# Patient Record
Sex: FEMALE | Race: WHITE | NOT HISPANIC OR LATINO | Employment: OTHER | URBAN - METROPOLITAN AREA
[De-identification: names, ages, dates, MRNs, and addresses within clinical notes are randomized per-mention and may not be internally consistent; named-entity substitution may affect disease eponyms.]

---

## 2017-01-02 ENCOUNTER — OFFICE VISIT - HEALTHEAST (OUTPATIENT)
Dept: GERIATRICS | Facility: CLINIC | Age: 62
End: 2017-01-02

## 2017-01-02 DIAGNOSIS — I10 HYPERTENSION: ICD-10-CM

## 2017-01-02 DIAGNOSIS — G89.29 ENCOUNTER FOR CHRONIC PAIN MANAGEMENT: ICD-10-CM

## 2017-01-02 DIAGNOSIS — K81.0 ACUTE CHOLECYSTITIS: ICD-10-CM

## 2017-01-02 DIAGNOSIS — E80.6 HYPERBILIRUBINEMIA: ICD-10-CM

## 2017-01-02 DIAGNOSIS — Z90.49 S/P CHOLECYSTECTOMY: ICD-10-CM

## 2017-01-02 DIAGNOSIS — D72.829 LEUKOCYTOSIS: ICD-10-CM

## 2017-01-02 DIAGNOSIS — N39.0 URINARY TRACT INFECTION: ICD-10-CM

## 2017-01-04 ENCOUNTER — COMMUNICATION - HEALTHEAST (OUTPATIENT)
Dept: SURGERY | Facility: CLINIC | Age: 62
End: 2017-01-04

## 2017-01-05 ENCOUNTER — OFFICE VISIT - HEALTHEAST (OUTPATIENT)
Dept: SURGERY | Facility: CLINIC | Age: 62
End: 2017-01-05

## 2017-01-05 ENCOUNTER — OFFICE VISIT - HEALTHEAST (OUTPATIENT)
Dept: GERIATRICS | Facility: CLINIC | Age: 62
End: 2017-01-05

## 2017-01-05 DIAGNOSIS — Z90.49 S/P CHOLECYSTECTOMY: ICD-10-CM

## 2017-01-05 DIAGNOSIS — Z48.89 POSTOPERATIVE VISIT: ICD-10-CM

## 2017-01-05 DIAGNOSIS — Z90.49 S/P CHOLE: ICD-10-CM

## 2017-01-05 DIAGNOSIS — K81.0 ACUTE CHOLECYSTITIS: ICD-10-CM

## 2017-01-05 DIAGNOSIS — R79.0 LOW MAGNESIUM LEVELS: ICD-10-CM

## 2017-01-05 DIAGNOSIS — D72.829 LEUKOCYTOSIS, UNSPECIFIED TYPE: ICD-10-CM

## 2017-01-05 DIAGNOSIS — E83.51 HYPOCALCEMIA: ICD-10-CM

## 2017-01-09 ENCOUNTER — OFFICE VISIT - HEALTHEAST (OUTPATIENT)
Dept: SURGERY | Facility: CLINIC | Age: 62
End: 2017-01-09

## 2017-01-09 ENCOUNTER — OFFICE VISIT - HEALTHEAST (OUTPATIENT)
Dept: GERIATRICS | Facility: CLINIC | Age: 62
End: 2017-01-09

## 2017-01-09 DIAGNOSIS — E83.51 HYPOCALCEMIA: ICD-10-CM

## 2017-01-09 DIAGNOSIS — D72.829 LEUKOCYTOSIS, UNSPECIFIED TYPE: ICD-10-CM

## 2017-01-09 DIAGNOSIS — Z48.89 POSTOPERATIVE VISIT: ICD-10-CM

## 2017-01-09 DIAGNOSIS — Z90.49 S/P CHOLECYSTECTOMY: ICD-10-CM

## 2017-01-09 DIAGNOSIS — R79.0 LOW MAGNESIUM LEVELS: ICD-10-CM

## 2017-01-09 DIAGNOSIS — K81.0 ACUTE CHOLECYSTITIS: ICD-10-CM

## 2017-01-10 ENCOUNTER — OFFICE VISIT - HEALTHEAST (OUTPATIENT)
Dept: GERIATRICS | Facility: CLINIC | Age: 62
End: 2017-01-10

## 2017-01-10 DIAGNOSIS — N76.6 VULVAR ULCER: ICD-10-CM

## 2017-01-12 ENCOUNTER — AMBULATORY - HEALTHEAST (OUTPATIENT)
Dept: GERIATRICS | Facility: CLINIC | Age: 62
End: 2017-01-12

## 2017-06-13 ENCOUNTER — RECORDS - HEALTHEAST (OUTPATIENT)
Dept: LAB | Facility: CLINIC | Age: 62
End: 2017-06-13

## 2017-06-13 LAB
CHOLEST SERPL-MCNC: 187 MG/DL
FASTING STATUS PATIENT QL REPORTED: ABNORMAL
HDLC SERPL-MCNC: 34 MG/DL
LDLC SERPL CALC-MCNC: 124 MG/DL
TRIGL SERPL-MCNC: 143 MG/DL

## 2018-02-05 ENCOUNTER — RECORDS - HEALTHEAST (OUTPATIENT)
Dept: LAB | Facility: CLINIC | Age: 63
End: 2018-02-05

## 2018-02-05 LAB
ALBUMIN SERPL-MCNC: 3.3 G/DL (ref 3.5–5)
ALP SERPL-CCNC: 101 U/L (ref 45–120)
ALT SERPL W P-5'-P-CCNC: 12 U/L (ref 0–45)
ANION GAP SERPL CALCULATED.3IONS-SCNC: 11 MMOL/L (ref 5–18)
AST SERPL W P-5'-P-CCNC: 19 U/L (ref 0–40)
BILIRUB SERPL-MCNC: 0.4 MG/DL (ref 0–1)
BUN SERPL-MCNC: 12 MG/DL (ref 8–22)
CALCIUM SERPL-MCNC: 8.9 MG/DL (ref 8.5–10.5)
CHLORIDE BLD-SCNC: 105 MMOL/L (ref 98–107)
CHOLEST SERPL-MCNC: 174 MG/DL
CO2 SERPL-SCNC: 25 MMOL/L (ref 22–31)
CREAT SERPL-MCNC: 0.78 MG/DL (ref 0.6–1.1)
FASTING STATUS PATIENT QL REPORTED: ABNORMAL
GFR SERPL CREATININE-BSD FRML MDRD: >60 ML/MIN/1.73M2
GLUCOSE BLD-MCNC: 96 MG/DL (ref 70–125)
HDLC SERPL-MCNC: 42 MG/DL
LDLC SERPL CALC-MCNC: 110 MG/DL
POTASSIUM BLD-SCNC: 4.7 MMOL/L (ref 3.5–5)
PROT SERPL-MCNC: 6.4 G/DL (ref 6–8)
SODIUM SERPL-SCNC: 141 MMOL/L (ref 136–145)
TRIGL SERPL-MCNC: 111 MG/DL

## 2018-02-06 ENCOUNTER — HOME CARE/HOSPICE - HEALTHEAST (OUTPATIENT)
Dept: HOME HEALTH SERVICES | Facility: HOME HEALTH | Age: 63
End: 2018-02-06

## 2018-02-07 ENCOUNTER — HOME CARE/HOSPICE - HEALTHEAST (OUTPATIENT)
Dept: HOME HEALTH SERVICES | Facility: HOME HEALTH | Age: 63
End: 2018-02-07

## 2018-02-09 ENCOUNTER — HOME CARE/HOSPICE - HEALTHEAST (OUTPATIENT)
Dept: HOME HEALTH SERVICES | Facility: HOME HEALTH | Age: 63
End: 2018-02-09

## 2018-02-12 ENCOUNTER — HOME CARE/HOSPICE - HEALTHEAST (OUTPATIENT)
Dept: HOME HEALTH SERVICES | Facility: HOME HEALTH | Age: 63
End: 2018-02-12

## 2018-02-14 ENCOUNTER — HOME CARE/HOSPICE - HEALTHEAST (OUTPATIENT)
Dept: HOME HEALTH SERVICES | Facility: HOME HEALTH | Age: 63
End: 2018-02-14

## 2018-02-16 ENCOUNTER — HOME CARE/HOSPICE - HEALTHEAST (OUTPATIENT)
Dept: HOME HEALTH SERVICES | Facility: HOME HEALTH | Age: 63
End: 2018-02-16

## 2018-02-19 ENCOUNTER — HOME CARE/HOSPICE - HEALTHEAST (OUTPATIENT)
Dept: HOME HEALTH SERVICES | Facility: HOME HEALTH | Age: 63
End: 2018-02-19

## 2018-02-21 ENCOUNTER — HOME CARE/HOSPICE - HEALTHEAST (OUTPATIENT)
Dept: HOME HEALTH SERVICES | Facility: HOME HEALTH | Age: 63
End: 2018-02-21

## 2018-02-23 ENCOUNTER — HOME CARE/HOSPICE - HEALTHEAST (OUTPATIENT)
Dept: HOME HEALTH SERVICES | Facility: HOME HEALTH | Age: 63
End: 2018-02-23

## 2018-02-27 ENCOUNTER — HOME CARE/HOSPICE - HEALTHEAST (OUTPATIENT)
Dept: HOME HEALTH SERVICES | Facility: HOME HEALTH | Age: 63
End: 2018-02-27

## 2018-05-07 ENCOUNTER — RECORDS - HEALTHEAST (OUTPATIENT)
Dept: LAB | Facility: CLINIC | Age: 63
End: 2018-05-07

## 2018-05-09 LAB
THC CARBOXYLIC ACID-BY GC/MS: 22 NG/ML
TOXICOLOGIST REVIEW: NORMAL

## 2018-08-28 ENCOUNTER — RECORDS - HEALTHEAST (OUTPATIENT)
Dept: LAB | Facility: CLINIC | Age: 63
End: 2018-08-28

## 2018-08-30 LAB
7-NH-CLONAZEPAM-BY GC/MS: NEGATIVE NG/ML
7-NH-FLUNITRAZEPAM-BY GC/MS: NEGATIVE NG/ML
ALPHA OH-ALPRAZOLAM-BY GC/MS: NEGATIVE NG/ML
ALPHA OH-TRIAZOLAM-BY GC/MS: NEGATIVE NG/ML
BACTERIA SPEC CULT: ABNORMAL
INTERPRETATION: NORMAL
LORAZEPAM-BY GC/MS: NEGATIVE NG/ML
NORDIAZEPAM-BY GC/MS: NEGATIVE NG/ML
OH-ETHYL-FLURAZEPAM-BY GC/MS: NEGATIVE NG/ML
OXAZEPAM-BY GC/MS: NEGATIVE NG/ML
TEMAZEPAM-BY GC/MS: NEGATIVE NG/ML

## 2018-08-31 LAB
THC CARBOXYLIC ACID-BY GC/MS: 3 NG/ML
TOXICOLOGIST REVIEW: NORMAL

## 2019-01-22 ENCOUNTER — RECORDS - HEALTHEAST (OUTPATIENT)
Dept: LAB | Facility: CLINIC | Age: 64
End: 2019-01-22

## 2019-01-22 LAB
ALBUMIN SERPL-MCNC: 3.5 G/DL (ref 3.5–5)
ALP SERPL-CCNC: 117 U/L (ref 45–120)
ALT SERPL W P-5'-P-CCNC: 10 U/L (ref 0–45)
ANION GAP SERPL CALCULATED.3IONS-SCNC: 11 MMOL/L (ref 5–18)
AST SERPL W P-5'-P-CCNC: 15 U/L (ref 0–40)
BILIRUB SERPL-MCNC: 0.4 MG/DL (ref 0–1)
BUN SERPL-MCNC: 13 MG/DL (ref 8–22)
CALCIUM SERPL-MCNC: 8.9 MG/DL (ref 8.5–10.5)
CHLORIDE BLD-SCNC: 106 MMOL/L (ref 98–107)
CHOLEST SERPL-MCNC: 181 MG/DL
CO2 SERPL-SCNC: 23 MMOL/L (ref 22–31)
CREAT SERPL-MCNC: 0.9 MG/DL (ref 0.6–1.1)
FASTING STATUS PATIENT QL REPORTED: ABNORMAL
GFR SERPL CREATININE-BSD FRML MDRD: >60 ML/MIN/1.73M2
GLUCOSE BLD-MCNC: 98 MG/DL (ref 70–125)
HDLC SERPL-MCNC: 41 MG/DL
LDLC SERPL CALC-MCNC: 113 MG/DL
POTASSIUM BLD-SCNC: 4.5 MMOL/L (ref 3.5–5)
PROT SERPL-MCNC: 6.3 G/DL (ref 6–8)
SODIUM SERPL-SCNC: 140 MMOL/L (ref 136–145)
TRIGL SERPL-MCNC: 136 MG/DL
TSH SERPL DL<=0.005 MIU/L-ACNC: 4.98 UIU/ML (ref 0.3–5)

## 2019-01-23 LAB — BACTERIA SPEC CULT: NO GROWTH

## 2019-05-06 ENCOUNTER — RECORDS - HEALTHEAST (OUTPATIENT)
Dept: LAB | Facility: CLINIC | Age: 64
End: 2019-05-06

## 2019-05-06 LAB — TSH SERPL DL<=0.005 MIU/L-ACNC: 8.41 UIU/ML (ref 0.3–5)

## 2019-08-21 ENCOUNTER — RECORDS - HEALTHEAST (OUTPATIENT)
Dept: LAB | Facility: CLINIC | Age: 64
End: 2019-08-21

## 2019-08-21 ENCOUNTER — TRANSFERRED RECORDS (OUTPATIENT)
Dept: HEALTH INFORMATION MANAGEMENT | Facility: CLINIC | Age: 64
End: 2019-08-21

## 2019-08-21 LAB — TSH SERPL DL<=0.005 MIU/L-ACNC: 0.83 UIU/ML (ref 0.3–5)

## 2019-10-27 ENCOUNTER — HOSPITAL ENCOUNTER (EMERGENCY)
Facility: CLINIC | Age: 64
Discharge: HOME | End: 2019-10-27
Attending: EMERGENCY MEDICINE | Admitting: EMERGENCY MEDICINE
Payer: MEDICARE

## 2019-10-27 ENCOUNTER — APPOINTMENT (OUTPATIENT)
Dept: ULTRASOUND IMAGING | Facility: CLINIC | Age: 64
End: 2019-10-27
Attending: EMERGENCY MEDICINE
Payer: MEDICARE

## 2019-10-27 VITALS
BODY MASS INDEX: 29.62 KG/M2 | RESPIRATION RATE: 18 BRPM | DIASTOLIC BLOOD PRESSURE: 82 MMHG | HEIGHT: 69 IN | TEMPERATURE: 98.2 F | WEIGHT: 200 LBS | OXYGEN SATURATION: 99 % | SYSTOLIC BLOOD PRESSURE: 198 MMHG

## 2019-10-27 DIAGNOSIS — L03.115 CELLULITIS OF RIGHT LOWER EXTREMITY: ICD-10-CM

## 2019-10-27 DIAGNOSIS — M79.89 RIGHT LEG SWELLING: ICD-10-CM

## 2019-10-27 LAB
ALBUMIN SERPL-MCNC: 2.7 G/DL (ref 3.4–5)
ALP SERPL-CCNC: 120 U/L (ref 40–150)
ALT SERPL W P-5'-P-CCNC: 12 U/L (ref 0–50)
ANION GAP SERPL CALCULATED.3IONS-SCNC: 7 MMOL/L (ref 3–14)
AST SERPL W P-5'-P-CCNC: 17 U/L (ref 0–45)
BASOPHILS # BLD AUTO: 0 10E9/L (ref 0–0.2)
BASOPHILS NFR BLD AUTO: 0.4 %
BILIRUB SERPL-MCNC: 0.4 MG/DL (ref 0.2–1.3)
BUN SERPL-MCNC: 10 MG/DL (ref 7–30)
CALCIUM SERPL-MCNC: 8.4 MG/DL (ref 8.5–10.1)
CHLORIDE SERPL-SCNC: 109 MMOL/L (ref 94–109)
CO2 SERPL-SCNC: 25 MMOL/L (ref 20–32)
CREAT SERPL-MCNC: 0.97 MG/DL (ref 0.52–1.04)
DIFFERENTIAL METHOD BLD: ABNORMAL
EOSINOPHIL # BLD AUTO: 0.3 10E9/L (ref 0–0.7)
EOSINOPHIL NFR BLD AUTO: 3.5 %
ERYTHROCYTE [DISTWIDTH] IN BLOOD BY AUTOMATED COUNT: 14.1 % (ref 10–15)
GFR SERPL CREATININE-BSD FRML MDRD: 61 ML/MIN/{1.73_M2}
GLUCOSE SERPL-MCNC: 86 MG/DL (ref 70–99)
HCT VFR BLD AUTO: 35.5 % (ref 35–47)
HGB BLD-MCNC: 11.1 G/DL (ref 11.7–15.7)
IMM GRANULOCYTES # BLD: 0 10E9/L (ref 0–0.4)
IMM GRANULOCYTES NFR BLD: 0.3 %
LACTATE BLD-SCNC: 0.7 MMOL/L (ref 0.7–2)
LYMPHOCYTES # BLD AUTO: 1.2 10E9/L (ref 0.8–5.3)
LYMPHOCYTES NFR BLD AUTO: 17.3 %
MCH RBC QN AUTO: 29.3 PG (ref 26.5–33)
MCHC RBC AUTO-ENTMCNC: 31.3 G/DL (ref 31.5–36.5)
MCV RBC AUTO: 94 FL (ref 78–100)
MONOCYTES # BLD AUTO: 0.5 10E9/L (ref 0–1.3)
MONOCYTES NFR BLD AUTO: 7 %
NEUTROPHILS # BLD AUTO: 5.1 10E9/L (ref 1.6–8.3)
NEUTROPHILS NFR BLD AUTO: 71.5 %
NRBC # BLD AUTO: 0 10*3/UL
NRBC BLD AUTO-RTO: 0 /100
PLATELET # BLD AUTO: 239 10E9/L (ref 150–450)
POTASSIUM SERPL-SCNC: 4.8 MMOL/L (ref 3.4–5.3)
PROT SERPL-MCNC: 6.2 G/DL (ref 6.8–8.8)
RBC # BLD AUTO: 3.79 10E12/L (ref 3.8–5.2)
SODIUM SERPL-SCNC: 141 MMOL/L (ref 133–144)
WBC # BLD AUTO: 7.1 10E9/L (ref 4–11)

## 2019-10-27 PROCEDURE — 93971 EXTREMITY STUDY: CPT | Mod: RT

## 2019-10-27 PROCEDURE — 87040 BLOOD CULTURE FOR BACTERIA: CPT | Performed by: EMERGENCY MEDICINE

## 2019-10-27 PROCEDURE — 80053 COMPREHEN METABOLIC PANEL: CPT | Performed by: EMERGENCY MEDICINE

## 2019-10-27 PROCEDURE — 96374 THER/PROPH/DIAG INJ IV PUSH: CPT | Performed by: EMERGENCY MEDICINE

## 2019-10-27 PROCEDURE — 99285 EMERGENCY DEPT VISIT HI MDM: CPT | Mod: 25 | Performed by: EMERGENCY MEDICINE

## 2019-10-27 PROCEDURE — 96361 HYDRATE IV INFUSION ADD-ON: CPT | Performed by: EMERGENCY MEDICINE

## 2019-10-27 PROCEDURE — 25000128 H RX IP 250 OP 636: Performed by: EMERGENCY MEDICINE

## 2019-10-27 PROCEDURE — 99284 EMERGENCY DEPT VISIT MOD MDM: CPT | Mod: Z6 | Performed by: EMERGENCY MEDICINE

## 2019-10-27 PROCEDURE — 83605 ASSAY OF LACTIC ACID: CPT | Performed by: EMERGENCY MEDICINE

## 2019-10-27 PROCEDURE — 85025 COMPLETE CBC W/AUTO DIFF WBC: CPT | Performed by: EMERGENCY MEDICINE

## 2019-10-27 RX ORDER — CEPHALEXIN 500 MG/1
500 CAPSULE ORAL 4 TIMES DAILY
Qty: 40 CAPSULE | Refills: 0 | Status: SHIPPED | OUTPATIENT
Start: 2019-10-27 | End: 2019-11-06

## 2019-10-27 RX ORDER — CEFAZOLIN SODIUM 2 G/100ML
2 INJECTION, SOLUTION INTRAVENOUS EVERY 8 HOURS
Status: DISCONTINUED | OUTPATIENT
Start: 2019-10-27 | End: 2019-10-27 | Stop reason: HOSPADM

## 2019-10-27 RX ORDER — SODIUM CHLORIDE 9 MG/ML
1000 INJECTION, SOLUTION INTRAVENOUS CONTINUOUS
Status: DISCONTINUED | OUTPATIENT
Start: 2019-10-27 | End: 2019-10-27 | Stop reason: HOSPADM

## 2019-10-27 RX ADMIN — SODIUM CHLORIDE 1000 ML: 9 INJECTION, SOLUTION INTRAVENOUS at 13:55

## 2019-10-27 RX ADMIN — CEFAZOLIN SODIUM 2 G: 2 INJECTION, SOLUTION INTRAVENOUS at 13:56

## 2019-10-27 ASSESSMENT — MIFFLIN-ST. JEOR: SCORE: 1516.57

## 2019-10-27 ASSESSMENT — ENCOUNTER SYMPTOMS: WOUND: 1

## 2019-10-27 NOTE — ED PROVIDER NOTES
History     Chief Complaint   Patient presents with     Leg Swelling     HPI  Carolina Mari is a 64 year old female who presents to the emergency department today for evaluation of leg swelling. Patient developed bilateral leg swelling a few weeks ago. She has been using her compression socks but it has continued to get worse. Her right leg is worse that her left and is weeping with erythema. Patient notes that she has rashes on her butt and abdomen. She notes that her abdomen rash is from her gastric bypass surgery which was years ago but is continuing to give her problems. She has no history of blood clots.     Past Medical History   Past Medical History:   Diagnosis Date     Diabetes mellitus (H)      History of stomach ulcers      HTN (hypertension)      Knee osteoarthritis     right     Osteoarthritis of right hip      Osteoporosis      Osteoporosis      Thyroid disease        Problem List  Patient Active Problem List   Diagnosis     Osteoarthritis of right hip     Open wound of abdomen     Morbid obesity (H)     Diabetes mellitus, type 2 (H)     Tobacco use disorder       Past Surgical History  Past Surgical History:   Procedure Laterality Date     FOOT SURGERY       GASTRIC BYPASS       HC SACROPLASTY       tka      right       Social History  Social History     Socioeconomic History     Marital status:      Spouse name: Not on file     Number of children: Not on file     Years of education: Not on file     Highest education level: Not on file   Occupational History     Not on file   Social Needs     Financial resource strain: Not on file     Food insecurity:     Worry: Not on file     Inability: Not on file     Transportation needs:     Medical: Not on file     Non-medical: Not on file   Tobacco Use     Smoking status: Never Smoker     Smokeless tobacco: Never Used   Substance and Sexual Activity     Alcohol use: No     Drug use: No     Sexual activity: Not on file   Lifestyle     Physical  activity:     Days per week: Not on file     Minutes per session: Not on file     Stress: Not on file   Relationships     Social connections:     Talks on phone: Not on file     Gets together: Not on file     Attends [employer] service: Not on file     Active member of club or organization: Not on file     Attends meetings of clubs or organizations: Not on file     Relationship status: Not on file     Intimate partner violence:     Fear of current or ex partner: Not on file     Emotionally abused: Not on file     Physically abused: Not on file     Forced sexual activity: Not on file   Other Topics Concern     Not on file   Social History Narrative     Not on file       Allergies:  No Known Allergies    Problem List:    Patient Active Problem List    Diagnosis Date Noted     Open wound of abdomen 07/01/2015     Priority: Medium     Morbid obesity (H) 07/01/2015     Priority: Medium     Diabetes mellitus, type 2 (H) 07/01/2015     Priority: Medium     Tobacco use disorder 07/01/2015     Priority: Medium     Osteoarthritis of right hip 04/22/2015     Priority: Medium        Past Medical History:    Past Medical History:   Diagnosis Date     Diabetes mellitus (H)      History of stomach ulcers      HTN (hypertension)      Knee osteoarthritis      Osteoarthritis of right hip      Osteoporosis      Osteoporosis      Thyroid disease        Past Surgical History:    Past Surgical History:   Procedure Laterality Date     FOOT SURGERY       GASTRIC BYPASS       HC SACROPLASTY       tka      right       Family History:    Family History   Problem Relation Age of Onset     Coronary Artery Disease Father      Coronary Artery Disease Brother      Cancer Mother         bone     Cancer Brother         leukemia       Social History:  Marital Status:   [4]  Social History     Tobacco Use     Smoking status: Never Smoker     Smokeless tobacco: Never Used   Substance Use Topics     Alcohol use: No     Drug use: No     "    Medications:    cephALEXin (KEFLEX) 500 MG capsule  ATENOLOL PO  colchicine 0.6 MG tablet  cyanocobalamin (VITAMIN B12) 1000 MCG/ML injection  CYCLOBENZAPRINE HCL PO  diclofenac (VOLTAREN) 1 % GEL  ferrous sulfate (IRON) 325 (65 FE) MG tablet  FLUOXETINE HCL PO  GABAPENTIN PO  LEVOTHYROXINE SODIUM PO  LOSARTAN POTASSIUM PO  MELATONIN PO  multivitamin, therapeutic with minerals (MULTI-VITAMIN) TABS  NAPROXEN SODIUM PO  Nystatin (NYAMYC) 753909 UNIT/GM POWD  OxyCODONE HCl (OXYCONTIN PO)  OXYCODONE HCL ER PO  predniSONE (DELTASONE) 10 MG tablet  RANITIDINE HCL PO  TRAZODONE HCL PO          Review of Systems   Cardiovascular: Positive for leg swelling.   Skin: Positive for rash and wound.   All other systems reviewed and are negative.      Physical Exam   BP: (!) 198/82  Heart Rate: 55  Temp: 98.2  F (36.8  C)  Resp: 18  Height: 175.3 cm (5' 9\")  Weight: 90.7 kg (200 lb)  SpO2: 99 %      Physical Exam General alert cooperative female in mild to moderate distress.  HEENT shows no scleral icterus.  She will speak with sentences.  Lungs are clear.  Cardiac auscultation was normal.  Her abdomen reveals open sores in the mid abdomen healing by secondary intention.  Did not look secondarily infected.  The abdomen otherwise is nontender.  On her right buttock or proximal thigh she has a superficial lesion but again does not look secondarily infected.  Her right leg is swollen, erythematous, some mild superficial ulcers and sanguinous oozing from those areas as noted.  Intact pulses.  No joint effusions.              ED Course   1:24 PM Patient assessed.        Procedures               Critical Care time:  none               Results for orders placed or performed during the hospital encounter of 10/27/19 (from the past 24 hour(s))   CBC with platelets differential   Result Value Ref Range    WBC 7.1 4.0 - 11.0 10e9/L    RBC Count 3.79 (L) 3.8 - 5.2 10e12/L    Hemoglobin 11.1 (L) 11.7 - 15.7 g/dL    Hematocrit 35.5 35.0 - " 47.0 %    MCV 94 78 - 100 fl    MCH 29.3 26.5 - 33.0 pg    MCHC 31.3 (L) 31.5 - 36.5 g/dL    RDW 14.1 10.0 - 15.0 %    Platelet Count 239 150 - 450 10e9/L    Diff Method Automated Method     % Neutrophils 71.5 %    % Lymphocytes 17.3 %    % Monocytes 7.0 %    % Eosinophils 3.5 %    % Basophils 0.4 %    % Immature Granulocytes 0.3 %    Nucleated RBCs 0 0 /100    Absolute Neutrophil 5.1 1.6 - 8.3 10e9/L    Absolute Lymphocytes 1.2 0.8 - 5.3 10e9/L    Absolute Monocytes 0.5 0.0 - 1.3 10e9/L    Absolute Eosinophils 0.3 0.0 - 0.7 10e9/L    Absolute Basophils 0.0 0.0 - 0.2 10e9/L    Abs Immature Granulocytes 0.0 0 - 0.4 10e9/L    Absolute Nucleated RBC 0.0    Comprehensive metabolic panel   Result Value Ref Range    Sodium 141 133 - 144 mmol/L    Potassium 4.8 3.4 - 5.3 mmol/L    Chloride 109 94 - 109 mmol/L    Carbon Dioxide 25 20 - 32 mmol/L    Anion Gap 7 3 - 14 mmol/L    Glucose 86 70 - 99 mg/dL    Urea Nitrogen 10 7 - 30 mg/dL    Creatinine 0.97 0.52 - 1.04 mg/dL    GFR Estimate 61 >60 mL/min/[1.73_m2]    GFR Estimate If Black 70 >60 mL/min/[1.73_m2]    Calcium 8.4 (L) 8.5 - 10.1 mg/dL    Bilirubin Total 0.4 0.2 - 1.3 mg/dL    Albumin 2.7 (L) 3.4 - 5.0 g/dL    Protein Total 6.2 (L) 6.8 - 8.8 g/dL    Alkaline Phosphatase 120 40 - 150 U/L    ALT 12 0 - 50 U/L    AST 17 0 - 45 U/L   Lactic acid whole blood   Result Value Ref Range    Lactic Acid 0.7 0.7 - 2.0 mmol/L   US Lower Extremity Venous Duplex Right    Narrative    ULTRASOUND VENOUS LOWER EXTREMITY UNILATERAL RIGHT  10/27/2019 2:36 PM      HISTORY: Unilateral leg swelling with concerns for deep venous  thrombosis.    COMPARISON: None.    TECHNIQUE: Ultrasound gray scale, Color Doppler flow, and spectral  Doppler waveform analysis performed.    FINDINGS: The right common femoral, superficial femoral, popliteal and  posterior tibial veins are patent and fully compressible and  demonstrate normal venous Doppler flow. The visualized greater  saphenous vein is  negative for thrombus.      Impression    IMPRESSION: No deep venous thrombosis demonstrated.    REINA REED MD       Medications   0.9% sodium chloride BOLUS (1,000 mLs Intravenous New Bag 10/27/19 1355)     Followed by   sodium chloride 0.9% infusion (has no administration in time range)   ceFAZolin (ANCEF) intermittent infusion 2 g in 100 mL dextrose PRE-MIX (2 g Intravenous Given 10/27/19 1356)     IV is established.  Blood work is obtained and noted above.  Patient given Ancef.  Ultrasound was ordered to assess for DVT.  Assessments & Plan (with Medical Decision Making)   Carolina Mari is a 64 year old female who presents to the emergency department today for evaluation of leg swelling. Patient developed bilateral leg swelling a few weeks ago. She has been using her compression socks but it has continued to get worse. Her right leg is worse that her left and is weeping with erythema. Patient notes that she has rashes on her butt and abdomen. She notes that her abdomen rash is from her gastric bypass surgery which was years ago but is continuing to give her problems. She has no history of blood clots.  On presentation patient was afebrile.  Her right leg was swollen and erythematous and had some draining lesions that were superficial and had sanguinous discharge.  Ultrasound showed no DVT.  Blood work including a lactic acid was normal.  She was given Ancef IV for suspected cellulitis.  Recommend she take Keflex for 10 days.  Will cover the lesions and they can change dressings as needed.  Recommend her compressive stockings.  Recheck with her primary doctor in 3 to 4 days.  Return if new concerning symptoms.  I have reviewed the nursing notes.    I have reviewed the findings, diagnosis, plan and need for follow up with the patient.       New Prescriptions    CEPHALEXIN (KEFLEX) 500 MG CAPSULE    Take 1 capsule (500 mg) by mouth 4 times daily for 10 days       Final diagnoses:   Right leg swelling    Cellulitis of right lower extremity     This document serves as a record of the services and decisions personally performed and made by Grayson Epps MD. It was created on HIS/HER behalf by Hilda Chong, a trained medical scribe. The creation of this document is based on the provider's statements to the medical scribe.  Hilda Chong 1:29 PM 10/27/2019    Provider:  The information in this document, created by the medical scribe for me, accurately reflects the services I personally performed and the decisions made by me. I have reviewed and approved this document for accuracy prior to leaving the patient care area.  Grayson Epps MD 1:29 PM 10/27/2019    10/27/2019   [facility] EMERGENCY DEPARTMENT     Grayson Epps MD  10/27/19 1514

## 2019-10-27 NOTE — DISCHARGE INSTRUCTIONS
Keflex as directed for 10 days.  Keep the lesions clean and covered.  Compression stockings.  Follow-up with your doctor in 3 to 4 days for reassessment to make sure things are improving.  The infection/redness may get worse before the antibiotic's have time to take effect.

## 2019-10-27 NOTE — ED AVS SNAPSHOT
[facility] Emergency Department  [address] [address]  Phone:  [phone]  Fax:  [phone]                                    Carolina Mari   MRN: [id number]    Department:  [facility] Emergency Department   Date of Visit:  10/27/2019           After Visit Summary Signature Page    I have received my discharge instructions, and my questions have been answered. I have discussed any challenges I see with this plan with the nurse or doctor.    ..........................................................................................................................................  Patient/Patient Representative Signature      ..........................................................................................................................................  Patient Representative Print Name and Relationship to Patient    ..................................................               ................................................  Date                                   Time    ..........................................................................................................................................  Reviewed by Signature/Title    ...................................................              ..............................................  Date                                               Time          22EPIC Rev 08/18

## 2019-11-02 LAB
BACTERIA SPEC CULT: NO GROWTH
BACTERIA SPEC CULT: NO GROWTH
Lab: NORMAL
Lab: NORMAL
SPECIMEN SOURCE: NORMAL
SPECIMEN SOURCE: NORMAL

## 2019-11-06 ENCOUNTER — OFFICE VISIT (OUTPATIENT)
Dept: FAMILY MEDICINE | Facility: CLINIC | Age: 64
End: 2019-11-06
Payer: MEDICARE

## 2019-11-06 VITALS
WEIGHT: 247 LBS | HEART RATE: 73 BPM | SYSTOLIC BLOOD PRESSURE: 144 MMHG | TEMPERATURE: 98.1 F | RESPIRATION RATE: 16 BRPM | HEIGHT: 69 IN | DIASTOLIC BLOOD PRESSURE: 60 MMHG | BODY MASS INDEX: 36.58 KG/M2 | OXYGEN SATURATION: 97 %

## 2019-11-06 DIAGNOSIS — L03.90 CELLULITIS, UNSPECIFIED CELLULITIS SITE: Primary | ICD-10-CM

## 2019-11-06 DIAGNOSIS — M06.4 UNDIFFERENTIATED INFLAMMATORY ARTHRITIS (H): ICD-10-CM

## 2019-11-06 DIAGNOSIS — Z23 NEED FOR PROPHYLACTIC VACCINATION AND INOCULATION AGAINST INFLUENZA: ICD-10-CM

## 2019-11-06 PROCEDURE — G0008 ADMIN INFLUENZA VIRUS VAC: HCPCS | Performed by: FAMILY MEDICINE

## 2019-11-06 PROCEDURE — 90662 IIV NO PRSV INCREASED AG IM: CPT | Performed by: FAMILY MEDICINE

## 2019-11-06 PROCEDURE — 99204 OFFICE O/P NEW MOD 45 MIN: CPT | Mod: 25 | Performed by: FAMILY MEDICINE

## 2019-11-06 PROCEDURE — 87070 CULTURE OTHR SPECIMN AEROBIC: CPT | Performed by: FAMILY MEDICINE

## 2019-11-06 RX ORDER — OXYCODONE HYDROCHLORIDE 15 MG/1
15 TABLET, FILM COATED, EXTENDED RELEASE ORAL
Refills: 0 | Status: CANCELLED | OUTPATIENT
Start: 2019-11-06

## 2019-11-06 RX ORDER — OXYCODONE HYDROCHLORIDE 30 MG/1
30 TABLET, FILM COATED, EXTENDED RELEASE ORAL EVERY 8 HOURS
Refills: 0 | Status: CANCELLED | OUTPATIENT
Start: 2019-11-06

## 2019-11-06 RX ORDER — SULFAMETHOXAZOLE/TRIMETHOPRIM 800-160 MG
1 TABLET ORAL 2 TIMES DAILY
Qty: 20 TABLET | Refills: 0 | Status: SHIPPED | OUTPATIENT
Start: 2019-11-06 | End: 2019-11-16

## 2019-11-06 ASSESSMENT — ANXIETY QUESTIONNAIRES
3. WORRYING TOO MUCH ABOUT DIFFERENT THINGS: NOT AT ALL
6. BECOMING EASILY ANNOYED OR IRRITABLE: NOT AT ALL
7. FEELING AFRAID AS IF SOMETHING AWFUL MIGHT HAPPEN: NOT AT ALL
2. NOT BEING ABLE TO STOP OR CONTROL WORRYING: NOT AT ALL
5. BEING SO RESTLESS THAT IT IS HARD TO SIT STILL: NOT AT ALL
1. FEELING NERVOUS, ANXIOUS, OR ON EDGE: NOT AT ALL
GAD7 TOTAL SCORE: 0

## 2019-11-06 ASSESSMENT — PAIN SCALES - GENERAL: PAINLEVEL: EXTREME PAIN (8)

## 2019-11-06 ASSESSMENT — PATIENT HEALTH QUESTIONNAIRE - PHQ9
5. POOR APPETITE OR OVEREATING: NOT AT ALL
SUM OF ALL RESPONSES TO PHQ QUESTIONS 1-9: 0

## 2019-11-06 ASSESSMENT — MIFFLIN-ST. JEOR: SCORE: 1729.76

## 2019-11-06 NOTE — PROGRESS NOTES
Subjective     Carolina Mari is a 64 year old female who presents to clinic today for the following health issues:    HPI   Problem:   Chief Complaint   Patient presents with     [facility]     med recheck     Cellulitis     right foot, states she had her last dose of Keflex this morning     Flu Shot     Patient Request     would like order for farro wraps     ADDITIONAL HPI: 64 year old female here for above issue.      ROS: 10 point review of systems negative except as per HPI.    PAST MEDICAL HISTORY:  Past Medical History:   Diagnosis Date     Diabetes mellitus (H)      History of stomach ulcers      HTN (hypertension)      Knee osteoarthritis     right     Osteoarthritis of right hip      Osteoporosis      Osteoporosis      Thyroid disease         ACTIVE MEDICAL PROBLEMS:  Patient Active Problem List   Diagnosis     Osteoarthritis of right hip     Open wound of abdomen     Morbid obesity (H)     Diabetes mellitus, type 2 (H)     Tobacco use disorder        FAMILY HISTORY:  Family History   Problem Relation Age of Onset     Coronary Artery Disease Father      Coronary Artery Disease Brother      Cancer Mother         bone     Cancer Brother         leukemia       SOCIAL HISTORY:  Social History     Socioeconomic History     Marital status:      Spouse name: Not on file     Number of children: Not on file     Years of education: Not on file     Highest education level: Not on file   Occupational History     Not on file   Social Needs     Financial resource strain: Not on file     Food insecurity:     Worry: Not on file     Inability: Not on file     Transportation needs:     Medical: Not on file     Non-medical: Not on file   Tobacco Use     Smoking status: Never Smoker     Smokeless tobacco: Never Used   Substance and Sexual Activity     Alcohol use: No     Drug use: No     Sexual activity: Not on file   Lifestyle     Physical activity:     Days per week: Not on file     Minutes per session:  Not on file     Stress: Not on file   Relationships     Social connections:     Talks on phone: Not on file     Gets together: Not on file     Attends Shinto service: Not on file     Active member of club or organization: Not on file     Attends meetings of clubs or organizations: Not on file     Relationship status: Not on file     Intimate partner violence:     Fear of current or ex partner: Not on file     Emotionally abused: Not on file     Physically abused: Not on file     Forced sexual activity: Not on file   Other Topics Concern     Not on file   Social History Narrative     Not on file       MEDICATIONS:  Current Outpatient Medications   Medication Sig Dispense Refill     ATENOLOL PO Take 100 mg by mouth 2 times daily       cephALEXin (KEFLEX) 500 MG capsule Take 1 capsule (500 mg) by mouth 4 times daily for 10 days 40 capsule 0     cyanocobalamin (VITAMIN B12) 1000 MCG/ML injection Inject 1 mL into the muscle every 30 days       diclofenac (VOLTAREN) 1 % GEL Place onto the skin 4 times daily       FLUOXETINE HCL PO Take 60 mg by mouth daily       GABAPENTIN PO Take 300 mg by mouth 3 times daily       LEVOTHYROXINE SODIUM PO Take 200 mcg by mouth daily       LOSARTAN POTASSIUM PO Take 100 mg by mouth daily       multivitamin, therapeutic with minerals (MULTI-VITAMIN) TABS Take 1 tablet by mouth daily       NAPROXEN SODIUM PO Take 275 mg by mouth 2 times daily (with meals)       Nystatin (NYAMYC) 859744 UNIT/GM POWD        OxyCODONE HCl (OXYCONTIN PO) Take 30 mg by mouth every 8 hours       OXYCODONE HCL ER PO Take 15 mg by mouth every 4 hours as needed        RANITIDINE HCL PO Take 150 mg by mouth 2 times daily       CYCLOBENZAPRINE HCL PO Take 10 mg by mouth 3 times daily       ferrous sulfate (IRON) 325 (65 FE) MG tablet Take 325 mg by mouth 2 times daily       MELATONIN PO Take 3 mg by mouth nightly as needed       TRAZODONE HCL PO Take 50 mg by mouth At Bedtime         ALLERGIES:   No Known  "Allergies    Problem list, Medication list, Allergies, and Medical/Social/Surgical histories reviewed in [facility] and updated as appropriate.    OBJECTIVE:                                                    VITALS: BP (!) 144/60 (BP Location: Right arm)   Pulse 73   Temp 98.1  F (36.7  C) (Tympanic)   Resp 16   Ht 1.753 m (5' 9\")   Wt 112 kg (247 lb)   SpO2 97%   BMI 36.48 kg/m   Body mass index is 36.48 kg/m .  GENERAL: Pleasant, well appearing female.  CV: RRR, no murmurs, rubs or gallops.  LUNGS: CTAB, normal effort.   EXTREMITIES: right lower extremity diffuse 2+ pitting edema and erythema  with weeping of serous fluid. Culture obtained.    ASSESSMENT/PLAN:                                                    1. Cellulitis, unspecified cellulitis site  Culture obtained. Switch to septra for broader antibiotic coverage. Farrow wraps ordered as she cannot put on traditional compression hose. In the interim we did wrap today with kerlix and ace. She can change this daily until she gets her Farrow wraps.  - order for DME; Equipment being ordered: Farrow compression wrap.  Dispense: 1 each; Refill: 1  - sulfamethoxazole-trimethoprim (BACTRIM DS/SEPTRA DS) 800-160 MG tablet; Take 1 tablet by mouth 2 times daily for 10 days  Dispense: 20 tablet; Refill: 0  - Wound Culture Aerobic Bacterial    2. Undifferentiated inflammatory arthritis (H)  She is currently on Oxycontin 30mg TID and oxycodone 15mg 5/day. This is equal to 270 MME per day. Discussed with her that I would not be willing to take on prescribing of narcotics at those doses. Discussed high risk of unintentional overdose at these doses. Offered pain clinic referral. She declined and will continue to see previous provider in [place].     3. Need for prophylactic vaccination and inoculation against influenza  - INFLUENZA (HIGH DOSE) 3 VALENT VACCINE [99366]  - ADMIN INFLUENZA (For MEDICARE Patients ONLY) []      "

## 2019-11-06 NOTE — PATIENT INSTRUCTIONS
Our Clinic hours are:  Mondays    7:20 am - 7 pm  Tues -  Fri  7:20 am - 5 pm    Clinic Phone: [phone]    The clinic lab opens at 7:30 am Mon - Fri and appointments are required.    [facility]. [phone]  Monday  8 am - 7pm  Tues - Fri 8 am - 5:30 pm

## 2019-11-07 ASSESSMENT — ANXIETY QUESTIONNAIRES: GAD7 TOTAL SCORE: 0

## 2019-11-08 LAB
BACTERIA SPEC CULT: NO GROWTH
Lab: NORMAL
SPECIMEN SOURCE: NORMAL

## 2019-11-18 ENCOUNTER — HOSPITAL LABORATORY (OUTPATIENT)
Facility: OTHER | Age: 64
End: 2019-11-18

## 2019-11-19 LAB
ANION GAP SERPL CALCULATED.3IONS-SCNC: 2 MMOL/L (ref 3–14)
BUN SERPL-MCNC: 21 MG/DL (ref 7–30)
CALCIUM SERPL-MCNC: 8.4 MG/DL (ref 8.5–10.1)
CHLORIDE SERPL-SCNC: 110 MMOL/L (ref 94–109)
CO2 SERPL-SCNC: 25 MMOL/L (ref 20–32)
CREAT SERPL-MCNC: 1.1 MG/DL (ref 0.52–1.04)
GFR SERPL CREATININE-BSD FRML MDRD: 52 ML/MIN/{1.73_M2}
GLUCOSE SERPL-MCNC: 87 MG/DL (ref 70–99)
POTASSIUM SERPL-SCNC: 5.2 MMOL/L (ref 3.4–5.3)
SODIUM SERPL-SCNC: 137 MMOL/L (ref 133–144)

## 2019-12-10 ENCOUNTER — PATIENT OUTREACH (OUTPATIENT)
Dept: GERIATRIC MEDICINE | Facility: CLINIC | Age: 64
End: 2019-12-10

## 2019-12-10 PROBLEM — Z76.89 HEALTH CARE HOME: Status: ACTIVE | Noted: 2019-12-10

## 2019-12-10 NOTE — PROGRESS NOTES
[facility] Care Coordination Contact    Member became effective with  [facility] on 12/01/2019 with [facility] MSC+.  Previous Health Plan: [facility] MSC+  Previous Care System: [facility]  Previous care coordinators name and number: Jessica Palma Type: CADI  Last MMIS Entry: Date 11/06/19 and Type 06 REASSMT      UTF received: Yes: Received and saved to member file    Irma Maikel  Care Management Specialist  [facility]  [phone]

## 2019-12-10 NOTE — LETTER
December 10, 2019      CAROLINA LISA REYES  [address]   [place] [address]        Dear Carolina:    Welcome to [facility] s [place] [employer] Plus (MSC+) health program. My name is GILBERT Negron, [facility]. I am your MSC+ care coordinator.     I will call you soon to introduce myself and discuss the services you are receiving. I will not replace your CADI waiver , but we will work together to coordinate your Medicaid and waiver benefits.Our goal is to provide services that will keep you as healthy and independent as possible.     MSC+ includes the benefits you may receive from Medical Assistance. Soon, you will receive a new MSC+ member identification (ID) card from [facility]. Please use this card whenever you get health services. If you have Medicare, you will need to show your Medicare card when you get health services.       If you have questions, please call me at [phone]. If you reach my voice mail, leave a message and your phone number. If you are hearing impaired, please call the [place] Relay at 812 or [phone] (kjkxzo-[id number] relay service).    Sincerely,         GILBERT Negron [facility]    E-mail: [email or web address]  Phone: [phone]      Zack Pacheco          MS MSC+ [id number] DHS Approved ([id number])                                 [id number] (11/18)                   _

## 2019-12-19 ENCOUNTER — PATIENT OUTREACH (OUTPATIENT)
Dept: GERIATRIC MEDICINE | Facility: CLINIC | Age: 64
End: 2019-12-19

## 2019-12-19 NOTE — PROGRESS NOTES
[facility] Care Coordination Contact    [facility] Care System Change (Transfer)    12/31/19  Member is new enrollee to [facility] effective 12/1/19 with [facility]+ health plan. Member transferred from [facility].    No home visit required because this care coordinator (CC) has received all required documentation from the previous CC.    Writer t/c to member, introduced self as member's new CC. Confirmed with member that the welcome letter with writer's name and contact information has been received.  Reviewed [facility]/Health Risk Assessment (HRA) and POC with member. No changes noted.  Transitional HRA completed. Care Plan Summary updated and reflects current services.  Required referral authorization information communicated to CMS: Not applicable    MMIS entry completed by: Care Coordinator    Writer reviewed the following with member:    ER visits: Yes, Zack Mobley with cellulitis  Hospitalizations: No  TCU stays: No  Significant health status changes: None  Falls/Injuries: No  ADL/IADL changes: No  Changes in services: No    Follow-Up Plan: Member informed of future contact, plan to f/u with member with at next regularly scheduled contact.  Contact information shared with member and family, encouraged member to call with any questions or concerns.    Carolina did say that she actually sees a physician with [facility], Le Romo, and saw a [facility] once but still seeing Dr. Romo.     CC will process clinic change and care system change back to [facility] effective 1/1/20.    GILBERT Negron, [facility]  [phone]    12/19/19 Left message with Carolina and her daughter Elizabeth on this date requesting call back to discuss her change to [facility] for care coordination.    GILBERT Negron, [facility]  [phone]

## 2020-01-07 NOTE — PROGRESS NOTES
Faxed PCC Change to [facility] for change eff 2/1/20.  Member does not see [facility] PCP but Dr Le Romo at [facility].    UTF will be sent when change confirmed.    Irma Ko  Care Management Specialist  [facility]  [phone]

## 2020-02-14 PROBLEM — Z76.89 HEALTH CARE HOME: Status: RESOLVED | Noted: 2019-12-10 | Resolved: 2020-02-14

## 2020-02-21 ENCOUNTER — RECORDS - HEALTHEAST (OUTPATIENT)
Dept: LAB | Facility: CLINIC | Age: 65
End: 2020-02-21

## 2020-02-21 LAB
ANION GAP SERPL CALCULATED.3IONS-SCNC: 8 MMOL/L (ref 5–18)
BUN SERPL-MCNC: 12 MG/DL (ref 8–22)
CALCIUM SERPL-MCNC: 8.8 MG/DL (ref 8.5–10.5)
CHLORIDE BLD-SCNC: 104 MMOL/L (ref 98–107)
CHOLEST SERPL-MCNC: 135 MG/DL
CO2 SERPL-SCNC: 27 MMOL/L (ref 22–31)
CREAT SERPL-MCNC: 0.99 MG/DL (ref 0.6–1.1)
FASTING STATUS PATIENT QL REPORTED: ABNORMAL
GFR SERPL CREATININE-BSD FRML MDRD: 56 ML/MIN/1.73M2
GLUCOSE BLD-MCNC: 85 MG/DL (ref 70–125)
HDLC SERPL-MCNC: 42 MG/DL
LDLC SERPL CALC-MCNC: 76 MG/DL
POTASSIUM BLD-SCNC: 4.6 MMOL/L (ref 3.5–5)
SODIUM SERPL-SCNC: 139 MMOL/L (ref 136–145)
TRIGL SERPL-MCNC: 84 MG/DL
TSH SERPL DL<=0.005 MIU/L-ACNC: 3.87 UIU/ML (ref 0.3–5)

## 2020-11-04 ENCOUNTER — RECORDS - HEALTHEAST (OUTPATIENT)
Dept: LAB | Facility: CLINIC | Age: 65
End: 2020-11-04

## 2020-11-04 LAB
ANION GAP SERPL CALCULATED.3IONS-SCNC: 9 MMOL/L (ref 5–18)
BUN SERPL-MCNC: 14 MG/DL (ref 8–22)
CALCIUM SERPL-MCNC: 8.2 MG/DL (ref 8.5–10.5)
CHLORIDE BLD-SCNC: 104 MMOL/L (ref 98–107)
CHOLEST SERPL-MCNC: 115 MG/DL
CO2 SERPL-SCNC: 23 MMOL/L (ref 22–31)
CREAT SERPL-MCNC: 1.3 MG/DL (ref 0.6–1.1)
FASTING STATUS PATIENT QL REPORTED: ABNORMAL
GFR SERPL CREATININE-BSD FRML MDRD: 41 ML/MIN/1.73M2
GLUCOSE BLD-MCNC: 89 MG/DL (ref 70–125)
HDLC SERPL-MCNC: 37 MG/DL
LDLC SERPL CALC-MCNC: 58 MG/DL
POTASSIUM BLD-SCNC: 4.6 MMOL/L (ref 3.5–5)
SODIUM SERPL-SCNC: 136 MMOL/L (ref 136–145)
TRIGL SERPL-MCNC: 99 MG/DL

## 2021-01-05 ENCOUNTER — HOSPITAL ENCOUNTER (EMERGENCY)
Facility: CLINIC | Age: 66
Discharge: HOME | End: 2021-01-05
Attending: FAMILY MEDICINE | Admitting: FAMILY MEDICINE
Payer: MEDICARE

## 2021-01-05 ENCOUNTER — HOSPITAL ENCOUNTER (OUTPATIENT)
Facility: CLINIC | Age: 66
Setting detail: OBSERVATION
Discharge: NURSING, REHAB OR LONG-TERM CARE | End: 2021-01-06
Attending: EMERGENCY MEDICINE | Admitting: INTERNAL MEDICINE
Payer: MEDICARE

## 2021-01-05 VITALS
RESPIRATION RATE: 22 BRPM | DIASTOLIC BLOOD PRESSURE: 129 MMHG | HEIGHT: 69 IN | TEMPERATURE: 98.3 F | BODY MASS INDEX: 36.48 KG/M2 | OXYGEN SATURATION: 98 % | HEART RATE: 75 BPM | SYSTOLIC BLOOD PRESSURE: 166 MMHG

## 2021-01-05 DIAGNOSIS — Z11.52 ENCOUNTER FOR SCREENING LABORATORY TESTING FOR SEVERE ACUTE RESPIRATORY SYNDROME CORONAVIRUS 2 (SARS-COV-2): ICD-10-CM

## 2021-01-05 DIAGNOSIS — M06.4 UNDIFFERENTIATED INFLAMMATORY ARTHRITIS (H): ICD-10-CM

## 2021-01-05 DIAGNOSIS — L89.313 PRESSURE INJURY OF RIGHT BUTTOCK, STAGE 3 (H): ICD-10-CM

## 2021-01-05 DIAGNOSIS — L89.322 PRESSURE INJURY OF LEFT BUTTOCK, STAGE 2 (H): ICD-10-CM

## 2021-01-05 DIAGNOSIS — Z79.631 ENCOUNTER FOR METHOTREXATE MONITORING: ICD-10-CM

## 2021-01-05 DIAGNOSIS — L03.317 CELLULITIS OF BUTTOCK: ICD-10-CM

## 2021-01-05 DIAGNOSIS — Z51.81 ENCOUNTER FOR METHOTREXATE MONITORING: ICD-10-CM

## 2021-01-05 LAB
ANION GAP SERPL CALCULATED.3IONS-SCNC: 2 MMOL/L (ref 3–14)
BASOPHILS # BLD AUTO: 0.1 10E9/L (ref 0–0.2)
BASOPHILS NFR BLD AUTO: 0.5 %
BUN SERPL-MCNC: 19 MG/DL (ref 7–30)
CALCIUM SERPL-MCNC: 8.1 MG/DL (ref 8.5–10.1)
CHLORIDE SERPL-SCNC: 113 MMOL/L (ref 94–109)
CO2 SERPL-SCNC: 26 MMOL/L (ref 20–32)
CREAT SERPL-MCNC: 1.04 MG/DL (ref 0.52–1.04)
CRP SERPL-MCNC: 33.7 MG/L (ref 0–8)
DIFFERENTIAL METHOD BLD: ABNORMAL
EOSINOPHIL # BLD AUTO: 0.2 10E9/L (ref 0–0.7)
EOSINOPHIL NFR BLD AUTO: 1.7 %
ERYTHROCYTE [DISTWIDTH] IN BLOOD BY AUTOMATED COUNT: 16.9 % (ref 10–15)
GFR SERPL CREATININE-BSD FRML MDRD: 56 ML/MIN/{1.73_M2}
GLUCOSE SERPL-MCNC: 98 MG/DL (ref 70–99)
HCT VFR BLD AUTO: 30.3 % (ref 35–47)
HGB BLD-MCNC: 9.4 G/DL (ref 11.7–15.7)
IMM GRANULOCYTES # BLD: 0.1 10E9/L (ref 0–0.4)
IMM GRANULOCYTES NFR BLD: 0.7 %
LABORATORY COMMENT REPORT: NORMAL
LYMPHOCYTES # BLD AUTO: 1 10E9/L (ref 0.8–5.3)
LYMPHOCYTES NFR BLD AUTO: 9.1 %
MCH RBC QN AUTO: 27.9 PG (ref 26.5–33)
MCHC RBC AUTO-ENTMCNC: 31 G/DL (ref 31.5–36.5)
MCV RBC AUTO: 90 FL (ref 78–100)
MONOCYTES # BLD AUTO: 0.5 10E9/L (ref 0–1.3)
MONOCYTES NFR BLD AUTO: 5.1 %
NEUTROPHILS # BLD AUTO: 8.9 10E9/L (ref 1.6–8.3)
NEUTROPHILS NFR BLD AUTO: 82.9 %
NRBC # BLD AUTO: 0 10*3/UL
NRBC BLD AUTO-RTO: 0 /100
PLATELET # BLD AUTO: 290 10E9/L (ref 150–450)
POTASSIUM SERPL-SCNC: 4.2 MMOL/L (ref 3.4–5.3)
RBC # BLD AUTO: 3.37 10E12/L (ref 3.8–5.2)
SARS-COV-2 RNA SPEC QL NAA+PROBE: NEGATIVE
SODIUM SERPL-SCNC: 141 MMOL/L (ref 133–144)
SPECIMEN SOURCE: NORMAL
WBC # BLD AUTO: 10.7 10E9/L (ref 4–11)

## 2021-01-05 PROCEDURE — 93005 ELECTROCARDIOGRAM TRACING: CPT | Performed by: FAMILY MEDICINE

## 2021-01-05 PROCEDURE — 99285 EMERGENCY DEPT VISIT HI MDM: CPT | Performed by: EMERGENCY MEDICINE

## 2021-01-05 PROCEDURE — 250N000013 HC RX MED GY IP 250 OP 250 PS 637: Mod: GY | Performed by: EMERGENCY MEDICINE

## 2021-01-05 PROCEDURE — 99284 EMERGENCY DEPT VISIT MOD MDM: CPT | Performed by: FAMILY MEDICINE

## 2021-01-05 PROCEDURE — C9803 HOPD COVID-19 SPEC COLLECT: HCPCS | Performed by: EMERGENCY MEDICINE

## 2021-01-05 PROCEDURE — 80048 BASIC METABOLIC PNL TOTAL CA: CPT | Performed by: FAMILY MEDICINE

## 2021-01-05 PROCEDURE — 86140 C-REACTIVE PROTEIN: CPT | Performed by: FAMILY MEDICINE

## 2021-01-05 PROCEDURE — 99219 PR INITIAL OBSERVATION CARE,LEVEL II: CPT | Performed by: INTERNAL MEDICINE

## 2021-01-05 PROCEDURE — 250N000013 HC RX MED GY IP 250 OP 250 PS 637: Mod: GY | Performed by: INTERNAL MEDICINE

## 2021-01-05 PROCEDURE — 99284 EMERGENCY DEPT VISIT MOD MDM: CPT | Mod: 25 | Performed by: FAMILY MEDICINE

## 2021-01-05 PROCEDURE — 85025 COMPLETE CBC W/AUTO DIFF WBC: CPT | Performed by: FAMILY MEDICINE

## 2021-01-05 PROCEDURE — 99285 EMERGENCY DEPT VISIT HI MDM: CPT | Mod: 25 | Performed by: EMERGENCY MEDICINE

## 2021-01-05 PROCEDURE — 93010 ELECTROCARDIOGRAM REPORT: CPT | Performed by: FAMILY MEDICINE

## 2021-01-05 PROCEDURE — G0378 HOSPITAL OBSERVATION PER HR: HCPCS

## 2021-01-05 PROCEDURE — 87635 SARS-COV-2 COVID-19 AMP PRB: CPT | Performed by: EMERGENCY MEDICINE

## 2021-01-05 PROCEDURE — 250N000013 HC RX MED GY IP 250 OP 250 PS 637: Performed by: FAMILY MEDICINE

## 2021-01-05 RX ORDER — GABAPENTIN 600 MG/1
600 TABLET ORAL 3 TIMES DAILY
Status: DISCONTINUED | OUTPATIENT
Start: 2021-01-05 | End: 2021-01-06 | Stop reason: HOSPADM

## 2021-01-05 RX ORDER — METOPROLOL TARTRATE 100 MG
50 TABLET ORAL 2 TIMES DAILY
COMMUNITY

## 2021-01-05 RX ORDER — HYDROMORPHONE HYDROCHLORIDE 1 MG/ML
0.2 INJECTION, SOLUTION INTRAMUSCULAR; INTRAVENOUS; SUBCUTANEOUS
Status: CANCELLED | OUTPATIENT
Start: 2021-01-05

## 2021-01-05 RX ORDER — AMLODIPINE BESYLATE 10 MG/1
10 TABLET ORAL DAILY
COMMUNITY
Start: 2021-05-24 | End: 2021-08-03 | Stop reason: DRUGHIGH

## 2021-01-05 RX ORDER — CYCLOBENZAPRINE HCL 10 MG
10 TABLET ORAL 3 TIMES DAILY
Status: DISCONTINUED | OUTPATIENT
Start: 2021-01-05 | End: 2021-01-06 | Stop reason: HOSPADM

## 2021-01-05 RX ORDER — OXYCODONE HYDROCHLORIDE 5 MG/1
5-10 TABLET ORAL
Status: CANCELLED | OUTPATIENT
Start: 2021-01-05

## 2021-01-05 RX ORDER — CLINDAMYCIN HCL 300 MG
300 CAPSULE ORAL 4 TIMES DAILY
Qty: 40 CAPSULE | Refills: 0 | Status: SHIPPED | OUTPATIENT
Start: 2021-01-05 | End: 2021-01-15

## 2021-01-05 RX ORDER — CLINDAMYCIN HCL 150 MG
300 CAPSULE ORAL ONCE
Status: COMPLETED | OUTPATIENT
Start: 2021-01-05 | End: 2021-01-05

## 2021-01-05 RX ORDER — OXYCODONE AND ACETAMINOPHEN 5; 325 MG/1; MG/1
2 TABLET ORAL ONCE
Status: COMPLETED | OUTPATIENT
Start: 2021-01-05 | End: 2021-01-05

## 2021-01-05 RX ORDER — FAMOTIDINE 20 MG/1
20 TABLET, FILM COATED ORAL DAILY
COMMUNITY
Start: 2021-11-18

## 2021-01-05 RX ORDER — OXYCODONE HCL 10 MG/1
30 TABLET, FILM COATED, EXTENDED RELEASE ORAL EVERY 8 HOURS
Status: DISCONTINUED | OUTPATIENT
Start: 2021-01-05 | End: 2021-01-06 | Stop reason: HOSPADM

## 2021-01-05 RX ORDER — CLINDAMYCIN HCL 150 MG
300 CAPSULE ORAL EVERY 6 HOURS SCHEDULED
Status: DISCONTINUED | OUTPATIENT
Start: 2021-01-05 | End: 2021-01-06

## 2021-01-05 RX ORDER — OXYCODONE HYDROCHLORIDE 5 MG/1
15 TABLET ORAL EVERY 4 HOURS PRN
Status: DISCONTINUED | OUTPATIENT
Start: 2021-01-05 | End: 2021-01-06 | Stop reason: HOSPADM

## 2021-01-05 RX ADMIN — OXYCODONE HYDROCHLORIDE AND ACETAMINOPHEN 2 TABLET: 5; 325 TABLET ORAL at 19:43

## 2021-01-05 RX ADMIN — CLINDAMYCIN HYDROCHLORIDE 300 MG: 150 CAPSULE ORAL at 19:41

## 2021-01-05 RX ADMIN — GABAPENTIN 600 MG: 600 TABLET, FILM COATED ORAL at 22:43

## 2021-01-05 RX ADMIN — CYCLOBENZAPRINE HYDROCHLORIDE 10 MG: 10 TABLET, FILM COATED ORAL at 22:43

## 2021-01-05 RX ADMIN — FLUOXETINE 60 MG: 20 CAPSULE ORAL at 22:43

## 2021-01-05 RX ADMIN — CLINDAMYCIN HYDROCHLORIDE 300 MG: 150 CAPSULE ORAL at 14:47

## 2021-01-05 RX ADMIN — OXYCODONE HYDROCHLORIDE 30 MG: 10 TABLET, FILM COATED, EXTENDED RELEASE ORAL at 22:43

## 2021-01-05 ASSESSMENT — ENCOUNTER SYMPTOMS
SHORTNESS OF BREATH: 0
WOUND: 1
FEVER: 0
MYALGIAS: 0
HEADACHES: 0
NAUSEA: 0
VOMITING: 0
SORE THROAT: 0
FREQUENCY: 0
COUGH: 0
ABDOMINAL PAIN: 0
DYSURIA: 0
CHILLS: 0
COLOR CHANGE: 1

## 2021-01-05 ASSESSMENT — MIFFLIN-ST. JEOR: SCORE: 1772.38

## 2021-01-05 NOTE — ED PROVIDER NOTES
History     Chief Complaint   Patient presents with     Wound Check     Back Pain     HPI  Carolina Mari is a 65 year old female, past medical history is significant for type 2 diabetes, morbid obesity, tobacco use disorder, osteoarthritis, presents to the emergency department at the request of home health nurse for a check on chronic pressure ulcers on her buttocks times a month and apparently 3 wounds on her abdomen that have been present for years.  History is obtained from the patient who states that she has some pressure ulcers on her buttocks which have been dressed about every other day by home health care nurse as well as ones on her anterior abdomen which been present for years.  Over the past couple of days to a week she has had increased pain especially on the right buttock down the right posterior medial thigh area by description.  She denies any fever chills or sweats.  No nausea or vomiting.  She is in an adult diaper and is frequently incontinent of urine and feces.      Allergies:  No Known Allergies    Problem List:    Patient Active Problem List    Diagnosis Date Noted     Cellulitis of buttock 01/05/2021     Priority: Medium     Pressure injury of right buttock, stage 3 (H) 01/05/2021     Priority: Medium     Open wound of abdomen 07/01/2015     Priority: Medium     Morbid obesity (H) 07/01/2015     Priority: Medium     Diabetes mellitus, type 2 (H) 07/01/2015     Priority: Medium     Tobacco use disorder 07/01/2015     Priority: Medium     Osteoarthritis of right hip 04/22/2015     Priority: Medium        Past Medical History:    Past Medical History:   Diagnosis Date     Diabetes mellitus (H)      History of stomach ulcers      HTN (hypertension)      Knee osteoarthritis      Osteoarthritis of right hip      Osteoporosis      Osteoporosis      Thyroid disease        Past Surgical History:    Past Surgical History:   Procedure Laterality Date     FOOT SURGERY       GASTRIC BYPASS        "HC SACROPLASTY       tka      right       Family History:    Family History   Problem Relation Age of Onset     Coronary Artery Disease Father      Coronary Artery Disease Brother      Cancer Mother         bone     Cancer Brother         leukemia       Social History:  Marital Status:   [4]  Social History     Tobacco Use     Smoking status: Never Smoker     Smokeless tobacco: Never Used   Substance Use Topics     Alcohol use: No     Drug use: No        Medications:         clindamycin (CLEOCIN) 300 MG capsule       amLODIPine (NORVASC) 5 MG tablet       cyanocobalamin (VITAMIN B12) 1000 MCG/ML injection       CYCLOBENZAPRINE HCL PO       famotidine (PEPCID) 20 MG tablet       FLUOXETINE HCL PO       GABAPENTIN PO       LEVOTHYROXINE SODIUM PO       LOSARTAN POTASSIUM PO       metoprolol tartrate (LOPRESSOR) 100 MG tablet       Nystatin (NYAMYC) 530145 UNIT/GM POWD       order for DME       oxyCODONE (OXYCONTIN) 30 MG 12 hr tablet       oxyCODONE IR (ROXICODONE) 15 MG tablet          Review of Systems   All other systems reviewed and are negative.      Physical Exam   BP: (!) 148/72  Pulse: 66  Temp: 98.4  F (36.9  C)  Resp: 16  Height: 175.3 cm (5' 9\")  SpO2: 98 %      Physical Exam  Vitals signs and nursing note reviewed.   Constitutional:       General: She is not in acute distress.     Appearance: Normal appearance. She is obese. She is not ill-appearing.   Cardiovascular:      Rate and Rhythm: Normal rate and regular rhythm.      Pulses: Normal pulses.      Heart sounds: Normal heart sounds.   Pulmonary:      Effort: Pulmonary effort is normal.      Breath sounds: Normal breath sounds.   Abdominal:      Palpations: Abdomen is soft.       Musculoskeletal:        Legs:    Neurological:      Mental Status: She is alert.         ED Course        Procedures       EKG Interpretation:      Interpreted by Jeffery Benites MD  Time reviewed: Time obtained 1300 time interpreted same 68 bpm normal sinus " rhythm, T wave inversions V4 through V6.  No previous EKGs for comparison.  EKG is obtained at the time the patient has no chest complaints, no chest pain, no shortness of breath.  She presents for evaluation of buttock ulcers.  She has no other complaints.               Critical Care time:  none     Labs Ordered and Resulted from Time of ED Arrival Up to the Time of Departure from the ED   CBC WITH PLATELETS DIFFERENTIAL - Abnormal; Notable for the following components:       Result Value    RBC Count 3.37 (*)     Hemoglobin 9.4 (*)     Hematocrit 30.3 (*)     MCHC 31.0 (*)     RDW 16.9 (*)     Absolute Neutrophil 8.9 (*)     All other components within normal limits   CRP INFLAMMATION - Abnormal; Notable for the following components:    CRP Inflammation 33.7 (*)     All other components within normal limits   BASIC METABOLIC PANEL - Abnormal; Notable for the following components:    Chloride 113 (*)     Anion Gap 2 (*)     GFR Estimate 56 (*)     Calcium 8.1 (*)     All other components within normal limits   WOUND CARE                 No results found for this or any previous visit (from the past 24 hour(s)).    Medications   clindamycin (CLEOCIN) capsule 300 mg (300 mg Oral Given 1/5/21 1447)   3:21 PM  I reviewed my impression in the room with the patient as well as resulted lab diagnostics.  I requested wound care assessment in the emergency department but they have not not able to accommodate the request.  I placed an outpatient referral for wound care for their suggestions for alternate dressings for the patient's pressure ulcers.  Given the presence of cellulitis to the right sided lesion I placed the patient on clindamycin after considering the potential etiology of the cellulitis with her morbid obesity and type 2 diabetes, and consideration of possible MRSA.  Encourage the patient to continue the current dressings that she receives from home health every other day by her account.  Disposition is to  home.    Assessments & Plan (with Medical Decision Making)   65-year-old female past medical history reviewed as above who presents to the emergency department with concerns of chronic pressure ulcers on her buttocks/sacral area as discussed in the HPI.  Documented with respect to findings on exam; the patient did not appear systemically ill no hypotension, no tachycardia, no tachypnea, no fever.  Chronic pressure injuries to both buttock areas down to the posterior medial thigh especially the right side with evidence of cellulitis in this area.  Lab diagnostics showed nonelevation of white cell count, mild elevation of CRP.  The patient is not nauseated nor vomiting and is able to take oral antibiotics and eat and drink.  I tried to get wound care team to look at her in the emergency department but they were not able to accommodate the request.  I placed an outpatient referral wound care order.  Place the patient on oral clindamycin.  She will have follow-up through wound care for their suggestions for alternate dressings.  Return to the emergency department if worse or changes.  Disposition to home.    Disclaimer: This note consists of symbols derived from keyboarding, dictation and/or voice recognition software. As a result, there may be errors in the script that have gone undetected. Please consider this when interpreting information found in this chart.      I have reviewed the nursing notes.    I have reviewed the findings, diagnosis, plan and need for follow up with the patient.          Discharge Medication List as of 1/5/2021  5:30 PM      START taking these medications    Details   clindamycin (CLEOCIN) 300 MG capsule Take 1 capsule (300 mg) by mouth 4 times daily for 10 days, Disp-40 capsule, R-0, E-Prescribe             Final diagnoses:   Cellulitis of buttock   Pressure injury of left buttock, stage 2 (H)   Pressure injury of right buttock, stage 3 (H)       1/5/2021   [facility]  EMERGENCY DEPT     Jeffery Benites MD  01/07/21 1148       Jeffery Benites MD  01/11/21 2919

## 2021-01-05 NOTE — LETTER
Transition Communication Hand-off for Care Transitions to Next Level of Care Provider    Name: Carolina Mari  : 1955  MRN #: [id number]  Primary Care Provider: Le Romo     Primary Clinic: [facility] [address]  [facility] [address]     Reason for Hospitalization:  Cellulitis of buttock [L03.317]  Pressure injury of right buttock, stage 3 (H) [L89.313]  Admit Date/Time: 2021  5:51 PM  Discharge Date: 21  Payor Source: Payor: MEDICARE / Plan: MEDICARE / Product Type: Medicare /     Reason for Communication Hand-off Referral:  Discharge to a TCU    Discharge Plan:  [facility] Phone (Main Phone:[phone] Admissions Phone:[phone] Fax: [phone])       Concern for non-adherence with plan of care:   Y/N No  Discharge Needs Assessment:  Needs      Most Recent Value   Equipment Currently Used at Home  wheelchair, manual   # of Referrals Placed by [facility]  External Care Coordination, Post Acute Facilities, Transportation        Follow-up plan:  No future appointments.      Le Ash, HEBERT Care Coordinator    AVS/Discharge Summary is the source of truth; this is a helpful guide for improved communication of patient story

## 2021-01-05 NOTE — DISCHARGE INSTRUCTIONS
Outpatient wound care assessment.  Continue with current dressings.  Clindamycin 300 mg p.o. 4 times daily x10 days.  Wound check in clinic in 7 to 10 days.  Return to the emergency department if worse or changes.

## 2021-01-05 NOTE — ED AVS SNAPSHOT
[facility] Emergency Dept  [address] [address]  Phone: [phone]  Fax: [phone]                                    Carolina Mari   MRN: [id number]    Department: [facility] Emergency Dept   Date of Visit: 1/5/2021           After Visit Summary Signature Page    I have received my discharge instructions, and my questions have been answered. I have discussed any challenges I see with this plan with the nurse or doctor.    ..........................................................................................................................................  Patient/Patient Representative Signature      ..........................................................................................................................................  Patient Representative Print Name and Relationship to Patient    ..................................................               ................................................  Date                                   Time    ..........................................................................................................................................  Reviewed by Signature/Title    ...................................................              ..............................................  Date                                               Time          22EPIC Rev 08/18

## 2021-01-05 NOTE — ED TRIAGE NOTES
Pt was brought in via EMS after her home health nurse suggested she come in for a wound check as she has chronic pressure ulcers to the buttock X 1 month and 3 wounds to the abd she had had for years. She denies any fever, SOB, and has not other complaints outside of the pain.

## 2021-01-06 ENCOUNTER — APPOINTMENT (OUTPATIENT)
Dept: PHYSICAL THERAPY | Facility: CLINIC | Age: 66
End: 2021-01-06
Payer: MEDICARE

## 2021-01-06 ENCOUNTER — APPOINTMENT (OUTPATIENT)
Dept: OCCUPATIONAL THERAPY | Facility: CLINIC | Age: 66
End: 2021-01-06
Payer: MEDICARE

## 2021-01-06 VITALS
DIASTOLIC BLOOD PRESSURE: 54 MMHG | SYSTOLIC BLOOD PRESSURE: 118 MMHG | HEIGHT: 69 IN | BODY MASS INDEX: 38.14 KG/M2 | RESPIRATION RATE: 18 BRPM | WEIGHT: 257.5 LBS | OXYGEN SATURATION: 94 % | TEMPERATURE: 97.7 F | HEART RATE: 54 BPM

## 2021-01-06 LAB
ANION GAP SERPL CALCULATED.3IONS-SCNC: 2 MMOL/L (ref 3–14)
BUN SERPL-MCNC: 16 MG/DL (ref 7–30)
CALCIUM SERPL-MCNC: 7.9 MG/DL (ref 8.5–10.1)
CHLORIDE SERPL-SCNC: 111 MMOL/L (ref 94–109)
CO2 SERPL-SCNC: 27 MMOL/L (ref 20–32)
CREAT SERPL-MCNC: 0.95 MG/DL (ref 0.52–1.04)
ERYTHROCYTE [DISTWIDTH] IN BLOOD BY AUTOMATED COUNT: 16.9 % (ref 10–15)
GFR SERPL CREATININE-BSD FRML MDRD: 62 ML/MIN/{1.73_M2}
GLUCOSE BLDC GLUCOMTR-MCNC: 93 MG/DL (ref 70–99)
GLUCOSE SERPL-MCNC: 103 MG/DL (ref 70–99)
HBA1C MFR BLD: 5.4 % (ref 0–5.6)
HCT VFR BLD AUTO: 27.5 % (ref 35–47)
HGB BLD-MCNC: 8.5 G/DL (ref 11.7–15.7)
MCH RBC QN AUTO: 27.7 PG (ref 26.5–33)
MCHC RBC AUTO-ENTMCNC: 30.9 G/DL (ref 31.5–36.5)
MCV RBC AUTO: 90 FL (ref 78–100)
PLATELET # BLD AUTO: 238 10E9/L (ref 150–450)
POTASSIUM SERPL-SCNC: 3.7 MMOL/L (ref 3.4–5.3)
RBC # BLD AUTO: 3.07 10E12/L (ref 3.8–5.2)
SODIUM SERPL-SCNC: 140 MMOL/L (ref 133–144)
WBC # BLD AUTO: 9.6 10E9/L (ref 4–11)

## 2021-01-06 PROCEDURE — 80048 BASIC METABOLIC PNL TOTAL CA: CPT | Performed by: INTERNAL MEDICINE

## 2021-01-06 PROCEDURE — 250N000013 HC RX MED GY IP 250 OP 250 PS 637: Performed by: INTERNAL MEDICINE

## 2021-01-06 PROCEDURE — 97161 PT EVAL LOW COMPLEX 20 MIN: CPT | Mod: GP | Performed by: PHYSICAL THERAPIST

## 2021-01-06 PROCEDURE — 97602 WOUND(S) CARE NON-SELECTIVE: CPT

## 2021-01-06 PROCEDURE — G0378 HOSPITAL OBSERVATION PER HR: HCPCS

## 2021-01-06 PROCEDURE — 99217 PR OBSERVATION CARE DISCHARGE: CPT | Performed by: INTERNAL MEDICINE

## 2021-01-06 PROCEDURE — 99207 PR CDG-CODE CATEGORY CHANGED: CPT | Performed by: INTERNAL MEDICINE

## 2021-01-06 PROCEDURE — 97165 OT EVAL LOW COMPLEX 30 MIN: CPT | Mod: GO

## 2021-01-06 PROCEDURE — G0463 HOSPITAL OUTPT CLINIC VISIT: HCPCS | Mod: 25

## 2021-01-06 PROCEDURE — 85027 COMPLETE CBC AUTOMATED: CPT | Performed by: INTERNAL MEDICINE

## 2021-01-06 PROCEDURE — 36415 COLL VENOUS BLD VENIPUNCTURE: CPT | Performed by: INTERNAL MEDICINE

## 2021-01-06 PROCEDURE — 83036 HEMOGLOBIN GLYCOSYLATED A1C: CPT | Performed by: INTERNAL MEDICINE

## 2021-01-06 PROCEDURE — 999N001017 HC STATISTIC GLUCOSE BY METER IP

## 2021-01-06 PROCEDURE — A6240 HYDROCOLLD DRG FILLER PASTE: HCPCS

## 2021-01-06 RX ORDER — PROCHLORPERAZINE 25 MG
12.5 SUPPOSITORY, RECTAL RECTAL EVERY 12 HOURS PRN
Status: DISCONTINUED | OUTPATIENT
Start: 2021-01-06 | End: 2021-01-06 | Stop reason: HOSPADM

## 2021-01-06 RX ORDER — METOPROLOL TARTRATE 100 MG
100 TABLET ORAL 2 TIMES DAILY
Status: DISCONTINUED | OUTPATIENT
Start: 2021-01-06 | End: 2021-01-06 | Stop reason: HOSPADM

## 2021-01-06 RX ORDER — OXYCODONE HYDROCHLORIDE 30 MG/1
30 TABLET, FILM COATED, EXTENDED RELEASE ORAL EVERY 8 HOURS
Qty: 10 TABLET | Refills: 0 | Status: SHIPPED | OUTPATIENT
Start: 2021-01-06 | End: 2021-01-10

## 2021-01-06 RX ORDER — NALOXONE HYDROCHLORIDE 0.4 MG/ML
0.4 INJECTION, SOLUTION INTRAMUSCULAR; INTRAVENOUS; SUBCUTANEOUS
Status: DISCONTINUED | OUTPATIENT
Start: 2021-01-06 | End: 2021-01-06

## 2021-01-06 RX ORDER — LEVOTHYROXINE SODIUM 100 UG/1
200 TABLET ORAL DAILY
Status: DISCONTINUED | OUTPATIENT
Start: 2021-01-06 | End: 2021-01-06 | Stop reason: HOSPADM

## 2021-01-06 RX ORDER — AMOXICILLIN 250 MG
1 CAPSULE ORAL 2 TIMES DAILY PRN
Status: DISCONTINUED | OUTPATIENT
Start: 2021-01-06 | End: 2021-01-06 | Stop reason: HOSPADM

## 2021-01-06 RX ORDER — OXYCODONE HYDROCHLORIDE 15 MG/1
15 TABLET ORAL EVERY 4 HOURS PRN
Refills: 0 | COMMUNITY
Start: 2021-01-06 | End: 2021-01-06

## 2021-01-06 RX ORDER — FAMOTIDINE 20 MG/1
20 TABLET, FILM COATED ORAL 2 TIMES DAILY
Status: DISCONTINUED | OUTPATIENT
Start: 2021-01-06 | End: 2021-01-06 | Stop reason: HOSPADM

## 2021-01-06 RX ORDER — ACETAMINOPHEN 325 MG/1
650 TABLET ORAL EVERY 4 HOURS PRN
Status: DISCONTINUED | OUTPATIENT
Start: 2021-01-06 | End: 2021-01-06 | Stop reason: HOSPADM

## 2021-01-06 RX ORDER — LOSARTAN POTASSIUM 50 MG/1
100 TABLET ORAL DAILY
Status: DISCONTINUED | OUTPATIENT
Start: 2021-01-06 | End: 2021-01-06 | Stop reason: HOSPADM

## 2021-01-06 RX ORDER — NALOXONE HYDROCHLORIDE 0.4 MG/ML
0.4 INJECTION, SOLUTION INTRAMUSCULAR; INTRAVENOUS; SUBCUTANEOUS
Status: DISCONTINUED | OUTPATIENT
Start: 2021-01-06 | End: 2021-01-06 | Stop reason: HOSPADM

## 2021-01-06 RX ORDER — NALOXONE HYDROCHLORIDE 0.4 MG/ML
0.2 INJECTION, SOLUTION INTRAMUSCULAR; INTRAVENOUS; SUBCUTANEOUS
Status: DISCONTINUED | OUTPATIENT
Start: 2021-01-06 | End: 2021-01-06 | Stop reason: HOSPADM

## 2021-01-06 RX ORDER — NALOXONE HYDROCHLORIDE 0.4 MG/ML
0.2 INJECTION, SOLUTION INTRAMUSCULAR; INTRAVENOUS; SUBCUTANEOUS
Status: DISCONTINUED | OUTPATIENT
Start: 2021-01-06 | End: 2021-01-06

## 2021-01-06 RX ORDER — BISACODYL 10 MG
10 SUPPOSITORY, RECTAL RECTAL DAILY PRN
Status: DISCONTINUED | OUTPATIENT
Start: 2021-01-06 | End: 2021-01-06 | Stop reason: HOSPADM

## 2021-01-06 RX ORDER — POLYETHYLENE GLYCOL 3350 17 G/17G
17 POWDER, FOR SOLUTION ORAL DAILY PRN
Status: DISCONTINUED | OUTPATIENT
Start: 2021-01-06 | End: 2021-01-06 | Stop reason: HOSPADM

## 2021-01-06 RX ORDER — CLINDAMYCIN HCL 150 MG
300 CAPSULE ORAL EVERY 6 HOURS SCHEDULED
Status: DISCONTINUED | OUTPATIENT
Start: 2021-01-06 | End: 2021-01-06 | Stop reason: HOSPADM

## 2021-01-06 RX ORDER — AMLODIPINE BESYLATE 5 MG/1
5 TABLET ORAL DAILY
Status: DISCONTINUED | OUTPATIENT
Start: 2021-01-06 | End: 2021-01-06 | Stop reason: HOSPADM

## 2021-01-06 RX ORDER — ONDANSETRON 4 MG/1
4 TABLET, ORALLY DISINTEGRATING ORAL EVERY 6 HOURS PRN
Status: DISCONTINUED | OUTPATIENT
Start: 2021-01-06 | End: 2021-01-06

## 2021-01-06 RX ORDER — AMOXICILLIN 250 MG
2 CAPSULE ORAL 2 TIMES DAILY
Status: DISCONTINUED | OUTPATIENT
Start: 2021-01-06 | End: 2021-01-06 | Stop reason: HOSPADM

## 2021-01-06 RX ORDER — ACETAMINOPHEN 650 MG/1
650 SUPPOSITORY RECTAL EVERY 4 HOURS PRN
Status: DISCONTINUED | OUTPATIENT
Start: 2021-01-06 | End: 2021-01-06 | Stop reason: HOSPADM

## 2021-01-06 RX ORDER — PROCHLORPERAZINE MALEATE 5 MG
5 TABLET ORAL EVERY 6 HOURS PRN
Status: DISCONTINUED | OUTPATIENT
Start: 2021-01-06 | End: 2021-01-06 | Stop reason: HOSPADM

## 2021-01-06 RX ORDER — OXYCODONE HYDROCHLORIDE 15 MG/1
15 TABLET ORAL EVERY 4 HOURS PRN
Qty: 15 TABLET | Refills: 0 | Status: SHIPPED | OUTPATIENT
Start: 2021-01-06 | End: 2021-01-10

## 2021-01-06 RX ORDER — ONDANSETRON 2 MG/ML
4 INJECTION INTRAMUSCULAR; INTRAVENOUS EVERY 6 HOURS PRN
Status: DISCONTINUED | OUTPATIENT
Start: 2021-01-06 | End: 2021-01-06

## 2021-01-06 RX ORDER — ONDANSETRON 4 MG/1
4 TABLET, ORALLY DISINTEGRATING ORAL EVERY 6 HOURS PRN
Status: DISCONTINUED | OUTPATIENT
Start: 2021-01-06 | End: 2021-01-06 | Stop reason: HOSPADM

## 2021-01-06 RX ORDER — AMOXICILLIN 250 MG
2 CAPSULE ORAL 2 TIMES DAILY PRN
Status: DISCONTINUED | OUTPATIENT
Start: 2021-01-06 | End: 2021-01-06 | Stop reason: HOSPADM

## 2021-01-06 RX ORDER — ONDANSETRON 2 MG/ML
4 INJECTION INTRAMUSCULAR; INTRAVENOUS EVERY 6 HOURS PRN
Status: DISCONTINUED | OUTPATIENT
Start: 2021-01-06 | End: 2021-01-06 | Stop reason: HOSPADM

## 2021-01-06 RX ORDER — POLYETHYLENE GLYCOL 3350 17 G/17G
17 POWDER, FOR SOLUTION ORAL DAILY
Status: DISCONTINUED | OUTPATIENT
Start: 2021-01-06 | End: 2021-01-06 | Stop reason: HOSPADM

## 2021-01-06 RX ORDER — AMOXICILLIN 250 MG
1 CAPSULE ORAL 2 TIMES DAILY
Status: DISCONTINUED | OUTPATIENT
Start: 2021-01-06 | End: 2021-01-06 | Stop reason: HOSPADM

## 2021-01-06 RX ADMIN — CLINDAMYCIN HYDROCHLORIDE 300 MG: 150 CAPSULE ORAL at 12:46

## 2021-01-06 RX ADMIN — OXYCODONE HYDROCHLORIDE 30 MG: 10 TABLET, FILM COATED, EXTENDED RELEASE ORAL at 05:46

## 2021-01-06 RX ADMIN — CYCLOBENZAPRINE HYDROCHLORIDE 10 MG: 10 TABLET, FILM COATED ORAL at 12:46

## 2021-01-06 RX ADMIN — GABAPENTIN 600 MG: 600 TABLET, FILM COATED ORAL at 08:49

## 2021-01-06 RX ADMIN — METOPROLOL TARTRATE 100 MG: 100 TABLET, FILM COATED ORAL at 01:10

## 2021-01-06 RX ADMIN — CYCLOBENZAPRINE HYDROCHLORIDE 10 MG: 10 TABLET, FILM COATED ORAL at 08:49

## 2021-01-06 RX ADMIN — OXYCODONE HYDROCHLORIDE 30 MG: 10 TABLET, FILM COATED, EXTENDED RELEASE ORAL at 14:38

## 2021-01-06 RX ADMIN — LOSARTAN POTASSIUM 100 MG: 50 TABLET, FILM COATED ORAL at 01:11

## 2021-01-06 RX ADMIN — FAMOTIDINE 20 MG: 20 TABLET, FILM COATED ORAL at 08:49

## 2021-01-06 RX ADMIN — LOSARTAN POTASSIUM 100 MG: 50 TABLET, FILM COATED ORAL at 08:49

## 2021-01-06 RX ADMIN — MICONAZOLE NITRATE: 20 POWDER TOPICAL at 12:45

## 2021-01-06 RX ADMIN — OXYCODONE HYDROCHLORIDE 15 MG: 5 TABLET ORAL at 01:11

## 2021-01-06 RX ADMIN — METOPROLOL TARTRATE 100 MG: 100 TABLET, FILM COATED ORAL at 08:49

## 2021-01-06 RX ADMIN — OXYCODONE HYDROCHLORIDE 15 MG: 5 TABLET ORAL at 08:48

## 2021-01-06 RX ADMIN — CLINDAMYCIN HYDROCHLORIDE 300 MG: 150 CAPSULE ORAL at 05:47

## 2021-01-06 RX ADMIN — GABAPENTIN 600 MG: 600 TABLET, FILM COATED ORAL at 12:46

## 2021-01-06 RX ADMIN — OXYCODONE HYDROCHLORIDE 15 MG: 5 TABLET ORAL at 12:50

## 2021-01-06 RX ADMIN — AMLODIPINE BESYLATE 5 MG: 5 TABLET ORAL at 08:49

## 2021-01-06 RX ADMIN — LEVOTHYROXINE SODIUM 200 MCG: 100 TABLET ORAL at 05:47

## 2021-01-06 NOTE — ED NOTES
Pt requesting further pain medication, despite receiving 2 percocet two hours prior. Pt reports she is very uncomfortable. Hospital admission orders reviewed and pain management medication orders released to give in ED. Awaiting pharmacy verification.

## 2021-01-06 NOTE — PLAN OF CARE
"[facility] ADMISSION NOTE    Patient admitted to room 2302 at approximately 0015 via cart from emergency room. Patient was accompanied by transport tech.     Verbal SBAR report received from sharon prior to patient arrival.     Patient trasferred to bed via air dejuan. Patient alert and oriented X 3. Pain is controlled without any medications.  . Admission vital signs: Blood pressure 119/56, pulse 66, temperature 98.3  F (36.8  C), temperature source Oral, resp. rate 18, height 1.753 m (5' 9\"), weight 116.8 kg (257 lb 8 oz), SpO2 98 %, not currently breastfeeding. Patient was oriented to plan of care, call light, bed controls, tv, telephone, bathroom, and visiting hours.     Risk Assessment    The following safety risks were identified during admission: fall. Yellow risk band applied: YES.     Skin Initial Assessment    This writer admitted this patient and completed a full skin assessment and Jesus score in the Adult PCS flowsheet. Appropriate interventions initiated as needed.     Secondary skin check completed by jeanette.    Jesus Risk Assessment  Sensory Perception: 3-->slightly limited  Moisture: 2-->very moist  Activity: 2-->chairfast  Mobility: 2-->very limited  Nutrition: 4-->excellent  Friction and Shear: 3-->no apparent problem  Jesus Score: 16  Bed Support Surface: Pulsate mattress  Reassessed using Bed Algorithm: Yes  Jesus Intervention(s) Implemented: draw sheets, encouraged fluids, heels suspended, HOB elevated 30 degrees or less, incontinence care, moisture barrier ointment applied, low air low mattress, patient /family education on pressure injury prevention, repositioned/turned q2hr(pure wick in place )    Education    Patient has a Graham to Observation order: No  Observation education completed and documented: No      Odalis Claudio RN      "

## 2021-01-06 NOTE — PROGRESS NOTES
"[facility] Nurse Inpatient Adult Pressure Injury and Wound Assessment    Reason for consultation: Evaluate and treat multiple wounds - left buttocks, right posterior/medial proximal thigh, abdomen, bilateral abdominal skin folds, bilateral lower legs  Assessment  Left buttocks, right posterior/medial proximal thigh wounds due to pressure. Contributing factors of the pressure injury: pressure, shear, microclimate, immobility and moisture  Status: initial assessment    Pressure Injuries are Stage 3   Present on admission: Yes    Abdominal wound is a non-healing surgical wound per the patient from \"hernia surgeries\" with last one being done in \"2002.\"    Lateral abdominal fold wounds are fissuring r/t fungal-like infection.    Wounds on lower legs are related to lymphedema/venous insufficiency.   Treatment Plan  Wound Cares:  Abdomen  1.) Cleanse with wound cleanser and gauze. Pat dry.  2.) Cover with 6x6 Mepilex border dressing (or similar silicone foam dressing).  Change 3 times a week.    Bilateral abdominal folds, lateral  1.) Cleanse with wound cleanser and gauze. Pat dry. Make sure the area is thoroughly dry prior to applying topical product.  2.) Apply thin layer of Triad Hydrophilic Wound Dressing.  3.) Dust with small amount of Miconazole 2% Powder. Pat to help stick to Triad.  Perform these cares twice daily. Once open areas are healed, switch to applying Miconazole 2% Powder in thin layer twice daily after cleansing/drying.    Left buttocks  1.) Cleanse with wound cleanser and gauze. Pat dry.  2.) Apply 3x3 Mepilex Border Dressing (or similar silicone dressing). May need to use 3M No Sting Barrier Film (or similar product) under tape border and again over tape border to help adhere.  3.) Change 3 times a week as long as dressing stays in place. If not staying in place, follow same instructions for Right Posterior/Medial Thigh wounds    Right Posterior/Medial Thigh Wounds  1.) Cleanse with wound cleanser and gauze. " Pat dry.  2.) Apply dime thick layer of Triad Hydrophilic Wound Dressing over open areas, thinner layer ok on areas around wounds.  3.) Can cover with ABD pad if it stays in place.  4.) Perform these cares twice daily.    Bilateral Lower Legs  1.) Cleanse with wound cleanser or mild soap/water. If using soap/water, rinse after. Pat dry.  2.) Apply Aquaphor generously to intact skin.  3.) Use vaseline gauze or similar product and apply Aquaphor generously. (Used three 3x8 strips per leg).  4.) Use 2 ABD pads taped together for each leg. Lay strips of vaseline gauze with Aquaphor on ABD pads.  5.) Lift patient's leg, and slide under her leg so the vaseline strips/Aquaphor are on areas of crusting.  6.) Secure with rolled gauze and tape.  7.) Perform these cares 1-2 times a day depending on drainage. Most likely will need cares twice daily for now.    Orders detailed in discharge instructions for TCU  Recommended provider order: None, at this time; While patient would be ideally assessed by lymphedema therapy, she states she can't tolerate any compression on her legs because of pain. Also, with monophasic pulse on R would recommend vascular consult if wounds are not healing.  [facility] Nurse follow-up plan:signing off as patient is discharging today  Nursing to notify the Provider(s) and re-consult the WO Nurse if wound(s) deteriorates or new skin concern.    Patient History  According to provider note(s):  Carolina Mari is a 65 year old female who is brought in by family members due to generalized weakness, failure to thrive, and worsening pressure ulcers.  Patient states that her pressure ulcers developed approximately month ago after sitting too long in her wheelchair.  Patient states he has become increasingly weak and is having difficulty with transfers.  She wears an adult diaper and is frequently incontinent of urine and stool.  Her adult son assists with some cares and provides meals.  She believes she has  "had increased drainage from her wounds, but denies fevers or chills.  She also has drainage from her legs.     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, osteoarthritis of bilateral hips, left knee    Objective Data  Containment of urine/stool: [employer]    Active Diet Order  Orders Placed This Encounter      Combination Diet 2 gm NA Diet      Advance Diet as Tolerated      Output:   I/O last 3 completed shifts:  In: 540 [P.O.:540]  Out: 500 [Urine:500]    Risk Assessment:   Sensory Perception: 4-->no impairment  Moisture: 3-->occasionally moist  Activity: 2-->chairfast  Mobility: 3-->slightly limited  Nutrition: 4-->excellent  Friction and Shear: 1-->problem  Jesus Score: 17                          Labs:   Recent Labs   Lab 01/06/21  0439 01/05/21  1315   HGB 8.5* 9.4*   WBC 9.6 10.7   A1C 5.4  --    CRP  --  33.7*       Physical Exam  Areas of skin assessed: focused abdomen, buttocks, proximal thighs, breast folds, abdominal folds, bilateral lower legs, feet     Left buttocks and Right posterior/medial proximal thigh    1 full thickness wound noted on left buttocks and multiple full thickness wounds noted on right posterior/medial proximal thigh    Left buttocks wound measuring 1.6 cm x 1.9 cm x 0.1 cm with clean pink (approx 90%), white (approx 10%) wound bed. No tunneling, undermining noted.  Wound Edges: attached  Periwound: intact  Drainage: moderate serosanguineous drainage noted on previous dressing applied this morning  Odor: none  Pain: score \"100\", \"all of the above\" (burning, stinging, throbbing)    Right proximal thigh (posterior/lateral)  - multiple wounds in area measuring 9 cm x 9 cm with wounds ranging in size from " "0.6 cm x 0.2 cm x 0.1 cm to 6 cm x 2.5 cm x 0.1 cm. Tissue for these wounds is mix of yellow (slough vs coagulum vs subcutaneous), pink, red. No tunneling or undermining noted.  Wound Edges: attached  Periwound: intact, purple/red coloring, blanchable  Drainage: moderate serosanguineous  Odor: none  Pain: score \"100\", \"all of the above\" (burning, stinging, throbbing)    Abdominal wound    3 full thickness wounds. Full area measuring 7.5 cm x 2.5 cm (wound slightly on diagonal and longest length on diagonal measuring 8.6 cm). From proximal to distal, wound beds measuring 1.8 cm x 0.6 cm x flush, 1.4 cm x 1.1 cm x flush, and 2.7 cm x 1.1 cm x flush (on slight diagonal). All wound beds clean, red (see above).   Wound Edges: attached  Periwound: intact, scar tissue  Drainage: small serosanguineous noted on previous bandage applied this morning  Odor: none  Pain: no    Bilateral abdominal folds  Partial thickness fissuring noted on both right and left abdominal folds (lateral)  Right lateral abdominal fold fissure measuring 0.5 cm x 3 cm x flush and left lateral abdominal fold fissure measuring 0.4 cm x 8.5 cm x flush. Wound beds 100% clean pink/red.   Wound Edges: attached  Periwound: erythema  Drainage: Small amount of serosanguineous drainage noted.  Odor: none  Pain: score \"100\", \"all of the above\" (burning, stinging, throbbing)    Right and Left Lower leg    Noted excoriation on right leg measuring 15 cm x 6 cm. See below photo of left leg with excoriation measuring 7.5 cm x 6 cm.    Right Leg:      Stage/tissue depth: Full thickness  11 cm L x 14 cm W x flush D  Tunneling: no  Undermining: no  Wound bed type/amount: yellow, scaly patches; non fluctuant  Periwound: woody texture, pink  Drainage: heavy serous  Odor: no  Pain: score \"100\", \"all of the above\" (burning, stinging, throbbing)    Left Leg:      Stage/tissue depth: partial thickness  8 cm L x 15 cm W x flush D  Tunneling: no  Undermining: no  Wound bed " "type/amount: pink denudements and scaly areas within above measurements  Periwound: woody texture, pink  Drainage: heavy serous  Odor: no  Pain: score \"100\", \"all of the above\" (burning, stinging, throbbing)    Pain intervention prior to dressing change: oral oxycontin given by bedside nurse    Dorsalis Pedal Pulse: palpable: yes; doppler: R monophasic and L biphasic  Posterior Tibial Pulse: palpable: yes; doppler: biphasic  Hair growth: not noted  Capillary Refill: <3 seconds  Feet/toes color: consistent with surrounding coloring; patient with one toe amputation bilaterally  Firm non-pitting woody edema bilaterally    Interventions  Visual inspection and assessment completed   Wound Care Rationale Protect periwound skin, Improve absorptive capacity, Promote moist wound healing without tissue dehydration , Provide selective debridement (autolysis) of nonviable tissue , Provide protection  and Pain reduction.  Wound Care: done per plan of care  Supplies: gathered  Current off-loading measures: Pillows under calves  Current support surface: Standard  Low air loss mattress with pulsation   Education provided: plan of care, wound progress and Hygiene; cause of wounds on left buttocks/right posterior/medial proximal thigh being sustained pressure in wheelchair  Discussed plan of care with Patient and Nurse    Face to face time: approximately 100 minutes.  Procedure: debridement dressing applied to right leg wounds    Kandis Jimenes RN  [phone]    "

## 2021-01-06 NOTE — DISCHARGE INSTRUCTIONS
Wound Care:    Abdomen  1.) Cleanse with wound cleanser and gauze. Pat dry.  2.) Cover with 6x6 Mepilex border dressing (or similar silicone foam dressing).  Change 3 times a week.    Bilateral abdominal folds, lateral  1.) Cleanse with wound cleanser and gauze. Pat dry. Make sure the area is thoroughly dry prior to applying topical product.  2.) Apply thin layer of Triad Hydrophilic Wound Dressing.  3.) Dust with small amount of Miconazole 2% Powder. Pat to help stick to Triad.  Perform these cares twice daily. Once open areas are healed, switch to applying Miconazole 2% Powder in thin layer twice daily after cleansing/drying.    Left buttocks  1.) Cleanse with wound cleanser and gauze. Pat dry.  2.) Apply 3x3 Mepilex Border Dressing (or similar silicone dressing). May need to use 3M No Sting Barrier Film (or similar product) under tape border and again over tape border to help adhere.  3.) Change 3 times a week as long as dressing stays in place. If not staying in place, follow same instructions for Right Posterior/Medial Thigh wounds    Right Posterior/Medial Thigh Wounds  1.) Cleanse with wound cleanser and gauze. Pat dry.  2.) Apply dime thick layer of Triad Hydrophilic Wound Dressing over open areas, thinner layer ok on areas around wounds.  3.) Can cover with ABD pad if it stays in place.  4.) Perform these cares twice daily.    Bilateral Lower Legs  1.) Cleanse with wound cleanser or mild soap/water. If using soap/water, rinse after. Pat dry.  2.) Apply Aquaphor generously to intact skin.  3.) Use vaseline gauze or similar product and apply Aquaphor generously. (Used three 3x8 strips per leg).  4.) Use 2 ABD pads taped together for each leg. Lay strips of vaseline gauze with Aquaphor on ABD pads.  5.) Lift patient's leg, and slide under her leg so the vaseline strips/Aquaphor are on areas of crusting.  6.) Secure with rolled gauze and tape.  7.) Perform these cares 1-2 times a day depending on drainage. Most  likely will need cares twice daily for now.    Kandis Jimenes RN, CWOCN  [phone]

## 2021-01-06 NOTE — PLAN OF CARE
Pt alert/oriented.  lives alone in asst living in [place]. Pt w/c bound,  can stand & pivot to toilet. Has home care come in 3x per week. Pt  has been seeing WOC for around 1 month for new buttocks wounds.   Skin issues:  -red areas to nose from eyeglass pads  -scattered scabs & scratches  -abdominal wound: 3 open areas, red, moist, serous drainage. Pt  has had for years following hernia surgery. Covered with mepilex foam dressing.  -abdominal fold: both sides red, cracked.  -BLE red, warm, swollen 2-3+ edema (hx lymphedema), scattered scratches, yellow area to right lateral low leg. Bilat legs weeping small amt serous fluid. Pt wanted ABD pads wrapped around lower legs & kerlix applied per home regimen. Pt has 1 toe amputated from each foot. Bilateral heels blanchable red.  -left buttock 1.5cm x1.5cm open area, pink, moist scant drng.   -right buttock large blanchable purple area to low buttock/thigh, excoriated area, open areas, red, moist with serous drainage. Left these areas open & repositioned pt on her side. Pure-wick in place to keep moisture off of wounds. WOC consult in place.   Pt given oxycodone 15mg for pain 7/10, had snack. Calls appropriately.

## 2021-01-06 NOTE — CONSULTS
Care Management Initial Consult    General Information  Assessment completed with: Patient, Children, Carolina and Elizabeth/Tim       Primary Care Provider verified and updated as needed: Yes   Readmission within the last 30 days: no previous admission in last 30 days         Advance Care Planning: Advance Care Planning Reviewed: no concerns identified          Communication Assessment  Patient's communication style: spoken language (English or Bilingual)    Hearing Difficulty or Deaf: no   Wear Glasses or Blind: yes    Cognitive  Cognitive/Neuro/Behavioral: WDL                      Living Environment:   People in home: alone     Current living Arrangements: independent living facility      Able to return to prior arrangements: no       Family/Social Support:  Care provided by: self, homecare agency  Provides care for: no one     Children, Other (specify)(homecare)          Description of Support System: Supportive, Involved    Support Assessment: Adequate family and caregiver support    Current Resources:   Skilled Home Care Services: Skilled Nursing, Home Health Aid  Community Resources:    Equipment currently used at home: wheelchair, manual       Financial Concerns: No concerns identified           Lifestyle & Psychosocial Needs:        Socioeconomic History     Marital status:      Spouse name: Not on file     Number of children: Not on file     Years of education: Not on file     Highest education level: Not on file     Tobacco Use     Smoking status: Never Smoker     Smokeless tobacco: Never Used   Substance and Sexual Activity     Alcohol use: No     Drug use: No       Mental Health Status:  Mental Health Status: No Current Concerns       Chemical Dependency Status:  Chemical Dependency Status: Current Concern         Additional Information:    Spoke to the patient and her son Tim and daughter in law Elizabeth with the patient's permission regarding discharge planning.    The patient lives in the  [facility]. Her son Tim is supportive and involved.  [facility].  Per April RN, [facility] the patient has been sitting in her wheelchair all day with no activity and drinking alcohol frequently.  The patient denies she has a drinking problem.  Her drink of choice is whiskey water.  She declined CD resources.  She will not allow home care in her home at times.  The patient is wheelchair dependent, she is able to pivot transfer.  She has pressure ulcers on her buttocks that are worsening.  Discussed TCU.  The patient has been accepted at [facility] Phone (Main Phone:[phone] Admissions Phone:[phone] Fax: [phone]).  [facility]  Discussed transportation options with the patient.  Discussed vendor, mode of transportation and anticipated private pay cost.  She  agrees to private pay cost associated with [facility] services.    Referral placed for External Clinic Care Coordination.    Plan: [facility] Phone (Main Phone:[phone] Admissions Phone:[phone] Fax: [phone]) today.      Le Ash RN

## 2021-01-06 NOTE — PROGRESS NOTES
01/06/21 0802   Quick Adds   Type of Visit Initial PT Evaluation   Living Environment   People in home alone   Current Living Arrangements independent living facility   Home Accessibility wheelchair accessible   Transportation Anticipated family or friend will provide   Living Environment Comments patient lives in indepenent living facility, son comes to check on her periodocally. Her sister in law runs errands for her    Self-Care   Usual Activity Tolerance fair   Current Activity Tolerance poor   Regular Exercise No   Equipment Currently Used at Home wheelchair, manual   Disability/Function   Hearing Difficulty or Deaf no   Wear Glasses or Blind yes   Vision Management glasses   Concentrating, Remembering or Making Decisions Difficulty no   Difficulty Communicating no   Difficulty Eating/Swallowing no   Walking or Climbing Stairs Difficulty yes   Walking or Climbing Stairs ambulation difficulty, requires equipment   Mobility Management uses W/c    Dressing/Bathing Difficulty yes   Dressing/Bathing bathing difficulty, assistance 1 person   Toileting issues no;yes   Toileting Management commode at home   Toileting Assistance toileting difficulty, requires equipment   Doing Errands Independently Difficulty (such as shopping) no   Errands Management family assists with shopping   Fall history within last six months no   Change in Functional Status Since Onset of Current Illness/Injury yes   General Information   Onset of Illness/Injury or Date of Surgery 01/05/21   Referring Physician Bob Gil   Patient/Family Therapy Goals Statement (PT) to go to a TCU and then return home   Pertinent History of Current Problem (include personal factors and/or comorbidities that impact the POC) Carolina Mari is a 65 year old female admitted on 1/5/2021. She was brought into the emergency department with worsening pressure ulcers and inability to care for self at home.   Existing Precautions/Restrictions fall    Integumentary/Edema   Integumentary/Edema Comments wraps to B LE from shin down, Periwick present, pressure sore with bandage over sacral area   Range of Motion (ROM)   ROM Comment AROM B LE WFL    Strength   Strength Comments LE strength grossly 3+/5, able to complete activity but fatigues super quickly    Bed Mobility   Bed Mobility supine-sit   Supine-Sit [place] (Bed Mobility) supervision   Transfers   Transfers bed-chair transfer;sit-stand transfer   Impairments Contributing to Impaired Transfers impaired balance;decreased ROM   Bed-Chair Transfer   Bed-Chair [place] (Transfers) contact guard   Sit-Stand Transfer   Sit-Stand [place] (Transfers) contact guard   Gait/Stairs (Locomotion)   Comment (Gait/Stairs) patient uses WC at baseline and only pivot transfers    Balance   Balance Comments sitting balance good, but standing balance needs close CGA and unsteady without UE support    Clinical Impression   Criteria for Skilled Therapeutic Intervention evaluation only   PT Diagnosis (PT) impaired functional mobility    Influenced by the following impairments weakness, decreased activity tolerance   Functional limitations due to impairments transfers, bed mobility, safety, fall risk   Clinical Presentation Stable/Uncomplicated   Clinical Presentation Rationale clinical decision making and chart review   Clinical Decision Making (Complexity) low complexity   Therapy Frequency (PT) Evaluation only   Predicted Duration of Therapy Intervention (days/wks) 1 day   Risk & Benefits of therapy have been explained evaluation/treatment results reviewed;care plan/treatment goals reviewed;risks/benefits reviewed;current/potential barriers reviewed;participants voiced agreement with care plan;participants included;patient   PT Discharge Planning    PT Discharge Recommendation (DC Rec) Transitional Care Facility   PT Rationale for DC Rec patient is weak and struggles with single stand pivot transfer, unsteady and  needing Close CGA of 2 for transfer   PT Brief overview of current status  needing SBA for supine > sit, painful, bed> chair transfer close CGA of 2 and unsteady    Total Evaluation Time   Total Evaluation Time (Minutes) 10       Mónica Fleming  PT, DPT       1/6/2021   [address] [address]  [email or web address]  Voicemail: [phone]

## 2021-01-06 NOTE — PROGRESS NOTES
01/06/21 0852   Quick Adds   Type of Visit Initial Occupational Therapy Evaluation       Present no   Living Environment   People in home alone   Current Living Arrangements independent living facility   Home Accessibility wheelchair accessible   Transportation Anticipated family or friend will provide   Living Environment Comments patient lives in [facility] living facility, son comes to check on her periodocally. Her sister in law runs errands for her    Self-Care   Usual Activity Tolerance fair   Current Activity Tolerance poor   Regular Exercise No   Equipment Currently Used at Home wheelchair, manual;shower chair   Disability/Function   Hearing Difficulty or Deaf no   Wear Glasses or Blind yes   Vision Management wears glasses   Concentrating, Remembering or Making Decisions Difficulty no   Difficulty Communicating no   Difficulty Eating/Swallowing no   Walking or Climbing Stairs Difficulty yes   Walking or Climbing Stairs ambulation difficulty, requires equipment;transferring difficulty, requires equipment   Mobility Management uses w/c for mobility. also pivot transfers using w/c as UE support   Dressing/Bathing Difficulty yes   Dressing/Bathing bathing difficulty, requires equipment;bathing difficulty, assistance 1 person   Dressing/Bathing Management independent with dressing    Toileting issues yes   Toileting Management at times uses commode at home    Toileting Assistance toileting difficulty, requires equipment   Doing Errands Independently Difficulty (such as shopping) no   Errands Management family assists with shopping    Fall history within last six months no   Change in Functional Status Since Onset of Current Illness/Injury yes   General Information   Onset of Illness/Injury or Date of Surgery 01/05/21   Referring Physician Bob Gil MD   Patient/Family Therapy Goal Statement (OT) to go to TCU to increase strength prior to return home    Additional Occupational  Profile Info/Pertinent History of Current Problem Carolina Mari is a 65 year old female admitted on 1/5/2021. She was brought into the emergency department with worsening pressure ulcers and inability to care for self at home.   Existing Precautions/Restrictions no known precautions/restrictions   Cognitive Status Examination   Orientation Status orientation to person, place and time   Affect/Mental Status (Cognitive) WNL   Pain Assessment   Patient Currently in Pain Yes, see Vital Sign flowsheet  (wound on buttocks)   Strength Comprehensive (MMT)   Comment, General Manual Muscle Testing (MMT) Assessment Pt states she feels much weaker than baseline. Does not feel safe doing pivot transfers, dressing, toileting independently    Bed Mobility   Comment (Bed Mobility) SBA with significant effort. HOB elevated and heavy use of bed rail- pt does have hospital bed at home.    Lower Body Dressing Assessment/Training   [place] Level (Lower Body Dressing) moderate assist (50% patient effort)   Toileting   [place] Level (Toileting) contact guard assist;assist of 2  (2 d/t pt being unsteady. )   Assistive Devices (Toileting) commode chair   Clinical Impression   Criteria for Skilled Therapeutic Interventions Met (OT) no  (appropriate to defer treatment to TCU)   OT Diagnosis decreased independence with ADLs   OT Problem List-Impairments impacting ADL activity tolerance impaired;strength   Assessment of Occupational Performance 3-5 Performance Deficits   Identified Performance Deficits dressing, toileting, functional transfers   Clinical Decision Making Complexity (OT) low complexity   Risks and Benefits of Treatment have been explained. Yes   Patient, Family & other staff in agreement with plan of care Yes   OT Discharge Planning    OT Discharge Recommendation (DC Rec) Transitional Care Facility   OT Rationale for DC Rec decreased independence with ADLs and functional transfers    OT Brief overview of  current status  Pt is below baseline for ADLs. requires CGAx2 to safely transfer from bed <> w/c<> commode   Total Evaluation Time (Minutes)   Total Evaluation Time (Minutes) 10

## 2021-01-06 NOTE — PROGRESS NOTES
PAS-RR    D: Per DHS regulation, SW completed and submitted PAS-RR to [place] [employer] Direct Connect via the Senior LinkAge Line.  PAS-RR confirmation # is :   PAS [id number]    P: Further questions may be directed to Senior LinkAge Line at #[phone], option #4 for PAS-RR staff.    GILBERT Rey  [facility] [phone]/ [facility] [phone]  Care Management

## 2021-01-06 NOTE — PLAN OF CARE
[facility] DISCHARGE NOTE    Patient discharged to [facility] transitional care unit at 4:30 PM via stretcher. Accompanied by EMS. Discharge instructions reviewed with patient, opportunity offered to ask questions. Pt seen by WO prior to leaving.  Wounds dressed and recommendations received from WO for discharge. Report given to Heidi at [facility]. All belongings sent with patient.    Kandis Sanchez RN

## 2021-01-06 NOTE — ED NOTES
Patient incontinent of urine.  Changed brief with A2 and repositioned patient.  Dressings on wounds on buttocks reinforced with tegaderm.

## 2021-01-06 NOTE — ED PROVIDER NOTES
History     Chief Complaint   Patient presents with     pt placement     HPI  Carolina Mari is a 65 year old female with history significant for type 2 diabetes, morbid obesity, osteoarthritis with worsening pressure ulcers on buttocks.  She was evaluated in the emergency department earlier today and referral for wound care was made and plan for treatment of surrounding cellulitis with oral clindamycin.  Family initially in agreement to  patient and take her back to her senior living facility.  Patient had waited in the lobby for greater than 2 hours and then the family contacted and informed that they were not comfortable with her coming back and refusing to pick her up.  They have her apartment keys and she has no way to gain access to her place.  I spoke with her son Augustus [phone].  He had called and spoke with charge nurse earlier.  He informs me that his mom's home health nurse has been saying for weeks that her wounds are worsening and that she is not getting a level of care that she needs.  Reportedly she will frequently refused to answer the door or her phone and will not get home health care.  Around Vik time she called family to come to her place to see her wounds.  Son was quite surprised at the degree of the wounds.  He wanted her to be seen but she wanted to put it off till after the holidays.  Today her home health nurse, reportedly, would not leave until she agreed to come to the emergency department for evaluation.    Patient reports to me that she uses a wheelchair at baseline but is able to pivot to commode and to bed independently.  She does admit that it has been getting more difficult recently.  She has bilateral lymphedema which can be quite uncomfortable for her.  She is upset that she feels her family left her stranded here.  She denies any fevers, chills, nausea, vomiting, diarrhea.  No abdominal pain, chest pain, or cough.    The patient's PMHx, Surgical Hx,  Allergies, and Medications were all reviewed with the patient.    Allergies:  No Known Allergies    Problem List:    Patient Active Problem List    Diagnosis Date Noted     Cellulitis of buttock 01/05/2021     Priority: Medium     Pressure injury of right buttock, stage 3 (H) 01/05/2021     Priority: Medium     Open wound of abdomen 07/01/2015     Priority: Medium     Morbid obesity (H) 07/01/2015     Priority: Medium     Diabetes mellitus, type 2 (H) 07/01/2015     Priority: Medium     Tobacco use disorder 07/01/2015     Priority: Medium     Osteoarthritis of right hip 04/22/2015     Priority: Medium        Past Medical History:    Past Medical History:   Diagnosis Date     Diabetes mellitus (H)      History of stomach ulcers      HTN (hypertension)      Knee osteoarthritis      Osteoarthritis of right hip      Osteoporosis      Osteoporosis      Thyroid disease        Past Surgical History:    Past Surgical History:   Procedure Laterality Date     FOOT SURGERY       GASTRIC BYPASS       HC SACROPLASTY       tka      right       Family History:    Family History   Problem Relation Age of Onset     Coronary Artery Disease Father      Coronary Artery Disease Brother      Cancer Mother         bone     Cancer Brother         leukemia       Social History:  Marital Status:   [4]  Social History     Tobacco Use     Smoking status: Never Smoker     Smokeless tobacco: Never Used   Substance Use Topics     Alcohol use: No     Drug use: No        Medications:         amLODIPine (NORVASC) 5 MG tablet       CYCLOBENZAPRINE HCL PO       FLUOXETINE HCL PO       GABAPENTIN PO       LEVOTHYROXINE SODIUM PO       LOSARTAN POTASSIUM PO       metoprolol tartrate (LOPRESSOR) 100 MG tablet       Nystatin (NYAMYC) 928341 UNIT/GM POWD       OxyCODONE HCl (OXYCONTIN PO)       OXYCODONE HCL ER PO       clindamycin (CLEOCIN) 300 MG capsule       cyanocobalamin (VITAMIN B12) 1000 MCG/ML injection       order for DME       TRAZODONE  HCL PO          Review of Systems   Constitutional: Negative for chills and fever.   HENT: Negative for sore throat.    Eyes: Positive for visual disturbance.   Respiratory: Negative for cough and shortness of breath.    Cardiovascular: Positive for leg swelling. Negative for chest pain.   Gastrointestinal: Negative for abdominal pain, nausea and vomiting.   Genitourinary: Negative for dysuria, frequency and urgency.   Musculoskeletal: Negative for myalgias.   Skin: Positive for color change and wound.   Neurological: Negative for headaches.       Physical Exam   BP: (!) 150/68  Pulse: 68  Temp: 98.5  F (36.9  C)  Resp: 16  Weight: 117.9 kg (260 lb)  SpO2: 99 %    Physical Exam  GEN: Awake, alert, and cooperative.  Appears distressed but nontoxic.  HENT: MMM. External ears and nose normal bilaterally.  Atraumatic normocephalic  EYES: EOM intact. Conjunctiva clear. No discharge.   NECK: Symmetric.  Freely mobile.  CV : Regular rate and rhythm.  Extremities warm and well perfused.  PULM: Normal effort.  Speaking in full sentences  ABD: Soft, obese, non-tender, non-distended. No rebound or guarding.  3 circular wounds on abdomen which does not appear cellulitic.   NEURO: Normal speech. Following commands. CN II-XII grossly intact. Answering questions and interacting appropriately.   EXT: No gross deformity.  Bilateral erythema and skin changes consistent with chronic stasis and lymphedema. No open wound of signs of acute cellulitis  INT: Warm and dry.  Stage II pressure ulcer left ischial..  Stage II/III pressure ulcer and right posterior medial thigh with surrounding erythema calor and tenderness.       ED Course        Procedures           Critical Care time:  none               Results for orders placed or performed during the hospital encounter of 01/05/21 (from the past 24 hour(s))   CBC with platelets, differential   Result Value Ref Range    WBC 10.7 4.0 - 11.0 10e9/L    RBC Count 3.37 (L) 3.8 - 5.2 10e12/L     Hemoglobin 9.4 (L) 11.7 - 15.7 g/dL    Hematocrit 30.3 (L) 35.0 - 47.0 %    MCV 90 78 - 100 fl    MCH 27.9 26.5 - 33.0 pg    MCHC 31.0 (L) 31.5 - 36.5 g/dL    RDW 16.9 (H) 10.0 - 15.0 %    Platelet Count 290 150 - 450 10e9/L    Diff Method Automated Method     % Neutrophils 82.9 %    % Lymphocytes 9.1 %    % Monocytes 5.1 %    % Eosinophils 1.7 %    % Basophils 0.5 %    % Immature Granulocytes 0.7 %    Nucleated RBCs 0 0 /100    Absolute Neutrophil 8.9 (H) 1.6 - 8.3 10e9/L    Absolute Lymphocytes 1.0 0.8 - 5.3 10e9/L    Absolute Monocytes 0.5 0.0 - 1.3 10e9/L    Absolute Eosinophils 0.2 0.0 - 0.7 10e9/L    Absolute Basophils 0.1 0.0 - 0.2 10e9/L    Abs Immature Granulocytes 0.1 0 - 0.4 10e9/L    Absolute Nucleated RBC 0.0    CRP Inflammation   Result Value Ref Range    CRP Inflammation 33.7 (H) 0.0 - 8.0 mg/L   Basic metabolic panel   Result Value Ref Range    Sodium 141 133 - 144 mmol/L    Potassium 4.2 3.4 - 5.3 mmol/L    Chloride 113 (H) 94 - 109 mmol/L    Carbon Dioxide 26 20 - 32 mmol/L    Anion Gap 2 (L) 3 - 14 mmol/L    Glucose 98 70 - 99 mg/dL    Urea Nitrogen 19 7 - 30 mg/dL    Creatinine 1.04 0.52 - 1.04 mg/dL    GFR Estimate 56 (L) >60 mL/min/[1.73_m2]    GFR Estimate If Black 64 >60 mL/min/[1.73_m2]    Calcium 8.1 (L) 8.5 - 10.1 mg/dL       Medications   clindamycin (CLEOCIN) capsule 300 mg (300 mg Oral Given 1/5/21 1941)   oxyCODONE-acetaminophen (PERCOCET) 5-325 MG per tablet 2 tablet (2 tablets Oral Given 1/5/21 1943)       Assessments & Plan (with Medical Decision Making)   65 year old female with past medical history history of obesity, pressure ulcers, type 2 diabetes, who is unable to care for self at home.  No safe disposition and will require hospital admission for consideration of placement.  Would also benefit from PT/OT.  Oral clindamycin for concern for cellulitis as previously planned.  Wound care consultation for outpatient, should be consulted for inpatient since she is being admitted.  I  have reviewed the case with Dr. Abdalla from the hospital service accepted the admission.  Transition orders placed.  Patient will remain in the emergency department until 11 PM due to bed availability.    I have reviewed the nursing notes.         New Prescriptions    No medications on file       Final diagnoses:   Cellulitis of buttock   Pressure injury of right buttock, stage 3 (H)     Lucas Dejesus MD    1/5/2021   [facility] EMERGENCY DEPT    Disclaimer: This note consists of words and symbols derived from keyboarding and dictation using voice recognition software.  As a result, there may be errors that have gone undetected.  Please consider this when interpreting information found in this note.             Lucas Dejesus MD  01/05/21 1949       Lucas Dejesus MD  01/05/21 2123

## 2021-01-06 NOTE — ED TRIAGE NOTES
Patient reports that she is feeling sad because her family left her here. Patient reports that she was discharged around 1500 from the Emergency Department today and her family was supposed to pick her up and they left her here. Patient is here for hospital placement, to get placement in a higher level of care.

## 2021-01-06 NOTE — DISCHARGE SUMMARY
[facility] Hospitalist Discharge Summary    Carolina Mari MRN# [id number]   Age: 65 year old YOB: 1955     Date of Admission:  1/5/2021  Date of Discharge::  1/6/2021  Admitting Physician:  Bob Gil MD  Discharge Physician:  Eloy Low MD  Primary Physician: Le Romo  Transferring Facility: N/A     Home clinic: unknown          Admission Diagnoses:   Cellulitis of buttock [L03.317]  Pressure injury of right buttock, stage 3 (H) [L89.313]          Discharge Diagnosis:     Principle diagnosis: Physical deconditioning  Failure to thrive  Secondary diagnoses:  Patient Active Problem List   Diagnosis     Osteoarthritis of right hip     Open wound of abdomen     Morbid obesity (H)     Diabetes mellitus, type 2 (H)     Tobacco use disorder     Cellulitis of buttock     Pressure injury of right buttock, stage 3 (H)          Brief History of Presenting Illness:   As per admit hx  Carolina Mari is a 65 year old female who is brought in by family members due to generalized weakness, failure to thrive, and worsening pressure ulcers.  Patient states that her pressure ulcers developed approximately month ago after sitting too long in her wheelchair.  Patient states he has become increasingly weak and is having difficulty with transfers.  She wears an adult diaper and is frequently incontinent of urine and stool.  Her adult son assists with some cares and provides meals.  She believes she has had increased drainage from her wounds, but denies fevers or chills.  She also has drainage from her legs.  She lives in an apartment and has a wound care nurse who assists with wound care every other day.  Patient has no sick contacts and states that she has not been out of her house since March.  The patient presented emergency room after her wound care nurse told her that her wounds did not appear to be healing.  Patient was seen in the emergency room earlier today and discharged on a  course of clindamycin for cellulitis, however her son Augustus refused to pick her up from the ER lobby.  He stated that he was concerned that his mother's wounds were not healing and he was not comfortable having her return home.  Son reports that the patient will frequently refuse to answer the door or her phone and therefore not available due to get home care.         Hospital Course:     Physical deconditioning  Failure to thrive  Patient lives in an apartment.  She receives visits from the wound care nurse, as well as help from her son.  Her son no longer feels the patient is able to safely live in her apartment.  Seen by Physical and Occupational Therapy consults and Social work  -to  TCU today     Pressure ulcer stage II on buttocks bilaterally with cellulitis  Nonhealing wound on abdomen  Bilateral lower extremity lymphedema  -Consult wound care nurse  -Clindamycin for cellulitis x 10 days     Hypertension  Blood pressure appears suboptimally controlled in the emergency room ranging between 140-160/60-80.  -Continue prior to admission amlodipine, losartan, and metoprolol     Diabetes mellitus type 2  Diabetes is mentioned in the patient's past medical history, but she does not appear to be currently on any medications for this. a1c 5.4     GERD  Continue prior to admission famotidine.     Hypothyroidism  Continue prior to admission levothyroxine.     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     Depression  Anxiety  Continue prior to admission fluoxetine.     B12 deficiency  Hold vitamin B12 injection, this can be resumed after discharge.     COVID-19 PUI  PCR for COVID-19 is negative.  The patient is low risk and does not require further isolation.             Procedures:    No procedures performed during this admission         Allergies:    No Known Allergies          Medications Prior to Admission:     Medications Prior to Admission   Medication Sig Dispense Refill Last Dose     amLODIPine (NORVASC) 5 MG tablet Take 5 mg by mouth daily   1/5/2021 at am maybe     CYCLOBENZAPRINE HCL PO Take 10 mg by mouth 3 times daily   1/4/2021 at Unknown time     famotidine (PEPCID) 20 MG tablet Take 20 mg by mouth 2 times daily   1/4/2021 at pm     FLUOXETINE HCL PO Take 60 mg by mouth every evening    1/3/2021 at pm     GABAPENTIN PO Take 600 mg by mouth 3 times daily    1/4/2021 at pm     LEVOTHYROXINE SODIUM PO Take 200 mcg by mouth daily   1/5/2021 at am maybe     LOSARTAN POTASSIUM PO Take 100 mg by mouth daily   1/4/2021 at am     metoprolol tartrate (LOPRESSOR) 100 MG tablet Take 100 mg by mouth 2 times daily   1/5/2021 at am     Nystatin (NYAMYC) 306178 UNIT/GM POWD Apply topically as needed    Past Week at Unknown time     OxyCODONE HCl (OXYCONTIN PO) Take 30 mg by mouth every 8 hours   Past Week at Unknown time     OXYCODONE HCL ER PO Take 15 mg by mouth every 4 hours as needed    Past Week at Unknown time     clindamycin (CLEOCIN) 300 MG capsule Take 1 capsule (300 mg) by mouth 4 times daily for 10 days 40 capsule 0 new not yet     cyanocobalamin (VITAMIN B12) 1000 MCG/ML injection Inject 1 mL into the muscle every 30 days   due now     order for DME Equipment being ordered: Farrow compression wrap. 1 each 1              Discharge Medications:     Current Discharge Medication List      CONTINUE these medications which have NOT CHANGED    Details   amLODIPine (NORVASC) 5 MG tablet Take 5 mg by mouth daily      CYCLOBENZAPRINE HCL PO Take 10 mg by mouth 3 times daily      famotidine (PEPCID) 20 MG tablet Take 20 mg by mouth 2 times daily      FLUOXETINE HCL PO Take 60 mg by mouth every evening       GABAPENTIN PO Take 600 mg by mouth 3 times daily       LEVOTHYROXINE SODIUM PO Take 200  "mcg by mouth daily      LOSARTAN POTASSIUM PO Take 100 mg by mouth daily      metoprolol tartrate (LOPRESSOR) 100 MG tablet Take 100 mg by mouth 2 times daily      Nystatin (NYAMYC) 306078 UNIT/GM POWD Apply topically as needed       !! OxyCODONE HCl (OXYCONTIN PO) Take 30 mg by mouth every 8 hours    Associated Diagnoses: Undifferentiated inflammatory arthritis (H); Encounter for methotrexate monitoring      !! OXYCODONE HCL ER PO Take 15 mg by mouth every 4 hours as needed     Associated Diagnoses: Inflammatory arthritis; Encounter for methotrexate monitoring      clindamycin (CLEOCIN) 300 MG capsule Take 1 capsule (300 mg) by mouth 4 times daily for 10 days  Qty: 40 capsule, Refills: 0      cyanocobalamin (VITAMIN B12) 1000 MCG/ML injection Inject 1 mL into the muscle every 30 days      order for DME Equipment being ordered: Farrow compression wrap.  Qty: 1 each, Refills: 1    Associated Diagnoses: Cellulitis, unspecified cellulitis site       !! - Potential duplicate medications found. Please discuss with provider.                Consultations:   No consultations were requested during this admission            Discharge Exam:   Blood pressure 118/54, pulse 54, temperature 97.7  F (36.5  C), temperature source Oral, resp. rate 18, height 1.753 m (5' 9\"), weight 116.8 kg (257 lb 8 oz), SpO2 94 %, not currently breastfeeding.  GENERAL APPEARANCE: healthy, alert and no distress  EYES: conjunctiva clear, eyes grossly normal  HENT: external ears and nose normal   NECK: supple, no masses or adenopathy  RESP: lungs clear to auscultation - no rales, rhonchi or wheezes  CV: regular rate and rhythm, normal S1 S2, no S3 or S4 and no murmur, click or rub   ABDOMEN: soft, nontender, no HSM or masses and bowel sounds normal  MS: no clubbing, cyanosis; 2+ indurated edema  SKIN: abdominal skin-anterior open wound areas-mild serous drainage  NEURO: Normal strength and tone, sensory exam grossly normal, mentation intact and speech " normal    Unresulted Labs Ordered in the Past 30 Days of this Admission     No orders found for last 31 day(s).          No results found for this or any previous visit (from the past 24 hour(s)).         Pending Tests at Discharge:   None         Discharge Instructions and Follow-Up:     Discharge diet: Regular   Discharge activity: Activity as tolerated   Discharge follow-up: F/u AZUCENA FERRERA on rounds           Discharge Disposition:     Discharged to short-term care facility      Attestation:  I have reviewed today's vital signs, notes, medications, labs and imaging.    Time Spent on this Encounter   I, Eloy Low MD, personally saw the patient today and spent greater than 30 minutes discharging this patient.    Eloy Low MD

## 2021-01-06 NOTE — H&P
[facility]     History and Physical - Hospitalist Service       Date of Admission:  1/5/2021    Assessment & Plan   Carolina Mari is a 65 year old female admitted on 1/5/2021. She was brought into the emergency department with worsening pressure ulcers and inability to care for self at home.    Physical deconditioning  Failure to thrive  Patient lives in an apartment.  She receives visits from the wound care nurse, as well as help from her son.  Her son no longer feels the patient is able to safely live in her apartment.  -Physical and Occupational Therapy consults  -Social work consult for possible TCU placement    Pressure ulcer stage II on buttocks bilaterally with cellulitis  Nonhealing wound on abdomen  Bilateral lower extremity lymphedema  -Consult wound care nurse  -Clindamycin for cellulitis.    Hypertension  Blood pressure appears suboptimally controlled in the emergency room ranging between 140-160/60-80.  -Continue prior to admission amlodipine, losartan, and metoprolol    Diabetes mellitus type 2  Diabetes is mentioned in the patient's past medical history, but she does not appear to be currently on any medications for this.  -Check hemoglobin A1c in the morning.    GERD  Continue prior to admission famotidine.    Hypothyroidism  Continue prior to admission levothyroxine.    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    Depression  Anxiety  Continue prior to admission fluoxetine.    B12 deficiency  Hold vitamin B12 injection, this can be resumed after discharge.    COVID-19 PUI  PCR for COVID-19 is negative.  The patient is low risk and does not require further isolation.     Diet: Moderate Consistent CHO Diet    DVT Prophylaxis:  Low Risk/Ambulatory with no VTE prophylaxis indicated  Rodriguez Catheter: not present  Code Status:   DNR/DNI    Disposition Plan   Expected discharge: Tomorrow, recommended to transitional care unit once safe disposition plan/ TCU bed available.  Entered: Bob Gil MD 01/05/2021, 9:18 PM       Bob Gil MD  [facility]     ______________________________________________________________________    Chief Complaint   Chief Complaint   Patient presents with     pt placement        History is obtained from the patient    History of Present Illness   Carolina Mari is a 65 year old female who is brought in by family members due to generalized weakness, failure to thrive, and worsening pressure ulcers.  Patient states that her pressure ulcers developed approximately month ago after sitting too long in her wheelchair.  Patient states he has become increasingly weak and is having difficulty with transfers.  She wears an adult diaper and is frequently incontinent of urine and stool.  Her adult son assists with some cares and provides meals.  She believes she has had increased drainage from her wounds, but denies fevers or chills.  She also has drainage from her legs.  She lives in an apartment and has a wound care nurse who assists with wound care every other day.  Patient has no sick contacts and states that she has not been out of her house since March.  The patient presented emergency room after her wound care nurse told her that her wounds did not appear to be healing.  Patient was seen in the emergency room earlier today and discharged on a course of clindamycin for cellulitis, however her son Augustus refused to pick her up from the ER lobby.  He stated that he was concerned that his mother's wounds were not healing and he was not comfortable having her return home.  Son reports that the patient will frequently refuse to answer the door or her phone and therefore not  available due to get home care.    The patient denies headache, nasal congestion, runny nose, sore throat, wheezing, cough, or shortness of breath.  Patient denies chest pain, chest pressure, palpitations, lightheadedness, or dizziness.  Patient denies syncope, falls, or trauma.  Patient denies nausea, vomiting, diarrhea, melena, hematochezia, constipation, or abdominal pain.  Patient denies dysuria or hematuria.  Patient muscle aches or new joint pain.  Patient denies headache, neck pain, or back pain.  Patient denies diplopia, dysarthria, dysphagia, incoordination, focal numbness, or unilateral weakness.    Review of Systems    The 10 point Review of Systems is negative other than noted in the HPI or here.     Past Medical History    I have reviewed this patient's medical history and updated it with pertinent information if needed.   Past Medical History:   Diagnosis Date     Diabetes mellitus (H)      History of stomach ulcers      HTN (hypertension)      Knee osteoarthritis     right     Osteoarthritis of right hip      Osteoporosis      Osteoporosis      Thyroid disease        Patient Active Problem List    Diagnosis Date Noted     Cellulitis of buttock 01/05/2021     Priority: Medium     Pressure injury of right buttock, stage 3 (H) 01/05/2021     Priority: Medium     Open wound of abdomen 07/01/2015     Priority: Medium     Morbid obesity (H) 07/01/2015     Priority: Medium     Diabetes mellitus, type 2 (H) 07/01/2015     Priority: Medium     Tobacco use disorder 07/01/2015     Priority: Medium     Osteoarthritis of right hip 04/22/2015     Priority: Medium        Past Surgical History   I have reviewed this patient's surgical history and updated it with pertinent information if needed.  Past Surgical History:   Procedure Laterality Date     FOOT SURGERY       GASTRIC BYPASS       HC SACROPLASTY       tka      right       Social History   I have reviewed this patient's social history and updated it with  pertinent information if needed.  Social History     Tobacco Use     Smoking status: Never Smoker     Smokeless tobacco: Never Used   Substance Use Topics     Alcohol use: No     Drug use: No       Family History   I have reviewed this patient's family history and updated it with pertinent information if needed.   Family History   Problem Relation Age of Onset     Coronary Artery Disease Father      Coronary Artery Disease Brother      Cancer Mother         bone     Cancer Brother         leukemia       Prior to Admission Medications   Prior to Admission Medications   Prescriptions Last Dose Informant Patient Reported? Taking?   CYCLOBENZAPRINE HCL PO 1/4/2021 at Unknown time Self Yes Yes   Sig: Take 10 mg by mouth 3 times daily   FLUOXETINE HCL PO 1/3/2021 at pm Pharmacy Yes Yes   Sig: Take 60 mg by mouth every evening    GABAPENTIN PO 1/4/2021 at pm Pharmacy Yes Yes   Sig: Take 600 mg by mouth 3 times daily    LEVOTHYROXINE SODIUM PO 1/5/2021 at am maybe Pharmacy Yes Yes   Sig: Take 200 mcg by mouth daily   LOSARTAN POTASSIUM PO 1/4/2021 at am Pharmacy Yes Yes   Sig: Take 100 mg by mouth daily   Nystatin (NYAMYC) 020175 UNIT/GM POWD Past Week at Unknown time Pharmacy Yes Yes   Sig: Apply topically as needed    OXYCODONE HCL ER PO Past Week at Unknown time Pharmacy Yes Yes   Sig: Take 15 mg by mouth every 4 hours as needed    OxyCODONE HCl (OXYCONTIN PO) Past Week at Unknown time Pharmacy Yes Yes   Sig: Take 30 mg by mouth every 8 hours   amLODIPine (NORVASC) 5 MG tablet 1/5/2021 at am maybe Pharmacy Yes Yes   Sig: Take 5 mg by mouth daily   clindamycin (CLEOCIN) 300 MG capsule new not yet Self No No   Sig: Take 1 capsule (300 mg) by mouth 4 times daily for 10 days   cyanocobalamin (VITAMIN B12) 1000 MCG/ML injection due now Self Yes No   Sig: Inject 1 mL into the muscle every 30 days   famotidine (PEPCID) 20 MG tablet 1/4/2021 at pm Pharmacy Yes Yes   Sig: Take 20 mg by mouth 2 times daily   metoprolol tartrate  (LOPRESSOR) 100 MG tablet 1/5/2021 at am Pharmacy Yes Yes   Sig: Take 100 mg by mouth 2 times daily   order for DME  Pharmacy No No   Sig: Equipment being ordered: Farrow compression wrap.      Facility-Administered Medications: None     Allergies   No Known Allergies    Physical Exam   Vital Signs: Temp: 98.5  F (36.9  C) Temp src: Oral BP: (!) 149/71 Pulse: 73   Resp: 16 SpO2: 96 % O2 Device: None (Room air)    Weight: 260 lbs 0 oz    Gen: Disheveled obese debilitated female, resting in bed, no acute distress  Head: atraumatic normocephalic; sclera non-injected, anicterric; oral mucosa moist, no lesions, no exudates  Neck: supple without spinal abnormality  Chest: clear to auscultation bilaterally, no wheezes, no rhonchi, no rales.  Cardiovascular: regular rate and rhythm, no gallops or rubs, no murmurs, no edema  Abdomen: bowel sounds normal, no hepatosplenomegaly, no masses, non-tender, non-distended, no guarding, no rebound tenderness  Musculoskeletal: Generalized muscle atrophy, normal muscle tone  Skin: Bilateral lower extremity chronic venous stasis with woody edema, stage II pressure ulcers over ischial tuberosities bilaterally with surrounding erythema, serpiginous superficial ulceration over mid abdomen along the midline  Lymph: no lymphadenopathy.  Neuro: cranial nerves II-XII intact, strength in all four extremities symmetrically decreased, reflexes normal, coordination normal.    Data   Data reviewed today: I reviewed all medications, new labs and imaging results over the last 24 hours. I personally reviewed the EKG tracing showing NSR with flipped T waves in lateral leads.    Recent Labs   Lab 01/05/21  1315   WBC 10.7   HGB 9.4*   MCV 90         POTASSIUM 4.2   CHLORIDE 113*   CO2 26   BUN 19   CR 1.04   ANIONGAP 2*   JAYA 8.1*   GLC 98         No results found for this or any previous visit (from the past 24 hour(s)).

## 2021-01-06 NOTE — PLAN OF CARE
Pt repositioning more on left side as buttocks wounds are worse on right side. Small amt serous drainage on chux pad. Removed pad, placed foam dressings for comfort until WOC nurse can see pt. Also place interdry wicking cloth under abdominal fold. Pt given scheduled oxycontin at this time.

## 2021-01-07 NOTE — PROGRESS NOTES
Care Transitions staff discussed current COVID 19 pandemic and Medicare waiver of the traditional 3 day stay requirement. Referred patient to medicare.gov, insurance provider, and admissions department at accepting facility for further questions or concerns on coverage for SNF stay.    1. This patient has been evacuated from a nursing home in the emergency area? no  2. This patient is being discharged from a hospital (in the emergency area) in order to provide care to more seriously ill patients? yes  3. This patient needs SNF care as a result of the emergency, regardless of whether he/she was in a hospital/nursing home prior to this stay? no      Rekha ELLISON RN  Inpatient Care Coordinator  [facility] [phone]  [facility] [phone]

## 2021-01-08 ENCOUNTER — VIRTUAL VISIT (OUTPATIENT)
Dept: GERIATRICS | Facility: CLINIC | Age: 66
End: 2021-01-08
Payer: MEDICARE

## 2021-01-08 VITALS
OXYGEN SATURATION: 94 % | DIASTOLIC BLOOD PRESSURE: 69 MMHG | HEART RATE: 63 BPM | BODY MASS INDEX: 38.1 KG/M2 | WEIGHT: 258 LBS | RESPIRATION RATE: 18 BRPM | SYSTOLIC BLOOD PRESSURE: 127 MMHG | TEMPERATURE: 97.7 F

## 2021-01-08 DIAGNOSIS — F17.200 TOBACCO USE DISORDER: ICD-10-CM

## 2021-01-08 DIAGNOSIS — E66.01 MORBID OBESITY (H): ICD-10-CM

## 2021-01-08 DIAGNOSIS — I10 ESSENTIAL HYPERTENSION: ICD-10-CM

## 2021-01-08 DIAGNOSIS — M25.531 PAIN AND SWELLING OF RIGHT WRIST: ICD-10-CM

## 2021-01-08 DIAGNOSIS — E11.628 TYPE 2 DIABETES MELLITUS WITH OTHER SKIN COMPLICATION, WITHOUT LONG-TERM CURRENT USE OF INSULIN (H): ICD-10-CM

## 2021-01-08 DIAGNOSIS — L03.317 CELLULITIS OF BUTTOCK: ICD-10-CM

## 2021-01-08 DIAGNOSIS — F11.90 CHRONIC, CONTINUOUS USE OF OPIOIDS: ICD-10-CM

## 2021-01-08 DIAGNOSIS — M25.431 PAIN AND SWELLING OF RIGHT WRIST: ICD-10-CM

## 2021-01-08 DIAGNOSIS — L89.313 PRESSURE INJURY OF RIGHT BUTTOCK, STAGE 3 (H): Primary | ICD-10-CM

## 2021-01-08 PROCEDURE — 99305 1ST NF CARE MODERATE MDM 35: CPT | Mod: AI | Performed by: NURSE PRACTITIONER

## 2021-01-08 NOTE — LETTER
"    1/8/2021        RE: Carolina Mari  [address]  [place] [address]        [facility] Video Visit  Carolina Mari is a 65 year old female who is being evaluated via a billable video visit due to the restrictions of the COVID19 pandemic.The patient has been notified of following: \"This video visit will be conducted via a call between you and your provider. We have found that certain health care needs can be provided without the need for an in-person physical exam.  This service lets us provide the care you need with a video conversation.  If a prescription is necessary we can send it directly to your pharmacy.  If lab work is needed we can place an order for that and you can then stop by our lab to have the test done at a later time. If during the course of the call the provider feels a video visit is not appropriate, you will not be charged for this service.\"     Proivder has received verbal consent for a Video Visit from the patient? Yes    Patient/facility would like the video invitation sent by: Send to e-mail at: [email or web address]    This visit took place virtually, with the patient located at [facility].  ________________________________________________  Video Start Time: 1534  TCU Admission  PRIMARY CARE PROVIDER AND CLINIC:  Le Romo MD, [place] [address] / [facility]*  Chief Complaint   Patient presents with     Video Visit     Hospital F/U   [facility] MRN: [id number]. Carolina Mari  is a 65 year old  (1955), admitted to the above facility from  [facility]. Hospital stay 1/5/21 through 1/6/21..  Admitted to this facility for  rehab, medical management and nursing care. HPI information obtained from: facility chart records, facility staff, patient report, [facility] chart review and Care Everywhere Epic chart review.     Brief Summary of Hospital " Course: Carolina presented to [facility] on 1/5/21 w/ FTT and physical deconditioning. She was found to have cellulitis of right buttock wound, which has been present x 1 month. WOCN was consulted, along w/ OT/PT as her son declined to have her discharge home, as she frequently refuses the home care services given to her. She discharged to TCU for ongoing care.    Updates since admission to transitional care unit: Carolina presented to TCU on 1/6 s/p the above-short hospitalization. We note she is on a significant opioid regimen at baseline. She states that she doesn't have pain if she doesn't move, but then has significant pain with movement in her backside. She is also complaining of pain in her right wrist, which is newly swollen. She is known to have an undifferentiated seronegative arthritis, ganglion cyst, and bouts with gout. She denies trauma to this area. She denies headaches, dizziness, SOB, CP, palpitations, fever, cough. She states her bowels are moving well and she has a good appetite. Chart review shows some mild HTN at time, but otherwise VS are stable.     CODE STATUS/ADVANCE DIRECTIVES DISCUSSION:   DNR / DNIPatient's living condition: lives alone ALLERGIES: Patient has no known allergies.PAST MEDICAL HISTORY:  has a past medical history of Diabetes mellitus (H), History of stomach ulcers, HTN (hypertension), Knee osteoarthritis, Osteoarthritis of right hip, Osteoporosis, Osteoporosis, and Thyroid disease.PAST SURGICAL HISTORY:   has a past surgical history that includes tka; gastric bypass; Foot surgery; and SACROPLASTY.FAMILY HISTORY: family history includes Cancer in her brother and mother; Coronary Artery Disease in her brother and father.SOCIAL HISTORY:   reports that she has never smoked. She has never used smokeless tobacco. She reports that she does not drink alcohol or use drugs.  Post Discharge Medication Reconciliation Status: discharge medications reconciled and changed, per note/orders  Current  Outpatient Medications   Medication Sig Dispense Refill     amLODIPine (NORVASC) 5 MG tablet Take 5 mg by mouth daily       clindamycin (CLEOCIN) 300 MG capsule Take 1 capsule (300 mg) by mouth 4 times daily for 10 days 40 capsule 0     cyanocobalamin (VITAMIN B12) 1000 MCG/ML injection Inject 1 mL into the muscle every 30 days       CYCLOBENZAPRINE HCL PO Take 10 mg by mouth 3 times daily       famotidine (PEPCID) 20 MG tablet Take 20 mg by mouth 2 times daily       FLUOXETINE HCL PO Take 60 mg by mouth every evening        GABAPENTIN PO Take 600 mg by mouth 3 times daily        LEVOTHYROXINE SODIUM PO Take 200 mcg by mouth daily       LOSARTAN POTASSIUM PO Take 100 mg by mouth daily       metoprolol tartrate (LOPRESSOR) 100 MG tablet Take 100 mg by mouth 2 times daily       Nystatin (NYAMYC) 516329 UNIT/GM POWD Apply topically as needed        order for DME Equipment being ordered: Farrow compression wrap. 1 each 1     oxyCODONE (OXYCONTIN) 30 MG 12 hr tablet Take 1 tablet (30 mg) by mouth every 8 hours 10 tablet 0     oxyCODONE IR (ROXICODONE) 15 MG tablet Take 1 tablet (15 mg) by mouth every 4 hours as needed 15 tablet 0     ROS: 10 point ROS of systems including Constitutional, Eyes, Respiratory, Cardiovascular, Gastroenterology, Genitourinary, Integumentary, Musculoskeletal, Psychiatric were all negative except for pertinent positives noted in my HPI.    Vitals:  /69   Pulse 63   Temp 97.7  F (36.5  C)   Resp 18   Wt 117 kg (258 lb)   SpO2 94%   BMI 38.10 kg/m    Exam:  GENERAL APPEARANCE: Sleepy, in no distress, cooperative.   EYES/ENT: EOM normal on camera, hearing acuity [place].  RESP: Respiratory effort appears unlabored over video screen, no respiratory distress apparent on video, On RA.   CV: Edema appears 1-2+ BLE via video. Color appears pale.  ABDOMEN: Appears non-distended, rounded.  SKIN: Skin appears baseline w/ video inspection and wound is covered at this time. Right wrist is as pictured  below w/ bilateral comparison:    NEURO: CN II-XII at patient's baseline, MCCULLOUGH at baseline on video. Sensation baseline PPS.  PSYCH: Insight, judgement, and memory are baseline, affect and mood are flat/engaged.    Lab/Diagnostic data:  Most Recent 3 CBC's:  Recent Labs   Lab Test 01/06/21  0439 01/05/21  1315 10/27/19  1350   WBC 9.6 10.7 7.1   HGB 8.5* 9.4* 11.1*   MCV 90 90 94    290 239   Most Recent 3 BMP's:  Recent Labs   Lab Test 01/06/21  0439 01/05/21  1315 11/18/19  1250    141 Canceled, Test credited  137   POTASSIUM 3.7 4.2 Canceled, Test credited  5.2   CHLORIDE 111* 113* Canceled, Test credited  110*   CO2 27 26 Canceled, Test credited  25   BUN 16 19 Canceled, Test credited  21   CR 0.95 1.04 Canceled, Test credited  1.10*   ANIONGAP 2* 2* Canceled, Test credited  2*   JAYA 7.9* 8.1* Canceled, Test credited  8.4*   * 98 Canceled, Test credited  87   Most Recent Hemoglobin A1c:  Recent Labs   Lab Test 01/06/21  0439   A1C 5.4       ASSESSMENT/PLAN:  Pressure injury of right buttock, stage 3 (H)  Cellulitis of buttock  Type 2 diabetes mellitus with other skin complication, without long-term current use of insulin (H)  Tobacco use disorder  Morbid obesity (H)  Essential hypertension  Chronic, continuous use of opioids   Pain and swelling of the right wrist  Acute-on-chronic. Ongoing.    Although Carolina c/o pain, she is drowsy and appearing comfortable during our visit. TCU stay may be an opportunity to reduce her opioid needs. We will approach her with this as we trend her progression.    We note a significant difference in appearance between hands/wrists. We consulted/coordinated care w/ ortho EMMIE Fischer around work-up, and XRs were indeed recommended here. We ordered them as below and acknowledge that these will likely occur after hours. We will coordinate care with the on-call team.     Given Carolina's significant polypharmacy, we will trend her renal function. Given her hx of  gout, we will obtain a uric acid level on the next lab day, though this is of low suspicion.     We also note her use of Clindamycin, and we will trend CBC as well.     We also coordinated care with nursing to help facilitate virtual visit, wrist examination, and will be tracking Carolina's wound ongoing.   Follow-up w/in 1 week or as needed.    Orders:  1. XR right hand A/P/Lateral/Oblique ASAP. Dx; pain/swelling  2. XR right wrist AP/lateral ASAP. Dx: pain/swelling.  3. CBC, CMP, uric acid level x1 on 1/11. Dx: Cellulitis/pain.   4. Diagnoses clarified and attached to medications.     Total time spent with patient visit at the skilled nursing facility was 35 min including patient visit and review of past records. Greater than 50% of total time spent with counseling and coordinating care with PA and nursing as noted above.     Video-Visit Details  Type of service:  Video Visit  Video Start Time: 1534  Video End Time (time video stopped): 1545  Originating Location (pt. Location): TCU  Distant Location (provider location):  [facility] GERIATRIC TRANSITIONAL CARE   Mode of Communication:  Video Conference.    Electronically signed by:  RACHEL Alvarez CNP DNP              Sincerely,        RACHEL Martin CNP

## 2021-01-08 NOTE — PROGRESS NOTES
"[facility] Geriatrics Video Visit  Carolina Mari is a 65 year old female who is being evaluated via a billable video visit due to the restrictions of the COVID19 pandemic.The patient has been notified of following: \"This video visit will be conducted via a call between you and your provider. We have found that certain health care needs can be provided without the need for an in-person physical exam.  This service lets us provide the care you need with a video conversation.  If a prescription is necessary we can send it directly to your pharmacy.  If lab work is needed we can place an order for that and you can then stop by our lab to have the test done at a later time. If during the course of the call the provider feels a video visit is not appropriate, you will not be charged for this service.\"     Proivder has received verbal consent for a Video Visit from the patient? Yes    Patient/facility would like the video invitation sent by: Send to e-mail at: [email or web address]    This visit took place virtually, with the patient located at [facility].  ________________________________________________  Video Start Time: 1534  TCU Admission  PRIMARY CARE PROVIDER AND CLINIC:  Le Romo MD, [place] [address] / [facility]*  Chief Complaint   Patient presents with     Video Visit     [facility] F/U   [place] MRN: [id number]. Carolina Mari  is a 65 year old  (1955), admitted to the above facility from  [facility]. Hospital stay 1/5/21 through 1/6/21..  Admitted to this facility for  rehab, medical management and nursing care. HPI information obtained from: facility chart records, facility staff, patient report, [facility] chart review and Care Everywhere Epic chart review.     Brief Summary of Hospital Course: Carolina presented to [facility] on 1/5/21 w/ FTT and physical deconditioning. She was found to have " cellulitis of right buttock wound, which has been present x 1 month. WOCN was consulted, along w/ OT/PT as her son declined to have her discharge home, as she frequently refuses the home care services given to her. She discharged to TCU for ongoing care.    Updates since admission to transitional care unit: Carolina presented to TCU on 1/6 s/p the above-short hospitalization. We note she is on a significant opioid regimen at baseline. She states that she doesn't have pain if she doesn't move, but then has significant pain with movement in her backside. She is also complaining of pain in her right wrist, which is newly swollen. She is known to have an undifferentiated seronegative arthritis, ganglion cyst, and bouts with gout. She denies trauma to this area. She denies headaches, dizziness, SOB, CP, palpitations, fever, cough. She states her bowels are moving well and she has a good appetite. Chart review shows some mild HTN at time, but otherwise VS are stable.     CODE STATUS/ADVANCE DIRECTIVES DISCUSSION:   DNR / DNIPatient's living condition: lives alone ALLERGIES: Patient has no known allergies.PAST MEDICAL HISTORY:  has a past medical history of Diabetes mellitus (H), History of stomach ulcers, HTN (hypertension), Knee osteoarthritis, Osteoarthritis of right hip, Osteoporosis, Osteoporosis, and Thyroid disease.PAST SURGICAL HISTORY:   has a past surgical history that includes tka; gastric bypass; Foot surgery; and SACROPLASTY.FAMILY HISTORY: family history includes Cancer in her brother and mother; Coronary Artery Disease in her brother and father.SOCIAL HISTORY:   reports that she has never smoked. She has never used smokeless tobacco. She reports that she does not drink alcohol or use drugs.  Post Discharge Medication Reconciliation Status: discharge medications reconciled and changed, per note/orders  Current Outpatient Medications   Medication Sig Dispense Refill     amLODIPine (NORVASC) 5 MG tablet Take 5 mg  by mouth daily       clindamycin (CLEOCIN) 300 MG capsule Take 1 capsule (300 mg) by mouth 4 times daily for 10 days 40 capsule 0     cyanocobalamin (VITAMIN B12) 1000 MCG/ML injection Inject 1 mL into the muscle every 30 days       CYCLOBENZAPRINE HCL PO Take 10 mg by mouth 3 times daily       famotidine (PEPCID) 20 MG tablet Take 20 mg by mouth 2 times daily       FLUOXETINE HCL PO Take 60 mg by mouth every evening        GABAPENTIN PO Take 600 mg by mouth 3 times daily        LEVOTHYROXINE SODIUM PO Take 200 mcg by mouth daily       LOSARTAN POTASSIUM PO Take 100 mg by mouth daily       metoprolol tartrate (LOPRESSOR) 100 MG tablet Take 100 mg by mouth 2 times daily       Nystatin (NYAMYC) 448253 UNIT/GM POWD Apply topically as needed        order for DME Equipment being ordered: Farrow compression wrap. 1 each 1     oxyCODONE (OXYCONTIN) 30 MG 12 hr tablet Take 1 tablet (30 mg) by mouth every 8 hours 10 tablet 0     oxyCODONE IR (ROXICODONE) 15 MG tablet Take 1 tablet (15 mg) by mouth every 4 hours as needed 15 tablet 0     ROS: 10 point ROS of systems including Constitutional, Eyes, Respiratory, Cardiovascular, Gastroenterology, Genitourinary, Integumentary, Musculoskeletal, Psychiatric were all negative except for pertinent positives noted in my HPI.    Vitals:  /69   Pulse 63   Temp 97.7  F (36.5  C)   Resp 18   Wt 117 kg (258 lb)   SpO2 94%   BMI 38.10 kg/m    Exam:  GENERAL APPEARANCE: Sleepy, in no distress, cooperative.   EYES/ENT: EOM normal on camera, hearing acuity [place].  RESP: Respiratory effort appears unlabored over video screen, no respiratory distress apparent on video, On RA.   CV: Edema appears 1-2+ BLE via video. Color appears pale.  ABDOMEN: Appears non-distended, rounded.  SKIN: Skin appears baseline w/ video inspection and wound is covered at this time. Right wrist is as pictured below w/ bilateral comparison:    NEURO: CN II-XII at patient's baseline, MCCULLOUGH at baseline on video.  Sensation baseline PPS.  PSYCH: Insight, judgement, and memory are baseline, affect and mood are flat/engaged.    Lab/Diagnostic data:  Most Recent 3 CBC's:  Recent Labs   Lab Test 01/06/21  0439 01/05/21  1315 10/27/19  1350   WBC 9.6 10.7 7.1   HGB 8.5* 9.4* 11.1*   MCV 90 90 94    290 239   Most Recent 3 BMP's:  Recent Labs   Lab Test 01/06/21  0439 01/05/21  1315 11/18/19  1250    141 Canceled, Test credited  137   POTASSIUM 3.7 4.2 Canceled, Test credited  5.2   CHLORIDE 111* 113* Canceled, Test credited  110*   CO2 27 26 Canceled, Test credited  25   BUN 16 19 Canceled, Test credited  21   CR 0.95 1.04 Canceled, Test credited  1.10*   ANIONGAP 2* 2* Canceled, Test credited  2*   JAYA 7.9* 8.1* Canceled, Test credited  8.4*   * 98 Canceled, Test credited  87   Most Recent Hemoglobin A1c:  Recent Labs   Lab Test 01/06/21  0439   A1C 5.4       ASSESSMENT/PLAN:  Pressure injury of right buttock, stage 3 (H)  Cellulitis of buttock  Type 2 diabetes mellitus with other skin complication, without long-term current use of insulin (H)  Tobacco use disorder  Morbid obesity (H)  Essential hypertension  Chronic, continuous use of opioids   Pain and swelling of the right wrist  Acute-on-chronic. Ongoing.    Although Carolina c/o pain, she is drowsy and appearing comfortable during our visit. TCU stay may be an opportunity to reduce her opioid needs. We will approach her with this as we trend her progression.    We note a significant difference in appearance between hands/wrists. We consulted/coordinated care w/ ortho EMMIE Fischer around work-up, and XRs were indeed recommended here. We ordered them as below and acknowledge that these will likely occur after hours. We will coordinate care with the on-call team.     Given Carolina's significant polypharmacy, we will trend her renal function. Given her hx of gout, we will obtain a uric acid level on the next lab day, though this is of low suspicion.     We also  note her use of Clindamycin, and we will trend CBC as well.     We also coordinated care with nursing to help facilitate virtual visit, wrist examination, and will be tracking Carolina's wound ongoing.   Follow-up w/in 1 week or as needed.    Orders:  1. XR right hand A/P/Lateral/Oblique ASAP. Dx; pain/swelling  2. XR right wrist AP/lateral ASAP. Dx: pain/swelling.  3. CBC, CMP, uric acid level x1 on 1/11. Dx: Cellulitis/pain.   4. Diagnoses clarified and attached to medications.     Total time spent with patient visit at the [facility] nursing [facility] was 35 min including patient visit and review of past records. Greater than 50% of total time spent with counseling and coordinating care with PA and nursing as noted above.     Video-Visit Details  Type of service:  Video Visit  Video Start Time: 1534  Video End Time (time video stopped): 1545  Originating Location (pt. Location): U  Distant Location (provider location):  [facility] GERIATRIC TRANSITIONAL CARE   Mode of Communication:  Video Conference.    Electronically signed by:  Dr. Adilene Aguayo, APRN CNP DNP

## 2021-01-09 ENCOUNTER — RECORDS - HEALTHEAST (OUTPATIENT)
Dept: LAB | Facility: CLINIC | Age: 66
End: 2021-01-09

## 2021-01-09 ENCOUNTER — TRANSFERRED RECORDS (OUTPATIENT)
Dept: HEALTH INFORMATION MANAGEMENT | Facility: CLINIC | Age: 66
End: 2021-01-09

## 2021-01-10 ENCOUNTER — TELEPHONE (OUTPATIENT)
Dept: GERIATRICS | Facility: CLINIC | Age: 66
End: 2021-01-10

## 2021-01-10 DIAGNOSIS — L89.313 PRESSURE INJURY OF RIGHT BUTTOCK, STAGE 3 (H): ICD-10-CM

## 2021-01-10 DIAGNOSIS — M06.4 UNDIFFERENTIATED INFLAMMATORY ARTHRITIS (H): ICD-10-CM

## 2021-01-10 DIAGNOSIS — Z79.631 ENCOUNTER FOR METHOTREXATE MONITORING: ICD-10-CM

## 2021-01-10 DIAGNOSIS — Z51.81 ENCOUNTER FOR METHOTREXATE MONITORING: ICD-10-CM

## 2021-01-10 RX ORDER — OXYCODONE HYDROCHLORIDE 15 MG/1
15 TABLET ORAL EVERY 4 HOURS PRN
Qty: 15 TABLET | Refills: 0 | Status: SHIPPED | OUTPATIENT
Start: 2021-01-10 | End: 2021-01-13

## 2021-01-10 RX ORDER — OXYCODONE HYDROCHLORIDE 30 MG/1
30 TABLET, FILM COATED, EXTENDED RELEASE ORAL EVERY 8 HOURS
Qty: 10 TABLET | Refills: 0 | Status: SHIPPED | OUTPATIENT
Start: 2021-01-10 | End: 2021-01-13

## 2021-01-10 NOTE — TELEPHONE ENCOUNTER
Nursing called to report the 2 xray results of right wrist and hand.  Both showed inflammatory arthritis.  No fractures.    Does have PRN oxycodone and oxycontin which staff only have 2 left of the oxycontin and 6 of oxycodone.  Asking for a refill.   Can send 3 days worth to [facility]    [facility] Controlled Substance Medication Fill:  Carolina Mari  1955  Effective Jan 1, 2021, The [place] Board of Pharmacy has a new legislative requirement that requires checking the [place]  database before initially prescribing an opiate and every three months for patients receiving an opiate for treatment of chronic pain.   Request for controlled substance medication:    for oxycodone, Oxycontin medication and sent to [facility] for 15, 10 amount   checked, and last dispensed fill  on 1/6/2021 for 15, 10 amount prior to discharge from hospital.      Electronically signed by Fatemeh Knight RN, CNP

## 2021-01-11 ENCOUNTER — TRANSFERRED RECORDS (OUTPATIENT)
Dept: HEALTH INFORMATION MANAGEMENT | Facility: CLINIC | Age: 66
End: 2021-01-11

## 2021-01-11 LAB
ALBUMIN SERPL-MCNC: 1.8 G/DL (ref 3.5–5)
ALBUMIN SERPL-MCNC: 1.8 G/DL (ref 3.5–5)
ALP SERPL-CCNC: 94 U/L (ref 45–120)
ALP SERPL-CCNC: 94 U/L (ref 45–120)
ALT SERPL W P-5'-P-CCNC: 14 U/L (ref 0–45)
ALT SERPL-CCNC: 14 U/L (ref 0–45)
ANION GAP SERPL CALCULATED.3IONS-SCNC: 5 MMOL/L (ref 5–18)
ANION GAP SERPL CALCULATED.3IONS-SCNC: 5 MMOL/L (ref 5–18)
AST SERPL W P-5'-P-CCNC: 20 U/L (ref 0–40)
AST SERPL-CCNC: 20 U/L (ref 0–40)
BASOPHILS # BLD AUTO: 0 THOU/UL (ref 0–0.2)
BASOPHILS NFR BLD AUTO: 1 % (ref 0–2)
BILIRUB SERPL-MCNC: 0.3 MG/DL (ref 0–1)
BILIRUB SERPL-MCNC: 0.3 MG/DL (ref 0–1)
BUN SERPL-MCNC: 16 MG/DL (ref 8–22)
BUN SERPL-MCNC: 16 MG/DL (ref 8–22)
CALCIUM SERPL-MCNC: 8 MG/DL (ref 8.5–10.5)
CALCIUM SERPL-MCNC: 8 MG/DL (ref 8.5–10.5)
CHLORIDE BLD-SCNC: 103 MMOL/L (ref 98–107)
CHLORIDE SERPLBLD-SCNC: 103 MMOL/L (ref 98–107)
CO2 SERPL-SCNC: 28 MMOL/L (ref 22–31)
CO2 SERPL-SCNC: 28 MMOL/L (ref 22–31)
CREAT SERPL-MCNC: 1.14 MG/DL (ref 0.6–1.1)
CREAT SERPL-MCNC: 1.14 MG/DL (ref 0.6–1.1)
DIFFERENTIAL: ABNORMAL
EOSINOPHIL # BLD AUTO: 0.4 THOU/UL (ref 0–0.4)
EOSINOPHIL NFR BLD AUTO: 4 % (ref 0–6)
ERYTHROCYTE [DISTWIDTH] IN BLOOD BY AUTOMATED COUNT: 16.6 % (ref 11–14.5)
ERYTHROCYTE [DISTWIDTH] IN BLOOD BY AUTOMATED COUNT: 16.6 % (ref 11–14.5)
GFR SERPL CREATININE-BSD FRML MDRD: 48 ML/MIN/1.73M2
GFR SERPL CREATININE-BSD FRML MDRD: 48 ML/MIN/1.73M2
GLUCOSE BLD-MCNC: 75 MG/DL (ref 70–125)
GLUCOSE SERPL-MCNC: 75 MG/DL (ref 70–125)
HCT VFR BLD AUTO: 28.5 % (ref 35–47)
HCT VFR BLD AUTO: 28.5 % (ref 35–47)
HEMOGLOBIN: 8.6 G/DL (ref 12–16)
HGB BLD-MCNC: 8.6 G/DL (ref 12–16)
IMM GRANULOCYTES # BLD: 0 THOU/UL
IMM GRANULOCYTES NFR BLD: 0 %
LYMPHOCYTES # BLD AUTO: 1.1 THOU/UL (ref 0.8–4.4)
LYMPHOCYTES NFR BLD AUTO: 14 % (ref 20–40)
MCH RBC QN AUTO: 27.6 PG (ref 27–34)
MCH RBC QN AUTO: 27.6 PG (ref 27–34)
MCHC RBC AUTO-ENTMCNC: 30.2 G/DL (ref 32–36)
MCHC RBC AUTO-ENTMCNC: 30.2 G/DL (ref 32–36)
MCV RBC AUTO: 91 FL (ref 80–100)
MCV RBC AUTO: 91 FL (ref 80–100)
MONOCYTES # BLD AUTO: 0.6 THOU/UL (ref 0–0.9)
MONOCYTES NFR BLD AUTO: 7 % (ref 2–10)
NEUTROPHILS # BLD AUTO: 5.8 THOU/UL (ref 2–7.7)
NEUTROPHILS NFR BLD AUTO: 73 % (ref 50–70)
PLATELET # BLD AUTO: 271 THOU/UL (ref 140–440)
PLATELET # BLD AUTO: 271 THOU/UL (ref 140–440)
PMV BLD AUTO: 10.9 FL (ref 8.5–12.5)
POTASSIUM BLD-SCNC: 4.9 MMOL/L (ref 3.5–5)
POTASSIUM SERPL-SCNC: 4.9 MMOL/L (ref 3.5–5)
PROT SERPL-MCNC: 5.3 G/DL (ref 6–8)
PROT SERPL-MCNC: 5.3 G/DL (ref 6–8)
RBC # BLD AUTO: 3.12 MILL/UL (ref 3.8–5.4)
RBC # BLD AUTO: 3.12 MILL/UL (ref 3.8–5.4)
SODIUM SERPL-SCNC: 136 MMOL/L (ref 136–145)
SODIUM SERPL-SCNC: 136 MMOL/L (ref 136–145)
URATE SERPL-MCNC: 5.6 MG/DL (ref 2–7.5)
WBC # BLD AUTO: 7.9 THOU/UL (ref 4–11)
WBC: 7.9 THOU/UL (ref 4–11)

## 2021-01-13 DIAGNOSIS — Z51.81 ENCOUNTER FOR METHOTREXATE MONITORING: ICD-10-CM

## 2021-01-13 DIAGNOSIS — M06.4 UNDIFFERENTIATED INFLAMMATORY ARTHRITIS (H): ICD-10-CM

## 2021-01-13 DIAGNOSIS — L89.313 PRESSURE INJURY OF RIGHT BUTTOCK, STAGE 3 (H): ICD-10-CM

## 2021-01-13 DIAGNOSIS — Z79.631 ENCOUNTER FOR METHOTREXATE MONITORING: ICD-10-CM

## 2021-01-13 RX ORDER — OXYCODONE HYDROCHLORIDE 30 MG/1
30 TABLET, FILM COATED, EXTENDED RELEASE ORAL EVERY 8 HOURS
Qty: 10 TABLET | Refills: 0 | Status: SHIPPED | OUTPATIENT
Start: 2021-01-13 | End: 2021-01-18

## 2021-01-13 RX ORDER — OXYCODONE HYDROCHLORIDE 15 MG/1
15 TABLET ORAL EVERY 4 HOURS PRN
Qty: 15 TABLET | Refills: 0 | Status: SHIPPED | OUTPATIENT
Start: 2021-01-13 | End: 2021-01-18

## 2021-01-15 ENCOUNTER — VIRTUAL VISIT (OUTPATIENT)
Dept: GERIATRICS | Facility: CLINIC | Age: 66
End: 2021-01-15
Payer: MEDICARE

## 2021-01-15 VITALS
WEIGHT: 257.5 LBS | BODY MASS INDEX: 38.03 KG/M2 | HEART RATE: 83 BPM | OXYGEN SATURATION: 96 % | DIASTOLIC BLOOD PRESSURE: 65 MMHG | RESPIRATION RATE: 16 BRPM | TEMPERATURE: 97.3 F | SYSTOLIC BLOOD PRESSURE: 107 MMHG

## 2021-01-15 DIAGNOSIS — M06.4 UNDIFFERENTIATED INFLAMMATORY ARTHRITIS (H): ICD-10-CM

## 2021-01-15 DIAGNOSIS — E11.628 TYPE 2 DIABETES MELLITUS WITH OTHER SKIN COMPLICATION, WITHOUT LONG-TERM CURRENT USE OF INSULIN (H): ICD-10-CM

## 2021-01-15 DIAGNOSIS — L03.317 CELLULITIS OF BUTTOCK: ICD-10-CM

## 2021-01-15 DIAGNOSIS — F11.90 CHRONIC, CONTINUOUS USE OF OPIOIDS: ICD-10-CM

## 2021-01-15 DIAGNOSIS — E66.01 MORBID OBESITY (H): ICD-10-CM

## 2021-01-15 DIAGNOSIS — N18.31 STAGE 3A CHRONIC KIDNEY DISEASE (H): ICD-10-CM

## 2021-01-15 DIAGNOSIS — I10 ESSENTIAL HYPERTENSION: ICD-10-CM

## 2021-01-15 DIAGNOSIS — L89.313 PRESSURE INJURY OF RIGHT BUTTOCK, STAGE 3 (H): Primary | ICD-10-CM

## 2021-01-15 DIAGNOSIS — F17.200 TOBACCO USE DISORDER: ICD-10-CM

## 2021-01-15 PROCEDURE — 99309 SBSQ NF CARE MODERATE MDM 30: CPT | Mod: 95 | Performed by: NURSE PRACTITIONER

## 2021-01-15 NOTE — LETTER
"    1/15/2021        RE: Carolina Mari  [address]  [place] [address]        [facility] Geriatrics Video Visit  Carolina Mari is a 65 year old female who is being evaluated via a billable video visit due to the restrictions of the COVID19 pandemic.The patient has been notified of following: \"This video visit will be conducted via a call between you and your provider. We have found that certain health care needs can be provided without the need for an in-person physical exam.  This service lets us provide the care you need with a video conversation.  If a prescription is necessary we can send it directly to your pharmacy.  If lab work is needed we can place an order for that and you can then stop by our lab to have the test done at a later time. If during the course of the call the provider feels a video visit is not appropriate, you will not be charged for this service.\"     Proivder has received verbal consent for a Video Visit from the patient? Yes    Patient/facility would like the video invitation sent by: Send to e-mail at: [email or web address]     This visit took place virtually, with the patient located at [facility]   ________________________________________________  Video Start Time: 1244  Carolina Mari complains of    Chief Complaint   Patient presents with     Nursing Home Acute   HPI/Subjective:  Carolina seen on follow-up after her admission to TCU last week. She has wrist complaints and we obtained XRs, which showed known inflammatory arthritis.     Today, Carolina states her wrist is much better and she was astonished that it was just arthritis that had caused her flare. She states the swelling is way down and she still has mild soreness. Her main pain complaint is her wounds and chronic back problems. We note her high opioid dosing and drowsiness. Carolina states she is doing well with therapy and she feels like she can " complete her exercises with a tolerable amount of pain. She states her appetite is good, she denies bowel trouble, nausea, SOB, CP, dizziness, headache. Her VS have been stable, and her follow-up blood work s/p TCU admission was grossly unremarkable.     History: I have reviewed and updated the patient's Past Medical History, Social History, Family History and Medication List. ALLERGIES: Aspirin  MEDICATIONS:  Current Outpatient Medications   Medication Sig Dispense Refill     amLODIPine (NORVASC) 5 MG tablet Take 5 mg by mouth daily       clindamycin (CLEOCIN) 300 MG capsule Take 1 capsule (300 mg) by mouth 4 times daily for 10 days 40 capsule 0     cyanocobalamin (VITAMIN B12) 1000 MCG/ML injection Inject 1 mL into the muscle every 30 days       CYCLOBENZAPRINE HCL PO Take 10 mg by mouth 3 times daily       famotidine (PEPCID) 20 MG tablet Take 20 mg by mouth 2 times daily       FLUOXETINE HCL PO Take 60 mg by mouth every evening        GABAPENTIN PO Take 600 mg by mouth 3 times daily        LEVOTHYROXINE SODIUM PO Take 200 mcg by mouth daily       LOSARTAN POTASSIUM PO Take 100 mg by mouth daily       metoprolol tartrate (LOPRESSOR) 100 MG tablet Take 100 mg by mouth 2 times daily       Nystatin (NYAMYC) 708386 UNIT/GM POWD Apply topically as needed        order for DME Equipment being ordered: Farrow compression wrap. 1 each 1     oxyCODONE (OXYCONTIN) 30 MG 12 hr tablet Take 1 tablet (30 mg) by mouth every 8 hours 10 tablet 0     oxyCODONE IR (ROXICODONE) 15 MG tablet Take 1 tablet (15 mg) by mouth every 4 hours as needed for breakthrough pain 15 tablet 0     REVIEW OF SYSTEMS: 4 point ROS including Respiratory, CV, GI and , other than that noted in the HPI, is negative.  Objective:  /65   Pulse 83   Temp 97.3  F (36.3  C)   Resp 16   Wt 116.8 kg (257 lb 8 oz)   SpO2 96%   BMI 38.03 kg/m    GENERAL APPEARANCE: Alert, in no distress, cooperative.   EYES/ENT: EOM normal on camera, hearing acuity  [place].  RESP: Respiratory effort appears unlabored over video screen, no respiratory distress apparent on video, On RA.   CV: Edema appears 1+ BLE via video. Color appears pale w/ discoloration in BLE.  ABDOMEN: Appears non-distended , rounded.  SKIN: Skin appears baseline w/ video inspection. Wounds as pictured here:      NEURO: CN II-XII at patient's baseline, MCCULLOUGH at baseline on video. Sensation baseline PPS.  PSYCH: Insight, judgement, and memory are baseline impaired, affect and mood are drowsy/happy.    Laboratory/Diagnostics: Reviewed in Epic by provider today.     Assessment/Plan:  Pressure injury of right buttock, stage 3 (H)  Undifferentiated inflammatory arthritis (H)  Cellulitis of buttock  Type 2 diabetes mellitus with other skin complication, without long-term current use of insulin (H)  Stage 3a chronic kidney disease  Tobacco use disorder  Morbid obesity (H)  Essential hypertension  Chronic, continuous use of opioids  Acute-on-chronic. Ongoing.    Carolina is making good progress in therapy with her pain well controlled. Given she is drowsy, and at risk for falls, we will slightly decrease Flexeril, with the goal for this medication to be changed to PRN.     We note her high opioid dosing. Should she tolerate a flexeril reduction, we may be able to decrease her IR Oxycodone.     She remains on abx for her cellulitis for a few more days.     Her renal function and BGs have been overall controlled at baseline.     We will closely monitor wounds with nursing ongoing, these are far from resolved.   Follow-up w/in 1 week or as needed.    Orders:  1. Decrease Flexeril to 5mg PO TID. Dx: muscle spasms.     Video-Visit Details  Type of service:  Video Visit  Video Start Time: 1244  Video End Time (time video stopped): 1248  Originating Location (pt. Location): TCU  Distant Location (provider location):  [facility] TRANSITIONAL CARE   Mode of Communication:  Video Conference.    Electronically  signed by:  Dr. Adilene Aguayo, APRN CNP DNP            Sincerely,        Adilene Aguayo, RACHEL CNP

## 2021-01-15 NOTE — PROGRESS NOTES
"[facility] Geriatrics Video Visit  Carolina Mari is a 65 year old female who is being evaluated via a billable video visit due to the restrictions of the COVID19 pandemic.The patient has been notified of following: \"This video visit will be conducted via a call between you and your provider. We have found that certain health care needs can be provided without the need for an in-person physical exam.  This service lets us provide the care you need with a video conversation.  If a prescription is necessary we can send it directly to your pharmacy.  If lab work is needed we can place an order for that and you can then stop by our lab to have the test done at a later time. If during the course of the call the provider feels a video visit is not appropriate, you will not be charged for this service.\"     Proivder has received verbal consent for a Video Visit from the patient? Yes    Patient/facility would like the video invitation sent by: Send to e-mail at: [email or web address]     This visit took place virtually, with the patient located at [facility]   ________________________________________________  Video Start Time: 1244  Carolina Mari complains of    Chief Complaint   Patient presents with     Nursing Home Acute   HPI/Subjective:  Carolina seen on follow-up after her admission to TCU last week. She has wrist complaints and we obtained XRs, which showed known inflammatory arthritis.     Today, Carolina states her wrist is much better and she was astonished that it was just arthritis that had caused her flare. She states the swelling is way down and she still has mild soreness. Her main pain complaint is her wounds and chronic back problems. We note her high opioid dosing and drowsiness. Carolina states she is doing well with therapy and she feels like she can complete her exercises with a tolerable amount of pain. She states her appetite is good, she denies bowel " trouble, nausea, SOB, CP, dizziness, headache. Her VS have been stable, and her follow-up blood work s/p TCU admission was grossly unremarkable.     History: I have reviewed and updated the patient's Past Medical History, Social History, Family History and Medication List. ALLERGIES: Aspirin  MEDICATIONS:  Current Outpatient Medications   Medication Sig Dispense Refill     amLODIPine (NORVASC) 5 MG tablet Take 5 mg by mouth daily       clindamycin (CLEOCIN) 300 MG capsule Take 1 capsule (300 mg) by mouth 4 times daily for 10 days 40 capsule 0     cyanocobalamin (VITAMIN B12) 1000 MCG/ML injection Inject 1 mL into the muscle every 30 days       CYCLOBENZAPRINE HCL PO Take 10 mg by mouth 3 times daily       famotidine (PEPCID) 20 MG tablet Take 20 mg by mouth 2 times daily       FLUOXETINE HCL PO Take 60 mg by mouth every evening        GABAPENTIN PO Take 600 mg by mouth 3 times daily        LEVOTHYROXINE SODIUM PO Take 200 mcg by mouth daily       LOSARTAN POTASSIUM PO Take 100 mg by mouth daily       metoprolol tartrate (LOPRESSOR) 100 MG tablet Take 100 mg by mouth 2 times daily       Nystatin (NYAMYC) 135227 UNIT/GM POWD Apply topically as needed        order for DME Equipment being ordered: Farrow compression wrap. 1 each 1     oxyCODONE (OXYCONTIN) 30 MG 12 hr tablet Take 1 tablet (30 mg) by mouth every 8 hours 10 tablet 0     oxyCODONE IR (ROXICODONE) 15 MG tablet Take 1 tablet (15 mg) by mouth every 4 hours as needed for breakthrough pain 15 tablet 0     REVIEW OF SYSTEMS: 4 point ROS including Respiratory, CV, GI and , other than that noted in the HPI, is negative.  Objective:  /65   Pulse 83   Temp 97.3  F (36.3  C)   Resp 16   Wt 116.8 kg (257 lb 8 oz)   SpO2 96%   BMI 38.03 kg/m    GENERAL APPEARANCE: Alert, in no distress, cooperative.   EYES/ENT: EOM normal on camera, hearing acuity [place].  RESP: Respiratory effort appears unlabored over video screen, no respiratory distress apparent on  video, On RA.   CV: Edema appears 1+ BLE via video. Color appears pale w/ discoloration in BLE.  ABDOMEN: Appears non-distended , rounded.  SKIN: Skin appears baseline w/ video inspection. Wounds as pictured here:      NEURO: CN II-XII at patient's baseline, MCCULLOUGH at baseline on video. Sensation baseline PPS.  PSYCH: Insight, judgement, and memory are baseline impaired, affect and mood are drowsy/happy.    Laboratory/Diagnostics: Reviewed in Epic by provider today.     Assessment/Plan:  Pressure injury of right buttock, stage 3 (H)  Undifferentiated inflammatory arthritis (H)  Cellulitis of buttock  Type 2 diabetes mellitus with other skin complication, without long-term current use of insulin (H)  Stage 3a chronic kidney disease  Tobacco use disorder  Morbid obesity (H)  Essential hypertension  Chronic, continuous use of opioids  Acute-on-chronic. Ongoing.    Carolina is making good progress in therapy with her pain well controlled. Given she is drowsy, and at risk for falls, we will slightly decrease Flexeril, with the goal for this medication to be changed to PRN.     We note her high opioid dosing. Should she tolerate a flexeril reduction, we may be able to decrease her IR Oxycodone.     She remains on abx for her cellulitis for a few more days.     Her renal function and BGs have been overall controlled at baseline.     We will closely monitor wounds with nursing ongoing, these are far from resolved.   Follow-up w/in 1 week or as needed.    Orders:  1. Decrease Flexeril to 5mg PO TID. Dx: muscle spasms.     Video-Visit Details  Type of service:  Video Visit  Video Start Time: 1244  Video End Time (time video stopped): 1248  Originating Location (pt. Location): TCU  Distant Location (provider location):  [facility] GERIATRIC TRANSITIONAL CARE   Mode of Communication:  Video Conference.    Electronically signed by:  Dr. Adilene Aguayo, APRN CNP DNP

## 2021-01-18 ENCOUNTER — VIRTUAL VISIT (OUTPATIENT)
Dept: GERIATRICS | Facility: CLINIC | Age: 66
End: 2021-01-18
Payer: MEDICARE

## 2021-01-18 VITALS
SYSTOLIC BLOOD PRESSURE: 107 MMHG | HEIGHT: 69 IN | HEART RATE: 85 BPM | TEMPERATURE: 97.4 F | DIASTOLIC BLOOD PRESSURE: 65 MMHG | BODY MASS INDEX: 38.14 KG/M2 | RESPIRATION RATE: 16 BRPM | OXYGEN SATURATION: 96 % | WEIGHT: 257.5 LBS

## 2021-01-18 DIAGNOSIS — N18.31 STAGE 3A CHRONIC KIDNEY DISEASE (H): ICD-10-CM

## 2021-01-18 DIAGNOSIS — L89.313 PRESSURE INJURY OF RIGHT BUTTOCK, STAGE 3 (H): ICD-10-CM

## 2021-01-18 DIAGNOSIS — M06.4 UNDIFFERENTIATED INFLAMMATORY ARTHRITIS (H): ICD-10-CM

## 2021-01-18 DIAGNOSIS — L03.317 CELLULITIS OF BUTTOCK: ICD-10-CM

## 2021-01-18 DIAGNOSIS — Z79.631 ENCOUNTER FOR METHOTREXATE MONITORING: ICD-10-CM

## 2021-01-18 DIAGNOSIS — Z51.81 ENCOUNTER FOR METHOTREXATE MONITORING: ICD-10-CM

## 2021-01-18 DIAGNOSIS — L89.313 PRESSURE INJURY OF RIGHT BUTTOCK, STAGE 3 (H): Primary | ICD-10-CM

## 2021-01-18 DIAGNOSIS — E66.01 CLASS 2 SEVERE OBESITY DUE TO EXCESS CALORIES WITH SERIOUS COMORBIDITY AND BODY MASS INDEX (BMI) OF 38.0 TO 38.9 IN ADULT (H): ICD-10-CM

## 2021-01-18 DIAGNOSIS — F11.90 CHRONIC, CONTINUOUS USE OF OPIOIDS: ICD-10-CM

## 2021-01-18 DIAGNOSIS — E66.812 CLASS 2 SEVERE OBESITY DUE TO EXCESS CALORIES WITH SERIOUS COMORBIDITY AND BODY MASS INDEX (BMI) OF 38.0 TO 38.9 IN ADULT (H): ICD-10-CM

## 2021-01-18 PROCEDURE — 99305 1ST NF CARE MODERATE MDM 35: CPT | Mod: 95 | Performed by: FAMILY MEDICINE

## 2021-01-18 RX ORDER — OXYCODONE HYDROCHLORIDE 30 MG/1
30 TABLET, FILM COATED, EXTENDED RELEASE ORAL EVERY 8 HOURS
Qty: 30 TABLET | Refills: 0 | Status: SHIPPED | OUTPATIENT
Start: 2021-01-18 | End: 2021-01-26

## 2021-01-18 RX ORDER — OXYCODONE HYDROCHLORIDE 15 MG/1
15 TABLET ORAL EVERY 4 HOURS PRN
Qty: 30 TABLET | Refills: 0 | Status: SHIPPED | OUTPATIENT
Start: 2021-01-18 | End: 2021-01-20

## 2021-01-18 ASSESSMENT — MIFFLIN-ST. JEOR: SCORE: 1772.39

## 2021-01-18 NOTE — LETTER
"    1/18/2021        RE: Carolina Mari  [address] [address]  [place] [facility] [place] [facility] [address]         [facility]  Carolina Mari is being evaluated via a billable video.   The patient has been notified of following:  \"This video visit will be conducted via a call between you and your provider. We have found that certain health care needs can be provided without the need for an in-person physical exam.  This service lets us provide the care you need with a video conversation. If during the course of the call the provider feels a video visit is not appropriate, you will not be charged for this service.\"   The provider has received verbal consent for a Video Visit from the patient and or first contact? Yes  Patient/facility staff would like the video invitation sent by: N/A   Video Start Time: 13:19  Which Facility the Patient is at during the time of visit: [facility]   Received verbal consent to use Care Everywhere in order to access labs, documents, histories, and all other needed information to provide care at current facility.    PRIMARY CARE PROVIDER AND CLINIC:  Le Romo MD, [facility] [address] [place] [address] / [facility]*  Chief Complaint   Patient presents with     Hospital F/U     Video Visit     [facility] Medical Record Number:  [id number]  Carolina Mari  is a 65 year old  (1955), admitted to the above facility from  [facility]. Hospital stay 1/5/21 through 1/6/21..  Admitted to this facility for  rehab, medical management and nursing care.  HPI:    HPI information obtained from: facility staff, patient report and [facility] chart review.   Brief Summary of Hospital Course:   - pt with PMH notable for T2D, hospitalized with generalized weakness, right gluteal wound cellulitis      Updates on Status Since Skilled nursing Admission:   -1/15:Flexeril reduced       Today:  - Pt seen in the presence of RN who graciously " "assisted with the virtual visit  - buttock wound (on left and right): RN  Reports has been stable since admission. Pt reports still has some pain on the leg, and the wound over the buttock is painful, endorses changing position more often.   - wrist arthritis: coming down ..   - rehab:reports going well, can tolerate, can lift legs a little bit. \" I cannot walk for the last three years, because my hips are disintegrated, told nothing will help it, it is an injury that disintegrate. I went to [facility] sport doctor.\"  Has been on oxycodone for 2-3 years.  Has a contract with PCP.       CODE STATUS/ADVANCE DIRECTIVES DISCUSSION:   DNR / DNI  Patient's living condition: lives alone  ALLERGIES: Aspirin  PAST MEDICAL HISTORY:  has a past medical history of Diabetes mellitus (H), History of stomach ulcers, HTN (hypertension), Knee osteoarthritis, Osteoarthritis of right hip, Osteoporosis, Osteoporosis, and Thyroid disease.  PAST SURGICAL HISTORY:   has a past surgical history that includes tka; gastric bypass; Foot surgery; and SACROPLASTY.  FAMILY HISTORY: family history includes Cancer in her brother and mother; Coronary Artery Disease in her brother and father.  SOCIAL HISTORY:   reports that she has never smoked. She has never used smokeless tobacco. She reports that she does not drink alcohol or use drugs.  Current Outpatient Medications   Medication Sig Dispense Refill     amLODIPine (NORVASC) 5 MG tablet Take 5 mg by mouth daily       cyanocobalamin (VITAMIN B12) 1000 MCG/ML injection Inject 1 mL into the muscle every 30 days       CYCLOBENZAPRINE HCL PO Take 5 mg by mouth 3 times daily       famotidine (PEPCID) 20 MG tablet Take 20 mg by mouth 2 times daily       FLUOXETINE HCL PO Take 60 mg by mouth every evening        GABAPENTIN PO Take 600 mg by mouth 3 times daily        LEVOTHYROXINE SODIUM PO Take 200 mcg by mouth daily       LOSARTAN POTASSIUM PO Take 100 mg by mouth daily       metoprolol tartrate (LOPRESSOR) " "100 MG tablet Take 100 mg by mouth 2 times daily       Nystatin (NYAMYC) 621346 UNIT/GM POWD Apply topically as needed        oxyCODONE (OXYCONTIN) 30 MG 12 hr tablet Take 1 tablet (30 mg) by mouth every 8 hours 30 tablet 0     oxyCODONE IR (ROXICODONE) 15 MG tablet Take 1 tablet (15 mg) by mouth every 4 hours as needed for breakthrough pain 30 tablet 0      discharge medications reconciled and changed, per note/orders    ROS: 10 point ROS of systems including Constitutional, Eyes, Respiratory, Cardiovascular, Gastroenterology, Genitourinary, Integumentary, Musculoskeletal, Psychiatric were all negative except for pertinent positives noted in my HPI.  Vitals:/65   Pulse 85   Temp 97.4  F (36.3  C)   Resp 16   Ht 1.753 m (5' 9\")   Wt 116.8 kg (257 lb 8 oz)   SpO2 96%   BMI 38.03 kg/m     Limited Visit Exam done given COVID-19 precautions:  GENERAL APPEARANCE:  in no distress  RESP:  unlabored breathing  M/S:   no joint deformity noted  SKIN:  no rash noted  NEURO:   no purposeful movement in upper and lower extremities  PSYCH:  affect and mood normal    Lab/Diagnostic data: Reviewed in the chart and EHR.        ASSESSMENT/PLAN:  -----------------------------  Pressure injury of right buttock, stage 3 (H)  Cellulitis of buttock  Chronic Pain and Chronic, continuous use of opioids   - completed abx.   -  Analgesia optimal .       Undifferentiated inflammatory arthritis (H): improving      Class 2 Obesity  2/2 excessive calories  associated with comorbidities  Body mass index is 38.03 kg/m . (H)  -  to reduce carbs.       Stage 3a  chronic kidney disease GFR 48: - Avoid nephrotoxic  medications. Renally dose medications. Monitor electrolytes, and dehydration status    Hypothyroidism:  TSH 3.87 (Feb 2020). No FT4. On LT4 200 mcg. No concern.     Order: See above, otherwise, continue the rest of the current POC.       Electronically signed by:  Ariana Bose MD     Video-Visit Details  Type of " service:  Video Visit  Video End Time (time video stopped): 13:29  Distant Location (provider location):  [facility]                 Sincerely,        Ariana Bose MD

## 2021-01-18 NOTE — PROGRESS NOTES
" [facility]  Carolina Mari is being evaluated via a billable video.   The patient has been notified of following:  \"This video visit will be conducted via a call between you and your provider. We have found that certain health care needs can be provided without the need for an in-person physical exam.  This service lets us provide the care you need with a video conversation. If during the course of the call the provider feels a video visit is not appropriate, you will not be charged for this service.\"   The provider has received verbal consent for a Video Visit from the patient and or first contact? Yes  Patient/facility staff would like the video invitation sent by: N/A   Video Start Time: 13:19  Which Facility the Patient is at during the time of visit: [facility]   Received verbal consent to use Care Everywhere in order to access labs, documents, histories, and all other needed information to provide care at current facility.    PRIMARY CARE PROVIDER AND CLINIC:  Le Romo MD, [place] [address] [place] [address] / [facility]*  Chief Complaint   Patient presents with     Hospital F/U     Video Visit     [facility] Medical Record Number:  [id number]  Carolina Mari  is a 65 year old  (1955), admitted to the above facility from  [facility]. Hospital stay 1/5/21 through 1/6/21..  Admitted to this facility for  rehab, medical management and nursing care.  HPI:    HPI information obtained from: facility staff, patient report and [facility] chart review.   Brief Summary of Hospital Course:   - pt with PMH notable for T2D, hospitalized with generalized weakness, right gluteal wound cellulitis      Updates on Status Since Skilled nursing Admission:   -1/15:Flexeril reduced       Today:  - Pt seen in the presence of RN who graciously assisted with the virtual visit  - buttock wound (on left and right): RN  Reports has been stable since " "admission. Pt reports still has some pain on the leg, and the wound over the buttock is painful, endorses changing position more often.   - wrist arthritis: coming down ..   - rehab:reports going well, can tolerate, can lift legs a little bit. \" I cannot walk for the last three years, because my hips are disintegrated, told nothing will help it, it is an injury that disintegrate. I went to [facility] sport doctor.\"  Has been on oxycodone for 2-3 years.  Has a contract with PCP.       CODE STATUS/ADVANCE DIRECTIVES DISCUSSION:   DNR / DNI  Patient's living condition: lives alone  ALLERGIES: Aspirin  PAST MEDICAL HISTORY:  has a past medical history of Diabetes mellitus (H), History of stomach ulcers, HTN (hypertension), Knee osteoarthritis, Osteoarthritis of right hip, Osteoporosis, Osteoporosis, and Thyroid disease.  PAST SURGICAL HISTORY:   has a past surgical history that includes tka; gastric bypass; Foot surgery; and SACROPLASTY.  FAMILY HISTORY: family history includes Cancer in her brother and mother; Coronary Artery Disease in her brother and father.  SOCIAL HISTORY:   reports that she has never smoked. She has never used smokeless tobacco. She reports that she does not drink alcohol or use drugs.  Current Outpatient Medications   Medication Sig Dispense Refill     amLODIPine (NORVASC) 5 MG tablet Take 5 mg by mouth daily       cyanocobalamin (VITAMIN B12) 1000 MCG/ML injection Inject 1 mL into the muscle every 30 days       CYCLOBENZAPRINE HCL PO Take 5 mg by mouth 3 times daily       famotidine (PEPCID) 20 MG tablet Take 20 mg by mouth 2 times daily       FLUOXETINE HCL PO Take 60 mg by mouth every evening        GABAPENTIN PO Take 600 mg by mouth 3 times daily        LEVOTHYROXINE SODIUM PO Take 200 mcg by mouth daily       LOSARTAN POTASSIUM PO Take 100 mg by mouth daily       metoprolol tartrate (LOPRESSOR) 100 MG tablet Take 100 mg by mouth 2 times daily       Nystatin (NYAMYC) 687671 UNIT/GM POWD Apply " "topically as needed        oxyCODONE (OXYCONTIN) 30 MG 12 hr tablet Take 1 tablet (30 mg) by mouth every 8 hours 30 tablet 0     oxyCODONE IR (ROXICODONE) 15 MG tablet Take 1 tablet (15 mg) by mouth every 4 hours as needed for breakthrough pain 30 tablet 0      discharge medications reconciled and changed, per note/orders    ROS: 10 point ROS of systems including Constitutional, Eyes, Respiratory, Cardiovascular, Gastroenterology, Genitourinary, Integumentary, Musculoskeletal, Psychiatric were all negative except for pertinent positives noted in my HPI.  Vitals:/65   Pulse 85   Temp 97.4  F (36.3  C)   Resp 16   Ht 1.753 m (5' 9\")   Wt 116.8 kg (257 lb 8 oz)   SpO2 96%   BMI 38.03 kg/m     Limited Visit Exam done given COVID-19 precautions:  GENERAL APPEARANCE:  in no distress  RESP:  unlabored breathing  M/S:   no joint deformity noted  SKIN:  no rash noted  NEURO:   no purposeful movement in upper and lower extremities  PSYCH:  affect and mood normal    Lab/Diagnostic data: Reviewed in the chart and EHR.        ASSESSMENT/PLAN:  -----------------------------  Pressure injury of right buttock, stage 3 (H)  Cellulitis of buttock  Chronic Pain and Chronic, continuous use of opioids   - completed abx.   -  Analgesia optimal .       Undifferentiated inflammatory arthritis (H): improving      Class 2 Obesity  2/2 excessive calories  associated with comorbidities  Body mass index is 38.03 kg/m . (H)  -  to reduce carbs.       Stage 3a  chronic kidney disease GFR 48: - Avoid nephrotoxic  medications. Renally dose medications. Monitor electrolytes, and dehydration status    Hypothyroidism:  TSH 3.87 (Feb 2020). No FT4. On LT4 200 mcg. No concern.     Order: See above, otherwise, continue the rest of the current POC.       Electronically signed by:  Ariana Bose MD     Video-Visit Details  Type of service:  Video Visit  Video End Time (time video stopped): 13:29  Distant Location (provider location):  " [facility]

## 2021-01-18 NOTE — TELEPHONE ENCOUNTER
[facility]'s Telephone Encounter  Chief Complaint   Patient presents with     Refill Request   Carolina Mari is a 65 year old (1955). Nursing team called/messaged today to report: she is completely out of her OxyContin and running low on her Oxycodone. She is chronically on this medication and is rehabbing in a TCU at this time.     ASSESSMENT/PLAN  [facility] Controlled Substance Medication Fill:  Carolina Mari  1955  Effective Jan 1, 2021, The [place] Board of Pharmacy has a new legislative requirement that requires checking the [place]  database before initially prescribing an opiate and every three months for patients receiving an opiate for treatment of chronic pain.   Request for controlled substance medication:    for OxyContin & Oxycodone medication   checked, and noted last fill of medication recently, however due to patient now being at a SNF or AL, last fill can not accessed by facility staff or patient and medication needs to be filled with facility contracted pharmacy.     Orders:  1. Refilled.    Electronically signed by:  RACHEL Alvarez CNP DNP

## 2021-01-20 ENCOUNTER — VIRTUAL VISIT (OUTPATIENT)
Dept: GERIATRICS | Facility: CLINIC | Age: 66
End: 2021-01-20
Payer: MEDICARE

## 2021-01-20 VITALS
TEMPERATURE: 98 F | OXYGEN SATURATION: 98 % | HEART RATE: 80 BPM | BODY MASS INDEX: 38.87 KG/M2 | WEIGHT: 263.2 LBS | SYSTOLIC BLOOD PRESSURE: 130 MMHG | DIASTOLIC BLOOD PRESSURE: 83 MMHG | RESPIRATION RATE: 16 BRPM

## 2021-01-20 DIAGNOSIS — F11.90 CHRONIC, CONTINUOUS USE OF OPIOIDS: ICD-10-CM

## 2021-01-20 DIAGNOSIS — L89.313 PRESSURE INJURY OF RIGHT BUTTOCK, STAGE 3 (H): Primary | ICD-10-CM

## 2021-01-20 DIAGNOSIS — E11.628 TYPE 2 DIABETES MELLITUS WITH OTHER SKIN COMPLICATION, WITHOUT LONG-TERM CURRENT USE OF INSULIN (H): ICD-10-CM

## 2021-01-20 DIAGNOSIS — L03.317 CELLULITIS OF BUTTOCK: ICD-10-CM

## 2021-01-20 DIAGNOSIS — N18.31 STAGE 3A CHRONIC KIDNEY DISEASE (H): ICD-10-CM

## 2021-01-20 PROCEDURE — 99309 SBSQ NF CARE MODERATE MDM 30: CPT | Mod: 95 | Performed by: NURSE PRACTITIONER

## 2021-01-20 RX ORDER — OXYCODONE HYDROCHLORIDE 10 MG/1
10 TABLET ORAL EVERY 4 HOURS PRN
Qty: 20 TABLET | Refills: 0 | Status: SHIPPED | OUTPATIENT
Start: 2021-01-20 | End: 2021-01-26

## 2021-01-20 NOTE — LETTER
"    1/20/2021        RE: Carolina Mari  [address] [address]  [place] [address]        [facility] Video Visit  Carolina Mari is a 65 year old female who is being evaluated via a billable video visit due to the restrictions of the COVID19 pandemic.The patient has been notified of following: \"This video visit will be conducted via a call between you and your provider. We have found that certain health care needs can be provided without the need for an in-person physical exam.  This service lets us provide the care you need with a video conversation.  If a prescription is necessary we can send it directly to your pharmacy.  If lab work is needed we can place an order for that and you can then stop by our lab to have the test done at a later time. If during the course of the call the provider feels a video visit is not appropriate, you will not be charged for this service.\"     Proivder has received verbal consent for a Video Visit from the patient? Yes    Patient/facility would like the video invitation sent by: Send to e-mail at: [email or web address]     This visit took place virtually, with the patient located at [facility]   ________________________________________________  Video Start Time: 1135  Carolina Mari complains of    Chief Complaint   Patient presents with     Video Visit     Nursing Home Acute   HPI/Subjective:  Carolina seen on follow-up after a flexeril reduction last week. Nursing states she is quite drowsy still. Today, Carolina states sometimes she has pain, but it's pretty controlled. She feels like she is able to do therapy. She denies constipation/diarrhea. She denies nausea/heartburn. She states that she has a good appetite. Chart review shows VS are stable.       History: I have reviewed and updated the patient's Past Medical History, Social History, Family History and Medication List. ALLERGIES: Aspirin and " Colcrys  MEDICATIONS:  Current Outpatient Medications   Medication Sig Dispense Refill     amLODIPine (NORVASC) 5 MG tablet Take 5 mg by mouth daily       cyanocobalamin (VITAMIN B12) 1000 MCG/ML injection Inject 1 mL into the muscle every 30 days       CYCLOBENZAPRINE HCL PO Take 5 mg by mouth 3 times daily       famotidine (PEPCID) 20 MG tablet Take 20 mg by mouth 2 times daily       FLUOXETINE HCL PO Take 60 mg by mouth every evening        GABAPENTIN PO Take 600 mg by mouth 3 times daily        LEVOTHYROXINE SODIUM PO Take 200 mcg by mouth daily       LOSARTAN POTASSIUM PO Take 100 mg by mouth daily       metoprolol tartrate (LOPRESSOR) 100 MG tablet Take 100 mg by mouth 2 times daily       Nystatin (NYAMYC) 504701 UNIT/GM POWD Apply topically as needed        oxyCODONE (OXYCONTIN) 30 MG 12 hr tablet Take 1 tablet (30 mg) by mouth every 8 hours 30 tablet 0     oxyCODONE IR (ROXICODONE) 15 MG tablet Take 1 tablet (15 mg) by mouth every 4 hours as needed for breakthrough pain 30 tablet 0     REVIEW OF SYSTEMS: 4 point ROS including Respiratory, CV, GI and , other than that noted in the HPI, is negative.  Objective:  /83   Pulse 80   Temp 98  F (36.7  C)   Resp 16   Wt 119.4 kg (263 lb 3.2 oz)   SpO2 98%   BMI 38.87 kg/m    GENERAL APPEARANCE: Alert, in no distress, cooperative.   EYES/ENT: EOM normal on camera, hearing acuity [place].  RESP: Respiratory effort appears unlabored over video screen, no respiratory distress apparent on video, On RA.   CV: Edema appears 1+ BLE via video. Color appears pale w/ discoloration in BLE.  ABDOMEN: Appears non-distended , rounded.  SKIN: Skin appears baseline w/ video inspection. Wounds as pictured here:      NEURO: CN II-XII at patient's baseline, MCCULLOUGH at baseline on video. Sensation baseline PPS.  PSYCH: Insight, judgement, and memory are baseline impaired, affect and mood are drowsy/happy.    Laboratory/Diagnostics: Reviewed in Epic by provider today.      Assessment/Plan:  Pressure injury of right buttock, stage 3 (H)  Cellulitis of buttock  Type 2 diabetes mellitus with other skin complication, without long-term current use of insulin (H)  Stage 3a chronic kidney disease  Chronic, continuous use of opioids  Acute-on-chronic. Ongoing.    Carolina has tolerated Flexeril reductions well without any notice, worsening, etc. We will change this to PRN and lower dosage as this is very likely to add to fall risk and drowsiness.    Carolina's skin problems are improving as noted above, which is also likely decreasing pain. We note her CKD and DM are well controlled. We will attempt GDR on her PRN Oxycodone as noted below.   Follow-up w/in 1 week or as needed.    Orders:  1. Decrease Flexeril to 2.5mg PO TID PRN. Dx: muscle spasms.   2. Decrease Oxycodone IR to 10mg PO Q4h PRN. Dx: severe pain.     [facility] Controlled Substance Medication Fill:  Carolina Munoz Kamaljit  1955  Effective Jan 1, 2021, The [place] Board of Pharmacy has a new legislative requirement that requires checking the [place]  database before initially prescribing an opiate and every three months for patients receiving an opiate for treatment of chronic pain.   Request for controlled substance medication:    for Oxycodone IR medication   checked, and noted last fill of medication recently, however due to patient now being at a SNF or AL, last fill can not accessed by facility staff or patient and medication needs to be filled with facility contracted pharmacy.     Video-Visit Details  Type of service:  Video Visit  Video Start Time: 1135  Video End Time (time video stopped): 1141  Originating Location (pt. Location): [facility]  Distant Location (provider location):  [facility]   Mode of Communication:  Video Conference.    Electronically signed by:  RACHEL Alvarez CNP DNP          Sincerely,        RACHEL Martin CNP

## 2021-01-20 NOTE — PROGRESS NOTES
"[facility] Geriatrics Video Visit  Carolina Mari is a 65 year old female who is being evaluated via a billable video visit due to the restrictions of the COVID19 pandemic.The patient has been notified of following: \"This video visit will be conducted via a call between you and your provider. We have found that certain health care needs can be provided without the need for an in-person physical exam.  This service lets us provide the care you need with a video conversation.  If a prescription is necessary we can send it directly to your pharmacy.  If lab work is needed we can place an order for that and you can then stop by our lab to have the test done at a later time. If during the course of the call the provider feels a video visit is not appropriate, you will not be charged for this service.\"     Kristinder has received verbal consent for a Video Visit from the patient? Yes    Patient/facility would like the video invitation sent by: Send to e-mail at: [email or web address]     This visit took place virtually, with the patient located at [facility]   ________________________________________________  Video Start Time: 1135  Carolina Mari complains of    Chief Complaint   Patient presents with     Video Visit     Nursing Home Acute   HPI/Subjective:  Carolina seen on follow-up after a flexeril reduction last week. Nursing states she is quite drowsy still. Today, Carolina states sometimes she has pain, but it's pretty controlled. She feels like she is able to do therapy. She denies constipation/diarrhea. She denies nausea/heartburn. She states that she has a good appetite. Chart review shows VS are stable.       History: I have reviewed and updated the patient's Past Medical History, Social History, Family History and Medication List. ALLERGIES: Aspirin and Colcrys  MEDICATIONS:  Current Outpatient Medications   Medication Sig Dispense Refill     amLODIPine (NORVASC) 5 " MG tablet Take 5 mg by mouth daily       cyanocobalamin (VITAMIN B12) 1000 MCG/ML injection Inject 1 mL into the muscle every 30 days       CYCLOBENZAPRINE HCL PO Take 5 mg by mouth 3 times daily       famotidine (PEPCID) 20 MG tablet Take 20 mg by mouth 2 times daily       FLUOXETINE HCL PO Take 60 mg by mouth every evening        GABAPENTIN PO Take 600 mg by mouth 3 times daily        LEVOTHYROXINE SODIUM PO Take 200 mcg by mouth daily       LOSARTAN POTASSIUM PO Take 100 mg by mouth daily       metoprolol tartrate (LOPRESSOR) 100 MG tablet Take 100 mg by mouth 2 times daily       Nystatin (NYAMYC) 143002 UNIT/GM POWD Apply topically as needed        oxyCODONE (OXYCONTIN) 30 MG 12 hr tablet Take 1 tablet (30 mg) by mouth every 8 hours 30 tablet 0     oxyCODONE IR (ROXICODONE) 15 MG tablet Take 1 tablet (15 mg) by mouth every 4 hours as needed for breakthrough pain 30 tablet 0     REVIEW OF SYSTEMS: 4 point ROS including Respiratory, CV, GI and , other than that noted in the HPI, is negative.  Objective:  /83   Pulse 80   Temp 98  F (36.7  C)   Resp 16   Wt 119.4 kg (263 lb 3.2 oz)   SpO2 98%   BMI 38.87 kg/m    GENERAL APPEARANCE: Alert, in no distress, cooperative.   EYES/ENT: EOM normal on camera, hearing acuity [place].  RESP: Respiratory effort appears unlabored over video screen, no respiratory distress apparent on video, On RA.   CV: Edema appears 1+ BLE via video. Color appears pale w/ discoloration in BLE.  ABDOMEN: Appears non-distended , rounded.  SKIN: Skin appears baseline w/ video inspection. Wounds as pictured here:      NEURO: CN II-XII at patient's baseline, MCCULLOUGH at baseline on video. Sensation baseline PPS.  PSYCH: Insight, judgement, and memory are baseline impaired, affect and mood are drowsy/happy.    Laboratory/Diagnostics: Reviewed in Epic by provider today.     Assessment/Plan:  Pressure injury of right buttock, stage 3 (H)  Cellulitis of buttock  Type 2 diabetes mellitus with other  skin complication, without long-term current use of insulin (H)  Stage 3a chronic kidney disease  Chronic, continuous use of opioids  Acute-on-chronic. Ongoing.    Carolina has tolerated Flexeril reductions well without any notice, worsening, etc. We will change this to PRN and lower dosage as this is very likely to add to fall risk and drowsiness.    Carolina's skin problems are improving as noted above, which is also likely decreasing pain. We note her CKD and DM are well controlled. We will attempt GDR on her PRN Oxycodone as noted below.   Follow-up w/in 1 week or as needed.    Orders:  1. Decrease Flexeril to 2.5mg PO TID PRN. Dx: muscle spasms.   2. Decrease Oxycodone IR to 10mg PO Q4h PRN. Dx: severe pain.     [facility] Controlled Substance Medication Fill:  Carolina Mari  1955  Effective Jan 1, 2021, The [place] Board of Pharmacy has a new legislative requirement that requires checking the [place]  database before initially prescribing an opiate and every three months for patients receiving an opiate for treatment of chronic pain.   Request for controlled substance medication:    for Oxycodone IR medication   checked, and noted last fill of medication recently, however due to patient now being at a SNF or AL, last fill can not accessed by facility staff or patient and medication needs to be filled with facility contracted pharmacy.     Video-Visit Details  Type of service:  Video Visit  Video Start Time: 1135  Video End Time (time video stopped): 1141  Originating Location (pt. Location): [facility]  Distant Location (provider location):  [facility]   Mode of Communication:  Video Conference.    Electronically signed by:  Dr. Adilene Aguayo, APRN CNP DNP

## 2021-01-26 ENCOUNTER — VIRTUAL VISIT (OUTPATIENT)
Dept: GERIATRICS | Facility: CLINIC | Age: 66
End: 2021-01-26
Payer: MEDICARE

## 2021-01-26 VITALS
OXYGEN SATURATION: 96 % | DIASTOLIC BLOOD PRESSURE: 76 MMHG | RESPIRATION RATE: 16 BRPM | SYSTOLIC BLOOD PRESSURE: 146 MMHG | TEMPERATURE: 96.3 F | HEART RATE: 69 BPM | BODY MASS INDEX: 38.47 KG/M2 | WEIGHT: 260.5 LBS

## 2021-01-26 DIAGNOSIS — M06.4 UNDIFFERENTIATED INFLAMMATORY ARTHRITIS (H): ICD-10-CM

## 2021-01-26 DIAGNOSIS — E11.628 TYPE 2 DIABETES MELLITUS WITH OTHER SKIN COMPLICATION, WITHOUT LONG-TERM CURRENT USE OF INSULIN (H): ICD-10-CM

## 2021-01-26 DIAGNOSIS — L89.313 PRESSURE INJURY OF RIGHT BUTTOCK, STAGE 3 (H): Primary | ICD-10-CM

## 2021-01-26 DIAGNOSIS — L03.317 CELLULITIS OF BUTTOCK: ICD-10-CM

## 2021-01-26 DIAGNOSIS — F33.1 MODERATE EPISODE OF RECURRENT MAJOR DEPRESSIVE DISORDER (H): ICD-10-CM

## 2021-01-26 DIAGNOSIS — F11.90 CHRONIC, CONTINUOUS USE OF OPIOIDS: ICD-10-CM

## 2021-01-26 DIAGNOSIS — N18.31 STAGE 3A CHRONIC KIDNEY DISEASE (H): ICD-10-CM

## 2021-01-26 PROCEDURE — 99310 SBSQ NF CARE HIGH MDM 45: CPT | Mod: 95 | Performed by: NURSE PRACTITIONER

## 2021-01-26 RX ORDER — ACETAMINOPHEN 500 MG
1000 TABLET ORAL 3 TIMES DAILY
COMMUNITY

## 2021-01-26 RX ORDER — OXYCODONE HYDROCHLORIDE 30 MG/1
30 TABLET, FILM COATED, EXTENDED RELEASE ORAL EVERY 12 HOURS
Qty: 30 TABLET | Refills: 0 | Status: SHIPPED | OUTPATIENT
Start: 2021-01-26 | End: 2021-02-09

## 2021-01-26 RX ORDER — OXYCODONE HYDROCHLORIDE 10 MG/1
5 TABLET ORAL EVERY 4 HOURS PRN
Qty: 20 TABLET | Refills: 0 | Status: SHIPPED | OUTPATIENT
Start: 2021-01-26 | End: 2021-02-03

## 2021-01-26 NOTE — PROGRESS NOTES
"[facility] Geriatrics Video Visit  Carolina Mari is a 65 year old female who is being evaluated via a billable video visit due to the restrictions of the COVID19 pandemic.The patient has been notified of following: \"This video visit will be conducted via a call between you and your provider. We have found that certain health care needs can be provided without the need for an in-person physical exam.  This service lets us provide the care you need with a video conversation.  If a prescription is necessary we can send it directly to your pharmacy.  If lab work is needed we can place an order for that and you can then stop by our lab to have the test done at a later time. If during the course of the call the provider feels a video visit is not appropriate, you will not be charged for this service.\"     Kristinder has received verbal consent for a Video Visit from the patient? Yes    Patient/facility would like the video invitation sent by: Send to e-mail at: [email or web address]     This visit took place virtually, with the patient located at [facility]   ________________________________________________  Video Start Time: 1307  Carolina Mari complains of    Chief Complaint   Patient presents with     Video Visit     Nursing Home Acute   HPI/Subjective:  Carolina seen on follow-up after a flexeril and Oxycodone reduction last week. Today is her birthday!  Carolina ***      History: I have reviewed and updated the patient's Past Medical History, Social History, Family History and Medication List. ALLERGIES: Aspirin and Colcrys  MEDICATIONS:  Current Outpatient Medications   Medication Sig Dispense Refill     amLODIPine (NORVASC) 5 MG tablet Take 5 mg by mouth daily       cyanocobalamin (VITAMIN B12) 1000 MCG/ML injection Inject 1 mL into the muscle every 30 days       CYCLOBENZAPRINE HCL PO Take 2.5 mg by mouth 3 times daily as needed       famotidine (PEPCID) 20 MG tablet " Take 20 mg by mouth 2 times daily       FLUOXETINE HCL PO Take 60 mg by mouth every evening        GABAPENTIN PO Take 600 mg by mouth 3 times daily        LEVOTHYROXINE SODIUM PO Take 200 mcg by mouth daily       LOSARTAN POTASSIUM PO Take 100 mg by mouth daily       metoprolol tartrate (LOPRESSOR) 100 MG tablet Take 100 mg by mouth 2 times daily       Nystatin (NYAMYC) 027791 UNIT/GM POWD Apply topically as needed        oxyCODONE (OXYCONTIN) 30 MG 12 hr tablet Take 1 tablet (30 mg) by mouth every 8 hours 30 tablet 0     oxyCODONE IR (ROXICODONE) 10 MG tablet Take 1 tablet (10 mg) by mouth every 4 hours as needed for breakthrough pain 20 tablet 0     REVIEW OF SYSTEMS: 4 point ROS including Respiratory, CV, GI and , other than that noted in the HPI, is negative.  Objective:  BP (!) 146/76   Pulse 69   Temp 96.3  F (35.7  C)   Resp 16   Wt 118.2 kg (260 lb 8 oz)   SpO2 96%   BMI 38.47 kg/m    GENERAL APPEARANCE: Alert, in no distress, cooperative.   EYES/ENT: EOM normal on camera, hearing acuity [place].  RESP: Respiratory effort appears unlabored over video screen, no respiratory distress apparent on video, On RA.   CV: Edema appears 1+ BLE via video. Color appears pale w/ discoloration in BLE.  ABDOMEN: Appears non-distended , rounded.  SKIN: Skin appears baseline w/ video inspection. Wounds as pictured here:  ***  NEURO: CN II-XII at patient's baseline, MCCULLOUGH at baseline on video. Sensation baseline PPS.  PSYCH: Insight, judgement, and memory are baseline impaired, affect and mood are drowsy/happy.    Laboratory/Diagnostics: Reviewed in Epic by provider today.     Assessment/Plan:  Pressure injury of right buttock, stage 3 (H)  Cellulitis of buttock  Type 2 diabetes mellitus with other skin complication, without long-term current use of insulin (H)  Stage 3a chronic kidney disease  Chronic, continuous use of opioids  Acute-on-chronic. Ongoing.    Carolina has tolerated Flexeril reductions, we will maintain this as  a PRN and monitor use moving forward.    Oxycodone ***    Carolina's skin problems are improving as noted above, which is also likely decreasing pain. We note her CKD and DM are well controlled. We will continue GDR on her PRN Oxycodone as noted below.   Follow-up w/in 1 week or as needed.    Orders:   1. Decrease Oxycodone IR to 5mg PO Q4h PRN. Dx: severe pain.   2. Decrease OxyContin to 30mg PO BID. Dx: severe pain.   3. Hgb, BMP x1 on 1/26. Dx: cellulitis    [facility]GS Controlled Substance Medication Fill:  Carolina Mari  1955  Effective Jan 1, 2021, The [place] Board of Pharmacy has a new legislative requirement that requires checking the [place]  database before initially prescribing an opiate and every three months for patients receiving an opiate for treatment of chronic pain.   Request for controlled substance medication:    for Oxycodone IR medication   checked, and noted last fill of medication recently, however due to patient now being at a SNF or AL, last fill can not accessed by facility staff or patient and medication needs to be filled with facility contracted pharmacy.     Video-Visit Details  Type of service:  Video Visit  Video Start Time: 1307  Video End Time (time video stopped): 1316  Originating Location (pt. Location): [facility]  Distant Location (provider location):  [facility]ATR[facility]   Mode of Communication:  Video Conference.    Electronically signed by:  Dr. Adilene Aguayo, APRN CNP DNP

## 2021-01-26 NOTE — LETTER
"    1/26/2021        RE: Carolina Mari  [address]  [place] [address]        [facility] Geriatrics Video Visit  Carolina Mari is a 65 year old female who is being evaluated via a billable video visit due to the restrictions of the COVID19 pandemic.The patient has been notified of following: \"This video visit will be conducted via a call between you and your provider. We have found that certain health care needs can be provided without the need for an in-person physical exam.  This service lets us provide the care you need with a video conversation.  If a prescription is necessary we can send it directly to your pharmacy.  If lab work is needed we can place an order for that and you can then stop by our lab to have the test done at a later time. If during the course of the call the provider feels a video visit is not appropriate, you will not be charged for this service.\"     Proivder has received verbal consent for a Video Visit from the patient? Yes    Patient/facility would like the video invitation sent by: Send to e-mail at: [email or web address]     This visit took place virtually, with the patient located at [facility]   ________________________________________________  Video Start Time: 1307  Carolina Mari complains of    Chief Complaint   Patient presents with     Video Visit     Nursing Home Acute   HPI/Subjective:  Carolina seen on follow-up after a flexeril and Oxycodone reduction last week. Today is her birthday!  Carolina is much more alert and engaged in care, she is fully dressed today and has makeup on. She's ordered pizza for a \"pizza party\" on the nursing unit for her birthday today.    She denies pain, but does endorse that sometimes her legs or thumbs are sore/give-her-trouble. She states she has a great appetite, no nausea/heartburn. She denies constipation/diarrhea. She denies SOB, cough, fever, headache, dizziness, CP, " palpitations. She feels like she is able to get through therapy and participate in activities without any complaint.     History: I have reviewed and updated the patient's Past Medical History, Social History, Family History and Medication List. ALLERGIES: Aspirin and Colcrys  MEDICATIONS:  Current Outpatient Medications   Medication Sig Dispense Refill     amLODIPine (NORVASC) 5 MG tablet Take 5 mg by mouth daily       cyanocobalamin (VITAMIN B12) 1000 MCG/ML injection Inject 1 mL into the muscle every 30 days       CYCLOBENZAPRINE HCL PO Take 2.5 mg by mouth 3 times daily as needed       famotidine (PEPCID) 20 MG tablet Take 20 mg by mouth 2 times daily       FLUOXETINE HCL PO Take 60 mg by mouth every evening        GABAPENTIN PO Take 600 mg by mouth 3 times daily        LEVOTHYROXINE SODIUM PO Take 200 mcg by mouth daily       LOSARTAN POTASSIUM PO Take 100 mg by mouth daily       metoprolol tartrate (LOPRESSOR) 100 MG tablet Take 100 mg by mouth 2 times daily       Nystatin (NYAMYC) 299713 UNIT/GM POWD Apply topically as needed        oxyCODONE (OXYCONTIN) 30 MG 12 hr tablet Take 1 tablet (30 mg) by mouth every 8 hours 30 tablet 0     oxyCODONE IR (ROXICODONE) 10 MG tablet Take 1 tablet (10 mg) by mouth every 4 hours as needed for breakthrough pain 20 tablet 0     REVIEW OF SYSTEMS: 4 point ROS including Respiratory, CV, GI and , other than that noted in the HPI, is negative.  Objective:  BP (!) 146/76   Pulse 69   Temp 96.3  F (35.7  C)   Resp 16   Wt 118.2 kg (260 lb 8 oz)   SpO2 96%   BMI 38.47 kg/m    GENERAL APPEARANCE: Alert, in no distress, cooperative.   EYES/ENT: EOM normal on camera, hearing acuity [place].  RESP: Respiratory effort appears unlabored over video screen, no respiratory distress apparent on video, On RA.   CV: Edema appears 1+ BLE via video. Color appears pale w/ discoloration in BLE.  ABDOMEN: Appears non-distended , rounded.  SKIN: Skin appears baseline w/ video inspection. Wounds  as pictured here:      NEURO: CN II-XII at patient's baseline, MCCULLOUGH at baseline on video. Sensation baseline PPS.  PSYCH: Insight, judgement, and memory are baseline impaired, affect and mood are drowsy/happy.    Laboratory/Diagnostics: Reviewed in Epic by provider today.     Assessment/Plan:  Pressure injury of right buttock, stage 3 (H)  Cellulitis of buttock  Type 2 diabetes mellitus with other skin complication, without long-term current use of insulin (H)  Stage 3a chronic kidney disease  Chronic, continuous use of opioids  Inflammatory arthritis (H)  Major depression (H)  Acute-on-chronic. Tenuous.    Carolina has tolerated her Flexeril reduction, which is now PRN. We will keep this PRN, as GDR of her other medications may bring up a future need for this.     We coordinated care with PharmD around safe reduction of her long-acting OxyContin, and we will attempt a GDR as noted below.    We will continue her GDR of Oxycodone as noted below, as this has been well tolerated.     Giving we are decreasing one of her controller opioids, we will schedule ES Tylenol to adjunct during this time, and alter PRN for safety.    We coordinated care with nursing, who later called provider explaining some anxiety Carolina was having around these reductions. Carolina was not having pain or withdrawal sxs, just expressing some concern. Provider counseled nursing to provide comfort, education, and will address anxiety/depression further with an in-house psych follow-up    Follow-up w/in 1 week or as needed.    Orders:   1. Decrease Oxycodone IR to 5mg PO Q4h PRN. Dx: severe pain.   2. Decrease OxyContin to 30mg PO BID. Dx: severe pain.   3. Hgb, BMP x1 on 1/29. Dx: cellulitis.  4. Tylenol 1000mg PO TID. Dx: Inflammatory Arthritis.  5. Change PRN Tylenol to 650mg PO every day PRN. Dx: pain/fever.  6. In-house psych consult x1. Dx: MDD, opioid misuse.     [facility]GS Controlled Substance Medication Fill:  Carolina Munoz  Kamaljit  1955  Effective Jan 1, 2021, The [place] Board of Pharmacy has a new legislative requirement that requires checking the [place]  database before initially prescribing an opiate and every three months for patients receiving an opiate for treatment of chronic pain.   Request for controlled substance medication:    for Oxycodone IR & OxyContin medications WERE REDUCED this encounter.   checked, and noted last fill of medication recently, however due to patient now being at a SNF or AL, last fill can not accessed by facility staff or patient and medication needs to be filled with facility contracted pharmacy.     Total time spent with patient visit was 40 min including patient visit and review of past records. Greater than 50% of total time spent with counseling and coordinating care with PharmD and with nursing as noted above.     Video-Visit Details  Type of service:  Video Visit  Video Start Time: 1307  Video End Time (time video stopped): 1316  Originating Location (pt. Location): [facility]  Distant Location (provider location):  [facility]   Mode of Communication:  Video Conference.    Electronically signed by:  RACHEL Alvarez CNP DNP        Sincerely,        RACHEL Martin CNP

## 2021-01-27 ENCOUNTER — RECORDS - HEALTHEAST (OUTPATIENT)
Dept: LAB | Facility: CLINIC | Age: 66
End: 2021-01-27

## 2021-01-27 LAB
SARS-COV-2 PCR COMMENT: NORMAL
SARS-COV-2 RNA SPEC QL NAA+PROBE: NEGATIVE
SARS-COV-2 VIRUS SPECIMEN SOURCE: NORMAL

## 2021-01-27 NOTE — PROGRESS NOTES
"[facility] Geriatrics Video Visit  Carolina Mari is a 65 year old female who is being evaluated via a billable video visit due to the restrictions of the COVID19 pandemic.The patient has been notified of following: \"This video visit will be conducted via a call between you and your provider. We have found that certain health care needs can be provided without the need for an in-person physical exam.  This service lets us provide the care you need with a video conversation.  If a prescription is necessary we can send it directly to your pharmacy.  If lab work is needed we can place an order for that and you can then stop by our lab to have the test done at a later time. If during the course of the call the provider feels a video visit is not appropriate, you will not be charged for this service.\"     Kristinder has received verbal consent for a Video Visit from the patient? Yes    Patient/facility would like the video invitation sent by: Send to e-mail at: [email or web address]     This visit took place virtually, with the patient located at [facility]   ________________________________________________  Video Start Time: 1307  Carolina Mari complains of    Chief Complaint   Patient presents with     Video Visit     Nursing Home Acute   HPI/Subjective:  Carolina seen on follow-up after a flexeril and Oxycodone reduction last week. Today is her birthday!  Carolina is much more alert and engaged in care, she is fully dressed today and has makeup on. She's ordered pizza for a \"pizza party\" on the nursing unit for her birthday today.    She denies pain, but does endorse that sometimes her legs or thumbs are sore/give-her-trouble. She states she has a great appetite, no nausea/heartburn. She denies constipation/diarrhea. She denies SOB, cough, fever, headache, dizziness, CP, palpitations. She feels like she is able to get through therapy and participate in activities without " any complaint.     History: I have reviewed and updated the patient's Past Medical History, Social History, Family History and Medication List. ALLERGIES: Aspirin and Colcrys  MEDICATIONS:  Current Outpatient Medications   Medication Sig Dispense Refill     amLODIPine (NORVASC) 5 MG tablet Take 5 mg by mouth daily       cyanocobalamin (VITAMIN B12) 1000 MCG/ML injection Inject 1 mL into the muscle every 30 days       CYCLOBENZAPRINE HCL PO Take 2.5 mg by mouth 3 times daily as needed       famotidine (PEPCID) 20 MG tablet Take 20 mg by mouth 2 times daily       FLUOXETINE HCL PO Take 60 mg by mouth every evening        GABAPENTIN PO Take 600 mg by mouth 3 times daily        LEVOTHYROXINE SODIUM PO Take 200 mcg by mouth daily       LOSARTAN POTASSIUM PO Take 100 mg by mouth daily       metoprolol tartrate (LOPRESSOR) 100 MG tablet Take 100 mg by mouth 2 times daily       Nystatin (NYAMYC) 551217 UNIT/GM POWD Apply topically as needed        oxyCODONE (OXYCONTIN) 30 MG 12 hr tablet Take 1 tablet (30 mg) by mouth every 8 hours 30 tablet 0     oxyCODONE IR (ROXICODONE) 10 MG tablet Take 1 tablet (10 mg) by mouth every 4 hours as needed for breakthrough pain 20 tablet 0     REVIEW OF SYSTEMS: 4 point ROS including Respiratory, CV, GI and , other than that noted in the HPI, is negative.  Objective:  BP (!) 146/76   Pulse 69   Temp 96.3  F (35.7  C)   Resp 16   Wt 118.2 kg (260 lb 8 oz)   SpO2 96%   BMI 38.47 kg/m    GENERAL APPEARANCE: Alert, in no distress, cooperative.   EYES/ENT: EOM normal on camera, hearing acuity [place].  RESP: Respiratory effort appears unlabored over video screen, no respiratory distress apparent on video, On RA.   CV: Edema appears 1+ BLE via video. Color appears pale w/ discoloration in BLE.  ABDOMEN: Appears non-distended , rounded.  SKIN: Skin appears baseline w/ video inspection. Wounds as pictured here:      NEURO: CN II-XII at patient's baseline, MCCULLOUGH at baseline on video. Sensation  baseline PPS.  PSYCH: Insight, judgement, and memory are baseline impaired, affect and mood are drowsy/happy.    Laboratory/Diagnostics: Reviewed in Epic by provider today.     Assessment/Plan:  Pressure injury of right buttock, stage 3 (H)  Cellulitis of buttock  Type 2 diabetes mellitus with other skin complication, without long-term current use of insulin (H)  Stage 3a chronic kidney disease  Chronic, continuous use of opioids  Inflammatory arthritis (H)  Major depression (H)  Acute-on-chronic. Tenuous.    Carolina has tolerated her Flexeril reduction, which is now PRN. We will keep this PRN, as GDR of her other medications may bring up a future need for this.     We coordinated care with PharmD around safe reduction of her long-acting OxyContin, and we will attempt a GDR as noted below.    We will continue her GDR of Oxycodone as noted below, as this has been well tolerated.     Giving we are decreasing one of her controller opioids, we will schedule ES Tylenol to adjunct during this time, and alter PRN for safety.    We coordinated care with nursing, who later called provider explaining some anxiety Carolina was having around these reductions. Carolina was not having pain or withdrawal sxs, just expressing some concern. Provider counseled nursing to provide comfort, education, and will address anxiety/depression further with an in-house psych follow-up    Follow-up w/in 1 week or as needed.    Orders:   1. Decrease Oxycodone IR to 5mg PO Q4h PRN. Dx: severe pain.   2. Decrease OxyContin to 30mg PO BID. Dx: severe pain.   3. Hgb, BMP x1 on 1/29. Dx: cellulitis.  4. Tylenol 1000mg PO TID. Dx: Inflammatory Arthritis.  5. Change PRN Tylenol to 650mg PO every day PRN. Dx: pain/fever.  6. In-house psych consult x1. Dx: MDD, opioid misuse.     [facility] Controlled Substance Medication Fill:  Carolina Munoz Kamaljit  1955  Effective Jan 1, 2021, The [place] Board of Pharmacy has a new legislative requirement that requires  checking the [place]  database before initially prescribing an opiate and every three months for patients receiving an opiate for treatment of chronic pain.   Request for controlled substance medication:    for Oxycodone IR & OxyContin medications WERE REDUCED this encounter.   checked, and noted last fill of medication recently, however due to patient now being at a SNF or AL, last fill can not accessed by facility staff or patient and medication needs to be filled with facility contracted pharmacy.     Total time spent with patient visit was 40 min including patient visit and review of past records. Greater than 50% of total time spent with counseling and coordinating care with PharmD and with nursing as noted above.     Video-Visit Details  Type of service:  Video Visit  Video Start Time: 1307  Video End Time (time video stopped): 1316  Originating Location (pt. Location): [facility]  Distant Location (provider location):  [facility] GERIATRIC TRANSITIONAL CARE   Mode of Communication:  Video Conference.    Electronically signed by:  Dr. Adilene Aguayo, APRN CNP DNP

## 2021-01-29 ENCOUNTER — TRANSFERRED RECORDS (OUTPATIENT)
Dept: HEALTH INFORMATION MANAGEMENT | Facility: CLINIC | Age: 66
End: 2021-01-29

## 2021-01-29 LAB
ANION GAP SERPL CALCULATED.3IONS-SCNC: 7 MMOL/L (ref 5–18)
ANION GAP SERPL CALCULATED.3IONS-SCNC: 7 MMOL/L (ref 5–18)
BUN SERPL-MCNC: 12 MG/DL (ref 8–22)
BUN SERPL-MCNC: 12 MG/DL (ref 8–22)
CALCIUM SERPL-MCNC: 8.6 MG/DL (ref 8.5–10.5)
CALCIUM SERPL-MCNC: 8.6 MG/DL (ref 8.5–10.5)
CHLORIDE BLD-SCNC: 108 MMOL/L (ref 98–107)
CHLORIDE SERPLBLD-SCNC: 108 MMOL/L (ref 98–107)
CO2 SERPL-SCNC: 24 MMOL/L (ref 22–31)
CO2 SERPL-SCNC: 24 MMOL/L (ref 22–31)
CREAT SERPL-MCNC: 0.87 MG/DL (ref 0.6–1.1)
CREAT SERPL-MCNC: 0.87 MG/DL (ref 0.6–1.1)
GFR SERPL CREATININE-BSD FRML MDRD: >60 ML/MIN/1.73M2
GFR SERPL CREATININE-BSD FRML MDRD: >60 ML/MIN/1.73M2
GLUCOSE BLD-MCNC: 108 MG/DL (ref 70–125)
GLUCOSE SERPL-MCNC: 108 MG/DL (ref 70–125)
HEMOGLOBIN: 10.4 G/DL (ref 12–16)
HGB BLD-MCNC: 10.4 G/DL (ref 12–16)
POTASSIUM BLD-SCNC: 4.9 MMOL/L (ref 3.5–5)
POTASSIUM SERPL-SCNC: 4.9 MMOL/L (ref 3.5–5)
SODIUM SERPL-SCNC: 139 MMOL/L (ref 136–145)
SODIUM SERPL-SCNC: 139 MMOL/L (ref 136–145)

## 2021-02-03 DIAGNOSIS — L89.313 PRESSURE INJURY OF RIGHT BUTTOCK, STAGE 3 (H): ICD-10-CM

## 2021-02-03 RX ORDER — OXYCODONE HYDROCHLORIDE 5 MG/1
5 TABLET ORAL EVERY 4 HOURS PRN
Qty: 20 TABLET | Refills: 0 | Status: SHIPPED | OUTPATIENT
Start: 2021-02-03 | End: 2021-02-09

## 2021-02-04 ENCOUNTER — VIRTUAL VISIT (OUTPATIENT)
Dept: GERIATRICS | Facility: CLINIC | Age: 66
End: 2021-02-04
Payer: MEDICARE

## 2021-02-04 VITALS
RESPIRATION RATE: 20 BRPM | HEART RATE: 88 BPM | WEIGHT: 253.2 LBS | TEMPERATURE: 97.9 F | BODY MASS INDEX: 37.39 KG/M2 | SYSTOLIC BLOOD PRESSURE: 138 MMHG | DIASTOLIC BLOOD PRESSURE: 77 MMHG | OXYGEN SATURATION: 97 %

## 2021-02-04 DIAGNOSIS — F33.1 MODERATE EPISODE OF RECURRENT MAJOR DEPRESSIVE DISORDER (H): ICD-10-CM

## 2021-02-04 DIAGNOSIS — L03.317 CELLULITIS OF BUTTOCK: ICD-10-CM

## 2021-02-04 DIAGNOSIS — F11.90 CHRONIC, CONTINUOUS USE OF OPIOIDS: ICD-10-CM

## 2021-02-04 DIAGNOSIS — M06.4 UNDIFFERENTIATED INFLAMMATORY ARTHRITIS (H): ICD-10-CM

## 2021-02-04 DIAGNOSIS — N18.31 STAGE 3A CHRONIC KIDNEY DISEASE (H): ICD-10-CM

## 2021-02-04 DIAGNOSIS — L89.313 PRESSURE INJURY OF RIGHT BUTTOCK, STAGE 3 (H): Primary | ICD-10-CM

## 2021-02-04 PROCEDURE — 99309 SBSQ NF CARE MODERATE MDM 30: CPT | Mod: 95 | Performed by: NURSE PRACTITIONER

## 2021-02-04 NOTE — PROGRESS NOTES
"[facility] Geriatrics Video Visit  Carolina Mari is a 65 year old female who is being evaluated via a billable video visit due to the restrictions of the COVID19 pandemic.The patient has been notified of following: \"This video visit will be conducted via a call between you and your provider. We have found that certain health care needs can be provided without the need for an in-person physical exam.  This service lets us provide the care you need with a video conversation.  If a prescription is necessary we can send it directly to your pharmacy.  If lab work is needed we can place an order for that and you can then stop by our lab to have the test done at a later time. If during the course of the call the provider feels a video visit is not appropriate, you will not be charged for this service.\"     Kristinder has received verbal consent for a Video Visit from the patient? Yes    Patient/facility would like the video invitation sent by: Send to e-mail at: [email or web address]     This visit took place virtually, with the patient located at [facility]   ________________________________________________  Video Start Time: 1242  Carolina Mari complains of    Chief Complaint   Patient presents with     Nursing Home Acute   HPI/Subjective:  Carolina seen on follow-up after starting Tylenol and reducing OxyContin last week. Today, Carolina denies pain, she has a great appetite, no nausea/heartburn. She denies constipation/diarrhea. She denies SOB, cough, fever, headache, dizziness, CP, palpitations. She feels like she is able to get through therapy and participate in activities without any complaint, but she is nervous to trial another reduction in pain medicine, she's not sure how she feels about it and doesn't want to alter it this week. She states she has been using pain medications to sleep.    History: I have reviewed and updated the patient's Past Medical History, Social " History, Family History and Medication List. ALLERGIES: Aspirin and Colcrys  MEDICATIONS:  Current Outpatient Medications   Medication Sig Dispense Refill     acetaminophen (TYLENOL) 500 MG tablet Take 1,000 mg by mouth 3 times daily AND 650mg PO every day  PRN       amLODIPine (NORVASC) 5 MG tablet Take 5 mg by mouth daily       cyanocobalamin (VITAMIN B12) 1000 MCG/ML injection Inject 1 mL into the muscle every 30 days       CYCLOBENZAPRINE HCL PO Take 2.5 mg by mouth 3 times daily as needed       famotidine (PEPCID) 20 MG tablet Take 20 mg by mouth 2 times daily       FLUOXETINE HCL PO Take 60 mg by mouth every evening        GABAPENTIN PO Take 600 mg by mouth 3 times daily        LEVOTHYROXINE SODIUM PO Take 200 mcg by mouth daily       LOSARTAN POTASSIUM PO Take 100 mg by mouth daily       metoprolol tartrate (LOPRESSOR) 100 MG tablet Take 100 mg by mouth 2 times daily       Nystatin (NYAMYC) 144698 UNIT/GM POWD Apply topically as needed        oxyCODONE (OXYCONTIN) 30 MG 12 hr tablet Take 1 tablet (30 mg) by mouth every 12 hours 30 tablet 0     oxyCODONE IR (ROXICODONE) 5 MG tablet Take 1 tablet (5 mg) by mouth every 4 hours as needed for breakthrough pain 20 tablet 0     REVIEW OF SYSTEMS: 4 point ROS including Respiratory, CV, GI and , other than that noted in the HPI, is negative.  Objective:  There were no vitals taken for this visit.  GENERAL APPEARANCE: Alert, in no distress, cooperative.   EYES/ENT: EOM normal on camera, hearing acuity [place].  RESP: Respiratory effort appears unlabored over video screen, no respiratory distress apparent on video, On RA.   CV: Edema appears 1+ BLE via video. Color appears pale w/ discoloration in BLE.  ABDOMEN: Appears non-distended , rounded.  SKIN: Skin appears baseline w/ video inspection. Wounds as pictured here:      NEURO: CN II-XII at patient's baseline, MCCULLOUGH at baseline on video. Sensation baseline PPS.  PSYCH: Insight, judgement, and memory are baseline impaired,  affect and mood are drowsy/happy.    Laboratory/Diagnostics: Reviewed in Epic by provider today.     Assessment/Plan:  Pressure injury of right buttock, stage 3 (H)  Cellulitis of buttock  Stage 3a chronic kidney disease  Chronic, continuous use of opioids  Inflammatory arthritis (H)  Major depression (H)  Acute-on-chronic. Tenuous.    We note that a change in Norvasc timing may help with BP control and w/ BLE edema (which is much improved anyway).    We partnered w/ Rebecca to agree on pain management plan this week. Her arthritis appears well-controlled, but we note her actually use of pain medications may be related to poor sleep. We note a historical use of Trazodone and Melatonin (She states melatonin did not work for her).      She is open to further medication reductions and denies nausea/heartburn or muscle spasms. We note no PRN Flexeril use in >1 week. We will reduce Pepcid as noted below and discontinue Flexeril.    Rebecca is making positive progress forward, and is hopeful to return home in 1-2 wks.   Follow-up w/in 1 week or as needed.    Orders:    1. Change Norvasc to HS. Dx: HTN.  2. Decrease Pepcid to 20mg PO QAM. Dx: GERD.  3. Discontinue PRN Flexeril.  4. Trazodone 12.5mg PO at bedtime PRN x 14 days. Dx: insomnia.    Video-Visit Details  Type of service:  Video Visit  Video Start Time: 1242  Video End Time (time video stopped): 1300  Originating Location (pt. Location): [facility]  Distant Location (provider location):  [facility] GERIATRIC TRANSITIONAL CARE   Mode of Communication:  Video Conference.    Electronically signed by:  Dr. Adilene Aguayo, APRN CNP DNP

## 2021-02-04 NOTE — LETTER
"    2/4/2021        RE: Carolina Mari  [address]  [place] [address]        [facility] Geriatrics Video Visit  Carolina Mari is a 65 year old female who is being evaluated via a billable video visit due to the restrictions of the COVID19 pandemic.The patient has been notified of following: \"This video visit will be conducted via a call between you and your provider. We have found that certain health care needs can be provided without the need for an in-person physical exam.  This service lets us provide the care you need with a video conversation.  If a prescription is necessary we can send it directly to your pharmacy.  If lab work is needed we can place an order for that and you can then stop by our lab to have the test done at a later time. If during the course of the call the provider feels a video visit is not appropriate, you will not be charged for this service.\"     Proivder has received verbal consent for a Video Visit from the patient? Yes    Patient/facility would like the video invitation sent by: Send to e-mail at: [email or web address]     This visit took place virtually, with the patient located at [facility]   ________________________________________________  Video Start Time: 1242  Carolina Mari complains of    Chief Complaint   Patient presents with     Nursing Home Acute   HPI/Subjective:  Carolina seen on follow-up after starting Tylenol and reducing OxyContin last week. Today, Carolina denies pain, she has a great appetite, no nausea/heartburn. She denies constipation/diarrhea. She denies SOB, cough, fever, headache, dizziness, CP, palpitations. She feels like she is able to get through therapy and participate in activities without any complaint, but she is nervous to trial another reduction in pain medicine, she's not sure how she feels about it and doesn't want to alter it this week. She states she has been using pain " medications to sleep.    History: I have reviewed and updated the patient's Past Medical History, Social History, Family History and Medication List. ALLERGIES: Aspirin and Colcrys  MEDICATIONS:  Current Outpatient Medications   Medication Sig Dispense Refill     acetaminophen (TYLENOL) 500 MG tablet Take 1,000 mg by mouth 3 times daily AND 650mg PO every day  PRN       amLODIPine (NORVASC) 5 MG tablet Take 5 mg by mouth daily       cyanocobalamin (VITAMIN B12) 1000 MCG/ML injection Inject 1 mL into the muscle every 30 days       CYCLOBENZAPRINE HCL PO Take 2.5 mg by mouth 3 times daily as needed       famotidine (PEPCID) 20 MG tablet Take 20 mg by mouth 2 times daily       FLUOXETINE HCL PO Take 60 mg by mouth every evening        GABAPENTIN PO Take 600 mg by mouth 3 times daily        LEVOTHYROXINE SODIUM PO Take 200 mcg by mouth daily       LOSARTAN POTASSIUM PO Take 100 mg by mouth daily       metoprolol tartrate (LOPRESSOR) 100 MG tablet Take 100 mg by mouth 2 times daily       Nystatin (NYAMYC) 184394 UNIT/GM POWD Apply topically as needed        oxyCODONE (OXYCONTIN) 30 MG 12 hr tablet Take 1 tablet (30 mg) by mouth every 12 hours 30 tablet 0     oxyCODONE IR (ROXICODONE) 5 MG tablet Take 1 tablet (5 mg) by mouth every 4 hours as needed for breakthrough pain 20 tablet 0     REVIEW OF SYSTEMS: 4 point ROS including Respiratory, CV, GI and , other than that noted in the HPI, is negative.  Objective:  There were no vitals taken for this visit.  GENERAL APPEARANCE: Alert, in no distress, cooperative.   EYES/ENT: EOM normal on camera, hearing acuity [place].  RESP: Respiratory effort appears unlabored over video screen, no respiratory distress apparent on video, On RA.   CV: Edema appears 1+ BLE via video. Color appears pale w/ discoloration in BLE.  ABDOMEN: Appears non-distended , rounded.  SKIN: Skin appears baseline w/ video inspection. Wounds as pictured here:      NEURO: CN II-XII at patient's baseline, MCCULLOUGH at  baseline on video. Sensation baseline PPS.  PSYCH: Insight, judgement, and memory are baseline impaired, affect and mood are drowsy/happy.    Laboratory/Diagnostics: Reviewed in Epic by provider today.     Assessment/Plan:  Pressure injury of right buttock, stage 3 (H)  Cellulitis of buttock  Stage 3a chronic kidney disease  Chronic, continuous use of opioids  Inflammatory arthritis (H)  Major depression (H)  Acute-on-chronic. Tenuous.    We note that a change in Norvasc timing may help with BP control and w/ BLE edema (which is much improved anyway).    We partnered w/ Rebecca to agree on pain management plan this week. Her arthritis appears well-controlled, but we note her actually use of pain medications may be related to poor sleep. We note a historical use of Trazodone and Melatonin (She states melatonin did not work for her).      She is open to further medication reductions and denies nausea/heartburn or muscle spasms. We note no PRN Flexeril use in >1 week. We will reduce Pepcid as noted below and discontinue Flexeril.    Rebecca is making positive progress forward, and is hopeful to return home in 1-2 wks.   Follow-up w/in 1 week or as needed.    Orders:    1. Change Norvasc to HS. Dx: HTN.  2. Decrease Pepcid to 20mg PO QAM. Dx: GERD.  3. Discontinue PRN Flexeril.  4. Trazodone 12.5mg PO at bedtime PRN x 14 days. Dx: insomnia.    Video-Visit Details  Type of service:  Video Visit  Video Start Time: 1242  Video End Time (time video stopped): 1300  Originating Location (pt. Location): [facility]  Distant Location (provider location):  [facility] GERIATRIC TRANSITIONAL CARE   Mode of Communication:  Video Conference.    Electronically signed by:  RACHEL Alvarez CNP DNP        Sincerely,        RACHEL Martin CNP

## 2021-02-08 ENCOUNTER — TELEPHONE (OUTPATIENT)
Dept: GERIATRICS | Facility: CLINIC | Age: 66
End: 2021-02-08

## 2021-02-08 NOTE — TELEPHONE ENCOUNTER
Nursing report patient having Nausea/vomiting and diarrhea, has vomited 3-4 times since evening.   Vitals are stable  Order: zofran 4mg q 6 hours prn, update NP in AM

## 2021-02-09 ENCOUNTER — VIRTUAL VISIT (OUTPATIENT)
Dept: GERIATRICS | Facility: CLINIC | Age: 66
End: 2021-02-09
Payer: MEDICARE

## 2021-02-09 VITALS
TEMPERATURE: 97.3 F | OXYGEN SATURATION: 95 % | BODY MASS INDEX: 37.39 KG/M2 | RESPIRATION RATE: 16 BRPM | DIASTOLIC BLOOD PRESSURE: 87 MMHG | HEART RATE: 53 BPM | WEIGHT: 253.2 LBS | SYSTOLIC BLOOD PRESSURE: 164 MMHG

## 2021-02-09 DIAGNOSIS — L89.313 PRESSURE INJURY OF RIGHT BUTTOCK, STAGE 3 (H): Primary | ICD-10-CM

## 2021-02-09 DIAGNOSIS — M06.4 UNDIFFERENTIATED INFLAMMATORY ARTHRITIS (H): ICD-10-CM

## 2021-02-09 DIAGNOSIS — E11.628 TYPE 2 DIABETES MELLITUS WITH OTHER SKIN COMPLICATION, WITHOUT LONG-TERM CURRENT USE OF INSULIN (H): ICD-10-CM

## 2021-02-09 DIAGNOSIS — L03.317 CELLULITIS OF BUTTOCK: ICD-10-CM

## 2021-02-09 DIAGNOSIS — F33.1 MODERATE EPISODE OF RECURRENT MAJOR DEPRESSIVE DISORDER (H): ICD-10-CM

## 2021-02-09 DIAGNOSIS — F11.90 CHRONIC, CONTINUOUS USE OF OPIOIDS: ICD-10-CM

## 2021-02-09 DIAGNOSIS — N18.31 STAGE 3A CHRONIC KIDNEY DISEASE (H): ICD-10-CM

## 2021-02-09 PROBLEM — E03.9 HYPOTHYROIDISM: Status: ACTIVE | Noted: 2021-02-09

## 2021-02-09 PROCEDURE — 99310 SBSQ NF CARE HIGH MDM 45: CPT | Mod: 95 | Performed by: NURSE PRACTITIONER

## 2021-02-09 RX ORDER — OXYCODONE HCL 20 MG/1
20 TABLET, FILM COATED, EXTENDED RELEASE ORAL EVERY 12 HOURS
Qty: 30 TABLET | Refills: 0 | Status: ON HOLD | OUTPATIENT
Start: 2021-02-09 | End: 2021-05-22

## 2021-02-09 RX ORDER — OXYCODONE HYDROCHLORIDE 5 MG/1
5 TABLET ORAL EVERY 4 HOURS PRN
Qty: 20 TABLET | Refills: 0 | Status: SHIPPED | OUTPATIENT
Start: 2021-02-09 | End: 2021-02-14

## 2021-02-09 NOTE — PROGRESS NOTES
"[facility] Geriatrics Video Visit  Carolina Mari is a 65 year old female who is being evaluated via a billable video visit due to the restrictions of the COVID19 pandemic.The patient has been notified of following: \"This video visit will be conducted via a call between you and your provider. We have found that certain health care needs can be provided without the need for an in-person physical exam.  This service lets us provide the care you need with a video conversation.  If a prescription is necessary we can send it directly to your pharmacy.  If lab work is needed we can place an order for that and you can then stop by our lab to have the test done at a later time. If during the course of the call the provider feels a video visit is not appropriate, you will not be charged for this service.\"     Proivder has received verbal consent for a Video Visit from the patient? Yes    Patient/facility would like the video invitation sent by: Send to e-mail at: [email or web address]     This visit took place virtually, with the patient located at [facility]   ________________________________________________  Video Start Time: 1500  Carolina Mari complains of    Chief Complaint   Patient presents with     Video Visit     Nursing Home Acute   HPI/Subjective:  Carolina seen on follow-up after we started PRN Trazodone to help with sleep last week. We also reduced Pepcid and completed her GDR of Flexeril, which is now discontinued.     Today, Carolina states a 24 hour hx of nausea and belching. She denies diarrhea/constipation/ She states her pain is pretty good and she is doing well in therapy. She denies headache, SOB, cough, dizziness, headache. We note her appropriate weight loss, trouble sleeping, and no use of PRN Trazodone. We also note no recent TSH w/ high Synthroid dosing at 200mcg/day.    History: I have reviewed and updated the patient's Past Medical History, Social " History, Family History and Medication List. ALLERGIES: Aspirin and Colcrys  MEDICATIONS:  Current Outpatient Medications   Medication Sig Dispense Refill     acetaminophen (TYLENOL) 500 MG tablet Take 1,000 mg by mouth 3 times daily AND 650mg PO every day  PRN       amLODIPine (NORVASC) 5 MG tablet Take 5 mg by mouth At Bedtime       cyanocobalamin (VITAMIN B12) 1000 MCG/ML injection Inject 1 mL into the muscle every 30 days       famotidine (PEPCID) 20 MG tablet Take 20 mg by mouth every morning       FLUOXETINE HCL PO Take 60 mg by mouth every evening        GABAPENTIN PO Take 600 mg by mouth 3 times daily        LEVOTHYROXINE SODIUM PO Take 200 mcg by mouth daily       LOSARTAN POTASSIUM PO Take 100 mg by mouth daily       metoprolol tartrate (LOPRESSOR) 100 MG tablet Take 100 mg by mouth 2 times daily       Nystatin (NYAMYC) 033062 UNIT/GM POWD Apply topically as needed        oxyCODONE (OXYCONTIN) 30 MG 12 hr tablet Take 1 tablet (30 mg) by mouth every 12 hours 30 tablet 0     oxyCODONE IR (ROXICODONE) 5 MG tablet Take 1 tablet (5 mg) by mouth every 4 hours as needed for breakthrough pain 20 tablet 0     REVIEW OF SYSTEMS: 4 point ROS including Respiratory, CV, GI and , other than that noted in the HPI, is negative.  Objective:  BP (!) 164/87   Pulse 53   Temp 97.3  F (36.3  C)   Resp 16   Wt 114.9 kg (253 lb 3.2 oz)   SpO2 95%   BMI 37.39 kg/m    GENERAL APPEARANCE: Alert, in no distress, cooperative.   EYES/ENT: EOM normal on camera, hearing acuity [place].  RESP: Respiratory effort appears unlabored over video screen, no respiratory distress apparent on video, On RA.   CV: Edema appears 1+ BLE via video. Color appears pale w/ discoloration in BLE.  ABDOMEN: Appears non-distended , rounded.  SKIN: Skin appears baseline w/ video inspection. Wounds as pictured here:      NEURO: CN II-XII at patient's baseline, MCCULLOUGH at baseline on video. Sensation baseline PPS.  PSYCH: Insight, judgement, and memory are  baseline impaired, affect and mood are drowsy/happy.    Laboratory/Diagnostics: Reviewed in Epic by provider today.     Assessment/Plan:  (L89.313) Pressure injury of right buttock, stage 3 (H)  (primary encounter diagnosis)  (L03.317) Cellulitis of buttock  (N18.31) Stage 3a chronic kidney disease  (F33.1) Moderate episode of recurrent major depressive disorder (H)  (F11.90) Chronic, continuous use of opioids  (M06.4) Undifferentiated inflammatory arthritis (H)  (E11.628) Type 2 diabetes mellitus with other skin complication, without long-term current use of insulin (H)  Acute-on-chronic. Tenuous.    We suspect that Carolina would benefit from having her Pepcid returned to prior dosing given her GI sxs.    We also note w/ weight loss, sleep issues, and high Synthroid dosing, thyroid may be over-treated. We will obtain TSH and follow.    Given pain is well controlled and she continues to progress, we will reduce OxyContin further. We also encouraged use of PRN Trazodone if she trouble sleeping (as none have been used).     We coordinated care with nursing around pain regimen, and methods of controlling anxiety around medication control. We coordinated care with , who anticipates discharge w/in 1-2 weeks.   Follow-up w/in 1 week or as needed.    Orders:    1. Increase Pepcid to 20mg PO BID. Dx: GERD.  2. Decrease OxyContin to 20mg PO BID. Dx: Severe pain.  3. Recheck TSH x1 on 2/12. Dx: hypothyroidism.      Total time spent with patient visit was 35 min including patient visit and review of past records. Greater than 50% of total time spent with counseling and coordinating care with nursing, and  as noted above.     Video-Visit Details  Type of service:  Video Visit  Video Start Time: 1500  Video End Time (time video stopped): 1510  Originating Location (pt. Location): TCU  Distant Location (provider location):  [facility] TRANSITIONAL CARE   Mode of Communication:  Video  Conference.    Electronically signed by:  Dr. Adilene Aguayo, APRN CNP DNP

## 2021-02-09 NOTE — LETTER
"    2/9/2021        RE: Carolina Mari  [address]  [place] [address]        [facility] Geriatrics Video Visit  Carolina Mari is a 65 year old female who is being evaluated via a billable video visit due to the restrictions of the COVID19 pandemic.The patient has been notified of following: \"This video visit will be conducted via a call between you and your provider. We have found that certain health care needs can be provided without the need for an in-person physical exam.  This service lets us provide the care you need with a video conversation.  If a prescription is necessary we can send it directly to your pharmacy.  If lab work is needed we can place an order for that and you can then stop by our lab to have the test done at a later time. If during the course of the call the provider feels a video visit is not appropriate, you will not be charged for this service.\"     Proivder has received verbal consent for a Video Visit from the patient? Yes    Patient/facility would like the video invitation sent by: Send to e-mail at: [email or web address]     This visit took place virtually, with the patient located at [facility]   ________________________________________________  Video Start Time: 1500  Carolina Mari complains of    Chief Complaint   Patient presents with     Video Visit     Nursing Home Acute   HPI/Subjective:  Carolina seen on follow-up after we started PRN Trazodone to help with sleep last week. We also reduced Pepcid and completed her GDR of Flexeril, which is now discontinued.     Today, Carolina states a 24 hour hx of nausea and belching. She denies diarrhea/constipation/ She states her pain is pretty good and she is doing well in therapy. She denies headache, SOB, cough, dizziness, headache. We note her appropriate weight loss, trouble sleeping, and no use of PRN Trazodone. We also note no recent TSH w/ high Synthroid " dosing at 200mcg/day.    History: I have reviewed and updated the patient's Past Medical History, Social History, Family History and Medication List. ALLERGIES: Aspirin and Colcrys  MEDICATIONS:  Current Outpatient Medications   Medication Sig Dispense Refill     acetaminophen (TYLENOL) 500 MG tablet Take 1,000 mg by mouth 3 times daily AND 650mg PO every day  PRN       amLODIPine (NORVASC) 5 MG tablet Take 5 mg by mouth At Bedtime       cyanocobalamin (VITAMIN B12) 1000 MCG/ML injection Inject 1 mL into the muscle every 30 days       famotidine (PEPCID) 20 MG tablet Take 20 mg by mouth every morning       FLUOXETINE HCL PO Take 60 mg by mouth every evening        GABAPENTIN PO Take 600 mg by mouth 3 times daily        LEVOTHYROXINE SODIUM PO Take 200 mcg by mouth daily       LOSARTAN POTASSIUM PO Take 100 mg by mouth daily       metoprolol tartrate (LOPRESSOR) 100 MG tablet Take 100 mg by mouth 2 times daily       Nystatin (NYAMYC) 052385 UNIT/GM POWD Apply topically as needed        oxyCODONE (OXYCONTIN) 30 MG 12 hr tablet Take 1 tablet (30 mg) by mouth every 12 hours 30 tablet 0     oxyCODONE IR (ROXICODONE) 5 MG tablet Take 1 tablet (5 mg) by mouth every 4 hours as needed for breakthrough pain 20 tablet 0     REVIEW OF SYSTEMS: 4 point ROS including Respiratory, CV, GI and , other than that noted in the HPI, is negative.  Objective:  BP (!) 164/87   Pulse 53   Temp 97.3  F (36.3  C)   Resp 16   Wt 114.9 kg (253 lb 3.2 oz)   SpO2 95%   BMI 37.39 kg/m    GENERAL APPEARANCE: Alert, in no distress, cooperative.   EYES/ENT: EOM normal on camera, hearing acuity [place].  RESP: Respiratory effort appears unlabored over video screen, no respiratory distress apparent on video, On RA.   CV: Edema appears 1+ BLE via video. Color appears pale w/ discoloration in BLE.  ABDOMEN: Appears non-distended , rounded.  SKIN: Skin appears baseline w/ video inspection. Wounds as pictured here:      NEURO: CN II-XII at patient's  baseline, MCCULLOUGH at baseline on video. Sensation baseline PPS.  PSYCH: Insight, judgement, and memory are baseline impaired, affect and mood are drowsy/happy.    Laboratory/Diagnostics: Reviewed in Epic by provider today.     Assessment/Plan:  (L89.313) Pressure injury of right buttock, stage 3 (H)  (primary encounter diagnosis)  (L03.317) Cellulitis of buttock  (N18.31) Stage 3a chronic kidney disease  (F33.1) Moderate episode of recurrent major depressive disorder (H)  (F11.90) Chronic, continuous use of opioids  (M06.4) Undifferentiated inflammatory arthritis (H)  (E11.628) Type 2 diabetes mellitus with other skin complication, without long-term current use of insulin (H)  Acute-on-chronic. Tenuous.    We suspect that Carolina would benefit from having her Pepcid returned to prior dosing given her GI sxs.    We also note w/ weight loss, sleep issues, and high Synthroid dosing, thyroid may be over-treated. We will obtain TSH and follow.    Given pain is well controlled and she continues to progress, we will reduce OxyContin further. We also encouraged use of PRN Trazodone if she trouble sleeping (as none have been used).     We coordinated care with nursing around pain regimen, and methods of controlling anxiety around medication control. We coordinated care with , who anticipates discharge w/in 1-2 weeks.   Follow-up w/in 1 week or as needed.    Orders:    1. Increase Pepcid to 20mg PO BID. Dx: GERD.  2. Decrease OxyContin to 20mg PO BID. Dx: Severe pain.  3. Recheck TSH x1 on 2/12. Dx: hypothyroidism.      Total time spent with patient visit was 35 min including patient visit and review of past records. Greater than 50% of total time spent with counseling and coordinating care with nursing, and  as noted above.     Video-Visit Details  Type of service:  Video Visit  Video Start Time: 1500  Video End Time (time video stopped): 1510  Originating Location (pt. Location): [facility]  Distant  Location (provider location):  [facility] TRANSITIONAL CARE   Mode of Communication:  Video Conference.    Electronically signed by:  RACHEL Alvarez CNP DNP        Sincerely,        RACHEL Martin CNP

## 2021-02-10 ENCOUNTER — RECORDS - HEALTHEAST (OUTPATIENT)
Dept: LAB | Facility: CLINIC | Age: 66
End: 2021-02-10

## 2021-02-12 ENCOUNTER — TRANSFERRED RECORDS (OUTPATIENT)
Dept: HEALTH INFORMATION MANAGEMENT | Facility: CLINIC | Age: 66
End: 2021-02-12

## 2021-02-12 LAB
TSH SERPL DL<=0.005 MIU/L-ACNC: 5.24 UIU/ML (ref 0.3–5)
TSH SERPL-ACNC: 5.24 UIU/ML (ref 0.3–5)

## 2021-02-14 ENCOUNTER — TELEPHONE (OUTPATIENT)
Dept: GERIATRICS | Facility: CLINIC | Age: 66
End: 2021-02-14

## 2021-02-14 DIAGNOSIS — L89.313 PRESSURE INJURY OF RIGHT BUTTOCK, STAGE 3 (H): ICD-10-CM

## 2021-02-14 RX ORDER — OXYCODONE HYDROCHLORIDE 5 MG/1
5 TABLET ORAL EVERY 4 HOURS PRN
Qty: 20 TABLET | Refills: 0 | Status: SHIPPED | OUTPATIENT
Start: 2021-02-14 | End: 2021-02-19

## 2021-02-16 ENCOUNTER — DISCHARGE SUMMARY NURSING HOME (OUTPATIENT)
Dept: GERIATRICS | Facility: CLINIC | Age: 66
End: 2021-02-16
Payer: MEDICARE

## 2021-02-16 VITALS
BODY MASS INDEX: 37.73 KG/M2 | HEART RATE: 75 BPM | WEIGHT: 255.5 LBS | RESPIRATION RATE: 18 BRPM | DIASTOLIC BLOOD PRESSURE: 70 MMHG | SYSTOLIC BLOOD PRESSURE: 143 MMHG | TEMPERATURE: 97.3 F

## 2021-02-16 DIAGNOSIS — N18.31 STAGE 3A CHRONIC KIDNEY DISEASE (H): ICD-10-CM

## 2021-02-16 DIAGNOSIS — F11.90 CHRONIC, CONTINUOUS USE OF OPIOIDS: ICD-10-CM

## 2021-02-16 DIAGNOSIS — F33.1 MODERATE EPISODE OF RECURRENT MAJOR DEPRESSIVE DISORDER (H): ICD-10-CM

## 2021-02-16 DIAGNOSIS — R62.7 ADULT FAILURE TO THRIVE: ICD-10-CM

## 2021-02-16 DIAGNOSIS — I10 ESSENTIAL HYPERTENSION: ICD-10-CM

## 2021-02-16 DIAGNOSIS — E66.01 MORBID OBESITY (H): ICD-10-CM

## 2021-02-16 DIAGNOSIS — F17.200 TOBACCO USE DISORDER: ICD-10-CM

## 2021-02-16 DIAGNOSIS — L89.313 PRESSURE INJURY OF RIGHT BUTTOCK, STAGE 3 (H): ICD-10-CM

## 2021-02-16 DIAGNOSIS — L03.317 CELLULITIS OF BUTTOCK: Primary | ICD-10-CM

## 2021-02-16 DIAGNOSIS — S31.109D OPEN WOUND OF ABDOMEN, SUBSEQUENT ENCOUNTER: ICD-10-CM

## 2021-02-16 DIAGNOSIS — F41.1 GAD (GENERALIZED ANXIETY DISORDER): ICD-10-CM

## 2021-02-16 DIAGNOSIS — M06.4 UNDIFFERENTIATED INFLAMMATORY ARTHRITIS (H): ICD-10-CM

## 2021-02-16 PROCEDURE — 99316 NF DSCHRG MGMT 30 MIN+: CPT | Performed by: NURSE PRACTITIONER

## 2021-02-16 NOTE — PROGRESS NOTES
"[place] [facility] DISCHARGE SUMMARY  PATIENT'S NAME: Carolina Mari : 1955 MRN: [id number] Place of Service where encounter took place:  [facility] (U) [4000] PRIMARY CARE PROVIDER AND CLINIC RESPONSIBLE AFTER TRANSFER: Le Romo MD, [facility] [address] / [facility]. Non-FMG Provider     Transferring providers: RACHEL Terrazas CNP [facility] & Ariana Bose MD.  Recent Hospitalization/ED: [facility] stay 21 to 21.  Date of TCU Admission: 21  Date of TCU (anticipated) Discharge: 21  Discharged to: previous independent home.  Cognitive Scores: BIMS: 10/15, SLUMS: 15/30 and CPT: 4.6/5.6  Physical Function: Using wc for most mobility as per baseline.  DME: None.  CODE STATUS/ADVANCE DIRECTIVES DISCUSSION: DNR/DNI.  ALLERGIES: Aspirin and Colcrys    DISCHARGE DIAGNOSIS/NURSING FACILITY COURSE:   Cellulitis of buttock  Pressure injury of right buttock, stage 3 (H)  Open wound of abdomen, subsequent encounter  Morbid obesity (H)  Adult failure to thrive  Chronic, continuous use of opioids  Undifferentiated inflammatory arthritis (H)  Moderate episode of recurrent major depressive disorder (H)  MARKO (generalized anxiety disorder)  Stage 3a chronic kidney disease  Essential hypertension  Tobacco use disorder  Carolina presented to [facility] on 21 w/ FTT and physical deconditioning. She was found to have cellulitis of right buttock wound, which has been present x 1 month. WOCN was consulted, along w/ OT/PT as her son declined to have her discharge home, as she frequently refuses the home care services given to her. She discharged to [facility] for ongoing care.     Carolina presented to [facility] on  s/p the above-short hospitalization. She completed a course of Clindamycin and her wounds have healed nicely, almost to completion (see prior notes for comparison). We noted her significant misuse of opioid medications for \"sleep issues\" per her statement. She " tolerated significant reductions as noted below. We are concerned for the possibility of misuse again, and that this ultimately contributed to her failure-to-thrive prior to arrival.     During her stay, her right wrist had an arthritic flare, which we confirmed by xray (as it was significant in appearance). This was self-limiting and resolved with her already prescribed regimen. She lost a healthy amount of weight during her stay, and we were able to do several other medication reductions. We noted that her pain is grouped with anxiety and depression. We consulted in-house psych for extra support and educated patient constantly about the appropriate use of opioid prescriptions. We would recommend the following reductions on follow-up:    Switch from Prozac to Zoloft (or alternative) given the t1/2 and potential anticholinergic effects.    Thyroid trending given her weight loss and several med changes.    Ongoing  and case management outpatient. If Carolina refuses home care (as is what happened prior), she will be at-risk for re-hospitalization.      Today, Carolina is feeling great! She states any transient nausea or diarrhea she was having is gone today, and thinks that some food may have disagreed with her. She denies pain, SOB, cough, fever. She is excited to discharge and use home care, and she acknowledges the progress that she has made.     Past Medical History:  has a past medical history of Diabetes mellitus (H), History of stomach ulcers, HTN (hypertension), Knee osteoarthritis, Osteoarthritis of right hip, Osteoporosis, Osteoporosis, and Thyroid disease.  Discharge Medications:  Current Outpatient Medications   Medication Sig Dispense Refill     acetaminophen (TYLENOL) 500 MG tablet Take 1,000 mg by mouth 3 times daily AND 650mg PO every day  PRN       amLODIPine (NORVASC) 5 MG tablet Take 5 mg by mouth At Bedtime       cyanocobalamin (VITAMIN B12) 1000 MCG/ML injection Inject 1 mL into the  muscle every 30 days       famotidine (PEPCID) 20 MG tablet Take 20 mg by mouth 2 times daily       FLUOXETINE HCL PO Take 60 mg by mouth every evening        GABAPENTIN PO Take 600 mg by mouth 3 times daily        LEVOTHYROXINE SODIUM PO Take 200 mcg by mouth daily       LOSARTAN POTASSIUM PO Take 100 mg by mouth daily       metoprolol tartrate (LOPRESSOR) 100 MG tablet Take 100 mg by mouth 2 times daily       Nystatin (NYAMYC) 971417 UNIT/GM POWD Apply topically as needed        oxyCODONE (OXYCONTIN) 20 MG 12 hr tablet Take 1 tablet (20 mg) by mouth every 12 hours 30 tablet 0     oxyCODONE (ROXICODONE) 5 MG tablet Take 1 tablet (5 mg) by mouth every 4 hours as needed for breakthrough pain 20 tablet 0     Medication Changes/Rationale:     Oxycodone reduction from 15mg Q4h PRH down to 5mg Q4h PRN.    OxyContin reduction from 30mg TID, to 20mg BID.     Flexeril reduction and discontinuation.    Tylenol start-up 1000mg TID.     Controlled medications sent with patient: Medication: Oxycodone/OxyContin , 20 of each tabs given to patient at the time of discharge to take home     ROS: 4 point ROS including Respiratory, CV, GI and , other than that noted in the HPI, is negative.    Physical Exam: Vitals: BP (!) 143/70   Pulse 75   Temp 97.3  F (36.3  C)   Resp 18   Wt 115.9 kg (255 lb 8 oz)   BMI 37.73 kg/m    GENERAL APPEARANCE: Alert, in no distress, cooperative.   ENT: Mouth/posterior oropharynx intact w/ moist mucous membranes, hearing acuity [place].  EYES: EOM, conjunctivae, lids, pupils and irises normal, PERRL2.   RESP: Respiratory effort good, no respiratory distress, Lung sounds clear. On RA.   CV: Auscultation of heart reveals S1, S2, rate and rhythm regular, no murmur, no rub or gallop, Edema 1+ BLE. Peripheral pulses are 2+.  ABDOMEN: Normal bowel sounds, soft, non-tender abdomen, and no masses palpated.  SKIN: Inspection/Palpation of skin and subcutaneous tissue baseline w/ fragility. Her wounds/skin  problems are healed almost completely, with no new pictures taken this visit.  NEURO: CN II-XII at patient's baseline, sensation baseline PPS.  PSYCH: Insight, judgement, and memory are improved baseline, affect and mood are happy/engaged.    Facility Labs: Labs done in SNF are in [place] EPIC. Please refer to them using EPIC/Care Everywhere.    DISCHARGE PLAN:    Follow up labs: No labs orders/due    Medical Follow Up:  Follow up with primary care provider in 1-2 weeks.    MTM referral needed and placed by this provider: No    Current [place] scheduled appointments: None.    Discharge Services: Home Care:  Occupational Therapy, Physical Therapy, Registered Nurse, Home Health Aide and     Orders:  1. Directions: Do not discharge with more than 20 tabs of each OxyContin & Oxycodone.     TOTAL DISCHARGE TIME:   Greater than 30 minutes    Electronically signed by:  Dr. Adilene Aguayo, RACHEL CNP DNP  ______________      Documentation of Face-to-Face and Certification for Home Health Services   Patient: Carolina Mari YOB: 1955  MRN: [id number]  Today's Date: 2/16/2021  I certify that patient: Carolina Mari is under my care and that I had a face-to-face encounter that meets the provider  face-to-face encounter requirements with this patient on: 2/16/2021. This encounter with the patient was in whole, or in part, for the following medical condition, which is the primary reason for home health care: Wound care & Inflammatory Arthritis.    I certify that, based on my findings, the following services are medically necessary home health services: Nursing, Occupational Therapy, Physical Therapy and Social Work. My clinical findings support the need for the above services because: Nurse is needed: To provide assessment and oversight required in the home to assure adherence to the medical plan due to: opioid dependence.., Occupational Therapy Services are needed to  assess and treat cognitive ability and address ADL safety due to impairment in mobility. and Physical Therapy Services are needed to assess and treat the following functional impairments: mobility.    Further, I certify that my clinical findings support that this patient is homebound (i.e. absences from home require considerable and taxing effort and are for medical reasons or [facility] services or infrequently or of short duration when for other reasons) because: Requires assistance of another person or specialized equipment to access medical services because patient: Range of motion limitations prevents ability to exit home safely...    Based on the above findings. I certify that this patient is confined to the home and needs intermittent skilled nursing care, physical therapy and/or speech therapy.  The patient is under my care, and I have initiated the establishment of the plan of care.  This patient will be followed by a provider who will periodically review the plan of care.    Provider to give follow up care: Le Romo    Responsible Medicare certified [employer] Provider: RACHEL Alvarez CNP DNP  Provider Signature: See electronic signature associated with these discharge orders.  Date: 2/16/2021

## 2021-02-16 NOTE — LETTER
2021        RE: Carolina Mari  [address] [facility] [address]  [facility] [address]        [place] TCU DISCHARGE SUMMARY  PATIENT'S NAME: Carolina Mari : 1955 MRN: [id number] Place of Service where encounter took place:  [facility] ([facility]) [4000] PRIMARY CARE PROVIDER AND CLINIC RESPONSIBLE AFTER TRANSFER: Le Romo MD, [facility] [address] [address] / [facility]. Non-FMG Provider     Transferring providers: RACHEL Terrazas CNP DNP & Ariana Bose MD.  Recent Hospitalization/ED: FVL stay 21 to 21.  Date of TCU Admission: 21  Date of TCU (anticipated) Discharge: 21  Discharged to: previous independent home.  Cognitive Scores: BIMS: 10/15, SLUMS: 15/30 and CPT: 4.6/5.6  Physical Function: Using wc for most mobility as per baseline.  DME: None.  CODE STATUS/ADVANCE DIRECTIVES DISCUSSION: DNR/DNI.  ALLERGIES: Aspirin and Colcrys    DISCHARGE DIAGNOSIS/NURSING FACILITY COURSE:   Cellulitis of buttock  Pressure injury of right buttock, stage 3 (H)  Open wound of abdomen, subsequent encounter  Morbid obesity (H)  Adult failure to thrive  Chronic, continuous use of opioids  Undifferentiated inflammatory arthritis (H)  Moderate episode of recurrent major depressive disorder (H)  MARKO (generalized anxiety disorder)  Stage 3a chronic kidney disease  Essential hypertension  Tobacco use disorder  Carolina presented to [facility] on 21 w/ FTT and physical deconditioning. She was found to have cellulitis of right buttock wound, which has been present x 1 month. WOCN was consulted, along w/ OT/PT as her son declined to have her discharge home, as she frequently refuses the home care services given to her. She discharged to U for ongoing care.     Carolina presented to U on  s/p the above-short hospitalization. She completed a course of Clindamycin and her wounds have healed nicely, almost to completion (see prior notes for comparison).  "We noted her significant misuse of opioid medications for \"sleep issues\" per her statement. She tolerated significant reductions as noted below. We are concerned for the possibility of misuse again, and that this ultimately contributed to her failure-to-thrive prior to arrival.     During her stay, her right wrist had an arthritic flare, which we confirmed by xray (as it was significant in appearance). This was self-limiting and resolved with her already prescribed regimen. She lost a healthy amount of weight during her stay, and we were able to do several other medication reductions. We noted that her pain is grouped with anxiety and depression. We consulted in-house psych for extra support and educated patient constantly about the appropriate use of opioid prescriptions. We would recommend the following reductions on follow-up:    Switch from Prozac to Zoloft (or alternative) given the t1/2 and potential anticholinergic effects.    Thyroid trending given her weight loss and several med changes.    Ongoing  and case management outpatient. If Carolina refuses home care (as is what happened prior), she will be at-risk for re-hospitalization.      Today, Carolina is feeling great! She states any transient nausea or diarrhea she was having is gone today, and thinks that some food may have disagreed with her. She denies pain, SOB, cough, fever. She is excited to discharge and use home care, and she acknowledges the progress that she has made.     Past Medical History:  has a past medical history of Diabetes mellitus (H), History of stomach ulcers, HTN (hypertension), Knee osteoarthritis, Osteoarthritis of right hip, Osteoporosis, Osteoporosis, and Thyroid disease.  Discharge Medications:  Current Outpatient Medications   Medication Sig Dispense Refill     acetaminophen (TYLENOL) 500 MG tablet Take 1,000 mg by mouth 3 times daily AND 650mg PO every day  PRN       amLODIPine (NORVASC) 5 MG tablet Take 5 mg by " mouth At Bedtime       cyanocobalamin (VITAMIN B12) 1000 MCG/ML injection Inject 1 mL into the muscle every 30 days       famotidine (PEPCID) 20 MG tablet Take 20 mg by mouth 2 times daily       FLUOXETINE HCL PO Take 60 mg by mouth every evening        GABAPENTIN PO Take 600 mg by mouth 3 times daily        LEVOTHYROXINE SODIUM PO Take 200 mcg by mouth daily       LOSARTAN POTASSIUM PO Take 100 mg by mouth daily       metoprolol tartrate (LOPRESSOR) 100 MG tablet Take 100 mg by mouth 2 times daily       Nystatin (NYAMYC) 610371 UNIT/GM POWD Apply topically as needed        oxyCODONE (OXYCONTIN) 20 MG 12 hr tablet Take 1 tablet (20 mg) by mouth every 12 hours 30 tablet 0     oxyCODONE (ROXICODONE) 5 MG tablet Take 1 tablet (5 mg) by mouth every 4 hours as needed for breakthrough pain 20 tablet 0     Medication Changes/Rationale:     Oxycodone reduction from 15mg Q4h PRH down to 5mg Q4h PRN.    OxyContin reduction from 30mg TID, to 20mg BID.     Flexeril reduction and discontinuation.    Tylenol start-up 1000mg TID.     Controlled medications sent with patient: Medication: Oxycodone/OxyContin , 20 of each tabs given to patient at the time of discharge to take home     ROS: 4 point ROS including Respiratory, CV, GI and , other than that noted in the HPI, is negative.    Physical Exam: Vitals: BP (!) 143/70   Pulse 75   Temp 97.3  F (36.3  C)   Resp 18   Wt 115.9 kg (255 lb 8 oz)   BMI 37.73 kg/m    GENERAL APPEARANCE: Alert, in no distress, cooperative.   ENT: Mouth/posterior oropharynx intact w/ moist mucous membranes, hearing acuity WDL.  EYES: EOM, conjunctivae, lids, pupils and irises normal, PERRL2.   RESP: Respiratory effort good, no respiratory distress, Lung sounds clear. On RA.   CV: Auscultation of heart reveals S1, S2, rate and rhythm regular, no murmur, no rub or gallop, Edema 1+ BLE. Peripheral pulses are 2+.  ABDOMEN: Normal bowel sounds, soft, non-tender abdomen, and no masses palpated.  SKIN:  Inspection/Palpation of skin and subcutaneous tissue baseline w/ fragility. Her wounds/skin problems are healed almost completely, with no new pictures taken this visit.  NEURO: CN II-XII at patient's baseline, sensation baseline PPS.  PSYCH: Insight, judgement, and memory are improved baseline, affect and mood are happy/engaged.    Facility Labs: Labs done in SNF are in [place] EPIC. Please refer to them using [facility]/Care Everywhere.    DISCHARGE PLAN:    Follow up labs: No labs orders/due    Medical Follow Up:  Follow up with primary care provider in 1-2 weeks.    MTM referral needed and placed by this provider: No    Current [place] scheduled appointments: None.    Discharge Services: Home Care:  Occupational Therapy, Physical Therapy, Registered Nurse, Home Health Aide and     Orders:  1. Directions: Do not discharge with more than 20 tabs of each OxyContin & Oxycodone.     TOTAL DISCHARGE TIME:   Greater than 30 minutes    Electronically signed by:  Dr. Adilene Aguayo, RACHEL CNP DNP  ______________      Documentation of Face-to-Face and Certification for Home Health Services   Patient: Carolina Mari YOB: 1955  MRN: [id number]  Today's Date: 2/16/2021  I certify that patient: Carolina Mari is under my care and that I had a face-to-face encounter that meets the provider  face-to-face encounter requirements with this patient on: 2/16/2021. This encounter with the patient was in whole, or in part, for the following medical condition, which is the primary reason for home health care: Wound care & Inflammatory Arthritis.    I certify that, based on my findings, the following services are medically necessary home health services: Nursing, Occupational Therapy, Physical Therapy and Social Work. My clinical findings support the need for the above services because: Nurse is needed: To provide assessment and oversight required in the home to assure adherence to the  medical plan due to: opioid dependence.., Occupational Therapy Services are needed to assess and treat cognitive ability and address ADL safety due to impairment in mobility. and Physical Therapy Services are needed to assess and treat the following functional impairments: mobility.    Further, I certify that my clinical findings support that this patient is homebound (i.e. absences from home require considerable and taxing effort and are for medical reasons or Hoahaoism services or infrequently or of short duration when for other reasons) because: Requires assistance of another person or specialized equipment to access medical services because patient: Range of motion limitations prevents ability to exit home safely...    Based on the above findings. I certify that this patient is confined to the home and needs intermittent skilled nursing care, physical therapy and/or speech therapy.  The patient is under my care, and I have initiated the establishment of the plan of care.  This patient will be followed by a provider who will periodically review the plan of care.    Provider to give follow up care: Le Romo    Responsible Medicare certified PECOS Provider: RACHEL Alvarez CNP [facility]  Provider Signature: See electronic signature associated with these discharge orders.  Date: 2/16/2021                   Sincerely,        RACHEL Martin CNP

## 2021-02-16 NOTE — LETTER
2021        RE: Carolina Mari  [address] [facility] [address]  [facility] [address]        [place] TCU DISCHARGE SUMMARY  PATIENT'S NAME: Carolina Mari : 1955 MRN: [id number] Place of Service where encounter took place:  [facility] ([facility]) [4000] PRIMARY CARE PROVIDER AND CLINIC RESPONSIBLE AFTER TRANSFER: Le Romo MD, [facility] [address] [address] / [facility]. Non-FMG Provider     Transferring providers: RACHEL Terrazas CNP DNP & Ariana Bose MD.  Recent Hospitalization/ED: FVL stay 21 to 21.  Date of TCU Admission: 21  Date of TCU (anticipated) Discharge: 21  Discharged to: previous independent home.  Cognitive Scores: BIMS: 10/15, SLUMS: 15/30 and CPT: 4.6/5.6  Physical Function: Using wc for most mobility as per baseline.  DME: None.  CODE STATUS/ADVANCE DIRECTIVES DISCUSSION: DNR/DNI.  ALLERGIES: Aspirin and Colcrys    DISCHARGE DIAGNOSIS/NURSING FACILITY COURSE:   Cellulitis of buttock  Pressure injury of right buttock, stage 3 (H)  Open wound of abdomen, subsequent encounter  Morbid obesity (H)  Adult failure to thrive  Chronic, continuous use of opioids  Undifferentiated inflammatory arthritis (H)  Moderate episode of recurrent major depressive disorder (H)  MARKO (generalized anxiety disorder)  Stage 3a chronic kidney disease  Essential hypertension  Tobacco use disorder  Carolina presented to [facility] on 21 w/ FTT and physical deconditioning. She was found to have cellulitis of right buttock wound, which has been present x 1 month. WOCN was consulted, along w/ OT/PT as her son declined to have her discharge home, as she frequently refuses the home care services given to her. She discharged to U for ongoing care.     Carolina presented to U on  s/p the above-short hospitalization. She completed a course of Clindamycin and her wounds have healed nicely, almost to completion (see prior notes for comparison).  "We noted her significant misuse of opioid medications for \"sleep issues\" per her statement. She tolerated significant reductions as noted below. We are concerned for the possibility of misuse again, and that this ultimately contributed to her failure-to-thrive prior to arrival.     During her stay, her right wrist had an arthritic flare, which we confirmed by xray (as it was significant in appearance). This was self-limiting and resolved with her already prescribed regimen. She lost a healthy amount of weight during her stay, and we were able to do several other medication reductions. We noted that her pain is grouped with anxiety and depression. We consulted in-house psych for extra support and educated patient constantly about the appropriate use of opioid prescriptions. We would recommend the following reductions on follow-up:    Switch from Prozac to Zoloft (or alternative) given the t1/2 and potential anticholinergic effects.    Thyroid trending given her weight loss and several med changes.    Ongoing  and case management outpatient. If Carolina refuses home care (as is what happened prior), she will be at-risk for re-hospitalization.      Today, Carolina is feeling great! She states any transient nausea or diarrhea she was having is gone today, and thinks that some food may have disagreed with her. She denies pain, SOB, cough, fever. She is excited to discharge and use home care, and she acknowledges the progress that she has made.     Past Medical History:  has a past medical history of Diabetes mellitus (H), History of stomach ulcers, HTN (hypertension), Knee osteoarthritis, Osteoarthritis of right hip, Osteoporosis, Osteoporosis, and Thyroid disease.  Discharge Medications:  Current Outpatient Medications   Medication Sig Dispense Refill     acetaminophen (TYLENOL) 500 MG tablet Take 1,000 mg by mouth 3 times daily AND 650mg PO every day  PRN       amLODIPine (NORVASC) 5 MG tablet Take 5 mg by " mouth At Bedtime       cyanocobalamin (VITAMIN B12) 1000 MCG/ML injection Inject 1 mL into the muscle every 30 days       famotidine (PEPCID) 20 MG tablet Take 20 mg by mouth 2 times daily       FLUOXETINE HCL PO Take 60 mg by mouth every evening        GABAPENTIN PO Take 600 mg by mouth 3 times daily        LEVOTHYROXINE SODIUM PO Take 200 mcg by mouth daily       LOSARTAN POTASSIUM PO Take 100 mg by mouth daily       metoprolol tartrate (LOPRESSOR) 100 MG tablet Take 100 mg by mouth 2 times daily       Nystatin (NYAMYC) 378661 UNIT/GM POWD Apply topically as needed        oxyCODONE (OXYCONTIN) 20 MG 12 hr tablet Take 1 tablet (20 mg) by mouth every 12 hours 30 tablet 0     oxyCODONE (ROXICODONE) 5 MG tablet Take 1 tablet (5 mg) by mouth every 4 hours as needed for breakthrough pain 20 tablet 0     Medication Changes/Rationale:     Oxycodone reduction from 15mg Q4h PRH down to 5mg Q4h PRN.    OxyContin reduction from 30mg TID, to 20mg BID.     Flexeril reduction and discontinuation.    Tylenol start-up 1000mg TID.     Controlled medications sent with patient: Medication: Oxycodone/OxyContin , 20 of each tabs given to patient at the time of discharge to take home     ROS: 4 point ROS including Respiratory, CV, GI and , other than that noted in the HPI, is negative.    Physical Exam: Vitals: BP (!) 143/70   Pulse 75   Temp 97.3  F (36.3  C)   Resp 18   Wt 115.9 kg (255 lb 8 oz)   BMI 37.73 kg/m    GENERAL APPEARANCE: Alert, in no distress, cooperative.   ENT: Mouth/posterior oropharynx intact w/ moist mucous membranes, hearing acuity WDL.  EYES: EOM, conjunctivae, lids, pupils and irises normal, PERRL2.   RESP: Respiratory effort good, no respiratory distress, Lung sounds clear. On RA.   CV: Auscultation of heart reveals S1, S2, rate and rhythm regular, no murmur, no rub or gallop, Edema 1+ BLE. Peripheral pulses are 2+.  ABDOMEN: Normal bowel sounds, soft, non-tender abdomen, and no masses palpated.  SKIN:  Inspection/Palpation of skin and subcutaneous tissue baseline w/ fragility. Her wounds/skin problems are healed almost completely, with no new pictures taken this visit.  NEURO: CN II-XII at patient's baseline, sensation baseline PPS.  PSYCH: Insight, judgement, and memory are improved baseline, affect and mood are happy/engaged.    Facility Labs: Labs done in SNF are in [place] EPIC. Please refer to them using [facility]/Care Everywhere.    DISCHARGE PLAN:    Follow up labs: No labs orders/due    Medical Follow Up:  Follow up with primary care provider in 1-2 weeks.    MTM referral needed and placed by this provider: No    Current [place] scheduled appointments: None.    Discharge Services: Home Care:  Occupational Therapy, Physical Therapy, Registered Nurse, Home Health Aide and     Orders:  1. Directions: Do not discharge with more than 20 tabs of each OxyContin & Oxycodone.     TOTAL DISCHARGE TIME:   Greater than 30 minutes    Electronically signed by:  Dr. Adilene Aguayo, RACHEL CNP DNP  ______________      Documentation of Face-to-Face and Certification for Home Health Services   Patient: Carolina Mari YOB: 1955  MRN: [id number]  Today's Date: 2/16/2021  I certify that patient: Carolina Mari is under my care and that I had a face-to-face encounter that meets the provider  face-to-face encounter requirements with this patient on: 2/16/2021. This encounter with the patient was in whole, or in part, for the following medical condition, which is the primary reason for home health care: Wound care & Inflammatory Arthritis.    I certify that, based on my findings, the following services are medically necessary home health services: Nursing, Occupational Therapy, Physical Therapy and Social Work. My clinical findings support the need for the above services because: Nurse is needed: To provide assessment and oversight required in the home to assure adherence to the  medical plan due to: opioid dependence.., Occupational Therapy Services are needed to assess and treat cognitive ability and address ADL safety due to impairment in mobility. and Physical Therapy Services are needed to assess and treat the following functional impairments: mobility.    Further, I certify that my clinical findings support that this patient is homebound (i.e. absences from home require considerable and taxing effort and are for medical reasons or Anabaptist services or infrequently or of short duration when for other reasons) because: Requires assistance of another person or specialized equipment to access medical services because patient: Range of motion limitations prevents ability to exit home safely...    Based on the above findings. I certify that this patient is confined to the home and needs intermittent skilled nursing care, physical therapy and/or speech therapy.  The patient is under my care, and I have initiated the establishment of the plan of care.  This patient will be followed by a provider who will periodically review the plan of care.    Provider to give follow up care: Le Romo    Responsible Medicare certified PECOS Provider: RACHEL Alvarez CNP [facility]  Provider Signature: See electronic signature associated with these discharge orders.  Date: 2/16/2021                   Sincerely,        RACHEL Martin CNP

## 2021-02-19 RX ORDER — OXYCODONE HYDROCHLORIDE 5 MG/1
5 TABLET ORAL EVERY 4 HOURS PRN
Qty: 20 TABLET | Refills: 0 | Status: ON HOLD | OUTPATIENT
Start: 2021-02-19 | End: 2021-05-22

## 2021-03-24 ENCOUNTER — RECORDS - HEALTHEAST (OUTPATIENT)
Dept: LAB | Facility: CLINIC | Age: 66
End: 2021-03-24

## 2021-03-24 LAB
ANION GAP SERPL CALCULATED.3IONS-SCNC: 8 MMOL/L (ref 5–18)
BUN SERPL-MCNC: 11 MG/DL (ref 8–22)
CALCIUM SERPL-MCNC: 8.5 MG/DL (ref 8.5–10.5)
CHLORIDE BLD-SCNC: 108 MMOL/L (ref 98–107)
CO2 SERPL-SCNC: 23 MMOL/L (ref 22–31)
CREAT SERPL-MCNC: 0.94 MG/DL (ref 0.6–1.1)
GFR SERPL CREATININE-BSD FRML MDRD: 59 ML/MIN/1.73M2
GLUCOSE BLD-MCNC: 87 MG/DL (ref 70–125)
POTASSIUM BLD-SCNC: 5.3 MMOL/L (ref 3.5–5)
SODIUM SERPL-SCNC: 139 MMOL/L (ref 136–145)
TSH SERPL DL<=0.005 MIU/L-ACNC: 2.3 UIU/ML (ref 0.3–5)

## 2021-05-20 ENCOUNTER — APPOINTMENT (OUTPATIENT)
Dept: GENERAL RADIOLOGY | Facility: CLINIC | Age: 66
DRG: 602 | End: 2021-05-20
Attending: EMERGENCY MEDICINE
Payer: MEDICARE

## 2021-05-20 ENCOUNTER — HOSPITAL ENCOUNTER (INPATIENT)
Facility: CLINIC | Age: 66
LOS: 1 days | Discharge: NURSING, REHAB OR LONG-TERM CARE | DRG: 602 | End: 2021-05-22
Attending: EMERGENCY MEDICINE | Admitting: HOSPITALIST
Payer: MEDICARE

## 2021-05-20 DIAGNOSIS — L03.317 CELLULITIS OF BUTTOCK: ICD-10-CM

## 2021-05-20 DIAGNOSIS — E53.8 VITAMIN B12 DEFICIENCY (NON ANEMIC): Primary | ICD-10-CM

## 2021-05-20 DIAGNOSIS — M06.4 UNDIFFERENTIATED INFLAMMATORY ARTHRITIS (H): ICD-10-CM

## 2021-05-20 DIAGNOSIS — Z11.52 ENCOUNTER FOR SCREENING LABORATORY TESTING FOR SEVERE ACUTE RESPIRATORY SYNDROME CORONAVIRUS 2 (SARS-COV-2): ICD-10-CM

## 2021-05-20 DIAGNOSIS — L89.313 PRESSURE INJURY OF RIGHT BUTTOCK, STAGE 3 (H): ICD-10-CM

## 2021-05-20 DIAGNOSIS — N17.9 ACUTE KIDNEY INJURY (H): ICD-10-CM

## 2021-05-20 LAB
ALBUMIN SERPL-MCNC: 2.8 G/DL (ref 3.4–5)
ALP SERPL-CCNC: 120 U/L (ref 40–150)
ALT SERPL W P-5'-P-CCNC: 16 U/L (ref 0–50)
ANION GAP SERPL CALCULATED.3IONS-SCNC: 7 MMOL/L (ref 3–14)
AST SERPL W P-5'-P-CCNC: 24 U/L (ref 0–45)
BASOPHILS # BLD AUTO: 0.1 10E9/L (ref 0–0.2)
BASOPHILS NFR BLD AUTO: 0.4 %
BILIRUB SERPL-MCNC: 0.3 MG/DL (ref 0.2–1.3)
BUN SERPL-MCNC: 20 MG/DL (ref 7–30)
CALCIUM SERPL-MCNC: 7.7 MG/DL (ref 8.5–10.1)
CHLORIDE SERPL-SCNC: 104 MMOL/L (ref 94–109)
CO2 SERPL-SCNC: 23 MMOL/L (ref 20–32)
CREAT SERPL-MCNC: 1.31 MG/DL (ref 0.52–1.04)
DIFFERENTIAL METHOD BLD: ABNORMAL
EOSINOPHIL # BLD AUTO: 0.4 10E9/L (ref 0–0.7)
EOSINOPHIL NFR BLD AUTO: 3.3 %
ERYTHROCYTE [DISTWIDTH] IN BLOOD BY AUTOMATED COUNT: 14.6 % (ref 10–15)
GFR SERPL CREATININE-BSD FRML MDRD: 42 ML/MIN/{1.73_M2}
GLUCOSE SERPL-MCNC: 100 MG/DL (ref 70–99)
HCT VFR BLD AUTO: 32.7 % (ref 35–47)
HGB BLD-MCNC: 9.9 G/DL (ref 11.7–15.7)
IMM GRANULOCYTES # BLD: 0.1 10E9/L (ref 0–0.4)
IMM GRANULOCYTES NFR BLD: 0.4 %
LABORATORY COMMENT REPORT: NORMAL
LACTATE BLD-SCNC: 0.8 MMOL/L (ref 0.7–2)
LIPASE SERPL-CCNC: 117 U/L (ref 73–393)
LYMPHOCYTES # BLD AUTO: 1.5 10E9/L (ref 0.8–5.3)
LYMPHOCYTES NFR BLD AUTO: 11.8 %
MCH RBC QN AUTO: 27 PG (ref 26.5–33)
MCHC RBC AUTO-ENTMCNC: 30.3 G/DL (ref 31.5–36.5)
MCV RBC AUTO: 89 FL (ref 78–100)
MONOCYTES # BLD AUTO: 0.6 10E9/L (ref 0–1.3)
MONOCYTES NFR BLD AUTO: 5.1 %
NEUTROPHILS # BLD AUTO: 9.8 10E9/L (ref 1.6–8.3)
NEUTROPHILS NFR BLD AUTO: 79 %
NRBC # BLD AUTO: 0 10*3/UL
NRBC BLD AUTO-RTO: 0 /100
PLATELET # BLD AUTO: 307 10E9/L (ref 150–450)
POTASSIUM SERPL-SCNC: 3.9 MMOL/L (ref 3.4–5.3)
PROT SERPL-MCNC: 6.9 G/DL (ref 6.8–8.8)
RBC # BLD AUTO: 3.66 10E12/L (ref 3.8–5.2)
SARS-COV-2 RNA RESP QL NAA+PROBE: NEGATIVE
SODIUM SERPL-SCNC: 134 MMOL/L (ref 133–144)
SPECIMEN SOURCE: NORMAL
TROPONIN I SERPL-MCNC: <0.015 UG/L (ref 0–0.04)
TSH SERPL DL<=0.005 MIU/L-ACNC: 2.1 MU/L (ref 0.4–4)
WBC # BLD AUTO: 12.4 10E9/L (ref 4–11)

## 2021-05-20 PROCEDURE — 83605 ASSAY OF LACTIC ACID: CPT | Performed by: EMERGENCY MEDICINE

## 2021-05-20 PROCEDURE — 258N000003 HC RX IP 258 OP 636: Performed by: EMERGENCY MEDICINE

## 2021-05-20 PROCEDURE — 71045 X-RAY EXAM CHEST 1 VIEW: CPT

## 2021-05-20 PROCEDURE — 81003 URINALYSIS AUTO W/O SCOPE: CPT | Performed by: EMERGENCY MEDICINE

## 2021-05-20 PROCEDURE — 36415 COLL VENOUS BLD VENIPUNCTURE: CPT | Performed by: EMERGENCY MEDICINE

## 2021-05-20 PROCEDURE — C9803 HOPD COVID-19 SPEC COLLECT: HCPCS | Performed by: EMERGENCY MEDICINE

## 2021-05-20 PROCEDURE — 99285 EMERGENCY DEPT VISIT HI MDM: CPT | Mod: 25 | Performed by: EMERGENCY MEDICINE

## 2021-05-20 PROCEDURE — 84443 ASSAY THYROID STIM HORMONE: CPT | Performed by: EMERGENCY MEDICINE

## 2021-05-20 PROCEDURE — 85025 COMPLETE CBC W/AUTO DIFF WBC: CPT | Performed by: EMERGENCY MEDICINE

## 2021-05-20 PROCEDURE — 96361 HYDRATE IV INFUSION ADD-ON: CPT | Performed by: EMERGENCY MEDICINE

## 2021-05-20 PROCEDURE — 250N000011 HC RX IP 250 OP 636: Performed by: EMERGENCY MEDICINE

## 2021-05-20 PROCEDURE — 96365 THER/PROPH/DIAG IV INF INIT: CPT | Performed by: EMERGENCY MEDICINE

## 2021-05-20 PROCEDURE — 81015 MICROSCOPIC EXAM OF URINE: CPT | Performed by: EMERGENCY MEDICINE

## 2021-05-20 PROCEDURE — 99285 EMERGENCY DEPT VISIT HI MDM: CPT | Performed by: EMERGENCY MEDICINE

## 2021-05-20 PROCEDURE — 80053 COMPREHEN METABOLIC PANEL: CPT | Performed by: EMERGENCY MEDICINE

## 2021-05-20 PROCEDURE — 84484 ASSAY OF TROPONIN QUANT: CPT | Performed by: EMERGENCY MEDICINE

## 2021-05-20 PROCEDURE — 87635 SARS-COV-2 COVID-19 AMP PRB: CPT | Performed by: EMERGENCY MEDICINE

## 2021-05-20 PROCEDURE — 83690 ASSAY OF LIPASE: CPT | Performed by: EMERGENCY MEDICINE

## 2021-05-20 PROCEDURE — 96366 THER/PROPH/DIAG IV INF ADDON: CPT | Performed by: EMERGENCY MEDICINE

## 2021-05-20 PROCEDURE — 87040 BLOOD CULTURE FOR BACTERIA: CPT | Performed by: EMERGENCY MEDICINE

## 2021-05-20 RX ORDER — CEFAZOLIN SODIUM 2 G/100ML
2 INJECTION, SOLUTION INTRAVENOUS EVERY 8 HOURS
Status: DISCONTINUED | OUTPATIENT
Start: 2021-05-20 | End: 2021-05-21

## 2021-05-20 RX ADMIN — SODIUM CHLORIDE, POTASSIUM CHLORIDE, SODIUM LACTATE AND CALCIUM CHLORIDE 1000 ML: 600; 310; 30; 20 INJECTION, SOLUTION INTRAVENOUS at 21:13

## 2021-05-20 RX ADMIN — CEFAZOLIN SODIUM 2 G: 2 INJECTION, SOLUTION INTRAVENOUS at 23:33

## 2021-05-20 ASSESSMENT — MIFFLIN-ST. JEOR: SCORE: 1733.37

## 2021-05-20 NOTE — LETTER
Transition Communication Hand-off for Care Transitions to Next Level of Care Provider    Name: Carolina Mari  : 1955  MRN #: [id number]  Primary Care Provider: Le Romo     Primary Clinic: [facility] [address], [facility] [address]     Reason for Hospitalization:  Cellulitis of buttock [L03.317]  Acute kidney injury (H) [N17.9]  Encounter for screening laboratory testing for severe acute respiratory syndrome coronavirus 2 (SARS-CoV-2) [Z20.822]  Admit Date/Time: 2021  8:10 PM  Discharge Date: 21  Payor Source: Payor: MEDICARE / Plan: MEDICARE / Product Type: Medicare /     Readmission Assessment Measure (SEVERO) Risk Score/category: HIGH  Reason for Communication Hand-off Referral: Fragility    Discharge Plan:  [facility] Phone (Main Phone:[phone] Admissions Phone:[phone] Fax: [phone]) for TCU cares, WOC cares    Concern for non-adherence with plan of care:   Y/N NO  Discharge Needs Assessment:  Needs      Most Recent Value   Equipment Currently Used at Home  grab bar, toilet, grab bar, tub/shower, shower chair, wheelchair, manual   # of Referrals Placed by [facility]  External Care Coordination, Post Acute Facilities, Transportation   PAS Number  -- [[id number]]        Key Recommendations:   Pt to benefit from TCU cares for WOC cares & PT/OT assistance    GILBERT Thomas    AVS/Discharge Summary is the source of truth; this is a helpful guide for improved communication of patient story

## 2021-05-21 ENCOUNTER — APPOINTMENT (OUTPATIENT)
Dept: OCCUPATIONAL THERAPY | Facility: CLINIC | Age: 66
DRG: 602 | End: 2021-05-21
Payer: MEDICARE

## 2021-05-21 PROBLEM — N17.9 ACUTE KIDNEY INJURY (H): Status: ACTIVE | Noted: 2021-05-21

## 2021-05-21 PROBLEM — Z11.52 ENCOUNTER FOR SCREENING LABORATORY TESTING FOR SEVERE ACUTE RESPIRATORY SYNDROME CORONAVIRUS 2 (SARS-COV-2): Status: ACTIVE | Noted: 2021-05-21

## 2021-05-21 LAB
ALBUMIN UR-MCNC: NEGATIVE MG/DL
APPEARANCE UR: CLEAR
BACTERIA #/AREA URNS HPF: ABNORMAL /HPF
BILIRUB UR QL STRIP: NEGATIVE
COLOR UR AUTO: ABNORMAL
CREAT SERPL-MCNC: 1.04 MG/DL (ref 0.52–1.04)
GFR SERPL CREATININE-BSD FRML MDRD: 55 ML/MIN/{1.73_M2}
GLUCOSE BLDC GLUCOMTR-MCNC: 101 MG/DL (ref 70–99)
GLUCOSE BLDC GLUCOMTR-MCNC: 105 MG/DL (ref 70–99)
GLUCOSE BLDC GLUCOMTR-MCNC: 93 MG/DL (ref 70–99)
GLUCOSE BLDC GLUCOMTR-MCNC: 95 MG/DL (ref 70–99)
GLUCOSE BLDC GLUCOMTR-MCNC: 99 MG/DL (ref 70–99)
GLUCOSE UR STRIP-MCNC: NEGATIVE MG/DL
HBA1C MFR BLD: 5.5 % (ref 0–5.6)
HGB UR QL STRIP: ABNORMAL
KETONES UR STRIP-MCNC: NEGATIVE MG/DL
LEUKOCYTE ESTERASE UR QL STRIP: NEGATIVE
MAGNESIUM SERPL-MCNC: 1.6 MG/DL (ref 1.6–2.3)
MRSA DNA SPEC QL NAA+PROBE: NEGATIVE
NITRATE UR QL: NEGATIVE
PH UR STRIP: 6 PH (ref 5–7)
POTASSIUM SERPL-SCNC: 3.6 MMOL/L (ref 3.4–5.3)
RBC #/AREA URNS AUTO: <1 /HPF (ref 0–2)
SOURCE: ABNORMAL
SP GR UR STRIP: 1 (ref 1–1.03)
SPECIMEN SOURCE: NORMAL
SQUAMOUS #/AREA URNS AUTO: 1 /HPF (ref 0–1)
UROBILINOGEN UR STRIP-MCNC: 0 MG/DL (ref 0–2)
WBC #/AREA URNS AUTO: 1 /HPF (ref 0–5)

## 2021-05-21 PROCEDURE — 999N001017 HC STATISTIC GLUCOSE BY METER IP

## 2021-05-21 PROCEDURE — G0463 HOSPITAL OUTPT CLINIC VISIT: HCPCS

## 2021-05-21 PROCEDURE — 250N000011 HC RX IP 250 OP 636: Performed by: INTERNAL MEDICINE

## 2021-05-21 PROCEDURE — 99207 PR NOT IN PERSON INPATIENT CONSULT STATISTICAL MARKER: CPT | Performed by: HOSPITALIST

## 2021-05-21 PROCEDURE — 99223 1ST HOSP IP/OBS HIGH 75: CPT | Mod: AI | Performed by: HOSPITALIST

## 2021-05-21 PROCEDURE — G0378 HOSPITAL OBSERVATION PER HR: HCPCS

## 2021-05-21 PROCEDURE — 83735 ASSAY OF MAGNESIUM: CPT | Performed by: HOSPITALIST

## 2021-05-21 PROCEDURE — 258N000003 HC RX IP 258 OP 636: Performed by: HOSPITALIST

## 2021-05-21 PROCEDURE — 250N000013 HC RX MED GY IP 250 OP 250 PS 637: Performed by: PHYSICIAN ASSISTANT

## 2021-05-21 PROCEDURE — 250N000013 HC RX MED GY IP 250 OP 250 PS 637: Performed by: INTERNAL MEDICINE

## 2021-05-21 PROCEDURE — 82565 ASSAY OF CREATININE: CPT | Performed by: HOSPITALIST

## 2021-05-21 PROCEDURE — 96372 THER/PROPH/DIAG INJ SC/IM: CPT | Performed by: HOSPITALIST

## 2021-05-21 PROCEDURE — 83036 HEMOGLOBIN GLYCOSYLATED A1C: CPT | Performed by: HOSPITALIST

## 2021-05-21 PROCEDURE — G0463 HOSPITAL OUTPT CLINIC VISIT: HCPCS | Mod: 25

## 2021-05-21 PROCEDURE — 36415 COLL VENOUS BLD VENIPUNCTURE: CPT | Performed by: HOSPITALIST

## 2021-05-21 PROCEDURE — 258N000003 HC RX IP 258 OP 636: Performed by: INTERNAL MEDICINE

## 2021-05-21 PROCEDURE — 250N000011 HC RX IP 250 OP 636: Performed by: HOSPITALIST

## 2021-05-21 PROCEDURE — 97602 WOUND(S) CARE NON-SELECTIVE: CPT

## 2021-05-21 PROCEDURE — 87641 MR-STAPH DNA AMP PROBE: CPT | Performed by: INTERNAL MEDICINE

## 2021-05-21 PROCEDURE — 97535 SELF CARE MNGMENT TRAINING: CPT | Mod: GO

## 2021-05-21 PROCEDURE — 250N000013 HC RX MED GY IP 250 OP 250 PS 637: Performed by: EMERGENCY MEDICINE

## 2021-05-21 PROCEDURE — 97165 OT EVAL LOW COMPLEX 30 MIN: CPT | Mod: GO

## 2021-05-21 PROCEDURE — 96375 TX/PRO/DX INJ NEW DRUG ADDON: CPT

## 2021-05-21 PROCEDURE — 84132 ASSAY OF SERUM POTASSIUM: CPT | Performed by: HOSPITALIST

## 2021-05-21 PROCEDURE — 96376 TX/PRO/DX INJ SAME DRUG ADON: CPT

## 2021-05-21 PROCEDURE — 87640 STAPH A DNA AMP PROBE: CPT | Performed by: INTERNAL MEDICINE

## 2021-05-21 PROCEDURE — 120N000001 HC R&B MED SURG/OB

## 2021-05-21 RX ORDER — ACETAMINOPHEN 325 MG/1
650 TABLET ORAL EVERY 4 HOURS PRN
Status: DISCONTINUED | OUTPATIENT
Start: 2021-05-21 | End: 2021-05-21

## 2021-05-21 RX ORDER — AMOXICILLIN 250 MG
2 CAPSULE ORAL 2 TIMES DAILY
Status: DISCONTINUED | OUTPATIENT
Start: 2021-05-21 | End: 2021-05-22 | Stop reason: HOSPADM

## 2021-05-21 RX ORDER — ONDANSETRON 2 MG/ML
4 INJECTION INTRAMUSCULAR; INTRAVENOUS EVERY 6 HOURS PRN
Status: DISCONTINUED | OUTPATIENT
Start: 2021-05-21 | End: 2021-05-22 | Stop reason: HOSPADM

## 2021-05-21 RX ORDER — ACETAMINOPHEN 500 MG
1000 TABLET ORAL 3 TIMES DAILY
Status: DISCONTINUED | OUTPATIENT
Start: 2021-05-21 | End: 2021-05-22 | Stop reason: HOSPADM

## 2021-05-21 RX ORDER — OXYCODONE HYDROCHLORIDE 5 MG/1
5 TABLET ORAL EVERY 4 HOURS PRN
Status: DISCONTINUED | OUTPATIENT
Start: 2021-05-21 | End: 2021-05-22 | Stop reason: HOSPADM

## 2021-05-21 RX ORDER — SODIUM CHLORIDE 9 MG/ML
INJECTION, SOLUTION INTRAVENOUS CONTINUOUS
Status: DISCONTINUED | OUTPATIENT
Start: 2021-05-21 | End: 2021-05-22 | Stop reason: HOSPADM

## 2021-05-21 RX ORDER — AMOXICILLIN 250 MG
1 CAPSULE ORAL 2 TIMES DAILY
Status: DISCONTINUED | OUTPATIENT
Start: 2021-05-21 | End: 2021-05-22 | Stop reason: HOSPADM

## 2021-05-21 RX ORDER — NYSTATIN 100000 U/G
CREAM TOPICAL 2 TIMES DAILY
Status: DISCONTINUED | OUTPATIENT
Start: 2021-05-21 | End: 2021-05-21

## 2021-05-21 RX ORDER — OXYCODONE HCL 10 MG/1
20 TABLET, FILM COATED, EXTENDED RELEASE ORAL EVERY 12 HOURS
Status: DISCONTINUED | OUTPATIENT
Start: 2021-05-21 | End: 2021-05-22 | Stop reason: HOSPADM

## 2021-05-21 RX ORDER — NALOXONE HYDROCHLORIDE 0.4 MG/ML
0.2 INJECTION, SOLUTION INTRAMUSCULAR; INTRAVENOUS; SUBCUTANEOUS
Status: DISCONTINUED | OUTPATIENT
Start: 2021-05-21 | End: 2021-05-22 | Stop reason: HOSPADM

## 2021-05-21 RX ORDER — HYDROCODONE BITARTRATE AND ACETAMINOPHEN 5; 325 MG/1; MG/1
1-2 TABLET ORAL EVERY 4 HOURS PRN
Status: DISCONTINUED | OUTPATIENT
Start: 2021-05-21 | End: 2021-05-22 | Stop reason: HOSPADM

## 2021-05-21 RX ORDER — DEXTROSE MONOHYDRATE 25 G/50ML
25-50 INJECTION, SOLUTION INTRAVENOUS
Status: DISCONTINUED | OUTPATIENT
Start: 2021-05-21 | End: 2021-05-22 | Stop reason: HOSPADM

## 2021-05-21 RX ORDER — ONDANSETRON 4 MG/1
4 TABLET, ORALLY DISINTEGRATING ORAL EVERY 6 HOURS PRN
Status: DISCONTINUED | OUTPATIENT
Start: 2021-05-21 | End: 2021-05-22 | Stop reason: HOSPADM

## 2021-05-21 RX ORDER — ACETAMINOPHEN 325 MG/1
650 TABLET ORAL EVERY 4 HOURS PRN
Status: DISCONTINUED | OUTPATIENT
Start: 2021-05-21 | End: 2021-05-22 | Stop reason: HOSPADM

## 2021-05-21 RX ORDER — LIDOCAINE 40 MG/G
CREAM TOPICAL
Status: DISCONTINUED | OUTPATIENT
Start: 2021-05-21 | End: 2021-05-22 | Stop reason: HOSPADM

## 2021-05-21 RX ORDER — LOSARTAN POTASSIUM 50 MG/1
100 TABLET ORAL DAILY
Status: DISCONTINUED | OUTPATIENT
Start: 2021-05-21 | End: 2021-05-22 | Stop reason: HOSPADM

## 2021-05-21 RX ORDER — NICOTINE POLACRILEX 4 MG
15-30 LOZENGE BUCCAL
Status: DISCONTINUED | OUTPATIENT
Start: 2021-05-21 | End: 2021-05-22 | Stop reason: HOSPADM

## 2021-05-21 RX ORDER — METOPROLOL TARTRATE 100 MG
100 TABLET ORAL 2 TIMES DAILY
Status: DISCONTINUED | OUTPATIENT
Start: 2021-05-21 | End: 2021-05-22 | Stop reason: HOSPADM

## 2021-05-21 RX ORDER — AMLODIPINE BESYLATE 5 MG/1
5 TABLET ORAL AT BEDTIME
Status: DISCONTINUED | OUTPATIENT
Start: 2021-05-21 | End: 2021-05-22 | Stop reason: HOSPADM

## 2021-05-21 RX ORDER — GABAPENTIN 300 MG/1
600 CAPSULE ORAL 3 TIMES DAILY
Status: DISCONTINUED | OUTPATIENT
Start: 2021-05-21 | End: 2021-05-22 | Stop reason: HOSPADM

## 2021-05-21 RX ORDER — NALOXONE HYDROCHLORIDE 0.4 MG/ML
0.4 INJECTION, SOLUTION INTRAMUSCULAR; INTRAVENOUS; SUBCUTANEOUS
Status: DISCONTINUED | OUTPATIENT
Start: 2021-05-21 | End: 2021-05-22 | Stop reason: HOSPADM

## 2021-05-21 RX ORDER — LEVOTHYROXINE SODIUM 100 UG/1
200 TABLET ORAL
Status: DISCONTINUED | OUTPATIENT
Start: 2021-05-21 | End: 2021-05-22 | Stop reason: HOSPADM

## 2021-05-21 RX ORDER — FAMOTIDINE 20 MG/1
20 TABLET, FILM COATED ORAL 2 TIMES DAILY
Status: DISCONTINUED | OUTPATIENT
Start: 2021-05-21 | End: 2021-05-22 | Stop reason: HOSPADM

## 2021-05-21 RX ORDER — FLUCONAZOLE 2 MG/ML
400 INJECTION, SOLUTION INTRAVENOUS ONCE
Status: COMPLETED | OUTPATIENT
Start: 2021-05-21 | End: 2021-05-21

## 2021-05-21 RX ADMIN — OXYCODONE HYDROCHLORIDE 5 MG: 5 TABLET ORAL at 19:48

## 2021-05-21 RX ADMIN — SODIUM CHLORIDE: 9 INJECTION, SOLUTION INTRAVENOUS at 03:29

## 2021-05-21 RX ADMIN — VANCOMYCIN HYDROCHLORIDE 2500 MG: 10 INJECTION, POWDER, LYOPHILIZED, FOR SOLUTION INTRAVENOUS at 04:04

## 2021-05-21 RX ADMIN — TAZOBACTAM SODIUM AND PIPERACILLIN SODIUM 3.38 G: 375; 3 INJECTION, SOLUTION INTRAVENOUS at 14:33

## 2021-05-21 RX ADMIN — FAMOTIDINE 20 MG: 20 TABLET ORAL at 19:48

## 2021-05-21 RX ADMIN — LEVOTHYROXINE SODIUM 200 MCG: 100 TABLET ORAL at 12:24

## 2021-05-21 RX ADMIN — SODIUM CHLORIDE: 9 INJECTION, SOLUTION INTRAVENOUS at 16:36

## 2021-05-21 RX ADMIN — ACETAMINOPHEN 1000 MG: 500 TABLET, FILM COATED ORAL at 14:13

## 2021-05-21 RX ADMIN — LOSARTAN POTASSIUM 100 MG: 50 TABLET, FILM COATED ORAL at 12:24

## 2021-05-21 RX ADMIN — ENOXAPARIN SODIUM 40 MG: 40 INJECTION SUBCUTANEOUS at 04:03

## 2021-05-21 RX ADMIN — GABAPENTIN 600 MG: 300 CAPSULE ORAL at 14:13

## 2021-05-21 RX ADMIN — OXYCODONE HYDROCHLORIDE 20 MG: 10 TABLET, FILM COATED, EXTENDED RELEASE ORAL at 22:42

## 2021-05-21 RX ADMIN — AMLODIPINE BESYLATE 5 MG: 5 TABLET ORAL at 22:42

## 2021-05-21 RX ADMIN — MICONAZOLE NITRATE: 20 POWDER TOPICAL at 06:01

## 2021-05-21 RX ADMIN — ACETAMINOPHEN 1000 MG: 500 TABLET, FILM COATED ORAL at 19:48

## 2021-05-21 RX ADMIN — OXYCODONE HYDROCHLORIDE 20 MG: 10 TABLET, FILM COATED, EXTENDED RELEASE ORAL at 11:22

## 2021-05-21 RX ADMIN — TAZOBACTAM SODIUM AND PIPERACILLIN SODIUM 3.38 G: 375; 3 INJECTION, SOLUTION INTRAVENOUS at 21:59

## 2021-05-21 RX ADMIN — ACETAMINOPHEN 650 MG: 325 TABLET, FILM COATED ORAL at 09:58

## 2021-05-21 RX ADMIN — OXYCODONE HYDROCHLORIDE 5 MG: 5 TABLET ORAL at 05:01

## 2021-05-21 RX ADMIN — TAZOBACTAM SODIUM AND PIPERACILLIN SODIUM 3.38 G: 375; 3 INJECTION, SOLUTION INTRAVENOUS at 08:57

## 2021-05-21 RX ADMIN — TAZOBACTAM SODIUM AND PIPERACILLIN SODIUM 3.38 G: 375; 3 INJECTION, SOLUTION INTRAVENOUS at 03:29

## 2021-05-21 RX ADMIN — OXYCODONE HYDROCHLORIDE 5 MG: 5 TABLET ORAL at 09:58

## 2021-05-21 RX ADMIN — METOPROLOL TARTRATE 100 MG: 100 TABLET, FILM COATED ORAL at 12:24

## 2021-05-21 RX ADMIN — OXYCODONE HYDROCHLORIDE 5 MG: 5 TABLET ORAL at 14:13

## 2021-05-21 RX ADMIN — GABAPENTIN 600 MG: 300 CAPSULE ORAL at 19:48

## 2021-05-21 RX ADMIN — METOPROLOL TARTRATE 100 MG: 100 TABLET, FILM COATED ORAL at 19:48

## 2021-05-21 RX ADMIN — ACETAMINOPHEN 650 MG: 325 TABLET, FILM COATED ORAL at 05:01

## 2021-05-21 RX ADMIN — FLUCONAZOLE IN SODIUM CHLORIDE 400 MG: 2 INJECTION, SOLUTION INTRAVENOUS at 06:01

## 2021-05-21 RX ADMIN — FAMOTIDINE 20 MG: 20 TABLET ORAL at 12:24

## 2021-05-21 RX ADMIN — FLUOXETINE 60 MG: 20 CAPSULE ORAL at 17:54

## 2021-05-21 SDOH — ECONOMIC STABILITY: FOOD INSECURITY: WITHIN THE PAST 12 MONTHS, YOU WORRIED THAT YOUR FOOD WOULD RUN OUT BEFORE YOU GOT MONEY TO BUY MORE.: NOT ASKED

## 2021-05-21 SDOH — ECONOMIC STABILITY: TRANSPORTATION INSECURITY
IN THE PAST 12 MONTHS, HAS THE LACK OF TRANSPORTATION KEPT YOU FROM MEDICAL APPOINTMENTS OR FROM GETTING MEDICATIONS?: NOT ASKED

## 2021-05-21 SDOH — ECONOMIC STABILITY: FOOD INSECURITY: WITHIN THE PAST 12 MONTHS, THE FOOD YOU BOUGHT JUST DIDN'T LAST AND YOU DIDN'T HAVE MONEY TO GET MORE.: NOT ASKED

## 2021-05-21 SDOH — ECONOMIC STABILITY: TRANSPORTATION INSECURITY
IN THE PAST 12 MONTHS, HAS LACK OF TRANSPORTATION KEPT YOU FROM MEETINGS, WORK, OR FROM GETTING THINGS NEEDED FOR DAILY LIVING?: NOT ASKED

## 2021-05-21 SDOH — ECONOMIC STABILITY: INCOME INSECURITY: HOW HARD IS IT FOR YOU TO PAY FOR THE VERY BASICS LIKE FOOD, HOUSING, MEDICAL CARE, AND HEATING?: NOT ASKED

## 2021-05-21 ASSESSMENT — MIFFLIN-ST. JEOR: SCORE: 1814.38

## 2021-05-21 ASSESSMENT — ACTIVITIES OF DAILY LIVING (ADL)
DEPENDENT_IADLS:: CLEANING;COOKING;LAUNDRY;SHOPPING;MEAL PREPARATION;MEDICATION MANAGEMENT;TRANSPORTATION
ADLS_ACUITY_SCORE: 21
ADLS_ACUITY_SCORE: 21

## 2021-05-21 NOTE — CONSULTS
Care Management Initial Consult    General Information  Assessment completed with: Patient, Children, Tim & Carolina  Type of CM/SW Visit: Initial Assessment    Primary Care Provider verified and updated as needed: Yes   Readmission within the last 30 days:           Advance Care Planning:     Not present on chart     Communication Assessment  Patient's communication style: spoken language (English or Bilingual)    Hearing Difficulty or Deaf: no   Wear Glasses or Blind: yes    Cognitive  Cognitive/Neuro/Behavioral: WDL                      Living Environment:   People in home: alone     Current living Arrangements: apartment; Independent Living apartment on the [facility]  Able to return to prior arrangements: no; pt is not physically strong enough to return to her living environment at this time.     Family/Social Support:  Care provided by: self, homecare agency  Provides care for: no one, unable/limited ability to care for self  Marital Status:   Children, PCA          Description of Support System: Supportive, Involved    Support Assessment: Adequate family and caregiver support, Adequate social supports    Current Resources:   Patient receiving home care services: Yes  Skilled Home Care Services: Skilled Nursing, Physicial Therapy, Occupational Therapy  Community Resources: County Worker, Home Care, Meals on Wheels, PCA, Transportation Services  Equipment currently used at home: grab bar, toilet, grab bar, tub/shower, shower chair, wheelchair, manual  Supplies currently used at home: Incontinence Supplies, Diabetic Supplies, Wound Care Supplies, Gloves, Wipes    Employment/Financial:  Employment Status: disabled        Financial Concerns: No concerns identified   Referral to Financial Counselor: No     Lifestyle & Psychosocial Needs:  Socioeconomic History     Marital status:      Spouse name: Not on file     Number of children: 1     Years of education: Not on file     Highest  education level: Not on file     Tobacco Use     Smoking status: Never Smoker     Smokeless tobacco: Never Used   Substance and Sexual Activity     Alcohol use: No     Drug use: No     Functional Status:  Prior to admission patient needed assistance:   Dependent ADLs:: Bathing, Eating, Wheelchair-independent  Dependent IADLs:: Cleaning, Cooking, Laundry, Shopping, Meal Preparation, Medication Management, Transportation     Mental Health Status:  Mental Health Status: Current Concern  Mental Health Management: Medication    Chemical Dependency Status:  Chemical Dependency Status: Per EMR, documented pt is/was daily drinker Jan 2021        Values/Beliefs:  Spiritual, Cultural Beliefs, Confucianist Practices, Values that affect care: no             Additional Information:  Care Management received referral to assist pt with discharge planning.  SW reviewed pt's EMR and then placed call to pt's son, Tim, to introduce self/role, perform assessment, and discuss resources.  Tim shared that the pt lives in her own apartment at [facility] & gets home care, meals on wheels & PCA services, but had to speak with his wife, Elizabeth, to get the name of the agencies.    SW received phone call from [employer] Phone: [phone] Fax: [phone], informing this writer that [facility] provides RN, PT/OT services for the pt.    SW then met with pt and discussed home; pt shared she has PCA services of about 9-10 hours/week, but does not know the name of the agency providing the services.  Pt also said she has a CADI , Mami, at [facility] & gave verbal permission for this writer to speak with Mami to get the names of all agencies involved with her cares at home.  SW left message for Mami & updated pt's Care Team information in EMR.    During conversation with pt, she requested to go to [facility] Phone (Main Phone:[phone] Admissions Phone:[phone] Fax:  [phone]) for TCU cares as she felt like they really helped her during her previous stay.  Per pt, referrals can be sent to:  1.  [facility] Phone (Main Phone:[phone] Admissions Phone:[phone] Fax: [phone])- Spoke with Santa in admissions & she should have a bed for the pt; awaiting final decision.  2.  [facility] (Main: [phone] Admissions: [phone] Fax: [phone])- SW requested bed information from Riky Cornejo Patient Transition Liaison, [phone].  Awaiting return notification.    CTS discussed transportation options with pt and son, Tim.  Discussed vendor, mode of transportation & anticipated private pay costs.  Pt agree to private pay costs associated with [facility] (Ph: [phone]) services; however, pt also has MA, so should cover costs of transportation.  Pt uncertain if she can sit in a wheelchair due to lack of trunk strength at this time; will speak with floor RN prior to scheduling transport.    Care Transitions staff discussed current COVID 19 pandemic and Medicare waiver of the traditional 3 day stay requirement. Referred patient to medicare.gov, insurance provider, and admissions department at accepting facility for further questions or concerns on coverage for SNF stay.    1.  This patient has been evacuated from a nursing home in the emergency area? NO  2.  This patient is being discharged from a hospital (in the emergency area) in order to provide care to more seriously ill patients? YES  3.  This patient needs SNF care as a result of the emergency, regardless of whether he/she was in a hospital/nursing home prior to this stay? NO    COVID-19 testing requested by facility prior to accepting patient for admission YES  Discharging provider advised YES      GILBERT Thomas

## 2021-05-21 NOTE — PHARMACY-VANCOMYCIN DOSING SERVICE
Pharmacy Vancomycin Initial Note  Date of Service May 21, 2021  Patient's  1955  66 year old, female    Indication: Skin and Soft Tissue Infection    Current estimated CrCl = Estimated Creatinine Clearance: 58.1 mL/min (A) (based on SCr of 1.31 mg/dL (H)).    Creatinine for last 3 days  2021:  8:22 PM Creatinine 1.31 mg/dL    Recent Vancomycin Level(s) for last 3 days  No results found for requested labs within last 72 hours.      Vancomycin IV Administrations (past 72 hours)      No vancomycin orders with administrations in past 72 hours.                Nephrotoxins and other renal medications (From now, onward)    Start     Dose/Rate Route Frequency Ordered Stop    21 0300  vancomycin (VANCOCIN) 1,500 mg in sodium chloride 0.9 % 250 mL intermittent infusion      1,500 mg  over 90 Minutes Intravenous EVERY 24 HOURS 21 0255      21 0300  piperacillin-tazobactam (ZOSYN) infusion 3.375 g      3.375 g  over 30 Minutes Intravenous EVERY 6 HOURS 21 0237      21 0300  vancomycin (VANCOCIN) 2,500 mg in sodium chloride 0.9 % 500 mL intermittent infusion      2,500 mg  over 120 Minutes Intravenous ONCE 21 0255            Contrast Orders - past 72 hours (72h ago, onward)    None          Loading dose: 2500 mg (per InsightRx recommendation)  Regimen: 1500 mg every 24 hours for 5 doses.  Start time: 02:50 on 2021  Exposure target: AUC24 (range)400-600 mg/L.hr   AUC24,ss: 432 mg/L.hr  PAUC*: 59 %  Ctrough,ss: 13.2 mg/L  Pconc*: 13 %  Tox.: 8 %        Plan:  1. Start vancomycin  1500 mg IV q24h.   2. Vancomycin monitoring method: trough  3. Vancomycin therapeutic monitoring goal: 10-15 mg/L  4. Pharmacy will check vancomycin levels as appropriate in 1-3 Days.    5. Serum creatinine levels will be ordered daily for the first week of therapy and at least twice weekly for subsequent weeks.      Kory Kinsey, [facility]

## 2021-05-21 NOTE — PLAN OF CARE
Patient transferred from ICU to med-surg unit. BP elevated, home BP meds ordered and given. Other vital signs stable. Complains of pain in left hip and generalized pain, home oxycontin and PRN oxycodone given. Denies nausea, tolerating small amount of diet. Wound dressings completed per WOC RN prior to transfer. Patient refusing to reposition in bed, education given on why repositioning is important. Purewick in place.

## 2021-05-21 NOTE — PROGRESS NOTES
05/21/21 1200   Quick Adds   Type of Visit Initial Occupational Therapy Evaluation       Present no   Living Environment   People in home alone   Current Living Arrangements apartment   Home Accessibility wheelchair accessible   Living Environment Comments PCA 3x/week for a couple hours and RN 1x/week to set meds up and manage wound cares    Self-Care   Equipment Currently Used at Home grab bar, toilet;grab bar, tub/shower;shower chair;wheelchair, manual   Disability/Function   Hearing Difficulty or Deaf no   Wear Glasses or Blind yes   Vision Management wears glasses    Difficulty Communicating no   Walking or Climbing Stairs Difficulty yes   Walking or Climbing Stairs ambulation difficulty, dependent   Mobility Management w/c bound. normally can pivot transfer to w/c and toilet independently    Dressing/Bathing Difficulty yes   Dressing/Bathing bathing difficulty, requires equipment;bathing difficulty, assistance 1 person;dressing difficulty, requires equipment   Dressing/Bathing Management reacher and sock aides for dressing   Toileting issues yes   Toileting Assistance toileting difficulty, requires equipment   Fall history within last six months no   General Information   Onset of Illness/Injury or Date of Surgery 05/20/21   Referring Physician Grady Lopez MD   Patient/Family Therapy Goal Statement (OT) [facility]    Additional Occupational Profile Info/Pertinent History of Current Problem Carolina Mari is a 66 year old female admitted on 5/20/2021.  She was brought into the emergency department with worsening pressure ulcers and inability to care for self at home.   Existing Precautions/Restrictions no known precautions/restrictions   Cognitive Status Examination   Orientation Status orientation to person, place and time   Affect/Mental Status (Cognitive) WNL   Cognitive Status Comments SW requesting SLUMS: 27/30, indicating normal cognition    Pain Assessment   Patient Currently in Pain Yes, see Vital Sign flowsheet  (chronic knee, back and hip pain )   Bed Mobility   Comment (Bed Mobility) Min A for supine to sit. Does not come to full sit prior to needing to sit down.    Transfers   Transfer Comments refusing to attempt to stand at EOB    Upper Body Dressing Assessment/Training   [place] Level (Upper Body Dressing) minimum assist (75% patient effort)   Lower Body Dressing Assessment/Training   Comment (Lower Body Dressing) declines attempt    Toileting   Comment (Toileting) Pt reports needing to have BM. Declines attempt of commode transfer despite Max encouragement. Declines having ceiling lift to commode requesting to use bed pan at end of session.    Clinical Impression   Criteria for Skilled Therapeutic Interventions Met (OT) yes;skilled treatment is necessary   OT Diagnosis decreased independence with ADLs and functional mobility    OT Problem List-Impairments impacting ADL activity tolerance impaired;pain   Assessment of Occupational Performance 3-5 Performance Deficits   Identified Performance Deficits dressing, toileting, showering    Planned Therapy Interventions (OT) ADL retraining;strengthening;progressive activity/exercise   Clinical Decision Making Complexity (OT) low complexity   Therapy Frequency (OT) 6x/week   Predicted Duration of Therapy 2-3 days   Anticipated Equipment Needs Upon Discharge (OT)   (has all recommended AE )   Risk & Benefits of therapy have been explained evaluation/treatment results reviewed;care plan/treatment goals reviewed;risks/benefits reviewed;current/potential barriers reviewed;participants voiced agreement with care plan;participants included;patient   OT Discharge Planning    OT Discharge Recommendation (DC Rec) Transitional Care Facility   OT Rationale for DC Rec pt is below baseline for ADLs. Needs encouragement to participate in therapy. At this time would recommend TCU with transition into  appropriate living environment.    Total Evaluation Time (Minutes)   Total Evaluation Time (Minutes) 10

## 2021-05-21 NOTE — ED NOTES
Pt has several open sores, in ryan area, red area, in skin folds, BM in vaginal area. Pt has a home care nurse 3 times per wk, pt appears she is unable to safely care for herself, due to skin break down, pt lives by herself

## 2021-05-21 NOTE — PLAN OF CARE
"PT: Attempted to see patient this PM. Patient yelling at therapist upon entrance to room to \"get out\" and stating how she is done with therapy. Briefly tried to discuss plan of care with patient but was not participating appropriately. Will re-attempt 5/22 as appropriate for physical therapy evaluation.    Charles Vital, PT, DPT    "

## 2021-05-21 NOTE — ED PROVIDER NOTES
History     Chief Complaint   Patient presents with     Generalized Weakness     increasing weakness over the past 2 weeks     HPI  Carolina Mari is a 66 year old female with a history of physical deconditioning who was admitted in January of this year for cellulitis of her buttock wound who presents for increasing weakness over the past 2 weeks, unable to transfer this evening from her wheelchair to bed and get to the bathroom in time to have a bowel movement.  She says that she has been feeling increasingly weak.  She denies fever or chills.  Denies headache.  She says she has constant pain of her buttock from wounds there.  No nausea or vomiting.  No diarrhea.  She denies dysuria but notes that she has had urinary frequency.  No abdominal pain.  No chest pain, cough, or shortness of breath.    Allergies:  Allergies   Allergen Reactions     Aspirin Other (See Comments) and Unknown     Colcrys Unknown       Problem List:    Patient Active Problem List    Diagnosis Date Noted     Hypothyroidism 02/09/2021     Priority: Medium     Depression 01/26/2021     Priority: Medium     Chronic kidney disease, stage 3 01/14/2021     Priority: Medium     Cellulitis of buttock 01/05/2021     Priority: Medium     Pressure injury of right buttock, stage 3 (H) 01/05/2021     Priority: Medium     Adult failure to thrive 08/04/2015     Priority: Medium     Open wound of abdomen 07/01/2015     Priority: Medium     Morbid obesity (H) 07/01/2015     Priority: Medium     Diabetes mellitus, type 2 (H) 07/01/2015     Priority: Medium     Tobacco use disorder 07/01/2015     Priority: Medium     Osteoarthritis of right hip 04/22/2015     Priority: Medium        Past Medical History:    Past Medical History:   Diagnosis Date     Diabetes mellitus (H)      History of stomach ulcers      HTN (hypertension)      Knee osteoarthritis      Osteoarthritis of right hip      Osteoporosis      Osteoporosis      Thyroid disease        Past  "Surgical History:    Past Surgical History:   Procedure Laterality Date     FOOT SURGERY       GASTRIC BYPASS       HC SACROPLASTY       tka      right       Family History:    Family History   Problem Relation Age of Onset     Coronary Artery Disease Father      Coronary Artery Disease Brother      Cancer Mother         bone     Cancer Brother         leukemia       Social History:  Marital Status:   [4]  Social History     Tobacco Use     Smoking status: Never Smoker     Smokeless tobacco: Never Used   Substance Use Topics     Alcohol use: No     Drug use: No        Medications:    No current outpatient medications on file.        Review of Systems  Pertinent positives and negatives listed in the HPI, all other systems reviewed and are negative.    Physical Exam   BP: (!) 148/66  Pulse: 73  Temp: 100.2  F (37.9  C)  Resp: 20  Height: 175.3 cm (5' 9\")  Weight: 113.4 kg (250 lb)  SpO2: 97 %      Physical Exam  Vitals signs and nursing note reviewed.   Constitutional:       General: She is in acute distress.      Appearance: She is well-developed. She is not diaphoretic.   HENT:      Head: Normocephalic and atraumatic.      Right Ear: External ear normal.      Left Ear: External ear normal.      Nose: Nose normal.   Eyes:      General: No scleral icterus.     Conjunctiva/sclera: Conjunctivae normal.   Neck:      Musculoskeletal: Normal range of motion.   Cardiovascular:      Rate and Rhythm: Normal rate and regular rhythm.   Pulmonary:      Effort: Pulmonary effort is normal. No respiratory distress.      Breath sounds: No stridor.   Abdominal:      General: There is no distension.      Palpations: Abdomen is soft.      Tenderness: There is no abdominal tenderness. There is no guarding or rebound.   Skin:     General: Skin is warm.      Comments: Chronic wound to the right lower extremity on the posterior aspect with tenderness, no drainage, surrounding erythema and edema of the lower extremity.  Chronic " wound to the medial aspect of the left lower extremity with a small amount of serosanguineous drainage and surrounding erythema, excess warmth, and edema.  Chronic wound to the right buttock with tenderness, excess warmth, surrounding erythema spreading along the buttock.  Fungal rash to the intertriginous areas of the groin   Neurological:      Mental Status: She is alert and oriented to person, place, and time.   Psychiatric:         Behavior: Behavior normal.         ED Course        Procedures               Critical Care time:  none               Results for orders placed or performed during the hospital encounter of 05/20/21 (from the past 24 hour(s))   Comprehensive metabolic panel   Result Value Ref Range    Sodium 134 133 - 144 mmol/L    Potassium 3.9 3.4 - 5.3 mmol/L    Chloride 104 94 - 109 mmol/L    Carbon Dioxide 23 20 - 32 mmol/L    Anion Gap 7 3 - 14 mmol/L    Glucose 100 (H) 70 - 99 mg/dL    Urea Nitrogen 20 7 - 30 mg/dL    Creatinine 1.31 (H) 0.52 - 1.04 mg/dL    GFR Estimate 42 (L) >60 mL/min/[1.73_m2]    GFR Estimate If Black 49 (L) >60 mL/min/[1.73_m2]    Calcium 7.7 (L) 8.5 - 10.1 mg/dL    Bilirubin Total 0.3 0.2 - 1.3 mg/dL    Albumin 2.8 (L) 3.4 - 5.0 g/dL    Protein Total 6.9 6.8 - 8.8 g/dL    Alkaline Phosphatase 120 40 - 150 U/L    ALT 16 0 - 50 U/L    AST 24 0 - 45 U/L   Lactic acid whole blood   Result Value Ref Range    Lactic Acid 0.8 0.7 - 2.0 mmol/L   Lipase   Result Value Ref Range    Lipase 117 73 - 393 U/L   Troponin I   Result Value Ref Range    Troponin I ES <0.015 0.000 - 0.045 ug/L   TSH with free T4 reflex   Result Value Ref Range    TSH 2.10 0.40 - 4.00 mU/L   CBC with platelets differential   Result Value Ref Range    WBC 12.4 (H) 4.0 - 11.0 10e9/L    RBC Count 3.66 (L) 3.8 - 5.2 10e12/L    Hemoglobin 9.9 (L) 11.7 - 15.7 g/dL    Hematocrit 32.7 (L) 35.0 - 47.0 %    MCV 89 78 - 100 fl    MCH 27.0 26.5 - 33.0 pg    MCHC 30.3 (L) 31.5 - 36.5 g/dL    RDW 14.6 10.0 - 15.0 %     Platelet Count 307 150 - 450 10e9/L    Diff Method Automated Method     % Neutrophils 79.0 %    % Lymphocytes 11.8 %    % Monocytes 5.1 %    % Eosinophils 3.3 %    % Basophils 0.4 %    % Immature Granulocytes 0.4 %    Nucleated RBCs 0 0 /100    Absolute Neutrophil 9.8 (H) 1.6 - 8.3 10e9/L    Absolute Lymphocytes 1.5 0.8 - 5.3 10e9/L    Absolute Monocytes 0.6 0.0 - 1.3 10e9/L    Absolute Eosinophils 0.4 0.0 - 0.7 10e9/L    Absolute Basophils 0.1 0.0 - 0.2 10e9/L    Abs Immature Granulocytes 0.1 0 - 0.4 10e9/L    Absolute Nucleated RBC 0.0    XR Chest Port 1 View    Narrative    CHEST ONE VIEW PORTABLE   5/20/2021 9:27 PM     HISTORY:  Fever. Weakness.    COMPARISON: None.      Impression    IMPRESSION: Mild elevation of the right hemidiaphragm. Chest otherwise  negative. Lungs clear. Pulmonary vascularity is within normal limits.  Multiple surgical clips are projected over the left upper quadrant of  the abdomen.    PATRICK OCAMPO MD   Symptomatic SARS-CoV-2 COVID-19 Virus (Coronavirus) by PCR    Specimen: Nasopharyngeal   Result Value Ref Range    SARS-CoV-2 Virus Specimen Source Nasopharyngeal     SARS-CoV-2 PCR Result NEGATIVE     SARS-CoV-2 PCR Comment (Note)    UA reflex to Microscopic   Result Value Ref Range    Color Urine Straw     Appearance Urine Clear     Glucose Urine Negative NEG^Negative mg/dL    Bilirubin Urine Negative NEG^Negative    Ketones Urine Negative NEG^Negative mg/dL    Specific Gravity Urine 1.003 1.003 - 1.035    Blood Urine Moderate (A) NEG^Negative    pH Urine 6.0 5.0 - 7.0 pH    Protein Albumin Urine Negative NEG^Negative mg/dL    Urobilinogen mg/dL 0.0 0.0 - 2.0 mg/dL    Nitrite Urine Negative NEG^Negative    Leukocyte Esterase Urine Negative NEG^Negative    Source Midstream Urine     RBC Urine <1 0 - 2 /HPF    WBC Urine 1 0 - 5 /HPF    Bacteria Urine Few (A) NEG^Negative /HPF    Squamous Epithelial /HPF Urine 1 0 - 1 /HPF       Medications   ceFAZolin (ANCEF) intermittent infusion 2  g in 100 mL dextrose PRE-MIX (0 g Intravenous Stopped 5/21/21 0108)   lactated ringers BOLUS 1,000 mL (1,000 mLs Intravenous New Bag 5/20/21 2113)       Assessments & Plan (with Medical Decision Making)   66-year-old female with a history of deconditioning and chronic wounds who presents for weakness and found to have a fever of 100.2  F.  Heart rate is 73, blood pressure is 148/66, SPO2 is 97% on room air.  Core blood cell count is elevated 12.4.  Hemoglobin is 9.9 which is relatively stable for this patient.  Her lactic acid is normal.  Lipase is normal, no signs of pancreatitis.  Troponin is normal.  TSH is normal, no signs of thyroid disease.  Electrolytes are within normal limits.  She does have elevation of her creatinine to 1.3 possibly related to acute kidney injury from dehydration.  She is given IV fluids.  Chest x-ray obtained, images reviewed independently as well as radiology read reviewed, no signs of infiltrates to suggest pneumonia.  Blood cultures are drawn.  She is started on IV cefazolin for cellulitis.  I think she is appropriate to admit to the hospital here for further care.  I have discussed the case with the on-call hospitalist Munira OLEA who agrees to meet the patient to her service.    I have reviewed the nursing notes.    I have reviewed the findings, diagnosis, plan and need for follow up with the patient.       Current Discharge Medication List          Final diagnoses:   Cellulitis of buttock   Acute kidney injury (H)       5/20/2021   [facility] EMERGENCY DEPT     Gianfranco Swanson MD  05/21/21 6609

## 2021-05-21 NOTE — ED NOTES
"Patient has  [place] to Observation  order. Patient has been given the Observation brochure -  What does Observation mean to me.\"  Patient has been given the opportunity to ask questions about observation status and their plan of care.      Judit Jaffe RN    "

## 2021-05-21 NOTE — ED TRIAGE NOTES
"Pt comes from assisted living with c/o increasing weakness over the last two weeks. States she needed help getting her pants up today which she normally doesn't. States she \"shit herself\" which is also not normal. Denies fever/N/V/D. Denies dizziness, CP.   "

## 2021-05-21 NOTE — PROGRESS NOTES
[facility]    Hospitalist Progress Note    Date of Service (when I saw the patient): 05/21/2021    Assessment & Plan   Carolina Mari is a 66 year old female admitted on 5/20/2021.  She was brought into the emergency department with worsening pressure ulcers and inability to care for self at home.     Pressure ulcer stage III on buttocks bilaterally with cellulitis and possible infection  Nonhealing wound on abdomen  Bilateral lower extremity lymphedema and open wounds  - Consult wound care nurse  - Vancomycin and Zosyn given chronicity of the wound  - Blood cultures to follow, wound care  - Nasal swab for MRSA  - May need surgical consult if not improvement with wound care only for debridement.      Physical deconditioning  Failure to thrive  Patient lives in an apartment.  She receives visits from the wound care nurse, as well as help from her son.    Her son no longer feels the patient is able to safely live in her apartment.  - Physical and Occupational Therapy consults  - Social work consult for possible placement     Acute Kidney injury:   Likely pre-renal and in the setting of cellulitis  renal function in January 2021 creatinine was 0.87 with GFR more than 60, today the patient's creatinine is 1.31 GFR 42  We will continue IV fluids and follow.  - Cr 1.31 -> 1.04  - Continue IVF     Candida or yeast infection of the skin  Intertrigo  Skin lesion over patient's perineal area, breast and abdominal folds  Related to extensive, I will coordinate with the patient fluconazole 4 mg IV x1  Nystatin cream twice daily and follow.    Hypertension, borderline control  -Continue prior to admission amlodipine, losartan, and metoprolol, hold for signs of sepsis and hypotension and bradycardia     Diabetes mellitus type 2  Diabetes is mentioned in the patient's past medical history, but she does not appear to be currently on any medications for this.  - HbA1c 5.5%  - ISS  - No indication  for medication at discharge     GERD  Continue Famotidine     Hypothyroidism  Continue prior to admission levothyroxine.     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     Depression  Anxiety  Continue prior to admission fluoxetine.     DVT Prophylaxis: Enoxaparin (Lovenox) SQ  Code Status: No CPR- Do NOT Intubate    Disposition: Expected discharge in 1-2 days pending placement.    Bob Gil    Interval History   Patient is uncomfortable with turning, crying out.  Very weak, has difficulty lifting legs and unable to turn in bed without assistance.    -Data reviewed today: I reviewed all new labs and imaging results over the last 24 hours. I personally reviewed no images or EKG's today.    Physical Exam   Temp: 99.6  F (37.6  C) Temp src: Oral BP: (!) 195/80 Pulse: 65   Resp: 16 SpO2: 98 % O2 Device: None (Room air)    Vitals:    05/20/21 2017 05/21/21 0150   Weight: 113.4 kg (250 lb) 121.5 kg (267 lb 13.7 oz)     Vital Signs with Ranges  Temp:  [98.8  F (37.1  C)-100.2  F (37.9  C)] 99.6  F (37.6  C)  Pulse:  [63-73] 65  Resp:  [14-20] 16  BP: (132-195)/(60-80) 195/80  SpO2:  [94 %-98 %] 98 %  I/O last 3 completed shifts:  In: 251.67 [I.V.:251.67]  Out: 1900 [Urine:1900]    Gen: Obese debilitated female, lying in bed, appears uncomfortable  HEENT: Atraumatic, normocephalic; sclera non-injected, anicterric; oral mucosa moist, no lesion, no exudate  Lungs: Clear to ausculation, no wheezes, no rhonchi, no rales  Heart: Regular rate, regular rhythm, no gallops, no rubs, no murmurs  GI: Bowel sound normal, no hepatosplenomegaly, no masses, non-tender, non-distended, no guarding, no rebound tenderness  Lymph: No lymphadenopathy, no edema  Skin: shallow stage 2 ulcers behind  calves, 8 x 10 cm stage 3 ulcer on lower right buttocks    Medications     sodium chloride 100 mL/hr at 05/21/21 1228       acetaminophen  1,000 mg Oral TID     amLODIPine  5 mg Oral At Bedtime     enoxaparin ANTICOAGULANT  40 mg Subcutaneous Q24H     famotidine  20 mg Oral BID     FLUoxetine  60 mg Oral QPM     gabapentin  600 mg Oral TID     insulin aspart  1-7 Units Subcutaneous TID AC     insulin aspart  1-5 Units Subcutaneous At Bedtime     levothyroxine  200 mcg Oral QAM AC     losartan  100 mg Oral Daily     metoprolol tartrate  100 mg Oral BID     miconazole   Topical BID     oxyCODONE  20 mg Oral Q12H     piperacillin-tazobactam  3.375 g Intravenous Q6H     senna-docusate  1 tablet Oral BID    Or     senna-docusate  2 tablet Oral BID     sodium chloride (PF)  3 mL Intracatheter Q8H     [START ON 5/22/2021] vancomycin (VANCOCIN) IV  1,500 mg Intravenous Q24H       Data   Recent Labs   Lab 05/21/21  0435 05/20/21 2022   WBC  --  12.4*   HGB  --  9.9*   MCV  --  89   PLT  --  307   NA  --  134   POTASSIUM 3.6 3.9   CHLORIDE  --  104   CO2  --  23   BUN  --  20   CR 1.04 1.31*   ANIONGAP  --  7   JAYA  --  7.7*   GLC  --  100*   ALBUMIN  --  2.8*   PROTTOTAL  --  6.9   BILITOTAL  --  0.3   ALKPHOS  --  120   ALT  --  16   AST  --  24   LIPASE  --  117   TROPI  --  <0.015       Recent Results (from the past 24 hour(s))   XR Chest Port 1 View    Narrative    CHEST ONE VIEW PORTABLE   5/20/2021 9:27 PM     HISTORY:  Fever. Weakness.    COMPARISON: None.      Impression    IMPRESSION: Mild elevation of the right hemidiaphragm. Chest otherwise  negative. Lungs clear. Pulmonary vascularity is within normal limits.  Multiple surgical clips are projected over the left upper quadrant of  the abdomen.    PATRICK OCAMPO MD

## 2021-05-21 NOTE — UTILIZATION REVIEW
Admission Status; Secondary Review Determination       Under the authority of the Utilization Management Committee, the utilization review process indicated a secondary review on the above patient. The review outcome is based on review of the medical records, discussions with staff, and applying clinical experience noted on the date of the review.     (x) Inpatient Status Appropriate - This patient's medical care is consistent with medical management for inpatient care and reasonable inpatient medical practice.     RATIONALE FOR DETERMINATION   66-year-old woman with multiple comorbidities, presenting with pressure ulcers severe edema, concern for cellulitis and infection on broad-spectrum antibiotic coverage, discussed with the attending may need surgical debridement concern with elevated white count for ongoing infection, acute kidney injury on admission.  Anticipated for multiple days in the hospital for ongoing evaluation and management.  The expected length of stay at the time of admission was more than 2 nights because of the severity of illness, intensity of service provided, and risk for adverse outcome. Inpatient admission is appropriate.   Discussed with Dr. Gil    This document was produced using voice recognition software       The information on this document is developed by the utilization review team in order for the business office to ensure compliance. This only denotes the appropriateness of proper admission status and does not reflect the quality of care rendered.   The definitions of Inpatient Status and Observation Status used in making the determination above are those provided in the CMS Coverage Manual, Chapter 1 and Chapter 6, section 70.4.   Sincerely,   PRANAV ABERNATHY MD   System Medical Director   Utilization Management   [facility].

## 2021-05-21 NOTE — PLAN OF CARE
"[facility] [place]    Patient admitted to room 1005 at approximately 0230 via cart from emergency room. Patient was accompanied by transport tech.     Verbal SBAR report received from Kaci prior to patient arrival.     Patient trasferred to bed via air dejuan. Patient alert and oriented X 3. Pt has chronic pain, requesting pain medication. MD updated.  Admission vital signs: Blood pressure (!) 143/60, pulse 71, temperature 99.1  F (37.3  C), temperature source Oral, resp. rate 15, height 1.753 m (5' 9\"), weight 121.5 kg (267 lb 13.7 oz), SpO2 96 %, not currently breastfeeding. Patient was oriented to plan of care, call light, bed controls, tv, telephone, bathroom, and visiting hours.     Risk Assessment    The following safety risks were identified during admission: fall and skin. Yellow risk band applied: YES.     Skin Initial Assessment    This writer admitted this patient and completed a full skin assessment and Jesus score in the Adult PCS flowsheet. Appropriate interventions initiated as needed.     Secondary skin check completed by Payton.    Jesus Risk Assessment  Sensory Perception: 3-->slightly limited  Moisture: 2-->very moist  Activity: 2-->chairfast  Mobility: 3-->slightly limited  Nutrition: 3-->adequate  Friction and Shear: 1-->problem  Jesus Score: 14  Sensory/Activity/Mobility Interventions: Turn: left/right positioning, Pillows between bony prominence  Moisture Interventions: Incontinence pad, Urinary collection device  Friction/Shear Interventions: HOB 30 degrees or less, Assistive lifting device (portable/ceiling lift, etc.)  Mattress: Standard Hospital Mattress (Foam)  Bed Frame: Standard width and length    Education    Patient has a [place] to Observation order: Yes  Observation education completed and documented: Yes      Nancy Wood RN      "

## 2021-05-21 NOTE — PROGRESS NOTES
"[facility] Nurse Inpatient Adult Pressure Injury and Wound Assessment    Reason for consultation: Evaluate and treat multiple wounds - left buttocks, right posterior proximal thigh, abdomen, skin folds, bilateral lower legs  Assessment  Left buttocks, right posterior proximal thigh wounds due to pressure. Contributing factors of the pressure injury: pressure, shear, microclimate, immobility and moisture/incontinence  Status: initial assessment    Pressure Injuries are Stage 3   Present on admission: Yes    Abdominal wound is a non-healing surgical wound per the patient from \"hernia surgeries\" with last one being done in \"2002.\"    Right lateral abdominal fold wound is fissuring r/t fungal-like infection (intertrigo)    Wounds on lower legs are related to lymphedema/venous insufficiency.   Treatment Plan  Wound Cares:  Abdomen  1.) Cleanse with wound cleanser and gauze. Pat dry.  2.) Cover with 6x6 Mepilex border dressing (or similar silicone foam dressing).  Change every other day.    Skin folds/areas of intertrigo  1.) Cleanse with Coloplast Barbara Cleanse and Protect. Only remove product that is easily removed. Do not scrub. Pat dry.   2.) Apply thin layer of Triad Hydrophilic Wound Dressing to any open areas.  3.) Dust with small amount of Miconazole 2% Powder. Pat to help stick to Triad.  Perform these cares twice daily. Once open areas are healed, switch to applying Miconazole 2% Powder in thin layer twice daily after cleansing/drying.    Left buttocks  1.) Cleanse with wound cleanser and gauze. Pat dry.  2.) Protect surrounding skin and area that will be covered with bandage using 3M Cavilon No Sting Barrier Film.  3.) Apply thin layer of Medihoney to wound bed.  4.) Apply 3x3 Mepilex Border Dressing. Use 3M Cavilon No Sting Barrier Film again over tape border to help adhere.  5.) Change every other day as long as dressing stays in place. If not staying in place, follow same instructions for Right Posterior Thigh " wound    Right Posterior Thigh Wound  1.) Cleanse with Coloplast Barbara Cleanse and Protect. Only remove product that is easily removed. Do not scrub. Pat dry.   2.) Apply dime thick layer of Triad Hydrophilic Wound Dressing over open area, thinner layer ok on areas around wound.  3.) Cover with ABD pad.  4.) Perform these cares twice daily.    HOB less than 30 degrees as able, elevate knee gatch with HOB elevation to reduce shear.  Chair cushion when up.  Reposition hourly when in chair and every two hours in bed as able.  Avoid supine position using pillows tucked under alternating hips for at least 15-30 degree turn to lift sacrum/coccyx off bed.  Avoid dragging buttocks with position changes.      Bilateral Lower Legs  1.) Cleanse with wound cleanser or mild soap/water. If using soap/water, rinse after. Pat dry.  2.) Apply Aquaphor generously to intact skin    If draining, perform the following cares also (leave open to air if not weeping)  3.) Use an ABD pad(s) based on size of wound/drainage. Lay strips of vaseline gauze with Aquaphor on top of ABD pads.  4.) Apply dressing so that vaseline strips/Aquaphor are on areas of crusting.  5.) Secure with rolled gauze and tape.  6.) Perform these cares 1-2 times a day depending on drainage.     Orders detailed in discharge instructions for presumed TCU at discharge  Recommended provider order: None, at this time; While patient would be ideally assessed by lymphedema therapy, she states she can't tolerate any compression on her legs because of pain. Also, with monophasic pulse on R would recommend vascular consult if wounds are not healing.  [facility] Nurse follow-up plan:signing off  Nursing to notify the Provider(s) and re-consult the [facility] Nurse if wound(s) deteriorates or new skin concern.    Patient History  According to provider note(s):  Carolina Mari is a 66 year old female admitted on 5/20/2021.  She was brought into the emergency department with worsening  pressure ulcers and inability to care for self at home.     Pressure ulcer stage III on buttocks bilaterally with cellulitis and possible infection  Nonhealing wound on abdomen  Bilateral lower extremity lymphedema and open wounds  - Consult wound care nurse  - Vancomycin and Zosyn given chronicity of the wound  - Blood cultures to follow, wound care  - Nasal swab for MRSA  - May need surgical consult if not improvement with wound care only for debridement.     Physical deconditioning  Failure to thrive  Patient lives in an apartment.  She receives visits from the wound care nurse, as well as help from her son.    Her son no longer feels the patient is able to safely live in her apartment.  - Physical and Occupational Therapy consults  - Social work consult for possible placement    Patient known to this writer from previous hospitalization in January 2021 for same issues. History from that episode - Carolina Mari is a 65 year old female who is brought in by family members due to generalized weakness, failure to thrive, and worsening pressure ulcers.  Patient states that her pressure ulcers developed approximately month ago after sitting too long in her wheelchair.  Patient states he has become increasingly weak and is having difficulty with transfers.  She wears an adult diaper and is frequently incontinent of urine and stool.  Her adult son assists with some cares and provides meals.  She believes she has had increased drainage from her wounds, but denies fevers or chills.  She also has drainage from her legs.     Diagnoses include: pressure injuries of bilateral buttocks, nonhealing abdominal wound, BLE lymphedema, physical deconditioning, failure to thrive, HTN, DM 2 (A1C 5.5% 5/21/21), chronic pain syndrome, history of cervical spine fracture with residual right arm weakness, polyarticular symmetrical seronegative inflammatory arthritis, osteoarthritis of bilateral hips, left knee    Objective  Data  Containment of urine/stool: [facility]    Active Diet Order  Orders Placed This Encounter      Combination Diet [id number] Calories: Low Consistent CHO (3-5 CHO units/meal)      Output:   I/O last 3 completed shifts:  In: 251.67 [I.V.:251.67]  Out: 1900 [Urine:1900]    Risk Assessment:   Sensory Perception: 3-->slightly limited  Moisture: 2-->very moist  Activity: 2-->chairfast  Mobility: 3-->slightly limited  Nutrition: 3-->adequate  Friction and Shear: 1-->problem  Jesus Score: 14                          Labs:   Recent Labs   Lab 05/21/21  0435 05/20/21 2022   ALBUMIN  --  2.8*   HGB  --  9.9*   WBC  --  12.4*   A1C 5.5  --        Physical Exam  Areas of skin assessed: focused abdomen, buttocks, proximal thighs, abdominal folds, bilateral lower legs     Left buttocks and Right posterior/medial proximal thigh      Above photo is of left buttocks      Above photo is of right posterior thigh    1 full thickness wound noted on left buttocks and 1 large full thickness wound noted on right posterior thigh    Left buttocks wound measuring 1 cm x 1.5 cm x 0.1 cm with clean pink wound bed. No tunneling or undermining noted.  Wound Edges: attached  Periwound: intact  Drainage: not noted  Odor: none  Pain: not assessed as cares not performed; nurse had just cleansed wounds    Right proximal thigh (posterior) - wound measures 4.5 cm x 10 cm and is a mix of yellow non viable adherent slough (20% of area) and pink tissue with fibrin coating (80% of area) (see above photo)  No tunneling or undermining noted.  Wound Edges: attached  Periwound: intact, purple/red coloring  Drainage: moderate serosanguineous note on prior ABD pad  Odor: none  Pain: not assessed as cares not performed; nurse had just cleansed wounds    Abdominal wound      Stage/tissue depth: full thickness  Full area measuring 11.5 cm x 2.3 cm x flush (measured on slight diagonal)   Wound beds: clean, red  Wound Edges: attached  Periwound: intact, scar  tissue, dried drainage  Drainage: small serosanguineous noted on previous bandage applied this morning  Odor: none  Pain: no    Bilateral abdominal folds  Partial thickness fissuring noted right abdominal fold (lateral)  Wound beds 100% clean pink/red   Wound Edges: attached  Periwound: erythema  Drainage: Small amount of serous drainage noted.  Odor: none    Bilateral lower legs          Right Leg:  Stage/tissue depth: full thickness  4.2 cm L x 3 cm W x flush D  Tunneling: no  Undermining: no  Wound bed type/amount: dry pink (approximately 80%) and yellow (20%)  Periwound: woody texture, pink  Drainage: none  Odor: no    Left Leg:  Stage/tissue depth: partial thickness  13 cm L x 10 cm W x flush D  Tunneling: no  Undermining: no  Wound bed type/amount: some intact epithelium surrounding open areas with pink tissue and yellow fibrin; see above photo  Periwound: woody texture, pink  Drainage: moderate serous, creamy  Odor: no    Wound cares not provided so pain not assessed for her buttocks, leg wounds    From prior hospitalization 1/2021:  Dorsalis Pedal Pulse: palpable: yes; doppler: R monophasic and L biphasic  Posterior Tibial Pulse: palpable: yes; doppler: biphasic  Hair growth: not noted  Capillary Refill: <3 seconds  Feet/toes color: consistent with surrounding coloring; patient with one toe amputation bilaterally  Firm non-pitting woody edema bilaterally    Interventions  Visual inspection and assessment completed   Wound Care Rationale Protect periwound skin, Improve absorptive capacity, Promote moist wound healing without tissue dehydration , Provide selective debridement (autolysis) of nonviable tissue , Provide protection  and Pain reduction.  Wound Care: performed on abdominal wound only as patient transferring to [facility] surg; other cares will need to be performed by nursing staff per orders  Supplies: gathered, floor stock, discussed with RN and discussed with patient  Current off-loading measures:  Pillows under calves  Current support surface: Standard  Atmos Air mattress; recommend that patient is transferred to a low air loss mattress  Education provided: importance of repositioning and plan of care   Discussed plan of care with Patient and Nurse    Face to face time: approximately 20 minutes.    Kandis Jimenes RN, CWOCN  [phone]

## 2021-05-21 NOTE — DISCHARGE INSTRUCTIONS
Wound Cares:  Abdomen  1.) Cleanse with wound cleanser and gauze. Pat dry.  2.) Cover with 6x6 Mepilex border dressing (or similar silicone foam dressing).  Change every other day.     Skin folds/areas of intertrigo  1.) Cleanse with Coloplast Barbara Cleanse and Protect. Only remove product that is easily removed. Do not scrub. Pat dry.   2.) Apply thin layer of Triad Hydrophilic Wound Dressing to any open areas.  3.) Dust with small amount of Miconazole 2% Powder. Pat to help stick to Triad.  Perform these cares twice daily. Once open areas are healed, switch to applying Miconazole 2% Powder in thin layer twice daily after cleansing/drying.     Left buttocks  1.) Cleanse with wound cleanser and gauze. Pat dry.  2.) Protect surrounding skin and area that will be covered with bandage using 3M Cavilon No Sting Barrier Film.  3.) Apply thin layer of Medihoney to wound bed.  4.) Apply 3x3 Mepilex Border Dressing. Use 3M Cavilon No Sting Barrier Film again over tape border to help adhere.  5.) Change every other day as long as dressing stays in place. If not staying in place, follow same instructions for Right Posterior Thigh wound     Right Posterior Thigh Wound  1.) Cleanse with Coloplast Barbara Cleanse and Protect. Only remove product that is easily removed. Do not scrub. Pat dry.   2.) Apply dime thick layer of Triad Hydrophilic Wound Dressing over open area, thinner layer ok on areas around wound.  3.) Cover with ABD pad.  4.) Perform these cares twice daily.     HOB less than 30 degrees as able, elevate knee gatch with HOB elevation to reduce shear.  Chair cushion when up.  Reposition hourly when in chair and every two hours in bed as able.  Avoid supine position using pillows tucked under alternating hips for at least 15-30 degree turn to lift sacrum/coccyx off bed.  Avoid dragging buttocks with position changes.       Bilateral Lower Legs  1.) Cleanse with wound cleanser or mild soap/water. If using soap/water, rinse  after. Pat dry.  2.) Apply Aquaphor generously to intact skin     If draining, perform the following cares also (leave open to air if not weeping)  3.) Use an ABD pad(s) based on size of wound/drainage. Lay strips of vaseline gauze with Aquaphor on top of ABD pads.  4.) Apply dressing so that vaseline strips/Aquaphor are on areas of crusting.  5.) Secure with rolled gauze and tape.  6.) Perform these cares 1-2 times a day depending on drainage.     Kandis Jimenes RN, CWOCN  [phone]

## 2021-05-22 VITALS
SYSTOLIC BLOOD PRESSURE: 159 MMHG | WEIGHT: 267.86 LBS | BODY MASS INDEX: 39.67 KG/M2 | TEMPERATURE: 97.7 F | HEART RATE: 55 BPM | OXYGEN SATURATION: 97 % | DIASTOLIC BLOOD PRESSURE: 70 MMHG | HEIGHT: 69 IN | RESPIRATION RATE: 18 BRPM

## 2021-05-22 LAB
ALBUMIN SERPL-MCNC: 2.3 G/DL (ref 3.4–5)
ALP SERPL-CCNC: 82 U/L (ref 40–150)
ALT SERPL W P-5'-P-CCNC: 10 U/L (ref 0–50)
ANION GAP SERPL CALCULATED.3IONS-SCNC: 5 MMOL/L (ref 3–14)
AST SERPL W P-5'-P-CCNC: 12 U/L (ref 0–45)
BILIRUB SERPL-MCNC: 0.4 MG/DL (ref 0.2–1.3)
BUN SERPL-MCNC: 10 MG/DL (ref 7–30)
CALCIUM SERPL-MCNC: 7.8 MG/DL (ref 8.5–10.1)
CHLORIDE SERPL-SCNC: 113 MMOL/L (ref 94–109)
CO2 SERPL-SCNC: 25 MMOL/L (ref 20–32)
CREAT SERPL-MCNC: 0.92 MG/DL (ref 0.52–1.04)
ERYTHROCYTE [DISTWIDTH] IN BLOOD BY AUTOMATED COUNT: 14.6 % (ref 10–15)
GFR SERPL CREATININE-BSD FRML MDRD: 64 ML/MIN/{1.73_M2}
GLUCOSE BLDC GLUCOMTR-MCNC: 92 MG/DL (ref 70–99)
GLUCOSE BLDC GLUCOMTR-MCNC: 95 MG/DL (ref 70–99)
GLUCOSE SERPL-MCNC: 90 MG/DL (ref 70–99)
HCT VFR BLD AUTO: 30.6 % (ref 35–47)
HGB BLD-MCNC: 9.2 G/DL (ref 11.7–15.7)
MCH RBC QN AUTO: 26.9 PG (ref 26.5–33)
MCHC RBC AUTO-ENTMCNC: 30.1 G/DL (ref 31.5–36.5)
MCV RBC AUTO: 90 FL (ref 78–100)
PLATELET # BLD AUTO: 245 10E9/L (ref 150–450)
POTASSIUM SERPL-SCNC: 3.9 MMOL/L (ref 3.4–5.3)
PROT SERPL-MCNC: 5.9 G/DL (ref 6.8–8.8)
RBC # BLD AUTO: 3.42 10E12/L (ref 3.8–5.2)
SODIUM SERPL-SCNC: 143 MMOL/L (ref 133–144)
WBC # BLD AUTO: 7.4 10E9/L (ref 4–11)

## 2021-05-22 PROCEDURE — 250N000013 HC RX MED GY IP 250 OP 250 PS 637: Performed by: PHYSICIAN ASSISTANT

## 2021-05-22 PROCEDURE — 250N000011 HC RX IP 250 OP 636: Performed by: INTERNAL MEDICINE

## 2021-05-22 PROCEDURE — 999N001017 HC STATISTIC GLUCOSE BY METER IP

## 2021-05-22 PROCEDURE — 250N000011 HC RX IP 250 OP 636: Performed by: HOSPITALIST

## 2021-05-22 PROCEDURE — 250N000013 HC RX MED GY IP 250 OP 250 PS 637: Performed by: INTERNAL MEDICINE

## 2021-05-22 PROCEDURE — 99239 HOSP IP/OBS DSCHRG MGMT >30: CPT | Performed by: INTERNAL MEDICINE

## 2021-05-22 PROCEDURE — 85027 COMPLETE CBC AUTOMATED: CPT | Performed by: HOSPITALIST

## 2021-05-22 PROCEDURE — 258N000003 HC RX IP 258 OP 636: Performed by: INTERNAL MEDICINE

## 2021-05-22 PROCEDURE — 258N000003 HC RX IP 258 OP 636: Performed by: HOSPITALIST

## 2021-05-22 PROCEDURE — 80053 COMPREHEN METABOLIC PANEL: CPT | Performed by: HOSPITALIST

## 2021-05-22 PROCEDURE — 36415 COLL VENOUS BLD VENIPUNCTURE: CPT | Performed by: HOSPITALIST

## 2021-05-22 RX ORDER — OXYCODONE HCL 20 MG/1
20 TABLET, FILM COATED, EXTENDED RELEASE ORAL EVERY 12 HOURS
Qty: 30 TABLET | Refills: 0 | Status: SHIPPED | OUTPATIENT
Start: 2021-05-22 | End: 2021-06-01

## 2021-05-22 RX ORDER — OXYCODONE HYDROCHLORIDE 5 MG/1
5 TABLET ORAL EVERY 4 HOURS PRN
Qty: 20 TABLET | Refills: 0 | Status: SHIPPED | OUTPATIENT
Start: 2021-05-22 | End: 2021-05-26

## 2021-05-22 RX ORDER — CYANOCOBALAMIN 1000 UG/ML
1 INJECTION, SOLUTION INTRAMUSCULAR; SUBCUTANEOUS
DISCHARGE
Start: 2021-06-18 | End: 2023-01-01

## 2021-05-22 RX ADMIN — OXYCODONE HYDROCHLORIDE 5 MG: 5 TABLET ORAL at 03:51

## 2021-05-22 RX ADMIN — OXYCODONE HYDROCHLORIDE 20 MG: 10 TABLET, FILM COATED, EXTENDED RELEASE ORAL at 11:36

## 2021-05-22 RX ADMIN — ACETAMINOPHEN 1000 MG: 500 TABLET, FILM COATED ORAL at 08:41

## 2021-05-22 RX ADMIN — TAZOBACTAM SODIUM AND PIPERACILLIN SODIUM 3.38 G: 375; 3 INJECTION, SOLUTION INTRAVENOUS at 02:55

## 2021-05-22 RX ADMIN — ACETAMINOPHEN 1000 MG: 500 TABLET, FILM COATED ORAL at 13:25

## 2021-05-22 RX ADMIN — LEVOTHYROXINE SODIUM 200 MCG: 100 TABLET ORAL at 06:07

## 2021-05-22 RX ADMIN — LOSARTAN POTASSIUM 100 MG: 50 TABLET, FILM COATED ORAL at 08:41

## 2021-05-22 RX ADMIN — GABAPENTIN 600 MG: 300 CAPSULE ORAL at 08:42

## 2021-05-22 RX ADMIN — VANCOMYCIN HYDROCHLORIDE 1500 MG: 10 INJECTION, POWDER, LYOPHILIZED, FOR SOLUTION INTRAVENOUS at 03:36

## 2021-05-22 RX ADMIN — METOPROLOL TARTRATE 100 MG: 100 TABLET, FILM COATED ORAL at 08:42

## 2021-05-22 RX ADMIN — MICONAZOLE NITRATE: 20 POWDER TOPICAL at 09:21

## 2021-05-22 RX ADMIN — ENOXAPARIN SODIUM 40 MG: 40 INJECTION SUBCUTANEOUS at 03:40

## 2021-05-22 RX ADMIN — OXYCODONE HYDROCHLORIDE 5 MG: 5 TABLET ORAL at 13:25

## 2021-05-22 RX ADMIN — GABAPENTIN 600 MG: 300 CAPSULE ORAL at 13:25

## 2021-05-22 RX ADMIN — FAMOTIDINE 20 MG: 20 TABLET ORAL at 08:42

## 2021-05-22 RX ADMIN — SODIUM CHLORIDE: 9 INJECTION, SOLUTION INTRAVENOUS at 03:36

## 2021-05-22 RX ADMIN — OXYCODONE HYDROCHLORIDE 5 MG: 5 TABLET ORAL at 08:42

## 2021-05-22 RX ADMIN — TAZOBACTAM SODIUM AND PIPERACILLIN SODIUM 3.38 G: 375; 3 INJECTION, SOLUTION INTRAVENOUS at 09:18

## 2021-05-22 ASSESSMENT — ACTIVITIES OF DAILY LIVING (ADL)
ADLS_ACUITY_SCORE: 21

## 2021-05-22 NOTE — PLAN OF CARE
[facility] DISCHARGE NOTE    Patient discharged to transitional care unit at 2:30 PM via cart. Accompanied by staff. Discharge instructions reviewed with patient, opportunity offered to ask questions. Prescriptions sent with patient to fill . All belongings sent with patient.    Vita Garcia RN

## 2021-05-22 NOTE — DISCHARGE SUMMARY
[facility]  Hospitalist Discharge Summary       Date of Admission:  5/20/2021  Date of Discharge:  5/22/2021  2:07 PM  Discharging Provider: Bob Gil MD      Discharge Diagnoses     Pressure ulcer stage III on buttocks bilaterally  Possible infected wounds  Nonhealing wound on abdomen  Bilateral lower extremity lymphedema and open wounds  Physical deconditioning  Failure to thrive  Acute Kidney injury:   Candida or yeast infection of the skin  Intertrigo  Hypertension, borderline control  Diabetes mellitus type 2  GERD  Hypothyroidism  Chronic pain syndrome  History of cervical spine fracture with residual right arm weakness  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  The following labs/tests are   recommended: BMP and CBC.           Unresulted Labs Ordered in the Past 30 Days of this Admission     Date and Time Order Name Status Description    5/20/2021 2309 Blood culture Preliminary     5/20/2021 2150 Blood culture Preliminary       These results will be followed up by Bob Gil or PCP    Discharge Disposition   Discharged to short-term care facility  Condition at discharge: Stable    Hospital Course   Carolina Mari is a 66 year old female admitted on 5/20/2021.  She was brought into the emergency department with worsening pressure ulcers and inability to care for self at home.     Pressure ulcer stage III on buttocks bilaterally  Possible infected wounds  Nonhealing wound on abdomen  Bilateral lower extremity lymphedema and open wounds  - Consult wound care nurse  - Vancomycin and Zosyn given chronicity of the wound  - Blood cultures NGTD  - Nasal swab for MRSA negative so Vanco discontinued  - Discharged on course of  Augmentin     Physical deconditioning  Failure to thrive  Patient lives in an apartment.  She receives visits from the wound care nurse, as well as help from her son.    Her son no longer feels the patient is able to safely live in her apartment.  - Occupational Therapy recommending TCU  - Social work consult for TCU placement     Acute Kidney injury:   Likely pre-renal and in the setting of cellulitis  renal function in January 2021 creatinine was 0.87 with GFR more than 60, today the patient's creatinine is 1.31 GFR 42  Treated with IV fluids.  - Cr 1.31 -> 1.04 -> 0.92  - Recheck BMP in follow up     Candida or yeast infection of the skin  Intertrigo  Skin lesion over patient's perineal area, breast and abdominal folds  Related to extensive, I will coordinate with the patient fluconazole 4 mg IV x1  Nystatin cream twice daily and follow.    Hypertension, borderline control  -Continue prior to admission amlodipine, losartan, and metoprolol, hold for signs of sepsis and hypotension and bradycardia     Diabetes mellitus type 2  Diabetes is mentioned in the patient's past medical history, but she does not appear to be currently on any medications for this.  - HbA1c 5.5%  - No indication for medication at discharge     GERD  Continue Famotidine     Hypothyroidism  Continue prior to admission levothyroxine.     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     Depression  Anxiety  Continue prior to admission fluoxetine.    Consultations This Hospital Stay   PHYSICAL THERAPY ADULT IP CONSULT  OCCUPATIONAL THERAPY ADULT IP CONSULT  PHARMACY TO DOSE VANCO  SOCIAL WORK IP CONSULT  WOUND OSTOMY CONTINENCE NURSE  IP CONSULT  CARE MANAGEMENT / SOCIAL WORK IP CONSULT    Code Status   No CPR- Do  NOT Intubate    Time Spent on this Encounter   I, Bob Gil MD, personally saw the patient today and spent greater than 30 minutes discharging this patient.       Bob Gil MD  [facility] [place]  ______________________________________________________________________    Physical Exam   Vital Signs: Temp: 97.7  F (36.5  C) Temp src: Oral BP: (!) 159/70 Pulse: 55   Resp: 18 SpO2: 97 % O2 Device: None (Room air)    Weight: 267 lbs 13.74 oz       Gen: Obese debilitated female, lying in bed, appears uncomfortable  HEENT: Atraumatic, normocephalic; sclera non-injected, anicterric; oral mucosa moist, no lesion, no exudate  Lungs: Clear to ausculation, no wheezes, no rhonchi, no rales  Heart: Regular rate, regular rhythm, no gallops, no rubs, no murmurs  GI: Bowel sound normal, no hepatosplenomegaly, no masses, non-tender, non-distended, no guarding, no rebound tenderness  Lymph: No lymphadenopathy, no edema  Skin: shallow stage 2 ulcers behind calves, 4.5 x 10 cm stage 3 ulcer on lower right buttocks    Primary Care Physician   Le Romo    Discharge Orders      General info for SNF    Length of Stay Estimate: Short Term Care: Estimated # of Days <30  Condition at Discharge: Improving  Level of care:skilled   Rehabilitation Potential: Good  Admission H&P remains valid and up-to-date: Yes  Recent Chemotherapy: N/A  Use Nursing Home Standing Orders: Yes     Mantoux instructions    Give two-step Mantoux (PPD) Per Facility Policy Yes     Reason for your hospital stay    This is a 66 year old female admitted with nonhealing wounds.     Follow Up and recommended labs and tests    Follow up with correction physician.  The following labs/tests are recommended: BMP and CBC.     Activity - Up with nursing assistance     Glucose monitor nursing POCT    Before meals and at bedtime     Advance Diet as Tolerated    Follow this diet upon discharge: Orders Placed This Encounter       Combination Diet 8688-8780 Calories: Low Consistent CHO (3-5 CHO units/meal)       Significant Results and Procedures   Most Recent 3 CBC's:  Recent Labs   Lab Test 05/22/21  0525 05/20/21 2022 01/29/21 01/11/21   WBC 7.4 12.4*  --  7.9   HGB 9.2* 9.9* 10.4* 8.6*   MCV 90 89  --  91    307  --  271     Most Recent 3 BMP's:  Recent Labs   Lab Test 05/22/21  0525 05/21/21  0435 05/20/21 2022 01/29/21     --  134 139   POTASSIUM 3.9 3.6 3.9 4.9   CHLORIDE 113*  --  104 108*   CO2 25  --  23 24   BUN 10  --  20 12   CR 0.92 1.04 1.31* 0.87   ANIONGAP 5  --  7 7   JAYA 7.8*  --  7.7* 8.6   GLC 90  --  100* 108     Most Recent 6 Bacteria Isolates From Any Culture (See EPIC Reports for Culture Details):  Recent Labs   Lab Test 05/20/21  2324 05/20/21  2245 11/06/19  1632 10/27/19  1353 10/27/19  1350   CULT No growth after 1 day No growth after 1 day No growth No growth No growth   ,   Results for orders placed or performed during the hospital encounter of 05/20/21   XR Chest Port 1 View    Narrative    CHEST ONE VIEW PORTABLE   5/20/2021 9:27 PM     HISTORY:  Fever. Weakness.    COMPARISON: None.      Impression    IMPRESSION: Mild elevation of the right hemidiaphragm. Chest otherwise  negative. Lungs clear. Pulmonary vascularity is within normal limits.  Multiple surgical clips are projected over the left upper quadrant of  the abdomen.    PATRICK OCAMPO MD       Discharge Medications   Current Discharge Medication List      START taking these medications    Details   amoxicillin-clavulanate (AUGMENTIN) 875-125 MG tablet Take 1 tablet by mouth 2 times daily for 7 days  Qty: 14 tablet    Associated Diagnoses: Cellulitis of buttock         CONTINUE these medications which have CHANGED    Details   cyanocobalamin (CYANOCOBALAMIN) 1000 MCG/ML injection Inject 1 mL (1,000 mcg) into the muscle every 30 days  Qty:      Associated Diagnoses: Vitamin B12 deficiency (non anemic)      oxyCODONE (OXYCONTIN) 20 MG 12  hr tablet Take 1 tablet (20 mg) by mouth every 12 hours  Qty: 30 tablet, Refills: 0    Associated Diagnoses: Undifferentiated inflammatory arthritis (H)      oxyCODONE (ROXICODONE) 5 MG tablet Take 1 tablet (5 mg) by mouth every 4 hours as needed for breakthrough pain  Qty: 20 tablet, Refills: 0    Associated Diagnoses: Pressure injury of right buttock, stage 3 (H)         CONTINUE these medications which have NOT CHANGED    Details   acetaminophen (TYLENOL) 500 MG tablet Take 1,000 mg by mouth 3 times daily AND 650mg PO every day  PRN      amLODIPine (NORVASC) 5 MG tablet Take 5 mg by mouth At Bedtime      famotidine (PEPCID) 20 MG tablet Take 20 mg by mouth 2 times daily      FLUOXETINE HCL PO Take 60 mg by mouth every evening       GABAPENTIN PO Take 600 mg by mouth 3 times daily       LEVOTHYROXINE SODIUM PO Take 200 mcg by mouth daily      LOSARTAN POTASSIUM PO Take 100 mg by mouth daily      metoprolol tartrate (LOPRESSOR) 100 MG tablet Take 100 mg by mouth 2 times daily      Nystatin (NYAMYC) 615823 UNIT/GM POWD Apply topically as needed            Allergies   Allergies   Allergen Reactions     Aspirin Other (See Comments) and Unknown     Colcrys Unknown

## 2021-05-22 NOTE — PROGRESS NOTES
Care Management Discharge Note    Discharge Date: 05/22/21     Discharge Disposition: Transitional Care; pt has been accepted for cares at [facility] Phone (Main Phone:[phone] Admissions Phone:[phone] Fax: [phone])    Discharge Services:  NA    Discharge DME:  NA    Discharge Transportation: agency    Private pay costs discussed: Not applicable; pt has Medicare & Medicaid    PAS Confirmation Code:  [id number]  Patient/family educated on Medicare website which has current facility and service quality ratings: yes    Education Provided on the Discharge Plan:  Yes  Persons Notified of Discharge Plans: Pt, son, Tim, [place] admissions & floor RN  Patient/Family in Agreement with the Plan: yes    Handoff Referral Completed: Yes    Additional Information:  Per IDT rounds today, MD team states that pt is medically stable for discharge today.       Transportation discussed and [facility] stretcher transport at 2PM.    Pt/family aware of costs associated with [facility] transportation services, but pt has MA product which covers transportation services.    PLAN OF CARE:  Pt will discharge to [facility]  via [facility] stretcher at 2 PM.    GILBERT Thomas

## 2021-05-22 NOTE — PLAN OF CARE
Occupational Therapy Discharge Summary    Reason for therapy discharge:    Discharged to transitional care facility.    Progress towards therapy goal(s). See goals on Care Plan in [facility] electronic health record for goal details.  Goals partially met.  Barriers to achieving goals:   discharge from facility.    Therapy recommendation(s):    Continued therapy is recommended.  Rationale/Recommendations:  maximize safety and ind with ADLs/transfers.

## 2021-05-22 NOTE — PLAN OF CARE
Patient alert and oriented x3, pleasant with staff. Patient declined dressing change to buttock during NOC. Reports pain 8/10 to left hip and received PRN pain medication. Denies nausea, SOB. Rash to bilateral lower abdominal folds. Moisture wicking cloth in place. Several wounds to BLE. Patient able to sleep throughout NOC. Remained in bed for duration of shift.   Temp: 98.1  F (36.7  C) Temp src: Oral BP: 135/53 Pulse: 53   Resp: 16 SpO2: 99 % O2 Device: None (Room air)

## 2021-05-22 NOTE — PLAN OF CARE
"Vitally stable;    /53 (BP Location: Left arm)   Pulse 53   Temp 98.1  F (36.7  C) (Oral)   Resp 16   Ht 1.753 m (5' 9\")   Wt 121.5 kg (267 lb 13.7 oz)   SpO2 99%   BMI 39.56 kg/m      Pt reported 8/10 chronic pain. PRN Oxycodone given with relief. Pt also has scheduled OxyContin which aids in pain relief. Pt refused wound cares. Transferred pulsate mattress during shift.     Plan: Pain control, discharge to TCU (TBD)    Hilda Maynard RN on 5/21/2021 at 11:00 PM    "

## 2021-05-23 PROBLEM — F33.9 RECURRENT MAJOR DEPRESSION (H): Status: ACTIVE | Noted: 2021-01-06

## 2021-05-23 PROBLEM — F11.10 NONDEPENDENT OPIOID ABUSE (H): Status: ACTIVE | Noted: 2021-05-23

## 2021-05-23 PROBLEM — M19.90 UNSPECIFIED OSTEOARTHRITIS, UNSPECIFIED SITE: Status: ACTIVE | Noted: 2021-01-06

## 2021-05-23 PROBLEM — R53.81 DEBILITY: Status: ACTIVE | Noted: 2021-05-23

## 2021-05-23 PROBLEM — N18.31 STAGE 3A CHRONIC KIDNEY DISEASE (H): Status: ACTIVE | Noted: 2021-01-14

## 2021-05-23 PROBLEM — E11.49: Status: ACTIVE | Noted: 2021-05-23

## 2021-05-23 PROBLEM — R60.9 EDEMA: Status: ACTIVE | Noted: 2021-05-23

## 2021-05-23 PROBLEM — E78.5 HYPERLIPIDEMIA: Status: ACTIVE | Noted: 2021-05-23

## 2021-05-23 PROBLEM — Z90.49 HISTORY OF CHOLECYSTECTOMY: Status: ACTIVE | Noted: 2021-05-23

## 2021-05-23 NOTE — PROGRESS NOTES
[facility]  PCP & CLINIC: Le Romo MD, [facility] [address] / [facility]; Phone: [phone]; Fax: [phone]  Chief Complaint   Patient presents with     Hospital F/U     [facility] 5/20/2021 - 5/22/2021    [place] MRN: [id number]. Place of Service where encounter took place: [facility] (U) [4000] Carolina Mari  is a 66 year old  (1955), admitted to the above facility from  [facility]. Hospital stay 5/20/21 through 5/22/21. Admitted to this facility for  rehab, medical management, and nursing care. HPI information obtained from: facility chart records, facility staff, patient report, [facility] chart review, and Care Everywhere [facility] chart review.     Brief Summary of Hospital Course: Carolina presented to [facility] on 5/20 w/ worsening wounds and overall inability to care for herself. She was found to have possibly infected wounds, an NEELIMA, and was significantly deconditioned. She was given IVF, IVabx and other supportive measures.     Updates since admission to transitional care unit: Carolina presented to [facility] on 5/22 s/p the above hospitalization. She is known to  staff, as she recovered here prior and was able to discharge with services, walking/talking, and with wounds healed. Today, Carolina is happy to be back and feels safe at [place]. She states her pain is fair and at baseline, mostly in her back. She feels embarrassed that her wounds reopened, but she is hopeful to work with nursing on this. She denies headache, dizziness, SOB, CP, cough, fever, wheezing, nausea/heartburn, constipation/diarrhea. Chart review shows some HTN since her arrival:  BPs:      CODE STATUS/ADVANCE DIRECTIVES DISCUSSION: DNR/DNI. Patient's living condition: lives alone. ALLERGIES: Aspirin, Colchicine, and ColcrysPAST MEDICAL HISTORY:  has a past medical history of Diabetes mellitus (H), History of stomach ulcers, HTN  (hypertension), Knee osteoarthritis, Osteoarthritis of right hip, Osteoporosis, Osteoporosis, and Thyroid disease.. PAST SURGICAL HISTORY:   has a past surgical history that includes tka; gastric bypass; Foot surgery; and SACROPLASTY.. FAMILY HISTORY: family history includes Cancer in her brother and mother; Coronary Artery Disease in her brother and father.. SOCIAL HISTORY:   reports that she has never smoked. She has never used smokeless tobacco. She reports that she does not drink alcohol or use drugs.  Post Discharge Medication Reconciliation Status: discharge medications reconciled and changed, per note/orders  Current Outpatient Medications   Medication Sig Dispense Refill     Amino Acids-Protein Hydrolys (PRO-STAT PO) Take 30 mLs by mouth daily       FLUoxetine 20 MG tablet Take 40 mg by mouth daily       acetaminophen (TYLENOL) 500 MG tablet Take 1,000 mg by mouth 3 times daily AND 650mg PO every day  PRN       amLODIPine (NORVASC) 10 MG tablet Take 10 mg by mouth At Bedtime        amoxicillin-clavulanate (AUGMENTIN) 875-125 MG tablet Take 1 tablet by mouth 2 times daily for 7 days 14 tablet      [START ON 6/18/2021] cyanocobalamin (CYANOCOBALAMIN) 1000 MCG/ML injection Inject 1 mL (1,000 mcg) into the muscle every 30 days       famotidine (PEPCID) 20 MG tablet Take 20 mg by mouth every morning        FLUOXETINE HCL PO Take 60 mg by mouth every evening        GABAPENTIN PO Take 600 mg by mouth 3 times daily        LEVOTHYROXINE SODIUM PO Take 200 mcg by mouth daily       LOSARTAN POTASSIUM PO Take 100 mg by mouth daily       metoprolol tartrate (LOPRESSOR) 100 MG tablet Take 100 mg by mouth 2 times daily       Nystatin (NYAMYC) 135929 UNIT/GM POWD Apply topically 2 times daily as needed        oxyCODONE (OXYCONTIN) 20 MG 12 hr tablet Take 1 tablet (20 mg) by mouth every 12 hours 30 tablet 0     oxyCODONE (ROXICODONE) 5 MG tablet Take 1 tablet (5 mg) by mouth every 4 hours as needed for breakthrough pain 20  "tablet 0     ROS: 10 point ROS of systems including Constitutional, Eyes, Respiratory, Cardiovascular, Gastroenterology, Genitourinary, Integumentary, Musculoskeletal, Psychiatric were all negative except for pertinent positives noted in my HPI.    Vitals: BP (!) 173/82   Pulse 57   Temp 98.3  F (36.8  C)   Resp 18   Ht 1.753 m (5' 9\")   Wt 115.9 kg (255 lb 8 oz)   SpO2 94%   BMI 37.73 kg/m    Exam:  GENERAL APPEARANCE: Alert, in no distress, cooperative.   ENT: Mouth/posterior oropharynx intact w/ moist mucous membranes, hearing acuity [place].  EYES: EOM, conjunctivae, lids, pupils and irises normal, PERRL2.   RESP: Respiratory effort unlabored, no respiratory distress, Lung sounds clear. On RA.   CV: Auscultation of heart reveals S1, S2, rate and rhythm regular, no murmur, no rub or gallop, Edema 1+ BLE. Peripheral pulses are 2+.  ABDOMEN: Normal bowel sounds, soft, non-tender abdomen, and no masses palpated.  SKIN: Inspection/Palpation of skin and subcutaneous tissue baseline w/ fragility. Wounds as pictured here:    NEURO: CN II-XII at patient's baseline, sensation baseline PPS.  PSYCH: Insight, judgement, and memory are baseline, affect and mood are happy/engaged.    Lab/Diagnostic data: Recent labs in [facility] reviewed by me today.     ASSESSMENT/PLAN:  Adult failure to thrive  Undifferentiated inflammatory arthritis (H)  Pressure injury of right buttock, stage 3 (H)  Open wound of abdomen, subsequent encounter  Morbid obesity (H)  Moderate episode of recurrent major depressive disorder (H)  Chronic, continuous use of opioids  Nondependent opioid abuse (H)  Class 2 severe obesity due to excess calories with serious comorbidity and body mass index (BMI) of 38.0 to 38.9 in adult (H)  Tobacco use disorder  Type 2 diabetes mellitus with other skin complication, without long-term current use of insulin (H)  MARKO (generalized anxiety disorder)  Stage 3a chronic kidney disease  Essential " hypertension  Acute-on-chronic. Ongoing and recurring.    Upon discharge months ago, provider noted that Prozac may be contributing to patient's lack of initiative, and anticholinergic issues.  Will start reductions, with plan to switch to another SSRI.    To help with wound care healing, will start a protein supplement.    Noting hypertension, will slightly increase Norvasc at bedtime as noted below.    Diabetes appears well controlled at this time.    Noting ongoing opioid abuse/misuse, but also noting her acute pain at this time secondary to infection and open wounds.  Will consider reductions at future visits.    Noting antibiotic dosing, active infection, will trend blood work as noted below.    We will start GDR on Pepcid as noted below.    Provider coordinated care with nursing, and we were able to decrease Accu-Cheks, and clarify nystatin dosing as noted below.    Provider coordinated care with rehabilitation services, who state that Carolina has slid backwards from where she had discharged before (walking and performing her own ADLs).  Provider also coordinated care with , and they state Carolina that would like to move to assisted living upon discharge from TCU.  Follow-up w/in 1 week or as needed.    Orders written by provider at facility and transcribed by : Griselda De Leon  1. Decrease Prozac to 40 mg PO every day - dx: MDD  2. Decrease Pepcid to 20 mg PO Q AM - dx: GERD  3. CBC, CMP x 1 on 5/27 - dx: cellulitis  4. Clarification: Nystatin BID PRN - dx: yeast dermatitis  5. Decrease accu-checks to BID - dx: DM2  6. Increase Norvasc to 10 mg PO at bedtime - dx: HTN  7. Prostat 30 mL PO every day - dx: wounds.    [facility] Controlled Substance Medication Fill:  Carolina DK Munoz Kamaljit  1955  Effective Jan 1, 2021, The [place] Board of Pharmacy has a new legislative requirement that requires checking the [place]  database before initially prescribing an opiate and every three  months for patients receiving an opiate for treatment of chronic pain.   Request for controlled substance medication:     not checked due to meeting exception criteria: 9. the prescriber is unable to access the data due to operational or other technological failure of the program so long as the prescriber reports the failure to the board.    Total time spent with patient visit was 40 min including patient visit and review of past records. Greater than 50% of total time spent with counseling and coordinating care with nursing, rehabilitation services, and  as noted above.     Electronically signed by:  RACHEL Alvarez CNP DNP

## 2021-05-24 ENCOUNTER — NURSING HOME VISIT (OUTPATIENT)
Dept: GERIATRICS | Facility: CLINIC | Age: 66
End: 2021-05-24
Payer: MEDICARE

## 2021-05-24 ENCOUNTER — RECORDS - HEALTHEAST (OUTPATIENT)
Dept: ADMINISTRATIVE | Facility: CLINIC | Age: 66
End: 2021-05-24

## 2021-05-24 VITALS
OXYGEN SATURATION: 94 % | HEART RATE: 57 BPM | BODY MASS INDEX: 37.84 KG/M2 | RESPIRATION RATE: 18 BRPM | TEMPERATURE: 98.3 F | HEIGHT: 69 IN | DIASTOLIC BLOOD PRESSURE: 82 MMHG | SYSTOLIC BLOOD PRESSURE: 173 MMHG | WEIGHT: 255.5 LBS

## 2021-05-24 DIAGNOSIS — F41.1 GAD (GENERALIZED ANXIETY DISORDER): ICD-10-CM

## 2021-05-24 DIAGNOSIS — I10 ESSENTIAL HYPERTENSION: ICD-10-CM

## 2021-05-24 DIAGNOSIS — M06.4 UNDIFFERENTIATED INFLAMMATORY ARTHRITIS (H): ICD-10-CM

## 2021-05-24 DIAGNOSIS — E66.01 MORBID OBESITY (H): ICD-10-CM

## 2021-05-24 DIAGNOSIS — F17.200 TOBACCO USE DISORDER: ICD-10-CM

## 2021-05-24 DIAGNOSIS — N18.31 STAGE 3A CHRONIC KIDNEY DISEASE (H): ICD-10-CM

## 2021-05-24 DIAGNOSIS — L89.313 PRESSURE INJURY OF RIGHT BUTTOCK, STAGE 3 (H): ICD-10-CM

## 2021-05-24 DIAGNOSIS — E66.812 CLASS 2 SEVERE OBESITY DUE TO EXCESS CALORIES WITH SERIOUS COMORBIDITY AND BODY MASS INDEX (BMI) OF 38.0 TO 38.9 IN ADULT (H): ICD-10-CM

## 2021-05-24 DIAGNOSIS — F33.1 MODERATE EPISODE OF RECURRENT MAJOR DEPRESSIVE DISORDER (H): ICD-10-CM

## 2021-05-24 DIAGNOSIS — S31.109D OPEN WOUND OF ABDOMEN, SUBSEQUENT ENCOUNTER: ICD-10-CM

## 2021-05-24 DIAGNOSIS — E11.628 TYPE 2 DIABETES MELLITUS WITH OTHER SKIN COMPLICATION, WITHOUT LONG-TERM CURRENT USE OF INSULIN (H): ICD-10-CM

## 2021-05-24 DIAGNOSIS — E66.01 CLASS 2 SEVERE OBESITY DUE TO EXCESS CALORIES WITH SERIOUS COMORBIDITY AND BODY MASS INDEX (BMI) OF 38.0 TO 38.9 IN ADULT (H): ICD-10-CM

## 2021-05-24 DIAGNOSIS — F11.90 CHRONIC, CONTINUOUS USE OF OPIOIDS: ICD-10-CM

## 2021-05-24 DIAGNOSIS — R62.7 ADULT FAILURE TO THRIVE: Primary | ICD-10-CM

## 2021-05-24 DIAGNOSIS — F11.10: ICD-10-CM

## 2021-05-24 PROCEDURE — 99310 SBSQ NF CARE HIGH MDM 45: CPT | Performed by: NURSE PRACTITIONER

## 2021-05-24 RX ORDER — FLUOXETINE 20 MG/1
60 TABLET, FILM COATED ORAL DAILY
COMMUNITY
Start: 2021-05-24 | End: 2021-08-03 | Stop reason: DRUGHIGH

## 2021-05-24 ASSESSMENT — MIFFLIN-ST. JEOR: SCORE: 1758.32

## 2021-05-24 NOTE — LETTER
5/24/2021        RE: Carolina Mari  [address]  [address]  [facility] [address]        [facility] Admission  PCP & CLINIC: Le Romo MD, [facility] [address] [place] / [facility]; Phone: [phone]; Fax: [phone]  Chief Complaint   Patient presents with     Hospital F/U     [facility] 5/20/2021 - 5/22/2021    [place] MRN: [id number]. Place of Service where encounter took place: [facility] (U) [4000] Carolina Mari  is a 66 year old  (1955), admitted to the above facility from  [facility]. Hospital stay 5/20/21 through 5/22/21. Admitted to this facility for  rehab, medical management, and nursing care. HPI information obtained from: facility chart records, facility staff, patient report, [facility] chart review, and Care Everywhere [facility] chart review.     Brief Summary of Hospital Course: Carolina presented to [facility] on 5/20 w/ worsening wounds and overall inability to care for herself. She was found to have possibly infected wounds, an NEELIMA, and was significantly deconditioned. She was given IVF, IVabx and other supportive measures.     Updates since admission to transitional care unit: Carolina presented to U on 5/22 s/p the above hospitalization. She is known to  staff, as she recovered here prior and was able to discharge with services, walking/talking, and with wounds healed. Today, Carolina is happy to be back and feels safe at [place]. She states her pain is fair and at baseline, mostly in her back. She feels embarrassed that her wounds reopened, but she is hopeful to work with nursing on this. She denies headache, dizziness, SOB, CP, cough, fever, wheezing, nausea/heartburn, constipation/diarrhea. Chart review shows some HTN since her arrival:  BPs:      CODE STATUS/ADVANCE DIRECTIVES DISCUSSION: DNR/DNI. Patient's living condition: lives alone. ALLERGIES: Aspirin, Colchicine, and  ColcrysPAST MEDICAL HISTORY:  has a past medical history of Diabetes mellitus (H), History of stomach ulcers, HTN (hypertension), Knee osteoarthritis, Osteoarthritis of right hip, Osteoporosis, Osteoporosis, and Thyroid disease.. PAST SURGICAL HISTORY:   has a past surgical history that includes tka; gastric bypass; Foot surgery; and SACROPLASTY.. FAMILY HISTORY: family history includes Cancer in her brother and mother; Coronary Artery Disease in her brother and father.. SOCIAL HISTORY:   reports that she has never smoked. She has never used smokeless tobacco. She reports that she does not drink alcohol or use drugs.  Post Discharge Medication Reconciliation Status: discharge medications reconciled and changed, per note/orders  Current Outpatient Medications   Medication Sig Dispense Refill     Amino Acids-Protein Hydrolys (PRO-STAT PO) Take 30 mLs by mouth daily       FLUoxetine 20 MG tablet Take 40 mg by mouth daily       acetaminophen (TYLENOL) 500 MG tablet Take 1,000 mg by mouth 3 times daily AND 650mg PO every day  PRN       amLODIPine (NORVASC) 10 MG tablet Take 10 mg by mouth At Bedtime        amoxicillin-clavulanate (AUGMENTIN) 875-125 MG tablet Take 1 tablet by mouth 2 times daily for 7 days 14 tablet      [START ON 6/18/2021] cyanocobalamin (CYANOCOBALAMIN) 1000 MCG/ML injection Inject 1 mL (1,000 mcg) into the muscle every 30 days       famotidine (PEPCID) 20 MG tablet Take 20 mg by mouth every morning        FLUOXETINE HCL PO Take 60 mg by mouth every evening        GABAPENTIN PO Take 600 mg by mouth 3 times daily        LEVOTHYROXINE SODIUM PO Take 200 mcg by mouth daily       LOSARTAN POTASSIUM PO Take 100 mg by mouth daily       metoprolol tartrate (LOPRESSOR) 100 MG tablet Take 100 mg by mouth 2 times daily       Nystatin (NYAMYC) 175717 UNIT/GM POWD Apply topically 2 times daily as needed        oxyCODONE (OXYCONTIN) 20 MG 12 hr tablet Take 1 tablet (20 mg) by mouth every 12 hours 30 tablet 0      "oxyCODONE (ROXICODONE) 5 MG tablet Take 1 tablet (5 mg) by mouth every 4 hours as needed for breakthrough pain 20 tablet 0     ROS: 10 point ROS of systems including Constitutional, Eyes, Respiratory, Cardiovascular, Gastroenterology, Genitourinary, Integumentary, Musculoskeletal, Psychiatric were all negative except for pertinent positives noted in my HPI.    Vitals: BP (!) 173/82   Pulse 57   Temp 98.3  F (36.8  C)   Resp 18   Ht 1.753 m (5' 9\")   Wt 115.9 kg (255 lb 8 oz)   SpO2 94%   BMI 37.73 kg/m    Exam:  GENERAL APPEARANCE: Alert, in no distress, cooperative.   ENT: Mouth/posterior oropharynx intact w/ moist mucous membranes, hearing acuity [place].  EYES: EOM, conjunctivae, lids, pupils and irises normal, PERRL2.   RESP: Respiratory effort unlabored, no respiratory distress, Lung sounds clear. On RA.   CV: Auscultation of heart reveals S1, S2, rate and rhythm regular, no murmur, no rub or gallop, Edema 1+ BLE. Peripheral pulses are 2+.  ABDOMEN: Normal bowel sounds, soft, non-tender abdomen, and no masses palpated.  SKIN: Inspection/Palpation of skin and subcutaneous tissue baseline w/ fragility. Wounds as pictured here:    NEURO: CN II-XII at patient's baseline, sensation baseline PPS.  PSYCH: Insight, judgement, and memory are baseline, affect and mood are happy/engaged.    Lab/Diagnostic data: Recent labs in [facility] reviewed by me today.     ASSESSMENT/PLAN:  Adult failure to thrive  Undifferentiated inflammatory arthritis (H)  Pressure injury of right buttock, stage 3 (H)  Open wound of abdomen, subsequent encounter  Morbid obesity (H)  Moderate episode of recurrent major depressive disorder (H)  Chronic, continuous use of opioids  Nondependent opioid abuse (H)  Class 2 severe obesity due to excess calories with serious comorbidity and body mass index (BMI) of 38.0 to 38.9 in adult (H)  Tobacco use disorder  Type 2 diabetes mellitus with other skin complication, without long-term current use of insulin " (H)  MARKO (generalized anxiety disorder)  Stage 3a chronic kidney disease  Essential hypertension  Acute-on-chronic. Ongoing and recurring.    Upon discharge months ago, provider noted that Prozac may be contributing to patient's lack of initiative, and anticholinergic issues.  Will start reductions, with plan to switch to another SSRI.    To help with wound care healing, will start a protein supplement.    Noting hypertension, will slightly increase Norvasc at bedtime as noted below.    Diabetes appears well controlled at this time.    Noting ongoing opioid abuse/misuse, but also noting her acute pain at this time secondary to infection and open wounds.  Will consider reductions at future visits.    Noting antibiotic dosing, active infection, will trend blood work as noted below.    We will start GDR on Pepcid as noted below.    Provider coordinated care with nursing, and we were able to decrease Accu-Cheks, and clarify nystatin dosing as noted below.    Provider coordinated care with rehabilitation services, who state that Carolina has slid backwards from where she had discharged before (walking and performing her own ADLs).  Provider also coordinated care with , and they state Carolina that would like to move to assisted living upon discharge from TCU.  Follow-up w/in 1 week or as needed.    Orders written by provider at facility and transcribed by : Griselda De Leon  1. Decrease Prozac to 40 mg PO every day - dx: MDD  2. Decrease Pepcid to 20 mg PO Q AM - dx: GERD  3. CBC, CMP x 1 on 5/27 - dx: cellulitis  4. Clarification: Nystatin BID PRN - dx: yeast dermatitis  5. Decrease accu-checks to BID - dx: DM2  6. Increase Norvasc to 10 mg PO at bedtime - dx: HTN  7. Prostat 30 mL PO every day - dx: wounds.    [facility] Controlled Substance Medication Fill:  Carolina Munoz Kamaljit  1955  Effective Jan 1, 2021, The [place] Board of Pharmacy has a new legislative requirement that requires  checking the [place]  database before initially prescribing an opiate and every three months for patients receiving an opiate for treatment of chronic pain.   Request for controlled substance medication:     not checked due to meeting exception criteria: 9. the prescriber is unable to access the data due to operational or other technological failure of the program so long as the prescriber reports the failure to the board.    Total time spent with patient visit was 40 min including patient visit and review of past records. Greater than 50% of total time spent with counseling and coordinating care with nursing, rehabilitation services, and  as noted above.     Electronically signed by:  RACHEL Alvarez CNP DNP                        Sincerely,        RACHEL Martin CNP

## 2021-05-25 ENCOUNTER — RECORDS - HEALTHEAST (OUTPATIENT)
Dept: ADMINISTRATIVE | Facility: CLINIC | Age: 66
End: 2021-05-25

## 2021-05-26 ENCOUNTER — RECORDS - HEALTHEAST (OUTPATIENT)
Dept: ADMINISTRATIVE | Facility: CLINIC | Age: 66
End: 2021-05-26

## 2021-05-26 ENCOUNTER — RECORDS - HEALTHEAST (OUTPATIENT)
Dept: LAB | Facility: CLINIC | Age: 66
End: 2021-05-26

## 2021-05-26 RX ORDER — OXYCODONE HYDROCHLORIDE 5 MG/1
5 TABLET ORAL EVERY 4 HOURS PRN
Qty: 30 TABLET | Refills: 0 | Status: SHIPPED | OUTPATIENT
Start: 2021-05-26 | End: 2021-06-01

## 2021-05-27 ENCOUNTER — RECORDS - HEALTHEAST (OUTPATIENT)
Dept: ADMINISTRATIVE | Facility: CLINIC | Age: 66
End: 2021-05-27

## 2021-05-27 ENCOUNTER — TELEPHONE (OUTPATIENT)
Dept: GERIATRICS | Facility: CLINIC | Age: 66
End: 2021-05-27

## 2021-05-27 DIAGNOSIS — L89.313 PRESSURE INJURY OF RIGHT BUTTOCK, STAGE 3 (H): ICD-10-CM

## 2021-05-27 LAB
ALBUMIN SERPL-MCNC: 2.6 G/DL (ref 3.5–5)
ALP SERPL-CCNC: 98 U/L (ref 45–120)
ALT SERPL W P-5'-P-CCNC: 14 U/L (ref 0–45)
ANION GAP SERPL CALCULATED.3IONS-SCNC: 8 MMOL/L (ref 5–18)
AST SERPL W P-5'-P-CCNC: 21 U/L (ref 0–40)
BACTERIA SPEC CULT: NO GROWTH
BACTERIA SPEC CULT: NO GROWTH
BASOPHILS # BLD AUTO: 0 THOU/UL (ref 0–0.2)
BASOPHILS NFR BLD AUTO: 1 % (ref 0–2)
BILIRUB SERPL-MCNC: 0.2 MG/DL (ref 0–1)
BUN SERPL-MCNC: 11 MG/DL (ref 8–22)
CALCIUM SERPL-MCNC: 8.7 MG/DL (ref 8.5–10.5)
CHLORIDE BLD-SCNC: 108 MMOL/L (ref 98–107)
CO2 SERPL-SCNC: 26 MMOL/L (ref 22–31)
CREAT SERPL-MCNC: 0.77 MG/DL (ref 0.6–1.1)
EOSINOPHIL # BLD AUTO: 0.3 THOU/UL (ref 0–0.4)
EOSINOPHIL NFR BLD AUTO: 4 % (ref 0–6)
ERYTHROCYTE [DISTWIDTH] IN BLOOD BY AUTOMATED COUNT: 14.6 % (ref 11–14.5)
GFR SERPL CREATININE-BSD FRML MDRD: >60 ML/MIN/1.73M2
GLUCOSE BLD-MCNC: 108 MG/DL (ref 70–125)
HCT VFR BLD AUTO: 33.5 % (ref 35–47)
HGB BLD-MCNC: 10.3 G/DL (ref 12–16)
IMM GRANULOCYTES # BLD: 0.1 THOU/UL
IMM GRANULOCYTES NFR BLD: 1 %
LYMPHOCYTES # BLD AUTO: 1.4 THOU/UL (ref 0.8–4.4)
LYMPHOCYTES NFR BLD AUTO: 17 % (ref 20–40)
Lab: NORMAL
Lab: NORMAL
MCH RBC QN AUTO: 27 PG (ref 27–34)
MCHC RBC AUTO-ENTMCNC: 30.7 G/DL (ref 32–36)
MCV RBC AUTO: 88 FL (ref 80–100)
MONOCYTES # BLD AUTO: 0.4 THOU/UL (ref 0–0.9)
MONOCYTES NFR BLD AUTO: 5 % (ref 2–10)
NEUTROPHILS # BLD AUTO: 6.1 THOU/UL (ref 2–7.7)
NEUTROPHILS NFR BLD AUTO: 73 % (ref 50–70)
PLATELET # BLD AUTO: 365 THOU/UL (ref 140–440)
PMV BLD AUTO: 10 FL (ref 8.5–12.5)
POTASSIUM BLD-SCNC: 4.4 MMOL/L (ref 3.5–5)
PROT SERPL-MCNC: 6.5 G/DL (ref 6–8)
RBC # BLD AUTO: 3.82 MILL/UL (ref 3.8–5.4)
SODIUM SERPL-SCNC: 142 MMOL/L (ref 136–145)
SPECIMEN SOURCE: NORMAL
SPECIMEN SOURCE: NORMAL
WBC: 8.4 THOU/UL (ref 4–11)

## 2021-05-27 RX ORDER — OXYCODONE HYDROCHLORIDE 5 MG/1
5 TABLET ORAL ONCE
Qty: 6 TABLET | Refills: 0 | Status: SHIPPED | OUTPATIENT
Start: 2021-05-27 | End: 2021-05-27

## 2021-05-27 NOTE — TELEPHONE ENCOUNTER
Nurse called requesting an order to take oxycodone from [facility] as pharmacy has not delivered her supply.     Order sent for oxycodone 1 tab take from [facility] now.     Dave RAMOS CNP

## 2021-05-28 ENCOUNTER — RECORDS - HEALTHEAST (OUTPATIENT)
Dept: ADMINISTRATIVE | Facility: CLINIC | Age: 66
End: 2021-05-28

## 2021-05-29 ENCOUNTER — RECORDS - HEALTHEAST (OUTPATIENT)
Dept: ADMINISTRATIVE | Facility: CLINIC | Age: 66
End: 2021-05-29

## 2021-05-30 ENCOUNTER — RECORDS - HEALTHEAST (OUTPATIENT)
Dept: ADMINISTRATIVE | Facility: CLINIC | Age: 66
End: 2021-05-30

## 2021-05-31 ENCOUNTER — RECORDS - HEALTHEAST (OUTPATIENT)
Dept: ADMINISTRATIVE | Facility: CLINIC | Age: 66
End: 2021-05-31

## 2021-06-01 DIAGNOSIS — L89.313 PRESSURE INJURY OF RIGHT BUTTOCK, STAGE 3 (H): ICD-10-CM

## 2021-06-01 DIAGNOSIS — M06.4 UNDIFFERENTIATED INFLAMMATORY ARTHRITIS (H): ICD-10-CM

## 2021-06-01 RX ORDER — OXYCODONE HYDROCHLORIDE 5 MG/1
5 TABLET ORAL EVERY 4 HOURS PRN
Qty: 30 TABLET | Refills: 0 | Status: SHIPPED | OUTPATIENT
Start: 2021-06-01 | End: 2021-06-06

## 2021-06-01 RX ORDER — OXYCODONE HCL 20 MG/1
20 TABLET, FILM COATED, EXTENDED RELEASE ORAL EVERY 12 HOURS
Qty: 30 TABLET | Refills: 0 | Status: SHIPPED | OUTPATIENT
Start: 2021-06-01 | End: 2021-06-13

## 2021-06-01 NOTE — TELEPHONE ENCOUNTER
FGS Controlled Substance Medication Fill:  Carolina DK Alexander Mari  1955  Effective Jan 1, 2021, The [place] Board of Pharmacy has a new legislative requirement that requires checking the [place]  database before initially prescribing an opiate and every three months for patients receiving an opiate for treatment of chronic pain.   Request for controlled substance medication:     not checked due to meeting exception criteria: 9. the prescriber is unable to access the data due to operational or other technological failure of the program so long as the prescriber reports the failure to the board.

## 2021-06-02 ENCOUNTER — NURSING HOME VISIT (OUTPATIENT)
Dept: GERIATRICS | Facility: CLINIC | Age: 66
End: 2021-06-02
Payer: MEDICARE

## 2021-06-02 VITALS
SYSTOLIC BLOOD PRESSURE: 128 MMHG | OXYGEN SATURATION: 98 % | RESPIRATION RATE: 18 BRPM | BODY MASS INDEX: 38.51 KG/M2 | HEART RATE: 62 BPM | TEMPERATURE: 98.5 F | WEIGHT: 260 LBS | HEIGHT: 69 IN | DIASTOLIC BLOOD PRESSURE: 76 MMHG

## 2021-06-02 DIAGNOSIS — R62.7 ADULT FAILURE TO THRIVE: ICD-10-CM

## 2021-06-02 DIAGNOSIS — F33.1 MODERATE EPISODE OF RECURRENT MAJOR DEPRESSIVE DISORDER (H): ICD-10-CM

## 2021-06-02 DIAGNOSIS — E66.01 MORBID OBESITY (H): ICD-10-CM

## 2021-06-02 DIAGNOSIS — M06.4 UNDIFFERENTIATED INFLAMMATORY ARTHRITIS (H): ICD-10-CM

## 2021-06-02 DIAGNOSIS — F11.90 CHRONIC, CONTINUOUS USE OF OPIOIDS: ICD-10-CM

## 2021-06-02 DIAGNOSIS — N18.31 STAGE 3A CHRONIC KIDNEY DISEASE (H): ICD-10-CM

## 2021-06-02 DIAGNOSIS — L89.313 PRESSURE INJURY OF RIGHT BUTTOCK, STAGE 3 (H): Primary | ICD-10-CM

## 2021-06-02 DIAGNOSIS — S31.109D OPEN WOUND OF ABDOMEN, SUBSEQUENT ENCOUNTER: ICD-10-CM

## 2021-06-02 PROCEDURE — 99309 SBSQ NF CARE MODERATE MDM 30: CPT | Performed by: NURSE PRACTITIONER

## 2021-06-02 ASSESSMENT — MIFFLIN-ST. JEOR: SCORE: 1778.73

## 2021-06-02 NOTE — PROGRESS NOTES
[facility]  Episodic/Acute/Follow-Up  [place] MRN: [id number]. Place of Service where encounter took place:  [facility] (U) [4000]   Chief Complaint   Patient presents with     RECHECK   HPI: Carolina Mari  is a 66 year old (1955), who is being seen today for an episodic care visit.  HPI information obtained from: facility chart records, facility staff, patient report, [facility] chart review and Care Everywhere [facility] chart review. Today's concern is:    Carolina seen today on routine follow-up in [facility], after she was admitted last week for wound care and ongoing therapy.  She is completing her antibiotic courses, and her pain is well controlled.  She cannot fathom ever reducing her oxycodone, she actually thinks that she should just be able to take more.  She does not seem to understand her addiction, and when provider brought up possibility of changing her as needed dosing to every 6 hours, Carolina was not ready to consider this.  She states that she has had a hard week.  She has been weepy, angry, and very anxious.  She states that she believes that the Prozac change is causing these issues, and she is very uncomfortable.  She would like provider to increase Prozac back to former dosing.  She otherwise denies shortness of breath, fever, headache, chest pain, nausea.  She states that she has had a couple of loose stools, and that this is new.    Past Medical and Surgical History reviewed in Epic today.  MEDICATIONS:  Current Outpatient Medications   Medication Sig Dispense Refill     acetaminophen (TYLENOL) 500 MG tablet Take 1,000 mg by mouth 3 times daily AND 650mg PO every day  PRN       Amino Acids-Protein Hydrolys (PRO-STAT PO) Take 30 mLs by mouth daily       amLODIPine (NORVASC) 10 MG tablet Take 10 mg by mouth At Bedtime        [START ON 6/18/2021] cyanocobalamin (CYANOCOBALAMIN) 1000 MCG/ML injection Inject 1 mL (1,000 mcg) into the muscle every 30  "days       famotidine (PEPCID) 20 MG tablet Take 20 mg by mouth every morning        FLUoxetine 20 MG tablet Take 40 mg by mouth daily       GABAPENTIN PO Take 600 mg by mouth 3 times daily        LEVOTHYROXINE SODIUM PO Take 200 mcg by mouth daily       LOSARTAN POTASSIUM PO Take 100 mg by mouth daily       metoprolol tartrate (LOPRESSOR) 100 MG tablet Take 100 mg by mouth 2 times daily       Nystatin (NYAMYC) 030156 UNIT/GM POWD Apply topically 2 times daily as needed        oxyCODONE (OXYCONTIN) 20 MG 12 hr tablet Take 1 tablet (20 mg) by mouth every 12 hours 30 tablet 0     oxyCODONE (ROXICODONE) 5 MG tablet Take 1 tablet (5 mg) by mouth every 4 hours as needed for breakthrough pain 30 tablet 0     REVIEW OF SYSTEMS: 4 point ROS including Respiratory, CV, GI and , other than that noted in the HPI, is negative.    Objective: /76   Pulse 62   Temp 98.5  F (36.9  C)   Resp 18   Ht 1.753 m (5' 9\")   Wt 117.9 kg (260 lb)   SpO2 98%   BMI 38.40 kg/m    Exam:  GENERAL APPEARANCE: Alert, in no distress, cooperative.   ENT: Mouth/posterior oropharynx intact w/ moist mucous membranes, hearing acuity [place].  EYES: EOM, conjunctivae, lids, pupils and irises normal, PERRL2.   RESP: Respiratory effort unlabored, no respiratory distress, Lung sounds clear. On RA.   CV: Auscultation of heart reveals S1, S2, rate and rhythm regular, no murmur, no rub or gallop, Edema 2+ BLE. Peripheral pulses are 2+.  ABDOMEN: Normal bowel sounds, soft, non-tender abdomen, and no masses palpated.  SKIN: Inspection/Palpation of skin and subcutaneous tissue baseline w/ fragility.         NEURO: CN II-XII at patient's baseline, sensation baseline PPS.  PSYCH: Insight, judgement, and memory are baseline, affect and mood are happy/engaged/anxious.    Labs: Labs done in facility are in EPIC. Please refer to them using [facility]/Care Everywhere.    ASSESSMENT/PLAN:  Pressure injury of right buttock, stage 3 (H)  Undifferentiated inflammatory " arthritis (H)  Adult failure to thrive  Stage 3a chronic kidney disease  Morbid obesity (H)  Open wound of abdomen, subsequent encounter  Moderate episode of recurrent major depressive disorder (H)  Chronic, continuous use of opioids  Acute-on-chronic. Ongoing.    Noting that some loose stools could be secondary to SSRI changes.  Will monitor this closely, as Carolina is on antibiotics for cellulitis.    Will increase Prozac as Carolina has requested back to 60 mg.  She was very anxious and not ready/willing to attempt to change at this time.    Carolina was not open or ready to changes in her oxycodone/OxyContin dosing.  Of note she should not need long-term use of short acting opioids for the rest of her life.  We have had conversations in the past about pain expectations, and Carolina is usually realistic.  Suspect that her anxiety around Prozac is fueling her anxiety around other medication changes at this time.    Carolina should continue support with in-house psych, as she continues to rehab and likely move to a new assisted living.  Discharge planning in date is undetermined at this time, but suspecting several more weeks of rehab as Carolina is on a waiting list at an [facility] in [place].  Follow-up w/in 1 week or as needed.    Orders:  1. Increase Prozac to 60mg PO every day. Dx: MDD.     [facility]GS Controlled Substance Medication Fill:  Carolina Munoz Kamaljit  1955  Effective Jan 1, 2021, The [place] Board of Pharmacy has a new legislative requirement that requires checking the [place]  database before initially prescribing an opiate and every three months for patients receiving an opiate for treatment of chronic pain.   Request for controlled substance medication:     not checked due to meeting exception criteria: 9. the prescriber is unable to access the data due to operational or other technological failure of the program so long as the prescriber reports the failure to the board.    Electronically signed  by:  Dr. Adilene Aguayo, APRN CNP DNP

## 2021-06-02 NOTE — LETTER
6/2/2021        RE: Carolina Mari  [address]  [address]  [place] [address]        [facility]  Episodic/Acute/Follow-Up  [place] MRN: [id number]. Place of Service where encounter took place:  [facility] [4000]   Chief Complaint   Patient presents with     RECHECK   HPI: Carolina Mari  is a 66 year old (1955), who is being seen today for an episodic care visit.  HPI information obtained from: facility chart records, facility staff, patient report, [facility] chart review and Care Everywhere [facility] chart review. Today's concern is:    Carolina seen today on routine follow-up in [facility], after she was admitted last week for wound care and ongoing therapy.  She is completing her antibiotic courses, and her pain is well controlled.  She cannot fathom ever reducing her oxycodone, she actually thinks that she should just be able to take more.  She does not seem to understand her addiction, and when provider brought up possibility of changing her as needed dosing to every 6 hours, Carolina was not ready to consider this.  She states that she has had a hard week.  She has been weepy, angry, and very anxious.  She states that she believes that the Prozac change is causing these issues, and she is very uncomfortable.  She would like provider to increase Prozac back to former dosing.  She otherwise denies shortness of breath, fever, headache, chest pain, nausea.  She states that she has had a couple of loose stools, and that this is new.    Past Medical and Surgical History reviewed in Epic today.  MEDICATIONS:  Current Outpatient Medications   Medication Sig Dispense Refill     acetaminophen (TYLENOL) 500 MG tablet Take 1,000 mg by mouth 3 times daily AND 650mg PO every day  PRN       Amino Acids-Protein Hydrolys (PRO-STAT PO) Take 30 mLs by mouth daily       amLODIPine (NORVASC) 10 MG tablet Take 10 mg by mouth At Bedtime        [START ON 6/18/2021]  "cyanocobalamin (CYANOCOBALAMIN) 1000 MCG/ML injection Inject 1 mL (1,000 mcg) into the muscle every 30 days       famotidine (PEPCID) 20 MG tablet Take 20 mg by mouth every morning        FLUoxetine 20 MG tablet Take 40 mg by mouth daily       GABAPENTIN PO Take 600 mg by mouth 3 times daily        LEVOTHYROXINE SODIUM PO Take 200 mcg by mouth daily       LOSARTAN POTASSIUM PO Take 100 mg by mouth daily       metoprolol tartrate (LOPRESSOR) 100 MG tablet Take 100 mg by mouth 2 times daily       Nystatin (NYAMYC) 274667 UNIT/GM POWD Apply topically 2 times daily as needed        oxyCODONE (OXYCONTIN) 20 MG 12 hr tablet Take 1 tablet (20 mg) by mouth every 12 hours 30 tablet 0     oxyCODONE (ROXICODONE) 5 MG tablet Take 1 tablet (5 mg) by mouth every 4 hours as needed for breakthrough pain 30 tablet 0     REVIEW OF SYSTEMS: 4 point ROS including Respiratory, CV, GI and , other than that noted in the HPI, is negative.    Objective: /76   Pulse 62   Temp 98.5  F (36.9  C)   Resp 18   Ht 1.753 m (5' 9\")   Wt 117.9 kg (260 lb)   SpO2 98%   BMI 38.40 kg/m    Exam:  GENERAL APPEARANCE: Alert, in no distress, cooperative.   ENT: Mouth/posterior oropharynx intact w/ moist mucous membranes, hearing acuity [place].  EYES: EOM, conjunctivae, lids, pupils and irises normal, PERRL2.   RESP: Respiratory effort unlabored, no respiratory distress, Lung sounds clear. On RA.   CV: Auscultation of heart reveals S1, S2, rate and rhythm regular, no murmur, no rub or gallop, Edema 2+ BLE. Peripheral pulses are 2+.  ABDOMEN: Normal bowel sounds, soft, non-tender abdomen, and no masses palpated.  SKIN: Inspection/Palpation of skin and subcutaneous tissue baseline w/ fragility.         NEURO: CN II-XII at patient's baseline, sensation baseline PPS.  PSYCH: Insight, judgement, and memory are baseline, affect and mood are happy/engaged/anxious.    Labs: Labs done in facility are in EPIC. Please refer to them using EPIC/Care " Everywhere.    ASSESSMENT/PLAN:  Pressure injury of right buttock, stage 3 (H)  Undifferentiated inflammatory arthritis (H)  Adult failure to thrive  Stage 3a chronic kidney disease  Morbid obesity (H)  Open wound of abdomen, subsequent encounter  Moderate episode of recurrent major depressive disorder (H)  Chronic, continuous use of opioids  Acute-on-chronic. Ongoing.    Noting that some loose stools could be secondary to SSRI changes.  Will monitor this closely, as Carolina is on antibiotics for cellulitis.    Will increase Prozac as Carolina has requested back to 60 mg.  She was very anxious and not ready/willing to attempt to change at this time.    Carolina was not open or ready to changes in her oxycodone/OxyContin dosing.  Of note she should not need long-term use of short acting opioids for the rest of her life.  We have had conversations in the past about pain expectations, and Carolina is usually realistic.  Suspect that her anxiety around Prozac is fueling her anxiety around other medication changes at this time.    Carolina should continue support with in-house psych, as she continues to rehab and likely move to a new assisted living.  Discharge planning in date is undetermined at this time, but suspecting several more weeks of rehab as Carolina is on a waiting list at an [facility] in [place].  Follow-up w/in 1 week or as needed.    Orders:  1. Increase Prozac to 60mg PO every day. Dx: MDD.     [facility] Controlled Substance Medication Fill:  Carolina Mari  1955  Effective Jan 1, 2021, The [place] Board of Pharmacy has a new legislative requirement that requires checking the [place]  database before initially prescribing an opiate and every three months for patients receiving an opiate for treatment of chronic pain.   Request for controlled substance medication:     not checked due to meeting exception criteria: 9. the prescriber is unable to access the data due to operational or other technological  failure of the program so long as the prescriber reports the failure to the board.    Electronically signed by:  Dr. Adilene Aguayo, RACHEL CNP DNP        Sincerely,        RACHEL Martin CNP

## 2021-06-06 VITALS
DIASTOLIC BLOOD PRESSURE: 85 MMHG | WEIGHT: 260 LBS | HEART RATE: 59 BPM | HEIGHT: 69 IN | TEMPERATURE: 98.3 F | SYSTOLIC BLOOD PRESSURE: 167 MMHG | RESPIRATION RATE: 18 BRPM | BODY MASS INDEX: 38.51 KG/M2 | OXYGEN SATURATION: 93 %

## 2021-06-06 DIAGNOSIS — L89.313 PRESSURE INJURY OF RIGHT BUTTOCK, STAGE 3 (H): ICD-10-CM

## 2021-06-06 RX ORDER — OXYCODONE HYDROCHLORIDE 5 MG/1
TABLET ORAL
Qty: 30 TABLET | Refills: 0 | Status: SHIPPED | OUTPATIENT
Start: 2021-06-06 | End: 2021-06-13

## 2021-06-06 ASSESSMENT — MIFFLIN-ST. JEOR: SCORE: 1778.73

## 2021-06-07 ENCOUNTER — NURSING HOME VISIT (OUTPATIENT)
Dept: GERIATRICS | Facility: CLINIC | Age: 66
End: 2021-06-07
Payer: MEDICARE

## 2021-06-07 DIAGNOSIS — S31.109D OPEN WOUND OF ABDOMEN, SUBSEQUENT ENCOUNTER: ICD-10-CM

## 2021-06-07 DIAGNOSIS — E11.9 DIET-CONTROLLED DIABETES MELLITUS (H): ICD-10-CM

## 2021-06-07 DIAGNOSIS — L89.313 PRESSURE INJURY OF RIGHT BUTTOCK, STAGE 3 (H): Primary | ICD-10-CM

## 2021-06-07 DIAGNOSIS — E66.01 MORBID OBESITY (H): ICD-10-CM

## 2021-06-07 DIAGNOSIS — F33.1 MODERATE EPISODE OF RECURRENT MAJOR DEPRESSIVE DISORDER (H): ICD-10-CM

## 2021-06-07 DIAGNOSIS — N18.31 STAGE 3A CHRONIC KIDNEY DISEASE (H): ICD-10-CM

## 2021-06-07 DIAGNOSIS — F11.90 CHRONIC, CONTINUOUS USE OF OPIOIDS: ICD-10-CM

## 2021-06-07 DIAGNOSIS — R62.7 ADULT FAILURE TO THRIVE: ICD-10-CM

## 2021-06-07 PROCEDURE — 99305 1ST NF CARE MODERATE MDM 35: CPT | Performed by: FAMILY MEDICINE

## 2021-06-07 PROCEDURE — 99207 PR CDG-CODE CATEGORY CHANGED: CPT | Performed by: FAMILY MEDICINE

## 2021-06-07 NOTE — PROGRESS NOTES
"[facility]  PRIMARY CARE PROVIDER AND CLINIC:  Le Romo MD, [facility] [address] / [facility]*  Chief Complaint   Patient presents with     Hospital F/U     [facility] Medical Record Number:  [id number]  Place of Service where encounter took place:  [facility] (U) [4000]    Carolina Mari  is a 66 year old  (1955), admitted to the above facility from  [facility]. Hospital stay 5/20/21 through 5/22/21..  Admitted to this facility for  rehab, medical management and nursing care.    HPI:    HPI information obtained from: facility staff, patient report and extensive chart review [facility] chart review.   Brief Summary of Hospital Course:   Patient with PMH pertinent for, pressure ulcer stage III on buttocks bilaterally, nonhealing wound on abdomen, type 2 diabetes, clinical syndrome, leukopenia with worsening pressure ulcer and inability to care for self at home.  Found To have infected wound was started on vancomycin and Zosyn, discharged on Augmentin.  Also had NEELIMA which was resolved.    Updates on Status Since Skilled nursing Admission:   5/24: Decrease Prozac to 40 mg, , Decrease Pepcid to 20 mg, Increase Norvasc to 10 mg, Prostat 30 mL PO every day      Today:  - wound (abd/buttock): pt reports she has to keep bandage over it spongy like and does not hurt as much. \" was told it is getting better.     - rehab: pt reports it is going fine. Reports can do the upper and lower body strenght, reports has not walked in 5 years, due to deteriorating hip and it is just going.     - Depression: reports feeling ok, on Prozac once a day. Reports has been on it for 4-5 years. Reports doing ok.     - diarrhea: reports mushy BM, after she eats, every time, especially in the morning and after noon, with coffee. Had nausea and vomiting yesterday, put her on pills for nausea and that is helping. Denies abd pain. \" they took " "x-ray for my belly and found bunch of gas but no obstruction\". Feeling better today.       =================================  CODE STATUS/ADVANCE DIRECTIVES DISCUSSION:   DNR / DNI  Patient's living condition: lives alone  ALLERGIES: Aspirin, Colchicine, and Colcrys  PAST MEDICAL HISTORY:  has a past medical history of Diabetes mellitus (H), History of stomach ulcers, HTN (hypertension), Knee osteoarthritis, Osteoarthritis of right hip, Osteoporosis, Osteoporosis, and Thyroid disease.  PAST SURGICAL HISTORY:   has a past surgical history that includes tka; gastric bypass; Foot surgery; and SACROPLASTY.  FAMILY HISTORY: family history includes Cancer in her brother and mother; Coronary Artery Disease in her brother and father.  SOCIAL HISTORY:   reports that she has never smoked. She has never used smokeless tobacco. She reports that she does not drink alcohol or use drugs.    Post Discharge Medication Reconciliation Status: discharge medications reconciled and changed, per note/orders  Current Outpatient Medications   Medication Sig Dispense Refill     acetaminophen (TYLENOL) 500 MG tablet Take 1,000 mg by mouth 3 times daily AND 650mg PO every day  PRN       Amino Acids-Protein Hydrolys (PRO-STAT PO) Take 30 mLs by mouth daily       amLODIPine (NORVASC) 10 MG tablet Take 10 mg by mouth At Bedtime        [START ON 6/18/2021] cyanocobalamin (CYANOCOBALAMIN) 1000 MCG/ML injection Inject 1 mL (1,000 mcg) into the muscle every 30 days       famotidine (PEPCID) 20 MG tablet Take 20 mg by mouth every morning        FLUoxetine 20 MG tablet Take 60 mg by mouth daily       GABAPENTIN PO Take 600 mg by mouth 3 times daily        LEVOTHYROXINE SODIUM PO Take 200 mcg by mouth daily       LOSARTAN POTASSIUM PO Take 100 mg by mouth daily       metoprolol tartrate (LOPRESSOR) 100 MG tablet Take 100 mg by mouth 2 times daily       Nystatin (NYAMYC) 228385 UNIT/GM POWD Apply topically 2 times daily as needed        oxyCODONE " "(OXYCONTIN) 20 MG 12 hr tablet Take 1 tablet (20 mg) by mouth every 12 hours 30 tablet 0     oxyCODONE (ROXICODONE) 5 MG tablet TAKE 1 TAB BY MOUTH EVERY 4 HOURS AS NEEDED FOR BREAKTHROUGH PAIN 30 tablet 0       ROS: 10 point ROS of systems including Constitutional, Eyes, Respiratory, Cardiovascular, Gastroenterology, Genitourinary, Integumentary, Musculoskeletal, Psychiatric were all negative except for pertinent positives noted in my HPI.    Vitals:  BP (!) 167/85   Pulse 59   Temp 98.3  F (36.8  C)   Resp 18   Ht 1.753 m (5' 9\")   Wt 117.9 kg (260 lb)   SpO2 93%   BMI 38.40 kg/m    Exam:  GENERAL APPEARANCE:  in no distress, cooperative, morbidly obese.   ENT:  Mouth and posterior oropharynx normal, moist mucous membranes, oral mucosa moist, no lesion noted.   EYES:  EOMI, Pupil rounded and equal.  RESP:  lungs clear to auscultation   CV:  S1S2 audible, regular HR, no murmur appreciated.   ABDOMEN:  soft, NT/ND, BS audible. no mass appreciated on palpation.   M/S:   no joint deformity noted on observation. Legs wrapped.   SKIN:  No rash.   NEURO:   No NFD appreciated on observation. Hand  5/5 b/l  PSYCH:  normal insight, judgement and memory, affect and mood normal      Lab/Diagnostic data: Reviewed in the chart and EHR.        ASSESSMENT/PLAN:  --------------------------  Pressure injury of right buttock, stage 3 (H)  Open wound of abdomen, subsequent encounter  Chronic, continuous use of opioids  Nondependent opioid abuse (H)  - completed abx  - wound care, improving.   - WC bound   - analgesia optimal.   - used to have home care, and wants to go back. Counseled that disposition has to be safe.   - Discussed the nature of her chronic pain, and the goal is be comfortable, and some pain is expected. Counseled on opioid dependency and it effect on the body.       Diet Controlled diabetes (H): no concern      Class 2 severe obesity due to excess calories with serious comorbidity and body mass index (BMI) " of 38.0 to 38.9 in adult (H)  - this is due to excessive calories and limited physical activities.   - counseled.       AFTT: started rehab program, making a progress, continue until desired goal is achieved.       Moderate episode of recurrent major depressive disorder (H): Prozac 60 mg reduced to 40 mg, developed worsening depressive sx, back to 60 mg. Discussed alternative, however, Prozac has to be GDR first. Not interested at this time.       Essential HTN: norvasc increased due to elevated SBP. Continued to have some elevated reading. Continue to monitor. Asymptomatic.       Non specific diarrhea: resovled. No concern    Order: See above, otherwise, continue the rest of the current POC.       Electronically signed by:  Ariana Bose MD

## 2021-06-07 NOTE — LETTER
"    6/7/2021        RE: Carolina Mari  [address]  [address]  [place] [address]        [facility]  PRIMARY CARE PROVIDER AND CLINIC:  Le Romo MD, [facility] [address] / [facility]*  Chief Complaint   Patient presents with     Hospital F/U     [facility] Record Number:  [id number]  Place of Service where encounter took place:  [facility] (U) [4000]    Carolina Mari  is a 66 year old  (1955), admitted to the above facility from  [facility]. Hospital stay 5/20/21 through 5/22/21..  Admitted to this facility for  rehab, medical management and nursing care.    HPI:    HPI information obtained from: facility staff, patient report and extensive chart review [facility] chart review.   Brief Summary of Hospital Course:   Patient with PMH pertinent for, pressure ulcer stage III on buttocks bilaterally, nonhealing wound on abdomen, type 2 diabetes, clinical syndrome, leukopenia with worsening pressure ulcer and inability to care for self at home.  Found To have infected wound was started on vancomycin and Zosyn, discharged on Augmentin.  Also had NEELIMA which was resolved.    Updates on Status Since Skilled nursing Admission:   5/24: Decrease Prozac to 40 mg, , Decrease Pepcid to 20 mg, Increase Norvasc to 10 mg, Prostat 30 mL PO every day      Today:  - wound (abd/buttock): pt reports she has to keep bandage over it spongy like and does not hurt as much. \" was told it is getting better.     - rehab: pt reports it is going fine. Reports can do the upper and lower body strenght, reports has not walked in 5 years, due to deteriorating hip and it is just going.     - Depression: reports feeling ok, on Prozac once a day. Reports has been on it for 4-5 years. Reports doing ok.     - diarrhea: reports mushy BM, after she eats, every time, especially in the morning and after noon, with coffee. Had " "nausea and vomiting yesterday, put her on pills for nausea and that is helping. Denies abd pain. \" they took x-ray for my belly and found bunch of gas but no obstruction\". Feeling better today.       =================================  CODE STATUS/ADVANCE DIRECTIVES DISCUSSION:   DNR / DNI  Patient's living condition: lives alone  ALLERGIES: Aspirin, Colchicine, and Colcrys  PAST MEDICAL HISTORY:  has a past medical history of Diabetes mellitus (H), History of stomach ulcers, HTN (hypertension), Knee osteoarthritis, Osteoarthritis of right hip, Osteoporosis, Osteoporosis, and Thyroid disease.  PAST SURGICAL HISTORY:   has a past surgical history that includes tka; gastric bypass; Foot surgery; and SACROPLASTY.  FAMILY HISTORY: family history includes Cancer in her brother and mother; Coronary Artery Disease in her brother and father.  SOCIAL HISTORY:   reports that she has never smoked. She has never used smokeless tobacco. She reports that she does not drink alcohol or use drugs.    Post Discharge Medication Reconciliation Status: discharge medications reconciled and changed, per note/orders  Current Outpatient Medications   Medication Sig Dispense Refill     acetaminophen (TYLENOL) 500 MG tablet Take 1,000 mg by mouth 3 times daily AND 650mg PO every day  PRN       Amino Acids-Protein Hydrolys (PRO-STAT PO) Take 30 mLs by mouth daily       amLODIPine (NORVASC) 10 MG tablet Take 10 mg by mouth At Bedtime        [START ON 6/18/2021] cyanocobalamin (CYANOCOBALAMIN) 1000 MCG/ML injection Inject 1 mL (1,000 mcg) into the muscle every 30 days       famotidine (PEPCID) 20 MG tablet Take 20 mg by mouth every morning        FLUoxetine 20 MG tablet Take 60 mg by mouth daily       GABAPENTIN PO Take 600 mg by mouth 3 times daily        LEVOTHYROXINE SODIUM PO Take 200 mcg by mouth daily       LOSARTAN POTASSIUM PO Take 100 mg by mouth daily       metoprolol tartrate (LOPRESSOR) 100 MG tablet Take 100 mg by mouth 2 times daily " "      Nystatin ([facility]) 837200 UNIT/GM POWD Apply topically 2 times daily as needed        oxyCODONE (OXYCONTIN) 20 MG 12 hr tablet Take 1 tablet (20 mg) by mouth every 12 hours 30 tablet 0     oxyCODONE (ROXICODONE) 5 MG tablet TAKE 1 TAB BY MOUTH EVERY 4 HOURS AS NEEDED FOR BREAKTHROUGH PAIN 30 tablet 0       ROS: 10 point ROS of systems including Constitutional, Eyes, Respiratory, Cardiovascular, Gastroenterology, Genitourinary, Integumentary, Musculoskeletal, Psychiatric were all negative except for pertinent positives noted in my HPI.    Vitals:  BP (!) 167/85   Pulse 59   Temp 98.3  F (36.8  C)   Resp 18   Ht 1.753 m (5' 9\")   Wt 117.9 kg (260 lb)   SpO2 93%   BMI 38.40 kg/m    Exam:  GENERAL APPEARANCE:  in no distress, cooperative, morbidly obese.   ENT:  Mouth and posterior oropharynx normal, moist mucous membranes, oral mucosa moist, no lesion noted.   EYES:  EOMI, Pupil rounded and equal.  RESP:  lungs clear to auscultation   CV:  S1S2 audible, regular HR, no murmur appreciated.   ABDOMEN:  soft, NT/ND, BS audible. no mass appreciated on palpation.   M/S:   no joint deformity noted on observation. Legs wrapped.   SKIN:  No rash.   NEURO:   No NFD appreciated on observation. Hand  5/5 b/l  PSYCH:  normal insight, judgement and memory, affect and mood normal      Lab/Diagnostic data: Reviewed in the chart and EHR.        ASSESSMENT/PLAN:  --------------------------  Pressure injury of right buttock, stage 3 (H)  Open wound of abdomen, subsequent encounter  Chronic, continuous use of opioids  Nondependent opioid abuse (H)  - completed abx  - wound care, improving.   - WC bound   - analgesia optimal.   - used to have home care, and wants to go back. Counseled that disposition has to be safe.   - Discussed the nature of her chronic pain, and the goal is be comfortable, and some pain is expected. Counseled on opioid dependency and it effect on the body.       Diet Controlled diabetes (H): no " concern      Class 2 severe obesity due to excess calories with serious comorbidity and body mass index (BMI) of 38.0 to 38.9 in adult (H)  - this is due to excessive calories and limited physical activities.   - counseled.       AFTT: started rehab program, making a progress, continue until desired goal is achieved.       Moderate episode of recurrent major depressive disorder (H): Prozac 60 mg reduced to 40 mg, developed worsening depressive sx, back to 60 mg. Discussed alternative, however, Prozac has to be GDR first. Not interested at this time.       Essential HTN: norvasc increased due to elevated SBP. Continued to have some elevated reading. Continue to monitor. Asymptomatic.       Non specific diarrhea: resovled. No concern    Order: See above, otherwise, continue the rest of the current POC.       Electronically signed by:  Ariana Bose MD            Sincerely,        Ariana Bose MD

## 2021-06-08 NOTE — PROGRESS NOTES
[facility]    Facility:   [facility] [[id number]]   Code Status: UNKNOWN      CHIEF COMPLAINT/REASON FOR VISIT:  Chief Complaint   Patient presents with     Review Of Multiple Medical Conditions     Low magnesium levels, leukocytosis, status post open cholecystectomy, hello calcium level, pain management,.       HISTORY:      HPI: Carolina is a 61 y.o. female Who resides at the [facility] undergoing physical and occupational therapy secondary to open cholecystectomy secondary to acute cholecystitis. She did have elevated liver function tests which are improving and it was found to have acute gangrenous cholecystitis. She was put on Zosyn in the hospital her white count did improve however on January 2 it came back at 12. Her pain is being controlled with oxycodone as well as Tylenol in her pain seems to be improving. She does have a large ventral hernia and she now has developed redness around her incision. She is on levofloxacin at this time however that does not cover well for M RSA. She has been afebrile at this time and she has not had any adverse affects to her pain medications. She is going to see her surgeon today to check out the incision as well as what we believe is an enlarging ventral hernia. We did put them in abdominal binder on her however with her body habitus this is difficult. She is able to get out of bed with assistance at this time and will await with the surgeon says. Her C. diff can back is negative.    Past Medical History   Diagnosis Date     Anxiety      Bowel obstruction      Chronic back pain      Chronic hip pain      Depression      Diabetes mellitus      GERD (gastroesophageal reflux disease)      Hip pain      HTN (hypertension)              Family History   Problem Relation Age of Onset     Breast cancer Mother      Coronary artery disease Father      Cancer Brother 20     Leukemia     Heart attack Brother      Social  History     Social History     Marital status:      Spouse name: N/A     Number of children: N/A     Years of education: N/A     Social History Main Topics     Smoking status: Current Some Day Smoker     Packs/day: 0.25     Years: 20.00     Types: Cigarettes     Smokeless tobacco: Never Used      Comment: refused counceling and smoking cessation folder.     Alcohol use No     Drug use: No     Sexual activity: Not Asked     Other Topics Concern     None     Social History Narrative         Review of Systems   Constitutional:        Patients pain is better managed at this time and she does have some drainage coming from her incision. It is more the right side of the incision and there may be some dehiscence starting. She is moving her bowels appropriately at this time and she does have a Rodriguez catheter. She denies any fevers chills vomiting or diarrhea and there is no chest pain or shortness of breath. The remainder of the review of systems is negative.       .  Vitals:    01/05/17 0807   BP: 108/54   Pulse: 82   Resp: 18   Temp: 97  F (36.1  C)   SpO2: 93%       Physical Exam   Constitutional: She is oriented to person, place, and time. She appears well-developed and well-nourished. No distress.   HENT:   Head: Normocephalic and atraumatic.   Nose: Nose normal.   Mouth/Throat: Oropharynx is clear and moist. No oropharyngeal exudate.   Eyes: Conjunctivae and EOM are normal. Pupils are equal, round, and reactive to light. Right eye exhibits no discharge. Left eye exhibits no discharge. No scleral icterus.   Neck: Neck supple. No tracheal deviation present. No thyromegaly present.   Cardiovascular: Normal rate, regular rhythm and normal heart sounds.    Pulmonary/Chest: Effort normal. She has no wheezes. She has no rales.   Abdominal: Soft. Bowel sounds are normal. She exhibits distension.   Patient has a horizontal incision with redness around the staples. There is no odor however there is a definite discharge  on the gauze. There is no active bleeding at this time. Staples are intact at this time. There is a large ventral hernia left a midline that is severe.   Musculoskeletal: Normal range of motion. She exhibits edema. She exhibits no tenderness.   Lymphadenopathy:     She has no cervical adenopathy.   Neurological: She is alert and oriented to person, place, and time. She displays normal reflexes. No cranial nerve deficit. She exhibits normal muscle tone. Coordination normal.   Skin: Skin is warm and dry. She is not diaphoretic.   Psychiatric: She has a normal mood and affect. Her behavior is normal.         LABS: Recent labs are as follows, White blood count was 12.1, hemoglobin was 11.5, platelets were within normal limits. Basic metabolic profile was normal with the exception of a calcium of 7.9. Magnesium was 1.1, liver function tests were within normal limits. Except for low-protein and low albumin.      ASSESSMENT:      ICD-10-CM    1. Acute cholecystitis K81.0    2. S/P cholecystectomy Z90.49    3. Leukocytosis, unspecified type D72.829    4. Hypocalcemia E83.51    5. Low magnesium levels E83.42        PLAN:  Will check a magnesium and calcium today stat secondary to her low magnesium and her low calcium. I will start tomorrow Keflex 500 mg TID times 10 days and she is going to finish the Levaquin t I will start a probiotic and I will obtain a wound culture. I may use Bactrim or doxycycline to cover for M RSA however I will await the results of her visit to the surgeon today. No other changes to care plan at this time. Greater than 38 minutes was spent on this follow-up acute care visit with greater than 50% of the time dedicated to coordination of care.      Total  minutes of which % was spent counseling and coordination of care of the above plan.    Electronically signed by: Bob Barnes DO

## 2021-06-08 NOTE — PROGRESS NOTES
HPI: Pt is here for follow up of a open gallbladder.   she is doing well.  Pain is well controlled.  No difficulties with the surgical wound/wounds.  she is eating well and denies fever and chills.         There were no vitals taken for this visit.    EXAM:  GENERAL:Appears well  ABDOMEN:  Soft, +BS  SURGICAL WOUNDS:  Incisions healing- mild irritation from staples. Staples removed. Steri strips placed.     .lastlab[CASEREPORT    Assessment/Plan: . Doing well after surgery and should follow up as needed.  Dominique Sanders PA-C  [facility] Department of Surgery

## 2021-06-08 NOTE — PROGRESS NOTES
[facility]      Facility:    [facility] [[id number]]  Code Status: UNKNOWN      Chief Complaint/Reason for Visit:  Chief Complaint   Patient presents with     H & P     Cholelithias with open cholecystectomy, diabetes, hyponatremia, leukocytosis, pain management, low magnesium, hyperbilirubinemia, status post cholecystectomy open.       HPI:   Carolina is a 61 y.o. female who Was recently admitted to the hospital on December 23, 2016 secondary to acute on set of abdominal pain. She was brought to the hospital in was worked up very thoroughly in was found to have an acute cholecystitis. Patient had elevated liver function tests including bilirubin and she was brought to surgery and found to have acute gangrenous cholecystitis. Patient needed in open procedure at that time and she did also had an elevated white count. She was put on Zosyn in the hospital during and after hospitalization. Her white count did improve with antibiotics and she finished her Augmentin. Patient does have chronic back pain in which she is on OxyContin at home 30 mg three times daily with intermediate release as needed. She was very sedated in the hospitaland now she is on 20 mg three times daily over long-acting oxycodone. This switched her to Vicodin instead of intermediate release oxycodone in her pain is in very poor control at this time. Patient also has nausea and she claims she is miserable with her pain. They did also lower gabapentin in the hospital due to sedation however they kept their blood pressure meds the same. Amlodipine was added to her blood pressure regime and she also had a urinary tract infection with hematuria that was treated appropriately. A Rodriguez catheter was placed and she was then transferred here to the TCU at [place] in stable condition.    Today patient claims her pain is in poor control. She claims it's about eight out of 10 and I did review her pain plan very  closely.White count was elevated in the hospital and she did run a low-grade fever of 99.9 here in the TCU. She is moving her bowels regularly and most loose at this time however ecause she was on antibiotics I am suspicious 4C diff colitis.    Past Medical History:  Past Medical History   Diagnosis Date     Anxiety      Bowel obstruction      Chronic back pain      Chronic hip pain      Depression      Diabetes mellitus      GERD (gastroesophageal reflux disease)      Hip pain      HTN (hypertension)            Surgical History:  Past Surgical History   Procedure Laterality Date     Joint replacement       Hernia repair       Had 4 surgeries total     Toe amputation Bilateral      3rd toe on both feet amputated.      Pr removal gallbladder N/A 12/25/2016     Procedure: CHOLECYSTECTOMY, OPEN;  Surgeon: Everardo Cisneros MD;  [place] [facility] [place]  Service: General       Family History:   Family History   Problem Relation Age of Onset     Breast cancer Mother      Coronary artery disease Father      Cancer Brother 20     Leukemia     Heart attack Brother        Social History:    Social History     Social History     Marital status:      Spouse name: N/A     Number of children: N/A     Years of education: N/A     Social History Main Topics     Smoking status: Current Some Day Smoker     Packs/day: 0.25     Years: 20.00     Types: Cigarettes     Smokeless tobacco: Never Used      Comment: refused counceling and smoking cessation folder.     Alcohol use No     Drug use: No     Sexual activity: Not Asked     Other Topics Concern     None     Social History Narrative          Review of Systems   Constitutional:        Patient complains of pain in poor control as well as multiple loose stools. She does have some nausea and vomiting and her oral intake has been poor. Her blood pressure is somewhat elevated at this time and she is running a low-grade fever she does have a Rodriguez catheter in at this time and I  am likely going to remove that within this week. The remainder of the review of systems is negative.       Vitals:    01/02/17 0908   BP: 174/89   Pulse: 69   Resp: 22   Temp: 99.4  F (37.4  C)   SpO2: 92%       Physical Exam   Constitutional: She is oriented to person, place, and time. She appears well-developed and well-nourished. No distress.   HENT:   Head: Normocephalic and atraumatic.   Nose: Nose normal.   Mouth/Throat: Oropharynx is clear and moist. No oropharyngeal exudate.   Eyes: Conjunctivae and EOM are normal. Pupils are equal, round, and reactive to light. Right eye exhibits no discharge. Left eye exhibits no discharge. No scleral icterus.   Neck: Neck supple. No tracheal deviation present. No thyromegaly present.   Cardiovascular: Normal rate, regular rhythm and normal heart sounds.  Exam reveals no gallop and no friction rub.    No murmur heard.  Pulmonary/Chest: Effort normal. No respiratory distress. She has no wheezes. She has no rales.   Abdominal: Soft. Bowel sounds are normal. She exhibits distension.   Large ventral hernia noted, incision this unremarkable with no signs of any infection or dehiscence.   Musculoskeletal: Normal range of motion. She exhibits no edema or tenderness.   Lymphadenopathy:     She has no cervical adenopathy.   Neurological: She is alert and oriented to person, place, and time. She displays normal reflexes. No cranial nerve deficit. She exhibits normal muscle tone. Coordination normal.   Skin: Skin is warm and dry. She is not diaphoretic.   Psychiatric: She has a normal mood and affect. Her behavior is normal.       Medication List:  Current Outpatient Prescriptions   Medication Sig     amLODIPine (NORVASC) 10 MG tablet Take 1 tablet (10 mg total) by mouth daily.     atenolol (TENORMIN) 100 MG tablet Take 100 mg by mouth 2 times a day at 9:00am and 5:00pm. hypertension     blood glucose test (ACCU-CHEK REMY) strips Use 1 each As Directed 3 (three) times a week.  Indications: DM II     cyanocobalamin 1,000 mcg/mL injection Inject 1,000 mcg into the shoulder, thigh, or buttocks Every month. supplement     cyclobenzaprine (FLEXERIL) 10 MG tablet Take 10 mg by mouth 3 (three) times a day as needed.      diclofenac sodium (VOLTAREN) 1 % Gel Apply topically daily as needed.     FLUoxetine (PROZAC) 20 MG capsule Take 60 mg by mouth every evening.     gabapentin (NEURONTIN) 100 MG capsule Take 200 mg by mouth 3 (three) times a day.     HYDROcodone-acetaminophen (NORCO )  mg per tablet Take 1-2 tablets by mouth every 4 (four) hours as needed for pain. 1 tab for moderate pain, 2 for severe.     Dani Therapuetic Nutrition 7-7-1.5 gram PwPk Take 1 packet by mouth 2 (two) times a day.     levothyroxine (SYNTHROID) 200 MCG tablet Take 200 mcg by mouth Daily at 6:00 am. Indications: Hypothyroidism     losartan (COZAAR) 100 MG tablet Take 100 mg by mouth daily. hypertension     naproxen (NAPROSYN) 250 MG tablet Take 250 mg by mouth 2 (two) times a day as needed. For pain     nystatin (MYCOSTATIN) powder Apply 1 application topically 3 (three) times a day. To abd folds and/or under breasts, for rash     oxyCODONE (OXYCONTIN) 20 mg 12 hr tablet Take 1 tablet (20 mg total) by mouth every 8 (eight) hours.     ranitidine (ZANTAC) 150 MG tablet Take 1 tablet (150 mg total) by mouth 2 (two) times a day as needed for heartburn. For reflux     traZODone (DESYREL) 50 MG tablet Take 50 mg by mouth bedtime. Indications: Major Depressive Disorder       Labs:Hospital labs are as follows; white count is 16.5, hemoglobin is 11.6,platelets are 279,000. Potassium in the hospital was 3.3 with the remainder of the basic metabolic profile within normal limits. Magnesium was 1.3.      Assessment:    ICD-10-CM    1. Acute cholecystitis K81.0    2. S/P cholecystectomy Z90.49    3. Leukocytosis D72.829    4. Encounter for chronic pain management G89.29    5. Hypertension I10    6. Urinary tract  infection N39.0    7. Hyperbilirubinemia E80.6        Plan:  Plan at this time is to stop the Norco at this time and start the oxycodone 5 mg one every four hours for pain to up to six out of 10. We will give to pills which is 10 mg for pain greater than six out of 10. Pain assessments will be done by staff every four hours and we will check liver function tests, CBC, magnesium, basic metabolic profile today. This is done secondary to cholecystitis with elevated temp, elevated liver function test, low magnesium, and leukocytosis. Patient will also have Tylenol 650 Q6 hours scheduled and I will continue to monitor above medical problems. I will likely take the catheter out sometime this week and no other changes to care plan at this time. Greater than 58 minutes was spent on this admission with greater than 50% of the time dedicated to coordination of care.        Electronically signed by: Bob Barnes DO

## 2021-06-08 NOTE — PROGRESS NOTES
[facility]    Facility:   [facility] [[id number]]   Code Status: FULL CODE     Following hospitalization and surgery for cholecystitis.  She developed a urinary tract infection.  She had evidence of a deep ulcer of the right minor labium.  Nurse requested consultation because she is insisting the catheter needs removed.  She was seen by urology yesterday.  Urologist recommended Rodriguez catheter until the ulcers healed.  She finds the catheter extremely uncomfortable and is anxious about having to manage it when she is discharge home tomorrow.  On exam she has deep ulcer estimated to be 3 to 4 mm deep.  It has a clean base.  The margins are crisp and sharp.  No rolled margins.  No induration.  Lesion measures an estimated 6 x 8 mm and is irregular in shape.    Discontinue catheter.  Advised the patient that if she starts having burning or discomfort in the area of the ulcer with voiding then the catheter should go back in until the ulcers healed.  If there is no discomfort that she should follow up with her family physician within the week for documentation of the ulcer and to decide if and when it needs to be biopsied.  Patient agreed that she would follow up with her regular physician.  Catheter will be removed        Electronically signed by: Ck Dixon MD

## 2021-06-08 NOTE — PROGRESS NOTES
HPI: Pt is here for follow up of a open kiera.   she is doing well.  Pain is well controlled. Here due to worries from tcu. Some drainage and redness around wound.     There were no vitals taken for this visit.    EXAM:  GENERAL:Appears well  ABDOMEN:  Soft, +BS  SURGICAL WOUNDS:  Incisions healing well, some irritation mid incision but no signs of infection. No seroma felt.     .lastlab[CASEREPORT    Assessment/Plan: . Probable seroma which has not refilled in at this time. Due to her difficult case and she is finishing five day of levaquin 500. i added levaquin 750 for five days. Follow up 1/9/17 for staple removal  [facility] Department of Surgery

## 2021-06-08 NOTE — PROGRESS NOTES
[facility]    Facility:   [facility] [[id number]]   Code Status: UNKNOWN      CHIEF COMPLAINT/REASON FOR VISIT:  Chief Complaint   Patient presents with     Review Of Multiple Medical Conditions     Low magnesium levels which are improving, leukocytosis, status post open cholecystectomy, low calcium level,pain management.       HISTORY:      HPI: Carolina is a 61 y.o. female who resides at the [facility] undergoing physical and occupational therapy secondary to cholecystitis with status post open cholecystectomy. She did have gangrenous cholecystitis and she underwent antibiotics in the hospital including Zosyn and Levaquin. Her white count the still elevated 12.1 however we are checking that weekly in that is pending for today. She did have some redness around her incision which warranted a visit to her surgeon. The surgeon did extend the antibiotics however it does not seem to be of any concern at this time. Will continue to monitor this. It is a question whether or not her ventral hernia is getting worse and she does have an abdominal binder on at this time. She hasn't had a ball movement since yesterday and feel she may be somewhat constipated. Other than that she has no other concerns today feels good and wants to go home.    Past Medical History   Diagnosis Date     Anxiety      Bowel obstruction      Chronic back pain      Chronic hip pain      Depression      Diabetes mellitus      GERD (gastroesophageal reflux disease)      Hip pain      HTN (hypertension)              Family History   Problem Relation Age of Onset     Breast cancer Mother      Coronary artery disease Father      Cancer Brother 20     Leukemia     Heart attack Brother      Social History     Social History     Marital status:      Spouse name: N/A     Number of children: N/A     Years of education: N/A     Social History Main Topics     Smoking status: Current Some Day  Smoker     Packs/day: 0.25     Years: 20.00     Types: Cigarettes     Smokeless tobacco: Never Used      Comment: refused counceling and smoking cessation folder.     Alcohol use No     Drug use: No     Sexual activity: Not Asked     Other Topics Concern     None     Social History Narrative         Review of Systems   Constitutional: Negative for activity change, chills and fever.   HENT: Negative for hearing loss.    Eyes: Negative for visual disturbance.   Respiratory: Negative for apnea, chest tightness and shortness of breath.    Cardiovascular: Negative for chest pain and palpitations.   Gastrointestinal: Negative for abdominal pain, constipation, nausea and vomiting.   Endocrine: Negative for polydipsia, polyphagia and polyuria.   Genitourinary: Negative for decreased urine volume and urgency.   Musculoskeletal: Negative for neck pain and neck stiffness.   Skin: Negative for rash.   Hematological: Does not bruise/bleed easily.   Psychiatric/Behavioral: Negative for agitation and behavioral problems.       .  Vitals:    01/09/17 0849   BP: 134/77   Pulse: 68   Resp: 16   Temp: 98  F (36.7  C)   SpO2: 98%       Physical Exam   Constitutional: She is oriented to person, place, and time. She appears well-developed and well-nourished. No distress.   HENT:   Head: Normocephalic and atraumatic.   Nose: Nose normal.   Mouth/Throat: Oropharynx is clear and moist. No oropharyngeal exudate.   Eyes: Conjunctivae and EOM are normal. Pupils are equal, round, and reactive to light. Right eye exhibits no discharge. Left eye exhibits no discharge. No scleral icterus.   Neck: Neck supple. No tracheal deviation present. No thyromegaly present.   Cardiovascular: Normal rate, regular rhythm and normal heart sounds.  Exam reveals no gallop and no friction rub.    No murmur heard.  Pulmonary/Chest: Effort normal. No respiratory distress. She has no wheezes. She has no rales.   Abdominal: Soft. Bowel sounds are normal. She exhibits  distension.   Large ventral hernia exist left a midline which has not changed.Bowel sounds are positive.incision will be examined in rounds today.   Musculoskeletal: Normal range of motion. She exhibits no edema or tenderness.   Lymphadenopathy:     She has no cervical adenopathy.   Neurological: She is alert and oriented to person, place, and time. She displays normal reflexes. No cranial nerve deficit. She exhibits normal muscle tone. Coordination normal.   Skin: Skin is warm and dry. No rash noted. She is not diaphoretic. No erythema. No pallor.   Psychiatric: She has a normal mood and affect. Her behavior is normal.         LABS: On January 5 calcium was 8.1 which is up from 7.9, Museum was 1.3 which is up from 1.1, Clostridium difficile was negative. On January 2 white count was 12.1 hemoglobin was 11.5,and platelets were within normal limits.      ASSESSMENT:      ICD-10-CM    1. Acute cholecystitis K81.0    2. S/P cholecystectomy Z90.49    3. Leukocytosis, unspecified type D72.829    4. Low magnesium levels E83.42    5. Hypocalcemia E83.51        PLAN:  Plan at this time is to continue to monitor above medical problems andwe will continue to monitor the magnesium as well as the calcium in white count. I will check these labs tomorrow. No other changes to care plan at this time and care plan was reviewed.      Total  minutes of which % was spent counseling and coordination of care of the above plan.    Electronically signed by: Bob Barnes DO

## 2021-06-09 NOTE — PROGRESS NOTES
[place] [facility] DISCHARGE SUMMARY  PATIENT'S NAME: Carolina Mari : 1955 MRN: [id number] Place of Service where encounter took place:  [facility] (U) [4000] PRIMARY CARE PROVIDER AND CLINIC RESPONSIBLE AFTER TRANSFER: Le Romo MD, [facility] [address] [place] [address] / [facility]. Non-FMG Provider     Transferring providers: RACHEL Terrazas CNP DNP & Ariana Bose MD  Recent Hospitalization/ED: [facility] stay  to .  Date of TCU Admission: 21.  Date of TCU (anticipated) Discharge: 21.  Discharged to: previous independent home (with goal for HETAL in the near future).   Cognitive Scores: BIMS: 13/15, SLUMS: 29/30 and CPT: 5.5/5.6  Physical Function: Wheelchair at baseline.  DME: None.  CODE STATUS/ADVANCE DIRECTIVES DISCUSSION: DNR/DNI.  ALLERGIES: Aspirin, Colchicine, and Colcrys    DISCHARGE DIAGNOSIS/NURSING FACILITY COURSE:   Chronic skin ulcer, limited to breakdown of skin (H)  Pressure ulcer of right buttock, stage 3 (H)  Opioid abuse (H)  Moderate episode of recurrent major depressive disorder (H)  Generalized anxiety disorder  Stage 3a chronic kidney disease  NEELIMA (acute kidney injury) (H)  Morbid obesity (H)  Carolina presented to [facility] on  w/ worsening wounds and overall inability to care for herself. She was found to have possibly infected wounds, an NEELIMA, and was significantly deconditioned. She was given IVF, IV abx and other supportive measures. Carolina presented to TCU on  s/p hospitalization.      Skin was diligently cared for by nursing staff, and most lesions are healing well as noted below.     Carolina continues to believe that she requires ongoing opioid use, and states she takes more at home. She requested increases during several visits. Changes were not made here, despite efforts to educate.    Attempt for Prozac reduction also caused much anxiety. Prozac was initially reduced from 60mg to 40mg. When Carolina found out, she was  "angry and demanded it be switched back. Returned to 60mg and educated Carolina on better SSRI alternatives. She was supported during her stay by in-house psych.    NEELIMA completely resolved.  Today, Carolina is adamant that provider not make any changes and becomes angry with my entry into her room/space. She states she \"needs\" the Oxycodone every 4 hours. She then demonstrated transfer from her bed to wheelchair w/o assistance and without guarding/discomfort. She denies SOB, CP, fever, constipation. Previous diarrhea resolved. Skin is looking better, and she states she is sleeping well. Chart review shows her VS have been stable.     Recommendations for PCP:     Trend TSH.    Pain Management & Opioid Contract.     Diligent care coordination until she is under HETAL care.    Prozac GDR and switch to better tolerated SSRI.    Past Medical History:  has a past medical history of Diabetes mellitus (H), History of stomach ulcers, HTN (hypertension), Knee osteoarthritis, Osteoarthritis of right hip, Osteoporosis, Osteoporosis, and Thyroid disease.  Discharge Medications:  Current Outpatient Medications   Medication Sig Dispense Refill     acetaminophen (TYLENOL) 500 MG tablet Take 1,000 mg by mouth 3 times daily AND 650mg PO every day  PRN       amLODIPine (NORVASC) 10 MG tablet Take 10 mg by mouth At Bedtime        [START ON 6/18/2021] cyanocobalamin (CYANOCOBALAMIN) 1000 MCG/ML injection Inject 1 mL (1,000 mcg) into the muscle every 30 days       famotidine (PEPCID) 20 MG tablet Take 20 mg by mouth every morning        FLUoxetine 20 MG tablet Take 60 mg by mouth daily       GABAPENTIN PO Take 600 mg by mouth 3 times daily        LEVOTHYROXINE SODIUM PO Take 200 mcg by mouth daily       LOSARTAN POTASSIUM PO Take 100 mg by mouth daily       metoprolol tartrate (LOPRESSOR) 100 MG tablet Take 100 mg by mouth 2 times daily       Nystatin (NYAMYC) 976731 UNIT/GM POWD Apply topically 2 times daily as needed        oxyCODONE " "(OXYCONTIN) 20 MG 12 hr tablet Take 1 tablet (20 mg) by mouth every 12 hours 6 tablet 0     oxyCODONE (ROXICODONE) 5 MG tablet Take 1 tablet (5 mg) by mouth every 4 hours as needed for severe pain 5 tablet 0     Controlled medications sent with patient: Medication: OxyContin/Oxycodone , remaining tabs given to patient at the time of discharge to take home     ROS: 4 point ROS including Respiratory, CV, GI and , other than that noted in the HPI,  is negative    Physical Exam: Vitals: /65   Pulse 60   Temp 98  F (36.7  C)   Resp 16   Ht 1.753 m (5' 9\")   Wt 118.3 kg (260 lb 12.8 oz)   SpO2 98%   BMI 38.51 kg/m    GENERAL APPEARANCE: Alert, in no distress, cooperative.   ENT: Mouth/posterior oropharynx intact w/ moist mucous membranes, hearing acuity [place].  EYES: EOM, conjunctivae, lids, pupils and irises normal, PERRL2.   RESP: Respiratory effort unlabored, no respiratory distress, Lung sounds clear. On RA.   CV: Auscultation of heart reveals S1, S2, rate and rhythm regular, no murmur, no rub or gallop, Edema 1+ BLE. Peripheral pulses are 2+.  ABDOMEN: Normal bowel sounds, soft, non-tender abdomen, and no masses palpated.  SKIN: Inspection/Palpation of skin and subcutaneous tissue baseline w/ fragility. Updated skin pictures are included here (bottom is healed):    NEURO: CN II-XII at patient's baseline, sensation baseline PPS.  PSYCH: Insight, judgement, and memory are baseline, affect and mood are irritable/engaged.    Facility Labs: Labs done in SNF are in [place] EPIC. Please refer to them using EPIC/Care Everywhere.    DISCHARGE PLAN:    Follow up labs: No labs orders/due    Medical Follow Up:  Follow up with primary care provider in 1-2 weeks    MTM referral needed and placed by this provider: No    Current [place] scheduled appointments: None.    Discharge Services: Home Care:  Occupational Therapy, Physical Therapy, Registered Nurse and Home Health Aide    Orders:  No new orders.    TOTAL " DISCHARGE TIME:   Greater than 30 minutes    Electronically signed by:  Dr. Adilene Aguayo, APRN CNP DNP  ______________      Documentation of Face-to-Face and Certification for Home Health Services   Patient: Carolina Mari YOB: 1955  MRN: [id number]  Today's Date: 6/10/2021  I certify that patient: Carolina Mari is under my care and that I had a face-to-face encounter that meets the provider  face-to-face encounter requirements with this patient on: 6/10/2021. This encounter with the patient was in whole, or in part, for the following medical condition, which is the primary reason for home health care: wound care.    I certify that, based on my findings, the following services are medically necessary home health services: Nursing, Occupational Therapy and Physical Therapy. My clinical findings support the need for the above services because: Nurse is needed: To provide assessment and oversight required in the home to assure adherence to the medical plan due to: opioid abuse.., Occupational Therapy Services are needed to assess and treat cognitive ability and address ADL safety due to impairment in mobility. and Physical Therapy Services are needed to assess and treat the following functional impairments: mobility.    Further, I certify that my clinical findings support that this patient is homebound (i.e. absences from home require considerable and taxing effort and are for medical reasons or [employer] services or infrequently or of short duration when for other reasons) because: Requires assistance of another person or specialized equipment to access medical services because patient: Range of motion limitations prevents ability to exit home safely...    Based on the above findings. I certify that this patient is confined to the home and needs intermittent skilled nursing care, physical therapy and/or speech therapy.  The patient is under my care, and I have initiated the  establishment of the plan of care.  This patient will be followed by a provider who will periodically review the plan of care.    Provider to give follow up care: Le Romo    Responsible Medicare certified PECOS Provider: RACHEL Alvarez CNP DNP  Provider Signature: See electronic signature associated with these discharge orders.  Date: 6/10/2021

## 2021-06-10 ENCOUNTER — NURSING HOME VISIT (OUTPATIENT)
Dept: GERIATRICS | Facility: CLINIC | Age: 66
End: 2021-06-10
Payer: MEDICARE

## 2021-06-10 VITALS
WEIGHT: 260.8 LBS | SYSTOLIC BLOOD PRESSURE: 113 MMHG | HEART RATE: 60 BPM | RESPIRATION RATE: 16 BRPM | OXYGEN SATURATION: 98 % | TEMPERATURE: 98 F | DIASTOLIC BLOOD PRESSURE: 65 MMHG | BODY MASS INDEX: 38.63 KG/M2 | HEIGHT: 69 IN

## 2021-06-10 DIAGNOSIS — E66.01 MORBID OBESITY (H): ICD-10-CM

## 2021-06-10 DIAGNOSIS — L98.491 CHRONIC SKIN ULCER, LIMITED TO BREAKDOWN OF SKIN (H): Primary | ICD-10-CM

## 2021-06-10 DIAGNOSIS — F41.1 GENERALIZED ANXIETY DISORDER: ICD-10-CM

## 2021-06-10 DIAGNOSIS — F33.1 MODERATE EPISODE OF RECURRENT MAJOR DEPRESSIVE DISORDER (H): ICD-10-CM

## 2021-06-10 DIAGNOSIS — N17.9 AKI (ACUTE KIDNEY INJURY) (H): ICD-10-CM

## 2021-06-10 DIAGNOSIS — N18.31 STAGE 3A CHRONIC KIDNEY DISEASE (H): ICD-10-CM

## 2021-06-10 DIAGNOSIS — F11.10 OPIOID ABUSE (H): ICD-10-CM

## 2021-06-10 DIAGNOSIS — L89.313 PRESSURE ULCER OF RIGHT BUTTOCK, STAGE 3 (H): ICD-10-CM

## 2021-06-10 PROCEDURE — 99316 NF DSCHRG MGMT 30 MIN+: CPT | Performed by: NURSE PRACTITIONER

## 2021-06-10 ASSESSMENT — MIFFLIN-ST. JEOR: SCORE: 1782.36

## 2021-06-10 NOTE — LETTER
6/10/2021        RE: Carolina Mari  [address]  [address]  [place] [address]        [place] [facility] DISCHARGE [place]  PATIENT'S NAME: Carolina Mari : 1955 MRN: [id number] Place of Service where encounter took place:  [facility] (U) [4000] PRIMARY CARE PROVIDER AND CLINIC RESPONSIBLE AFTER TRANSFER: Le Romo MD, [facility] [address] [facility] 7 / [facility]. Non-FMG Provider     Transferring providers: RACHEL Terrazas CNP DNP & Ariana Bose MD  Recent Hospitalization/ED: [facility] stay  to .  Date of TCU Admission: 21.  Date of TCU (anticipated) Discharge: 21.  Discharged to: previous independent home (with goal for shelter in the near future).   Cognitive Scores: BIMS: 13/15, SLUMS: 29/30 and CPT: 5.5/5.6  Physical Function: Wheelchair at baseline.  DME: None.  CODE STATUS/ADVANCE DIRECTIVES DISCUSSION: DNR/DNI.  ALLERGIES: Aspirin, Colchicine, and Colcrys    DISCHARGE DIAGNOSIS/NURSING FACILITY COURSE:   Chronic skin ulcer, limited to breakdown of skin (H)  Pressure ulcer of right buttock, stage 3 (H)  Opioid abuse (H)  Moderate episode of recurrent major depressive disorder (H)  Generalized anxiety disorder  Stage 3a chronic kidney disease  NEELIMA (acute kidney injury) (H)  Morbid obesity (H)  Carolina presented to [facility] on  w/ worsening wounds and overall inability to care for herself. She was found to have possibly infected wounds, an NEELIMA, and was significantly deconditioned. She was given IVF, IV abx and other supportive measures. Carolina presented to TCU on  s/p hospitalization.      Skin was diligently cared for by nursing staff, and most lesions are healing well as noted below.     Carolina continues to believe that she requires ongoing opioid use, and states she takes more at home. She requested increases during several visits. Changes were not made here, despite efforts to educate.    Attempt for Prozac  "reduction also caused much anxiety. Prozac was initially reduced from 60mg to 40mg. When Carolina found out, she was angry and demanded it be switched back. Returned to 60mg and educated Carolina on better SSRI alternatives. She was supported during her stay by in-house psych.    NEELIMA completely resolved.  Today, Carolina is adamant that provider not make any changes and becomes angry with my entry into her room/space. She states she \"needs\" the Oxycodone every 4 hours. She then demonstrated transfer from her bed to wheelchair w/o assistance and without guarding/discomfort. She denies SOB, CP, fever, constipation. Previous diarrhea resolved. Skin is looking better, and she states she is sleeping well. Chart review shows her VS have been stable.     Recommendations for PCP:     Trend TSH.    Pain Management & Opioid Contract.     Diligent care coordination until she is under HETAL care.    Prozac GDR and switch to better tolerated SSRI.    Past Medical History:  has a past medical history of Diabetes mellitus (H), History of stomach ulcers, HTN (hypertension), Knee osteoarthritis, Osteoarthritis of right hip, Osteoporosis, Osteoporosis, and Thyroid disease.  Discharge Medications:  Current Outpatient Medications   Medication Sig Dispense Refill     acetaminophen (TYLENOL) 500 MG tablet Take 1,000 mg by mouth 3 times daily AND 650mg PO every day  PRN       amLODIPine (NORVASC) 10 MG tablet Take 10 mg by mouth At Bedtime        [START ON 6/18/2021] cyanocobalamin (CYANOCOBALAMIN) 1000 MCG/ML injection Inject 1 mL (1,000 mcg) into the muscle every 30 days       famotidine (PEPCID) 20 MG tablet Take 20 mg by mouth every morning        FLUoxetine 20 MG tablet Take 60 mg by mouth daily       GABAPENTIN PO Take 600 mg by mouth 3 times daily        LEVOTHYROXINE SODIUM PO Take 200 mcg by mouth daily       LOSARTAN POTASSIUM PO Take 100 mg by mouth daily       metoprolol tartrate (LOPRESSOR) 100 MG tablet Take 100 mg by mouth 2 times " "daily       Nystatin ([facility]) [id number] UNIT/GM POWD Apply topically 2 times daily as needed        oxyCODONE (OXYCONTIN) 20 MG 12 hr tablet Take 1 tablet (20 mg) by mouth every 12 hours 6 tablet 0     oxyCODONE (ROXICODONE) 5 MG tablet Take 1 tablet (5 mg) by mouth every 4 hours as needed for severe pain 5 tablet 0     Controlled medications sent with patient: Medication: OxyContin/Oxycodone , remaining tabs given to patient at the time of discharge to take home     ROS: 4 point ROS including Respiratory, CV, GI and , other than that noted in the HPI,  is negative    Physical Exam: Vitals: /65   Pulse 60   Temp 98  F (36.7  C)   Resp 16   Ht 1.753 m (5' 9\")   Wt 118.3 kg (260 lb 12.8 oz)   SpO2 98%   BMI 38.51 kg/m    GENERAL APPEARANCE: Alert, in no distress, cooperative.   ENT: Mouth/posterior oropharynx intact w/ moist mucous membranes, hearing acuity [place].  EYES: EOM, conjunctivae, lids, pupils and irises normal, PERRL2.   RESP: Respiratory effort unlabored, no respiratory distress, Lung sounds clear. On RA.   CV: Auscultation of heart reveals S1, S2, rate and rhythm regular, no murmur, no rub or gallop, Edema 1+ BLE. Peripheral pulses are 2+.  ABDOMEN: Normal bowel sounds, soft, non-tender abdomen, and no masses palpated.  SKIN: Inspection/Palpation of skin and subcutaneous tissue baseline w/ fragility. Updated skin pictures are included here (bottom is healed):    NEURO: CN II-XII at patient's baseline, sensation baseline PPS.  PSYCH: Insight, judgement, and memory are baseline, affect and mood are irritable/engaged.    Facility Labs: Labs done in SNF are in [place] EPIC. Please refer to them using EPIC/Care Everywhere.    DISCHARGE PLAN:    Follow up labs: No labs orders/due    Medical Follow Up:  Follow up with primary care provider in 1-2 weeks    MTM referral needed and placed by this provider: No    Current [place] scheduled appointments: None.    Discharge Services: Home Care:  Occupational " Therapy, Physical Therapy, Registered Nurse and Home Health Aide    Orders:  No new orders.    TOTAL DISCHARGE TIME:   Greater than 30 minutes    Electronically signed by:  RACHEL Alvarez CNP DNP  ______________      Documentation of Face-to-Face and Certification for Home Health Services   Patient: Carolina Mari YOB: 1955  MRN: [id number]  Today's Date: 6/10/2021  I certify that patient: Carolina Mari is under my care and that I had a face-to-face encounter that meets the provider  face-to-face encounter requirements with this patient on: 6/10/2021. This encounter with the patient was in whole, or in part, for the following medical condition, which is the primary reason for home health care: wound care.    I certify that, based on my findings, the following services are medically necessary home health services: Nursing, Occupational Therapy and Physical Therapy. My clinical findings support the need for the above services because: Nurse is needed: To provide assessment and oversight required in the home to assure adherence to the medical plan due to: opioid abuse.., Occupational Therapy Services are needed to assess and treat cognitive ability and address ADL safety due to impairment in mobility. and Physical Therapy Services are needed to assess and treat the following functional impairments: mobility.    Further, I certify that my clinical findings support that this patient is homebound (i.e. absences from home require considerable and taxing effort and are for medical reasons or [employer] services or infrequently or of short duration when for other reasons) because: Requires assistance of another person or specialized equipment to access medical services because patient: Range of motion limitations prevents ability to exit home safely...    Based on the above findings. I certify that this patient is confined to the home and needs intermittent skilled nursing care,  physical therapy and/or speech therapy.  The patient is under my care, and I have initiated the establishment of the plan of care.  This patient will be followed by a provider who will periodically review the plan of care.    Provider to give follow up care: Le Romo    Responsible Medicare certified PECOS Provider: RACHEL Alvarez CNP, DNP  Provider Signature: See electronic signature associated with these discharge orders.  Date: 6/10/2021                   Sincerely,        RACHEL Martin CNP

## 2021-06-13 ENCOUNTER — TELEPHONE (OUTPATIENT)
Dept: GERIATRICS | Facility: CLINIC | Age: 66
End: 2021-06-13

## 2021-06-13 DIAGNOSIS — L89.313 PRESSURE INJURY OF RIGHT BUTTOCK, STAGE 3 (H): ICD-10-CM

## 2021-06-13 DIAGNOSIS — M06.4 UNDIFFERENTIATED INFLAMMATORY ARTHRITIS (H): ICD-10-CM

## 2021-06-13 RX ORDER — OXYCODONE HYDROCHLORIDE 5 MG/1
5 TABLET ORAL EVERY 4 HOURS PRN
Qty: 5 TABLET | Refills: 0 | Status: SHIPPED | OUTPATIENT
Start: 2021-06-13 | End: 2021-08-03

## 2021-06-13 RX ORDER — OXYCODONE HCL 20 MG/1
20 TABLET, FILM COATED, EXTENDED RELEASE ORAL EVERY 12 HOURS
Qty: 6 TABLET | Refills: 0 | Status: SHIPPED | OUTPATIENT
Start: 2021-06-13 | End: 2021-06-13

## 2021-06-13 RX ORDER — OXYCODONE HCL 20 MG/1
20 TABLET, FILM COATED, EXTENDED RELEASE ORAL EVERY 12 HOURS
Qty: 6 TABLET | Refills: 0 | Status: SHIPPED | OUTPATIENT
Start: 2021-06-13 | End: 2022-01-06

## 2021-07-13 ENCOUNTER — RECORDS - HEALTHEAST (OUTPATIENT)
Dept: ADMINISTRATIVE | Facility: CLINIC | Age: 66
End: 2021-07-13

## 2021-08-03 ENCOUNTER — HOSPITAL ENCOUNTER (OUTPATIENT)
Facility: CLINIC | Age: 66
Setting detail: OBSERVATION
Discharge: HOME HEALTH | End: 2021-08-05
Attending: FAMILY MEDICINE | Admitting: INTERNAL MEDICINE
Payer: MEDICARE

## 2021-08-03 ENCOUNTER — APPOINTMENT (OUTPATIENT)
Dept: CT IMAGING | Facility: CLINIC | Age: 66
End: 2021-08-03
Attending: FAMILY MEDICINE
Payer: MEDICARE

## 2021-08-03 DIAGNOSIS — Z11.52 ENCOUNTER FOR SCREENING LABORATORY TESTING FOR SEVERE ACUTE RESPIRATORY SYNDROME CORONAVIRUS 2 (SARS-COV-2): ICD-10-CM

## 2021-08-03 DIAGNOSIS — R11.2 NON-INTRACTABLE VOMITING WITH NAUSEA, UNSPECIFIED VOMITING TYPE: ICD-10-CM

## 2021-08-03 DIAGNOSIS — R10.84 ABDOMINAL PAIN, GENERALIZED: ICD-10-CM

## 2021-08-03 LAB
ALBUMIN SERPL-MCNC: 3.5 G/DL (ref 3.4–5)
ALP SERPL-CCNC: 130 U/L (ref 40–150)
ALT SERPL W P-5'-P-CCNC: 16 U/L (ref 0–50)
ANION GAP SERPL CALCULATED.3IONS-SCNC: 5 MMOL/L (ref 3–14)
AST SERPL W P-5'-P-CCNC: 23 U/L (ref 0–45)
BASOPHILS # BLD AUTO: 0 10E3/UL (ref 0–0.2)
BASOPHILS NFR BLD AUTO: 0 %
BILIRUB SERPL-MCNC: 0.8 MG/DL (ref 0.2–1.3)
BUN SERPL-MCNC: 21 MG/DL (ref 7–30)
CALCIUM SERPL-MCNC: 9.3 MG/DL (ref 8.5–10.1)
CHLORIDE BLD-SCNC: 102 MMOL/L (ref 94–109)
CO2 SERPL-SCNC: 28 MMOL/L (ref 20–32)
CREAT SERPL-MCNC: 1.02 MG/DL (ref 0.52–1.04)
EOSINOPHIL # BLD AUTO: 0 10E3/UL (ref 0–0.7)
EOSINOPHIL NFR BLD AUTO: 0 %
ERYTHROCYTE [DISTWIDTH] IN BLOOD BY AUTOMATED COUNT: 15.9 % (ref 10–15)
ETHANOL SERPL-MCNC: <0.01 G/DL
GFR SERPL CREATININE-BSD FRML MDRD: 57 ML/MIN/1.73M2
GLUCOSE BLD-MCNC: 147 MG/DL (ref 70–99)
HCT VFR BLD AUTO: 39.1 % (ref 35–47)
HGB BLD-MCNC: 12.4 G/DL (ref 11.7–15.7)
HOLD SPECIMEN: NORMAL
IMM GRANULOCYTES # BLD: 0.1 10E3/UL
IMM GRANULOCYTES NFR BLD: 1 %
LIPASE SERPL-CCNC: 149 U/L (ref 73–393)
LYMPHOCYTES # BLD AUTO: 0.7 10E3/UL (ref 0.8–5.3)
LYMPHOCYTES NFR BLD AUTO: 6 %
MCH RBC QN AUTO: 27.3 PG (ref 26.5–33)
MCHC RBC AUTO-ENTMCNC: 31.7 G/DL (ref 31.5–36.5)
MCV RBC AUTO: 86 FL (ref 78–100)
MONOCYTES # BLD AUTO: 0.7 10E3/UL (ref 0–1.3)
MONOCYTES NFR BLD AUTO: 7 %
NEUTROPHILS # BLD AUTO: 9.5 10E3/UL (ref 1.6–8.3)
NEUTROPHILS NFR BLD AUTO: 86 %
NRBC # BLD AUTO: 0 10E3/UL
NRBC BLD AUTO-RTO: 0 /100
PLATELET # BLD AUTO: 348 10E3/UL (ref 150–450)
POTASSIUM BLD-SCNC: 4.6 MMOL/L (ref 3.4–5.3)
PROT SERPL-MCNC: 8.2 G/DL (ref 6.8–8.8)
RBC # BLD AUTO: 4.55 10E6/UL (ref 3.8–5.2)
SARS-COV-2 RNA RESP QL NAA+PROBE: NEGATIVE
SODIUM SERPL-SCNC: 135 MMOL/L (ref 133–144)
WBC # BLD AUTO: 11 10E3/UL (ref 4–11)

## 2021-08-03 PROCEDURE — 99285 EMERGENCY DEPT VISIT HI MDM: CPT | Performed by: FAMILY MEDICINE

## 2021-08-03 PROCEDURE — 258N000003 HC RX IP 258 OP 636: Performed by: FAMILY MEDICINE

## 2021-08-03 PROCEDURE — 83690 ASSAY OF LIPASE: CPT | Performed by: FAMILY MEDICINE

## 2021-08-03 PROCEDURE — 85025 COMPLETE CBC W/AUTO DIFF WBC: CPT | Performed by: FAMILY MEDICINE

## 2021-08-03 PROCEDURE — 258N000003 HC RX IP 258 OP 636: Performed by: INTERNAL MEDICINE

## 2021-08-03 PROCEDURE — 250N000011 HC RX IP 250 OP 636: Performed by: INTERNAL MEDICINE

## 2021-08-03 PROCEDURE — 250N000009 HC RX 250: Performed by: FAMILY MEDICINE

## 2021-08-03 PROCEDURE — 74177 CT ABD & PELVIS W/CONTRAST: CPT

## 2021-08-03 PROCEDURE — 250N000011 HC RX IP 250 OP 636: Performed by: FAMILY MEDICINE

## 2021-08-03 PROCEDURE — 99285 EMERGENCY DEPT VISIT HI MDM: CPT | Mod: 25 | Performed by: FAMILY MEDICINE

## 2021-08-03 PROCEDURE — 96375 TX/PRO/DX INJ NEW DRUG ADDON: CPT | Performed by: FAMILY MEDICINE

## 2021-08-03 PROCEDURE — 99207 PR CDG-MDM COMPONENT: MEETS HIGH - UP CODED: CPT | Performed by: INTERNAL MEDICINE

## 2021-08-03 PROCEDURE — 96376 TX/PRO/DX INJ SAME DRUG ADON: CPT | Performed by: FAMILY MEDICINE

## 2021-08-03 PROCEDURE — G0378 HOSPITAL OBSERVATION PER HR: HCPCS

## 2021-08-03 PROCEDURE — 96374 THER/PROPH/DIAG INJ IV PUSH: CPT | Mod: 59 | Performed by: FAMILY MEDICINE

## 2021-08-03 PROCEDURE — 82077 ASSAY SPEC XCP UR&BREATH IA: CPT | Performed by: FAMILY MEDICINE

## 2021-08-03 PROCEDURE — 36415 COLL VENOUS BLD VENIPUNCTURE: CPT | Performed by: FAMILY MEDICINE

## 2021-08-03 PROCEDURE — 82040 ASSAY OF SERUM ALBUMIN: CPT | Performed by: FAMILY MEDICINE

## 2021-08-03 PROCEDURE — 99220 PR INITIAL OBSERVATION CARE,LEVEL III: CPT | Performed by: INTERNAL MEDICINE

## 2021-08-03 PROCEDURE — C9803 HOPD COVID-19 SPEC COLLECT: HCPCS | Performed by: FAMILY MEDICINE

## 2021-08-03 PROCEDURE — 96376 TX/PRO/DX INJ SAME DRUG ADON: CPT

## 2021-08-03 PROCEDURE — 96375 TX/PRO/DX INJ NEW DRUG ADDON: CPT

## 2021-08-03 PROCEDURE — 87635 SARS-COV-2 COVID-19 AMP PRB: CPT | Performed by: FAMILY MEDICINE

## 2021-08-03 PROCEDURE — 250N000013 HC RX MED GY IP 250 OP 250 PS 637: Performed by: INTERNAL MEDICINE

## 2021-08-03 RX ORDER — AMOXICILLIN 250 MG
2 CAPSULE ORAL 2 TIMES DAILY PRN
Status: DISCONTINUED | OUTPATIENT
Start: 2021-08-03 | End: 2021-08-05 | Stop reason: HOSPADM

## 2021-08-03 RX ORDER — FLUOXETINE HYDROCHLORIDE 60 MG/1
1 TABLET, FILM COATED ORAL; ORAL EVERY MORNING
COMMUNITY
Start: 2021-06-25 | End: 2023-01-01

## 2021-08-03 RX ORDER — AMLODIPINE BESYLATE 5 MG/1
5 TABLET ORAL DAILY
Status: DISCONTINUED | OUTPATIENT
Start: 2021-08-04 | End: 2021-08-05 | Stop reason: HOSPADM

## 2021-08-03 RX ORDER — PROCHLORPERAZINE MALEATE 5 MG
5 TABLET ORAL EVERY 6 HOURS PRN
Status: DISCONTINUED | OUTPATIENT
Start: 2021-08-03 | End: 2021-08-05 | Stop reason: HOSPADM

## 2021-08-03 RX ORDER — AMLODIPINE BESYLATE 5 MG/1
5 TABLET ORAL AT BEDTIME
COMMUNITY
Start: 2021-06-21 | End: 2023-01-01

## 2021-08-03 RX ORDER — GABAPENTIN 600 MG/1
100 TABLET ORAL 2 TIMES DAILY
COMMUNITY
Start: 2021-12-31 | End: 2022-04-01

## 2021-08-03 RX ORDER — LOSARTAN POTASSIUM 100 MG/1
1 TABLET ORAL DAILY
Status: ON HOLD | COMMUNITY
Start: 2021-06-15 | End: 2021-08-05

## 2021-08-03 RX ORDER — IOPAMIDOL 755 MG/ML
100 INJECTION, SOLUTION INTRAVASCULAR ONCE
Status: COMPLETED | OUTPATIENT
Start: 2021-08-03 | End: 2021-08-03

## 2021-08-03 RX ORDER — NALOXONE HYDROCHLORIDE 0.4 MG/ML
0.2 INJECTION, SOLUTION INTRAMUSCULAR; INTRAVENOUS; SUBCUTANEOUS
Status: DISCONTINUED | OUTPATIENT
Start: 2021-08-03 | End: 2021-08-05 | Stop reason: HOSPADM

## 2021-08-03 RX ORDER — ONDANSETRON 4 MG/1
4 TABLET, ORALLY DISINTEGRATING ORAL EVERY 6 HOURS PRN
Status: DISCONTINUED | OUTPATIENT
Start: 2021-08-03 | End: 2021-08-05 | Stop reason: HOSPADM

## 2021-08-03 RX ORDER — LEVOTHYROXINE SODIUM 100 UG/1
200 TABLET ORAL DAILY
Status: DISCONTINUED | OUTPATIENT
Start: 2021-08-03 | End: 2021-08-05 | Stop reason: HOSPADM

## 2021-08-03 RX ORDER — POLYETHYLENE GLYCOL 3350 17 G/17G
17 POWDER, FOR SOLUTION ORAL DAILY PRN
Status: DISCONTINUED | OUTPATIENT
Start: 2021-08-03 | End: 2021-08-05 | Stop reason: HOSPADM

## 2021-08-03 RX ORDER — LEVOTHYROXINE SODIUM 200 UG/1
225 TABLET ORAL DAILY
COMMUNITY
Start: 2021-06-15

## 2021-08-03 RX ORDER — METOPROLOL TARTRATE 100 MG
100 TABLET ORAL 2 TIMES DAILY
Status: DISCONTINUED | OUTPATIENT
Start: 2021-08-03 | End: 2021-08-05 | Stop reason: HOSPADM

## 2021-08-03 RX ORDER — SODIUM CHLORIDE 9 MG/ML
INJECTION, SOLUTION INTRAVENOUS CONTINUOUS
Status: DISCONTINUED | OUTPATIENT
Start: 2021-08-03 | End: 2021-08-05 | Stop reason: HOSPADM

## 2021-08-03 RX ORDER — SIMVASTATIN 20 MG
20 TABLET ORAL AT BEDTIME
COMMUNITY
Start: 2021-12-09

## 2021-08-03 RX ORDER — PROCHLORPERAZINE 25 MG
12.5 SUPPOSITORY, RECTAL RECTAL EVERY 12 HOURS PRN
Status: DISCONTINUED | OUTPATIENT
Start: 2021-08-03 | End: 2021-08-05 | Stop reason: HOSPADM

## 2021-08-03 RX ORDER — AMOXICILLIN 250 MG
1 CAPSULE ORAL 2 TIMES DAILY PRN
Status: DISCONTINUED | OUTPATIENT
Start: 2021-08-03 | End: 2021-08-05 | Stop reason: HOSPADM

## 2021-08-03 RX ORDER — NALOXONE HYDROCHLORIDE 0.4 MG/ML
0.4 INJECTION, SOLUTION INTRAMUSCULAR; INTRAVENOUS; SUBCUTANEOUS
Status: DISCONTINUED | OUTPATIENT
Start: 2021-08-03 | End: 2021-08-05 | Stop reason: HOSPADM

## 2021-08-03 RX ORDER — ONDANSETRON 2 MG/ML
4 INJECTION INTRAMUSCULAR; INTRAVENOUS EVERY 6 HOURS PRN
Status: DISCONTINUED | OUTPATIENT
Start: 2021-08-03 | End: 2021-08-05 | Stop reason: HOSPADM

## 2021-08-03 RX ORDER — HYDROMORPHONE HYDROCHLORIDE 1 MG/ML
0.5 INJECTION, SOLUTION INTRAMUSCULAR; INTRAVENOUS; SUBCUTANEOUS
Status: DISCONTINUED | OUTPATIENT
Start: 2021-08-03 | End: 2021-08-03

## 2021-08-03 RX ORDER — HYDROMORPHONE HYDROCHLORIDE 1 MG/ML
0.5 INJECTION, SOLUTION INTRAMUSCULAR; INTRAVENOUS; SUBCUTANEOUS
Status: COMPLETED | OUTPATIENT
Start: 2021-08-03 | End: 2021-08-03

## 2021-08-03 RX ORDER — ONDANSETRON 2 MG/ML
4 INJECTION INTRAMUSCULAR; INTRAVENOUS
Status: COMPLETED | OUTPATIENT
Start: 2021-08-03 | End: 2021-08-03

## 2021-08-03 RX ORDER — DEXTROSE MONOHYDRATE, SODIUM CHLORIDE, AND POTASSIUM CHLORIDE 50; 1.49; 4.5 G/1000ML; G/1000ML; G/1000ML
INJECTION, SOLUTION INTRAVENOUS CONTINUOUS
Status: CANCELLED | OUTPATIENT
Start: 2021-08-03

## 2021-08-03 RX ORDER — BISACODYL 10 MG
10 SUPPOSITORY, RECTAL RECTAL DAILY PRN
Status: DISCONTINUED | OUTPATIENT
Start: 2021-08-03 | End: 2021-08-05 | Stop reason: HOSPADM

## 2021-08-03 RX ADMIN — SODIUM CHLORIDE 72 ML: 9 INJECTION, SOLUTION INTRAVENOUS at 12:56

## 2021-08-03 RX ADMIN — METOPROLOL TARTRATE 100 MG: 100 TABLET, FILM COATED ORAL at 21:12

## 2021-08-03 RX ADMIN — IOPAMIDOL 100 ML: 755 INJECTION, SOLUTION INTRAVENOUS at 12:56

## 2021-08-03 RX ADMIN — HYDROMORPHONE HYDROCHLORIDE 0.5 MG: 1 INJECTION, SOLUTION INTRAMUSCULAR; INTRAVENOUS; SUBCUTANEOUS at 13:33

## 2021-08-03 RX ADMIN — SODIUM CHLORIDE: 9 INJECTION, SOLUTION INTRAVENOUS at 17:52

## 2021-08-03 RX ADMIN — HYDROMORPHONE HYDROCHLORIDE 0.5 MG: 1 INJECTION, SOLUTION INTRAMUSCULAR; INTRAVENOUS; SUBCUTANEOUS at 17:52

## 2021-08-03 RX ADMIN — PROMETHAZINE HYDROCHLORIDE 12.5 MG: 25 INJECTION INTRAMUSCULAR; INTRAVENOUS at 12:08

## 2021-08-03 RX ADMIN — FLUOXETINE HYDROCHLORIDE 60 MG: 20 CAPSULE ORAL at 17:52

## 2021-08-03 RX ADMIN — LEVOTHYROXINE SODIUM 200 MCG: 0.1 TABLET ORAL at 17:52

## 2021-08-03 RX ADMIN — FAMOTIDINE 20 MG: 20 INJECTION, SOLUTION INTRAVENOUS at 21:14

## 2021-08-03 RX ADMIN — HYDROMORPHONE HYDROCHLORIDE 0.5 MG: 1 INJECTION, SOLUTION INTRAMUSCULAR; INTRAVENOUS; SUBCUTANEOUS at 12:09

## 2021-08-03 RX ADMIN — ONDANSETRON 4 MG: 2 INJECTION INTRAMUSCULAR; INTRAVENOUS at 11:46

## 2021-08-03 ASSESSMENT — MIFFLIN-ST. JEOR
SCORE: 1725.38
SCORE: 1778.73

## 2021-08-03 NOTE — PLAN OF CARE
"[facility] [place] ADMISSION NOTE    Patient admitted to room 2312 at approximately 1620 via wheel chair from emergency room. Patient was accompanied by transport tech.     Verbal SBAR report received from Юлия Lindsay RN prior to patient arrival.     Pt remained in the wheelchair.  Patient alert and oriented X 3. Pain is controlled with current analgesics.  Medication(s) being used: narcotic analgesics including hydromorphone (Dilaudid).  . Admission vital signs: Blood pressure 130/71, pulse 69, temperature 98.9  F (37.2  C), temperature source Oral, resp. rate 18, height 1.753 m (5' 9\"), weight 117.9 kg (260 lb), SpO2 97 %, not currently breastfeeding. Patient was oriented to plan of care, call light, bed controls, tv, telephone, bathroom and visiting hours.     Risk Assessment    The following safety risks were identified during admission: skin. Yellow risk band applied: YES.     Skin Initial Assessment    This writer admitted this patient and completed a full skin assessment and Jesus score in the Adult PCS flowsheet. Appropriate interventions initiated as needed.     Secondary skin check completed by Nessa SWEENEY RN.         Education    Patient has a [place] to Observation order: Yes  Observation education completed and documented: Yes      Mary Burnham RN    "

## 2021-08-03 NOTE — H&P
[facility]    History and Physical - Hospitalist Service       Date of Admission:  8/3/2021    Assessment & Plan   Carolina Mari is a 66 year old female admitted on 8/3/2021. She is admitted with a small bowel obstruction    Small bowel obstruction  Ventral wall hernia  Abdominal CT demonstrates surgical changes in the region of the stomach.  A few loops of small bowel mildly distended with air-fluid levels and no transition point.  Bowel loops extend through a wide ventral abdominal wall defect.  -Patient will be brought into the hospital under observation.  She will be NPO and place on IV fluids.  Consult general surgery.  Await return of bowel function.  Pain control with IV Dilaudid sparingly.    Pressure ulcer stage III on right buttocks  Nonhealing wound on abdomen  Bilateral chronic lower extremity lymphedema  Consult wound care nurse for wound care recommendations     Physical deconditioning  Patient lives in an apartment.  She receives visits from the wound care nurse, as well as help from her son.    -Consulted OT and PT to assess for therapy needs    Hypertension, borderline control  Continue prior to admission amlodipine and metoprolol, hold losartan until bowel obstruction resolved due to risk for volume depletion and acute kidney injury.    GERD  We will change patient from oral to IV famotidine     Hypothyroidism  Continue prior to admission levothyroxine.     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     Depression  Anxiety  Continue prior to admission fluoxetine.     Diet: NPO for Medical/Clinical Reasons Except for: No Exceptions    DVT Prophylaxis: Low Risk/Ambulatory with no VTE prophylaxis  indicated  Rodriguez Catheter: Not present  Code Status:   Full    Disposition Plan   Expected discharge: Patient recommended discharge to prior living arrangement once adequate pain management/ tolerating PO medications.  Entered: Bob Gil MD 08/03/2021, 3:42 PM       Bob Gil MD  [facility]    ______________________________________________________________________    Chief Complaint   Chief Complaint   Patient presents with     Vomiting     Vomiting X2 days, poor appetite with some abdominal bloating.         History is obtained from the patient    History of Present Illness   Carolina Mari is a 66 year old female who presents with nausea, vomiting, and abdominal pain.  Patient states that she was in her usual health until Saturday evening when she developed severe left lower quadrant abdominal pain over a hernia with associated distention and tenderness.  Patient had pain throughout the night and had difficulty sleeping.  On Sunday, she developed nausea and vomiting.  She has been unable to eat or drink since that time.  She had 1 bowel movement yesterday which was firm.  She denies diarrhea, melena, or hematochezia.  Patient denies hematemesis or coffee-ground emesis.  She is not had any fevers or chills.  She denies any dyspnea, cough, or wheezing.  She not had any sick contacts.  The patient denies headache, nasal congestion, runny nose, sore throat.  Patient denies chest pain, chest pressure, palpitations, lightheadedness, or dizziness.  Patient denies syncope, falls, or trauma.  Patient denies dysuria or hematuria.  Patient denies rash, muscle aches, joint pain, or edema.  Patient denies headache, neck pain, or back pain.  Patient denies diplopia, dysarthria, dysphagia, incoordination, focal numbness, or unilateral weakness.    Review of Systems    The 10 point Review of Systems is negative other than noted in the HPI or here.     Past Medical History     I have reviewed this patient's medical history and updated it with pertinent information if needed.   Past Medical History:   Diagnosis Date     Diabetes mellitus (H)      History of stomach ulcers      HTN (hypertension)      Knee osteoarthritis     right     Osteoarthritis of right hip      Osteoporosis      Osteoporosis      Thyroid disease        Patient Active Problem List    Diagnosis Date Noted     Abdominal pain, generalized 08/03/2021     Priority: Medium     Non-intractable vomiting with nausea, unspecified vomiting type 08/03/2021     Priority: Medium     Morbid obesity (H) 05/26/2021     Priority: Medium     Chronic ulcer of skin (H) 05/23/2021     Priority: Medium     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) 05/23/2021     Priority: Medium     Edema 05/23/2021     Priority: Medium     History of cholecystectomy 05/23/2021     Priority: Medium     Hyperlipidemia 05/23/2021     Priority: Medium     Iron deficiency anemia 05/23/2021     Priority: Medium     Nondependent opioid abuse (H) 05/23/2021     Priority: Medium     Vitamin B deficiency 05/23/2021     Priority: Medium     Debility 05/23/2021     Priority: Medium     Encounter for screening laboratory testing for severe acute respiratory syndrome coronavirus 2 (SARS-CoV-2) 05/21/2021     Priority: Medium     Depression 01/26/2021     Priority: Medium     Stage 3a chronic kidney disease 01/14/2021     Priority: Medium     Other specified hypothyroidism 01/06/2021     Priority: Medium     Generalized anxiety disorder 01/06/2021     Priority: Medium     Gastro-esophageal reflux disease without esophagitis 01/06/2021     Priority: Medium     Hypertension, benign essential, goal below 140/90 01/06/2021     Priority: Medium     Nicotine dependence, unspecified, uncomplicated 01/06/2021     Priority: Medium     Recurrent major depression (H) 01/06/2021     Priority: Medium     Unspecified osteoarthritis, unspecified site 01/06/2021      Priority: Medium     Cellulitis of buttock 01/05/2021     Priority: Medium     Pressure ulcer of right buttock, stage 3 (H) 01/05/2021     Priority: Medium     NEELIMA (acute kidney injury) (H) 12/23/2016     Priority: Medium     Acute gangrenous cholecystitis 12/23/2016     Priority: Medium     Hyponatremia 12/23/2016     Priority: Medium     Hypomagnesemia 12/23/2016     Priority: Medium     Hyperbilirubinemia 12/23/2016     Priority: Medium     Leukocytosis 12/23/2016     Priority: Medium     Adult failure to thrive syndrome 08/04/2015     Priority: Medium     UTI (urinary tract infection) 08/04/2015     Priority: Medium     Open wound of abdomen 07/01/2015     Priority: Medium     Diabetes mellitus (H) 07/01/2015     Priority: Medium     Tobacco user 07/01/2015     Priority: Medium     Obesity 06/05/2015     Priority: Medium     Chronic, continuous use of opioids 06/05/2015     Priority: Medium     Tinea corporis 06/05/2015     Priority: Medium     Weak 06/05/2015     Priority: Medium     Hip pain, bilateral 06/04/2015     Priority: Medium     Chronic pain 05/07/2015     Priority: Medium     Small bowel obstruction (H) 05/06/2015     Priority: Medium     Osteoarthritis of right hip 04/22/2015     Priority: Medium        Past Surgical History   I have reviewed this patient's surgical history and updated it with pertinent information if needed.  Past Surgical History:   Procedure Laterality Date     AMPUTATE TOE(S) Bilateral     3rd toe on both feet amputated.      FOOT SURGERY       GASTRIC BYPASS       HC REMOVAL GALLBLADDER N/A 12/25/2016    Procedure: CHOLECYSTECTOMY, OPEN;  Surgeon: Everardo Cisneros MD;  Location: [facility] OR;  Service: General      SACROPLASTY       HERNIA REPAIR      Had 4 surgeries total     JOINT REPLACEMENT       tka      right       Social History   I have reviewed this patient's social history and updated it with pertinent information if needed.  Social History     Tobacco Use      Smoking status: Never Smoker     Smokeless tobacco: Never Used   Substance Use Topics     Alcohol use: No     Drug use: No       Family History   I have reviewed this patient's family history and updated it with pertinent information if needed.   Family History   Problem Relation Age of Onset     Coronary Artery Disease Father      Coronary Artery Disease Brother      Cancer Mother         bone     Cancer Brother         leukemia     Breast Cancer Mother      Cancer Brother 20.00        Leukemia     Coronary Artery Disease Brother        Prior to Admission Medications   Prior to Admission Medications   Prescriptions Last Dose Informant Patient Reported? Taking?   FLUoxetine HCl 60 MG TABS 8/2/2021 at am Daughter Yes Yes   Sig: Take 1 tablet by mouth every morning    Nystatin (NYAMYC) 612710 UNIT/GM POWD Past Week at Unknown time Daughter Yes Yes   Sig: Apply topically 2 times daily as needed    acetaminophen (TYLENOL) 500 MG tablet 8/2/2021 at Unknown time Daughter Yes Yes   Sig: Take 1,000 mg by mouth 3 times daily    amLODIPine (NORVASC) 5 MG tablet 8/3/2021 at am, may have thrown it up Daughter Yes Yes   Sig: Take 5 mg by mouth daily    cyanocobalamin (CYANOCOBALAMIN) 1000 MCG/ML injection Past Week at Unknown time Daughter No Yes   Sig: Inject 1 mL (1,000 mcg) into the muscle every 30 days   famotidine (PEPCID) 20 MG tablet 8/2/2021 at pm Daughter Yes Yes   Sig: Take 20 mg by mouth 2 times daily    gabapentin (NEURONTIN) 600 MG tablet Past Month at Unknown time Daughter Yes Yes   Sig: Take 1 tablet by mouth 3 times daily   levothyroxine (SYNTHROID/LEVOTHROID) 200 MCG tablet 8/2/2021 at am Daughter Yes Yes   Sig: Take 1 tablet by mouth daily   losartan (COZAAR) 100 MG tablet 8/3/2021 at am Daughter Yes Yes   Sig: Take 1 tablet by mouth daily   metoprolol tartrate (LOPRESSOR) 100 MG tablet 8/3/2021 at am may have thrown it up Daughter Yes Yes   Sig: Take 100 mg by mouth 2 times daily   oxyCODONE (OXYCONTIN) 20 MG  12 hr tablet 8/3/2021 at am threw it back up Daughter No Yes   Sig: Take 1 tablet (20 mg) by mouth every 12 hours   oxyCODONE (ROXICODONE) 5 MG tablet 8/3/2021 at am dissolved under tongue Daughter No Yes   Sig: TAKE 1 TAB BY MOUTH EVERY 4 HOURS AS NEEDED FOR BREAKTHROUGH PAIN   simvastatin (ZOCOR) 20 MG tablet 8/2/2021 at hs Daughter Yes Yes   Sig: Take 20 mg by mouth At Bedtime       Facility-Administered Medications: None     Allergies   Allergies   Allergen Reactions     Aspirin Other (See Comments)     History of ulcers     Colchicine Swelling     And gout flare up     Colcrys Other (See Comments) and Swelling     And gout flare up       Physical Exam   Vital Signs: Temp: 98.7  F (37.1  C) Temp src: Oral BP: 125/73 Pulse: 65   Resp: 20 SpO2: 96 % O2 Device: None (Room air)    Weight: 260 lbs 0 oz    Gen: Debilitated elderly female, sitting in a wheelchair, no acute distress  Head: atraumatic normocephalic; sclera non-injected, anicterric; oral mucosa moist, no lesions, no exudates  Neck: supple without spinal abnormality  Chest: clear to auscultation bilaterally, no wheezes, no rhonchi, no rales.  Cardiovascular: regular rate and rhythm, no gallops or rubs, no murmurs, no edema  Abdomen: bowel sounds high-pitched, no hepatosplenomegaly, no masses, mildly tender, mildly distended, no guarding, no rebound tenderness, ventral incisional hernia  Musculoskeletal: normal muscle mass, normal muscle tone  Skin: BLE chronic venous stasis, ventral incisional hernia with superficial ulceration  Lymph: no lymphadenopathy.  Neuro: cranial nerves II-XII intact, strength in all four extremities normal, reflexes normal, coordination normal.    Data   Data reviewed today: I reviewed all medications, new labs and imaging results over the last 24 hours. I personally reviewed no images or EKG's today.    Recent Labs   Lab 08/03/21  1132   WBC 11.0   HGB 12.4   MCV 86         POTASSIUM 4.6   CHLORIDE 102   CO2 28   BUN  21   CR 1.02   ANIONGAP 5   JAYA 9.3   *   ALBUMIN 3.5   PROTTOTAL 8.2   BILITOTAL 0.8   ALKPHOS 130   ALT 16   AST 23   LIPASE 149         Recent Results (from the past 24 hour(s))   CT Abdomen Pelvis w Contrast    Narrative    CT ABDOMEN PELVIS W CONTRAST 8/3/2021 1:12 PM    CLINICAL HISTORY: Vomiting and abdominal pain    TECHNIQUE: CT scan of the abdomen and pelvis was performed following  injection of IV contrast. Multiplanar reformats were obtained. Dose  reduction techniques were used.  CONTRAST: 100 mL Isovue-370    COMPARISON: None.    FINDINGS:   LOWER CHEST: Normal.    HEPATOBILIARY: Cholecystectomy. Mild bile duct dilatation may be from  prior cholecystectomy.    PANCREAS: Normal.    SPLEEN: Normal.    ADRENAL GLANDS: Normal.    KIDNEYS/BLADDER: A simple right renal cyst does not require follow-up.  No hydronephrosis or hydroureter.    BOWEL: There is surgical change in the region of the stomach. A few  loops of small bowel are mildly distended. Air-fluid levels are  present. There is no transition point. The bowel loops extent through  a wide ventral abdominal wall defect.    PELVIC ORGANS: Normal.    ADDITIONAL FINDINGS: None.    MUSCULOSKELETAL: There are severe degenerative changes at the hips  with extensive remodeling of the femoral heads and acetabula. There  are degenerative changes in the spine. There is a wide ventral  abdominal wall defect.      Impression    IMPRESSION:   1.  A few loops of small bowel are mildly distended and exhibit  air-fluid levels. There is no transition point. Findings could be  related to paralytic ileus or a mild small bowel obstruction without  an identified transition point. Adhesive disease is possible. The  bowel loops extend through a wide ventral abdominal wall defect.  2.  Cholecystectomy. Mild bile duct dilatation may be from prior  cholecystectomy.    LESTER J FAHRNER, MD         SYSTEM ID:  [id number]

## 2021-08-03 NOTE — ED NOTES
[facility] per [facility] report pt has had vomiting X2 days. Pt reports abdominal pain across abdominal with some hernia bulging left of umbilicus. Pt does have some open wounds across abdomen as well, pt reports she does have a nurse come out 3 times a week for wound care. Pt denies fevers, urinary symptoms at this time. Pt bowel patterns have been normal, last BM at 0630

## 2021-08-03 NOTE — ED PROVIDER NOTES
"  History     Chief Complaint   Patient presents with     Vomiting     Vomiting X2 days, poor appetite with some abdominal bloating.      HPI    Carolina Mari is a 66 year old female who was brought in by EMS from home due to vomiting and abdominal pain.  She thinks she is having trouble with her hernia and that it is \"bust open.\"  Her hernia bulge has increased in size and she has pain in that area.  She was hospitalized in May with pressure ulcers.  She was discharged to transitional care.  She was there for about a month.  She was discharged at the end of June.  She is now living at home alone again.  Her symptoms began 2 days ago with abdominal pain, nausea, vomiting.  She has not been able to take anything by mouth today.  She feels chilled but has not taken her temperature.  She did have a bowel movement this morning that was small but has not passed any stool.  She has had prior umbilical herniorrhaphy.  She has a chronic wound on the abdomen for which she has been getting home wound care.  She is on chronic narcotic therapy with OxyContin twice a day as well as immediate release oxycodone.  Her primary care [facility], Dr. Le Romo (sp?).  She has not had any shortness of breath, chest pain, cough.  She has not had any cold or flu symptoms.  She did not have Covid infection.  She has had Covid vaccination.  She denies any irritative or obstructive voiding symptoms.    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) 05/23/2021     Priority: Medium     Edema 05/23/2021     Priority: Medium     History of cholecystectomy 05/23/2021     Priority: Medium     Hyperlipidemia 05/23/2021     Priority: Medium     Iron deficiency anemia 05/23/2021     Priority: Medium     Nondependent opioid abuse (H) 05/23/2021     Priority: Medium     Vitamin B deficiency 05/23/2021     Priority: Medium     Debility 05/23/2021     Priority: Medium     Encounter for screening laboratory testing for severe acute respiratory syndrome coronavirus 2 (SARS-CoV-2) 05/21/2021     Priority: Medium     Depression 01/26/2021     Priority: Medium     Stage 3a chronic kidney disease 01/14/2021     Priority: Medium     Other specified hypothyroidism 01/06/2021     Priority: Medium     Generalized anxiety disorder 01/06/2021     Priority: Medium     Gastro-esophageal reflux disease without esophagitis 01/06/2021     Priority: Medium     Hypertension, benign essential, goal below 140/90 01/06/2021     Priority: Medium     Nicotine dependence, unspecified, uncomplicated 01/06/2021     Priority: Medium     Recurrent major depression (H) 01/06/2021     Priority: Medium     Unspecified osteoarthritis, unspecified site 01/06/2021     Priority: Medium     Cellulitis of buttock 01/05/2021     Priority: Medium     Pressure ulcer of right buttock, stage 3 (H) 01/05/2021     Priority: Medium     NEELIMA (acute kidney injury) (H) 12/23/2016     Priority: Medium     Acute gangrenous cholecystitis 12/23/2016     Priority: Medium     Hyponatremia 12/23/2016     Priority: Medium     Hypomagnesemia 12/23/2016     Priority: Medium     Hyperbilirubinemia 12/23/2016     Priority: Medium     Leukocytosis 12/23/2016     Priority: Medium     Adult failure to thrive syndrome 08/04/2015     Priority: Medium     UTI (urinary tract infection) 08/04/2015     Priority: Medium     Open wound of abdomen 07/01/2015      Priority: Medium     Diabetes mellitus (H) 07/01/2015     Priority: Medium     Tobacco user 07/01/2015     Priority: Medium     Obesity 06/05/2015     Priority: Medium     Chronic, continuous use of opioids 06/05/2015     Priority: Medium     Tinea corporis 06/05/2015     Priority: Medium     Weak 06/05/2015     Priority: Medium     Hip pain, bilateral 06/04/2015     Priority: Medium     Chronic pain 05/07/2015     Priority: Medium     Small bowel obstruction (H) 05/06/2015     Priority: Medium     Osteoarthritis of right hip 04/22/2015     Priority: Medium        Past Medical History:    Past Medical History:   Diagnosis Date     Diabetes mellitus (H)      History of stomach ulcers      HTN (hypertension)      Knee osteoarthritis      Osteoarthritis of right hip      Osteoporosis      Osteoporosis      Thyroid disease        Past Surgical History:    Past Surgical History:   Procedure Laterality Date     AMPUTATE TOE(S) Bilateral     3rd toe on both feet amputated.      FOOT SURGERY       GASTRIC BYPASS       HC REMOVAL GALLBLADDER N/A 12/25/2016    Procedure: CHOLECYSTECTOMY, OPEN;  Surgeon: Everardo Cisneros MD;  [facility];  Service: General     HC SACROPLASTY       HERNIA REPAIR      Had 4 surgeries total     JOINT REPLACEMENT       tka      right       Family History:    Family History   Problem Relation Age of Onset     Coronary Artery Disease Father      Coronary Artery Disease Brother      Cancer Mother         bone     Cancer Brother         leukemia     Breast Cancer Mother      Cancer Brother 20.00        Leukemia     Coronary Artery Disease Brother        Social History:  Marital Status:   [4]  Social History     Tobacco Use     Smoking status: Never Smoker     Smokeless tobacco: Never Used   Substance Use Topics     Alcohol use: No     Drug use: No        Medications:    acetaminophen (TYLENOL) 500 MG tablet  cyanocobalamin (CYANOCOBALAMIN) 1000 MCG/ML injection  levothyroxine  "(SYNTHROID/LEVOTHROID) 200 MCG tablet  losartan (COZAAR) 100 MG tablet  metoprolol tartrate (LOPRESSOR) 100 MG tablet  Nystatin (NYAMYC) 183824 UNIT/GM POWD  oxyCODONE (OXYCONTIN) 20 MG 12 hr tablet  oxyCODONE (ROXICODONE) 5 MG tablet  amLODIPine (NORVASC) 10 MG tablet  famotidine (PEPCID) 20 MG tablet  FLUoxetine 20 MG tablet  FLUoxetine HCl 60 MG TABS  gabapentin (NEURONTIN) 600 MG tablet  GABAPENTIN PO  LEVOTHYROXINE SODIUM PO  LOSARTAN POTASSIUM PO  oxyCODONE (ROXICODONE) 5 MG tablet      Review of Systems    All other systems are reviewed and are negative    Physical Exam   BP: (!) 206/91  Pulse: 65  Temp: 98.7  F (37.1  C)  Resp: 20  Height: 175.3 cm (5' 9\")  Weight: 117.9 kg (260 lb)  SpO2: 97 %      Physical Exam  Nursing note and vitals were reviewed.  Constitutional: Awake and alert, morbidly obese appearing 66-year-old in moderate discomfort, currently retching despite ondansetron being administered, who answers questions appropriately and cooperates with examination.  HEENT: Atraumatic and normocephalic EOMI.   Neck: Freely mobile.  Cardiovascular: Cardiac examination reveals normal heart rate and regular rhythm without murmur.  Pulmonary/Chest: Breathing is unlabored.  Breath sounds are clear and equal bilaterally.  There no retractions, tachypnea, rales, wheezes, or rhonchi.  Abdomen: Soft, mild diffuse tenderness, healing skin wound on the abdominal wall without evidence of secondary infection, no HSM or masses though difficult to assess due to morbid obesity.  No rebound or guarding.  Musculoskeletal: Extremities are warm and well-perfused and with 2+ tibial pitting edema  Neurological: Alert, oriented, thought content logical, coherent   Skin: Warm, dry.  Psychiatric: Affect congruent with acute symptoms.      ED Course        Procedures              Critical Care time:  none               Results for orders placed or performed during the hospital encounter of 08/03/21 (from the past 24 hour(s)) "   [place] Draw    Narrative    The following orders were created for panel order [place] Draw.  Procedure                               Abnormality         Status                     ---------                               -----------         ------                     Extra Red Top Tube[[id number]]                               Final result               Extra Green Top (Lithium...[[id number]]                      Final result               Extra Green Top (Lithium...[[id number]]                      Final result               Extra Purple Top Tube[[id number]]                            Final result                 Please view results for these tests on the individual orders.   Extra Red Top Tube   Result Value Ref Range    Hold Specimen JIC    Extra Green Top (Lithium Heparin) Tube   Result Value Ref Range    Hold Specimen JIC    Extra Green Top (Lithium Heparin) Tube   Result Value Ref Range    Hold Specimen     Extra Purple Top Tube   Result Value Ref Range    Hold Specimen JIC    CBC with platelets differential    Narrative    The following orders were created for panel order CBC with platelets differential.  Procedure                               Abnormality         Status                     ---------                               -----------         ------                     CBC with platelets and d...[[id number]]  Abnormal            Final result                 Please view results for these tests on the individual orders.   Comprehensive metabolic panel   Result Value Ref Range    Sodium 135 133 - 144 mmol/L    Potassium 4.6 3.4 - 5.3 mmol/L    Chloride 102 94 - 109 mmol/L    Carbon Dioxide (CO2) 28 20 - 32 mmol/L    Anion Gap 5 3 - 14 mmol/L    Urea Nitrogen 21 7 - 30 mg/dL    Creatinine 1.02 0.52 - 1.04 mg/dL    Calcium 9.3 8.5 - 10.1 mg/dL    Glucose 147 (H) 70 - 99 mg/dL    Alkaline Phosphatase 130 40 - 150 U/L    AST 23 0 - 45 U/L    ALT 16 0 - 50 U/L    Protein Total 8.2 6.8 - 8.8 g/dL    Albumin 3.5  3.4 - 5.0 g/dL    Bilirubin Total 0.8 0.2 - 1.3 mg/dL    GFR Estimate 57 (L) >60 mL/min/1.73m2   Lipase   Result Value Ref Range    Lipase 149 73 - 393 U/L   Ethyl Alcohol Level   Result Value Ref Range    Alcohol ethyl <0.01 <=0.01 g/dL   CBC with platelets and differential   Result Value Ref Range    WBC Count 11.0 4.0 - 11.0 10e3/uL    RBC Count 4.55 3.80 - 5.20 10e6/uL    Hemoglobin 12.4 11.7 - 15.7 g/dL    Hematocrit 39.1 35.0 - 47.0 %    MCV 86 78 - 100 fL    MCH 27.3 26.5 - 33.0 pg    MCHC 31.7 31.5 - 36.5 g/dL    RDW 15.9 (H) 10.0 - 15.0 %    Platelet Count 348 150 - 450 10e3/uL    % Neutrophils 86 %    % Lymphocytes 6 %    % Monocytes 7 %    % Eosinophils 0 %    % Basophils 0 %    % Immature Granulocytes 1 %    NRBCs per 100 WBC 0 <1 /100    Absolute Neutrophils 9.5 (H) 1.6 - 8.3 10e3/uL    Absolute Lymphocytes 0.7 (L) 0.8 - 5.3 10e3/uL    Absolute Monocytes 0.7 0.0 - 1.3 10e3/uL    Absolute Eosinophils 0.0 0.0 - 0.7 10e3/uL    Absolute Basophils 0.0 0.0 - 0.2 10e3/uL    Absolute Immature Granulocytes 0.1 (H) <=0.0 10e3/uL    Absolute NRBCs 0.0 10e3/uL   Asymptomatic COVID-19 Virus (Coronavirus) by PCR *Canceled*    Specimen: Swab    Narrative    The following orders were created for panel order Asymptomatic COVID-19 Virus (Coronavirus) by PCR.  Procedure                               Abnormality         Status                     ---------                               -----------         ------                       Please view results for these tests on the individual orders.   Asymptomatic COVID-19 Virus (Coronavirus) by PCR Nasopharyngeal    Specimen: Nasopharyngeal; Swab    Narrative    The following orders were created for panel order Asymptomatic COVID-19 Virus (Coronavirus) by PCR Nasopharyngeal.  Procedure                               Abnormality         Status                     ---------                               -----------         ------                     SARS-COV2 (COVID-19)  Vir...[[id number]]  Normal              Final result                 Please view results for these tests on the individual orders.   SARS-COV2 (COVID-19) Virus RT-PCR    Specimen: Nasopharyngeal; Swab   Result Value Ref Range    SARS CoV2 PCR Negative Negative    Narrative    Testing was performed using the jada  SARS-CoV-2 & Influenza A/B Assay on the jada  [employer]  System.  This test should be ordered for the detection of SARS-COV-2 in individuals who meet SARS-CoV-2 clinical and/or epidemiological criteria. Test performance is unknown in asymptomatic patients.  This test is for in vitro diagnostic use under the FDA EUA for laboratories certified under CLIA to perform moderate and/or high complexity testing. This test has not been FDA cleared or approved.  A negative test does not rule out the presence of PCR inhibitors in the specimen or target RNA in concentration below the limit of detection for the assay. The possibility of a false negative should be considered if the patient's recent exposure or clinical presentation suggests COVID-19.  [facility] are certified under the Clinical Laboratory Improvement Amendments of 1988 (CLIA-88) as qualified to perform moderate and/or high complexity laboratory testing.   CT Abdomen Pelvis w Contrast    Narrative    CT ABDOMEN PELVIS W CONTRAST 8/3/2021 1:12 PM    CLINICAL HISTORY: Vomiting and abdominal pain    TECHNIQUE: CT scan of the abdomen and pelvis was performed following  injection of IV contrast. Multiplanar reformats were obtained. Dose  reduction techniques were used.  CONTRAST: 100 mL Isovue-370    COMPARISON: None.    FINDINGS:   LOWER CHEST: Normal.    HEPATOBILIARY: Cholecystectomy. Mild bile duct dilatation may be from  prior cholecystectomy.    PANCREAS: Normal.    SPLEEN: Normal.    ADRENAL GLANDS: Normal.    KIDNEYS/BLADDER: A simple right renal cyst does not require follow-up.  No hydronephrosis or hydroureter.    BOWEL: There is  surgical change in the region of the stomach. A few  loops of small bowel are mildly distended. Air-fluid levels are  present. There is no transition point. The bowel loops extent through  a wide ventral abdominal wall defect.    PELVIC ORGANS: Normal.    ADDITIONAL FINDINGS: None.    MUSCULOSKELETAL: There are severe degenerative changes at the hips  with extensive remodeling of the femoral heads and acetabula. There  are degenerative changes in the spine. There is a wide ventral  abdominal wall defect.      Impression    IMPRESSION:   1.  A few loops of small bowel are mildly distended and exhibit  air-fluid levels. There is no transition point. Findings could be  related to paralytic ileus or a mild small bowel obstruction without  an identified transition point. Adhesive disease is possible. The  bowel loops extend through a wide ventral abdominal wall defect.  2.  Cholecystectomy. Mild bile duct dilatation may be from prior  cholecystectomy.    LESTER J FAHRNER, MD         SYSTEM ID:  [id number]       Medications   HYDROmorphone (PF) (DILAUDID) injection 0.5 mg (0.5 mg Intravenous Given 8/3/21 1333)   ondansetron (ZOFRAN) injection 4 mg (4 mg Intravenous Given 8/3/21 1146)   promethazine (PHENERGAN) 12.5 mg in sodium chloride 0.9 % 50 mL intermittent infusion (12.5 mg Intravenous Given 8/3/21 1208)   iopamidol (ISOVUE-370) solution 100 mL (100 mLs Intravenous Given 8/3/21 1256)   sodium chloride 0.9 % bag 500mL for CT scan flush use (72 mLs Intravenous Given 8/3/21 1256)       Assessments & Plan (with Medical Decision Making)     66-year-old female presented with vomiting and abdominal pain.  She has a history of gastric bypass and cholecystectomy.  She has had prior bowel obstructions.  He has had 4 abdominal herniorrhaphies.  She has not had other surgeries.  She did not have peritoneal signs on examination.  She was still vomiting on arrival.  Symptoms improved with antiemetics and pain medication but did not  resolve.  Work-up in the emergency department showed the above CT scan findings.  Most likely she has early, mild, partial small bowel obstruction.  With no bed availability throughout the [facility], she will be admitted for observation here.  She may require transfer if she needs surgical intervention due to her history of gastric bypass, however this is unlikely.  Most likely this will resolve spontaneously.  I discussed the findings with the patient who is agreeable to the plan.  I discussed her case with Bob Gil MD of the hospital service and they will assume care and admission.  I discussed her case with Dr. Landeros in general surgery.    I have reviewed the nursing notes.    I have reviewed the findings, diagnosis, plan and need for follow up with the patient.       New Prescriptions    No medications on file       Final diagnoses:   Abdominal pain, generalized   Non-intractable vomiting with nausea, unspecified vomiting type       8/3/2021   [facility] EMERGENCY DEPT     Sergio Sparks MD  08/03/21 [id number]

## 2021-08-03 NOTE — ED NOTES
Writer entered pt's room to update on wait time. Patient was noted to be drinking a coke. Patient was told several times by MD and nursing staff that she is NPO. Pop was dumped out and patient again was told she is NPO.

## 2021-08-04 LAB
ALBUMIN UR-MCNC: NEGATIVE MG/DL
ANION GAP SERPL CALCULATED.3IONS-SCNC: 6 MMOL/L (ref 3–14)
APPEARANCE UR: ABNORMAL
BACTERIA #/AREA URNS HPF: ABNORMAL /HPF
BILIRUB UR QL STRIP: NEGATIVE
BUN SERPL-MCNC: 22 MG/DL (ref 7–30)
CALCIUM SERPL-MCNC: 8.4 MG/DL (ref 8.5–10.1)
CHLORIDE BLD-SCNC: 103 MMOL/L (ref 94–109)
CO2 SERPL-SCNC: 26 MMOL/L (ref 20–32)
COLOR UR AUTO: YELLOW
CREAT SERPL-MCNC: 1.34 MG/DL (ref 0.52–1.04)
ERYTHROCYTE [DISTWIDTH] IN BLOOD BY AUTOMATED COUNT: 16.2 % (ref 10–15)
GFR SERPL CREATININE-BSD FRML MDRD: 41 ML/MIN/1.73M2
GLUCOSE BLD-MCNC: 90 MG/DL (ref 70–99)
GLUCOSE UR STRIP-MCNC: NEGATIVE MG/DL
HCT VFR BLD AUTO: 35.7 % (ref 35–47)
HGB BLD-MCNC: 11 G/DL (ref 11.7–15.7)
HGB UR QL STRIP: ABNORMAL
KETONES UR STRIP-MCNC: NEGATIVE MG/DL
LEUKOCYTE ESTERASE UR QL STRIP: ABNORMAL
MCH RBC QN AUTO: 27.2 PG (ref 26.5–33)
MCHC RBC AUTO-ENTMCNC: 30.8 G/DL (ref 31.5–36.5)
MCV RBC AUTO: 88 FL (ref 78–100)
NITRATE UR QL: NEGATIVE
PH UR STRIP: 5 [PH] (ref 5–7)
PLATELET # BLD AUTO: 291 10E3/UL (ref 150–450)
POTASSIUM BLD-SCNC: 4.3 MMOL/L (ref 3.4–5.3)
RBC # BLD AUTO: 4.05 10E6/UL (ref 3.8–5.2)
RBC URINE: 1 /HPF
SODIUM SERPL-SCNC: 135 MMOL/L (ref 133–144)
SP GR UR STRIP: 1.02 (ref 1–1.03)
SQUAMOUS EPITHELIAL: 8 /HPF
UROBILINOGEN UR STRIP-MCNC: NORMAL MG/DL
WBC # BLD AUTO: 9 10E3/UL (ref 4–11)
WBC URINE: 12 /HPF

## 2021-08-04 PROCEDURE — 85027 COMPLETE CBC AUTOMATED: CPT | Performed by: INTERNAL MEDICINE

## 2021-08-04 PROCEDURE — 96376 TX/PRO/DX INJ SAME DRUG ADON: CPT

## 2021-08-04 PROCEDURE — 99225 PR SUBSEQUENT OBSERVATION CARE,LEVEL II: CPT | Performed by: INTERNAL MEDICINE

## 2021-08-04 PROCEDURE — 99207 PR CDG-CODE CATEGORY CHANGED: CPT | Performed by: INTERNAL MEDICINE

## 2021-08-04 PROCEDURE — G0378 HOSPITAL OBSERVATION PER HR: HCPCS

## 2021-08-04 PROCEDURE — 80048 BASIC METABOLIC PNL TOTAL CA: CPT | Performed by: INTERNAL MEDICINE

## 2021-08-04 PROCEDURE — 258N000003 HC RX IP 258 OP 636: Performed by: INTERNAL MEDICINE

## 2021-08-04 PROCEDURE — 36415 COLL VENOUS BLD VENIPUNCTURE: CPT | Performed by: INTERNAL MEDICINE

## 2021-08-04 PROCEDURE — 81001 URINALYSIS AUTO W/SCOPE: CPT | Performed by: FAMILY MEDICINE

## 2021-08-04 PROCEDURE — G0463 HOSPITAL OUTPT CLINIC VISIT: HCPCS

## 2021-08-04 PROCEDURE — 250N000013 HC RX MED GY IP 250 OP 250 PS 637: Performed by: INTERNAL MEDICINE

## 2021-08-04 PROCEDURE — 250N000011 HC RX IP 250 OP 636: Performed by: INTERNAL MEDICINE

## 2021-08-04 RX ORDER — OXYCODONE HCL 10 MG/1
20 TABLET, FILM COATED, EXTENDED RELEASE ORAL EVERY 12 HOURS
Status: DISCONTINUED | OUTPATIENT
Start: 2021-08-04 | End: 2021-08-05 | Stop reason: HOSPADM

## 2021-08-04 RX ORDER — OXYCODONE HYDROCHLORIDE 5 MG/1
5 TABLET ORAL EVERY 4 HOURS PRN
Status: DISCONTINUED | OUTPATIENT
Start: 2021-08-04 | End: 2021-08-05 | Stop reason: HOSPADM

## 2021-08-04 RX ADMIN — FLUOXETINE HYDROCHLORIDE 60 MG: 20 CAPSULE ORAL at 09:36

## 2021-08-04 RX ADMIN — OXYCODONE HYDROCHLORIDE 5 MG: 5 TABLET ORAL at 21:35

## 2021-08-04 RX ADMIN — OXYCODONE HYDROCHLORIDE 20 MG: 10 TABLET, FILM COATED, EXTENDED RELEASE ORAL at 23:58

## 2021-08-04 RX ADMIN — OXYCODONE HYDROCHLORIDE 5 MG: 5 TABLET ORAL at 11:41

## 2021-08-04 RX ADMIN — OXYCODONE HYDROCHLORIDE 5 MG: 5 TABLET ORAL at 17:12

## 2021-08-04 RX ADMIN — LEVOTHYROXINE SODIUM 200 MCG: 0.1 TABLET ORAL at 05:51

## 2021-08-04 RX ADMIN — SODIUM CHLORIDE: 9 INJECTION, SOLUTION INTRAVENOUS at 05:11

## 2021-08-04 RX ADMIN — SODIUM CHLORIDE: 9 INJECTION, SOLUTION INTRAVENOUS at 16:15

## 2021-08-04 RX ADMIN — FAMOTIDINE 20 MG: 20 INJECTION, SOLUTION INTRAVENOUS at 09:35

## 2021-08-04 RX ADMIN — OXYCODONE HYDROCHLORIDE 20 MG: 10 TABLET, FILM COATED, EXTENDED RELEASE ORAL at 11:41

## 2021-08-04 RX ADMIN — FAMOTIDINE 20 MG: 20 INJECTION, SOLUTION INTRAVENOUS at 21:28

## 2021-08-04 RX ADMIN — METOPROLOL TARTRATE 100 MG: 100 TABLET, FILM COATED ORAL at 21:29

## 2021-08-04 ASSESSMENT — ACTIVITIES OF DAILY LIVING (ADL): DEPENDENT_IADLS:: CLEANING;SHOPPING;MEDICATION MANAGEMENT;TRANSPORTATION

## 2021-08-04 NOTE — PROGRESS NOTES
"A&O. Up with assist x 1 to pivot. Uses bedside commode. Brief in place as well. IV infusing continuous NS @ 100 ml/hr. Clear liquid diet tolerated well, advanced to regular diet for supper and tolerated that okay. WO seen the patient this afternoon and completed dressing changes and wound care with the RN. Metoprolol and Norvasc held this morning due to soft BP and low HR. PO Oxy 5 mg PRN q4h x 2. Scheduled 20 mg XR PO Oxy. UA sent and MD stated that he would not be treating her for a UTI. /51 (BP Location: Left arm)   Pulse 56   Temp 98.2  F (36.8  C) (Oral)   Resp 18   Ht 1.753 m (5' 9\")   Wt 112.6 kg (248 lb 3.8 oz)   SpO2 94%   BMI 36.66 kg/m  .       Aminah Jolly RN on 8/4/2021 at 6:27 PM    "

## 2021-08-04 NOTE — PLAN OF CARE
Patient alternates between sitting in wheelchair and lying in bed this shift. She is able to stand and pivot transfer between chair and bed with assist of 1-2. Dyspnea on exertion. NPO except ice chips and meds. No complaints of pain or nausea. Active bowel sounds.

## 2021-08-04 NOTE — CONSULTS
Care Management Initial Consult    General Information  Assessment completed with: Carolina Simons  Type of CM/SW Visit: Initial Assessment    Primary Care Provider verified and updated as needed: Yes   Readmission within the last 30 days: no previous admission in last 30 days      Reason for Consult: discharge planning  Advance Care Planning:     None     Communication Assessment  Patient's communication style: spoken language (English or Bilingual)    Hearing Difficulty or Deaf: no   Wear Glasses or Blind: yes    Cognitive  Cognitive/Neuro/Behavioral: WDL                      Living Environment:   People in home: alone     Current living Arrangements: apartment  [facility] of Facility: [facility]   Able to return to prior arrangements: yes     Family/Social Support:  Care provided by: self, child(kumar), homecare agency  Provides care for: no one, unable/limited ability to care for self  Marital Status:   Children          Description of Support System: Supportive, Involved    Support Assessment: Adequate family and caregiver support, Adequate social supports    Current Resources:   Patient receiving home care services: Yes  Skilled Home Care Services: Skilled Nursing, Physicial Therapy, Occupational Therapy  Community Resources: County Worker, Home Care, Meals on Wheels, PCA, Transportation Services  Equipment currently used at home: commode chair, grab bar, toilet, grab bar, tub/shower, hospital bed, lift device, raised toilet seat, shower chair, wheelchair, manual  Supplies currently used at home: Incontinence Supplies, Diabetic Supplies, Wound Care Supplies, Gloves, Wipes    Employment/Financial:  Employment Status: retired       Financial Concerns: No concerns identified   Referral to Financial Counselor: No     Lifestyle & Psychosocial Needs:  Social Determinants of Health     Tobacco Use: Low Risk      Smoking Tobacco Use: Never Smoker     Smokeless Tobacco Use: Never Used   Alcohol Use:       Frequency of Alcohol Consumption:      Average Number of Drinks:      Frequency of Binge Drinking:    Financial Resource Strain:      Difficulty of Paying Living Expenses:    Food Insecurity:      Worried About Running Out of Food in the Last Year:      Ran Out of Food in the Last Year:    Transportation Needs:      Lack of Transportation (Medical):      Lack of Transportation (Non-Medical):    Physical Activity:      Days of Exercise per Week:      Minutes of Exercise per Session:    Stress:      Feeling of Stress :    Social Connections:      Frequency of Communication with Friends and Family:      Frequency of Social Gatherings with Friends and Family:      Attends Alevism Services:      Active Member of Clubs or Organizations:      Attends Club or Organization Meetings:      Marital Status:    Intimate Partner Violence:      Fear of Current or Ex-Partner:      Emotionally Abused:      Physically Abused:      Sexually Abused:    Depression: Not at risk     PHQ-2 Score: 0   Housing Stability:      Unable to Pay for Housing in the Last Year:      Number of Places Lived in the Last Year:      Unstable Housing in the Last Year:        Functional Status:  Prior to admission patient needed assistance:   Dependent ADLs:: Bathing, Wheelchair-independent  Dependent IADLs:: Cleaning, Shopping, Medication Management, Transportation    Mental Health Status:  Mental Health Status: Current Concern       Chemical Dependency Status:  Chemical Dependency Status: No Current Concerns           Values/Beliefs:  Spiritual, Cultural Beliefs, Alevism Practices, Values that affect care: no             Additional Information:  Care Management received referral to assist pt with discharge planning.  SW reviewed pt's EMR and then met with pt at bedside to introduce self/role, perform assessment, and discuss resources.    Pt shared that she lives on the [facility] in her own apartment at [facility], independent  apartments.  Pt states at baseline, she gets Home [facility] Phone: [phone] Fax: [phone], RN, PT & OT services.   SW called [employer] and as long as pt is OBS status & discharges tomorrow, no new orders are needed by MD.    Pt shared that her family is incredibly supportive, especially her daughter in law, who sets up her medications & brings her all of her groceries.  Pt gets 1 meal/day with her rent & then is able to make other small meals as needed.  Pt, with family help, is able to get housework, laundry & other chores completed.    Pt requested [facility] wheelchair transport.  Family will meet her at her apartment with keys at time of transport.  SW requested [facility] transportation services; awaiting time of transport.    Care Management team to follow for discharge plans.      GILBERT Thomas

## 2021-08-04 NOTE — PROGRESS NOTES
"Dr. iGl paged with the following:    \"requesting pain medication. No meds available. She is requesting Dilaudid.\"  "

## 2021-08-04 NOTE — PROGRESS NOTES
[facility]    Hospitalist Progress Note    Date of Service (when I saw the patient): 08/04/2021    Assessment & Plan   Carolina Mari is a 66 year old female admitted on 8/3/2021. She is admitted with a small bowel obstruction     Small bowel obstruction  Ventral wall hernia  Abdominal CT demonstrates surgical changes in the region of the stomach.  A few loops of small bowel mildly distended with air-fluid levels and no transition point.  Bowel loops extend through a wide ventral abdominal wall defect.  -Patient will be brought into the hospital under observation.  She will be NPO and place on IV fluids.  Consult general surgery.  Await return of bowel function.  Pain control with IV Dilaudid sparingly.  -Patient passed loose stool and flatus this morning, start clear liquid diet and ADAT    NEELIMA stage 1  Cr increased to 1.34 from baseline 1.02.  Likely prerenal.    -Continue volume resuscitation and recheck BMP in AM     Pressure ulcer stage III on right buttocks  Nonhealing wound on abdomen  Bilateral chronic lower extremity lymphedema  Consult wound care nurse for wound care recommendations     Physical deconditioning  Patient lives in an apartment.  She receives visits from the wound care nurse, as well as help from her son.    -Return to apartment at discharge     Hypertension, borderline control  Continue prior to admission amlodipine and metoprolol, hold losartan until bowel obstruction resolved due to risk for volume depletion and acute kidney injury.     GERD  We will change patient from oral to IV famotidine     Hypothyroidism  Continue prior to admission levothyroxine.     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  -As patient is tolerating PO, restart OxyContin and prn oxycodone     Depression  Anxiety  Continue prior to admission fluoxetine.     Diet: NPO for Medical/Clinical Reasons Except for: No Exceptions    DVT Prophylaxis: Low Risk/Ambulatory with no VTE prophylaxis indicated  Rodriguez Catheter: Not present  Code Status:   Full     Disposition: Expected discharge tomorrow.    Bob Patel Stefanyamaris    Interval History   The patient states she passed flatus with a small liquid stool this AM.  Abdomen feels soft and less distended than on admit.    -Data reviewed today: I reviewed all new labs and imaging results over the last 24 hours. I personally reviewed no images or EKG's today.    Physical Exam   Temp: 97.9  F (36.6  C) Temp src: Oral BP: 117/50 Pulse: 54   Resp: 18 SpO2: 95 % O2 Device: None (Room air)    Vitals:    08/03/21 1122 08/03/21 1951   Weight: 117.9 kg (260 lb) 112.6 kg (248 lb 3.8 oz)     Vital Signs with Ranges  Temp:  [97.8  F (36.6  C)-98.9  F (37.2  C)] 97.9  F (36.6  C)  Pulse:  [53-71] 54  Resp:  [16-20] 18  BP: ()/(48-97) 117/50  SpO2:  [94 %-98 %] 95 %  No intake/output data recorded.    Gen: Debilitated female, alert and oriented x 3, no acute distressed  HEENT: Atraumatic, normocephalic; sclera non-injected, anicterric; oral mucosa moist, no lesion, no exudate  Lungs: Clear to ausculation, no wheezes, no rhonchi, no rales  Heart: Regular rate, regular rhythm, no gallops, no rubs, no murmurs  Abdomen: bowel sounds hyperactive, no hepatosplenomegaly, no masses, mildly tender, mildly distended, no guarding, no rebound tenderness, ventral incisional hernia soft and easily reducible  Musculoskeletal: normal muscle mass, normal muscle tone  Skin: BLE chronic venous stasis, ventral incisional hernia with superficial ulceration  Lymph: no lymphadenopathy.    Medications     sodium chloride 100 mL/hr at 08/04/21 0511       amLODIPine  5 mg Oral Daily     famotidine  20 mg Intravenous Q12H      FLUoxetine  60 mg Oral QAM     levothyroxine  200 mcg Oral Daily     metoprolol tartrate  100 mg Oral BID     oxyCODONE  20 mg Oral Q12H       Data   Recent Labs   Lab 08/04/21  0447 08/03/21  1132   WBC 9.0 11.0   HGB 11.0* 12.4   MCV 88 86    348    135   POTASSIUM 4.3 4.6   CHLORIDE 103 102   CO2 26 28   BUN 22 21   CR 1.34* 1.02   ANIONGAP 6 5   JAYA 8.4* 9.3   GLC 90 147*   ALBUMIN  --  3.5   PROTTOTAL  --  8.2   BILITOTAL  --  0.8   ALKPHOS  --  130   ALT  --  16   AST  --  23   LIPASE  --  149       Recent Results (from the past 24 hour(s))   CT Abdomen Pelvis w Contrast    Narrative    CT ABDOMEN PELVIS W CONTRAST 8/3/2021 1:12 PM    CLINICAL HISTORY: Vomiting and abdominal pain    TECHNIQUE: CT scan of the abdomen and pelvis was performed following  injection of IV contrast. Multiplanar reformats were obtained. Dose  reduction techniques were used.  CONTRAST: 100 mL Isovue-370    COMPARISON: None.    FINDINGS:   LOWER CHEST: Normal.    HEPATOBILIARY: Cholecystectomy. Mild bile duct dilatation may be from  prior cholecystectomy.    PANCREAS: Normal.    SPLEEN: Normal.    ADRENAL GLANDS: Normal.    KIDNEYS/BLADDER: A simple right renal cyst does not require follow-up.  No hydronephrosis or hydroureter.    BOWEL: There is surgical change in the region of the stomach. A few  loops of small bowel are mildly distended. Air-fluid levels are  present. There is no transition point. The bowel loops extent through  a wide ventral abdominal wall defect.    PELVIC ORGANS: Normal.    ADDITIONAL FINDINGS: None.    MUSCULOSKELETAL: There are severe degenerative changes at the hips  with extensive remodeling of the femoral heads and acetabula. There  are degenerative changes in the spine. There is a wide ventral  abdominal wall defect.      Impression    IMPRESSION:   1.  A few loops of small bowel are mildly distended and exhibit  air-fluid levels. There is no transition point. Findings could be  related to  paralytic ileus or a mild small bowel obstruction without  an identified transition point. Adhesive disease is possible. The  bowel loops extend through a wide ventral abdominal wall defect.  2.  Cholecystectomy. Mild bile duct dilatation may be from prior  cholecystectomy.    LESTER J FAHRNER, MD         SYSTEM ID:  [id number]

## 2021-08-04 NOTE — PROGRESS NOTES
"[facility] Nurse Inpatient Adult Pressure Injury and Wound Assessment    Reason for consultation: Evaluate and treat multiple wounds - left buttocks, right posterior proximal thigh, abdomen,  bilateral lower legs  Assessment  Patient was last seen 5/20/21 as an inpatient with same issues.      Sacral buttocks,  right posterior proximal thigh wounds due to pressure. Contributing factors of the pressure injury: pressure, shear, microclimate, immobility and moisture/incontinence    Status: initial assessment    Pressure Injuries are Stage 3 resolving based on previous photos and notes.    Present on admission: Yes    Abdominal wound is a non-healing surgical wound per the patient from \"hernia surgeries\" with last one being done in \"2002.\" - chronic in nature.    Wounds on lower legs are related to lymphedema/venous insufficiency. Pt not wearing any compression garment but has velcro garments at home.     Treatment Plan  Wound Cares:  Abdomen  1.) Cleanse with wound cleanser and gauze. Pat dry.  2.) Apply medihoney to open areas and cover with island dressing.   Change every other day.    Right Posterior Thigh Wound  1.) Cleanse with spray wound cleanser. Dry well.  2.  Apply skin prep to periwound skin.  3.  Cover with Mepilex border 6x6 and then seal edges of dressing with skin prep.   4.  Change 3x weekly.      HOB less than 30 degrees as able, elevate knee gatch with HOB elevation to reduce shear.  Chair cushion when up.  Reposition hourly when in chair and every two hours in bed as able.  Avoid supine position using pillows tucked under alternating hips for at least 15-30 degree turn to lift sacrum/coccyx off bed.  Avoid dragging buttocks with position changes.      Bilateral Lower Legs  1.) Cleanse with wound cleanser or mild soap/water. If using soap/water, rinse after. Pat dry.  2.) Apply Aquaphor generously to intact skin  3.) Cover any open draining area with mepilex lite and kerlex.   4.) Apply spandagrip to each " "leg for some light compression.  Pt must resume her velcro compression wraps when she gets home.      Orders detailed in discharge instructions for presumed TCU at discharge  Recommended provider order: None, at this time; While patient would be ideally assessed by lymphedema therapy, she states she velcro compression wraps at home. At this time placed large spandagrip for some light compression when pt is up in the chair with legs dependant.      WO Nurse follow-up plan:signing off  Nursing to notify the Provider(s) and re-consult the WOC Nurse if wound(s) deteriorates or new skin concern.    Patient History  According to provider note(s): Pt admitted for vomiting and abdominal pain? Bowel obstruction.  Upon skin assessment staff noted multiple skin issues and ordered WOC consult.  Since admission, pt began to pass flatus and is now having her diet advanced.       Pressure ulcer stage III on right posterior thigh below buttocks.    Chronic Nonhealing wounds on abdomen  Bilateral lower extremity lymphedema and open wounds    Patient known to [facility] service from previous hospitalization in January 2021 and May 2021 for same issues.   Patient states that her pressure ulcers developed approximately month ago after sitting too long in her wheelchair.  Patient states is having difficulty with transfers so she sits in her wheelchair with a \"special cushion\" for greater than 12 hours per day.   She wears an adult diaper and is frequently incontinent of urine and stool. She states she can apply her own barrier cream to her buttocks.   Her adult son assists with some cares and provides meals.  She has homecare RN 3x weekly for wound care.      Diagnoses include:  Pressure ulcer stage III on right posterior thigh below buttocks.    Chronic Nonhealing wounds on abdomen  Bilateral lower extremity lymphedema and open wounds  Physical deconditioning  Hypertension  GERD  Hypothyroidism  Chronic pain  Depression and " anxiety    Objective Data  Containment of urine/stool: [employer]Tree    Active Diet Order  Orders Placed This Encounter      Advance Diet as Tolerated: Clear Liquid Diet      Output:   I/O last 3 completed shifts:  In: 1510 [I.V.:1510]  Out: 300 [Urine:300]    Risk Assessment:   Sensory Perception: 3-->slightly limited  Moisture: 3-->occasionally moist  Activity: 2-->chairfast  Mobility: 2-->very limited  Nutrition: 3-->adequate  Friction and Shear: 2-->potential problem  Jesus Score: 15                          Labs:   Recent Labs   Lab 08/04/21  0447 08/03/21  1132   ALBUMIN  --  3.5   HGB 11.0* 12.4   WBC 9.0 11.0       Physical Exam  Areas of skin assessed: Right posterior thigh, abdominal wound, bilateral LEs      Right posterior/medial proximal thigh - No photo taken    Large area if blanchable erythema 12cm x 10cm with 2 small superficial open areas within measuring 0.5cm x 0.5cm x 1mm  and 2.5cm x 1.0cm x 1mm    Periwound: intact, purple/red coloring  Drainage: moderate serosanguineous note on prior dressing.  Odor: none  Pain: tolerated cares    Abdominal wound        8/4/21 - today  No Photo     Stage/tissue depth: full thickness  3 separate linear wounds proximal to distal   1.0m x 0.5cm, 3.0cm x 1.5cm, 5cm x 1.5cm x 3mm deep  Wound beds: clean, red  Wound Edges: attached  Periwound: intact, scar tissue, dried drainage  Drainage: small serosanguineous noted on previous bandage applied this morning  Odor: none  Pain: no    Bilateral lower legs:    +2 edema with stasis changes including thickened skin, warty appearing with scattered open blistered areas on the left LE covered with dried blood/scab.      Pt tolerated cleansing of the legs with spray cleanser and application of aquaphor liberally to both legs, left open areas covered with Mepilex lite 4x4 and kerlex.  Spandagrip G single layer for light compression.      From prior hospitalization 1/2021:  Dorsalis Pedal Pulse: palpable: yes; doppler: R  monophasic and L biphasic  Posterior Tibial Pulse: palpable: yes; doppler: biphasic  Hair growth: not noted  Capillary Refill: <3 seconds  Feet/toes color: consistent with surrounding coloring; patient with one toe amputation bilaterally  Firm non-pitting woody edema bilaterally    Interventions  Visual inspection and assessment completed   Wound Care Rationale Protect periwound skin, Improve absorptive capacity, Promote moist wound healing without tissue dehydration , Provide selective debridement (autolysis) of nonviable tissue , Provide protection  and Pain reduction.  Wound Care: done by WOC RN to all wound with assist of staff RN  Supplies: gathered, floor stock, discussed with RN and discussed with patient  Current off-loading measures: Pillows under calves  Current support surface: Standard  Atmos Air mattress; recommend that patient is transferred to a low air loss mattress  Education provided: importance of repositioning and plan of care   Discussed plan of care with Patient and Nurse    Face to face time: approximately 50 minutes.    Mai Massey RN, CWOCN  [phone]

## 2021-08-05 ENCOUNTER — PATIENT OUTREACH (OUTPATIENT)
Dept: CARE COORDINATION | Facility: CLINIC | Age: 66
End: 2021-08-05

## 2021-08-05 VITALS
HEART RATE: 58 BPM | HEIGHT: 69 IN | TEMPERATURE: 97.7 F | BODY MASS INDEX: 36.77 KG/M2 | SYSTOLIC BLOOD PRESSURE: 123 MMHG | WEIGHT: 248.24 LBS | DIASTOLIC BLOOD PRESSURE: 52 MMHG | OXYGEN SATURATION: 96 % | RESPIRATION RATE: 18 BRPM

## 2021-08-05 LAB
ANION GAP SERPL CALCULATED.3IONS-SCNC: 6 MMOL/L (ref 3–14)
BUN SERPL-MCNC: 19 MG/DL (ref 7–30)
CALCIUM SERPL-MCNC: 8.2 MG/DL (ref 8.5–10.1)
CHLORIDE BLD-SCNC: 104 MMOL/L (ref 94–109)
CO2 SERPL-SCNC: 24 MMOL/L (ref 20–32)
CREAT SERPL-MCNC: 1.25 MG/DL (ref 0.52–1.04)
GFR SERPL CREATININE-BSD FRML MDRD: 45 ML/MIN/1.73M2
GLUCOSE BLD-MCNC: 83 MG/DL (ref 70–99)
HOLD SPECIMEN: NORMAL
POTASSIUM BLD-SCNC: 4.1 MMOL/L (ref 3.4–5.3)
SODIUM SERPL-SCNC: 134 MMOL/L (ref 133–144)

## 2021-08-05 PROCEDURE — 250N000013 HC RX MED GY IP 250 OP 250 PS 637: Performed by: INTERNAL MEDICINE

## 2021-08-05 PROCEDURE — 36415 COLL VENOUS BLD VENIPUNCTURE: CPT | Performed by: INTERNAL MEDICINE

## 2021-08-05 PROCEDURE — 258N000003 HC RX IP 258 OP 636: Performed by: INTERNAL MEDICINE

## 2021-08-05 PROCEDURE — G0378 HOSPITAL OBSERVATION PER HR: HCPCS

## 2021-08-05 PROCEDURE — 250N000011 HC RX IP 250 OP 636: Performed by: INTERNAL MEDICINE

## 2021-08-05 PROCEDURE — 80048 BASIC METABOLIC PNL TOTAL CA: CPT | Performed by: INTERNAL MEDICINE

## 2021-08-05 PROCEDURE — 96376 TX/PRO/DX INJ SAME DRUG ADON: CPT

## 2021-08-05 PROCEDURE — 99217 PR OBSERVATION CARE DISCHARGE: CPT | Performed by: INTERNAL MEDICINE

## 2021-08-05 RX ADMIN — OXYCODONE HYDROCHLORIDE 5 MG: 5 TABLET ORAL at 05:32

## 2021-08-05 RX ADMIN — LEVOTHYROXINE SODIUM 200 MCG: 0.1 TABLET ORAL at 06:38

## 2021-08-05 RX ADMIN — FAMOTIDINE 20 MG: 20 INJECTION, SOLUTION INTRAVENOUS at 08:05

## 2021-08-05 RX ADMIN — FLUOXETINE HYDROCHLORIDE 60 MG: 20 CAPSULE ORAL at 08:05

## 2021-08-05 RX ADMIN — OXYCODONE HYDROCHLORIDE 20 MG: 10 TABLET, FILM COATED, EXTENDED RELEASE ORAL at 11:07

## 2021-08-05 RX ADMIN — METOPROLOL TARTRATE 100 MG: 100 TABLET, FILM COATED ORAL at 08:05

## 2021-08-05 RX ADMIN — SODIUM CHLORIDE: 9 INJECTION, SOLUTION INTRAVENOUS at 02:32

## 2021-08-05 RX ADMIN — AMLODIPINE BESYLATE 5 MG: 5 TABLET ORAL at 08:04

## 2021-08-05 RX ADMIN — OXYCODONE HYDROCHLORIDE 5 MG: 5 TABLET ORAL at 11:07

## 2021-08-05 NOTE — PLAN OF CARE
[place] [facility] DISCHARGE NOTE    Patient discharged to home at 3:00 PM via wheel chair. Accompanied by significant other and staff. Discharge instructions reviewed with patient, opportunity offered to ask questions. Prescriptions - None ordered for discharge. All belongings sent with patient.    Tiago Rajan RN

## 2021-08-05 NOTE — PLAN OF CARE
Dressings are clean, dry, and intact, barrier cream applied to groin folds and buttocks. Pt reports LUQ/LLQ abdominal & L flank pain of 6-8/10, utilizing PRN oxycodone approx q4-6h. Tolerating regular diet, had a large, soft BM, bowel sounds present x 4, denies nausea. Continuous NS infusing at 100 mL/hr.

## 2021-08-05 NOTE — LETTER
[facility] Note    Background: Patient identified in needing wheelchair or stretcher transportation coordinated within hospital discharge plan.  Transportation authorization request initiated to [facility] team.    Transportation type: Non-Emergent Transport Pre-Scheduled    Mode of Transportation (Wheelchair)     Transportation Company: [employer]  NPI: [id number]  Dispatch: [phone]    Patient name and : Carolina Mari  1955  MRN: [id number]    Discharge Location: [facility]  [address] [place] [address]  NPI: [id number]  Inpatient Case Management office phone: [phone]  Room #: 2312   Most Recent Weight:   Wt Readings from Last 3 Encounters:   21 112.6 kg (248 lb 3.8 oz)   06/10/21 118.3 kg (260 lb 12.8 oz)   21 117.9 kg (260 lb)       Primary Diagnosis: Nausea Vomiting Behavioral Health Concerns: Is patient potentially combative? NO      Destination (name of facility if applicable, and address): [facility] [address] [place] [address]           Date of :2021 Time of :1500    Insurance ID # (patient's [place] policy number): [id number]  Name of person we spoke to at insurance company:Pat  Call reference number if available (most insurance companies will give a call reference # if asked for one): NA Payor Authorization number (if approved by payor): NA    This note will be used as the transportation form sent to [facility] Transportation team via routing fax to: [phone].  Inpatient Care Management team updated on transportation ride.      Lexii Laughlin MA  [facility], [facility]

## 2021-08-05 NOTE — DISCHARGE INSTRUCTIONS
Pressure Ulcer Prevention   Must limit time sitting in chair without a position change.  Would suggest getting out of the chair every 2 hours and laying down.    Make sure the cushion in the wheelchair at home is providing proper pressure reduction by having a seating evaluation done by the cushion provider.     Improvement noted in buttock and leg wounds since last seen in the hospital in May.      Continue with current topical plan of care you have been doing at home with Homecare RN.      Would make sure to use your compression wraps on your legs when up in the chair to control the edema in the legs.    [facility] Nurse - Mai Massey RN CWON  [phone]  Wound Cares:  Abdomen  1.) Cleanse with wound cleanser and gauze. Pat dry.  2.) Apply medihoney to open areas and cover with island dressing.   Change every other day.     Right Posterior Thigh Wound  1.) Cleanse with spray wound cleanser. Dry well.  2.  Apply skin prep to periwound skin.  3.  Cover with Mepilex border 6x6 and then seal edges of dressing with skin prep.   4.  Change 3x weekly.       HOB less than 30 degrees as able, elevate knee gatch with HOB elevation to reduce shear.  Chair cushion when up.  Reposition hourly when in chair and every two hours in bed as able.  Avoid supine position using pillows tucked under alternating hips for at least 15-30 degree turn to lift sacrum/coccyx off bed.  Avoid dragging buttocks with position changes.       Bilateral Lower Legs  1.) Cleanse with wound cleanser or mild soap/water. If using soap/water, rinse after. Pat dry.  2.) Apply Aquaphor generously to intact skin  3.) Cover any open draining area with mepilex lite and kerlex.   4.) Apply spandagrip to each leg for some light compression.  Pt must resume her velcro compression wraps when she gets home.       Orders detailed in discharge instructions for presumed TCU at discharge  Recommended provider order: None, at this time; While patient would be ideally  assessed by lymphedema therapy, she states she velcro compression wraps at home. At this time placed large spandagrip for some light compression when pt is up in the chair with legs dependant.       WOC Nurse follow-up plan:signing off

## 2021-08-05 NOTE — PROGRESS NOTES
Care Management Discharge Note    Discharge Date:  08/05/21 at 3pm     Discharge Disposition: Home Care;     Discharge Services: County Worker    Discharge DME: Bed, Wheelchair, Shower Chair    Discharge Transportation: agency; [facility] wheelchair transportation    Private pay costs discussed: Not applicable    PAS Confirmation Code:  NA  Patient/family educated on Medicare website which has current facility and service quality ratings: yes    Education Provided on the Discharge Plan:  yes  Persons Notified of Discharge Plans: patient  Patient/Family in Agreement with the Plan: yes    Handoff Referral Completed: No    Additional Information:  Per IDT rounds today, MD team states that pt is medically stable for discharge today.  [employer] Phone: [phone] Fax: [phone] will resume RN, PT/OT services.      Transportation discussed and [facility] wheelchair to transport at 3 PM.  Pt to call her DIL now to ensure that her keys will be present upon return to her apartment.      GILBERT Thomas

## 2021-08-05 NOTE — PROGRESS NOTES
[facility] Note    Background: Patient identified in needing wheelchair or stretcher transportation coordinated within hospital discharge plan.  Transportation authorization request initiated to [facility] team.    Transportation type: Non-Emergent Transport Pre-Scheduled    Mode of Transportation (Wheelchair)     Transportation Company: [employer]  NPI: [id number]  Dispatch: [phone]    Patient name and : Carolina Mari  1955  MRN: [id number]    Discharge Location: [facility]  [address] [place] [address]  NPI: [id number]  Inpatient Case Management office phone: [phone]  Room #: 2312   Most Recent Weight:   Wt Readings from Last 3 Encounters:   21 112.6 kg (248 lb 3.8 oz)   06/10/21 118.3 kg (260 lb 12.8 oz)   21 117.9 kg (260 lb)       Primary Diagnosis: Nausea Vomiting Behavioral Health Concerns: Is patient potentially combative? NO      Destination (name of facility if applicable, and address): [facility] [address] [place] [address]           Date of :2021 Time of :1500    Insurance ID # (patient's [place] policy number): [id number]  Name of person we spoke to at insurance company:Pat  Call reference number if available (most insurance companies will give a call reference # if asked for one): NA Payor Authorization number (if approved by payor): NA    This note will be used as the transportation form sent to [facility] Transportation team via routing fax to: [phone].  Inpatient Care Management team updated on transportation ride.      Lexii Laughlin MA  [facility], [facility]

## 2021-08-06 ENCOUNTER — PATIENT OUTREACH (OUTPATIENT)
Dept: CARE COORDINATION | Facility: CLINIC | Age: 66
End: 2021-08-06

## 2021-08-06 DIAGNOSIS — Z71.89 OTHER SPECIFIED COUNSELING: ICD-10-CM

## 2021-08-06 NOTE — PROGRESS NOTES
Clinic Care Coordination Contact  [facility]: Post-Discharge Note  SITUATION                                                      Admission:    Admission Date: 08/04/21   Reason for Admission: Small bowel obstruction  Discharge:   Discharge Date: 08/05/21  Discharge Diagnosis: Small bowel obstruction    BACKGROUND                                                      Carolina Mari is a 66 year old female admitted on 8/3/2021. She is admitted with a small bowel obstruction    ASSESSMENT      Discharge Assessment  How are you doing now that you are home?: doing fine per son  Does the patient have their discharge instructions? : No - Review discharge instructions  Does the patient have questions regarding their discharge instructions? : No  Discharge follow-up appointment scheduled within 14 calendar days? : No  Is patient agreeable to assistance with scheduling? : No      unable to complete full Post Discharge Assessment due to Son was not able to talk and he ended call. Patients home number is not in service.       PLAN                                                      Outpatient Plan:     Follow-up Appointments     Follow-up and recommended labs and tests      Follow up with primary care provider, Le Romo, within 7 days   for hospital follow- up.  The following labs/tests are recommended: BMP   and CBC.   No future appointments.      For any urgent concerns, please contact our 24 hour nurse triage line: [phone] ([phone])         Sharla Monique   Community Health Worker   [facility], [facility]  Ph: [phone]  Fx: [phone]

## 2021-10-07 ENCOUNTER — LAB REQUISITION (OUTPATIENT)
Dept: LAB | Facility: CLINIC | Age: 66
End: 2021-10-07
Payer: MEDICARE

## 2021-10-07 DIAGNOSIS — E53.8 DEFICIENCY OF OTHER SPECIFIED B GROUP VITAMINS: ICD-10-CM

## 2021-10-07 DIAGNOSIS — E03.9 HYPOTHYROIDISM, UNSPECIFIED: ICD-10-CM

## 2021-10-07 DIAGNOSIS — E78.5 HYPERLIPIDEMIA, UNSPECIFIED: ICD-10-CM

## 2021-10-07 DIAGNOSIS — E83.42 HYPOMAGNESEMIA: ICD-10-CM

## 2021-10-07 LAB
ANION GAP SERPL CALCULATED.3IONS-SCNC: 8 MMOL/L (ref 5–18)
BUN SERPL-MCNC: 13 MG/DL (ref 8–22)
CALCIUM SERPL-MCNC: 8.7 MG/DL (ref 8.5–10.5)
CHLORIDE BLD-SCNC: 107 MMOL/L (ref 98–107)
CHOLEST SERPL-MCNC: 126 MG/DL
CO2 SERPL-SCNC: 24 MMOL/L (ref 22–31)
CREAT SERPL-MCNC: 0.94 MG/DL (ref 0.6–1.1)
GFR SERPL CREATININE-BSD FRML MDRD: 63 ML/MIN/1.73M2
GLUCOSE BLD-MCNC: 96 MG/DL (ref 70–125)
HDLC SERPL-MCNC: 43 MG/DL
LDLC SERPL CALC-MCNC: 61 MG/DL
MAGNESIUM SERPL-MCNC: 1.7 MG/DL (ref 1.8–2.6)
POTASSIUM BLD-SCNC: 4.7 MMOL/L (ref 3.5–5)
SODIUM SERPL-SCNC: 139 MMOL/L (ref 136–145)
TRIGL SERPL-MCNC: 108 MG/DL
TSH SERPL DL<=0.005 MIU/L-ACNC: 1.57 UIU/ML (ref 0.3–5)
VIT B12 SERPL-MCNC: >2000 PG/ML (ref 213–816)

## 2021-10-07 PROCEDURE — 80061 LIPID PANEL: CPT | Mod: ORL | Performed by: PHYSICIAN ASSISTANT

## 2021-10-07 PROCEDURE — 83735 ASSAY OF MAGNESIUM: CPT | Mod: ORL | Performed by: PHYSICIAN ASSISTANT

## 2021-10-07 PROCEDURE — 84443 ASSAY THYROID STIM HORMONE: CPT | Mod: ORL | Performed by: PHYSICIAN ASSISTANT

## 2021-10-07 PROCEDURE — 82607 VITAMIN B-12: CPT | Mod: ORL | Performed by: PHYSICIAN ASSISTANT

## 2021-10-07 PROCEDURE — 80048 BASIC METABOLIC PNL TOTAL CA: CPT | Mod: ORL | Performed by: PHYSICIAN ASSISTANT

## 2022-01-01 ENCOUNTER — LAB REQUISITION (OUTPATIENT)
Dept: LAB | Facility: CLINIC | Age: 67
End: 2022-01-01
Payer: MEDICARE

## 2022-01-01 DIAGNOSIS — E03.9 HYPOTHYROIDISM, UNSPECIFIED: ICD-10-CM

## 2022-01-01 DIAGNOSIS — Z03.818 ENCOUNTER FOR OBSERVATION FOR SUSPECTED EXPOSURE TO OTHER BIOLOGICAL AGENTS RULED OUT: ICD-10-CM

## 2022-01-01 DIAGNOSIS — E73.9 LACTOSE INTOLERANCE, UNSPECIFIED: ICD-10-CM

## 2022-01-01 DIAGNOSIS — I10 ESSENTIAL (PRIMARY) HYPERTENSION: ICD-10-CM

## 2022-01-01 LAB
ANION GAP SERPL CALCULATED.3IONS-SCNC: 13 MMOL/L (ref 7–15)
BUN SERPL-MCNC: 10 MG/DL (ref 8–23)
CALCIUM SERPL-MCNC: 8.7 MG/DL (ref 8.8–10.2)
CHLORIDE SERPL-SCNC: 105 MMOL/L (ref 98–107)
CREAT SERPL-MCNC: 0.86 MG/DL (ref 0.51–0.95)
DEPRECATED HCO3 PLAS-SCNC: 20 MMOL/L (ref 22–29)
ERYTHROCYTE [DISTWIDTH] IN BLOOD BY AUTOMATED COUNT: 15.7 % (ref 10–15)
GFR SERPL CREATININE-BSD FRML MDRD: 73 ML/MIN/1.73M2
GLUCOSE SERPL-MCNC: 158 MG/DL (ref 70–99)
HBA1C MFR BLD: 5.3 %
HCT VFR BLD AUTO: 37.3 % (ref 35–47)
HGB BLD-MCNC: 10.9 G/DL (ref 11.7–15.7)
MCH RBC QN AUTO: 27.3 PG (ref 26.5–33)
MCHC RBC AUTO-ENTMCNC: 29.2 G/DL (ref 31.5–36.5)
MCV RBC AUTO: 93 FL (ref 78–100)
PLATELET # BLD AUTO: 332 10E3/UL (ref 150–450)
POTASSIUM SERPL-SCNC: 4.9 MMOL/L (ref 3.4–5.3)
RBC # BLD AUTO: 4 10E6/UL (ref 3.8–5.2)
SARS-COV-2 RNA RESP QL NAA+PROBE: NEGATIVE
SARS-COV-2 RNA RESP QL NAA+PROBE: NEGATIVE
SODIUM SERPL-SCNC: 138 MMOL/L (ref 136–145)
TSH SERPL DL<=0.005 MIU/L-ACNC: 14.1 UIU/ML (ref 0.3–4.2)
TSH SERPL DL<=0.005 MIU/L-ACNC: 26.6 UIU/ML (ref 0.3–4.2)
WBC # BLD AUTO: 9.4 10E3/UL (ref 4–11)

## 2022-01-01 PROCEDURE — 36415 COLL VENOUS BLD VENIPUNCTURE: CPT | Mod: ORL | Performed by: FAMILY MEDICINE

## 2022-01-01 PROCEDURE — U0005 INFEC AGEN DETEC AMPLI PROBE: HCPCS | Mod: ORL | Performed by: FAMILY MEDICINE

## 2022-01-01 PROCEDURE — 83036 HEMOGLOBIN GLYCOSYLATED A1C: CPT | Mod: ORL | Performed by: FAMILY MEDICINE

## 2022-01-01 PROCEDURE — 85027 COMPLETE CBC AUTOMATED: CPT | Mod: ORL | Performed by: FAMILY MEDICINE

## 2022-01-01 PROCEDURE — 84443 ASSAY THYROID STIM HORMONE: CPT | Mod: ORL | Performed by: FAMILY MEDICINE

## 2022-01-01 PROCEDURE — P9604 ONE-WAY ALLOW PRORATED TRIP: HCPCS | Mod: ORL | Performed by: FAMILY MEDICINE

## 2022-01-01 PROCEDURE — 80048 BASIC METABOLIC PNL TOTAL CA: CPT | Mod: ORL | Performed by: FAMILY MEDICINE

## 2022-01-04 ENCOUNTER — APPOINTMENT (OUTPATIENT)
Dept: GENERAL RADIOLOGY | Facility: CLINIC | Age: 67
End: 2022-01-04
Attending: FAMILY MEDICINE
Payer: MEDICARE

## 2022-01-04 ENCOUNTER — APPOINTMENT (OUTPATIENT)
Dept: CT IMAGING | Facility: CLINIC | Age: 67
End: 2022-01-04
Attending: FAMILY MEDICINE
Payer: MEDICARE

## 2022-01-04 ENCOUNTER — HOSPITAL ENCOUNTER (OUTPATIENT)
Facility: CLINIC | Age: 67
Setting detail: OBSERVATION
Discharge: NURSING, REHAB OR LONG-TERM CARE | End: 2022-01-06
Attending: FAMILY MEDICINE | Admitting: INTERNAL MEDICINE
Payer: MEDICARE

## 2022-01-04 DIAGNOSIS — I44.0 FIRST DEGREE AV BLOCK: ICD-10-CM

## 2022-01-04 DIAGNOSIS — R19.7 DIARRHEA OF PRESUMED INFECTIOUS ORIGIN: ICD-10-CM

## 2022-01-04 DIAGNOSIS — R53.1 GENERALIZED WEAKNESS: ICD-10-CM

## 2022-01-04 DIAGNOSIS — R19.7 DIARRHEA, UNSPECIFIED TYPE: Primary | ICD-10-CM

## 2022-01-04 DIAGNOSIS — M25.511 ACUTE PAIN OF RIGHT SHOULDER: ICD-10-CM

## 2022-01-04 DIAGNOSIS — Z11.52 ENCOUNTER FOR SCREENING LABORATORY TESTING FOR SEVERE ACUTE RESPIRATORY SYNDROME CORONAVIRUS 2 (SARS-COV-2): ICD-10-CM

## 2022-01-04 DIAGNOSIS — R53.1 WEAK: ICD-10-CM

## 2022-01-04 DIAGNOSIS — L89.313 PRESSURE INJURY OF RIGHT BUTTOCK, STAGE 3 (H): ICD-10-CM

## 2022-01-04 DIAGNOSIS — M06.4 UNDIFFERENTIATED INFLAMMATORY ARTHRITIS (H): ICD-10-CM

## 2022-01-04 DIAGNOSIS — E86.0 DEHYDRATION: ICD-10-CM

## 2022-01-04 LAB
ALBUMIN SERPL-MCNC: 2.3 G/DL (ref 3.4–5)
ALP SERPL-CCNC: 167 U/L (ref 40–150)
ALT SERPL W P-5'-P-CCNC: 17 U/L (ref 0–50)
ANION GAP SERPL CALCULATED.3IONS-SCNC: 6 MMOL/L (ref 3–14)
AST SERPL W P-5'-P-CCNC: 25 U/L (ref 0–45)
BASOPHILS # BLD AUTO: 0 10E3/UL (ref 0–0.2)
BASOPHILS NFR BLD AUTO: 0 %
BILIRUB SERPL-MCNC: 1 MG/DL (ref 0.2–1.3)
BUN SERPL-MCNC: 15 MG/DL (ref 7–30)
CALCIUM SERPL-MCNC: 8.7 MG/DL (ref 8.5–10.1)
CHLORIDE BLD-SCNC: 106 MMOL/L (ref 94–109)
CO2 SERPL-SCNC: 22 MMOL/L (ref 20–32)
CREAT SERPL-MCNC: 0.97 MG/DL (ref 0.52–1.04)
EOSINOPHIL # BLD AUTO: 0 10E3/UL (ref 0–0.7)
EOSINOPHIL NFR BLD AUTO: 0 %
ERYTHROCYTE [DISTWIDTH] IN BLOOD BY AUTOMATED COUNT: 16 % (ref 10–15)
FLUAV RNA SPEC QL NAA+PROBE: NEGATIVE
FLUBV RNA RESP QL NAA+PROBE: NEGATIVE
GFR SERPL CREATININE-BSD FRML MDRD: 64 ML/MIN/1.73M2
GLUCOSE BLD-MCNC: 158 MG/DL (ref 70–99)
HCT VFR BLD AUTO: 33.1 % (ref 35–47)
HGB BLD-MCNC: 10.3 G/DL (ref 11.7–15.7)
HOLD SPECIMEN: NORMAL
HOLD SPECIMEN: NORMAL
IMM GRANULOCYTES # BLD: 0.1 10E3/UL
IMM GRANULOCYTES NFR BLD: 1 %
LACTATE SERPL-SCNC: 0.8 MMOL/L (ref 0.7–2)
LYMPHOCYTES # BLD AUTO: 0.4 10E3/UL (ref 0.8–5.3)
LYMPHOCYTES NFR BLD AUTO: 3 %
MCH RBC QN AUTO: 27.3 PG (ref 26.5–33)
MCHC RBC AUTO-ENTMCNC: 31.1 G/DL (ref 31.5–36.5)
MCV RBC AUTO: 88 FL (ref 78–100)
MONOCYTES # BLD AUTO: 0.9 10E3/UL (ref 0–1.3)
MONOCYTES NFR BLD AUTO: 6 %
NEUTROPHILS # BLD AUTO: 14.7 10E3/UL (ref 1.6–8.3)
NEUTROPHILS NFR BLD AUTO: 90 %
NRBC # BLD AUTO: 0 10E3/UL
NRBC BLD AUTO-RTO: 0 /100
PLATELET # BLD AUTO: 330 10E3/UL (ref 150–450)
POTASSIUM BLD-SCNC: 4.6 MMOL/L (ref 3.4–5.3)
PROT SERPL-MCNC: 7.5 G/DL (ref 6.8–8.8)
RBC # BLD AUTO: 3.77 10E6/UL (ref 3.8–5.2)
SARS-COV-2 RNA RESP QL NAA+PROBE: NEGATIVE
SODIUM SERPL-SCNC: 134 MMOL/L (ref 133–144)
TROPONIN I SERPL HS-MCNC: 10 NG/L
WBC # BLD AUTO: 16.2 10E3/UL (ref 4–11)

## 2022-01-04 PROCEDURE — 83605 ASSAY OF LACTIC ACID: CPT | Performed by: FAMILY MEDICINE

## 2022-01-04 PROCEDURE — 258N000003 HC RX IP 258 OP 636: Performed by: FAMILY MEDICINE

## 2022-01-04 PROCEDURE — 81001 URINALYSIS AUTO W/SCOPE: CPT | Performed by: FAMILY MEDICINE

## 2022-01-04 PROCEDURE — 96361 HYDRATE IV INFUSION ADD-ON: CPT | Performed by: FAMILY MEDICINE

## 2022-01-04 PROCEDURE — 80053 COMPREHEN METABOLIC PANEL: CPT | Performed by: FAMILY MEDICINE

## 2022-01-04 PROCEDURE — 93010 ELECTROCARDIOGRAM REPORT: CPT | Performed by: FAMILY MEDICINE

## 2022-01-04 PROCEDURE — 96365 THER/PROPH/DIAG IV INF INIT: CPT | Mod: 59 | Performed by: FAMILY MEDICINE

## 2022-01-04 PROCEDURE — 99285 EMERGENCY DEPT VISIT HI MDM: CPT | Mod: 25 | Performed by: FAMILY MEDICINE

## 2022-01-04 PROCEDURE — 96376 TX/PRO/DX INJ SAME DRUG ADON: CPT | Performed by: FAMILY MEDICINE

## 2022-01-04 PROCEDURE — 250N000013 HC RX MED GY IP 250 OP 250 PS 637: Performed by: FAMILY MEDICINE

## 2022-01-04 PROCEDURE — 82040 ASSAY OF SERUM ALBUMIN: CPT | Performed by: FAMILY MEDICINE

## 2022-01-04 PROCEDURE — 250N000009 HC RX 250: Performed by: FAMILY MEDICINE

## 2022-01-04 PROCEDURE — G0378 HOSPITAL OBSERVATION PER HR: HCPCS

## 2022-01-04 PROCEDURE — 87636 SARSCOV2 & INF A&B AMP PRB: CPT | Performed by: FAMILY MEDICINE

## 2022-01-04 PROCEDURE — 85041 AUTOMATED RBC COUNT: CPT | Performed by: FAMILY MEDICINE

## 2022-01-04 PROCEDURE — 73030 X-RAY EXAM OF SHOULDER: CPT | Mod: RT

## 2022-01-04 PROCEDURE — 96375 TX/PRO/DX INJ NEW DRUG ADDON: CPT | Performed by: FAMILY MEDICINE

## 2022-01-04 PROCEDURE — 84484 ASSAY OF TROPONIN QUANT: CPT | Performed by: FAMILY MEDICINE

## 2022-01-04 PROCEDURE — 93005 ELECTROCARDIOGRAM TRACING: CPT | Performed by: FAMILY MEDICINE

## 2022-01-04 PROCEDURE — 74177 CT ABD & PELVIS W/CONTRAST: CPT

## 2022-01-04 PROCEDURE — C9803 HOPD COVID-19 SPEC COLLECT: HCPCS | Performed by: FAMILY MEDICINE

## 2022-01-04 PROCEDURE — 36415 COLL VENOUS BLD VENIPUNCTURE: CPT | Performed by: FAMILY MEDICINE

## 2022-01-04 PROCEDURE — 96366 THER/PROPH/DIAG IV INF ADDON: CPT | Performed by: FAMILY MEDICINE

## 2022-01-04 PROCEDURE — 250N000011 HC RX IP 250 OP 636: Performed by: FAMILY MEDICINE

## 2022-01-04 RX ORDER — FAMOTIDINE 20 MG/1
20 TABLET, FILM COATED ORAL 2 TIMES DAILY
Status: DISCONTINUED | OUTPATIENT
Start: 2022-01-04 | End: 2022-01-06 | Stop reason: HOSPADM

## 2022-01-04 RX ORDER — METOPROLOL TARTRATE 100 MG
100 TABLET ORAL 2 TIMES DAILY
Status: DISCONTINUED | OUTPATIENT
Start: 2022-01-04 | End: 2022-01-06 | Stop reason: HOSPADM

## 2022-01-04 RX ORDER — FUROSEMIDE 20 MG
20 TABLET ORAL DAILY
COMMUNITY
Start: 2021-11-18 | End: 2023-01-01

## 2022-01-04 RX ORDER — FUROSEMIDE 20 MG
20 TABLET ORAL DAILY
Status: DISCONTINUED | OUTPATIENT
Start: 2022-01-05 | End: 2022-01-06 | Stop reason: HOSPADM

## 2022-01-04 RX ORDER — ONDANSETRON 2 MG/ML
4 INJECTION INTRAMUSCULAR; INTRAVENOUS EVERY 6 HOURS PRN
Status: DISCONTINUED | OUTPATIENT
Start: 2022-01-04 | End: 2022-01-06 | Stop reason: HOSPADM

## 2022-01-04 RX ORDER — MINERAL OIL/HYDROPHIL PETROLAT
OINTMENT (GRAM) TOPICAL
COMMUNITY
Start: 2021-01-06 | End: 2023-01-01

## 2022-01-04 RX ORDER — SIMVASTATIN 20 MG
20 TABLET ORAL AT BEDTIME
Status: DISCONTINUED | OUTPATIENT
Start: 2022-01-04 | End: 2022-01-06 | Stop reason: HOSPADM

## 2022-01-04 RX ORDER — IOPAMIDOL 755 MG/ML
100 INJECTION, SOLUTION INTRAVASCULAR ONCE
Status: COMPLETED | OUTPATIENT
Start: 2022-01-04 | End: 2022-01-04

## 2022-01-04 RX ORDER — LEVOTHYROXINE SODIUM 100 UG/1
200 TABLET ORAL
Status: DISCONTINUED | OUTPATIENT
Start: 2022-01-05 | End: 2022-01-06 | Stop reason: HOSPADM

## 2022-01-04 RX ORDER — LOSARTAN POTASSIUM 100 MG/1
50 TABLET ORAL DAILY
COMMUNITY
Start: 2021-11-30

## 2022-01-04 RX ORDER — CYCLOBENZAPRINE HCL 5 MG
1 TABLET ORAL 3 TIMES DAILY PRN
COMMUNITY
Start: 2021-01-16 | End: 2022-01-12

## 2022-01-04 RX ORDER — LOSARTAN POTASSIUM 50 MG/1
100 TABLET ORAL DAILY
Status: DISCONTINUED | OUTPATIENT
Start: 2022-01-05 | End: 2022-01-06 | Stop reason: HOSPADM

## 2022-01-04 RX ORDER — HYDROMORPHONE HYDROCHLORIDE 1 MG/ML
0.5 INJECTION, SOLUTION INTRAMUSCULAR; INTRAVENOUS; SUBCUTANEOUS
Status: DISCONTINUED | OUTPATIENT
Start: 2022-01-04 | End: 2022-01-06 | Stop reason: HOSPADM

## 2022-01-04 RX ORDER — NALOXONE HYDROCHLORIDE 4 MG/.1ML
SPRAY NASAL
COMMUNITY
Start: 2021-09-28

## 2022-01-04 RX ORDER — MICONAZOLE NITRATE 20 MG/G
POWDER TOPICAL
COMMUNITY
Start: 2021-02-03 | End: 2023-01-01

## 2022-01-04 RX ORDER — OXYCODONE HYDROCHLORIDE 5 MG/1
10 TABLET ORAL ONCE
Status: COMPLETED | OUTPATIENT
Start: 2022-01-04 | End: 2022-01-04

## 2022-01-04 RX ORDER — ACETAMINOPHEN 500 MG
1000 TABLET ORAL ONCE
Status: COMPLETED | OUTPATIENT
Start: 2022-01-04 | End: 2022-01-04

## 2022-01-04 RX ORDER — ONDANSETRON 4 MG/1
4 TABLET, ORALLY DISINTEGRATING ORAL EVERY 6 HOURS PRN
Status: DISCONTINUED | OUTPATIENT
Start: 2022-01-04 | End: 2022-01-06 | Stop reason: HOSPADM

## 2022-01-04 RX ORDER — AMLODIPINE BESYLATE 5 MG/1
5 TABLET ORAL DAILY
Status: DISCONTINUED | OUTPATIENT
Start: 2022-01-05 | End: 2022-01-06 | Stop reason: HOSPADM

## 2022-01-04 RX ORDER — OXYCODONE HYDROCHLORIDE 5 MG/1
10 TABLET ORAL EVERY 6 HOURS PRN
Status: DISCONTINUED | OUTPATIENT
Start: 2022-01-04 | End: 2022-01-06 | Stop reason: HOSPADM

## 2022-01-04 RX ORDER — GABAPENTIN 300 MG/1
600 CAPSULE ORAL 3 TIMES DAILY
Status: DISCONTINUED | OUTPATIENT
Start: 2022-01-04 | End: 2022-01-06 | Stop reason: HOSPADM

## 2022-01-04 RX ADMIN — OXYCODONE HYDROCHLORIDE 10 MG: 5 TABLET ORAL at 23:42

## 2022-01-04 RX ADMIN — FAMOTIDINE 20 MG: 20 TABLET ORAL at 18:26

## 2022-01-04 RX ADMIN — ACETAMINOPHEN 1000 MG: 500 TABLET, FILM COATED ORAL at 14:03

## 2022-01-04 RX ADMIN — HYDROMORPHONE HYDROCHLORIDE 0.5 MG: 1 INJECTION, SOLUTION INTRAMUSCULAR; INTRAVENOUS; SUBCUTANEOUS at 18:44

## 2022-01-04 RX ADMIN — SIMVASTATIN 20 MG: 20 TABLET, FILM COATED ORAL at 23:42

## 2022-01-04 RX ADMIN — GABAPENTIN 600 MG: 300 CAPSULE ORAL at 18:26

## 2022-01-04 RX ADMIN — DEXTROSE AND SODIUM CHLORIDE: 5; 900 INJECTION, SOLUTION INTRAVENOUS at 16:13

## 2022-01-04 RX ADMIN — METOPROLOL TARTRATE 100 MG: 100 TABLET, FILM COATED ORAL at 18:26

## 2022-01-04 RX ADMIN — DEXTROSE AND SODIUM CHLORIDE: 5; 900 INJECTION, SOLUTION INTRAVENOUS at 23:42

## 2022-01-04 RX ADMIN — SODIUM CHLORIDE 72 ML: 9 INJECTION, SOLUTION INTRAVENOUS at 15:45

## 2022-01-04 RX ADMIN — SODIUM CHLORIDE 1000 ML: 9 INJECTION, SOLUTION INTRAVENOUS at 14:02

## 2022-01-04 RX ADMIN — OXYCODONE HYDROCHLORIDE 10 MG: 5 TABLET ORAL at 15:40

## 2022-01-04 RX ADMIN — IOPAMIDOL 100 ML: 755 INJECTION, SOLUTION INTRAVENOUS at 15:45

## 2022-01-04 RX ADMIN — HYDROMORPHONE HYDROCHLORIDE 0.5 MG: 1 INJECTION, SOLUTION INTRAMUSCULAR; INTRAVENOUS; SUBCUTANEOUS at 14:03

## 2022-01-04 NOTE — ED NOTES
Pt presents to ED via EMS from independent living. Pt reports has been in w/c since last evening-unable to get out wheelchair. Pt c/o right shoulder pain that started yesterday evening. Pt denies injury to the shoulder. Pt soiled upon arrival to ED, pericare and barrier cream provided. Pt with excoriation to periarea. BLE with redness and swelling and open areas. Pt has nurse come to her home weekly and nurse advised to come to ED.

## 2022-01-04 NOTE — ED PROVIDER NOTES
"  HPI   The patient is a 66-year-old female presenting by EMS transport with generalized weakness, right shoulder pain, and diarrhea.  The patient has a known history of hypertension, morbid obesity, diabetes, electrolyte abnormalities, and hyperlipidemia.  She takes Norvasc 5 mg daily.  She takes metoprolol 100 mg twice daily.  She takes fluoxetine, gabapentin, Synthroid, among others.  She has not taken her medication today.  Her last dose of oxycodone was yesterday evening.  She generally takes 10 mg twice a day.  She was diagnosed with cellulitis on October 27, 2021.  She reports taking antibiotics as recently as 4 days ago but she cannot tell me why or which medicine.    The patient is brought in by EMS from her independent living apartment.  She generally sits in her wheelchair most of the day.  Her son lives close and was there when EMS arrived but is generally not available, per the patient.  She reports right shoulder/upper arm pain starting last evening, time unknown.  She says, \"I could not get out of my wheelchair because when I pushed on my right arm it hurt too much.\"  She ended up having 3 episodes of diarrhea through the night while sitting in her wheelchair.  She denies nausea or vomiting.  She does report diffuse abdominal pain that is moderate to severe in severity.  No fever reported.  No cough or congestion.  No headache.  No sore throat.  Uncertain if lightheaded.  She does feel generally weak.  She could not get up on her own.  No recent dysuria, urgency, or frequency of urination.  She denies trauma or injury.  No obvious hematochezia or melena.        Allergies:  Allergies   Allergen Reactions     Aspirin Other (See Comments)     History of ulcers     Colchicine Swelling     And gout flare up     Colcrys Other (See Comments) and Swelling     And gout flare up     Problem List:    Patient Active Problem List    Diagnosis Date Noted     Diarrhea of presumed infectious origin 01/04/2022     " Priority: Medium     Dehydration 01/04/2022     Priority: Medium     Generalized weakness 01/04/2022     Priority: Medium     Acute pain of right shoulder 01/04/2022     Priority: Medium     Abdominal pain, generalized 08/03/2021     Priority: Medium     Non-intractable vomiting with nausea, unspecified vomiting type 08/03/2021     Priority: Medium     Morbid obesity (H) 05/26/2021     Priority: Medium     Chronic ulcer of skin (H) 05/23/2021     Priority: Medium     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) 05/23/2021     Priority: Medium     Edema 05/23/2021     Priority: Medium     History of cholecystectomy 05/23/2021     Priority: Medium     Hyperlipidemia 05/23/2021     Priority: Medium     Iron deficiency anemia 05/23/2021     Priority: Medium     Nondependent opioid abuse (H) 05/23/2021     Priority: Medium     Vitamin B deficiency 05/23/2021     Priority: Medium     Debility 05/23/2021     Priority: Medium     Encounter for screening laboratory testing for severe acute respiratory syndrome coronavirus 2 (SARS-CoV-2) 05/21/2021     Priority: Medium     Depression 01/26/2021     Priority: Medium     Stage 3a chronic kidney disease (H) 01/14/2021     Priority: Medium     Other specified hypothyroidism 01/06/2021     Priority: Medium     Generalized anxiety disorder 01/06/2021     Priority: Medium     Gastro-esophageal reflux disease without esophagitis 01/06/2021     Priority: Medium     Hypertension, benign essential, goal below 140/90 01/06/2021     Priority: Medium     Nicotine dependence, unspecified, uncomplicated 01/06/2021     Priority: Medium     Recurrent major depression (H) 01/06/2021     Priority: Medium     Unspecified osteoarthritis, unspecified site 01/06/2021     Priority: Medium     Cellulitis of buttock 01/05/2021     Priority: Medium     Pressure ulcer of right buttock, stage 3 (H) 01/05/2021     Priority: Medium     NEELIMA (acute kidney injury) (H) 12/23/2016     Priority:  Medium     Acute gangrenous cholecystitis 12/23/2016     Priority: Medium     Hyponatremia 12/23/2016     Priority: Medium     Hypomagnesemia 12/23/2016     Priority: Medium     Hyperbilirubinemia 12/23/2016     Priority: Medium     Leukocytosis 12/23/2016     Priority: Medium     Adult failure to thrive syndrome 08/04/2015     Priority: Medium     UTI (urinary tract infection) 08/04/2015     Priority: Medium     Open wound of abdomen 07/01/2015     Priority: Medium     Diabetes mellitus (H) 07/01/2015     Priority: Medium     Tobacco user 07/01/2015     Priority: Medium     Obesity 06/05/2015     Priority: Medium     Chronic, continuous use of opioids 06/05/2015     Priority: Medium     Tinea corporis 06/05/2015     Priority: Medium     Weak 06/05/2015     Priority: Medium     Hip pain, bilateral 06/04/2015     Priority: Medium     Chronic pain 05/07/2015     Priority: Medium     Small bowel obstruction (H) 05/06/2015     Priority: Medium     Osteoarthritis of right hip 04/22/2015     Priority: Medium      Past Medical History:    Past Medical History:   Diagnosis Date     Diabetes mellitus (H)      History of stomach ulcers      HTN (hypertension)      Knee osteoarthritis      Osteoarthritis of right hip      Osteoporosis      Osteoporosis      Thyroid disease      Past Surgical History:    Past Surgical History:   Procedure Laterality Date     AMPUTATE TOE(S) Bilateral     3rd toe on both feet amputated.      FOOT SURGERY       GASTRIC BYPASS       HC REMOVAL GALLBLADDER N/A 12/25/2016    Procedure: CHOLECYSTECTOMY, OPEN;  Surgeon: Everardo Cisneros MD;  [place] [facility] [place]  Service: General     HERNIA REPAIR      Had 4 surgeries total     JOINT REPLACEMENT       SD SPINE SURGERY PROCEDURE UNLISTED       tka      right     Family History:    Family History   Problem Relation Age of Onset     Coronary Artery Disease Father      Coronary Artery Disease Brother      Cancer Mother         bone     Cancer  Brother         leukemia     Breast Cancer Mother      Cancer Brother 20.00        Leukemia     Coronary Artery Disease Brother      Social History:  Marital Status:   [4]  Social History     Tobacco Use     Smoking status: Never Smoker     Smokeless tobacco: Never Used   Substance Use Topics     Alcohol use: No     Drug use: No      Medications:    acetaminophen (TYLENOL) 500 MG tablet  amLODIPine (NORVASC) 5 MG tablet  cyanocobalamin (CYANOCOBALAMIN) 1000 MCG/ML injection  cyclobenzaprine (FLEXERIL) 5 MG tablet  diclofenac (VOLTAREN) 1 % topical gel  famotidine (PEPCID) 20 MG tablet  FLUoxetine HCl 60 MG TABS  furosemide (LASIX) 20 MG tablet  gabapentin (NEURONTIN) 600 MG tablet  levothyroxine (SYNTHROID/LEVOTHROID) 200 MCG tablet  losartan (COZAAR) 100 MG tablet  metoprolol tartrate (LOPRESSOR) 100 MG tablet  MICRO GUARD 2 % external powder  mineral oil-hydrophilic petrolatum (AQUAPHOR) external ointment  NARCAN 4 MG/0.1ML nasal spray  Nystatin (NYAMYC) 126230 UNIT/GM POWD  oxyCODONE (OXYCONTIN) 20 MG 12 hr tablet  oxyCODONE (ROXICODONE) 5 MG tablet  simvastatin (ZOCOR) 20 MG tablet      Review of Systems   All other systems reviewed and are negative.      PE   BP: (!) 175/76  Pulse: 62  Temp: 99.2  F (37.3  C)  Resp: 22  Weight: 117.9 kg (260 lb)  SpO2: 95 %  Physical Exam  Vitals reviewed.   Constitutional:       Appearance: She is well-developed. She is obese.      Comments: Patient is tired appearing and pale.  She smells of stool and urine.  She is disheveled.  Cooperative.  Answering questions appropriately.  Asking repeatedly for pain medication.   HENT:      Head: Normocephalic and atraumatic.      Right Ear: External ear normal.      Left Ear: External ear normal.      Nose: Nose normal. No congestion or rhinorrhea.      Mouth/Throat:      Mouth: Mucous membranes are dry.      Pharynx: Oropharynx is clear.   Eyes:      Extraocular Movements: Extraocular movements intact.      Conjunctiva/sclera:  Conjunctivae normal.      Pupils: Pupils are equal, round, and reactive to light.   Cardiovascular:      Rate and Rhythm: Normal rate and regular rhythm.      Heart sounds: Normal heart sounds.   Pulmonary:      Effort: Pulmonary effort is normal.      Breath sounds: Normal breath sounds.   Abdominal:      Palpations: Abdomen is soft.      Comments: She reports tenderness throughout.  No localized tenderness, mass or organomegaly.  No distention.   Musculoskeletal:         General: Normal range of motion.      Cervical back: Normal range of motion. No tenderness.      Comments: She reports tenderness as I push into the right anterior shoulder.  She has limited range of motion of the right shoulder because of pain.  This is both passive and active.  Her distal upper arm, elbow, forearm, wrist, and hand are all not tender and full range of motion is present without pain.  Capillary refill is brisk distally.  The hand is warm.  No evidence of contusion or open skin wound.  Otherwise not tender to palpation of major muscles, joints, long bones   Skin:     General: Skin is warm and dry.      Comments: Bilateral lower extremity edema, redness, not tender.   Neurological:      General: No focal deficit present.      Mental Status: She is alert and oriented to person, place, and time.      Comments: Generalized weakness, difficulty sitting up in bed on her own.   Psychiatric:         Behavior: Behavior normal.         ED COURSE and MDM   1335.  The patient has generalized weakness, right shoulder and upper arm pain, diarrhea.  Lab values pending.  X-ray of the shoulder and CT scan with IV contrast of the abdomen and pelvis.  Fluid bolus then infusion, Dilaudid, Tylenol.  Stool culture and C. difficile testing.    1645.  CT scan unremarkable.  Right shoulder x-ray unremarkable for acute pathology, possible chronic changes related to a rotator cuff problem.  Perhaps there is acute pain related to the new soft tissue injury?   Perhaps related to trying to get up from the wheelchair?  Diarrhea is presumably infectious.  She has not had diarrhea yet so no sample could be sent for inspection.  Patient feeling slightly improved.  Still quite weak and not able to get up on her own, also related to pain.  Leukocytosis present.  Urine analysis requested.  She did have a small urine output already though this was not collected.  Patient likely needing placement into a TCU versus hospital setting.    1714.  No bed availability.  Patient is a border status.  All medications provided.  Physical therapy and occupational therapy order requested for tomorrow.  Repeat blood work tomorrow.    EKG  (1353)   Interpretation performed by me.  Rate: 72     Rhythm: sinus     Axis: nl  Intervals: RI (12-2) 202, QRS (<12) 96, QTc (>5) 403  P wave: nl     QRS complex: nl   ST segment / T-wave: nl  Conclusion: First-degree AV block, otherwise unremarkable EKG.    LABS  Labs Ordered and Resulted from Time of ED Arrival to Time of ED Departure   COMPREHENSIVE METABOLIC PANEL - Abnormal       Result Value    Sodium 134      Potassium 4.6      Chloride 106      Carbon Dioxide (CO2) 22      Anion Gap 6      Urea Nitrogen 15      Creatinine 0.97      Calcium 8.7      Glucose 158 (*)     Alkaline Phosphatase 167 (*)     AST 25      ALT 17      Protein Total 7.5      Albumin 2.3 (*)     Bilirubin Total 1.0      GFR Estimate 64     CBC WITH PLATELETS AND DIFFERENTIAL - Abnormal    WBC Count 16.2 (*)     RBC Count 3.77 (*)     Hemoglobin 10.3 (*)     Hematocrit 33.1 (*)     MCV 88      MCH 27.3      MCHC 31.1 (*)     RDW 16.0 (*)     Platelet Count 330      % Neutrophils 90      % Lymphocytes 3      % Monocytes 6      % Eosinophils 0      % Basophils 0      % Immature Granulocytes 1      NRBCs per 100 WBC 0      Absolute Neutrophils 14.7 (*)     Absolute Lymphocytes 0.4 (*)     Absolute Monocytes 0.9      Absolute Eosinophils 0.0      Absolute Basophils 0.0      Absolute  Immature Granulocytes 0.1      Absolute NRBCs 0.0     TROPONIN I - Normal    Troponin I High Sensitivity 10     INFLUENZA A/B & SARS-COV2 PCR MULTIPLEX - Normal    Influenza A PCR Negative      Influenza B PCR Negative      SARS CoV2 PCR Negative     LACTIC ACID WHOLE BLOOD - Normal    Lactic Acid 0.8     ROUTINE UA WITH MICROSCOPIC REFLEX TO CULTURE   ENTERIC BACTERIA AND VIRUS PANEL BY LEON STOOL   CLOSTRIDIUM DIFFICILE TOXIN B       IMAGING  Images reviewed by me.  Radiology report also reviewed.  Shoulder XR, 2 view, right   Final Result   IMPRESSION: No acute fracture or malalignment. Moderate glenohumeral   and severe acromioclavicular joint degenerative changes. Cortical   irregularity at the greater tuberosity suggests chronic rotator cuff   pathology. Osteopenia. Cervical spine fusion hardware.      TANNER DEAL MD            SYSTEM ID:  [id number]      CT Abdomen Pelvis w Contrast   Final Result   IMPRESSION:    No acute cause for abdominal pain demonstrated. Chronic and incidental   findings detailed above are similar to prior.      REINA REED MD            SYSTEM ID:  [id number]          Procedures    Medications   HYDROmorphone (PF) (DILAUDID) injection 0.5 mg (0.5 mg Intravenous Given 1/4/22 1403)   dextrose 5% and 0.9% NaCl infusion ( Intravenous New Bag 1/4/22 1613)   amLODIPine (NORVASC) tablet 5 mg (has no administration in time range)   famotidine (PEPCID) tablet 20 mg (has no administration in time range)   FLUoxetine (PROzac) capsule 60 mg (has no administration in time range)   furosemide (LASIX) tablet 20 mg (has no administration in time range)   gabapentin (NEURONTIN) capsule 600 mg (has no administration in time range)   levothyroxine (SYNTHROID/LEVOTHROID) tablet 200 mcg (has no administration in time range)   losartan (COZAAR) tablet 100 mg (has no administration in time range)   metoprolol tartrate (LOPRESSOR) tablet 100 mg (has no administration in time range)   simvastatin (ZOCOR)  tablet 20 mg (has no administration in time range)   0.9% sodium chloride BOLUS (0 mLs Intravenous Stopped 1/4/22 1553)   acetaminophen (TYLENOL) tablet 1,000 mg (1,000 mg Oral Given 1/4/22 1403)   iopamidol (ISOVUE-370) solution 100 mL (100 mLs Intravenous Given 1/4/22 1545)   sodium chloride 0.9 % bag 500mL for CT scan flush use (72 mLs As instructed Given 1/4/22 1545)   oxyCODONE (ROXICODONE) tablet 10 mg (10 mg Oral Given 1/4/22 1540)         IMPRESSION       ICD-10-CM    1. Acute pain of right shoulder  M25.511    2. Diarrhea of presumed infectious origin  R19.7    3. Dehydration  E86.0    4. Generalized weakness  R53.1    5. First degree AV block  I44.0               Medication List      There are no discharge medications for this visit.                       Jose Patel MD  01/04/22 9595

## 2022-01-05 ENCOUNTER — APPOINTMENT (OUTPATIENT)
Dept: OCCUPATIONAL THERAPY | Facility: CLINIC | Age: 67
End: 2022-01-05
Payer: MEDICARE

## 2022-01-05 PROBLEM — L98.499 CHRONIC ULCER OF SKIN (H): Status: ACTIVE | Noted: 2021-05-23

## 2022-01-05 PROBLEM — E66.01 MORBID OBESITY (H): Status: ACTIVE | Noted: 2021-05-26

## 2022-01-05 PROBLEM — F41.1 GENERALIZED ANXIETY DISORDER: Status: ACTIVE | Noted: 2021-01-06

## 2022-01-05 PROBLEM — I10 HYPERTENSION, BENIGN ESSENTIAL, GOAL BELOW 140/90: Status: ACTIVE | Noted: 2021-01-06

## 2022-01-05 PROBLEM — E53.9 VITAMIN B DEFICIENCY: Status: ACTIVE | Noted: 2021-05-23

## 2022-01-05 PROBLEM — K21.9 GASTRO-ESOPHAGEAL REFLUX DISEASE WITHOUT ESOPHAGITIS: Status: ACTIVE | Noted: 2021-01-06

## 2022-01-05 PROBLEM — E03.8 OTHER SPECIFIED HYPOTHYROIDISM: Status: ACTIVE | Noted: 2021-01-06

## 2022-01-05 PROBLEM — D50.9 IRON DEFICIENCY ANEMIA: Status: ACTIVE | Noted: 2021-05-23

## 2022-01-05 PROBLEM — F17.200 NICOTINE DEPENDENCE, UNSPECIFIED, UNCOMPLICATED: Status: ACTIVE | Noted: 2021-01-06

## 2022-01-05 PROBLEM — F32.A DEPRESSION: Status: ACTIVE | Noted: 2021-01-26

## 2022-01-05 LAB
ALBUMIN UR-MCNC: NEGATIVE MG/DL
ANION GAP SERPL CALCULATED.3IONS-SCNC: 7 MMOL/L (ref 3–14)
APPEARANCE UR: CLEAR
BASOPHILS # BLD AUTO: 0 10E3/UL (ref 0–0.2)
BASOPHILS NFR BLD AUTO: 0 %
BILIRUB UR QL STRIP: NEGATIVE
BUN SERPL-MCNC: 12 MG/DL (ref 7–30)
C DIFF TOX B STL QL: NEGATIVE
CALCIUM SERPL-MCNC: 8.3 MG/DL (ref 8.5–10.1)
CHLORIDE BLD-SCNC: 111 MMOL/L (ref 94–109)
CO2 SERPL-SCNC: 21 MMOL/L (ref 20–32)
COLOR UR AUTO: YELLOW
CREAT SERPL-MCNC: 0.88 MG/DL (ref 0.52–1.04)
EOSINOPHIL # BLD AUTO: 0.1 10E3/UL (ref 0–0.7)
EOSINOPHIL NFR BLD AUTO: 1 %
ERYTHROCYTE [DISTWIDTH] IN BLOOD BY AUTOMATED COUNT: 16.1 % (ref 10–15)
GFR SERPL CREATININE-BSD FRML MDRD: 72 ML/MIN/1.73M2
GLUCOSE BLD-MCNC: 112 MG/DL (ref 70–99)
GLUCOSE UR STRIP-MCNC: NEGATIVE MG/DL
HCT VFR BLD AUTO: 29.2 % (ref 35–47)
HGB BLD-MCNC: 9 G/DL (ref 11.7–15.7)
HGB UR QL STRIP: ABNORMAL
IMM GRANULOCYTES # BLD: 0.1 10E3/UL
IMM GRANULOCYTES NFR BLD: 1 %
KETONES UR STRIP-MCNC: NEGATIVE MG/DL
LEUKOCYTE ESTERASE UR QL STRIP: ABNORMAL
LYMPHOCYTES # BLD AUTO: 0.5 10E3/UL (ref 0.8–5.3)
LYMPHOCYTES NFR BLD AUTO: 5 %
MCH RBC QN AUTO: 27.4 PG (ref 26.5–33)
MCHC RBC AUTO-ENTMCNC: 30.8 G/DL (ref 31.5–36.5)
MCV RBC AUTO: 89 FL (ref 78–100)
MONOCYTES # BLD AUTO: 0.8 10E3/UL (ref 0–1.3)
MONOCYTES NFR BLD AUTO: 7 %
MUCOUS THREADS #/AREA URNS LPF: PRESENT /LPF
NEUTROPHILS # BLD AUTO: 10 10E3/UL (ref 1.6–8.3)
NEUTROPHILS NFR BLD AUTO: 86 %
NITRATE UR QL: NEGATIVE
NRBC # BLD AUTO: 0 10E3/UL
NRBC BLD AUTO-RTO: 0 /100
PH UR STRIP: 5 [PH] (ref 5–7)
PLATELET # BLD AUTO: 258 10E3/UL (ref 150–450)
POTASSIUM BLD-SCNC: 3.8 MMOL/L (ref 3.4–5.3)
RBC # BLD AUTO: 3.29 10E6/UL (ref 3.8–5.2)
RBC URINE: 2 /HPF
SODIUM SERPL-SCNC: 139 MMOL/L (ref 133–144)
SP GR UR STRIP: 1.02 (ref 1–1.03)
SQUAMOUS EPITHELIAL: 1 /HPF
UROBILINOGEN UR STRIP-MCNC: NORMAL MG/DL
WBC # BLD AUTO: 11.4 10E3/UL (ref 4–11)
WBC URINE: 4 /HPF

## 2022-01-05 PROCEDURE — 96376 TX/PRO/DX INJ SAME DRUG ADON: CPT

## 2022-01-05 PROCEDURE — 250N000013 HC RX MED GY IP 250 OP 250 PS 637: Performed by: FAMILY MEDICINE

## 2022-01-05 PROCEDURE — 99220 PR INITIAL OBSERVATION CARE,LEVEL III: CPT | Performed by: FAMILY MEDICINE

## 2022-01-05 PROCEDURE — 97535 SELF CARE MNGMENT TRAINING: CPT | Mod: GO

## 2022-01-05 PROCEDURE — 87506 IADNA-DNA/RNA PROBE TQ 6-11: CPT | Performed by: FAMILY MEDICINE

## 2022-01-05 PROCEDURE — 36415 COLL VENOUS BLD VENIPUNCTURE: CPT | Performed by: FAMILY MEDICINE

## 2022-01-05 PROCEDURE — 85025 COMPLETE CBC W/AUTO DIFF WBC: CPT | Performed by: FAMILY MEDICINE

## 2022-01-05 PROCEDURE — G0463 HOSPITAL OUTPT CLINIC VISIT: HCPCS

## 2022-01-05 PROCEDURE — 97165 OT EVAL LOW COMPLEX 30 MIN: CPT | Mod: GO

## 2022-01-05 PROCEDURE — G0378 HOSPITAL OBSERVATION PER HR: HCPCS

## 2022-01-05 PROCEDURE — 87493 C DIFF AMPLIFIED PROBE: CPT | Mod: 91 | Performed by: FAMILY MEDICINE

## 2022-01-05 PROCEDURE — 80048 BASIC METABOLIC PNL TOTAL CA: CPT | Performed by: FAMILY MEDICINE

## 2022-01-05 RX ORDER — OXYCODONE HCL 10 MG/1
20 TABLET, FILM COATED, EXTENDED RELEASE ORAL EVERY 12 HOURS
Status: DISCONTINUED | OUTPATIENT
Start: 2022-01-05 | End: 2022-01-06 | Stop reason: HOSPADM

## 2022-01-05 RX ORDER — NYSTATIN 100000 U/G
CREAM TOPICAL 2 TIMES DAILY
Status: DISCONTINUED | OUTPATIENT
Start: 2022-01-05 | End: 2022-01-06 | Stop reason: HOSPADM

## 2022-01-05 RX ORDER — OXYCODONE HYDROCHLORIDE 5 MG/1
5 TABLET ORAL EVERY 4 HOURS PRN
Status: DISCONTINUED | OUTPATIENT
Start: 2022-01-05 | End: 2022-01-06 | Stop reason: HOSPADM

## 2022-01-05 RX ORDER — CYCLOBENZAPRINE HCL 5 MG
5 TABLET ORAL 3 TIMES DAILY PRN
Status: DISCONTINUED | OUTPATIENT
Start: 2022-01-05 | End: 2022-01-06 | Stop reason: HOSPADM

## 2022-01-05 RX ADMIN — FAMOTIDINE 20 MG: 20 TABLET ORAL at 08:55

## 2022-01-05 RX ADMIN — GABAPENTIN 600 MG: 300 CAPSULE ORAL at 08:54

## 2022-01-05 RX ADMIN — AMLODIPINE BESYLATE 5 MG: 5 TABLET ORAL at 08:57

## 2022-01-05 RX ADMIN — OXYCODONE HYDROCHLORIDE 10 MG: 5 TABLET ORAL at 17:05

## 2022-01-05 RX ADMIN — FLUOXETINE 60 MG: 20 CAPSULE ORAL at 08:57

## 2022-01-05 RX ADMIN — SIMVASTATIN 20 MG: 20 TABLET, FILM COATED ORAL at 21:40

## 2022-01-05 RX ADMIN — NYSTATIN: 100000 CREAM TOPICAL at 21:44

## 2022-01-05 RX ADMIN — OXYCODONE HYDROCHLORIDE 20 MG: 10 TABLET, FILM COATED, EXTENDED RELEASE ORAL at 21:39

## 2022-01-05 RX ADMIN — LEVOTHYROXINE SODIUM 200 MCG: 0.1 TABLET ORAL at 08:53

## 2022-01-05 RX ADMIN — FAMOTIDINE 20 MG: 20 TABLET ORAL at 17:05

## 2022-01-05 RX ADMIN — METOPROLOL TARTRATE 100 MG: 100 TABLET, FILM COATED ORAL at 17:05

## 2022-01-05 RX ADMIN — METOPROLOL TARTRATE 100 MG: 100 TABLET, FILM COATED ORAL at 08:55

## 2022-01-05 RX ADMIN — LOSARTAN POTASSIUM 100 MG: 50 TABLET, FILM COATED ORAL at 08:54

## 2022-01-05 RX ADMIN — GABAPENTIN 600 MG: 300 CAPSULE ORAL at 17:05

## 2022-01-05 RX ADMIN — FUROSEMIDE 20 MG: 20 TABLET ORAL at 08:55

## 2022-01-05 RX ADMIN — GABAPENTIN 600 MG: 300 CAPSULE ORAL at 12:33

## 2022-01-05 RX ADMIN — OXYCODONE HYDROCHLORIDE 10 MG: 5 TABLET ORAL at 05:13

## 2022-01-05 RX ADMIN — OXYCODONE HYDROCHLORIDE 10 MG: 5 TABLET ORAL at 10:56

## 2022-01-05 ASSESSMENT — ACTIVITIES OF DAILY LIVING (ADL)
DEPENDENT_IADLS:: CLEANING;COOKING;LAUNDRY;SHOPPING;MEAL PREPARATION;MEDICATION MANAGEMENT;MONEY MANAGEMENT;TRANSPORTATION

## 2022-01-05 NOTE — ED NOTES
Unable to obtain urine sample via straight cath. Pt incontinent of bladder prior to cath attempt. Pt stated she had to void-attempted bedpan without results. Pericare provided and barrier cream applied. Purewick placed.

## 2022-01-05 NOTE — PROGRESS NOTES
"[facility] Nurse Inpatient Adult Pressure Injury and Wound Assessment    Reason for consultation: Evaluate and treat multiple wounds - left buttocks,  Abdomen, left posterior LE, bilateral abdominal folds  Assessment  Patient was last seen 5/20/21 and again 8/21 as an inpatient with same issues.      Sacral buttocks,  right posterior proximal thigh wounds due to pressure. Contributing factors of the pressure injury: pressure, shear, microclimate, immobility and moisture/incontinence    Status: initial assessment    Pressure Injuries are Stage 2    Present on admission: Yes    Abdominal wound is a non-healing surgical wound per the patient from \"hernia surgeries\" with last one being done in \"2002.\" - chronic in nature.    Bilateral abdominal folds:  Superficial linear open areas due to moisture and yeast in folds.    Wounds on lower legs are related to lymphedema/venous insufficiency. Pt not wearing any compression garment but has velcro garments at home.     Treatment Plan  Wound Cares:    Mid Abdomen (old surgical wound)  1.) Cleanse with wound cleanser and gauze. Pat dry.  2.) Apply medihoney to open areas and cover with Mepilex border  island dressing.   Change every other day.    Buttocks:    1.  Cleanse with spray wound cleanser or incontinence cleanser and pat dry.  2.  Apply Triad wound paste   3.  Reapply after each incontinence episode.     Keep HOB less than 30 degrees as able, elevate knee gatch with HOB elevation to reduce shear.  Chair cushion when up.  Reposition hourly when in chair and every two hours in bed as able.  Avoid supine position using pillows tucked under alternating hips for at least 15-30 degree turn to lift sacrum/coccyx off bed.  Avoid dragging buttocks with position changes.      Bilateral Lower Legs  1.) Cleanse with wound cleanser or mild soap/water. If using soap/water, rinse after. Pat dry.  2.) Cover any open draining area with mepilex border dressing.  3.) Apply Aquaphor generously to " "intact skin   4.) keep legs elevated to control edema.  Pt must resume her velcro compression wraps when she gets home.     Bilateral Abdominal skin folds:  1.  Cleanse well with mild soap and water, rinse and pat dry.  2.  Generously dust skin fold with antifungal powder.   3.  Reapply 2 times per day       Orders written    WO Nurse follow-up plan:signing off  Nursing to notify the Provider(s) and re-consult the WOC Nurse if wound(s) deteriorates or new skin concern.    Patient History  According to provider note(s): Pt admitted for weakness, diarrhea and right shoulder pain.    Upon skin assessment staff noted multiple skin issues and ordered WOC consult.      Buttocks wounds    Chronic Nonhealing wounds on mid abdomen  Bilateral abdominal fold skin breakdown  Bilateral lower extremity lymphedema and open wounds    Patient known to [facility] service from previous hospitalization in January 2021 and May 2021, 8/21 and now 1/5/22 with ongoing skin issues.  At baseline pt is wheelchair bound and does sit for long periods of time.   Patient states is having difficulty with transfers so she sits in her wheelchair with a \"special cushion\" for greater than 12 hours per day.  She is living in an assisted living facility currently.    She wears an adult diaper and is frequently incontinent of urine and stool. She states she can apply her own barrier cream to her buttocks.   Her adult son assists with some cares and provides meals.  She has homecare RN 3x weekly for wound care.      Diagnoses include:  Pressure ulcer stage II with incontinence dermatitis  Chronic Nonhealing wounds on abdomen - Chronic  Bilateral lower extremity lymphedema with open wounds  Moisture related skin breakdown in bilateral abdominal skin folds.   Physical deconditioning  Hypertension  GERD  Hypothyroidism  Chronic pain  Depression and anxiety    Objective Data  Containment of urine/stool: depends    Active Diet Order  Orders Placed This Encounter     "  Regular Diet Adult      Risk Assessment:                                                Labs:   Recent Labs   Lab 01/05/22  0625 01/04/22  1358   ALBUMIN  --  2.3*   HGB 9.0* 10.3*   WBC 11.4* 16.2*       Physical Exam  Areas of skin assessed: Buttocks, abdominal wound, bilateral abdominal skin folds and  bilateral LEs     Abdominal wound          1/5/22          Stage/tissue depth: full thickness  3 separate linear wounds proximal to distal   1.0m x 0.5cm, 4.0cm x 1.5cm, 3cm x 2cm x 1- 2mm deep  Wound beds: clean, red  Wound Edges: attached  Periwound: intact, scar tissue, dried drainage  Drainage: small serosanguineous noted on previous bandage applied this morning  Odor: none  Pain: no    Left posterior LE       +2 edema with stasis changes including thickened skin, warty appearing with superficial open area of the posterior LE.  Small amount of serous drainage.         Bilateral Abdominal Skin Folds          Moist desquamation and yeast causing superficial skin breakdown in the skin fold.     Buttocks:  Unable to obtain photo    Scattered superficial open areas of the buttocks due to incontinence and pressure.  Using Triad paste topically for protection from stool and to promote healing.       Dorsalis Pedal Pulse: palpable: yes; doppler: R monophasic and L biphasic  Posterior Tibial Pulse: palpable: yes; doppler: biphasic  Hair growth: not noted  Capillary Refill: <3 seconds  Feet/toes color: consistent with surrounding coloring; patient with one toe amputation bilaterally  Firm non-pitting woody edema bilaterally    Interventions  Visual inspection and assessment completed   Wound Care Rationale Protect periwound skin, Improve absorptive capacity, Promote moist wound healing without tissue dehydration , Provide selective debridement (autolysis) of nonviable tissue , Provide protection  and Pain reduction.  Wound Care: orders written  Supplies:  At bedside  Current off-loading measures: Pillows under  calves  Current support surface: Standard  Atmos Air mattress; recommend that patient is transferred to a low air loss mattress  Education provided: importance of repositioning and plan of care   Discussed plan of care with patient.    Face to face time: approximately 40 minutes.    Mai Massey RN, CWOCN  [phone]

## 2022-01-05 NOTE — CONSULTS
Care Management Initial Consult    General Information  Assessment completed with: Patient,    Type of CM/SW Visit: Initial Assessment    Primary Care Provider verified and updated as needed: Yes   Readmission within the last 30 days: no previous admission in last 30 days   Reason for Consult: discharge planning  Advance Care Planning:            Communication Assessment  Patient's communication style: spoken language (English or Bilingual)    Hearing Difficulty or Deaf: no   Wear Glasses or Blind: yes                   Living Environment:   People in home: alone     Current living Arrangements: apartment,independent living facility      Able to return to prior arrangements: yes       Family/Social Support:  Care provided by: self,child(kumar),homecare agency,other (see comments) (PCA)  Provides care for: no one  Marital Status:   Children,Facility resident(s)/Staff,PCA,Other (specify) (Co worker)          Description of Support System: Supportive,Involved    Support Assessment: Adequate family and caregiver support,Adequate social supports    Current Resources:   Patient receiving home care services: Yes  Skilled Home Care Services: Skilled Nursing  Community Resources: County Worker,Home Care  Equipment currently used at home: commode chair,grab bar, toilet,raised toilet seat,wheelchair, manual,shower chair  Supplies currently used at home: Incontinence Supplies,Wound Care Supplies,Gloves,Wipes,Other (Lymphedema wraps)    Employment/Financial:  Employment Status: retired        Financial Concerns: No concerns identified         Lifestyle & Psychosocial Needs:  Social Determinants of Health     Tobacco Use: Low Risk      Smoking Tobacco Use: Never Smoker     Smokeless Tobacco Use: Never Used   Alcohol Use: Not on file   Financial Resource Strain: Not on file   Food Insecurity: Not on file   Transportation Needs: Not on file   Physical Activity: Not on file   Stress: Not on file   Social Connections: Not on  file   Intimate Partner Violence: Not on file   Depression: Not at risk     PHQ-2 Score: 0   Housing Stability: Not on file       Functional Status:  Prior to admission patient needed assistance:   Dependent ADLs:: Bathing  Dependent IADLs:: Cleaning,Cooking,Laundry,Shopping,Meal Preparation,Medication Management,Money Management,Transportation  Assesssment of Functional Status: Not at baseline with ADL Functioning,Not at baseline with mobility,Not at  functional baseline,Needs placement in a SNF/TCF for rehabilitation    Mental Health Status:  Mental Health Status: No Current Concerns       Chemical Dependency Status:  Chemical Dependency Status: No Current Concerns                Additional Information:    Referral received for discharge planning. Writer met w/ patient via bedside and introduced self and role. Patient reports living at the independent apartments at [place]. At baseline, she uses a wheelchair for all mobility. She is able to pivot transfer if able to hold to something. She reports not being able to get out of her wheelchair for over 24 hours before coming to the ED d/t weakness.     Patient currently receives x1 week RN visit w/ [facility] Phone: [phone] Fax: [phone] for wound care and lymphedema. Writer updated Nathaniel w/ intake @ [facility] on recent admission. Also receives PCA services x3 week (patient did not know the agency name). Her apartment provides x1 meal a day. Her son and DIL are very involved, visiting her apartment almost daily.     She states that she has a CADI , Mami at [facility]. Writer spoke genaro/ Maru at [facility] and requested that Mami call . Patient gave verbal permission for writer to speak w/ . Pt reports have increased difficulty managing at home even with support from family and current CADI services. Patient thinks she may be on a list for assisted.     PT/OT evals pending but patient will likely  need TCU placement. Patients agrees, 1st choice is [facility] as she has been to the facility in the past. TCU referrals sent:    [facility] (Main Phone:[phone] Admissions Phone:[phone] Fax: [phone])    [facility] (Main: [phone] Admissions: [phone] Fax: [phone])    [facility] (Phone: [phone] Fax: [phone])    [facility] Phone: (Admissions: [phone] RN Report: [phone] Fax: [phone])     [facility] (Phone: [phone] Fax: [phone])    [facility] (Phone: [phone] Fax: [phone])- no current openings but referral sent in case of any changes.     [facility] (Phone: [phone] Admissions: [phone] Fax: [phone])    Updated sonAugustus via phone per patient request.     Addendum: 13:50 patient has been accepted Thur/Friday at [facility] (Main Phone:[phone] Admissions Phone:[phone] Fax: [phone]).    Spoke w/ Co , Mami luther- [phone] and updated on patients admission. She will follow up w/ patient at discharge.     Joanne Laguna, [facility]  Care Management, [facility]  [phone]

## 2022-01-05 NOTE — H&P
[facility] History and Physical    Carolina Mari MRN# [id number]   Age: 66 year old YOB: 1955     Date of Admission:  1/4/2022      Primary care provider: Le Romo          Assessment and Plan:   Assessment & Plan     Presented 1/4 with inability to care for self at independent living location due to new right shoulder pain and diarrhea on top of long-sanding generalized weakness.  Wheelchair dependent at baseline.  Presented to ER 1/4, boarded overnight given no hospital beds available, admitted 1/5.      X-ray right shoulder on admission showed:  IMPRESSION: No acute fracture or malalignment. Moderate glenohumeral  and severe acromioclavicular joint degenerative changes. Cortical  irregularity at the greater tuberosity suggests chronic rotator cuff    pathology. Osteopenia. Cervical spine fusion hardware.      Generalized weakness, now unable to care for self independently due to this compounded by right shoulder pain and diarrhea.      Adult failure to thrive syndrome    Has been in and out of TCUs for past 6 months or so.  Wheelchair dependent at baseline.  Now worse due to shoulder pain and diarrhea and currently unable to care for herself at home.  Physical therapy and occupational therapy to see.    suspect will need placement on discharge.     Right shoulder pain - suspect rotator cuff injury   No apparent injury.  No fracture on x-ray.  Does have sever degenerative changes, could be due to flare of this but patient denies any long-standing more mild pain.  Also some evidence of rotator cuff injury although could be chronic based on x-ray.  Exam most consistent with rotator cuff injury.   Will attempt to discuss with ortho, possible consult to help with mobility recommendations and therapy recommendations.      Diarrhea - suspect infectious   Questionable recent antibiotic use - patient reports using some recently, but can't specify and I don't see any prescriptions.    Stool CDiff and enteric panel ordered and pending.        Dehydration  Due to poor recent intake and diarrhea, rehydrated overnight in ER, intake now improved on admission, follow.       Leukocytosis  No other apparent infectious source besides diarrhea as above.  WBC improving without antibiotics, follow.        Hypoxia ?  Was on 1LNC in ER, now off oxygen.  Asymptomatic.  Follow        History of Diabetes mellitus (H), appears now resolved  Carries history of this but not on any medications and last A1c was 5.5.  Appears resolved.         Hypothyroidism  Continue home synthroid.        Generalized anxiety disorder.Depression  At baseline, continue home prozac.        Chronic pain with Chronic, continuous use of opioids  Continue home oxycontin/oxycodone, appears baseline.  Continue flexeril.      Ventral wall hernia with history of small bowel obstruction   At baseline.         Multiple long-standing skin ulcers   Present prior to admission - WOC consult placed while here.  Follow.        Gastro-esophageal reflux disease without esophagitis  Continue home famotidine.        Hypertension, benign essential, goal below 140/90  Continue home amlodipine, losartan, metoprolol and lasix       Iron deficiency anemia  Appears about baseline, continue outpatient follow-up/eval as needed.        Morbid obesity (H)  Complicates multiple issues including mobility and diabetes     Asymptomatic COVID swab negative   Vaccinated with Moderna including booster 5/21     Prophylaxis  Near baseline mobility, follow for now - consider starting tomorrow if unable to discharge.      Lines  PIV    Diet  Orders Placed This Encounter      Regular Diet Adult      Code Status  Full          Disposition  Anticipate discharge in 1-2 days pending work-up of diarrhea and evaluation by physical therapy likely to TCU.               Chief Complaint:   Weakness, right shoulder pain, diarrhea   History is obtained from the patient          History  "of Present Illness:   This patient is a 66 year old  female with a significant past medical history of multiple issues as above who presents with weakness, diarrhea and right shoulder pain.  Patient has long-standing generalized weakness and is wheelchair dependent at baseline.  However, appears able to transfer independently at baseline and had been doing so at an independent living facility prior to admission.  However, she has intermittently needed TCU cares this past year.  She feels more \"crummy\" in general the past couple of days, in particular due to onset of new diarrhea.  No abdominal pain, no nausea or vomiting.  But feels like her stomach is \"loud\" and gurgly after eating and due to diarrhea and inability to mobilize normally with shoulder pain has been incontinent of stool in her wheelchair.  Today had single diarrhea at time of my evaluation.  No bloody or black stools.   Right shoulder pain is lateral shoulder pain, started a few days ago but no clear-cut injury.  Clearly worse with any movement of the shoulder including any active extension or abduction.  No numbness no weakness or left upper extremity beyond inability to move shoulder due to pain.  Pain not changed with palpation of shoulder.  No apparent injury.         no tobacco, alcohol or illicit drug use.            Past Medical History:   I have reviewed this patient's past medical history.  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) 05/23/2021     Priority: Medium     Edema 05/23/2021     Priority: Medium     History of cholecystectomy 05/23/2021     Priority: Medium     Hyperlipidemia 05/23/2021     Priority: Medium     Iron deficiency anemia 05/23/2021     Priority: Medium     Nondependent opioid abuse (H) 05/23/2021     Priority: Medium     Vitamin B deficiency 05/23/2021     Priority: Medium     Debility 05/23/2021     Priority: Medium     Encounter for screening laboratory testing for severe acute respiratory syndrome coronavirus 2 (SARS-CoV-2) 05/21/2021     Priority: Medium     Depression 01/26/2021     Priority: Medium     Stage 3a chronic kidney disease (H) 01/14/2021     Priority: Medium     Other specified hypothyroidism 01/06/2021     Priority: Medium     Generalized anxiety disorder 01/06/2021     Priority: Medium     Gastro-esophageal reflux disease without esophagitis 01/06/2021     Priority: Medium     Hypertension, benign essential, goal below 140/90 01/06/2021     Priority: Medium     Nicotine dependence, unspecified, uncomplicated 01/06/2021     Priority: Medium     Recurrent major depression (H) 01/06/2021     Priority: Medium     Unspecified osteoarthritis, unspecified site 01/06/2021     Priority: Medium     Cellulitis of buttock 01/05/2021     Priority: Medium     Pressure ulcer of right buttock, stage 3 (H) 01/05/2021     Priority: Medium     NEELIMA (acute kidney injury) (H) 12/23/2016     Priority: Medium     Acute gangrenous cholecystitis 12/23/2016     Priority: Medium     Hyponatremia 12/23/2016     Priority: Medium     Hypomagnesemia 12/23/2016     Priority: Medium     Hyperbilirubinemia 12/23/2016     Priority: Medium     Leukocytosis 12/23/2016     Priority: Medium     Adult failure to thrive syndrome 08/04/2015     Priority: Medium     UTI (urinary tract infection) 08/04/2015     Priority: Medium     Open wound of abdomen  07/01/2015     Priority: Medium     Diabetes mellitus (H) 07/01/2015     Priority: Medium     Tobacco user 07/01/2015     Priority: Medium     Obesity 06/05/2015     Priority: Medium     Chronic, continuous use of opioids 06/05/2015     Priority: Medium     Tinea corporis 06/05/2015     Priority: Medium     Weak 06/05/2015     Priority: Medium     Hip pain, bilateral 06/04/2015     Priority: Medium     Chronic pain 05/07/2015     Priority: Medium     Small bowel obstruction (H) 05/06/2015     Priority: Medium     Osteoarthritis of right hip 04/22/2015     Priority: Medium              Past Surgical History:   I have reviewed this patient's past surgical history   Past Surgical History:   Procedure Laterality Date     AMPUTATE TOE(S) Bilateral     3rd toe on both feet amputated.      FOOT SURGERY       GASTRIC BYPASS       HC REMOVAL GALLBLADDER N/A 12/25/2016    Procedure: CHOLECYSTECTOMY, OPEN;  Surgeon: Everardo Cisneros MD;  [facility];  Service: General     HERNIA REPAIR      Had 4 surgeries total     JOINT REPLACEMENT       WA SPINE SURGERY PROCEDURE UNLISTED       tka      right             Social History:   I have reviewed this patient's social history   Social History     Tobacco Use     Smoking status: Never Smoker     Smokeless tobacco: Never Used   Substance Use Topics     Alcohol use: No             Family History:   I have reviewed this patient's family history   Family History   Problem Relation Age of Onset     Coronary Artery Disease Father      Coronary Artery Disease Brother      Cancer Mother         bone     Cancer Brother         leukemia     Breast Cancer Mother      Cancer Brother 20.00        Leukemia     Coronary Artery Disease Brother              Immunizations:   Immunizations are current          Allergies:     Allergies   Allergen Reactions     Aspirin Other (See Comments)     History of ulcers     Colchicine Swelling     And gout flare up     Colcrys Other (See Comments)  and Swelling     And gout flare up             Medications:     Medications Prior to Admission   Medication Sig Dispense Refill Last Dose     acetaminophen (TYLENOL) 500 MG tablet Take 1,000 mg by mouth 3 times daily    1/4/2022 at am     amLODIPine (NORVASC) 5 MG tablet Take 5 mg by mouth daily    1/4/2022 at am     cyanocobalamin (CYANOCOBALAMIN) 1000 MCG/ML injection Inject 1 mL (1,000 mcg) into the muscle every 30 days   12/16/2021     cyclobenzaprine (FLEXERIL) 5 MG tablet Take 1 tablet by mouth 3 times daily as needed for muscle spasms   1/3/2022 at pm     diclofenac (VOLTAREN) 1 % topical gel Apply 2 g topically 4 times daily To affected area   Past Week at Unknown time     famotidine (PEPCID) 20 MG tablet Take 20 mg by mouth 2 times daily    1/4/2022 at am     FLUoxetine HCl 60 MG TABS Take 1 tablet by mouth every morning    1/4/2022 at am     furosemide (LASIX) 20 MG tablet Take 20 mg by mouth daily   1/4/2022 at am     gabapentin (NEURONTIN) 600 MG tablet Take 1 tablet by mouth 3 times daily   1/4/2022 at am     levothyroxine (SYNTHROID/LEVOTHROID) 200 MCG tablet Take 1 tablet by mouth daily   1/4/2022 at am     losartan (COZAAR) 100 MG tablet Take 100 mg by mouth daily   1/4/2022 at am     metoprolol tartrate (LOPRESSOR) 100 MG tablet Take 100 mg by mouth 2 times daily   1/4/2022 at am     MICRO GUARD 2 % external powder USE AS DIRECTED PER WOUND CARE ORDERS   1/4/2022 at am     mineral oil-hydrophilic petrolatum (AQUAPHOR) external ointment USE AS DIRECTED PER WOUND CARE ORDERS   Past Week at Unknown time     NARCAN 4 MG/0.1ML nasal spray CALL 911. SPR CONTENTS OF ONE SPRAYER (0.1ML) INTO ONE NOSTRIL. REPEAT IN 2-3 MIN IF SYMPTOMS OF OPIOID EMERGENCY PERSIST, ALTERNATE NOSTRILS   never     Nystatin (NYAMYC) 538149 UNIT/GM POWD Apply topically 2 times daily as needed    1/4/2022 at am     oxyCODONE (OXYCONTIN) 20 MG 12 hr tablet Take 1 tablet (20 mg) by mouth every 12 hours 6 tablet 0 1/3/2022 at pm      oxyCODONE (ROXICODONE) 5 MG tablet TAKE 1 TAB BY MOUTH EVERY 4 HOURS AS NEEDED FOR BREAKTHROUGH PAIN 15 tablet 0 1/3/2022 at pm     simvastatin (ZOCOR) 20 MG tablet Take 20 mg by mouth At Bedtime    1/3/2022 at hs             Review of Systems:    ROS: 10 point ROS neg other than the symptoms noted above in the HPI.           Physical Exam:   Blood pressure (!) 162/71, pulse 67, temperature 98.3  F (36.8  C), temperature source Oral, resp. rate 18, weight 117.9 kg (260 lb), SpO2 100 %, not currently breastfeeding.  Temperatures:  Current - Temp: 98.3  F (36.8  C); Max - Temp  Av.8  F (37.1  C)  Min: 98.3  F (36.8  C)  Max: 99.2  F (37.3  C)  Respiration range: Resp  Av  Min: 18  Max: 22  Pulse range: Pulse  Av.5  Min: 32  Max: 74  Blood pressure range: Systolic (24hrs), Avg:160 , Min:131 , Max:184   ; Diastolic (24hrs), Av, Min:66, Max:86    Pulse oximetry range: SpO2  Av.8 %  Min: 86 %  Max: 100 %    Intake/Output Summary (Last 24 hours) at 2022 1103  Last data filed at 2022 0553  Gross per 24 hour   Intake 1000 ml   Output 700 ml   Net 300 ml     EXAM:  General: awake and alert, NAD, oriented x 3  Head: normocephalic  Neck: unremarkable, no lymphadenopathy   HEENT: oropharynx pink and moist    Heart: Regular rate and rhythm, no murmurs, rubs, or gallops  Lungs: clear to auscultation bilaterally with good air movement throughout  Abdomen: soft, non-tender, no masses or organomegaly  Extremities: no edema in lower extremities   Right shoulder exam as above - pain especially with any abduction or extension.  No tenderness on palpation of shoulder or upper arm.  No numbness no weakness of right upper extremity   Skin: multiple long-standing ulcerations, otherwise unremarkable.             Data:     Results for orders placed or performed during the hospital encounter of 22 (from the past 24 hour(s))   Comprehensive metabolic panel   Result Value Ref Range    Sodium 134 133 - 144  mmol/L    Potassium 4.6 3.4 - 5.3 mmol/L    Chloride 106 94 - 109 mmol/L    Carbon Dioxide (CO2) 22 20 - 32 mmol/L    Anion Gap 6 3 - 14 mmol/L    Urea Nitrogen 15 7 - 30 mg/dL    Creatinine 0.97 0.52 - 1.04 mg/dL    Calcium 8.7 8.5 - 10.1 mg/dL    Glucose 158 (H) 70 - 99 mg/dL    Alkaline Phosphatase 167 (H) 40 - 150 U/L    AST 25 0 - 45 U/L    ALT 17 0 - 50 U/L    Protein Total 7.5 6.8 - 8.8 g/dL    Albumin 2.3 (L) 3.4 - 5.0 g/dL    Bilirubin Total 1.0 0.2 - 1.3 mg/dL    GFR Estimate 64 >60 mL/min/1.73m2   CBC with platelets, differential    Narrative    The following orders were created for panel order CBC with platelets, differential.  Procedure                               Abnormality         Status                     ---------                               -----------         ------                     CBC with platelets and d...[[id number]]  Abnormal            Final result                 Please view results for these tests on the individual orders.   Troponin I   Result Value Ref Range    Troponin I High Sensitivity 10 <54 ng/L   CBC with platelets and differential   Result Value Ref Range    WBC Count 16.2 (H) 4.0 - 11.0 10e3/uL    RBC Count 3.77 (L) 3.80 - 5.20 10e6/uL    Hemoglobin 10.3 (L) 11.7 - 15.7 g/dL    Hematocrit 33.1 (L) 35.0 - 47.0 %    MCV 88 78 - 100 fL    MCH 27.3 26.5 - 33.0 pg    MCHC 31.1 (L) 31.5 - 36.5 g/dL    RDW 16.0 (H) 10.0 - 15.0 %    Platelet Count 330 150 - 450 10e3/uL    % Neutrophils 90 %    % Lymphocytes 3 %    % Monocytes 6 %    % Eosinophils 0 %    % Basophils 0 %    % Immature Granulocytes 1 %    NRBCs per 100 WBC 0 <1 /100    Absolute Neutrophils 14.7 (H) 1.6 - 8.3 10e3/uL    Absolute Lymphocytes 0.4 (L) 0.8 - 5.3 10e3/uL    Absolute Monocytes 0.9 0.0 - 1.3 10e3/uL    Absolute Eosinophils 0.0 0.0 - 0.7 10e3/uL    Absolute Basophils 0.0 0.0 - 0.2 10e3/uL    Absolute Immature Granulocytes 0.1 <=0.4 10e3/uL    Absolute NRBCs 0.0 10e3/uL   Symptomatic; Yes; 1/3/2022 Influenza  A/B & SARS-CoV2 (COVID-19) Virus PCR Multiplex Nasopharyngeal    Specimen: Nasopharyngeal; Swab   Result Value Ref Range    Influenza A PCR Negative Negative    Influenza B PCR Negative Negative    SARS CoV2 PCR Negative Negative    Narrative    Testing was performed using the jada SARS-CoV-2 & Influenza A/B Assay on the jada [employer] System. This test should be ordered for the detection of SARS-CoV-2 and influenza viruses in individuals who meet clinical and/or epidemiological criteria. Test performance is unknown in asymptomatic patients. This test is for in vitro diagnostic use under the FDA EUA for laboratories certified under CLIA to perform moderate and/or high complexity testing. This test has not been FDA cleared or approved. A negative result does not rule out the presence of PCR inhibitors in the specimen or target RNA in concentration below the limit of detection for the assay. If only one viral target is positive but coinfection with multiple targets is suspected, the sample should be re-tested with another FDA cleared, approved or authorized test, if coinfection would change clinical management. [facility] are certified under the Clinical Laboratory Improvement Amendments of 1988 (CLIA-88) as  qualified to perform moderate and/or high complexity laboratory testing.   [facility] Draw    Narrative    The following orders were created for panel order [facility] Draw.  Procedure                               Abnormality         Status                     ---------                               -----------         ------                     Extra Blue Top Tube[[id number]]                              Final result               Extra Red Top Tube[[id number]]                               Final result                 Please view results for these tests on the individual orders.   Extra Blue Top Tube   Result Value Ref Range    Hold Specimen [facility]    Extra Red Top Tube   Result Value Ref Range    Hold  Specimen [facility]    CT Abdomen Pelvis w Contrast    Narrative    CT ABDOMEN AND PELVIS WITH CONTRAST 1/4/2022 3:54 PM    CLINICAL HISTORY: Abdominal pain.    TECHNIQUE: CT scan of the abdomen and pelvis was performed following  injection of IV contrast. Multiplanar reformats were obtained. Dose  reduction techniques were used.    CONTRAST: 100mL Isovue-370    COMPARISON: 8/3/2021    FINDINGS:   LOWER CHEST: Normal.    HEPATOBILIARY: Gallbladder is absent. Liver is unremarkable.    PANCREAS: Normal.    SPLEEN: Normal.    ADRENAL GLANDS: Normal.    KIDNEYS/BLADDER: No worrisome renal lesions or hydronephrosis.    BOWEL: Previous gastric bypass surgery. No bowel obstruction or  inflammatory change. Large ventral hernia contains loops of large and  small bowel.    PELVIC ORGANS: Normal.    ADDITIONAL FINDINGS: No adenopathy or ascites.    MUSCULOSKELETAL: Chronic deformity of both hips. Significant  degenerative changes of the spine.      Impression    IMPRESSION:   No acute cause for abdominal pain demonstrated. Chronic and incidental  findings detailed above are similar to prior.    REINA REED MD         SYSTEM ID:  [id number]   Shoulder XR, 2 view, right    Narrative    XR SHOULDER 2 VIEW RIGHT  1/4/2022 4:06 PM     HISTORY: Right shoulder pain since yesterday evening    COMPARISON: None      Impression    IMPRESSION: No acute fracture or malalignment. Moderate glenohumeral  and severe acromioclavicular joint degenerative changes. Cortical  irregularity at the greater tuberosity suggests chronic rotator cuff  pathology. Osteopenia. Cervical spine fusion hardware.    TANNER DEAL MD         SYSTEM ID:  [id number]   Lactic acid whole blood STAT   Result Value Ref Range    Lactic Acid 0.8 0.7 - 2.0 mmol/L   UA with Microscopic reflex to Culture    Specimen: Urine, Midstream   Result Value Ref Range    Color Urine Yellow Colorless, Straw, Light Yellow, Yellow    Appearance Urine Clear Clear    Glucose Urine Negative  Negative mg/dL    Bilirubin Urine Negative Negative    Ketones Urine Negative Negative mg/dL    Specific Gravity Urine 1.025 1.003 - 1.035    Blood Urine Moderate (A) Negative    pH Urine 5.0 5.0 - 7.0    Protein Albumin Urine Negative Negative mg/dL    Urobilinogen Urine Normal Normal, 2.0 mg/dL    Nitrite Urine Negative Negative    Leukocyte Esterase Urine Trace (A) Negative    Mucus Urine Present (A) None Seen /LPF    RBC Urine 2 <=2 /HPF    WBC Urine 4 <=5 /HPF    Squamous Epithelials Urine 1 <=1 /HPF    Narrative    Urine Culture not indicated   CBC with platelets differential    Narrative    The following orders were created for panel order CBC with platelets differential.  Procedure                               Abnormality         Status                     ---------                               -----------         ------                     CBC with platelets and d...[[id number]]  Abnormal            Final result                 Please view results for these tests on the individual orders.   Basic metabolic panel   Result Value Ref Range    Sodium 139 133 - 144 mmol/L    Potassium 3.8 3.4 - 5.3 mmol/L    Chloride 111 (H) 94 - 109 mmol/L    Carbon Dioxide (CO2) 21 20 - 32 mmol/L    Anion Gap 7 3 - 14 mmol/L    Urea Nitrogen 12 7 - 30 mg/dL    Creatinine 0.88 0.52 - 1.04 mg/dL    Calcium 8.3 (L) 8.5 - 10.1 mg/dL    Glucose 112 (H) 70 - 99 mg/dL    GFR Estimate 72 >60 mL/min/1.73m2   CBC with platelets and differential   Result Value Ref Range    WBC Count 11.4 (H) 4.0 - 11.0 10e3/uL    RBC Count 3.29 (L) 3.80 - 5.20 10e6/uL    Hemoglobin 9.0 (L) 11.7 - 15.7 g/dL    Hematocrit 29.2 (L) 35.0 - 47.0 %    MCV 89 78 - 100 fL    MCH 27.4 26.5 - 33.0 pg    MCHC 30.8 (L) 31.5 - 36.5 g/dL    RDW 16.1 (H) 10.0 - 15.0 %    Platelet Count 258 150 - 450 10e3/uL    % Neutrophils 86 %    % Lymphocytes 5 %    % Monocytes 7 %    % Eosinophils 1 %    % Basophils 0 %    % Immature Granulocytes 1 %    NRBCs per 100 WBC 0 <1 /100     Absolute Neutrophils 10.0 (H) 1.6 - 8.3 10e3/uL    Absolute Lymphocytes 0.5 (L) 0.8 - 5.3 10e3/uL    Absolute Monocytes 0.8 0.0 - 1.3 10e3/uL    Absolute Eosinophils 0.1 0.0 - 0.7 10e3/uL    Absolute Basophils 0.0 0.0 - 0.2 10e3/uL    Absolute Immature Granulocytes 0.1 <=0.4 10e3/uL    Absolute NRBCs 0.0 10e3/uL     All cardiac studies reviewed by me.  All imaging studies reviewed by me.    Attestation:  I have reviewed today's vital signs, notes, medications, labs and imaging.  Amount of time performed on this history and physical: 70 minutes.        Jeffery Martinez MD

## 2022-01-05 NOTE — ED PROVIDER NOTES
Emergency Department Patient Sign-out       Brief HPI:  This is a 66 year old female signed out to me by Dr. Rodriguez at 6:25 AM.  See initial ED Provider note for details of the presentation.     Significant Events prior to my assuming care: Laboratory evaluation, CT of the abdomen/pelvis with IV contrast, left shoulder films, administration of an IV fluid bolus and initiation of IV maintenance fluids, Dilaudid and Oxycodone given for left shoulder pain, awaiting stool specimen for evaluation.  Patient is boarding in the ED awaiting admission.      Exam:   Patient Vitals for the past 24 hrs:   BP Temp Temp src Pulse Resp SpO2 Weight   01/05/22 0600 (!) 153/70 -- -- 59 -- 98 % --   01/05/22 0530 -- -- -- -- -- 100 % --   01/05/22 0430 -- -- -- -- -- 98 % --   01/05/22 0400 131/73 -- -- 53 -- 98 % --   01/05/22 0154 -- -- -- -- -- 99 % --   01/05/22 0150 -- -- -- -- -- (!) 86 % --   01/05/22 0145 -- -- -- -- -- (!) 87 % --   01/05/22 0100 -- -- -- -- -- 97 % --   01/05/22 0000 (!) 152/71 -- -- 55 -- 98 % --   01/04/22 2200 132/66 -- -- 54 -- -- --   01/04/22 2000 (!) 155/80 -- -- 54 -- 97 % --   01/04/22 1530 (!) 152/70 -- -- 71 -- -- --   01/04/22 1515 (!) 156/75 -- -- 71 -- -- --   01/04/22 1500 (!) 159/75 -- -- 74 -- 98 % --   01/04/22 1445 (!) 161/69 -- -- 67 -- 98 % --   01/04/22 1430 (!) 165/73 -- -- 71 -- 97 % --   01/04/22 1415 (!) 180/76 -- -- 69 -- 97 % --   01/04/22 1400 (!) 184/86 -- -- (!) 32 -- 98 % --   01/04/22 1315 (!) 175/76 99.2  F (37.3  C) Oral 62 22 95 % 117.9 kg (260 lb)           ED RESULTS:   Results for orders placed or performed during the hospital encounter of 01/04/22 (from the past 24 hour(s))   Comprehensive metabolic panel     Status: Abnormal    Collection Time: 01/04/22  1:58 PM   Result Value Ref Range    Sodium 134 133 - 144 mmol/L    Potassium 4.6 3.4 - 5.3 mmol/L    Chloride 106 94 - 109 mmol/L    Carbon Dioxide (CO2) 22 20 - 32 mmol/L    Anion Gap 6 3 - 14 mmol/L    Urea  Nitrogen 15 7 - 30 mg/dL    Creatinine 0.97 0.52 - 1.04 mg/dL    Calcium 8.7 8.5 - 10.1 mg/dL    Glucose 158 (H) 70 - 99 mg/dL    Alkaline Phosphatase 167 (H) 40 - 150 U/L    AST 25 0 - 45 U/L    ALT 17 0 - 50 U/L    Protein Total 7.5 6.8 - 8.8 g/dL    Albumin 2.3 (L) 3.4 - 5.0 g/dL    Bilirubin Total 1.0 0.2 - 1.3 mg/dL    GFR Estimate 64 >60 mL/min/1.73m2   CBC with platelets, differential     Status: Abnormal    Collection Time: 01/04/22  1:58 PM    Narrative    The following orders were created for panel order CBC with platelets, differential.  Procedure                               Abnormality         Status                     ---------                               -----------         ------                     CBC with platelets and d...[[id number]]  Abnormal            Final result                 Please view results for these tests on the individual orders.   Troponin I     Status: Normal    Collection Time: 01/04/22  1:58 PM   Result Value Ref Range    Troponin I High Sensitivity 10 <54 ng/L   CBC with platelets and differential     Status: Abnormal    Collection Time: 01/04/22  1:58 PM   Result Value Ref Range    WBC Count 16.2 (H) 4.0 - 11.0 10e3/uL    RBC Count 3.77 (L) 3.80 - 5.20 10e6/uL    Hemoglobin 10.3 (L) 11.7 - 15.7 g/dL    Hematocrit 33.1 (L) 35.0 - 47.0 %    MCV 88 78 - 100 fL    MCH 27.3 26.5 - 33.0 pg    MCHC 31.1 (L) 31.5 - 36.5 g/dL    RDW 16.0 (H) 10.0 - 15.0 %    Platelet Count 330 150 - 450 10e3/uL    % Neutrophils 90 %    % Lymphocytes 3 %    % Monocytes 6 %    % Eosinophils 0 %    % Basophils 0 %    % Immature Granulocytes 1 %    NRBCs per 100 WBC 0 <1 /100    Absolute Neutrophils 14.7 (H) 1.6 - 8.3 10e3/uL    Absolute Lymphocytes 0.4 (L) 0.8 - 5.3 10e3/uL    Absolute Monocytes 0.9 0.0 - 1.3 10e3/uL    Absolute Eosinophils 0.0 0.0 - 0.7 10e3/uL    Absolute Basophils 0.0 0.0 - 0.2 10e3/uL    Absolute Immature Granulocytes 0.1 <=0.4 10e3/uL    Absolute NRBCs 0.0 10e3/uL   Symptomatic;  Yes; 1/3/2022 Influenza A/B & SARS-CoV2 (COVID-19) Virus PCR Multiplex Nasopharyngeal     Status: Normal    Collection Time: 01/04/22  2:00 PM    Specimen: Nasopharyngeal; Swab   Result Value Ref Range    Influenza A PCR Negative Negative    Influenza B PCR Negative Negative    SARS CoV2 PCR Negative Negative    Narrative    Testing was performed using the jada SARS-CoV-2 & Influenza A/B Assay on the jada [employer] System. This test should be ordered for the detection of SARS-CoV-2 and influenza viruses in individuals who meet clinical and/or epidemiological criteria. Test performance is unknown in asymptomatic patients. This test is for in vitro diagnostic use under the FDA EUA for laboratories certified under CLIA to perform moderate and/or high complexity testing. This test has not been FDA cleared or approved. A negative result does not rule out the presence of PCR inhibitors in the specimen or target RNA in concentration below the limit of detection for the assay. If only one viral target is positive but coinfection with multiple targets is suspected, the sample should be re-tested with another FDA cleared, approved or authorized test, if coinfection would change clinical management. [facility] are certified under the Clinical Laboratory Improvement Amendments of 1988 (CLIA-88) as  qualified to perform moderate and/or high complexity laboratory testing.   [place] Draw     Status: None    Collection Time: 01/04/22  2:01 PM    Narrative    The following orders were created for panel order [place] Draw.  Procedure                               Abnormality         Status                     ---------                               -----------         ------                     Extra Blue Top Tube[[id number]]                              Final result               Extra Red Top Tube[[id number]]                               Final result                 Please view results for these tests on the  individual orders.   Extra Blue Top Tube     Status: None    Collection Time: 01/04/22  2:01 PM   Result Value Ref Range    Hold Specimen JIC    Extra Red Top Tube     Status: None    Collection Time: 01/04/22  2:01 PM   Result Value Ref Range    Hold Specimen JIC    CT Abdomen Pelvis w Contrast     Status: None    Collection Time: 01/04/22  3:54 PM    Narrative    CT ABDOMEN AND PELVIS WITH CONTRAST 1/4/2022 3:54 PM    CLINICAL HISTORY: Abdominal pain.    TECHNIQUE: CT scan of the abdomen and pelvis was performed following  injection of IV contrast. Multiplanar reformats were obtained. Dose  reduction techniques were used.    CONTRAST: 100mL Isovue-370    COMPARISON: 8/3/2021    FINDINGS:   LOWER CHEST: Normal.    HEPATOBILIARY: Gallbladder is absent. Liver is unremarkable.    PANCREAS: Normal.    SPLEEN: Normal.    ADRENAL GLANDS: Normal.    KIDNEYS/BLADDER: No worrisome renal lesions or hydronephrosis.    BOWEL: Previous gastric bypass surgery. No bowel obstruction or  inflammatory change. Large ventral hernia contains loops of large and  small bowel.    PELVIC ORGANS: Normal.    ADDITIONAL FINDINGS: No adenopathy or ascites.    MUSCULOSKELETAL: Chronic deformity of both hips. Significant  degenerative changes of the spine.      Impression    IMPRESSION:   No acute cause for abdominal pain demonstrated. Chronic and incidental  findings detailed above are similar to prior.    REINA REED MD         SYSTEM ID:  [id number]   Shoulder XR, 2 view, right     Status: None    Collection Time: 01/04/22  4:06 PM    Narrative    XR SHOULDER 2 VIEW RIGHT  1/4/2022 4:06 PM     HISTORY: Right shoulder pain since yesterday evening    COMPARISON: None      Impression    IMPRESSION: No acute fracture or malalignment. Moderate glenohumeral  and severe acromioclavicular joint degenerative changes. Cortical  irregularity at the greater tuberosity suggests chronic rotator cuff  pathology. Osteopenia. Cervical spine fusion  hardware.    TANNER DEAL MD         SYSTEM ID:  [id number]   Lactic acid whole blood STAT     Status: Normal    Collection Time: 01/04/22  4:13 PM   Result Value Ref Range    Lactic Acid 0.8 0.7 - 2.0 mmol/L   UA with Microscopic reflex to Culture     Status: Abnormal    Collection Time: 01/04/22 11:46 PM    Specimen: Urine, Midstream   Result Value Ref Range    Color Urine Yellow Colorless, Straw, Light Yellow, Yellow    Appearance Urine Clear Clear    Glucose Urine Negative Negative mg/dL    Bilirubin Urine Negative Negative    Ketones Urine Negative Negative mg/dL    Specific Gravity Urine 1.025 1.003 - 1.035    Blood Urine Moderate (A) Negative    pH Urine 5.0 5.0 - 7.0    Protein Albumin Urine Negative Negative mg/dL    Urobilinogen Urine Normal Normal, 2.0 mg/dL    Nitrite Urine Negative Negative    Leukocyte Esterase Urine Trace (A) Negative    Mucus Urine Present (A) None Seen /LPF    RBC Urine 2 <=2 /HPF    WBC Urine 4 <=5 /HPF    Squamous Epithelials Urine 1 <=1 /HPF    Narrative    Urine Culture not indicated   CBC with platelets differential     Status: Abnormal    Collection Time: 01/05/22  6:25 AM    Narrative    The following orders were created for panel order CBC with platelets differential.  Procedure                               Abnormality         Status                     ---------                               -----------         ------                     CBC with platelets and d...[[id number]]  Abnormal            Final result                 Please view results for these tests on the individual orders.   Basic metabolic panel     Status: Abnormal    Collection Time: 01/05/22  6:25 AM   Result Value Ref Range    Sodium 139 133 - 144 mmol/L    Potassium 3.8 3.4 - 5.3 mmol/L    Chloride 111 (H) 94 - 109 mmol/L    Carbon Dioxide (CO2) 21 20 - 32 mmol/L    Anion Gap 7 3 - 14 mmol/L    Urea Nitrogen 12 7 - 30 mg/dL    Creatinine 0.88 0.52 - 1.04 mg/dL    Calcium 8.3 (L) 8.5 - 10.1 mg/dL     Glucose 112 (H) 70 - 99 mg/dL    GFR Estimate 72 >60 mL/min/1.73m2   CBC with platelets and differential     Status: Abnormal    Collection Time: 01/05/22  6:25 AM   Result Value Ref Range    WBC Count 11.4 (H) 4.0 - 11.0 10e3/uL    RBC Count 3.29 (L) 3.80 - 5.20 10e6/uL    Hemoglobin 9.0 (L) 11.7 - 15.7 g/dL    Hematocrit 29.2 (L) 35.0 - 47.0 %    MCV 89 78 - 100 fL    MCH 27.4 26.5 - 33.0 pg    MCHC 30.8 (L) 31.5 - 36.5 g/dL    RDW 16.1 (H) 10.0 - 15.0 %    Platelet Count 258 150 - 450 10e3/uL    % Neutrophils 86 %    % Lymphocytes 5 %    % Monocytes 7 %    % Eosinophils 1 %    % Basophils 0 %    % Immature Granulocytes 1 %    NRBCs per 100 WBC 0 <1 /100    Absolute Neutrophils 10.0 (H) 1.6 - 8.3 10e3/uL    Absolute Lymphocytes 0.5 (L) 0.8 - 5.3 10e3/uL    Absolute Monocytes 0.8 0.0 - 1.3 10e3/uL    Absolute Eosinophils 0.1 0.0 - 0.7 10e3/uL    Absolute Basophils 0.0 0.0 - 0.2 10e3/uL    Absolute Immature Granulocytes 0.1 <=0.4 10e3/uL    Absolute NRBCs 0.0 10e3/uL       ED MEDICATIONS:   Medications   HYDROmorphone (PF) (DILAUDID) injection 0.5 mg (0.5 mg Intravenous Given 1/4/22 4774)   dextrose 5% and 0.9% NaCl infusion ( Intravenous New Bag 1/4/22 4943)   oxyCODONE (ROXICODONE) tablet 10 mg (10 mg Oral Given 1/5/22 4116)   ondansetron (ZOFRAN-ODT) ODT tab 4 mg (has no administration in time range)     Or   ondansetron (ZOFRAN) injection 4 mg (has no administration in time range)   amLODIPine (NORVASC) tablet 5 mg (has no administration in time range)   famotidine (PEPCID) tablet 20 mg (20 mg Oral Given 1/4/22 0186)   FLUoxetine (PROzac) capsule 60 mg (has no administration in time range)   furosemide (LASIX) tablet 20 mg (has no administration in time range)   gabapentin (NEURONTIN) capsule 600 mg (600 mg Oral Given 1/4/22 1826)   levothyroxine (SYNTHROID/LEVOTHROID) tablet 200 mcg (has no administration in time range)   losartan (COZAAR) tablet 100 mg (has no administration in time range)   metoprolol  tartrate (LOPRESSOR) tablet 100 mg (100 mg Oral Given 1/4/22 1826)   simvastatin (ZOCOR) tablet 20 mg (20 mg Oral Given 1/4/22 2342)   0.9% sodium chloride BOLUS (0 mLs Intravenous Stopped 1/4/22 1553)   acetaminophen (TYLENOL) tablet 1,000 mg (1,000 mg Oral Given 1/4/22 1403)   iopamidol (ISOVUE-370) solution 100 mL (100 mLs Intravenous Given 1/4/22 1545)   sodium chloride 0.9 % bag 500mL for CT scan flush use (72 mLs As instructed Given 1/4/22 1545)   oxyCODONE (ROXICODONE) tablet 10 mg (10 mg Oral Given 1/4/22 1540)         Impression:    ICD-10-CM    1. Acute pain of right shoulder  M25.511    2. Diarrhea of presumed infectious origin  R19.7    3. Dehydration  E86.0    4. Generalized weakness  R53.1    5. First degree AV block  I44.0    6. Encounter for screening laboratory testing for severe acute respiratory syndrome coronavirus 2 (SARS-CoV-2)  Z20.822        Plan:    Pending studies: Stool evaluation.      Patient is boarding in the ED awaiting admission.    No significant events during my care of her in the ED, admit to the Hospitalist service here.           Devaughn Matos MD  01/06/22 7506

## 2022-01-05 NOTE — PROGRESS NOTES
CM Note    CM referral received. Per chart review, patient lives in the independent apartments on the [facility] [place] the [facility]. She will likely need TCU. PT/OT evals pending. Writer did contact agustin Cornejo/ [place] [place] [facility] (Main: [phone] Admissions: [phone] Fax: [phone]) and asked that she start reviewing for potential placement. CM to follow.      GILBERT Díaz  [place], [facility] [place]  [phone]

## 2022-01-05 NOTE — ED PROVIDER NOTES
Emergency Department Patient Sign-out       Brief HPI:  This is a 66 year old female signed out to me by Dr. Strong .  See initial ED Provider note for details of the presentation.            Significant Events prior to my assuming care:   66 year old female with generalized weakness, right shoulder pain, and diarrhea.  Patient brought from independent living facility where patient spends most of the day in her wheelchair.  She has 1 day of worsening sickness, with increased diarrhea.  Not able to care for herself at home.  Ended up having diarrhea while sitting in the wheelchair during overnight hours because she was too weak to get back up.    Slightly elevated white blood cell count, nonspecific.  Is awaiting stool and urine cultures.  Urine test did return normal.  Diarrhea likely infectious.  No sample available at this point.  Likely needing hospitalization with TCU placement.  Awaiting available bed    Exam:   Patient Vitals for the past 24 hrs:   BP Temp Temp src Pulse Resp SpO2 Weight   01/05/22 0154 -- -- -- -- -- 99 % --   01/05/22 0150 -- -- -- -- -- (!) 86 % --   01/05/22 0145 -- -- -- -- -- (!) 87 % --   01/05/22 0100 -- -- -- -- -- 97 % --   01/05/22 0000 (!) 152/71 -- -- 55 -- 98 % --   01/04/22 2200 132/66 -- -- 54 -- -- --   01/04/22 2000 (!) 155/80 -- -- 54 -- 97 % --   01/04/22 1530 (!) 152/70 -- -- 71 -- -- --   01/04/22 1515 (!) 156/75 -- -- 71 -- -- --   01/04/22 1500 (!) 159/75 -- -- 74 -- 98 % --   01/04/22 1445 (!) 161/69 -- -- 67 -- 98 % --   01/04/22 1430 (!) 165/73 -- -- 71 -- 97 % --   01/04/22 1415 (!) 180/76 -- -- 69 -- 97 % --   01/04/22 1400 (!) 184/86 -- -- (!) 32 -- 98 % --   01/04/22 1315 (!) 175/76 99.2  F (37.3  C) Oral 62 22 95 % 117.9 kg (260 lb)           ED RESULTS:   Results for orders placed or performed during the hospital encounter of 01/04/22 (from the past 24 hour(s))   Comprehensive metabolic panel     Status: Abnormal    Collection Time: 01/04/22  1:58 PM    Result Value Ref Range    Sodium 134 133 - 144 mmol/L    Potassium 4.6 3.4 - 5.3 mmol/L    Chloride 106 94 - 109 mmol/L    Carbon Dioxide (CO2) 22 20 - 32 mmol/L    Anion Gap 6 3 - 14 mmol/L    Urea Nitrogen 15 7 - 30 mg/dL    Creatinine 0.97 0.52 - 1.04 mg/dL    Calcium 8.7 8.5 - 10.1 mg/dL    Glucose 158 (H) 70 - 99 mg/dL    Alkaline Phosphatase 167 (H) 40 - 150 U/L    AST 25 0 - 45 U/L    ALT 17 0 - 50 U/L    Protein Total 7.5 6.8 - 8.8 g/dL    Albumin 2.3 (L) 3.4 - 5.0 g/dL    Bilirubin Total 1.0 0.2 - 1.3 mg/dL    GFR Estimate 64 >60 mL/min/1.73m2   CBC with platelets, differential     Status: Abnormal    Collection Time: 01/04/22  1:58 PM    Narrative    The following orders were created for panel order CBC with platelets, differential.  Procedure                               Abnormality         Status                     ---------                               -----------         ------                     CBC with platelets and d...[[id number]]  Abnormal            Final result                 Please view results for these tests on the individual orders.   Troponin I     Status: Normal    Collection Time: 01/04/22  1:58 PM   Result Value Ref Range    Troponin I High Sensitivity 10 <54 ng/L   CBC with platelets and differential     Status: Abnormal    Collection Time: 01/04/22  1:58 PM   Result Value Ref Range    WBC Count 16.2 (H) 4.0 - 11.0 10e3/uL    RBC Count 3.77 (L) 3.80 - 5.20 10e6/uL    Hemoglobin 10.3 (L) 11.7 - 15.7 g/dL    Hematocrit 33.1 (L) 35.0 - 47.0 %    MCV 88 78 - 100 fL    MCH 27.3 26.5 - 33.0 pg    MCHC 31.1 (L) 31.5 - 36.5 g/dL    RDW 16.0 (H) 10.0 - 15.0 %    Platelet Count 330 150 - 450 10e3/uL    % Neutrophils 90 %    % Lymphocytes 3 %    % Monocytes 6 %    % Eosinophils 0 %    % Basophils 0 %    % Immature Granulocytes 1 %    NRBCs per 100 WBC 0 <1 /100    Absolute Neutrophils 14.7 (H) 1.6 - 8.3 10e3/uL    Absolute Lymphocytes 0.4 (L) 0.8 - 5.3 10e3/uL    Absolute Monocytes 0.9 0.0  - 1.3 10e3/uL    Absolute Eosinophils 0.0 0.0 - 0.7 10e3/uL    Absolute Basophils 0.0 0.0 - 0.2 10e3/uL    Absolute Immature Granulocytes 0.1 <=0.4 10e3/uL    Absolute NRBCs 0.0 10e3/uL   Symptomatic; Yes; 1/3/2022 Influenza A/B & SARS-CoV2 (COVID-19) Virus PCR Multiplex Nasopharyngeal     Status: Normal    Collection Time: 01/04/22  2:00 PM    Specimen: Nasopharyngeal; Swab   Result Value Ref Range    Influenza A PCR Negative Negative    Influenza B PCR Negative Negative    SARS CoV2 PCR Negative Negative    Narrative    Testing was performed using the jada SARS-CoV-2 & Influenza A/B Assay on the jada Sh[employer] System. This test should be ordered for the detection of SARS-CoV-2 and influenza viruses in individuals who meet clinical and/or epidemiological criteria. Test performance is unknown in asymptomatic patients. This test is for in vitro diagnostic use under the FDA EUA for laboratories certified under CLIA to perform moderate and/or high complexity testing. This test has not been FDA cleared or approved. A negative result does not rule out the presence of PCR inhibitors in the specimen or target RNA in concentration below the limit of detection for the assay. If only one viral target is positive but coinfection with multiple targets is suspected, the sample should be re-tested with another FDA cleared, approved or authorized test, if coinfection would change clinical management. [facility] are certified under the Clinical Laboratory Improvement Amendments of 1988 (CLIA-88) as  qualified to perform moderate and/or high complexity laboratory testing.   Cameron Draw     Status: None    Collection Time: 01/04/22  2:01 PM    Narrative    The following orders were created for panel order Cameron Draw.  Procedure                               Abnormality         Status                     ---------                               -----------         ------                     Extra Blue Top  Tube[[id number]]                              Final result               Extra Red Top Tube[[id number]]                               Final result                 Please view results for these tests on the individual orders.   Extra Blue Top Tube     Status: None    Collection Time: 01/04/22  2:01 PM   Result Value Ref Range    Hold Specimen JIC    Extra Red Top Tube     Status: None    Collection Time: 01/04/22  2:01 PM   Result Value Ref Range    Hold Specimen JIC    CT Abdomen Pelvis w Contrast     Status: None    Collection Time: 01/04/22  3:54 PM    Narrative    CT ABDOMEN AND PELVIS WITH CONTRAST 1/4/2022 3:54 PM    CLINICAL HISTORY: Abdominal pain.    TECHNIQUE: CT scan of the abdomen and pelvis was performed following  injection of IV contrast. Multiplanar reformats were obtained. Dose  reduction techniques were used.    CONTRAST: 100mL Isovue-370    COMPARISON: 8/3/2021    FINDINGS:   LOWER CHEST: Normal.    HEPATOBILIARY: Gallbladder is absent. Liver is unremarkable.    PANCREAS: Normal.    SPLEEN: Normal.    ADRENAL GLANDS: Normal.    KIDNEYS/BLADDER: No worrisome renal lesions or hydronephrosis.    BOWEL: Previous gastric bypass surgery. No bowel obstruction or  inflammatory change. Large ventral hernia contains loops of large and  small bowel.    PELVIC ORGANS: Normal.    ADDITIONAL FINDINGS: No adenopathy or ascites.    MUSCULOSKELETAL: Chronic deformity of both hips. Significant  degenerative changes of the spine.      Impression    IMPRESSION:   No acute cause for abdominal pain demonstrated. Chronic and incidental  findings detailed above are similar to prior.    REINA REED MD         SYSTEM ID:  [id number]   Shoulder XR, 2 view, right     Status: None    Collection Time: 01/04/22  4:06 PM    Narrative    XR SHOULDER 2 VIEW RIGHT  1/4/2022 4:06 PM     HISTORY: Right shoulder pain since yesterday evening    COMPARISON: None      Impression    IMPRESSION: No acute fracture or malalignment. Moderate  glenohumeral  and severe acromioclavicular joint degenerative changes. Cortical  irregularity at the greater tuberosity suggests chronic rotator cuff  pathology. Osteopenia. Cervical spine fusion hardware.    TANNER DEAL MD         SYSTEM ID:  [id number]   Lactic acid whole blood STAT     Status: Normal    Collection Time: 01/04/22  4:13 PM   Result Value Ref Range    Lactic Acid 0.8 0.7 - 2.0 mmol/L   UA with Microscopic reflex to Culture     Status: Abnormal    Collection Time: 01/04/22 11:46 PM    Specimen: Urine, Midstream   Result Value Ref Range    Color Urine Yellow Colorless, Straw, Light Yellow, Yellow    Appearance Urine Clear Clear    Glucose Urine Negative Negative mg/dL    Bilirubin Urine Negative Negative    Ketones Urine Negative Negative mg/dL    Specific Gravity Urine 1.025 1.003 - 1.035    Blood Urine Moderate (A) Negative    pH Urine 5.0 5.0 - 7.0    Protein Albumin Urine Negative Negative mg/dL    Urobilinogen Urine Normal Normal, 2.0 mg/dL    Nitrite Urine Negative Negative    Leukocyte Esterase Urine Trace (A) Negative    Mucus Urine Present (A) None Seen /LPF    RBC Urine 2 <=2 /HPF    WBC Urine 4 <=5 /HPF    Squamous Epithelials Urine 1 <=1 /HPF    Narrative    Urine Culture not indicated       ED MEDICATIONS:   Medications   HYDROmorphone (PF) (DILAUDID) injection 0.5 mg (0.5 mg Intravenous Given 1/4/22 1844)   dextrose 5% and 0.9% NaCl infusion ( Intravenous New Bag 1/4/22 2342)   oxyCODONE (ROXICODONE) tablet 10 mg (10 mg Oral Given 1/5/22 0513)   ondansetron (ZOFRAN-ODT) ODT tab 4 mg (has no administration in time range)     Or   ondansetron (ZOFRAN) injection 4 mg (has no administration in time range)   amLODIPine (NORVASC) tablet 5 mg (has no administration in time range)   famotidine (PEPCID) tablet 20 mg (20 mg Oral Given 1/4/22 1826)   FLUoxetine (PROzac) capsule 60 mg (has no administration in time range)   furosemide (LASIX) tablet 20 mg (has no administration in time range)    gabapentin (NEURONTIN) capsule 600 mg (600 mg Oral Given 1/4/22 1826)   levothyroxine (SYNTHROID/LEVOTHROID) tablet 200 mcg (has no administration in time range)   losartan (COZAAR) tablet 100 mg (has no administration in time range)   metoprolol tartrate (LOPRESSOR) tablet 100 mg (100 mg Oral Given 1/4/22 1826)   simvastatin (ZOCOR) tablet 20 mg (20 mg Oral Given 1/4/22 2342)   0.9% sodium chloride BOLUS (0 mLs Intravenous Stopped 1/4/22 1553)   acetaminophen (TYLENOL) tablet 1,000 mg (1,000 mg Oral Given 1/4/22 1403)   iopamidol (ISOVUE-370) solution 100 mL (100 mLs Intravenous Given 1/4/22 1545)   sodium chloride 0.9 % bag 500mL for CT scan flush use (72 mLs As instructed Given 1/4/22 1545)   oxyCODONE (ROXICODONE) tablet 10 mg (10 mg Oral Given 1/4/22 1540)         Impression:    ICD-10-CM    1. Acute pain of right shoulder  M25.511    2. Diarrhea of presumed infectious origin  R19.7    3. Dehydration  E86.0    4. Generalized weakness  R53.1    5. First degree AV block  I44.0              MD Michael Galloway, Reji Guillen MD  01/05/22 7005

## 2022-01-05 NOTE — UTILIZATION REVIEW
"  Admission Status; Secondary Review Determination     Admission Date: 1/4/2022 12:54 PM      Under the authority of the Utilization Management Committee, the utilization review process indicated a secondary review on the above patient.  The review outcome is based on review of the medical records, discussions with staff, and applying clinical experience noted on the date of the review.        ()      Inpatient Status Appropriate - This patient's medical care is consistent with medical management for inpatient care and reasonable inpatient medical practice.      (x) Observation Status Appropriate - This patient does not meet hospital inpatient criteria and is placed in observation status. If this patient's primary payer is Medicare and was admitted as an inpatient, Condition Code 44 should be used and patient status changed to \"observation\".   () Admission Status NOT Appropriate - This patient's medical care is not consistent with medical management for Inpatient or Observation Status.          RATIONALE FOR DETERMINATION   Ms. Alexander Mari is a 66 year old  female with a past medical history significant for hypertension, osteoarthritis, diabetes mellitus, GERD, hyperlipidemia, depression, and hypothyroidism.  She presented to the hospital due to inability to care for self.  She is placed in the hospital on observation status for further evaluation and disposition planning.  She is expected to require hospital stay until a safe disposition plan can be made.  Observation status at the hospital remains appropriate hospital status at this time while safe disposition plan is in progress.      The severity of illness, intensity of service provided, expected LOS and risk for adverse outcome make the care appropriate for observation level care at the hospital.        The information on this document is developed by the utilization review team in order for the business office to ensure compliance.  This only " denotes the appropriateness of proper admission status and does not reflect the quality of care rendered.         The definitions of Inpatient Status and Observation Status used in making the determination above are those provided in the CMS Coverage Manual, Chapter 1 and Chapter 6, section 70.4.      Sincerely,     Christiano Schmidt D.O.  Utilization Review/ Case Management  [facility].

## 2022-01-05 NOTE — ED NOTES
Patient has been resting on cart, utilizing call light appropriately. Received medications for pain as requested for her shoulder, reports relief at this time. She has had no bowel movements during this shift. Purewick remains in place, 700ml drained during this shift. Plan for admission, patient is aware of plan and has no further complaints at this time. Will continue to monitor.

## 2022-01-05 NOTE — PROGRESS NOTES
01/05/22 1100   Quick Adds   Type of Visit Initial Occupational Therapy Evaluation       Present no   Living Environment   People in home alone   Current Living Arrangements apartment;independent living facility   Transportation Anticipated agency   Living Environment Comments PCA comes in 3x/week to assist with showers and other household cares. RN 1x/week to assist with medication set-up. Pt states she spends all day in her w/c and even sleeps in w/c. Is able to get up to bathroom independently using grab bars and w/c.    Self-Care   Usual Activity Tolerance fair   Current Activity Tolerance poor   Equipment Currently Used at Home commode chair;grab bar, toilet;raised toilet seat;wheelchair, manual;shower chair   Disability/Function   Hearing Difficulty or Deaf no   Wear Glasses or Blind yes   Concentrating, Remembering or Making Decisions Difficulty no   Difficulty Communicating no   Difficulty Eating/Swallowing no   Walking or Climbing Stairs Difficulty yes   Mobility Management w/c bound. Able to squat pivot up to w/c.    Dressing/Bathing Difficulty yes   Dressing/Bathing bathing difficulty, requires equipment;bathing difficulty, assistance 1 person;dressing difficulty, requires equipment   Dressing/Bathing Management AE for LB dressing: reacher and sock aid.    Toileting issues yes   Toileting Assistance toileting difficulty, requires equipment   Doing Errands Independently Difficulty (such as shopping) yes   Errands Management family assists    Fall history within last six months no   Change in Functional Status Since Onset of Current Illness/Injury yes   General Information   Onset of Illness/Injury or Date of Surgery 01/04/22   Referring Physician Jose Patel MD   Patient/Family Therapy Goal Statement (OT) to go to TCU    Additional Occupational Profile Info/Pertinent History of Current Problem The patient is brought in by EMS from her independent living apartment.  She  "generally sits in her wheelchair most of the day.  Her son lives close and was there when EMS arrived but is generally not available, per the patient.  She reports right shoulder/upper arm pain starting last evening, time unknown.  She says, \"I could not get out of my wheelchair because when I pushed on my right arm it hurt too much.\"  She ended up having 3 episodes of diarrhea through the night while sitting in her wheelchair   Existing Precautions/Restrictions no known precautions/restrictions   Cognitive Status Examination   Orientation Status orientation to person, place and time   Affect/Mental Status (Cognitive) WNL   Pain Assessment   Patient Currently in Pain Yes, see Vital Sign flowsheet  (R shoulder )   Range of Motion Comprehensive   Comment, General Range of Motion Unable to move R shoulder during ROM d/t pain. L UE ROM: WNL   Strength Comprehensive (MMT)   Comment, General Manual Muscle Testing (MMT) Assessment Pt states she feels weak.    Transfers   Transfer Comments about to participate in OOB activity. moves B LE's to EOB with Mod A but then states she had a BM awhile ago and needed to get changed. Returned later with lunch just arriving encouraged pt to get up to chair for lunch and adamantely refuses despite encouragement. Third attempt pt again gets B LE\"s off the bed with Mod A but then declines using R UE to assist with upper body. Unable to complete with Ax1. Called for Assist of another person but pt declines to further attempt. Returned B LE's to bed with Mod A.   Activities of Daily Living   BADL Assessment upper body dressing;grooming;feeding   Upper Body Dressing Assessment   [place] Level (Upper Body Dressing) moderate assist (50% patient effort)   Grooming Assessment   [place] Level (Grooming) minimum assist (75% patient effort)   Comment (Grooming) Difficulty using R arm/shoulder d/t pain    Eating/Self Feeding   [place] Level (Feeding) set up   Comment (Feeding) Needs " assist with opening items   Clinical Impression   Criteria for Skilled Therapeutic Interventions Met (OT) yes;meets criteria;skilled treatment is necessary   OT Diagnosis decreased independence with ADLs and functional mobility    OT Problem List-Impairments impacting ADL pain;strength   ADL comments/analysis R shoulder pain.    Assessment of Occupational Performance 3-5 Performance Deficits   Identified Performance Deficits toilet transfer, showering, functional transfers    Planned Therapy Interventions (OT) ADL retraining;strengthening;progressive activity/exercise   Clinical Decision Making Complexity (OT) low complexity   Therapy Frequency (OT) 5x/week   OT Discharge Planning    OT Discharge Recommendation (DC Rec) Transitional Care Facility;Long term care facility  (likely would benefit from transitioning to either [facility] or LTC)   OT Rationale for DC Rec Difficulty participating therapy today d/t diarrhea and R arm pain, however pt appears to have low functional level at baseline. Stays in w/c all day and states she sleeps in w/c at night.    Total Evaluation Time (Minutes)   Total Evaluation Time (Minutes) 10

## 2022-01-06 VITALS
HEART RATE: 64 BPM | RESPIRATION RATE: 18 BRPM | BODY MASS INDEX: 38.4 KG/M2 | WEIGHT: 260 LBS | TEMPERATURE: 98.2 F | SYSTOLIC BLOOD PRESSURE: 113 MMHG | DIASTOLIC BLOOD PRESSURE: 62 MMHG | OXYGEN SATURATION: 96 %

## 2022-01-06 LAB
ANION GAP SERPL CALCULATED.3IONS-SCNC: 7 MMOL/L (ref 3–14)
BASOPHILS # BLD AUTO: 0 10E3/UL (ref 0–0.2)
BASOPHILS NFR BLD AUTO: 0 %
BUN SERPL-MCNC: 13 MG/DL (ref 7–30)
C COLI+JEJUNI+LARI FUSA STL QL NAA+PROBE: NOT DETECTED
CALCIUM SERPL-MCNC: 7.8 MG/DL (ref 8.5–10.1)
CHLORIDE BLD-SCNC: 110 MMOL/L (ref 94–109)
CO2 SERPL-SCNC: 22 MMOL/L (ref 20–32)
CREAT SERPL-MCNC: 1.07 MG/DL (ref 0.52–1.04)
EC STX1 GENE STL QL NAA+PROBE: NOT DETECTED
EC STX2 GENE STL QL NAA+PROBE: NOT DETECTED
EOSINOPHIL # BLD AUTO: 0.2 10E3/UL (ref 0–0.7)
EOSINOPHIL NFR BLD AUTO: 2 %
ERYTHROCYTE [DISTWIDTH] IN BLOOD BY AUTOMATED COUNT: 16.3 % (ref 10–15)
GFR SERPL CREATININE-BSD FRML MDRD: 57 ML/MIN/1.73M2
GLUCOSE BLD-MCNC: 86 MG/DL (ref 70–99)
HCT VFR BLD AUTO: 28.9 % (ref 35–47)
HGB BLD-MCNC: 8.5 G/DL (ref 11.7–15.7)
IMM GRANULOCYTES # BLD: 0.1 10E3/UL
IMM GRANULOCYTES NFR BLD: 1 %
LYMPHOCYTES # BLD AUTO: 1.1 10E3/UL (ref 0.8–5.3)
LYMPHOCYTES NFR BLD AUTO: 11 %
MCH RBC QN AUTO: 26.4 PG (ref 26.5–33)
MCHC RBC AUTO-ENTMCNC: 29.4 G/DL (ref 31.5–36.5)
MCV RBC AUTO: 90 FL (ref 78–100)
MONOCYTES # BLD AUTO: 0.7 10E3/UL (ref 0–1.3)
MONOCYTES NFR BLD AUTO: 7 %
NEUTROPHILS # BLD AUTO: 7.9 10E3/UL (ref 1.6–8.3)
NEUTROPHILS NFR BLD AUTO: 79 %
NOROV GI+II ORF1-ORF2 JNC STL QL NAA+PR: NOT DETECTED
NRBC # BLD AUTO: 0 10E3/UL
NRBC BLD AUTO-RTO: 0 /100
PLATELET # BLD AUTO: 263 10E3/UL (ref 150–450)
POTASSIUM BLD-SCNC: 3.3 MMOL/L (ref 3.4–5.3)
POTASSIUM BLD-SCNC: 3.7 MMOL/L (ref 3.4–5.3)
RBC # BLD AUTO: 3.22 10E6/UL (ref 3.8–5.2)
RVA NSP5 STL QL NAA+PROBE: NOT DETECTED
SALMONELLA SP RPOD STL QL NAA+PROBE: NOT DETECTED
SHIGELLA SP+EIEC IPAH STL QL NAA+PROBE: NOT DETECTED
SODIUM SERPL-SCNC: 139 MMOL/L (ref 133–144)
V CHOL+PARA RFBL+TRKH+TNAA STL QL NAA+PR: NOT DETECTED
WBC # BLD AUTO: 10 10E3/UL (ref 4–11)
Y ENTERO RECN STL QL NAA+PROBE: NOT DETECTED

## 2022-01-06 PROCEDURE — 36415 COLL VENOUS BLD VENIPUNCTURE: CPT | Performed by: FAMILY MEDICINE

## 2022-01-06 PROCEDURE — 250N000013 HC RX MED GY IP 250 OP 250 PS 637: Performed by: FAMILY MEDICINE

## 2022-01-06 PROCEDURE — 250N000013 HC RX MED GY IP 250 OP 250 PS 637: Performed by: INTERNAL MEDICINE

## 2022-01-06 PROCEDURE — U0003 INFECTIOUS AGENT DETECTION BY NUCLEIC ACID (DNA OR RNA); SEVERE ACUTE RESPIRATORY SYNDROME CORONAVIRUS 2 (SARS-COV-2) (CORONAVIRUS DISEASE [COVID-19]), AMPLIFIED PROBE TECHNIQUE, MAKING USE OF HIGH THROUGHPUT TECHNOLOGIES AS DESCRIBED BY CMS-2020-01-R: HCPCS | Performed by: FAMILY MEDICINE

## 2022-01-06 PROCEDURE — 84132 ASSAY OF SERUM POTASSIUM: CPT | Performed by: FAMILY MEDICINE

## 2022-01-06 PROCEDURE — 82310 ASSAY OF CALCIUM: CPT | Performed by: FAMILY MEDICINE

## 2022-01-06 PROCEDURE — 85025 COMPLETE CBC W/AUTO DIFF WBC: CPT | Performed by: FAMILY MEDICINE

## 2022-01-06 PROCEDURE — G0378 HOSPITAL OBSERVATION PER HR: HCPCS

## 2022-01-06 PROCEDURE — 99217 PR OBSERVATION CARE DISCHARGE: CPT | Performed by: FAMILY MEDICINE

## 2022-01-06 RX ORDER — LOPERAMIDE HCL 2 MG
2 CAPSULE ORAL 4 TIMES DAILY PRN
Status: DISCONTINUED | OUTPATIENT
Start: 2022-01-06 | End: 2022-01-06 | Stop reason: HOSPADM

## 2022-01-06 RX ORDER — ACETAMINOPHEN 325 MG/1
650 TABLET ORAL EVERY 4 HOURS PRN
Status: DISCONTINUED | OUTPATIENT
Start: 2022-01-06 | End: 2022-01-06 | Stop reason: HOSPADM

## 2022-01-06 RX ORDER — POTASSIUM CHLORIDE 1500 MG/1
40 TABLET, EXTENDED RELEASE ORAL ONCE
Status: COMPLETED | OUTPATIENT
Start: 2022-01-06 | End: 2022-01-06

## 2022-01-06 RX ORDER — OXYCODONE HYDROCHLORIDE 5 MG/1
10 TABLET ORAL EVERY 6 HOURS PRN
Qty: 24 TABLET | Refills: 0 | Status: SHIPPED | OUTPATIENT
Start: 2022-01-06 | End: 2022-01-10

## 2022-01-06 RX ORDER — LOPERAMIDE HCL 2 MG
2 CAPSULE ORAL 4 TIMES DAILY PRN
DISCHARGE
Start: 2022-01-06 | End: 2023-01-01

## 2022-01-06 RX ORDER — OXYCODONE HCL 20 MG/1
20 TABLET, FILM COATED, EXTENDED RELEASE ORAL EVERY 12 HOURS
Qty: 24 TABLET | Refills: 0 | Status: SHIPPED | OUTPATIENT
Start: 2022-01-06 | End: 2022-01-17

## 2022-01-06 RX ADMIN — AMLODIPINE BESYLATE 5 MG: 5 TABLET ORAL at 07:55

## 2022-01-06 RX ADMIN — FUROSEMIDE 20 MG: 20 TABLET ORAL at 07:54

## 2022-01-06 RX ADMIN — FLUOXETINE 60 MG: 20 CAPSULE ORAL at 07:54

## 2022-01-06 RX ADMIN — ACETAMINOPHEN 650 MG: 325 TABLET, FILM COATED ORAL at 03:33

## 2022-01-06 RX ADMIN — LEVOTHYROXINE SODIUM 200 MCG: 0.1 TABLET ORAL at 07:54

## 2022-01-06 RX ADMIN — GABAPENTIN 600 MG: 300 CAPSULE ORAL at 07:54

## 2022-01-06 RX ADMIN — LOPERAMIDE HYDROCHLORIDE 2 MG: 2 CAPSULE ORAL at 12:13

## 2022-01-06 RX ADMIN — METOPROLOL TARTRATE 100 MG: 100 TABLET, FILM COATED ORAL at 07:55

## 2022-01-06 RX ADMIN — OXYCODONE HYDROCHLORIDE 10 MG: 5 TABLET ORAL at 03:05

## 2022-01-06 RX ADMIN — OXYCODONE HYDROCHLORIDE 20 MG: 10 TABLET, FILM COATED, EXTENDED RELEASE ORAL at 07:54

## 2022-01-06 RX ADMIN — LOSARTAN POTASSIUM 100 MG: 50 TABLET, FILM COATED ORAL at 07:54

## 2022-01-06 RX ADMIN — GABAPENTIN 600 MG: 300 CAPSULE ORAL at 12:13

## 2022-01-06 RX ADMIN — FAMOTIDINE 20 MG: 20 TABLET ORAL at 07:54

## 2022-01-06 RX ADMIN — OXYCODONE HYDROCHLORIDE 10 MG: 5 TABLET ORAL at 12:12

## 2022-01-06 RX ADMIN — POTASSIUM CHLORIDE 40 MEQ: 20 TABLET, EXTENDED RELEASE ORAL at 12:13

## 2022-01-06 NOTE — PLAN OF CARE
Occupational Therapy Discharge Summary    Reason for therapy discharge:    Discharged to transitional care facility.    Progress towards therapy goal(s). See goals on Care Plan in [facility] electronic health record for goal details.  Goals partially met.  Barriers to achieving goals:   discharge from facility.    Therapy recommendation(s):    Continued therapy is recommended.  Rationale/Recommendations:  TCU to increase independence with ADLs and related transfers. Likely needs transition into appropriate living environment (ie. [facility] or LTC).

## 2022-01-06 NOTE — CONSULTS
[facility] Consultation    Consultation - [facility]  Level of consult: One-time consult to assist in determining a diagnosis and to recommend an appropriate treatment plan    Carolina Mari,  1955, MRN [id number]     Admitting Dx: Diarrhea of presumed infectious origin [R19.7]  Dehydration [E86.0]  First degree AV block [I44.0]  Generalized weakness [R53.1]  Acute pain of right shoulder [M25.511]  Encounter for screening laboratory testing for severe acute respiratory syndrome coronavirus 2 (SARS-CoV-2) [Z20.423]     PCP: Le Romo, [phone]     Code status:  Prior     Extended Emergency Contact Information  Primary Emergency Contact: Tim Munoz  Address: [address] [place] [address]  Home Phone: [phone]  Mobile Phone: [phone]  Relation: Son  Secondary Emergency Contact: Elizabeth Munoz  Address: [address] [place] [address]  Home Phone: [phone]  Mobile Phone: [phone]  Relation: Daughter-in-Law     Assessment:    1. Right deltoid strain  2. Right shoulder glenohumeral and AC joint osteoarthritis    Plan:  This patient was discussed with Dr.Erik Montes, surgeon for [facility] and they are in agreement with the following plan.   Continue conservative treatment; ice or heat as desired, gentle ROM and use as tolerated.   PT evaluation for shoulder exercises recommended.   WBAT on the RUE.  Discussed the above with the pt, she is agreeable. If she develops increased pain or it does not continue to improve, she may follow up with a shoulder specialist outpatient.   Orthopedically stable. Will sign off. Please consult again if further orthopedic needs arise.    Thank you for including [facility] in the care of Carolina Mari. It has been a pleasure participating in her care.      Tate Abreu PA-C  [facility]    Principal Problem:    Generalized weakness  Active Problems:    Diabetes mellitus (H)    Depression    Adult failure to thrive syndrome    Other specified hypothyroidism    Generalized anxiety disorder    Chronic pain    Chronic ulcer of skin (H)    Chronic, continuous use of opioids    Gastro-esophageal reflux disease without esophagitis    Hypertension, benign essential, goal below 140/90    Iron deficiency anemia    Nicotine dependence, unspecified, uncomplicated    Vitamin B deficiency    Morbid obesity (H)    Diarrhea of presumed infectious origin    Dehydration    Acute pain of right shoulder       Chief Complaint  Right upper arm pain      HPI  The patient is seen in orthopedic consultation at the request of Dr.Kevin Martinez.  The patient is a 66 year old female with moderate pain of the right  shoulder. The patient has a history of DM, HTN, osteoarthritis and generalized weakness which requires use of a wheelchair at baseline. She usually transfers to the wheelchair independently. She reports that she developed right lateral mid-humerus pain about 5 days ago without any known injury. It hurt with raising the arm forward and to the side and was tender. She denies any pain radiating from her neck, denies shoulder area pain and denies numbness or tingling in the hand. The pain has been gradually improving and she is able to better lift her arm.      History is obtained from the patient and electronic health record     Past Medical History  Past Medical History:   Diagnosis Date     Diabetes mellitus (H)      History of stomach ulcers      HTN (hypertension)      Knee osteoarthritis     right     Osteoarthritis of right hip      Osteoporosis      Osteoporosis      Thyroid disease        Surgical History  Past Surgical History:   Procedure Laterality Date     AMPUTATE TOE(S) Bilateral     3rd toe on both feet amputated.      FOOT SURGERY       GASTRIC BYPASS       HC REMOVAL GALLBLADDER N/A  12/25/2016    Procedure: CHOLECYSTECTOMY, OPEN;  Surgeon: Everardo Cisneros MD;  Location: [facility] OR;  Service: General     HERNIA REPAIR      Had 4 surgeries total     JOINT REPLACEMENT       CA SPINE SURGERY PROCEDURE UNLISTED       tka      right        Social History  Social History     Socioeconomic History     Marital status:      Spouse name: Not on file     Number of children: 1     Years of education: Not on file     Highest education level: Not on file   Occupational History     Not on file   Tobacco Use     Smoking status: Never Smoker     Smokeless tobacco: Never Used   Substance and Sexual Activity     Alcohol use: No     Drug use: No     Sexual activity: Not on file   Other Topics Concern     Not on file   Social History Narrative    Son, Tim and daughter in law, Elizabeth, active & engaged with patient's cares.       Social Determinants of Health     Financial Resource Strain: Not on file   Food Insecurity: Not on file   Transportation Needs: Not on file   Physical Activity: Not on file   Stress: Not on file   Social Connections: Not on file   Intimate Partner Violence: Not on file   Housing Stability: Not on file       Family History  Family History   Problem Relation Age of Onset     Coronary Artery Disease Father      Coronary Artery Disease Brother      Cancer Mother         bone     Cancer Brother         leukemia     Breast Cancer Mother      Cancer Brother 20.00        Leukemia     Coronary Artery Disease Brother         Allergies:  Aspirin, Colchicine, and Colcrys      Current Medications:  Current Facility-Administered Medications   Medication     acetaminophen (TYLENOL) tablet 650 mg     amLODIPine (NORVASC) tablet 5 mg     cyclobenzaprine (FLEXERIL) tablet 5 mg     famotidine (PEPCID) tablet 20 mg     FLUoxetine (PROzac) capsule 60 mg     furosemide (LASIX) tablet 20 mg     gabapentin (NEURONTIN) capsule 600 mg     HYDROmorphone (PF) (DILAUDID) injection 0.5 mg      levothyroxine (SYNTHROID/LEVOTHROID) tablet 200 mcg     losartan (COZAAR) tablet 100 mg     metoprolol tartrate (LOPRESSOR) tablet 100 mg     nystatin (MYCOSTATIN) cream     ondansetron (ZOFRAN-ODT) ODT tab 4 mg    Or     ondansetron (ZOFRAN) injection 4 mg     oxyCODONE (oxyCONTIN) 12 hr tablet 20 mg     oxyCODONE (ROXICODONE) tablet 10 mg     oxyCODONE (ROXICODONE) tablet 5 mg     simvastatin (ZOCOR) tablet 20 mg       Review of Systems:  See H&P    Physical Exam:  Temp:  [98  F (36.7  C)-100.5  F (38.1  C)] 98  F (36.7  C)  Pulse:  [51-69] 53  Resp:  [18] 18  BP: (100-162)/(43-71) 136/57  SpO2:  [93 %-100 %] 96 %    General: On examination, the patient is resting comfortably, NAD, awake and alert and oriented to person, place, time, and and general circumstances  SKIN: Right shoulder and upper arm are without increased warmth, erythema, swelling or deformity.   Pulses:  radial pulse is intact and equal bilaterally  Sensation: intact and equal bilaterally to the distal upper extremities.  Tenderness: Mild TTP over the right lateral deltoid and deltoid insertion area. No TTP throughout the right shoulder.   ROM: Active FF to 90, abduction to 80 with increased pain in the lateral arm. No pain with ER/IR. FROM of the elbow without pain. No pain with passive ROM of the right shoulder.   Motor: 5/5 motor strength with right shoulder FF, abduction, ER/IR, elbow flexion/extension. Increased pain with resisted abduction.      Pertinent Labs  Lab Results: personally reviewed.  Lab Results   Component Value Date    WBC 10.0 01/06/2022    HGB 8.5 (L) 01/06/2022    HCT 28.9 (L) 01/06/2022    MCV 90 01/06/2022     01/06/2022       Pertinent Radiology  Radiology Results: images and radiology report reviewed  XR SHOULDER 2 VIEW RIGHT  1/4/2022 4:06 PM      HISTORY: Right shoulder pain since yesterday evening     COMPARISON: None                                                                      IMPRESSION: No acute  fracture or malalignment. Moderate glenohumeral  and severe acromioclavicular joint degenerative changes. Cortical  irregularity at the greater tuberosity suggests chronic rotator cuff  pathology. Osteopenia. Cervical spine fusion hardware.     TANNER DEAL MD         SYSTEM ID:  [id number]    Attestation:  I have reviewed today's vital signs, notes, medications, labs and imaging.     Tate Abreu PA-C

## 2022-01-06 NOTE — DISCHARGE SUMMARY
[facility] Discharge Summary    Carolina Mari MRN# [id number]   Age: 66 year old YOB: 1955     Date of Admission:  1/4/2022  Date of Discharge::  1/6/2022  Admitting Physician:  Amado Lam MD  Discharge Physician:  Jeffery Martinez MD, MD             Admission Diagnoses:   Diarrhea of presumed infectious origin [R19.7]  Dehydration [E86.0]  First degree AV block [I44.0]  Generalized weakness [R53.1]  Acute pain of right shoulder [M25.511]  Encounter for screening laboratory testing for severe acute respiratory syndrome coronavirus 2 (SARS-CoV-2) [Z20.822]          Principle Discharge Diagnosis:            Generalized weakness, now unable to care for self independently due to this compounded by right shoulder pain and diarrhea.      Adult failure to thrive syndrome    See hospital course for further active diagnoses addressed during this admission.                 Medications Prior to Admission:     Medications Prior to Admission   Medication Sig Dispense Refill Last Dose     acetaminophen (TYLENOL) 500 MG tablet Take 1,000 mg by mouth 3 times daily    1/4/2022 at am     amLODIPine (NORVASC) 5 MG tablet Take 5 mg by mouth daily    1/4/2022 at am     cyanocobalamin (CYANOCOBALAMIN) 1000 MCG/ML injection Inject 1 mL (1,000 mcg) into the muscle every 30 days   12/16/2021     cyclobenzaprine (FLEXERIL) 5 MG tablet Take 1 tablet by mouth 3 times daily as needed for muscle spasms   1/3/2022 at pm     diclofenac (VOLTAREN) 1 % topical gel Apply 2 g topically 4 times daily To affected area   Past Week at Unknown time     famotidine (PEPCID) 20 MG tablet Take 20 mg by mouth 2 times daily    1/4/2022 at am     FLUoxetine HCl 60 MG TABS Take 1 tablet by mouth every morning    1/4/2022 at am     furosemide (LASIX) 20 MG tablet Take 20 mg by mouth daily   1/4/2022 at am     gabapentin (NEURONTIN) 600 MG tablet Take 1 tablet by mouth 3 times daily   1/4/2022 at am     levothyroxine  (SYNTHROID/LEVOTHROID) 200 MCG tablet Take 1 tablet by mouth daily   1/4/2022 at am     losartan (COZAAR) 100 MG tablet Take 100 mg by mouth daily   1/4/2022 at am     metoprolol tartrate (LOPRESSOR) 100 MG tablet Take 100 mg by mouth 2 times daily   1/4/2022 at am     MICRO GUARD 2 % external powder USE AS DIRECTED PER WOUND CARE ORDERS   1/4/2022 at am     mineral oil-hydrophilic petrolatum (AQUAPHOR) external ointment USE AS DIRECTED PER WOUND CARE ORDERS   Past Week at Unknown time     NARCAN 4 MG/0.1ML nasal spray CALL 911. SPR CONTENTS OF ONE SPRAYER (0.1ML) INTO ONE NOSTRIL. REPEAT IN 2-3 MIN IF SYMPTOMS OF OPIOID EMERGENCY PERSIST, ALTERNATE NOSTRILS   never     Nystatin (NYAMYC) 878553 UNIT/GM POWD Apply topically 2 times daily as needed    1/4/2022 at am     oxyCODONE (OXYCONTIN) 20 MG 12 hr tablet Take 1 tablet (20 mg) by mouth every 12 hours 6 tablet 0 1/3/2022 at pm     oxyCODONE (ROXICODONE) 5 MG tablet TAKE 1 TAB BY MOUTH EVERY 4 HOURS AS NEEDED FOR BREAKTHROUGH PAIN 15 tablet 0 1/3/2022 at pm     simvastatin (ZOCOR) 20 MG tablet Take 20 mg by mouth At Bedtime    1/3/2022 at hs             Discharge Medications:     Current Discharge Medication List      START taking these medications    Details   loperamide (IMODIUM) 2 MG capsule Take 1 capsule (2 mg) by mouth 4 times daily as needed for diarrhea    Associated Diagnoses: Diarrhea, unspecified type         CONTINUE these medications which have NOT CHANGED    Details   acetaminophen (TYLENOL) 500 MG tablet Take 1,000 mg by mouth 3 times daily       amLODIPine (NORVASC) 5 MG tablet Take 5 mg by mouth daily       cyanocobalamin (CYANOCOBALAMIN) 1000 MCG/ML injection Inject 1 mL (1,000 mcg) into the muscle every 30 days  Qty:      Associated Diagnoses: Vitamin B12 deficiency (non anemic)      cyclobenzaprine (FLEXERIL) 5 MG tablet Take 1 tablet by mouth 3 times daily as needed for muscle spasms      diclofenac (VOLTAREN) 1 % topical gel Apply 2 g  topically 4 times daily To affected area      famotidine (PEPCID) 20 MG tablet Take 20 mg by mouth 2 times daily       FLUoxetine HCl 60 MG TABS Take 1 tablet by mouth every morning       furosemide (LASIX) 20 MG tablet Take 20 mg by mouth daily      gabapentin (NEURONTIN) 600 MG tablet Take 1 tablet by mouth 3 times daily      levothyroxine (SYNTHROID/LEVOTHROID) 200 MCG tablet Take 1 tablet by mouth daily      losartan (COZAAR) 100 MG tablet Take 100 mg by mouth daily      metoprolol tartrate (LOPRESSOR) 100 MG tablet Take 100 mg by mouth 2 times daily      MICRO GUARD 2 % external powder USE AS DIRECTED PER WOUND CARE ORDERS      mineral oil-hydrophilic petrolatum (AQUAPHOR) external ointment USE AS DIRECTED PER WOUND CARE ORDERS      NARCAN 4 MG/0.1ML nasal spray CALL 911. SPR CONTENTS OF ONE SPRAYER (0.1ML) INTO ONE NOSTRIL. REPEAT IN 2-3 MIN IF SYMPTOMS OF OPIOID EMERGENCY PERSIST, ALTERNATE NOSTRILS      Nystatin (NYAMYC) 267581 UNIT/GM POWD Apply topically 2 times daily as needed       oxyCODONE (OXYCONTIN) 20 MG 12 hr tablet Take 1 tablet (20 mg) by mouth every 12 hours  Qty: 6 tablet, Refills: 0    Associated Diagnoses: Undifferentiated inflammatory arthritis (H)      oxyCODONE (ROXICODONE) 5 MG tablet TAKE 1 TAB BY MOUTH EVERY 4 HOURS AS NEEDED FOR BREAKTHROUGH PAIN  Qty: 15 tablet, Refills: 0    Associated Diagnoses: Pressure injury of right buttock, stage 3 (H)      simvastatin (ZOCOR) 20 MG tablet Take 20 mg by mouth At Bedtime                    Consultations:   Consultation during this admission received from orthopedics          Brief History of Illness:     From Admission H+P:   This patient is a 66 year old  female with a significant past medical history of multiple issues as above who presents with weakness, diarrhea and right shoulder pain.  Patient has long-standing generalized weakness and is wheelchair dependent at baseline.  However, appears able to transfer independently at  "baseline and had been doing so at an independent living facility prior to admission.  However, she has intermittently needed TCU cares this past year.  She feels more \"crummy\" in general the past couple of days, in particular due to onset of new diarrhea.  No abdominal pain, no nausea or vomiting.  But feels like her stomach is \"loud\" and gurgly after eating and due to diarrhea and inability to mobilize normally with shoulder pain has been incontinent of stool in her wheelchair.  Today had single diarrhea at time of my evaluation.  No bloody or black stools.   Right shoulder pain is lateral shoulder pain, started a few days ago but no clear-cut injury.  Clearly worse with any movement of the shoulder including any active extension or abduction.  No numbness no weakness or left upper extremity beyond inability to move shoulder due to pain.  Pain not changed with palpation of shoulder.  No apparent injury.           no tobacco, alcohol or illicit drug use.              TODAY:     Subjective:  Improving.  Shoulder feels a bit better, able to lift right arm more today, less pain.  No pain at rest today.  No fever or chills. No dyspnea.  Intake improving but still having some diarrhea after eating.  Frequency is slowing however.    No other pain.       ROS:   ROS: 10 point ROS neg other than the symptoms noted above in the HPI.   /57   Pulse 53   Temp 98  F (36.7  C) (Oral)   Resp 18   Wt 117.9 kg (260 lb)   SpO2 96%   BMI 38.40 kg/m     EXAM:  General: awake and alert, NAD, oriented x 3  Head: normocephalic  Neck: unremarkable, no lymphadenopathy   HEENT: oropharynx pink and moist    Heart: Regular rate and rhythm, no murmurs, rubs, or gallops  Lungs: clear to auscultation bilaterally with good air movement throughout  Abdomen: soft, non-tender, no masses or organomegaly  Right shoulder shows still pain with extension/abduction but much improved today compared to yesterday    Extremities: no edema in lower " extremities   Skin unremarkable.            Hospital Course:          Presented 1/4 with inability to care for self at independent living location due to new right shoulder pain and diarrhea on top of long-sanding generalized weakness.  Wheelchair dependent at baseline.  Presented to ER 1/4, boarded overnight given no hospital beds available, admitted 1/5.       X-ray right shoulder on admission showed:  IMPRESSION: No acute fracture or malalignment. Moderate glenohumeral  and severe acromioclavicular joint degenerative changes. Cortical  irregularity at the greater tuberosity suggests chronic rotator cuff    pathology. Osteopenia. Cervical spine fusion hardware.       Generalized weakness, now unable to care for self independently due to this compounded by right shoulder pain and diarrhea.      Adult failure to thrive syndrome    Has been in and out of TCUs for past 6 months or so.  Wheelchair dependent at baseline.  Now worse due to shoulder pain and diarrhea and currently unable to care for herself at home.  Physical therapy and occupational therapy following.    Will need placement on discharge.       Right shoulder pain - suspect rotator cuff injury   No apparent injury.  No fracture on x-ray.  Does have sever degenerative changes, could be due to flare of this but patient denies any long-standing more mild pain.  Also some evidence of rotator cuff injury although could be chronic based on x-ray.  Exam most consistent with rotator cuff injury.   seen by ortho 1/6 - pain much improved 1/6, recommended ok for weight bearing as tolerated right upper extremity and mobility as tolerated, physical therapy as able.  If not improving over the next couple of weeks recommend outpatient follow-up with ortho.        Diarrhea - suspect infectious - most likely viral gastroenteritis   Questionable recent antibiotic use - patient reports using some recently, but can't specify and I don't see any prescriptions.   Stool CDiff  and enteric panel negative.  Diarrhea improving, started imodium prn and will continue this on discharge.           Dehydration  Due to poor recent intake and diarrhea, rehydrated overnight in ER, intake now improved on admission.   diarrhea slowing.  No further intervention needed.       Leukocytosis  No other apparent infectious source besides diarrhea as above.  WBC normalized without antibiotics.        Hypokalemia  Mild, suspect due to IVF and diarrhea, given replacement prior to discharge but with improving intake and diarrhea should not need further supplementation.  Can recheck at follow-up in 1 week.        Hypoxia ?  1/5/22 -- Was on 1LNC in ER, now off oxygen.  Asymptomatic.  Follow     1/6/2022 -- remained off oxygen.       History of Diabetes mellitus (H), appears now resolved  Carries history of this but not on any medications and last A1c was 5.5.  Appears resolved.          Hypothyroidism  Continue home synthroid.         Generalized anxiety disorder.Depression  At baseline, continue home prozac.         Chronic pain with Chronic, continuous use of opioids  Continue home oxycontin/oxycodone, appears baseline.  Continue flexeril.       Ventral wall hernia with history of small bowel obstruction   At baseline.          Multiple long-standing skin ulcers   Ween by wound care here.  Recommended:  Mid Abdomen (old surgical wound)  1.) Cleanse with wound cleanser and gauze. Pat dry.  2.) Apply medihoney to open areas and cover with Mepilex border  island dressing.   Change every other day.  Buttocks:    1.  Cleanse with spray wound cleanser or incontinence cleanser and pat dry.  2.  Apply Triad wound paste   3.  Reapply after each incontinence episode.   Keep HOB less than 30 degrees as able, elevate knee gatch with HOB elevation to reduce shear.  Chair cushion when up.  Reposition hourly when in chair and every two hours in bed as able.  Avoid supine position using pillows tucked under alternating hips  for at least 15-30 degree turn to lift sacrum/coccyx off bed.  Avoid dragging buttocks with position changes.    Bilateral Lower Legs  1.) Cleanse with wound cleanser or mild soap/water. If using soap/water, rinse after. Pat dry.  2.) Cover any open draining area with mepilex border dressing.  3.) Apply Aquaphor generously to intact skin   4.) keep legs elevated to control edema.  Pt must resume her velcro compression wraps when she gets home.   Bilateral Abdominal skin folds:  1.  Cleanse well with mild soap and water, rinse and pat dry.  2.  Generously dust skin fold with antifungal powder.   3.  Reapply 2 times per day         Gastro-esophageal reflux disease without esophagitis  Continue home famotidine.         Hypertension, benign essential, goal below 140/90  Continue home amlodipine, losartan, metoprolol and lasix        Iron deficiency anemia  Appears about baseline, continue outpatient follow-up/eval as needed.         Morbid obesity (H)  Complicates multiple issues including mobility and diabetes      Asymptomatic COVID swab negative   Vaccinated with Moderna including booster 5/21      Lines  PIV     Diet  Orders Placed This Encounter      Regular Diet Adult        Code Status  Full                     Discharge Instructions and Follow-Up:     Discharge diet: Orders Placed This Encounter      Regular Diet Adult       Discharge activity: Activity as tolerated   Discharge follow-up: Follow-up with provider in 1 week.  Recheck potassium and creatinine at that time and confirm diarrhea resolved.            Discharge Disposition:     Discharged to TCU      Attestation:  Amount of time performed on this discharge summary: 70 minutes.    Jeffery Martinez MD, MD

## 2022-01-06 NOTE — PROGRESS NOTES
[facility] [facility] ADMISSION NOTE    Patient admitted to room 2208 at approximately 1020 via cart from emergency room. Patient was accompanied by transport tech.     [facility]     Patient trasferred to bed via air dejuan. Patient alert and oriented X 3. Pain is not well controlled.  Medication(s) being used: narcotic analgesics including oxycodone.  . Admission vital signs: Blood pressure 131/63, pulse 68, temperature 98  F (36.7  C), temperature source Oral, resp. rate 18, weight 117.9 kg (260 lb), SpO2 99 %, not currently breastfeeding. Patient was oriented to plan of care, call light, bed controls, tv, telephone, bathroom and visiting hours.     Risk Assessment    The following safety risks were identified during admission: fall. Yellow risk band applied: YES.     Skin Initial Assessment    This writer admitted this patient and completed a full skin assessment and Jesus score in the Adult PCS flowsheet. Appropriate interventions initiated as needed.     Secondary skin check completed by Yamileth Chaudhary    Patient has a [place] to Observation order: Yes  Observation education completed and documented: Yes      Shireen Laura RN

## 2022-01-06 NOTE — PROGRESS NOTES
Care Management Discharge Note    Discharge Date: 01/06/2022  Expected Time of Departure: 1/6/22 at 1400    Discharge Disposition: Transitional Care    Discharge Services: Transportation Services    Discharge DME: Wheelchair    Discharge Transportation: agency    Private pay costs discussed: transportation costs    PAS Confirmation Code: [id number]  Patient/family educated on Medicare website which has current facility and service quality ratings: yes    Education Provided on the Discharge Plan:  yes  Persons Notified of Discharge Plans: Patient and son, Augustus.  Patient/Family in Agreement with the Plan: yes    Handoff Referral Completed: Yes    Additional Information:    Plan:  [facility] TCU.  Transportation provided by  [facility].      Le Ash RN

## 2022-01-06 NOTE — PLAN OF CARE
Pt c/o chronic R arm and buttock pain. Receiving scheduled oxycontin q 12 hrs & prn oxycodone 10mg. Pt was running temp of 100.5 tonight; order obtained for prn tylenol. Pt continues to be incont of B&B. Calls when she needs to be changed. Skin on buttocks & IT area very red, excoriated w/ some open areas. Skin cleanser used after each incont stool & triad paste applied. Abdominal folds cleansed w/ soap & water & antifungal cream applied. Note placed for MD to consider changing this order to antifungal powder instead. Mepilex dressings in place on abdomen & back of calves. Aquaphor ointment applied to both lower legs.

## 2022-01-06 NOTE — PLAN OF CARE
[place] [facility] DISCHARGE NOTE    Patient discharged to transitional care unit at 3:24 PM via wheel chair. Accompanied by other:  and staff. Discharge instructions reviewed with Radha at [facility] offered to ask questions. Prescriptions sent with patient to fill . All belongings sent with patient.    Shireen Laura RN

## 2022-01-06 NOTE — PROGRESS NOTES
Alert and oriented. Difficult to turn in bed. Multiple open areas on buttocks, upper thigh, left lower leg.  Cleansed with wound cleanser and Triad paste placed on perineal area, upper leg and buttocks. Rash bilateral abd folds, nystatin cream used.   Continues to have diarrhea, incontinence of urine also.   Changing brief every few hours.  Patient declined laying on her left side.   Right shoulder pain, cries out in pain with repositioning, pain in arm, buttocks and legs. Oxycodone given with poor results per patient.   Lower extremities elevated.   Bed alarm on for safety.

## 2022-01-07 ENCOUNTER — LAB REQUISITION (OUTPATIENT)
Dept: LAB | Facility: CLINIC | Age: 67
End: 2022-01-07
Payer: MEDICARE

## 2022-01-07 DIAGNOSIS — U07.1 COVID-19: ICD-10-CM

## 2022-01-07 PROBLEM — M06.4 UNDIFFERENTIATED INFLAMMATORY ARTHRITIS (H): Status: ACTIVE | Noted: 2022-01-07

## 2022-01-07 PROBLEM — N18.30 CHRONIC KIDNEY DISEASE, STAGE 3 UNSPECIFIED (H): Status: ACTIVE | Noted: 2021-01-14

## 2022-01-07 PROBLEM — L89.312 PRESSURE INJURY OF RIGHT BUTTOCK, STAGE 2 (H): Status: ACTIVE | Noted: 2021-01-05

## 2022-01-07 PROBLEM — N17.9 ACUTE KIDNEY FAILURE, UNSPECIFIED (H): Status: ACTIVE | Noted: 2021-05-21

## 2022-01-07 PROBLEM — E03.9 HYPOTHYROIDISM: Status: ACTIVE | Noted: 2021-01-06

## 2022-01-07 PROBLEM — M19.91 PRIMARY OSTEOARTHRITIS: Status: ACTIVE | Noted: 2021-01-06

## 2022-01-07 PROBLEM — E11.21 DIABETIC RENAL DISEASE (H): Status: ACTIVE | Noted: 2021-05-23

## 2022-01-07 LAB — SARS-COV-2 RNA RESP QL NAA+PROBE: POSITIVE

## 2022-01-10 ENCOUNTER — VIRTUAL VISIT (OUTPATIENT)
Dept: GERIATRICS | Facility: CLINIC | Age: 67
End: 2022-01-10
Payer: MEDICARE

## 2022-01-10 VITALS
TEMPERATURE: 97.2 F | HEIGHT: 69 IN | BODY MASS INDEX: 36.82 KG/M2 | HEART RATE: 82 BPM | DIASTOLIC BLOOD PRESSURE: 70 MMHG | SYSTOLIC BLOOD PRESSURE: 105 MMHG | WEIGHT: 248.6 LBS | OXYGEN SATURATION: 99 % | RESPIRATION RATE: 19 BRPM

## 2022-01-10 DIAGNOSIS — L98.491 CHRONIC SKIN ULCER, LIMITED TO BREAKDOWN OF SKIN (H): ICD-10-CM

## 2022-01-10 DIAGNOSIS — M06.4 UNDIFFERENTIATED INFLAMMATORY ARTHRITIS (H): ICD-10-CM

## 2022-01-10 DIAGNOSIS — F11.10 OPIOID ABUSE (H): ICD-10-CM

## 2022-01-10 DIAGNOSIS — E11.628 TYPE 2 DIABETES MELLITUS WITH OTHER SKIN COMPLICATION, WITHOUT LONG-TERM CURRENT USE OF INSULIN (H): ICD-10-CM

## 2022-01-10 DIAGNOSIS — U07.1 INFECTION DUE TO 2019 NOVEL CORONAVIRUS: Primary | ICD-10-CM

## 2022-01-10 DIAGNOSIS — F33.1 MODERATE EPISODE OF RECURRENT MAJOR DEPRESSIVE DISORDER (H): ICD-10-CM

## 2022-01-10 DIAGNOSIS — N18.31 STAGE 3A CHRONIC KIDNEY DISEASE (H): ICD-10-CM

## 2022-01-10 DIAGNOSIS — E66.01 MORBID OBESITY (H): ICD-10-CM

## 2022-01-10 DIAGNOSIS — R62.7 ADULT FAILURE TO THRIVE: ICD-10-CM

## 2022-01-10 DIAGNOSIS — L89.313 PRESSURE INJURY OF RIGHT BUTTOCK, STAGE 3 (H): ICD-10-CM

## 2022-01-10 DIAGNOSIS — L89.313 PRESSURE ULCER OF RIGHT BUTTOCK, STAGE 3 (H): ICD-10-CM

## 2022-01-10 PROCEDURE — 99310 SBSQ NF CARE HIGH MDM 45: CPT | Performed by: NURSE PRACTITIONER

## 2022-01-10 RX ORDER — OXYCODONE HYDROCHLORIDE 5 MG/1
10 TABLET ORAL EVERY 6 HOURS PRN
Qty: 24 TABLET | Refills: 0 | Status: SHIPPED | OUTPATIENT
Start: 2022-01-10 | End: 2022-01-10

## 2022-01-10 RX ORDER — OXYCODONE HYDROCHLORIDE 5 MG/1
5 TABLET ORAL EVERY 4 HOURS PRN
Qty: 60 TABLET | Refills: 0 | Status: SHIPPED | OUTPATIENT
Start: 2022-01-10 | End: 2022-01-17

## 2022-01-10 ASSESSMENT — MIFFLIN-ST. JEOR: SCORE: 1727.02

## 2022-01-10 NOTE — LETTER
"    1/10/2022        RE: Carolina Mari  [address] [address]  [address]  [place] [address]        [facility] Geriatrics Video Visit  Carolina Mari is a 66 year old female who is being evaluated via a billable video visit due to the restrictions of the COVID19 pandemic.The patient has been notified of following: \"This video visit will be conducted via a call between you and your provider. We have found that certain health care needs can be provided without the need for an in-person physical exam.  This service lets us provide the care you need with a video conversation.  If a prescription is necessary we can send it directly to your pharmacy.  If lab work is needed we can place an order for that and you can then stop by our lab to have the test done at a later time. If during the course of the call the provider feels a video visit is not appropriate, you will not be charged for this service.\"     Proivder has received verbal consent for a Video Visit from the patient? Yes    Patient/facility would like the video invitation sent by: Send to e-mail at: [email or web address].    This visit took place virtually, with the patient located at [facility]   ________________________________________________  Video Start Time: 1506  [facility] Admission  PCP & CLINIC: Le Romo MD, [place] [address] / [facility]*  Chief Complaint   Patient presents with     Video Visit     Hospital F/U     [facility] 1/4/2022 - 1/6/2022   [place] MRN: [id number]. Place of Service where encounter took place:  [facility] (TCU/SNF) [4000] Carolina Mari  is a 66 year old  (1955), admitted to the above facility from  [facility]. Hospital stay 1/4/22 through 1/6/22. Admitted to this facility for  rehab, medical management, and nursing care. HPI information obtained from: facility chart records, " facility staff, patient report, [facility] chart review, and Care Everywhere [facility] chart review.     Brief Summary of Hospital Course: Rebecca presented to [facility] on 1/4 w/ weakness, diarrhea, and dehydration.  She was unable to care for herself at home secondary to new onset right shoulder pain. Ortho consulted and no injury found. Notable underlying inflammatory arthritis. She was supported through nursing interventions via skin care, pain control, and hydration until placement for TCU was made.     Updates since admission to transitional care unit: Rebecca presented to TCU on 1/6 s/p the above hospitalization. She was swabbed per protocol upon entry to facility and tested positive for COVID19 via PCR. She is fully vaccinated for COVID19. Today, Rebecca feels ok, and states that she has a little cough but no other COVID sxs. She denies new SOB, sore throat, dizziness, runny nose,. She states her pain is worse since her illness started. She denies constipation, but notes significant ongoing diarrhea (tested negative for CDIFF). She states her main pain is her bottom.    CODE STATUS/ADVANCE DIRECTIVES DISCUSSION: DNR / DNI. Patient's living condition: lives alone. ALLERGIES: Aspirin, Colchicine, and Colcrys PAST MEDICAL HISTORY:  has a past medical history of Diabetes mellitus (H), History of stomach ulcers, HTN (hypertension), Knee osteoarthritis, Osteoarthritis of right hip, Osteoporosis, Osteoporosis, and Thyroid disease.. PAST SURGICAL HISTORY:   has a past surgical history that includes tka; gastric bypass; Foot surgery; SACROPLASTY; joint replacement; hernia repair; Amputate toe(s) (Bilateral); and REMOVAL GALLBLADDER (N/A, 12/25/2016).. FAMILY HISTORY: family history includes Breast Cancer in her mother; Cancer in her brother and mother; Cancer (age of onset: 20.00) in her brother; Coronary Artery Disease in her brother, brother, and father.. SOCIAL HISTORY:   reports that she has never smoked. She has never used  smokeless tobacco. She reports that she does not drink alcohol and does not use drugs.  Post Discharge Medication Reconciliation Status: discharge medications reconciled and changed, per note/orders  Current Outpatient Medications   Medication Sig Dispense Refill     lidocaine patch in PLACE Place 1 patch onto the skin every morning Remove at HS       oxyCODONE (ROXICODONE) 5 MG tablet Take 1 tablet (5 mg) by mouth every 4 hours as needed for severe pain 60 tablet 0     acetaminophen (TYLENOL) 500 MG tablet Take 1,000 mg by mouth 3 times daily        amLODIPine (NORVASC) 5 MG tablet Take 5 mg by mouth At Bedtime       cyanocobalamin (CYANOCOBALAMIN) 1000 MCG/ML injection Inject 1 mL (1,000 mcg) into the muscle every 30 days       diclofenac (VOLTAREN) 1 % topical gel Apply 2 g topically 4 times daily To affected area       famotidine (PEPCID) 20 MG tablet Take 20 mg by mouth daily       FLUoxetine HCl 60 MG TABS Take 1 tablet by mouth every morning        furosemide (LASIX) 20 MG tablet Take 20 mg by mouth daily       gabapentin (NEURONTIN) 600 MG tablet Take 1 tablet by mouth 3 times daily       levothyroxine (SYNTHROID/LEVOTHROID) 200 MCG tablet Take 1 tablet by mouth daily       loperamide (IMODIUM) 2 MG capsule Take 1 capsule (2 mg) by mouth 4 times daily as needed for diarrhea       losartan (COZAAR) 100 MG tablet Take 100 mg by mouth daily       metoprolol tartrate (LOPRESSOR) 100 MG tablet Take 75 mg by mouth 2 times daily       MICRO GUARD 2 % external powder USE AS DIRECTED PER WOUND CARE ORDERS       mineral oil-hydrophilic petrolatum (AQUAPHOR) external ointment USE AS DIRECTED PER WOUND CARE ORDERS       NARCAN 4 MG/0.1ML nasal spray CALL 911. SPR CONTENTS OF ONE SPRAYER (0.1ML) INTO ONE NOSTRIL. REPEAT IN 2-3 MIN IF SYMPTOMS OF OPIOID EMERGENCY PERSIST, ALTERNATE NOSTRILS       Nystatin (NYAMYC) 774868 UNIT/GM POWD Apply topically 2 times daily as needed        oxyCODONE (OXYCONTIN) 20 MG 12 hr tablet  "Take 1 tablet (20 mg) by mouth every 12 hours 24 tablet 0     simvastatin (ZOCOR) 20 MG tablet Take 20 mg by mouth At Bedtime        ROS: 10 point ROS of systems including Constitutional, Eyes, Respiratory, Cardiovascular, Gastroenterology, Genitourinary, Integumentary, Musculoskeletal, Psychiatric were all negative except for pertinent positives noted in my HPI.    Vitals: /70   Pulse 82   Temp 97.2  F (36.2  C)   Resp 19   Ht 1.753 m (5' 9\")   Wt 112.8 kg (248 lb 9.6 oz)   SpO2 99%   BMI 36.71 kg/m    Exam:  GENERAL APPEARANCE: Alert, in no distress, cooperative.   EYES/ENT: EOM normal on camera, hearing acuity [place].  RESP: Respiratory effort appears unlabored over video screen, no respiratory distress apparent on video, On RA.   CV: Edema appears 2+ BLE via video. Color appears pale w/ rubor in BLE and scarring from previous wounds.  ABDOMEN: Appears non-distended.  SKIN: Skin appears baseline w/ video inspection. Several wounds noted, particularly to abdomen and calves, which appear stage 2.         NEURO: CN II-XII at patient's baseline, MCCULLOUGH at baseline on video. Sensation baseline PPS.  PSYCH: Insight, judgement, and memory are baseline w/ forgetfulness, affect and mood are happy/engaged.    Lab/Diagnostic data: Recent labs in [facility] reviewed by me today.     ASSESSMENT/PLAN:    ICD-10-CM Acute on chronic.    1. Infection due to 2019 novel coronavirus  U07.1 Tenuous. Active infection on day 3, fully vaccinated but immunocompromised and failure to thrive. Superimposed on CKD, DM and other chronic conditions.    2. Adult failure to thrive  R62.7 Unstable. This is Rebecca's 3rd stay w/in the last 12 months to TCU. At home, she over medicates herself, becomes weakened, her BGs become uncontrolled, and her wounds become compromised/worsen, sometimes becoming cellulitis.    3. Pressure ulcer of right buttock, stage 3 (H)  L89.313 Tenuous. Appearance of MASD, which with proper nursing care, hygiene, and perhaps " supplementation will heal. Therapy to also review for pressure relieving devices.    4. Chronic skin ulcer, limited to breakdown of skin (H)  L98.491 Tenuous. Leg ulceration is new, will start skin prep and cover w/ mepelex. No evidence of slough.    5. Undifferentiated inflammatory arthritis (H)  M06.4 Ongoing. Appears at baseline, but with COVID may exacerbate. Will continue to monitor.    6. Opioid abuse (H)  F11.10 Unstable. Patient is already asking for more Oxycodone than was prescribed by discharging provider from hospital. Noting prescription needs clarification, and adjuncts are available to help w/ discomfort. Will also consult in house psych.    7. Moderate episode of recurrent major depressive disorder (H)  F33.1 Tenuous. Ongoing. Will consult in house psych and support as she rehabs. Continue w/ Prozac.    8. Type 2 diabetes mellitus with other skin complication, without long-term current use of insulin (H)  E11.628 Ongoing. Controlled. Continue to monitor w/ COVID infection. Will obtain labs, including a1c.    9. Stage 3a chronic kidney disease (H)  N18.31 Ongoing. Controlled. Will trend w/ blood work as noted below.    10. Morbid obesity (H)  E66.01 Ongoing, and effecting skin integrity. Wound care as noted below.     Follow up w/in 1 week or as needed.    Orders:  1. All Wounds: Clean/pat dry, skin prep, cover w/ mepelex, change every other day.  2. Change PRN Oxycodone to 5mg PO Q4h PRN. Dx: severe pain.  3. CBC, BMP x1 on 1/13. Dx: weakness.   4. a1c + TSH x1 on 1/13. Dx: weakness.   5. Decrease Metoprolol to 75mg PO BID. Dx: HTN.  6. In house psych x1. Dx: substance use disorder.   7. Decrease Pepcid to 20mg PO every day. Dx: GERD.  8. Accu checks QAM x 7 days. Dx: DM II.   9. Change Norvasc to HS dosing. Dx: HTN.   10. Discontinue Flexeril.   11. Lidocaine patch, 5%, 1 patch (shoulders/back/neck), on in AM, off at HS. Dx: chronic pain.     Video-Visit Details  Type of service:  Video Visit  Video  Start Time: 1506  Video End Time (time video stopped): 1517  Originating Location (pt. Location): TCU  Distant Location (provider location):  [facility]   Mode of Communication:  Video Conference.    Electronically signed by:  RACHEL Alvarez CNP DNP                    Sincerely,        RACHEL Martin CNP

## 2022-01-10 NOTE — PROGRESS NOTES
"[facility] Geriatrics Video Visit  Carolina Mari is a 66 year old female who is being evaluated via a billable video visit due to the restrictions of the COVID19 pandemic.The patient has been notified of following: \"This video visit will be conducted via a call between you and your provider. We have found that certain health care needs can be provided without the need for an in-person physical exam.  This service lets us provide the care you need with a video conversation.  If a prescription is necessary we can send it directly to your pharmacy.  If lab work is needed we can place an order for that and you can then stop by our lab to have the test done at a later time. If during the course of the call the provider feels a video visit is not appropriate, you will not be charged for this service.\"     Proivder has received verbal consent for a Video Visit from the patient? Yes    Patient/facility would like the video invitation sent by: Send to e-mail at: [email or web address].    This visit took place virtually, with the patient located at [facility]   ________________________________________________  Video Start Time: 1506  [facility]  PCP & CLINIC: Le Romo MD, [facility] [address] [address] / [facility]*  Chief Complaint   Patient presents with     Video Visit     [facility] F/U     [facility] 1/4/2022 - 1/6/2022   [place] MRN: [id number]. Place of Service where encounter took place:  [facility] (TCU/SNF) [4000] Carolina Mari  is a 66 year old  (1955), admitted to the above facility from  [facility]. Hospital stay 1/4/22 through 1/6/22. Admitted to this facility for  rehab, medical management, and nursing care. HPI information obtained from: facility chart records, facility staff, patient report, [place] Epic chart review, and Care Everywhere Epic chart review.     Brief " Summary of Hospital Course: Rebecca presented to [facility] on 1/4 w/ weakness, diarrhea, and dehydration.  She was unable to care for herself at home secondary to new onset right shoulder pain. Ortho consulted and no injury found. Notable underlying inflammatory arthritis. She was supported through nursing interventions via skin care, pain control, and hydration until placement for TCU was made.     Updates since admission to transitional care unit: Rebecca presented to TCU on 1/6 s/p the above hospitalization. She was swabbed per protocol upon entry to facility and tested positive for COVID19 via PCR. She is fully vaccinated for COVID19. Today, Rebecca feels ok, and states that she has a little cough but no other COVID sxs. She denies new SOB, sore throat, dizziness, runny nose,. She states her pain is worse since her illness started. She denies constipation, but notes significant ongoing diarrhea (tested negative for CDIFF). She states her main pain is her bottom.    CODE STATUS/ADVANCE DIRECTIVES DISCUSSION: DNR / DNI. Patient's living condition: lives alone. ALLERGIES: Aspirin, Colchicine, and Colcrys PAST MEDICAL HISTORY:  has a past medical history of Diabetes mellitus (H), History of stomach ulcers, HTN (hypertension), Knee osteoarthritis, Osteoarthritis of right hip, Osteoporosis, Osteoporosis, and Thyroid disease.. PAST SURGICAL HISTORY:   has a past surgical history that includes tka; gastric bypass; Foot surgery; SACROPLASTY; joint replacement; hernia repair; Amputate toe(s) (Bilateral); and REMOVAL GALLBLADDER (N/A, 12/25/2016).. FAMILY HISTORY: family history includes Breast Cancer in her mother; Cancer in her brother and mother; Cancer (age of onset: 20.00) in her brother; Coronary Artery Disease in her brother, brother, and father.. SOCIAL HISTORY:   reports that she has never smoked. She has never used smokeless tobacco. She reports that she does not drink alcohol and does not use drugs.  Post Discharge Medication  Reconciliation Status: discharge medications reconciled and changed, per note/orders  Current Outpatient Medications   Medication Sig Dispense Refill     lidocaine patch in PLACE Place 1 patch onto the skin every morning Remove at HS       oxyCODONE (ROXICODONE) 5 MG tablet Take 1 tablet (5 mg) by mouth every 4 hours as needed for severe pain 60 tablet 0     acetaminophen (TYLENOL) 500 MG tablet Take 1,000 mg by mouth 3 times daily        amLODIPine (NORVASC) 5 MG tablet Take 5 mg by mouth At Bedtime       cyanocobalamin (CYANOCOBALAMIN) 1000 MCG/ML injection Inject 1 mL (1,000 mcg) into the muscle every 30 days       diclofenac (VOLTAREN) 1 % topical gel Apply 2 g topically 4 times daily To affected area       famotidine (PEPCID) 20 MG tablet Take 20 mg by mouth daily       FLUoxetine HCl 60 MG TABS Take 1 tablet by mouth every morning        furosemide (LASIX) 20 MG tablet Take 20 mg by mouth daily       gabapentin (NEURONTIN) 600 MG tablet Take 1 tablet by mouth 3 times daily       levothyroxine (SYNTHROID/LEVOTHROID) 200 MCG tablet Take 1 tablet by mouth daily       loperamide (IMODIUM) 2 MG capsule Take 1 capsule (2 mg) by mouth 4 times daily as needed for diarrhea       losartan (COZAAR) 100 MG tablet Take 100 mg by mouth daily       metoprolol tartrate (LOPRESSOR) 100 MG tablet Take 75 mg by mouth 2 times daily       MICRO GUARD 2 % external powder USE AS DIRECTED PER WOUND CARE ORDERS       mineral oil-hydrophilic petrolatum (AQUAPHOR) external ointment USE AS DIRECTED PER WOUND CARE ORDERS       NARCAN 4 MG/0.1ML nasal spray CALL 911. SPR CONTENTS OF ONE SPRAYER (0.1ML) INTO ONE NOSTRIL. REPEAT IN 2-3 MIN IF SYMPTOMS OF OPIOID EMERGENCY PERSIST, ALTERNATE NOSTRILS       Nystatin (NYAMYC) 861567 UNIT/GM POWD Apply topically 2 times daily as needed        oxyCODONE (OXYCONTIN) 20 MG 12 hr tablet Take 1 tablet (20 mg) by mouth every 12 hours 24 tablet 0     simvastatin (ZOCOR) 20 MG tablet Take 20 mg by mouth  "At Bedtime        ROS: 10 point ROS of systems including Constitutional, Eyes, Respiratory, Cardiovascular, Gastroenterology, Genitourinary, Integumentary, Musculoskeletal, Psychiatric were all negative except for pertinent positives noted in my HPI.    Vitals: /70   Pulse 82   Temp 97.2  F (36.2  C)   Resp 19   Ht 1.753 m (5' 9\")   Wt 112.8 kg (248 lb 9.6 oz)   SpO2 99%   BMI 36.71 kg/m    Exam:  GENERAL APPEARANCE: Alert, in no distress, cooperative.   EYES/ENT: EOM normal on camera, hearing acuity [place].  RESP: Respiratory effort appears unlabored over video screen, no respiratory distress apparent on video, On RA.   CV: Edema appears 2+ BLE via video. Color appears pale w/ rubor in BLE and scarring from previous wounds.  ABDOMEN: Appears non-distended.  SKIN: Skin appears baseline w/ video inspection. Several wounds noted, particularly to abdomen and calves, which appear stage 2.         NEURO: CN II-XII at patient's baseline, MCCULLOUGH at baseline on video. Sensation baseline PPS.  PSYCH: Insight, judgement, and memory are baseline w/ forgetfulness, affect and mood are happy/engaged.    Lab/Diagnostic data: Recent labs in [facility] reviewed by me today.     ASSESSMENT/PLAN:    ICD-10-CM Acute on chronic.    1. Infection due to 2019 novel coronavirus  U07.1 Tenuous. Active infection on day 3, fully vaccinated but immunocompromised and failure to thrive. Superimposed on CKD, DM and other chronic conditions.    2. Adult failure to thrive  R62.7 Unstable. This is Rebecca's 3rd stay w/in the last 12 months to TCU. At home, she over medicates herself, becomes weakened, her BGs become uncontrolled, and her wounds become compromised/worsen, sometimes becoming cellulitis.    3. Pressure ulcer of right buttock, stage 3 (H)  L89.313 Tenuous. Appearance of MASD, which with proper nursing care, hygiene, and perhaps supplementation will heal. Therapy to also review for pressure relieving devices.    4. Chronic skin ulcer, " limited to breakdown of skin (H)  L98.491 Tenuous. Leg ulceration is new, will start skin prep and cover w/ mepelex. No evidence of slough.    5. Undifferentiated inflammatory arthritis (H)  M06.4 Ongoing. Appears at baseline, but with COVID may exacerbate. Will continue to monitor.    6. Opioid abuse (H)  F11.10 Unstable. Patient is already asking for more Oxycodone than was prescribed by discharging provider from hospital. Noting prescription needs clarification, and adjuncts are available to help w/ discomfort. Will also consult in house psych.    7. Moderate episode of recurrent major depressive disorder (H)  F33.1 Tenuous. Ongoing. Will consult in house psych and support as she rehabs. Continue w/ Prozac.    8. Type 2 diabetes mellitus with other skin complication, without long-term current use of insulin (H)  E11.628 Ongoing. Controlled. Continue to monitor w/ COVID infection. Will obtain labs, including a1c.    9. Stage 3a chronic kidney disease (H)  N18.31 Ongoing. Controlled. Will trend w/ blood work as noted below.    10. Morbid obesity (H)  E66.01 Ongoing, and effecting skin integrity. Wound care as noted below.     Follow up w/in 1 week or as needed.    Orders:  1. All Wounds: Clean/pat dry, skin prep, cover w/ mepelex, change every other day.  2. Change PRN Oxycodone to 5mg PO Q4h PRN. Dx: severe pain.  3. CBC, BMP x1 on 1/13. Dx: weakness.   4. a1c + TSH x1 on 1/13. Dx: weakness.   5. Decrease Metoprolol to 75mg PO BID. Dx: HTN.  6. In house psych x1. Dx: substance use disorder.   7. Decrease Pepcid to 20mg PO every day. Dx: GERD.  8. Accu checks QAM x 7 days. Dx: DM II.   9. Change Norvasc to HS dosing. Dx: HTN.   10. Discontinue Flexeril.   11. Lidocaine patch, 5%, 1 patch (shoulders/back/neck), on in AM, off at HS. Dx: chronic pain.     Video-Visit Details  Type of service:  Video Visit  Video Start Time: 1506  Video End Time (time video stopped): 1517  Originating Location (pt. Location):  TCU  Distant Location (provider location):  [facility]   Mode of Communication:  Video Conference.    Electronically signed by:  Dr. Adilene Aguayo, RACHEL CNP DNP

## 2022-01-12 ENCOUNTER — LAB REQUISITION (OUTPATIENT)
Dept: LAB | Facility: CLINIC | Age: 67
End: 2022-01-12
Payer: MEDICARE

## 2022-01-12 DIAGNOSIS — E11.9 TYPE 2 DIABETES MELLITUS WITHOUT COMPLICATIONS (H): ICD-10-CM

## 2022-01-12 DIAGNOSIS — R53.1 WEAKNESS: ICD-10-CM

## 2022-01-13 LAB
ANION GAP SERPL CALCULATED.3IONS-SCNC: 11 MMOL/L (ref 5–18)
BUN SERPL-MCNC: 13 MG/DL (ref 8–22)
CALCIUM SERPL-MCNC: 9 MG/DL (ref 8.5–10.5)
CHLORIDE BLD-SCNC: 104 MMOL/L (ref 98–107)
CO2 SERPL-SCNC: 22 MMOL/L (ref 22–31)
CREAT SERPL-MCNC: 0.98 MG/DL (ref 0.6–1.1)
ERYTHROCYTE [DISTWIDTH] IN BLOOD BY AUTOMATED COUNT: 16.5 % (ref 10–15)
GFR SERPL CREATININE-BSD FRML MDRD: 63 ML/MIN/1.73M2
GLUCOSE BLD-MCNC: 77 MG/DL (ref 70–125)
HBA1C MFR BLD: 5.2 %
HCT VFR BLD AUTO: 34.4 % (ref 35–47)
HGB BLD-MCNC: 10.1 G/DL (ref 11.7–15.7)
MCH RBC QN AUTO: 27 PG (ref 26.5–33)
MCHC RBC AUTO-ENTMCNC: 29.4 G/DL (ref 31.5–36.5)
MCV RBC AUTO: 92 FL (ref 78–100)
PLATELET # BLD AUTO: 408 10E3/UL (ref 150–450)
POTASSIUM BLD-SCNC: 4.3 MMOL/L (ref 3.5–5)
RBC # BLD AUTO: 3.74 10E6/UL (ref 3.8–5.2)
SODIUM SERPL-SCNC: 137 MMOL/L (ref 136–145)
TSH SERPL DL<=0.005 MIU/L-ACNC: 13.9 UIU/ML (ref 0.3–5)
WBC # BLD AUTO: 8.2 10E3/UL (ref 4–11)

## 2022-01-13 PROCEDURE — P9604 ONE-WAY ALLOW PRORATED TRIP: HCPCS | Performed by: FAMILY MEDICINE

## 2022-01-13 PROCEDURE — 83036 HEMOGLOBIN GLYCOSYLATED A1C: CPT | Performed by: FAMILY MEDICINE

## 2022-01-13 PROCEDURE — 85027 COMPLETE CBC AUTOMATED: CPT | Performed by: FAMILY MEDICINE

## 2022-01-13 PROCEDURE — 36415 COLL VENOUS BLD VENIPUNCTURE: CPT | Performed by: FAMILY MEDICINE

## 2022-01-13 PROCEDURE — 80048 BASIC METABOLIC PNL TOTAL CA: CPT | Performed by: FAMILY MEDICINE

## 2022-01-13 PROCEDURE — 84443 ASSAY THYROID STIM HORMONE: CPT | Performed by: FAMILY MEDICINE

## 2022-01-14 NOTE — ED NOTES
Delay on IV antibotics, UA collection difficult, pt refusing catheter, obtaining 2nd set of blood cultures, lab is at bedside   Problem: Infection  Goal: Absence of Infection Signs and Symptoms  Outcome: Declining   Wound cultures resulted with multiple organisms. PICC line ordered per ID and placed.  Remains on IV Vanco and Zosyn.  MRI of LLE ordered per Dr. Wiley's team, time of completion TBD

## 2022-01-17 ENCOUNTER — TRANSITIONAL CARE UNIT VISIT (OUTPATIENT)
Dept: GERIATRICS | Facility: CLINIC | Age: 67
End: 2022-01-17
Payer: MEDICARE

## 2022-01-17 VITALS
BODY MASS INDEX: 36.82 KG/M2 | HEIGHT: 69 IN | WEIGHT: 248.6 LBS | DIASTOLIC BLOOD PRESSURE: 65 MMHG | OXYGEN SATURATION: 98 % | TEMPERATURE: 98 F | SYSTOLIC BLOOD PRESSURE: 128 MMHG | HEART RATE: 58 BPM | RESPIRATION RATE: 14 BRPM

## 2022-01-17 DIAGNOSIS — M06.4 UNDIFFERENTIATED INFLAMMATORY ARTHRITIS (H): ICD-10-CM

## 2022-01-17 DIAGNOSIS — I10 ESSENTIAL HYPERTENSION: ICD-10-CM

## 2022-01-17 DIAGNOSIS — E66.01 CLASS 2 SEVERE OBESITY DUE TO EXCESS CALORIES WITH SERIOUS COMORBIDITY AND BODY MASS INDEX (BMI) OF 37.0 TO 37.9 IN ADULT (H): ICD-10-CM

## 2022-01-17 DIAGNOSIS — R62.7 ADULT FAILURE TO THRIVE: Primary | ICD-10-CM

## 2022-01-17 DIAGNOSIS — E11.9 DIET-CONTROLLED DIABETES MELLITUS (H): ICD-10-CM

## 2022-01-17 DIAGNOSIS — M25.511 ACUTE PAIN OF RIGHT SHOULDER: ICD-10-CM

## 2022-01-17 DIAGNOSIS — E66.812 CLASS 2 SEVERE OBESITY DUE TO EXCESS CALORIES WITH SERIOUS COMORBIDITY AND BODY MASS INDEX (BMI) OF 37.0 TO 37.9 IN ADULT (H): ICD-10-CM

## 2022-01-17 DIAGNOSIS — F33.1 MODERATE EPISODE OF RECURRENT MAJOR DEPRESSIVE DISORDER (H): ICD-10-CM

## 2022-01-17 DIAGNOSIS — D63.8 ANEMIA IN OTHER CHRONIC DISEASES CLASSIFIED ELSEWHERE: ICD-10-CM

## 2022-01-17 DIAGNOSIS — U07.1 INFECTION DUE TO 2019 NOVEL CORONAVIRUS: ICD-10-CM

## 2022-01-17 PROBLEM — I44.0 FIRST DEGREE ATRIOVENTRICULAR BLOCK: Status: ACTIVE | Noted: 2022-01-17

## 2022-01-17 PROBLEM — M25.519 SHOULDER JOINT PAIN: Status: ACTIVE | Noted: 2022-01-17

## 2022-01-17 PROCEDURE — 99305 1ST NF CARE MODERATE MDM 35: CPT | Performed by: FAMILY MEDICINE

## 2022-01-17 RX ORDER — OXYCODONE HCL 20 MG/1
20 TABLET, FILM COATED, EXTENDED RELEASE ORAL EVERY 12 HOURS
Qty: 20 TABLET | Refills: 0 | Status: SHIPPED | OUTPATIENT
Start: 2022-01-17 | End: 2022-01-24

## 2022-01-17 RX ORDER — OXYCODONE HYDROCHLORIDE 5 MG/1
5 TABLET ORAL EVERY 4 HOURS PRN
Qty: 30 TABLET | Refills: 0 | Status: SHIPPED | OUTPATIENT
Start: 2022-01-17 | End: 2022-01-24

## 2022-01-17 ASSESSMENT — MIFFLIN-ST. JEOR: SCORE: 1727.02

## 2022-01-17 NOTE — PROGRESS NOTES
"[place] [facility]  PRIMARY CARE PROVIDER AND CLINIC:  Le Romo MD, [facility], [place] [address] / [facility]*  Chief Complaint   Patient presents with     Hospital F/U     [place] Medical Record Number:  [id number]  Place of Service where encounter took place:  [facility] (TCU/SNF) [4000]    Carolina Mari  is a 66 year old  (1955), admitted to the above facility from  [facility]. Hospital stay 1/4/22 through 1/6/22..  Admitted to this facility for  rehab, medical management and nursing care.    HPI:    HPI information obtained from: facility chart records, facility staff, patient report and [facility] chart review.   Brief Summary of Hospital Course:   Pt with hx of pressure injury c/w infection requried  Abx treatment, recurrent hospitalization and TCU placement, hospitalied with dehydration 2/2 diarrhea, generalized weakness, and acute right shoulder pain (found to have arthritis). Evaluated by PRM and felt to benefit from admission to TCU given AFTT syndrome.       Today:  - AFTT: reports appetite is very good.   - rehab: reports it is going fine \" I am doing it\", able to stand ups and down with one A1C and belt.   - COVID 19: nurse manager reports pt is out of precaution as of today. Denies any symptoms.   - Diarrhea: reports BM is fine. Endorses chronic diarrhea, and takes imodium  - right shoulder pain\" It was really sore when I came here, but it is getting better, they putting patch on me\"   ===========================================    CODE STATUS/ADVANCE DIRECTIVES DISCUSSION:   DNR, Comfort focused cares  Patient's living condition: lives alone  ALLERGIES: Aspirin, Colchicine, and Colcrys  PAST MEDICAL HISTORY:  has a past medical history of Diabetes mellitus (H), History of stomach ulcers, HTN (hypertension), Knee osteoarthritis, Osteoarthritis of right hip, Osteoporosis, Osteoporosis, and Thyroid disease.  PAST " SURGICAL HISTORY:   has a past surgical history that includes tka; gastric bypass; Foot surgery; SACROPLASTY; joint replacement; hernia repair; Amputate toe(s) (Bilateral); and REMOVAL GALLBLADDER (N/A, 12/25/2016).  FAMILY HISTORY: family history includes Breast Cancer in her mother; Cancer in her brother and mother; Cancer (age of onset: 20.00) in her brother; Coronary Artery Disease in her brother, brother, and father.  SOCIAL HISTORY:   reports that she has never smoked. She has never used smokeless tobacco. She reports that she does not drink alcohol and does not use drugs.    Post Discharge Medication Reconciliation Status: discharge medications reconciled and changed, per note/orders    Current Outpatient Medications   Medication Sig Dispense Refill     acetaminophen (TYLENOL) 500 MG tablet Take 1,000 mg by mouth 3 times daily        amLODIPine (NORVASC) 5 MG tablet Take 5 mg by mouth At Bedtime       cyanocobalamin (CYANOCOBALAMIN) 1000 MCG/ML injection Inject 1 mL (1,000 mcg) into the muscle every 30 days       diclofenac (VOLTAREN) 1 % topical gel Apply 2 g topically 4 times daily To affected area       famotidine (PEPCID) 20 MG tablet Take 20 mg by mouth daily       FLUoxetine HCl 60 MG TABS Take 1 tablet by mouth every morning        furosemide (LASIX) 20 MG tablet Take 20 mg by mouth daily       gabapentin (NEURONTIN) 600 MG tablet Take 1 tablet by mouth 3 times daily       levothyroxine (SYNTHROID/LEVOTHROID) 200 MCG tablet Take 1 tablet by mouth daily       lidocaine patch in PLACE Place 1 patch onto the skin every morning Remove at HS       loperamide (IMODIUM) 2 MG capsule Take 1 capsule (2 mg) by mouth 4 times daily as needed for diarrhea       losartan (COZAAR) 100 MG tablet Take 100 mg by mouth daily       metoprolol tartrate (LOPRESSOR) 100 MG tablet Take 75 mg by mouth 2 times daily       MICRO GUARD 2 % external powder USE AS DIRECTED PER WOUND CARE ORDERS       mineral oil-hydrophilic  "petrolatum (AQUAPHOR) external ointment USE AS DIRECTED PER WOUND CARE ORDERS       NARCAN 4 MG/0.1ML nasal spray CALL 911. SPR CONTENTS OF ONE SPRAYER (0.1ML) INTO ONE NOSTRIL. REPEAT IN 2-3 MIN IF SYMPTOMS OF OPIOID EMERGENCY PERSIST, ALTERNATE NOSTRILS       Nystatin (NYAMYC) 770084 UNIT/GM POWD Apply topically 2 times daily as needed        oxyCODONE (OXYCONTIN) 20 MG 12 hr tablet Take 1 tablet (20 mg) by mouth every 12 hours 24 tablet 0     oxyCODONE (ROXICODONE) 5 MG tablet Take 1 tablet (5 mg) by mouth every 4 hours as needed for severe pain 60 tablet 0     simvastatin (ZOCOR) 20 MG tablet Take 20 mg by mouth At Bedtime        ROS: 10 point ROS of systems including Constitutional, Eyes, Respiratory, Cardiovascular, Gastroenterology, Genitourinary, Integumentary, Musculoskeletal, Psychiatric were all negative except for pertinent positives noted in my HPI.    Vitals:  /65   Pulse 58   Temp 98  F (36.7  C)   Resp 14   Ht 1.753 m (5' 9\")   Wt 112.8 kg (248 lb 9.6 oz)   SpO2 98%   BMI 36.71 kg/m    Exam:  GENERAL APPEARANCE:  in no distress, cooperative  ENT:  Mouth and posterior oropharynx normal, moist mucous membranes, oral mucosa moist, no lesion noted.   EYES:  EOMI, Pupil rounded and equal.  RESP:  lungs clear to auscultation   CV:  S1S2 audible, regular HR, no murmur appreciated.   ABDOMEN:  soft, NT/ND, BS audible. no mass appreciated on palpation.   M/S:   no joint deformity noted on observation.   SKIN:  No rash.   NEURO:   No NFD appreciated on observation. Hand  5/5 b/l  PSYCH:  normal insight, judgement and memory, affect and mood normal      Lab/Diagnostic data: Reviewed in the chart and EHR.        ASSESSMENT/PLAN:  ----------------------------  Adult failure to thrive syndrome  Recurrent hospitalization and TCU placement  Right shoulder acute pain, query rotator cuff injury  Wheel chair bound  - analgesia optimal  - started rehab program, making a progress, continue until desired " goal is achieved.       COVID 19 infection: detected upon admission to TCU, fully vaccinated. Asymptomatic, and today completed isolation precaution.     Diet Controlled diabetes (H): HBA1C 5.2%        Class 2 severe obesity due to excess calories with serious comorbidity and body mass index (BMI) 36.71 kg/m  in adult (H)  - *BMI has dropped 2% since last Summer.   - counseled to reduce carbs.       Moderate episode of recurrent major depressive disorder (H):  adjusting well. No concern.       Essential HTN: controlled. No concern      Recent dehydration and hypokalemia 2/2 infectious diarrhea in setting of chronic diarrhea: po intake is appropriate.       Chronic Anemia, normocytic: Hb 10.1 mg/dl.  Asymptomatic.       Electronically signed by:  Ariana Bose MD

## 2022-01-17 NOTE — LETTER
"    1/17/2022        RE: Carolina Mari  [address]  [address]  [place] [address]        [place] [facility]  PRIMARY CARE PROVIDER AND CLINIC:  Le Romo MD, [facility] [address] / [facility]*  Chief Complaint   Patient presents with     Hospital F/U     [place] Medical Record Number:  [id number]  Place of Service where encounter took place:  [facility] (TCU/SNF) [4000]    Carolina Mari  is a 66 year old  (1955), admitted to the above facility from  [facility]. Hospital stay 1/4/22 through 1/6/22..  Admitted to this facility for  rehab, medical management and nursing care.    HPI:    HPI information obtained from: facility chart records, facility staff, patient report and [facility] chart review.   Brief Summary of Hospital Course:   Pt with hx of pressure injury c/w infection requried  Abx treatment, recurrent hospitalization and TCU placement, hospitalied with dehydration 2/2 diarrhea, generalized weakness, and acute right shoulder pain (found to have arthritis). Evaluated by PRM and felt to benefit from admission to TCU given AFTT syndrome.       Today:  - AFTT: reports appetite is very good.   - rehab: reports it is going fine \" I am doing it\", able to stand ups and down with one A1C and belt.   - COVID 19: nurse manager reports pt is out of precaution as of today. Denies any symptoms.   - Diarrhea: reports BM is fine. Endorses chronic diarrhea, and takes imodium  - right shoulder pain\" It was really sore when I came here, but it is getting better, they putting patch on me\"   ===========================================    CODE STATUS/ADVANCE DIRECTIVES DISCUSSION:   DNR, Comfort focused cares  Patient's living condition: lives alone  ALLERGIES: Aspirin, Colchicine, and Colcrys  PAST MEDICAL HISTORY:  has a past medical history of Diabetes mellitus (H), History of stomach ulcers, HTN " (hypertension), Knee osteoarthritis, Osteoarthritis of right hip, Osteoporosis, Osteoporosis, and Thyroid disease.  PAST SURGICAL HISTORY:   has a past surgical history that includes tka; gastric bypass; Foot surgery; SACROPLASTY; joint replacement; hernia repair; Amputate toe(s) (Bilateral); and REMOVAL GALLBLADDER (N/A, 12/25/2016).  FAMILY HISTORY: family history includes Breast Cancer in her mother; Cancer in her brother and mother; Cancer (age of onset: 20.00) in her brother; Coronary Artery Disease in her brother, brother, and father.  SOCIAL HISTORY:   reports that she has never smoked. She has never used smokeless tobacco. She reports that she does not drink alcohol and does not use drugs.    Post Discharge Medication Reconciliation Status: discharge medications reconciled and changed, per note/orders    Current Outpatient Medications   Medication Sig Dispense Refill     acetaminophen (TYLENOL) 500 MG tablet Take 1,000 mg by mouth 3 times daily        amLODIPine (NORVASC) 5 MG tablet Take 5 mg by mouth At Bedtime       cyanocobalamin (CYANOCOBALAMIN) 1000 MCG/ML injection Inject 1 mL (1,000 mcg) into the muscle every 30 days       diclofenac (VOLTAREN) 1 % topical gel Apply 2 g topically 4 times daily To affected area       famotidine (PEPCID) 20 MG tablet Take 20 mg by mouth daily       FLUoxetine HCl 60 MG TABS Take 1 tablet by mouth every morning        furosemide (LASIX) 20 MG tablet Take 20 mg by mouth daily       gabapentin (NEURONTIN) 600 MG tablet Take 1 tablet by mouth 3 times daily       levothyroxine (SYNTHROID/LEVOTHROID) 200 MCG tablet Take 1 tablet by mouth daily       lidocaine patch in PLACE Place 1 patch onto the skin every morning Remove at HS       loperamide (IMODIUM) 2 MG capsule Take 1 capsule (2 mg) by mouth 4 times daily as needed for diarrhea       losartan (COZAAR) 100 MG tablet Take 100 mg by mouth daily       metoprolol tartrate (LOPRESSOR) 100 MG tablet Take 75 mg by mouth 2  "times daily       MICRO GUARD 2 % external powder USE AS DIRECTED PER WOUND CARE ORDERS       mineral oil-hydrophilic petrolatum (AQUAPHOR) external ointment USE AS DIRECTED PER WOUND CARE ORDERS       NARCAN 4 MG/0.1ML nasal spray CALL 911. SPR CONTENTS OF ONE SPRAYER (0.1ML) INTO ONE NOSTRIL. REPEAT IN 2-3 MIN IF SYMPTOMS OF OPIOID EMERGENCY PERSIST, ALTERNATE NOSTRILS       Nystatin (NYAMYC) 283632 UNIT/GM POWD Apply topically 2 times daily as needed        oxyCODONE (OXYCONTIN) 20 MG 12 hr tablet Take 1 tablet (20 mg) by mouth every 12 hours 24 tablet 0     oxyCODONE (ROXICODONE) 5 MG tablet Take 1 tablet (5 mg) by mouth every 4 hours as needed for severe pain 60 tablet 0     simvastatin (ZOCOR) 20 MG tablet Take 20 mg by mouth At Bedtime        ROS: 10 point ROS of systems including Constitutional, Eyes, Respiratory, Cardiovascular, Gastroenterology, Genitourinary, Integumentary, Musculoskeletal, Psychiatric were all negative except for pertinent positives noted in my HPI.    Vitals:  /65   Pulse 58   Temp 98  F (36.7  C)   Resp 14   Ht 1.753 m (5' 9\")   Wt 112.8 kg (248 lb 9.6 oz)   SpO2 98%   BMI 36.71 kg/m    Exam:  GENERAL APPEARANCE:  in no distress, cooperative  ENT:  Mouth and posterior oropharynx normal, moist mucous membranes, oral mucosa moist, no lesion noted.   EYES:  EOMI, Pupil rounded and equal.  RESP:  lungs clear to auscultation   CV:  S1S2 audible, regular HR, no murmur appreciated.   ABDOMEN:  soft, NT/ND, BS audible. no mass appreciated on palpation.   M/S:   no joint deformity noted on observation.   SKIN:  No rash.   NEURO:   No NFD appreciated on observation. Hand  5/5 b/l  PSYCH:  normal insight, judgement and memory, affect and mood normal      Lab/Diagnostic data: Reviewed in the chart and EHR.        ASSESSMENT/PLAN:  ----------------------------  Adult failure to thrive syndrome  Recurrent hospitalization and TCU placement  Right shoulder acute pain, query rotator " cuff injury  Wheel chair bound  - analgesia optimal  - started rehab program, making a progress, continue until desired goal is achieved.       COVID 19 infection: detected upon admission to TCU, fully vaccinated. Asymptomatic, and today completed isolation precaution.     Diet Controlled diabetes (H): HBA1C 5.2%        Class 2 severe obesity due to excess calories with serious comorbidity and body mass index (BMI) 36.71 kg/m  in adult (H)  - *BMI has dropped 2% since last Summer.   - counseled to reduce carbs.       Moderate episode of recurrent major depressive disorder (H):  adjusting well. No concern.       Essential HTN: controlled. No concern      Recent dehydration and hypokalemia 2/2 infectious diarrhea in setting of chronic diarrhea: po intake is appropriate.       Chronic Anemia, normocytic: Hb 10.1 mg/dl.  Asymptomatic.       Electronically signed by:  Ariana Bose MD         Sincerely,        Ariana Bose MD

## 2022-01-24 ENCOUNTER — TRANSITIONAL CARE UNIT VISIT (OUTPATIENT)
Dept: GERIATRICS | Facility: CLINIC | Age: 67
End: 2022-01-24
Payer: MEDICARE

## 2022-01-24 VITALS
SYSTOLIC BLOOD PRESSURE: 114 MMHG | HEIGHT: 69 IN | TEMPERATURE: 98 F | HEART RATE: 58 BPM | DIASTOLIC BLOOD PRESSURE: 66 MMHG | WEIGHT: 266.3 LBS | BODY MASS INDEX: 39.44 KG/M2 | RESPIRATION RATE: 14 BRPM | OXYGEN SATURATION: 98 %

## 2022-01-24 DIAGNOSIS — L03.119 CELLULITIS AND ABSCESS OF LEG: ICD-10-CM

## 2022-01-24 DIAGNOSIS — G89.29 OTHER CHRONIC PAIN: Primary | ICD-10-CM

## 2022-01-24 DIAGNOSIS — L02.419 CELLULITIS AND ABSCESS OF LEG: ICD-10-CM

## 2022-01-24 DIAGNOSIS — F11.90 CHRONIC, CONTINUOUS USE OF OPIOIDS: ICD-10-CM

## 2022-01-24 PROCEDURE — 99309 SBSQ NF CARE MODERATE MDM 30: CPT | Performed by: NURSE PRACTITIONER

## 2022-01-24 RX ORDER — OXYCODONE HCL 20 MG/1
20 TABLET, FILM COATED, EXTENDED RELEASE ORAL EVERY 12 HOURS
Qty: 20 TABLET | Refills: 0 | Status: SHIPPED | OUTPATIENT
Start: 2022-01-24 | End: 2022-02-07

## 2022-01-24 RX ORDER — OXYCODONE HYDROCHLORIDE 5 MG/1
5 TABLET ORAL EVERY 4 HOURS PRN
Qty: 30 TABLET | Refills: 0 | Status: SHIPPED | OUTPATIENT
Start: 2022-01-24 | End: 2022-01-28

## 2022-01-24 ASSESSMENT — MIFFLIN-ST. JEOR: SCORE: 1807.31

## 2022-01-24 NOTE — LETTER
"    1/24/2022        RE: Carolina Mair  [address]  [address]  [place] [address]        [facility]  [place] Medical Record Number: [id number]  Chief Complaint   Patient presents with     RECHECK     HPI: Carolina Mari is a 66 year old (1955), who is being seen today for an episodic care visit at: [facility] (U/SNF) [4000]. Today's concern is: increased weeping, edema BLE, concern for green drainage from some spots on LLE    Allergies and PMH/PSH reviewed in [facility] today.    REVIEW OF SYSTEMS:  4 point ROS including Respiratory, CV, GI and , other than that noted in the HPI,  is negative    Objective:   /66   Pulse 58   Temp 98  F (36.7  C)   Resp 14   Ht 1.753 m (5' 9\")   Wt 120.8 kg (266 lb 4.8 oz)   SpO2 98%   BMI 39.33 kg/m    Exam:  GENERAL APPEARANCE:  Alert, in no distress, morbidly obese, sitting in WC, appears comfortable  SKIN:  see photos.  BLE edemetous, weeping clear fluid. black compression socks with velcro wraps.  2 quarter sized areas on posterior LLE draining green thick drainage  PSYCH:  affect and mood direct, expressing wishes strongly      Posterior LLE    Close up posterior LLE draining          Labs:   Recent labs in [facility] reviewed by me today.     Assessment/Plan:  (G89.29) Other chronic pain  (primary encounter diagnosis)  (F11.90) Chronic, continuous use of opioids  Nursing staff requesting refills of Oxycontin and Oxycodone due to only 8 doses remaining.    Narcotics previously prescribed for undifferentiated inflammatory arthritis.  Patient describes pain not in her joints, but \"all over\"  Particularly in legs and buttocks where wounds are.  Patient requesting dose of short acting oxycodone be increased.  States she was previously taking 20mg and dose was reduced to 5mg.  Said she would settle for 10mg.  Reported pain is 8-9, goes down to \"tolerable\" after an oxycodone.  Education provided re:  Narcotics " not best practice for chronic pain.  Suggested other pain relieving tactics such as diversion, massage (of lotion on hands when hands hurting), Position change and off loading. Encouraged recliner or lying in bed for some time q day to off load.  Medications refilled today, however, encourage PCP to follow, wean narcotics.  Possible referral to pain clinic?    (L03.119,  L02.419) Cellulitis and abscess of leg  Skin on LLE red, hard, warm to touch with green drainage  Has required antibiotics in the past.  Amoxicillin or keflex are her stated preferences as others have caused yeast infection.    Orders:  Cephalexin 500mg PO QID x 7 days--LLE cellulitis    Electronically signed by: RACHEL High CNP        Sincerely,        RACHEL High CNP

## 2022-01-24 NOTE — PROGRESS NOTES
"[facility]  [place] Medical Record Number: [id number]  Chief Complaint   Patient presents with     RECHECK     HPI: Carolina Mari is a 66 year old (1955), who is being seen today for an episodic care visit at: [facility] [place] (TCU/SNF) [4000]. Today's concern is: increased weeping, edema BLE, concern for green drainage from some spots on LLE    Allergies and PMH/PSH reviewed in [facility] today.    REVIEW OF SYSTEMS:  4 point ROS including Respiratory, CV, GI and , other than that noted in the HPI,  is negative    Objective:   /66   Pulse 58   Temp 98  F (36.7  C)   Resp 14   Ht 1.753 m (5' 9\")   Wt 120.8 kg (266 lb 4.8 oz)   SpO2 98%   BMI 39.33 kg/m    Exam:  GENERAL APPEARANCE:  Alert, in no distress, morbidly obese, sitting in WC, appears comfortable  SKIN:  see photos.  BLE edemetous, weeping clear fluid. black compression socks with velcro wraps.  2 quarter sized areas on posterior LLE draining green thick drainage  PSYCH:  affect and mood direct, expressing wishes strongly      Posterior LLE    Close up posterior LLE draining          Labs:   Recent labs in [facility] reviewed by me today.     Assessment/Plan:  (G89.29) Other chronic pain  (primary encounter diagnosis)  (F11.90) Chronic, continuous use of opioids  Nursing staff requesting refills of Oxycontin and Oxycodone due to only 8 doses remaining.    Narcotics previously prescribed for undifferentiated inflammatory arthritis.  Patient describes pain not in her joints, but \"all over\"  Particularly in legs and buttocks where wounds are.  Patient requesting dose of short acting oxycodone be increased.  States she was previously taking 20mg and dose was reduced to 5mg.  Said she would settle for 10mg.  Reported pain is 8-9, goes down to \"tolerable\" after an oxycodone.  Education provided re:  Narcotics not best practice for chronic pain.  Suggested other pain relieving tactics such as diversion, massage (of " lotion on hands when hands hurting), Position change and off loading. Encouraged recliner or lying in bed for some time q day to off load.  Medications refilled today, however, encourage PCP to follow, wean narcotics.  Possible referral to pain clinic?    (L03.119,  L02.419) Cellulitis and abscess of leg  Skin on LLE red, hard, warm to touch with green drainage  Has required antibiotics in the past.  Amoxicillin or keflex are her stated preferences as others have caused yeast infection.    Orders:  Cephalexin 500mg PO QID x 7 days--LLE cellulitis    Electronically signed by: RACHEL High CNP

## 2022-01-28 DIAGNOSIS — F11.90 CHRONIC, CONTINUOUS USE OF OPIOIDS: Primary | ICD-10-CM

## 2022-01-28 RX ORDER — OXYCODONE HYDROCHLORIDE 5 MG/1
5 TABLET ORAL EVERY 4 HOURS PRN
Qty: 30 TABLET | Refills: 0 | Status: SHIPPED | OUTPATIENT
Start: 2022-01-28 | End: 2022-02-07

## 2022-01-31 ENCOUNTER — VIRTUAL VISIT (OUTPATIENT)
Dept: GERIATRICS | Facility: CLINIC | Age: 67
End: 2022-01-31
Payer: MEDICARE

## 2022-01-31 VITALS
RESPIRATION RATE: 18 BRPM | OXYGEN SATURATION: 93 % | SYSTOLIC BLOOD PRESSURE: 153 MMHG | DIASTOLIC BLOOD PRESSURE: 73 MMHG | HEART RATE: 81 BPM | BODY MASS INDEX: 39.66 KG/M2 | HEIGHT: 69 IN | WEIGHT: 267.8 LBS | TEMPERATURE: 97.7 F

## 2022-01-31 DIAGNOSIS — L98.491 CHRONIC SKIN ULCER, LIMITED TO BREAKDOWN OF SKIN (H): ICD-10-CM

## 2022-01-31 DIAGNOSIS — R62.7 ADULT FAILURE TO THRIVE: ICD-10-CM

## 2022-01-31 DIAGNOSIS — E66.01 CLASS 2 SEVERE OBESITY DUE TO EXCESS CALORIES WITH SERIOUS COMORBIDITY AND BODY MASS INDEX (BMI) OF 38.0 TO 38.9 IN ADULT (H): ICD-10-CM

## 2022-01-31 DIAGNOSIS — M06.4 UNDIFFERENTIATED INFLAMMATORY ARTHRITIS (H): ICD-10-CM

## 2022-01-31 DIAGNOSIS — R19.7 DIARRHEA, UNSPECIFIED TYPE: ICD-10-CM

## 2022-01-31 DIAGNOSIS — L02.419 CELLULITIS AND ABSCESS OF LEG: Primary | ICD-10-CM

## 2022-01-31 DIAGNOSIS — E66.812 CLASS 2 SEVERE OBESITY DUE TO EXCESS CALORIES WITH SERIOUS COMORBIDITY AND BODY MASS INDEX (BMI) OF 38.0 TO 38.9 IN ADULT (H): ICD-10-CM

## 2022-01-31 DIAGNOSIS — L03.119 CELLULITIS AND ABSCESS OF LEG: Primary | ICD-10-CM

## 2022-01-31 DIAGNOSIS — U07.1 INFECTION DUE TO 2019 NOVEL CORONAVIRUS: ICD-10-CM

## 2022-01-31 PROCEDURE — 99310 SBSQ NF CARE HIGH MDM 45: CPT | Performed by: NURSE PRACTITIONER

## 2022-01-31 ASSESSMENT — MIFFLIN-ST. JEOR: SCORE: 1809.11

## 2022-01-31 NOTE — PROGRESS NOTES
"[facility] Geriatrics Video Visit  Carolina Mari is a 67 year old female who is being evaluated via a billable video visit due to the restrictions of the COVID19 pandemic.The patient has been notified of following: \"This video visit will be conducted via a call between you and your provider. We have found that certain health care needs can be provided without the need for an in-person physical exam.  This service lets us provide the care you need with a video conversation.  If a prescription is necessary we can send it directly to your pharmacy.  If lab work is needed we can place an order for that and you can then stop by our lab to have the test done at a later time. If during the course of the call the provider feels a video visit is not appropriate, you will not be charged for this service.\"     Proivder has received verbal consent for a Video Visit from the patient? Yes    Patient/facility would like the video invitation sent by: Send to e-mail at: [email or web address]     This visit took place virtually, with the patient located at [facility]   ________________________________________________  Video Start Time: 1539  Carolina Mari complains of    Chief Complaint   Patient presents with     RECHECK     HPI/Subjective:  Rebecca seen today on routine follow-up after she was started on a 7-day course of Keflex for lower extremity cellulitis last week.  Today, Rebecca denies any fever, but says her leg pain is greater than usual, even interrupting her sleep.  Normally she does not have trouble sleeping, but she finds her pain to be very positional and needs to get out of bed to relieve her pain.  Because she sitting in her wheelchair, her lower extremity edema worsens because of dependency.  Lymphedema specialist started wrapping legs, in addition to antibiotic dosing, which is helped mildly, but both patient and lymphedema specialist report greenish-yellowish drainage " from the right lower extremity.  Rebecca has her baseline neuropathy, just increased pain from usual.  Her diarrhea is ongoing and has not changed.  She is concerned about continuing or changing antibiotics because she is prone to yeast infections post antibiotic dosing.  She denies any urinary or vaginal issues at the moment.  She denies any significant cough, palpitations, chest pain, but does get mildly short of breath with activity during rehab.    Past Medical and Surgical History reviewed in Epic today.  MEDICATIONS:  Current Outpatient Medications   Medication Sig Dispense Refill     acetaminophen (TYLENOL) 500 MG tablet Take 1,000 mg by mouth 3 times daily        amLODIPine (NORVASC) 5 MG tablet Take 5 mg by mouth At Bedtime       cyanocobalamin (CYANOCOBALAMIN) 1000 MCG/ML injection Inject 1 mL (1,000 mcg) into the muscle every 30 days       diclofenac (VOLTAREN) 1 % topical gel Apply 2 g topically 4 times daily To affected area       famotidine (PEPCID) 20 MG tablet Take 20 mg by mouth daily       FLUoxetine HCl 60 MG TABS Take 1 tablet by mouth every morning        furosemide (LASIX) 20 MG tablet Take 20 mg by mouth daily       gabapentin (NEURONTIN) 600 MG tablet Take 1 tablet by mouth 3 times daily       levothyroxine (SYNTHROID/LEVOTHROID) 200 MCG tablet Take 1 tablet by mouth daily       lidocaine patch in PLACE Place 1 patch onto the skin every morning Remove at HS       loperamide (IMODIUM) 2 MG capsule Take 1 capsule (2 mg) by mouth 4 times daily as needed for diarrhea       losartan (COZAAR) 100 MG tablet Take 100 mg by mouth daily       metoprolol tartrate (LOPRESSOR) 100 MG tablet Take 75 mg by mouth 2 times daily       MICRO GUARD 2 % external powder USE AS DIRECTED PER WOUND CARE ORDERS       mineral oil-hydrophilic petrolatum (AQUAPHOR) external ointment USE AS DIRECTED PER WOUND CARE ORDERS       NARCAN 4 MG/0.1ML nasal spray CALL 911. SPR CONTENTS OF ONE SPRAYER (0.1ML) INTO ONE NOSTRIL. REPEAT  "IN 2-3 MIN IF SYMPTOMS OF OPIOID EMERGENCY PERSIST, ALTERNATE NOSTRILS       Nystatin (NYAMYC) 990382 UNIT/GM POWD Apply topically 2 times daily as needed        oxyCODONE (OXYCONTIN) 20 MG 12 hr tablet Take 1 tablet (20 mg) by mouth every 12 hours 20 tablet 0     oxyCODONE (ROXICODONE) 5 MG tablet Take 1 tablet (5 mg) by mouth every 4 hours as needed for pain 30 tablet 0     simvastatin (ZOCOR) 20 MG tablet Take 20 mg by mouth At Bedtime        REVIEW OF SYSTEMS: Limited secondary to cognitive impairment but today pt reports the above and 8 point ROS including Respiratory, CV, GI and , other than that noted in the HPI, is negative.    Objective: BP (!) 153/73   Pulse 81   Temp 97.7  F (36.5  C)   Resp 18   Ht 1.753 m (5' 9\")   Wt 121.5 kg (267 lb 12.8 oz)   SpO2 93%   BMI 39.55 kg/m    Exam:  GENERAL APPEARANCE: Alert, in no distress, cooperative.   EYES/ENT: EOM normal on camera, hearing acuity [place].  RESP: Respiratory effort appears unlabored over video screen, no respiratory distress apparent on video, On RA.   CV: Edema is reported as 2 to 3+ BLE, which are wrapped via video. Color appears pink.  ABDOMEN: Appears non-distended, rounded.  SKIN: Skin appears baseline w/ video inspection. Cellulitis is not greatly improved as pictured here:    NEURO: CN II-XII at patient's baseline, MCCULLOUGH at baseline on video. Sensation baseline PPS.  PSYCH: Insight, judgement, and memory are baseline, affect and mood are happy/engaged.    Labs: Labs done in facility are in EPIC. Please refer to them using [employer] Everywhere.    ASSESSMENT/PLAN:    ICD-10-CM Acute on chronic.    1. Cellulitis and abscess of leg  L03.119 Unstable.  Though pictured above is not very clear, drainage seen by staff and provider appears significant and infection is ongoing.  Given Rebecca's concern for rebound yeast infection, she would prefer to just continue with current antibiotic dosing for several more days.    L02.419    2. Chronic skin ulcer, " limited to breakdown of skin (H)  L98.491 Tenuous. See above.    3. Class 2 severe obesity due to excess calories with serious comorbidity and body mass index (BMI) of 38.0 to 38.9 in adult (H)  E66.01 Ongoing.  Weight has remained stable during this admission but noting some fluctuation with fluid.  Will monitor closely.    Z68.38    4. Undifferentiated inflammatory arthritis (H)  M06.4 Ongoing.  Contributory to overall tolerance with activity, and underlying opioid dependence secondary to this issue.   5. Diarrhea, unspecified type  R19.7 Unstable.  Diarrhea has been worked up extensively, and Rebecca has been negative for C. difficile, unclear if functional or secondary to food intolerance.  Patient counseled to cut out dairy, and we will add a fiber supplement to bulk stool.   6. Adult failure to thrive  R62.7 Ongoing. See above.   7. Infection due to 2019 novel coronavirus  U07.1 Resolved. Monitor routinely.      Follow up w/in 1 week or as needed.    Orders:  1. Extend Keflex orders x3 more days (total of 10). Dx: cellulitis.   2. FiberCon, 2 tabs PO QAM. Dx: diarrhea.     Video-Visit Details  Type of service:  Video Visit  Video Start Time: 1539  Video End Time (time video stopped): 1551  Originating Location (pt. Location): [facility]  Distant Location (provider location):  [facility]   Mode of Communication:  Video Conference.    Electronically signed by:  Dr. Adilene Aguayo, APRN CNP DNP

## 2022-01-31 NOTE — LETTER
"    1/31/2022        RE: Carolina Mari  [address]  [address]  [place] [address]        [facility] Geriatrics Video Visit  Carolina Mari is a 67 year old female who is being evaluated via a billable video visit due to the restrictions of the COVID19 pandemic.The patient has been notified of following: \"This video visit will be conducted via a call between you and your provider. We have found that certain health care needs can be provided without the need for an in-person physical exam.  This service lets us provide the care you need with a video conversation.  If a prescription is necessary we can send it directly to your pharmacy.  If lab work is needed we can place an order for that and you can then stop by our lab to have the test done at a later time. If during the course of the call the provider feels a video visit is not appropriate, you will not be charged for this service.\"     Proivder has received verbal consent for a Video Visit from the patient? Yes    Patient/facility would like the video invitation sent by: Send to e-mail at: [email or web address]     This visit took place virtually, with the patient located at [facility]   ________________________________________________  Video Start Time: 1539  Carolina Mari complains of    Chief Complaint   Patient presents with     RECHECK     HPI/Subjective:  Rebecca seen today on routine follow-up after she was started on a 7-day course of Keflex for lower extremity cellulitis last week.  Today, Rebecca denies any fever, but says her leg pain is greater than usual, even interrupting her sleep.  Normally she does not have trouble sleeping, but she finds her pain to be very positional and needs to get out of bed to relieve her pain.  Because she sitting in her wheelchair, her lower extremity edema worsens because of dependency.  Lymphedema specialist started wrapping legs, in addition to antibiotic " dosing, which is helped mildly, but both patient and lymphedema specialist report greenish-yellowish drainage from the right lower extremity.  Rebecca has her baseline neuropathy, just increased pain from usual.  Her diarrhea is ongoing and has not changed.  She is concerned about continuing or changing antibiotics because she is prone to yeast infections post antibiotic dosing.  She denies any urinary or vaginal issues at the moment.  She denies any significant cough, palpitations, chest pain, but does get mildly short of breath with activity during rehab.    Past Medical and Surgical History reviewed in Epic today.  MEDICATIONS:  Current Outpatient Medications   Medication Sig Dispense Refill     acetaminophen (TYLENOL) 500 MG tablet Take 1,000 mg by mouth 3 times daily        amLODIPine (NORVASC) 5 MG tablet Take 5 mg by mouth At Bedtime       cyanocobalamin (CYANOCOBALAMIN) 1000 MCG/ML injection Inject 1 mL (1,000 mcg) into the muscle every 30 days       diclofenac (VOLTAREN) 1 % topical gel Apply 2 g topically 4 times daily To affected area       famotidine (PEPCID) 20 MG tablet Take 20 mg by mouth daily       FLUoxetine HCl 60 MG TABS Take 1 tablet by mouth every morning        furosemide (LASIX) 20 MG tablet Take 20 mg by mouth daily       gabapentin (NEURONTIN) 600 MG tablet Take 1 tablet by mouth 3 times daily       levothyroxine (SYNTHROID/LEVOTHROID) 200 MCG tablet Take 1 tablet by mouth daily       lidocaine patch in PLACE Place 1 patch onto the skin every morning Remove at HS       loperamide (IMODIUM) 2 MG capsule Take 1 capsule (2 mg) by mouth 4 times daily as needed for diarrhea       losartan (COZAAR) 100 MG tablet Take 100 mg by mouth daily       metoprolol tartrate (LOPRESSOR) 100 MG tablet Take 75 mg by mouth 2 times daily       MICRO GUARD 2 % external powder USE AS DIRECTED PER WOUND CARE ORDERS       mineral oil-hydrophilic petrolatum (AQUAPHOR) external ointment USE AS DIRECTED PER WOUND CARE  "ORDERS       NARCAN 4 MG/0.1ML nasal spray CALL 911. SPR CONTENTS OF ONE SPRAYER (0.1ML) INTO ONE NOSTRIL. REPEAT IN 2-3 MIN IF SYMPTOMS OF OPIOID EMERGENCY PERSIST, ALTERNATE NOSTRILS       Nystatin (NYAMYC) 488176 UNIT/GM POWD Apply topically 2 times daily as needed        oxyCODONE (OXYCONTIN) 20 MG 12 hr tablet Take 1 tablet (20 mg) by mouth every 12 hours 20 tablet 0     oxyCODONE (ROXICODONE) 5 MG tablet Take 1 tablet (5 mg) by mouth every 4 hours as needed for pain 30 tablet 0     simvastatin (ZOCOR) 20 MG tablet Take 20 mg by mouth At Bedtime        REVIEW OF SYSTEMS: Limited secondary to cognitive impairment but today pt reports the above and 8 point ROS including Respiratory, CV, GI and , other than that noted in the HPI, is negative.    Objective: BP (!) 153/73   Pulse 81   Temp 97.7  F (36.5  C)   Resp 18   Ht 1.753 m (5' 9\")   Wt 121.5 kg (267 lb 12.8 oz)   SpO2 93%   BMI 39.55 kg/m    Exam:  GENERAL APPEARANCE: Alert, in no distress, cooperative.   EYES/ENT: EOM normal on camera, hearing acuity [place].  RESP: Respiratory effort appears unlabored over video screen, no respiratory distress apparent on video, On RA.   CV: Edema is reported as 2 to 3+ BLE, which are wrapped via video. Color appears pink.  ABDOMEN: Appears non-distended, rounded.  SKIN: Skin appears baseline w/ video inspection. Cellulitis is not greatly improved as pictured here:    NEURO: CN II-XII at patient's baseline, MCCULLOUGH at baseline on video. Sensation baseline PPS.  PSYCH: Insight, judgement, and memory are baseline, affect and mood are happy/engaged.    Labs: Labs done in facility are in EPIC. Please refer to them using [employer] Everywhere.    ASSESSMENT/PLAN:    ICD-10-CM Acute on chronic.    1. Cellulitis and abscess of leg  L03.119 Unstable.  Though pictured above is not very clear, drainage seen by staff and provider appears significant and infection is ongoing.  Given Rebecca's concern for rebound yeast infection, she would " prefer to just continue with current antibiotic dosing for several more days.    L02.419    2. Chronic skin ulcer, limited to breakdown of skin (H)  L98.491 Tenuous. See above.    3. Class 2 severe obesity due to excess calories with serious comorbidity and body mass index (BMI) of 38.0 to 38.9 in adult (H)  E66.01 Ongoing.  Weight has remained stable during this admission but noting some fluctuation with fluid.  Will monitor closely.    Z68.38    4. Undifferentiated inflammatory arthritis (H)  M06.4 Ongoing.  Contributory to overall tolerance with activity, and underlying opioid dependence secondary to this issue.   5. Diarrhea, unspecified type  R19.7 Unstable.  Diarrhea has been worked up extensively, and Rebecca has been negative for C. difficile, unclear if functional or secondary to food intolerance.  Patient counseled to cut out dairy, and we will add a fiber supplement to bulk stool.   6. Adult failure to thrive  R62.7 Ongoing. See above.   7. Infection due to 2019 novel coronavirus  U07.1 Resolved. Monitor routinely.      Follow up w/in 1 week or as needed.    Orders:  1. Extend Keflex orders x3 more days (total of 10). Dx: cellulitis.   2. FiberCon, 2 tabs PO QAM. Dx: diarrhea.     Video-Visit Details  Type of service:  Video Visit  Video Start Time: 1539  Video End Time (time video stopped): 1551  Originating Location (pt. Location): [facility]  Distant Location (provider location):  [facility]   Mode of Communication:  Video Conference.    Electronically signed by:  RACHEL Alvarez CNP [facility]          Sincerely,        RACHEL Martin CNP

## 2022-02-07 ENCOUNTER — TRANSITIONAL CARE UNIT VISIT (OUTPATIENT)
Dept: GERIATRICS | Facility: CLINIC | Age: 67
End: 2022-02-07
Payer: MEDICARE

## 2022-02-07 VITALS
HEART RATE: 64 BPM | HEIGHT: 69 IN | WEIGHT: 266 LBS | SYSTOLIC BLOOD PRESSURE: 158 MMHG | DIASTOLIC BLOOD PRESSURE: 83 MMHG | TEMPERATURE: 97.5 F | OXYGEN SATURATION: 94 % | RESPIRATION RATE: 16 BRPM | BODY MASS INDEX: 39.4 KG/M2

## 2022-02-07 DIAGNOSIS — F11.90 CHRONIC, CONTINUOUS USE OF OPIOIDS: ICD-10-CM

## 2022-02-07 DIAGNOSIS — L98.491 CHRONIC SKIN ULCER, LIMITED TO BREAKDOWN OF SKIN (H): Primary | ICD-10-CM

## 2022-02-07 DIAGNOSIS — R19.7 DIARRHEA, UNSPECIFIED TYPE: ICD-10-CM

## 2022-02-07 DIAGNOSIS — I87.329 STASIS DERMATITIS OF LOWER EXTREMITY DUE TO CHRONIC PERIPHERAL VASCULAR HYPERTENSION: ICD-10-CM

## 2022-02-07 DIAGNOSIS — B37.31 YEAST INFECTION OF THE VAGINA: ICD-10-CM

## 2022-02-07 DIAGNOSIS — E03.9 ACQUIRED HYPOTHYROIDISM: ICD-10-CM

## 2022-02-07 DIAGNOSIS — D63.8 ANEMIA IN OTHER CHRONIC DISEASES CLASSIFIED ELSEWHERE: ICD-10-CM

## 2022-02-07 PROCEDURE — 99310 SBSQ NF CARE HIGH MDM 45: CPT | Performed by: NURSE PRACTITIONER

## 2022-02-07 RX ORDER — OXYCODONE HCL 20 MG/1
20 TABLET, FILM COATED, EXTENDED RELEASE ORAL EVERY 12 HOURS
Qty: 20 TABLET | Refills: 0 | Status: SHIPPED | OUTPATIENT
Start: 2022-02-07 | End: 2022-02-16

## 2022-02-07 RX ORDER — OXYCODONE HYDROCHLORIDE 5 MG/1
5 TABLET ORAL EVERY 4 HOURS PRN
Qty: 30 TABLET | Refills: 0 | Status: SHIPPED | OUTPATIENT
Start: 2022-02-07 | End: 2022-02-18

## 2022-02-07 ASSESSMENT — MIFFLIN-ST. JEOR: SCORE: 1800.95

## 2022-02-07 NOTE — NURSING NOTE
1. Triad paste to buttocks PRN Dx: wound  2. Dietician consult  Dx: diarrhea  3. Increase Calcium Polycarbophil 2 tabs PO BID Dx: Diarrhea  4. In house psych Dx: MDD/MARKO  5. Increase Norvasc 10mg po at bedtime; hold for SBP <100. Dx: hypertension  6. Restart ferrous sulfate 325mg PO daily Dx: iron deficiency  7. Ascorbic acid 500mg PO daily Dx: iron deficiency

## 2022-02-07 NOTE — PROGRESS NOTES
[facility] GERIATRIC SERVICE  Episodic/Acute/Follow-Up  [facility] MRN: [id number]. Place of Service where encounter took place:  [facility] (U/SNF) [4000]   Chief Complaint   Patient presents with     RECHECK    HPI: Carolina Mari  is a 67 year old (1955), who is being seen today for an episodic care visit.  HPI information obtained from: facility chart records, facility staff, patient report and [facility] chart review. Today's concern is:    Rebecca seen today on routine follow-up, after her Keflex was extended last week for lower extremity cellulitis.  Today, she states that her diarrhea is ongoing and has not changed despite the addition of FiberCon.  She denies any headaches, dizziness, fever, chills.  She denies any shortness of breath, and is sleeping a little bit better than last week.  Her leg pain is still there, but is improving.  Per occupational therapy, edema is down by several centimeters from last week.  Drainage from bilateral lower extremities is also lessened than prior.    Past Medical and Surgical History reviewed in Epic today.  MEDICATIONS:  Current Outpatient Medications   Medication Sig Dispense Refill     Ascorbic Acid (VITAMIN C) 500 MG CAPS Take 500 mg by mouth daily       calcium polycarbophil (FIBERCON) 625 MG tablet Take 2 tablets by mouth 2 times daily       ferrous sulfate (FEROSUL) 325 (65 Fe) MG tablet Take 325 mg by mouth daily (with breakfast)       acetaminophen (TYLENOL) 500 MG tablet Take 1,000 mg by mouth 3 times daily        amLODIPine (NORVASC) 5 MG tablet Take 5 mg by mouth At Bedtime       cyanocobalamin (CYANOCOBALAMIN) 1000 MCG/ML injection Inject 1 mL (1,000 mcg) into the muscle every 30 days       diclofenac (VOLTAREN) 1 % topical gel Apply 2 g topically 4 times daily To affected area       famotidine (PEPCID) 20 MG tablet Take 20 mg by mouth daily       FLUoxetine HCl 60 MG TABS Take 1 tablet by mouth every morning         "furosemide (LASIX) 20 MG tablet Take 20 mg by mouth daily       gabapentin (NEURONTIN) 600 MG tablet Take 1 tablet by mouth 3 times daily       levothyroxine (SYNTHROID/LEVOTHROID) 200 MCG tablet Take 1 tablet by mouth daily       lidocaine patch in PLACE Place 1 patch onto the skin every morning Remove at HS       loperamide (IMODIUM) 2 MG capsule Take 1 capsule (2 mg) by mouth 4 times daily as needed for diarrhea       losartan (COZAAR) 100 MG tablet Take 100 mg by mouth daily       metoprolol tartrate (LOPRESSOR) 100 MG tablet Take 75 mg by mouth 2 times daily       MICRO GUARD 2 % external powder USE AS DIRECTED PER WOUND CARE ORDERS       mineral oil-hydrophilic petrolatum (AQUAPHOR) external ointment USE AS DIRECTED PER WOUND CARE ORDERS       NARCAN 4 MG/0.1ML nasal spray CALL 911. SPR CONTENTS OF ONE SPRAYER (0.1ML) INTO ONE NOSTRIL. REPEAT IN 2-3 MIN IF SYMPTOMS OF OPIOID EMERGENCY PERSIST, ALTERNATE NOSTRILS       Nystatin (NYAMYC) 467893 UNIT/GM POWD Apply topically 2 times daily as needed        oxyCODONE (OXYCONTIN) 20 MG 12 hr tablet Take 1 tablet (20 mg) by mouth every 12 hours 20 tablet 0     oxyCODONE (ROXICODONE) 5 MG tablet Take 1 tablet (5 mg) by mouth every 4 hours as needed for pain 30 tablet 0     simvastatin (ZOCOR) 20 MG tablet Take 20 mg by mouth At Bedtime        REVIEW OF SYSTEMS: Limited secondary to cognitive impairment but today pt reports the above and 8 point ROS including Respiratory, CV, GI and , other than that noted in the HPI, is negative.    Objective: BP (!) 158/83   Pulse 64   Temp 97.5  F (36.4  C)   Resp 16   Ht 1.753 m (5' 9\")   Wt 120.7 kg (266 lb)   SpO2 94%   BMI 39.28 kg/m    GENERAL APPEARANCE: Alert, in no distress, cooperative.   ENT: Mouth/posterior oropharynx intact w/ moist mucous membranes, hearing acuity [place].  EYES: EOM, conjunctivae, lids, pupils and irises normal, PERRL2.   RESP: Respiratory effort good, no respiratory distress, Lung sounds clear. On " RA.   CV: Auscultation of heart reveals S1, S2, rate and rhythm regular, no murmur, no rub or gallop, Edema 2+ BLE. Peripheral pulses are 2+.  ABDOMEN: Normal bowel sounds, soft, non-tender abdomen, and no masses palpated.  SKIN: Inspection/Palpation of skin and subcutaneous tissue baseline w/ fragility.   RLE pictured here:      LLE pictured here:      NEURO: CN II-XII at patient's baseline, sensation baseline PPS.  PSYCH: Insight, judgement, and memory are baseline, affect and mood are happy/engaged.    Labs: Labs done in facility are in EPIC. Please refer to them using [employer] Everywhere.    ASSESSMENT/PLAN:    ICD-10-CM Acute on chronic.   1. Chronic skin ulcer, limited to breakdown of skin (H)  L98.491 Unstable.  Cellulitis appears improved, but ongoing stasis dermatitis is present.  Advise lymphedema therapy to apply Triad paste on lower extremities to help with dryness and skin integrity.  We will continue with lymph wraps,   2. Stasis dermatitis of lower extremity due to chronic peripheral vascular hypertension  I87.329 Unstable. See above.    3. Diarrhea, unspecified type  R19.7 Unstable.  Noting previous hypothyroidism, and consideration that diarrhea could be contributable to TSH changes.  Also noting initial fiber dosing not effective, and will trial increase.   4. Yeast infection of the vagina  B37.3 Tenuous.  Rebecca does feel like she could use antifungal for yeast in her vaginal area.  She has itching, stinging, and this always happens after she has an antibiotic course.  Will start Monistat for short course.  Hopefully correction of diarrhea will also help maintain cleanliness in this area.   5. Anemia in other chronic diseases classified elsewhere  D63.8 Tenuous.  Noting ongoing anemia.  Will add FeSO4 and vitamin C to help with correction, and noting that ferrous sulfate may also help to constipate.   6. Acquired hypothyroidism  E03.9 Tenuous.  TSH as discussed above.     Follow up w/in 1 week or as  needed.    Orders:  1. Triad paste to buttocks TID PRN. Dx: wound care.  2. Dietitian consult x1. Dx: diarrhea/food log.  3. Increase FiberCon to 2 tabs PO BID. Dx: diarrhea.   4. Recheck TSH x1 on 2/10. Dx: hypothyroidism.  5. FeSO4 325mg PO QDay. Dx: Anemia.   6. Vitamin C 500mg PO Qday. Dx: Anemia.   7. Monistat 4% cream, apply vaginally BID x 4 days. Dx: yeast infection.  8. Advised OT for triad to BLE as well.    Electronically signed by:  Dr. Adilene Aguayo, APRN CNP DNP

## 2022-02-09 ENCOUNTER — LAB REQUISITION (OUTPATIENT)
Dept: LAB | Facility: CLINIC | Age: 67
End: 2022-02-09
Payer: MEDICARE

## 2022-02-09 DIAGNOSIS — E03.9 HYPOTHYROIDISM, UNSPECIFIED: ICD-10-CM

## 2022-02-09 RX ORDER — MULTIVIT-MIN/IRON/FOLIC ACID/K 18-600-40
500 CAPSULE ORAL DAILY
COMMUNITY

## 2022-02-09 RX ORDER — CALCIUM POLYCARBOPHIL 625 MG 625 MG/1
2 TABLET ORAL 2 TIMES DAILY
COMMUNITY
End: 2022-02-21

## 2022-02-09 RX ORDER — FERROUS SULFATE 325(65) MG
325 TABLET ORAL
COMMUNITY

## 2022-02-10 LAB — TSH SERPL DL<=0.005 MIU/L-ACNC: 12.88 UIU/ML (ref 0.3–5)

## 2022-02-10 PROCEDURE — 84443 ASSAY THYROID STIM HORMONE: CPT | Performed by: FAMILY MEDICINE

## 2022-02-10 PROCEDURE — P9603 ONE-WAY ALLOW PRORATED MILES: HCPCS | Performed by: FAMILY MEDICINE

## 2022-02-10 PROCEDURE — 36415 COLL VENOUS BLD VENIPUNCTURE: CPT | Performed by: FAMILY MEDICINE

## 2022-02-13 NOTE — PROGRESS NOTES
"[facility] GERIATRIC SERVICE  Episodic/Acute/Follow-Up  [place] MRN: [id number]. Place of Service where encounter took place:  [facility] (U/SNF) [4000]   Chief Complaint   Patient presents with     RECHECK     Wellness Visit    HPI: Carolina Mari  is a 67 year old (1955), who is being seen today for an episodic care visit.  HPI information obtained from: facility chart records, facility staff, patient report and [facility] chart review. Today's concern is:    Rebecca seen today on routine follow-up in TCU.  Last week, treatment to her legs was slightly changed by the addition of triad paste.  This makes visualization difficult today and her legs are already wrapped.  She says that they feel and look a lot better.  She denies fever, chills, headache, but says sometimes she gets lightheaded.  She denies shortness of breath except for with significant activity.  She denies new cough, chest pain, palpitations, nausea.  She states her diarrhea has been gone all day, and for the first time, she is feeling hopeful.  She states that her yeast is \"under control.\"  Her mood is still \"blah.\"  She feels like she is off from her normal, and wishes she would just feel better.  She is not as anxious as she lives, but feels a little down.  She wants her Prozac increased, but does question her thyroid.    Past Medical and Surgical History reviewed in Epic today.  MEDICATIONS:  Current Outpatient Medications   Medication Sig Dispense Refill     acetaminophen (TYLENOL) 500 MG tablet Take 1,000 mg by mouth 3 times daily        amLODIPine (NORVASC) 5 MG tablet Take 5 mg by mouth At Bedtime       Ascorbic Acid (VITAMIN C) 500 MG CAPS Take 500 mg by mouth daily       calcium polycarbophil (FIBERCON) 625 MG tablet Take 2 tablets by mouth 2 times daily       cyanocobalamin (CYANOCOBALAMIN) 1000 MCG/ML injection Inject 1 mL (1,000 mcg) into the muscle every 30 days       diclofenac (VOLTAREN) 1 % " "topical gel Apply 2 g topically 4 times daily To affected area       famotidine (PEPCID) 20 MG tablet Take 20 mg by mouth daily       ferrous sulfate (FEROSUL) 325 (65 Fe) MG tablet Take 325 mg by mouth daily (with breakfast)       FLUoxetine HCl 60 MG TABS Take 1 tablet by mouth every morning        furosemide (LASIX) 20 MG tablet Take 20 mg by mouth daily       gabapentin (NEURONTIN) 600 MG tablet Take 1 tablet by mouth 3 times daily       levothyroxine (SYNTHROID/LEVOTHROID) 200 MCG tablet Take 225 mcg by mouth daily       lidocaine patch in PLACE Place 1 patch onto the skin every morning Remove at HS       loperamide (IMODIUM) 2 MG capsule Take 1 capsule (2 mg) by mouth 4 times daily as needed for diarrhea       losartan (COZAAR) 100 MG tablet Take 100 mg by mouth daily       metoprolol tartrate (LOPRESSOR) 100 MG tablet Take 75 mg by mouth 2 times daily       MICRO GUARD 2 % external powder USE AS DIRECTED PER WOUND CARE ORDERS       mineral oil-hydrophilic petrolatum (AQUAPHOR) external ointment USE AS DIRECTED PER WOUND CARE ORDERS       NARCAN 4 MG/0.1ML nasal spray CALL 911. SPR CONTENTS OF ONE SPRAYER (0.1ML) INTO ONE NOSTRIL. REPEAT IN 2-3 MIN IF SYMPTOMS OF OPIOID EMERGENCY PERSIST, ALTERNATE NOSTRILS       oxyCODONE (OXYCONTIN) 20 MG 12 hr tablet Take 1 tablet (20 mg) by mouth every 12 hours 20 tablet 0     oxyCODONE (ROXICODONE) 5 MG tablet Take 1 tablet (5 mg) by mouth every 4 hours as needed for pain 30 tablet 0     simvastatin (ZOCOR) 20 MG tablet Take 20 mg by mouth At Bedtime        REVIEW OF SYSTEMS: Limited secondary to cognitive impairment but today pt reports the above and 8 point ROS including Respiratory, CV, GI and , other than that noted in the HPI, is negative.    Objective: BP (!) 148/80   Pulse 73   Temp 97.3  F (36.3  C)   Resp 16   Ht 1.753 m (5' 9\")   Wt 120.7 kg (266 lb)   SpO2 96%   BMI 39.28 kg/m    GENERAL APPEARANCE: Alert, in no distress, cooperative.   ENT: " Mouth/posterior oropharynx intact w/ moist mucous membranes, hearing acuity [place].  EYES: EOM, conjunctivae, lids, pupils and irises normal, PERRL2.   RESP: Respiratory effort good, no respiratory distress, Lung sounds clear. On RA.   CV: Auscultation of heart reveals S1, S2, rate and rhythm regular, no murmur, no rub or gallop, Edema 2+ BLE. Peripheral pulses are 2+.  ABDOMEN: Normal bowel sounds, soft, non-tender abdomen, and no masses palpated.  SKIN: Inspection/Palpation of skin and subcutaneous tissue baseline w/ fragility. Legs are wrapped at this time.   NEURO: CN II-XII at patient's baseline, sensation baseline PPS.  PSYCH: Insight, judgement, and memory are baseline, affect and mood are happy/engaged.    Labs: Labs done in facility are in EPIC. Please refer to them using [employer] Everywhere.    ASSESSMENT/PLAN:  Chronic, continuous use of opioids  Chronic skin ulcer, limited to breakdown of skin (H)  Stasis dermatitis of lower extremity due to chronic peripheral vascular hypertension  Diarrhea, unspecified type  Yeast infection of the vagina  Acquired hypothyroidism  Major depressive disorder (H)  Acute on chronic. Ongoing.     Rebecca has an underlying major depressive disorder, which she utilizes Prozac, and sometimes even pain medicine for because she is looking for physical or psychological relief.    She acknowledges that her thyroid is off, and that this could be affecting her mood.  She is agreeable to a Synthroid change, and will order as noted below.  This may also help her energy level and her motivation with therapy, which is also low.    Discharge disposition is unclear at this time, as Rebecca will likely move to a long-term care unit.  Unclear if that will be at this facility or another, but do believe TSH should be trended with other thyroid levels.  Will order for future as noted below.    Provider coordinated care with nursing and with lymphedema occupational therapy.  Legs are improved as long as  "Carolina wears her wraps and remembers to elevate her lower extremities.  Carolina struggles with motivation and participation in therapy.  She continues to work with in-house psychologist.    Diarrhea, and yeast dermatitis is improved.  We will continue to monitor.  Follow up w/in 1 week or as needed.    Follow up w/in 1 week or as needed.    Orders:  1. Recheck TSH, Free T4, T3 x1 on 3/28. Dx: hypothyroidism.  2. Increase Synthroid to 225mcg PO QAM. Dx: hypothyroidism.     Electronically signed by:  RACHEL Alvarez CNP DNP  ~~~~~~~~~~~~~~~~~~~~~~~~~~    Annual Wellness Visit  Are you in the first 12 months of your Medicare Part B coverage?  No  Physical Health:    In general, how would you rate your overall physical health? poor    Outside of work, how many days during the week do you exercise?none    Outside of work, approximately how many minutes a day do you exercise?not applicable    If you drink alcohol do you typically have >3 drinks per day or >7 drinks per week? No    Do you usually eat at least 4 servings of fruit and vegetables a day, include whole grains & fiber and avoid regularly eating high fat or \"junk\" foods? Yes    Do you have any problems taking medications regularly? No    Do you have any side effects from medications? none    Needs assistance for the following daily activities: transportation, shopping, preparing meals, housework, bathing, laundry, money management and taking medicine    Which of the following safety concerns are present in your home?  poor lighting     Hearing impairment: Yes, Need to ask people to speak up or repeat themselves.    In the past 6 months, have you been bothered by leaking of urine? yes    Mental Health:    In general, how would you rate your overall mental or emotional health? fair  PHQ-2 Score:   PHQ-2 ( 1999 [employer]) 2/14/2022 11/6/2019   Q1: Little interest or pleasure in doing things 0 0   Q2: Feeling down, depressed or hopeless 1 0   PHQ-2 Score 1 0 " "  PHQ-2 Total Score (12-17 Years)- Positive if 3 or more points; Administer PHQ-A if positive - 0        Do you feel safe in your environment? Yes    Have you ever done Advance Care Planning? (For example, a Health Directive, POLST, or a discussion with a medical provider or your loved ones about your wishes)? Yes, advance care planning is on file.    Fall risk: cannot perform \"get up and go\" ongoing work w/ PT/OT. High fall risk.    Cognitive Screening: CPT 5.3/5.6, grossly intact.    Do you have sleep apnea, excessive snoring or daytime drowsiness?: yes, snoring.    Current providers sharing in care for this patient include: Dr. Adilene Aguayo, APRN CNP DNP                "

## 2022-02-14 ENCOUNTER — TRANSITIONAL CARE UNIT VISIT (OUTPATIENT)
Dept: GERIATRICS | Facility: CLINIC | Age: 67
End: 2022-02-14
Payer: MEDICARE

## 2022-02-14 VITALS
HEIGHT: 69 IN | WEIGHT: 266 LBS | BODY MASS INDEX: 39.4 KG/M2 | HEART RATE: 73 BPM | RESPIRATION RATE: 16 BRPM | SYSTOLIC BLOOD PRESSURE: 148 MMHG | OXYGEN SATURATION: 96 % | DIASTOLIC BLOOD PRESSURE: 80 MMHG | TEMPERATURE: 97.3 F

## 2022-02-14 DIAGNOSIS — R19.7 DIARRHEA, UNSPECIFIED TYPE: ICD-10-CM

## 2022-02-14 DIAGNOSIS — L98.491 CHRONIC SKIN ULCER, LIMITED TO BREAKDOWN OF SKIN (H): ICD-10-CM

## 2022-02-14 DIAGNOSIS — I87.329 STASIS DERMATITIS OF LOWER EXTREMITY DUE TO CHRONIC PERIPHERAL VASCULAR HYPERTENSION: ICD-10-CM

## 2022-02-14 DIAGNOSIS — F11.90 CHRONIC, CONTINUOUS USE OF OPIOIDS: Primary | ICD-10-CM

## 2022-02-14 DIAGNOSIS — F33.1 MODERATE EPISODE OF RECURRENT MAJOR DEPRESSIVE DISORDER (H): ICD-10-CM

## 2022-02-14 DIAGNOSIS — B37.31 YEAST INFECTION OF THE VAGINA: ICD-10-CM

## 2022-02-14 DIAGNOSIS — E03.9 ACQUIRED HYPOTHYROIDISM: ICD-10-CM

## 2022-02-14 PROCEDURE — 99309 SBSQ NF CARE MODERATE MDM 30: CPT | Performed by: NURSE PRACTITIONER

## 2022-02-14 ASSESSMENT — MIFFLIN-ST. JEOR: SCORE: 1800.95

## 2022-02-14 NOTE — LETTER
"    2/14/2022        RE: Carolina Mari  [address] [address]  [address]  [place] [address]        [facility]IATRIC SERV[facility]  Episodic/Acute/Follow-Up  [place] MRN: [id number]. Place of Service where encounter took place:  [facility] (U/SNF) [4000]   Chief Complaint   Patient presents with     RECHECK     Wellness Visit    HPI: Carolina Mari  is a 67 year old (1955), who is being seen today for an episodic care visit.  HPI information obtained from: facility chart records, facility staff, patient report and [facility] chart review. Today's concern is:    Rebecca seen today on routine follow-up in TCU.  Last week, treatment to her legs was slightly changed by the addition of triad paste.  This makes visualization difficult today and her legs are already wrapped.  She says that they feel and look a lot better.  She denies fever, chills, headache, but says sometimes she gets lightheaded.  She denies shortness of breath except for with significant activity.  She denies new cough, chest pain, palpitations, nausea.  She states her diarrhea has been gone all day, and for the first time, she is feeling hopeful.  She states that her yeast is \"under control.\"  Her mood is still \"blah.\"  She feels like she is off from her normal, and wishes she would just feel better.  She is not as anxious as she lives, but feels a little down.  She wants her Prozac increased, but does question her thyroid.    Past Medical and Surgical History reviewed in Epic today.  MEDICATIONS:  Current Outpatient Medications   Medication Sig Dispense Refill     acetaminophen (TYLENOL) 500 MG tablet Take 1,000 mg by mouth 3 times daily        amLODIPine (NORVASC) 5 MG tablet Take 5 mg by mouth At Bedtime       Ascorbic Acid (VITAMIN C) 500 MG CAPS Take 500 mg by mouth daily       calcium polycarbophil (FIBERCON) 625 MG tablet Take 2 tablets by mouth 2 times daily       cyanocobalamin (CYANOCOBALAMIN) 1000 " "MCG/ML injection Inject 1 mL (1,000 mcg) into the muscle every 30 days       diclofenac (VOLTAREN) 1 % topical gel Apply 2 g topically 4 times daily To affected area       famotidine (PEPCID) 20 MG tablet Take 20 mg by mouth daily       ferrous sulfate (FEROSUL) 325 (65 Fe) MG tablet Take 325 mg by mouth daily (with breakfast)       FLUoxetine HCl 60 MG TABS Take 1 tablet by mouth every morning        furosemide (LASIX) 20 MG tablet Take 20 mg by mouth daily       gabapentin (NEURONTIN) 600 MG tablet Take 1 tablet by mouth 3 times daily       levothyroxine (SYNTHROID/LEVOTHROID) 200 MCG tablet Take 225 mcg by mouth daily       lidocaine patch in PLACE Place 1 patch onto the skin every morning Remove at HS       loperamide (IMODIUM) 2 MG capsule Take 1 capsule (2 mg) by mouth 4 times daily as needed for diarrhea       losartan (COZAAR) 100 MG tablet Take 100 mg by mouth daily       metoprolol tartrate (LOPRESSOR) 100 MG tablet Take 75 mg by mouth 2 times daily       MICRO GUARD 2 % external powder USE AS DIRECTED PER WOUND CARE ORDERS       mineral oil-hydrophilic petrolatum (AQUAPHOR) external ointment USE AS DIRECTED PER WOUND CARE ORDERS       NARCAN 4 MG/0.1ML nasal spray CALL 911. SPR CONTENTS OF ONE SPRAYER (0.1ML) INTO ONE NOSTRIL. REPEAT IN 2-3 MIN IF SYMPTOMS OF OPIOID EMERGENCY PERSIST, ALTERNATE NOSTRILS       oxyCODONE (OXYCONTIN) 20 MG 12 hr tablet Take 1 tablet (20 mg) by mouth every 12 hours 20 tablet 0     oxyCODONE (ROXICODONE) 5 MG tablet Take 1 tablet (5 mg) by mouth every 4 hours as needed for pain 30 tablet 0     simvastatin (ZOCOR) 20 MG tablet Take 20 mg by mouth At Bedtime        REVIEW OF SYSTEMS: Limited secondary to cognitive impairment but today pt reports the above and 8 point ROS including Respiratory, CV, GI and , other than that noted in the HPI, is negative.    Objective: BP (!) 148/80   Pulse 73   Temp 97.3  F (36.3  C)   Resp 16   Ht 1.753 m (5' 9\")   Wt 120.7 kg (266 lb)   " SpO2 96%   BMI 39.28 kg/m    GENERAL APPEARANCE: Alert, in no distress, cooperative.   ENT: Mouth/posterior oropharynx intact w/ moist mucous membranes, hearing acuity [place].  EYES: EOM, conjunctivae, lids, pupils and irises normal, PERRL2.   RESP: Respiratory effort good, no respiratory distress, Lung sounds clear. On RA.   CV: Auscultation of heart reveals S1, S2, rate and rhythm regular, no murmur, no rub or gallop, Edema 2+ BLE. Peripheral pulses are 2+.  ABDOMEN: Normal bowel sounds, soft, non-tender abdomen, and no masses palpated.  SKIN: Inspection/Palpation of skin and subcutaneous tissue baseline w/ fragility. Legs are wrapped at this time.   NEURO: CN II-XII at patient's baseline, sensation baseline PPS.  PSYCH: Insight, judgement, and memory are baseline, affect and mood are happy/engaged.    Labs: Labs done in facility are in EPIC. Please refer to them using [employer] Everywhere.    ASSESSMENT/PLAN:  Chronic, continuous use of opioids  Chronic skin ulcer, limited to breakdown of skin (H)  Stasis dermatitis of lower extremity due to chronic peripheral vascular hypertension  Diarrhea, unspecified type  Yeast infection of the vagina  Acquired hypothyroidism  Major depressive disorder (H)  Acute on chronic. Ongoing.     Rebecca has an underlying major depressive disorder, which she utilizes Prozac, and sometimes even pain medicine for because she is looking for physical or psychological relief.    She acknowledges that her thyroid is off, and that this could be affecting her mood.  She is agreeable to a Synthroid change, and will order as noted below.  This may also help her energy level and her motivation with therapy, which is also low.    Discharge disposition is unclear at this time, as Rebecca will likely move to a long-term care unit.  Unclear if that will be at this facility or another, but do believe TSH should be trended with other thyroid levels.  Will order for future as noted below.    Provider  "coordinated care with nursing and with lymphedema occupational therapy.  Legs are improved as long as Carolina wears her wraps and remembers to elevate her lower extremities.  Carolina struggles with motivation and participation in therapy.  She continues to work with in-house psychologist.    Diarrhea, and yeast dermatitis is improved.  We will continue to monitor.  Follow up w/in 1 week or as needed.    Follow up w/in 1 week or as needed.    Orders:  1. Recheck TSH, Free T4, T3 x1 on 3/28. Dx: hypothyroidism.  2. Increase Synthroid to 225mcg PO QAM. Dx: hypothyroidism.     Electronically signed by:  Dr. Adilene Aguayo, RACHEL CNP DNP  ~~~~~~~~~~~~~~~~~~~~~~~~~~    Annual Wellness Visit  Are you in the first 12 months of your Medicare Part B coverage?  No  Physical Health:    In general, how would you rate your overall physical health? poor    Outside of work, how many days during the week do you exercise?none    Outside of work, approximately how many minutes a day do you exercise?not applicable    If you drink alcohol do you typically have >3 drinks per day or >7 drinks per week? No    Do you usually eat at least 4 servings of fruit and vegetables a day, include whole grains & fiber and avoid regularly eating high fat or \"junk\" foods? Yes    Do you have any problems taking medications regularly? No    Do you have any side effects from medications? none    Needs assistance for the following daily activities: transportation, shopping, preparing meals, housework, bathing, laundry, money management and taking medicine    Which of the following safety concerns are present in your home?  poor lighting     Hearing impairment: Yes, Need to ask people to speak up or repeat themselves.    In the past 6 months, have you been bothered by leaking of urine? yes    Mental Health:    In general, how would you rate your overall mental or emotional health? fair  PHQ-2 Score:   PHQ-2 ( 1999 Pfizer) 2/14/2022 11/6/2019   Q1: Little interest " "or pleasure in doing things 0 0   Q2: Feeling down, depressed or hopeless 1 0   PHQ-2 Score 1 0   PHQ-2 Total Score (12-17 Years)- Positive if 3 or more points; Administer PHQ-A if positive - 0        Do you feel safe in your environment? Yes    Have you ever done Advance Care Planning? (For example, a Health Directive, POLST, or a discussion with a medical provider or your loved ones about your wishes)? Yes, advance care planning is on file.    Fall risk: cannot perform \"get up and go\" ongoing work w/ PT/OT. High fall risk.    Cognitive Screening: CPT 5.3/5.6, grossly intact.    Do you have sleep apnea, excessive snoring or daytime drowsiness?: yes, snoring.    Current providers sharing in care for this patient include: Dr. Adilene Aguayo, RACHEL CNP DNP                      Sincerely,        RACHEL Martin CNP    "

## 2022-02-16 DIAGNOSIS — F11.90 CHRONIC, CONTINUOUS USE OF OPIOIDS: ICD-10-CM

## 2022-02-16 RX ORDER — OXYCODONE HCL 20 MG/1
20 TABLET, FILM COATED, EXTENDED RELEASE ORAL EVERY 12 HOURS
Qty: 20 TABLET | Refills: 0 | Status: SHIPPED | OUTPATIENT
Start: 2022-02-16 | End: 2022-02-24

## 2022-02-18 DIAGNOSIS — F11.90 CHRONIC, CONTINUOUS USE OF OPIOIDS: ICD-10-CM

## 2022-02-18 RX ORDER — OXYCODONE HYDROCHLORIDE 5 MG/1
5 TABLET ORAL EVERY 4 HOURS PRN
Qty: 30 TABLET | Refills: 0 | Status: SHIPPED | OUTPATIENT
Start: 2022-02-18 | End: 2022-02-24

## 2022-02-21 ENCOUNTER — TRANSITIONAL CARE UNIT VISIT (OUTPATIENT)
Dept: GERIATRICS | Facility: CLINIC | Age: 67
End: 2022-02-21
Payer: MEDICARE

## 2022-02-21 VITALS
SYSTOLIC BLOOD PRESSURE: 126 MMHG | OXYGEN SATURATION: 96 % | HEIGHT: 69 IN | HEART RATE: 58 BPM | WEIGHT: 264.3 LBS | TEMPERATURE: 97.4 F | DIASTOLIC BLOOD PRESSURE: 69 MMHG | BODY MASS INDEX: 39.14 KG/M2 | RESPIRATION RATE: 18 BRPM

## 2022-02-21 DIAGNOSIS — I87.329 STASIS DERMATITIS OF LOWER EXTREMITY DUE TO CHRONIC PERIPHERAL VASCULAR HYPERTENSION: ICD-10-CM

## 2022-02-21 DIAGNOSIS — L98.491 CHRONIC SKIN ULCER, LIMITED TO BREAKDOWN OF SKIN (H): ICD-10-CM

## 2022-02-21 DIAGNOSIS — R19.7 DIARRHEA, UNSPECIFIED TYPE: ICD-10-CM

## 2022-02-21 DIAGNOSIS — F11.90 CHRONIC, CONTINUOUS USE OF OPIOIDS: Primary | ICD-10-CM

## 2022-02-21 DIAGNOSIS — E03.9 ACQUIRED HYPOTHYROIDISM: ICD-10-CM

## 2022-02-21 DIAGNOSIS — F33.1 MODERATE EPISODE OF RECURRENT MAJOR DEPRESSIVE DISORDER (H): ICD-10-CM

## 2022-02-21 PROCEDURE — 99310 SBSQ NF CARE HIGH MDM 45: CPT | Performed by: NURSE PRACTITIONER

## 2022-02-21 NOTE — PROGRESS NOTES
"[facility] [place] [facility] [facility]  Episodic/Acute/Follow-Up  [place] MRN: [id number]. Place of Service where encounter took place:  [facility] (U/SNF) [4000]   Chief Complaint   Patient presents with     RECHECK    HPI: Carolina Mari  is a 67 year old (1955), who is being seen today for an episodic care visit.  HPI information obtained from: facility chart records, facility staff, patient report and [facility] chart review. Today's concern is:    Carolina seen today on follow-up as she continues to progress in TCU.  Her swelling and skin is under control, but nursing reports abdominal is slightly reopened.  Today, Carolina counseled about pain medications, and states she is \"always in pain\" but does remember it being much worse when she had cellulitis in her legs.  She would like to trial Lyrica, but we note her dosing on gabapentin.  Though she is nervous to make changes, she would like to see something work better for her.  She would like to stop taking fiber, and thinks that the addition of iron and her watching her dairy intake is making a difference as her diarrhea has lessened for couple days.  She denies fever, headache, shortness of breath, and states her appetite is fine.    Past Medical and Surgical History reviewed in Epic today.  MEDICATIONS:  Current Outpatient Medications   Medication Sig Dispense Refill     acetaminophen (TYLENOL) 500 MG tablet Take 1,000 mg by mouth 3 times daily        amLODIPine (NORVASC) 5 MG tablet Take 5 mg by mouth At Bedtime       Ascorbic Acid (VITAMIN C) 500 MG CAPS Take 500 mg by mouth daily       cyanocobalamin (CYANOCOBALAMIN) 1000 MCG/ML injection Inject 1 mL (1,000 mcg) into the muscle every 30 days       diclofenac (VOLTAREN) 1 % topical gel Apply 2 g topically 4 times daily To affected area       famotidine (PEPCID) 20 MG tablet Take 20 mg by mouth daily       ferrous sulfate (FEROSUL) 325 (65 Fe) MG tablet Take 325 mg by mouth daily " "(with breakfast)       FLUoxetine HCl 60 MG TABS Take 1 tablet by mouth every morning        furosemide (LASIX) 20 MG tablet Take 20 mg by mouth daily       gabapentin (NEURONTIN) 600 MG tablet Take 300,300,600 tablets by mouth 3 times daily       levothyroxine (SYNTHROID/LEVOTHROID) 200 MCG tablet Take 225 mcg by mouth daily       lidocaine patch in PLACE Place 1 patch onto the skin every morning Remove at HS       loperamide (IMODIUM) 2 MG capsule Take 1 capsule (2 mg) by mouth 4 times daily as needed for diarrhea       losartan (COZAAR) 100 MG tablet Take 100 mg by mouth daily       metoprolol tartrate (LOPRESSOR) 100 MG tablet Take 50 mg by mouth 2 times daily       MICRO GUARD 2 % external powder USE AS DIRECTED PER WOUND CARE ORDERS       mineral oil-hydrophilic petrolatum (AQUAPHOR) external ointment USE AS DIRECTED PER WOUND CARE ORDERS       NARCAN 4 MG/0.1ML nasal spray CALL 911. SPR CONTENTS OF ONE SPRAYER (0.1ML) INTO ONE NOSTRIL. REPEAT IN 2-3 MIN IF SYMPTOMS OF OPIOID EMERGENCY PERSIST, ALTERNATE NOSTRILS       oxyCODONE (OXYCONTIN) 20 MG 12 hr tablet Take 1 tablet (20 mg) by mouth every 12 hours 20 tablet 0     oxyCODONE (ROXICODONE) 5 MG tablet Take 1 tablet (5 mg) by mouth every 4 hours as needed for pain 30 tablet 0     simvastatin (ZOCOR) 20 MG tablet Take 20 mg by mouth At Bedtime        REVIEW OF SYSTEMS: Limited secondary to cognitive impairment but today pt reports the above and 10 point ROS including Respiratory, CV, GI and , other than that noted in the HPI, is negative.    Objective: /69   Pulse 58   Temp 97.4  F (36.3  C)   Resp 18   Ht 1.753 m (5' 9\")   Wt 119.9 kg (264 lb 4.8 oz)   SpO2 96%   BMI 39.03 kg/m    GENERAL APPEARANCE: Alert, in no distress, cooperative.   ENT: Mouth/posterior oropharynx intact w/ moist mucous membranes, hearing acuity [place].  EYES: EOM, conjunctivae, lids, pupils and irises normal, PERRL2.   RESP: Respiratory effort good, no respiratory distress, " Lung sounds clear. On RA.   CV: Auscultation of heart reveals S1, S2, rate and rhythm regular, no murmur, no rub or gallop, Edema 2+ BLE. Peripheral pulses are 2+.  ABDOMEN: Normal bowel sounds, soft, non-tender abdomen, and no masses palpated.  SKIN: Inspection/Palpation of skin and subcutaneous tissue baseline w/ fragility. Legs are wrapped at this time. Abdomen wound which is chronic, slightly open again as noted here:    NEURO: CN II-XII at patient's baseline, sensation baseline PPS.  PSYCH: Insight, judgement, and memory are baseline, affect and mood are happy/engaged.    Labs: Labs done in facility are in EPIC. Please refer to them using [employer] Everywhere.    ASSESSMENT/PLAN:  Chronic, continuous use of opioids  Chronic skin ulcer, limited to breakdown of skin (H)  Stasis dermatitis of lower extremity due to chronic peripheral vascular hypertension  Diarrhea, unspecified type  Acquired hypothyroidism  Major depressive disorder (H)  Acute on chronic. Tenuous.     We will discontinue FiberCon at Rebecca's request.    Diarrhea continues but has lessened, and she is using as needed Imodium which is helpful.  She has found that cutting back on dairy is helping.    Carolina agreeable to GDR of gabapentin and eventual switch to Lyrica.  It is thought that Rebecca has significant anxiety and depression and that she is treating this with OxyContin/oxycodone.  Hopeful for switch to Lyrica to reduce her need for opioid use.    Noting some low blood pressures and bradycardia on chart review, and Rebecca is complaining of fatigue, which metoprolol can contribute to.  We will slightly lower and attach holding parameters.  This will need to be carefully monitored.    Provider coordinated care with OT, nursing, and  regarding discharge disposition.  Rebecca is going to move to a long-term care bed within the next couple of weeks.  Follow up w/in 1 week or as needed.    Orders:  1. Discontinue FiberCon.  2. Decrease Gabapentin  to 300mg PO QAM, 300mg QNoon, and 600mg at bedtime. Dx: neuropathy.  3. Decrease Metoprolol to 50mg PO BID. Dx: HTN (hold for HR<60 or SBP <100).     Total time spent with patient visit was 35 min including patient visit and review of past records. Greater than 50% of total time spent with counseling and coordinating care with nursing, , and occupational therapy.     Electronically signed by:  Dr. Adilene Aguayo, RACHEL CNP DNP

## 2022-02-21 NOTE — LETTER
"    2/21/2022        RE: Carolina Mari  [address] [facility]  [address]  [facility] [address]        [facility] GERIATRIC SERVICE  Episodic/Acute/Follow-Up  [place] MRN: [id number]. Place of Service where encounter took place:  [facility] (U/SNF) [4000]   Chief Complaint   Patient presents with     RECHECK    HPI: Carolina Mari  is a 67 year old (1955), who is being seen today for an episodic care visit.  HPI information obtained from: facility chart records, facility staff, patient report and [facility] chart review. Today's concern is:    Carolina seen today on follow-up as she continues to progress in TCU.  Her swelling and skin is under control, but nursing reports abdominal is slightly reopened.  Today, Carolina counseled about pain medications, and states she is \"always in pain\" but does remember it being much worse when she had cellulitis in her legs.  She would like to trial Lyrica, but we note her dosing on gabapentin.  Though she is nervous to make changes, she would like to see something work better for her.  She would like to stop taking fiber, and thinks that the addition of iron and her watching her dairy intake is making a difference as her diarrhea has lessened for couple days.  She denies fever, headache, shortness of breath, and states her appetite is fine.    Past Medical and Surgical History reviewed in Epic today.  MEDICATIONS:  Current Outpatient Medications   Medication Sig Dispense Refill     acetaminophen (TYLENOL) 500 MG tablet Take 1,000 mg by mouth 3 times daily        amLODIPine (NORVASC) 5 MG tablet Take 5 mg by mouth At Bedtime       Ascorbic Acid (VITAMIN C) 500 MG CAPS Take 500 mg by mouth daily       cyanocobalamin (CYANOCOBALAMIN) 1000 MCG/ML injection Inject 1 mL (1,000 mcg) into the muscle every 30 days       diclofenac (VOLTAREN) 1 % topical gel Apply 2 g topically 4 times daily To affected area       famotidine (PEPCID) 20 MG tablet " "Take 20 mg by mouth daily       ferrous sulfate (FEROSUL) 325 (65 Fe) MG tablet Take 325 mg by mouth daily (with breakfast)       FLUoxetine HCl 60 MG TABS Take 1 tablet by mouth every morning        furosemide (LASIX) 20 MG tablet Take 20 mg by mouth daily       gabapentin (NEURONTIN) 600 MG tablet Take 300,300,600 tablets by mouth 3 times daily       levothyroxine (SYNTHROID/LEVOTHROID) 200 MCG tablet Take 225 mcg by mouth daily       lidocaine patch in PLACE Place 1 patch onto the skin every morning Remove at HS       loperamide (IMODIUM) 2 MG capsule Take 1 capsule (2 mg) by mouth 4 times daily as needed for diarrhea       losartan (COZAAR) 100 MG tablet Take 100 mg by mouth daily       metoprolol tartrate (LOPRESSOR) 100 MG tablet Take 50 mg by mouth 2 times daily       MICRO GUARD 2 % external powder USE AS DIRECTED PER WOUND CARE ORDERS       mineral oil-hydrophilic petrolatum (AQUAPHOR) external ointment USE AS DIRECTED PER WOUND CARE ORDERS       NARCAN 4 MG/0.1ML nasal spray CALL 911. SPR CONTENTS OF ONE SPRAYER (0.1ML) INTO ONE NOSTRIL. REPEAT IN 2-3 MIN IF SYMPTOMS OF OPIOID EMERGENCY PERSIST, ALTERNATE NOSTRILS       oxyCODONE (OXYCONTIN) 20 MG 12 hr tablet Take 1 tablet (20 mg) by mouth every 12 hours 20 tablet 0     oxyCODONE (ROXICODONE) 5 MG tablet Take 1 tablet (5 mg) by mouth every 4 hours as needed for pain 30 tablet 0     simvastatin (ZOCOR) 20 MG tablet Take 20 mg by mouth At Bedtime        REVIEW OF SYSTEMS: Limited secondary to cognitive impairment but today pt reports the above and 10 point ROS including Respiratory, CV, GI and , other than that noted in the HPI, is negative.    Objective: /69   Pulse 58   Temp 97.4  F (36.3  C)   Resp 18   Ht 1.753 m (5' 9\")   Wt 119.9 kg (264 lb 4.8 oz)   SpO2 96%   BMI 39.03 kg/m    GENERAL APPEARANCE: Alert, in no distress, cooperative.   ENT: Mouth/posterior oropharynx intact w/ moist mucous membranes, hearing acuity [place].  EYES: EOM, " conjunctivae, lids, pupils and irises normal, PERRL2.   RESP: Respiratory effort good, no respiratory distress, Lung sounds clear. On RA.   CV: Auscultation of heart reveals S1, S2, rate and rhythm regular, no murmur, no rub or gallop, Edema 2+ BLE. Peripheral pulses are 2+.  ABDOMEN: Normal bowel sounds, soft, non-tender abdomen, and no masses palpated.  SKIN: Inspection/Palpation of skin and subcutaneous tissue baseline w/ fragility. Legs are wrapped at this time. Abdomen wound which is chronic, slightly open again as noted here:    NEURO: CN II-XII at patient's baseline, sensation baseline PPS.  PSYCH: Insight, judgement, and memory are baseline, affect and mood are happy/engaged.    Labs: Labs done in facility are in EPIC. Please refer to them using [employer] Everywhere.    ASSESSMENT/PLAN:  Chronic, continuous use of opioids  Chronic skin ulcer, limited to breakdown of skin (H)  Stasis dermatitis of lower extremity due to chronic peripheral vascular hypertension  Diarrhea, unspecified type  Acquired hypothyroidism  Major depressive disorder (H)  Acute on chronic. Tenuous.     We will discontinue FiberCon at Rebecca's request.    Diarrhea continues but has lessened, and she is using as needed Imodium which is helpful.  She has found that cutting back on dairy is helping.    Carolina agreeable to GDR of gabapentin and eventual switch to Lyrica.  It is thought that Rebecca has significant anxiety and depression and that she is treating this with OxyContin/oxycodone.  Hopeful for switch to Lyrica to reduce her need for opioid use.    Noting some low blood pressures and bradycardia on chart review, and Rebecca is complaining of fatigue, which metoprolol can contribute to.  We will slightly lower and attach holding parameters.  This will need to be carefully monitored.    Provider coordinated care with OT, nursing, and  regarding discharge disposition.  Rebecca is going to move to a long-term care bed within the next  couple of weeks.  Follow up w/in 1 week or as needed.    Orders:  1. Discontinue FiberCon.  2. Decrease Gabapentin to 300mg PO QAM, 300mg QNoon, and 600mg at bedtime. Dx: neuropathy.  3. Decrease Metoprolol to 50mg PO BID. Dx: HTN (hold for HR<60 or SBP <100).     Total time spent with patient visit was 35 min including patient visit and review of past records. Greater than 50% of total time spent with counseling and coordinating care with nursing, , and occupational therapy.     Electronically signed by:  Dr. Adilene Aguayo, APRN CNP DNP        Sincerely,        Adilene Aguayo, RACHEL CNP

## 2022-02-24 DIAGNOSIS — F11.90 CHRONIC, CONTINUOUS USE OF OPIOIDS: ICD-10-CM

## 2022-02-24 RX ORDER — OXYCODONE HCL 20 MG/1
20 TABLET, FILM COATED, EXTENDED RELEASE ORAL EVERY 12 HOURS
Qty: 20 TABLET | Refills: 0 | Status: SHIPPED | OUTPATIENT
Start: 2022-02-24 | End: 2022-03-08

## 2022-02-24 RX ORDER — OXYCODONE HYDROCHLORIDE 5 MG/1
5 TABLET ORAL EVERY 4 HOURS PRN
Qty: 30 TABLET | Refills: 0 | Status: SHIPPED | OUTPATIENT
Start: 2022-02-24 | End: 2022-02-25

## 2022-02-25 DIAGNOSIS — F11.90 CHRONIC, CONTINUOUS USE OF OPIOIDS: ICD-10-CM

## 2022-02-25 RX ORDER — OXYCODONE HYDROCHLORIDE 5 MG/1
5 TABLET ORAL EVERY 4 HOURS PRN
Qty: 1 TABLET | Refills: 0 | Status: SHIPPED | OUTPATIENT
Start: 2022-02-25 | End: 2022-03-03

## 2022-02-28 ENCOUNTER — TRANSITIONAL CARE UNIT VISIT (OUTPATIENT)
Dept: GERIATRICS | Facility: CLINIC | Age: 67
End: 2022-02-28
Payer: MEDICARE

## 2022-02-28 VITALS
TEMPERATURE: 98 F | BODY MASS INDEX: 39.13 KG/M2 | SYSTOLIC BLOOD PRESSURE: 147 MMHG | HEIGHT: 69 IN | DIASTOLIC BLOOD PRESSURE: 84 MMHG | WEIGHT: 264.2 LBS | RESPIRATION RATE: 16 BRPM | HEART RATE: 63 BPM | OXYGEN SATURATION: 96 %

## 2022-02-28 DIAGNOSIS — R19.7 DIARRHEA, UNSPECIFIED TYPE: ICD-10-CM

## 2022-02-28 DIAGNOSIS — F11.90 CHRONIC, CONTINUOUS USE OF OPIOIDS: Primary | ICD-10-CM

## 2022-02-28 DIAGNOSIS — F33.1 MODERATE EPISODE OF RECURRENT MAJOR DEPRESSIVE DISORDER (H): ICD-10-CM

## 2022-02-28 DIAGNOSIS — I87.329 STASIS DERMATITIS OF LOWER EXTREMITY DUE TO CHRONIC PERIPHERAL VASCULAR HYPERTENSION: ICD-10-CM

## 2022-02-28 DIAGNOSIS — L98.491 CHRONIC SKIN ULCER, LIMITED TO BREAKDOWN OF SKIN (H): ICD-10-CM

## 2022-02-28 PROCEDURE — 99310 SBSQ NF CARE HIGH MDM 45: CPT | Performed by: NURSE PRACTITIONER

## 2022-02-28 NOTE — PROGRESS NOTES
[facility]  Episodic/Acute/Follow-Up  [place] MRN: [id number]. Place of Service where encounter took place:  [facility] (U/SNF) [4000]   Chief Complaint   Patient presents with     RECHECK    HPI: Carolina Mari  is a 67 year old (1955), who is being seen today for an episodic care visit.  HPI information obtained from: facility chart records, facility staff, patient report and [facility] chart review. Today's concern is:    Rebecca seen today on routine follow-up as she continues to rehab in TCU.  She recently toured a long-term care room, and she became very anxious and did not adjust well to the idea of having a roommate.  This is left her feeling lost and confused about her options.  Today, she thinks her abdomen is looking better, and she is happy with how her legs look.  She continues to have diarrhea.  She denies nausea, heartburn, dizziness, shortness of breath, fever, bladder problems.     Past Medical and Surgical History reviewed in Epic today.  MEDICATIONS:  Current Outpatient Medications   Medication Sig Dispense Refill     acetaminophen (TYLENOL) 500 MG tablet Take 1,000 mg by mouth 3 times daily        amLODIPine (NORVASC) 5 MG tablet Take 5 mg by mouth At Bedtime       Ascorbic Acid (VITAMIN C) 500 MG CAPS Take 500 mg by mouth daily       cyanocobalamin (CYANOCOBALAMIN) 1000 MCG/ML injection Inject 1 mL (1,000 mcg) into the muscle every 30 days       diclofenac (VOLTAREN) 1 % topical gel Apply 2 g topically 4 times daily To affected area       famotidine (PEPCID) 20 MG tablet Take 20 mg by mouth daily       ferrous sulfate (FEROSUL) 325 (65 Fe) MG tablet Take 325 mg by mouth daily (with breakfast)       FLUoxetine HCl 60 MG TABS Take 1 tablet by mouth every morning        furosemide (LASIX) 20 MG tablet Take 20 mg by mouth daily       gabapentin (NEURONTIN) 600 MG tablet Take 100,300,300 tablets by mouth 3 times daily       levothyroxine  "(SYNTHROID/LEVOTHROID) 200 MCG tablet Take 225 mcg by mouth daily       lidocaine patch in PLACE Place 1 patch onto the skin every morning Remove at HS       loperamide (IMODIUM) 2 MG capsule Take 1 capsule (2 mg) by mouth 4 times daily as needed for diarrhea       losartan (COZAAR) 100 MG tablet Take 100 mg by mouth daily       metoprolol tartrate (LOPRESSOR) 100 MG tablet Take 50 mg by mouth 2 times daily       MICRO GUARD 2 % external powder USE AS DIRECTED PER WOUND CARE ORDERS       mineral oil-hydrophilic petrolatum (AQUAPHOR) external ointment USE AS DIRECTED PER WOUND CARE ORDERS       NARCAN 4 MG/0.1ML nasal spray CALL 911. SPR CONTENTS OF ONE SPRAYER (0.1ML) INTO ONE NOSTRIL. REPEAT IN 2-3 MIN IF SYMPTOMS OF OPIOID EMERGENCY PERSIST, ALTERNATE NOSTRILS       oxyCODONE (OXYCONTIN) 20 MG 12 hr tablet Take 1 tablet (20 mg) by mouth every 12 hours 20 tablet 0     oxyCODONE (ROXICODONE) 5 MG tablet Take 1 tablet (5 mg) by mouth every 4 hours as needed for pain Ok to take from [employer] 1 tablet 0     simvastatin (ZOCOR) 20 MG tablet Take 20 mg by mouth At Bedtime        REVIEW OF SYSTEMS: Limited secondary to cognitive impairment but today pt reports the above and 10 point ROS including Respiratory, CV, GI and , other than that noted in the HPI, is negative.    Objective: BP (!) 147/84   Pulse 63   Temp 98  F (36.7  C)   Resp 16   Ht 1.753 m (5' 9\")   Wt 119.8 kg (264 lb 3.2 oz)   SpO2 96%   BMI 39.02 kg/m     GENERAL APPEARANCE: Alert, in no distress, cooperative.   ENT: Mouth/posterior oropharynx intact w/ moist mucous membranes, hearing acuity [place].  EYES: EOM, conjunctivae, lids, pupils and irises normal, PERRL2.   RESP: Respiratory effort good, no respiratory distress, Lung sounds clear. On RA.   CV: Auscultation of heart reveals S1, S2, rate and rhythm regular, no murmur, no rub or gallop, Edema 2+ BLE. Peripheral pulses are 2+.  ABDOMEN: Normal bowel sounds, soft, non-tender abdomen, and no masses " palpated.  SKIN: Inspection/Palpation of skin and subcutaneous tissue baseline w/ fragility. Legs are improving (covered at this time), and abdomen is as pictured here:    NEURO: CN II-XII at patient's baseline, sensation baseline PPS.  PSYCH: Insight, judgement, and memory are baseline, affect and mood are happy/engaged.    Labs: Labs done in facility are in EPIC. Please refer to them using EPIC/Care Everywhere.    ASSESSMENT/PLAN:  Chronic, continuous use of opioids  Chronic skin ulcer, limited to breakdown of skin (H)  Stasis dermatitis of lower extremity due to chronic peripheral vascular hypertension  Diarrhea, unspecified type  Major depressive disorder (H)  Acute on chronic. Tenuous.     Rebecca states that she wants more Prozac, because she is so beside herself with her living situation.  Provider coordinated care with , and wondered about the potential for assisted living facility since Rebecca is already on elderly waiver and is able to independently stand-pivot-transfer.  Provider counseled Rebecca on assisted living's that were located nearby, and directed Rebecca to work with  around arranging a tour.    Rebecca continues to want to switch from gabapentin to Lyrica, and her pain has been well controlled during this time.  We will continue GDR of gabapentin as noted below.  Rebecca does not want anything else changed (OxyContin or oxycodone).    Provider coordinated care with nursing surrounding the abdominal wound which reopened last week.  It appears improved this week, and nursing will keep open to air as noted below.    Stasis dermatitis is ongoing, and needs constant management to prevent infection.    Diarrhea is ongoing, unresolved, and resistant to our trials of medications.  Rebecca does not have concern for dehydration or infectious process.  Follow up w/in 1 week or as needed.    Orders:  1. Decrease Regi to 100mg QAM, 300mg QNoon, 300mg at bedtime. Dx: neuropathy.    2. Keep abd wound BOB.  Dx: skin care.     Total time spent with patient visit was 35 min including patient visit and review of past records. Greater than 50% of total time spent with counseling Rebecca directly and coordinating care with nursing and  as noted above.     Electronically signed by:  Dr. Adilene Aguayo, RACHEL CNP DNP

## 2022-02-28 NOTE — LETTER
2/28/2022        RE: Carolina Mari  [address] [address]  [address]  [place] [address]        [facility]  Episodic/Acute/Follow-Up  [place] MRN: [id number]. Place of Service where encounter took place:  [facility] (U/SNF) [4000]   Chief Complaint   Patient presents with     RECHECK    HPI: Carolina Mari  is a 67 year old (1955), who is being seen today for an episodic care visit.  HPI information obtained from: facility chart records, facility staff, patient report and [facility] chart review. Today's concern is:    Rebecca seen today on routine follow-up as she continues to rehab in TCU.  She recently toured a long-term care room, and she became very anxious and did not adjust well to the idea of having a roommate.  This is left her feeling lost and confused about her options.  Today, she thinks her abdomen is looking better, and she is happy with how her legs look.  She continues to have diarrhea.  She denies nausea, heartburn, dizziness, shortness of breath, fever, bladder problems.     Past Medical and Surgical History reviewed in Epic today.  MEDICATIONS:  Current Outpatient Medications   Medication Sig Dispense Refill     acetaminophen (TYLENOL) 500 MG tablet Take 1,000 mg by mouth 3 times daily        amLODIPine (NORVASC) 5 MG tablet Take 5 mg by mouth At Bedtime       Ascorbic Acid (VITAMIN C) 500 MG CAPS Take 500 mg by mouth daily       cyanocobalamin (CYANOCOBALAMIN) 1000 MCG/ML injection Inject 1 mL (1,000 mcg) into the muscle every 30 days       diclofenac (VOLTAREN) 1 % topical gel Apply 2 g topically 4 times daily To affected area       famotidine (PEPCID) 20 MG tablet Take 20 mg by mouth daily       ferrous sulfate (FEROSUL) 325 (65 Fe) MG tablet Take 325 mg by mouth daily (with breakfast)       FLUoxetine HCl 60 MG TABS Take 1 tablet by mouth every morning        furosemide (LASIX) 20 MG tablet Take 20 mg by mouth daily        "gabapentin (NEURONTIN) 600 MG tablet Take 100,300,300 tablets by mouth 3 times daily       levothyroxine (SYNTHROID/LEVOTHROID) 200 MCG tablet Take 225 mcg by mouth daily       lidocaine patch in PLACE Place 1 patch onto the skin every morning Remove at HS       loperamide (IMODIUM) 2 MG capsule Take 1 capsule (2 mg) by mouth 4 times daily as needed for diarrhea       losartan (COZAAR) 100 MG tablet Take 100 mg by mouth daily       metoprolol tartrate (LOPRESSOR) 100 MG tablet Take 50 mg by mouth 2 times daily       MICRO GUARD 2 % external powder USE AS DIRECTED PER WOUND CARE ORDERS       mineral oil-hydrophilic petrolatum (AQUAPHOR) external ointment USE AS DIRECTED PER WOUND CARE ORDERS       NARCAN 4 MG/0.1ML nasal spray CALL 911. SPR CONTENTS OF ONE SPRAYER (0.1ML) INTO ONE NOSTRIL. REPEAT IN 2-3 MIN IF SYMPTOMS OF OPIOID EMERGENCY PERSIST, ALTERNATE NOSTRILS       oxyCODONE (OXYCONTIN) 20 MG 12 hr tablet Take 1 tablet (20 mg) by mouth every 12 hours 20 tablet 0     oxyCODONE (ROXICODONE) 5 MG tablet Take 1 tablet (5 mg) by mouth every 4 hours as needed for pain Ok to take from EKIt 1 tablet 0     simvastatin (ZOCOR) 20 MG tablet Take 20 mg by mouth At Bedtime        REVIEW OF SYSTEMS: Limited secondary to cognitive impairment but today pt reports the above and 10 point ROS including Respiratory, CV, GI and , other than that noted in the HPI, is negative.    Objective: BP (!) 147/84   Pulse 63   Temp 98  F (36.7  C)   Resp 16   Ht 1.753 m (5' 9\")   Wt 119.8 kg (264 lb 3.2 oz)   SpO2 96%   BMI 39.02 kg/m     GENERAL APPEARANCE: Alert, in no distress, cooperative.   ENT: Mouth/posterior oropharynx intact w/ moist mucous membranes, hearing acuity [place].  EYES: EOM, conjunctivae, lids, pupils and irises normal, PERRL2.   RESP: Respiratory effort good, no respiratory distress, Lung sounds clear. On RA.   CV: Auscultation of heart reveals S1, S2, rate and rhythm regular, no murmur, no rub or gallop, Edema 2+ " BLE. Peripheral pulses are 2+.  ABDOMEN: Normal bowel sounds, soft, non-tender abdomen, and no masses palpated.  SKIN: Inspection/Palpation of skin and subcutaneous tissue baseline w/ fragility. Legs are improving (covered at this time), and abdomen is as pictured here:    NEURO: CN II-XII at patient's baseline, sensation baseline PPS.  PSYCH: Insight, judgement, and memory are baseline, affect and mood are happy/engaged.    Labs: Labs done in facility are in EPIC. Please refer to them using [employer] Everywhere.    ASSESSMENT/PLAN:  Chronic, continuous use of opioids  Chronic skin ulcer, limited to breakdown of skin (H)  Stasis dermatitis of lower extremity due to chronic peripheral vascular hypertension  Diarrhea, unspecified type  Major depressive disorder (H)  Acute on chronic. Tenuous.     Rebecca states that she wants more Prozac, because she is so beside herself with her living situation.  Provider coordinated care with , and wondered about the potential for assisted living facility since Rebecca is already on elderly waiver and is able to independently stand-pivot-transfer.  Provider counseled Rebecca on assisted living's that were located nearby, and directed Rebecca to work with  around arranging a tour.    Rebecca continues to want to switch from gabapentin to Lyrica, and her pain has been well controlled during this time.  We will continue GDR of gabapentin as noted below.  Rebecca does not want anything else changed (OxyContin or oxycodone).    Provider coordinated care with nursing surrounding the abdominal wound which reopened last week.  It appears improved this week, and nursing will keep open to air as noted below.    Stasis dermatitis is ongoing, and needs constant management to prevent infection.    Diarrhea is ongoing, unresolved, and resistant to our trials of medications.  Rebecca does not have concern for dehydration or infectious process.  Follow up w/in 1 week or as needed.    Orders:  1.  Decrease Regi to 100mg QAM, 300mg QNoon, 300mg at bedtime. Dx: neuropathy.    2. Keep abd wound BOB. Dx: skin care.     Total time spent with patient visit was 35 min including patient visit and review of past records. Greater than 50% of total time spent with counseling Rebecca directly and coordinating care with nursing and  as noted above.     Electronically signed by:  RACHEL Alvarez CNP DNP        Sincerely,        RACHEL Martin CNP

## 2022-03-03 DIAGNOSIS — F11.90 CHRONIC, CONTINUOUS USE OF OPIOIDS: ICD-10-CM

## 2022-03-03 RX ORDER — OXYCODONE HYDROCHLORIDE 5 MG/1
5 TABLET ORAL EVERY 4 HOURS PRN
Qty: 30 TABLET | Refills: 0 | Status: SHIPPED | OUTPATIENT
Start: 2022-03-03 | End: 2022-03-08

## 2022-03-07 ENCOUNTER — TRANSITIONAL CARE UNIT VISIT (OUTPATIENT)
Dept: GERIATRICS | Facility: CLINIC | Age: 67
End: 2022-03-07
Payer: MEDICARE

## 2022-03-07 VITALS
WEIGHT: 258 LBS | OXYGEN SATURATION: 96 % | RESPIRATION RATE: 18 BRPM | SYSTOLIC BLOOD PRESSURE: 138 MMHG | HEART RATE: 62 BPM | BODY MASS INDEX: 38.21 KG/M2 | DIASTOLIC BLOOD PRESSURE: 64 MMHG | HEIGHT: 69 IN | TEMPERATURE: 97.4 F

## 2022-03-07 DIAGNOSIS — D63.8 ANEMIA IN OTHER CHRONIC DISEASES CLASSIFIED ELSEWHERE: ICD-10-CM

## 2022-03-07 DIAGNOSIS — R19.7 DIARRHEA DUE TO MALABSORPTION: ICD-10-CM

## 2022-03-07 DIAGNOSIS — Z86.16 HISTORY OF COVID-19: ICD-10-CM

## 2022-03-07 DIAGNOSIS — I10 ESSENTIAL HYPERTENSION: ICD-10-CM

## 2022-03-07 DIAGNOSIS — F33.1 MODERATE EPISODE OF RECURRENT MAJOR DEPRESSIVE DISORDER (H): ICD-10-CM

## 2022-03-07 DIAGNOSIS — R62.7 ADULT FAILURE TO THRIVE SYNDROME: Primary | ICD-10-CM

## 2022-03-07 DIAGNOSIS — E11.9 DIET-CONTROLLED DIABETES MELLITUS (H): ICD-10-CM

## 2022-03-07 DIAGNOSIS — E66.812 CLASS 2 SEVERE OBESITY DUE TO EXCESS CALORIES WITH SERIOUS COMORBIDITY AND BODY MASS INDEX (BMI) OF 38.0 TO 38.9 IN ADULT (H): ICD-10-CM

## 2022-03-07 DIAGNOSIS — M25.519 ARTHRALGIA OF SHOULDER, UNSPECIFIED LATERALITY: ICD-10-CM

## 2022-03-07 DIAGNOSIS — K90.9 DIARRHEA DUE TO MALABSORPTION: ICD-10-CM

## 2022-03-07 DIAGNOSIS — E66.01 CLASS 2 SEVERE OBESITY DUE TO EXCESS CALORIES WITH SERIOUS COMORBIDITY AND BODY MASS INDEX (BMI) OF 38.0 TO 38.9 IN ADULT (H): ICD-10-CM

## 2022-03-07 PROCEDURE — 99309 SBSQ NF CARE MODERATE MDM 30: CPT | Performed by: FAMILY MEDICINE

## 2022-03-07 NOTE — PROGRESS NOTES
[place] [facility]  Chief Complaint   Patient presents with     shelter Regulatory     [place] Medical Record Number:  [id number]  Place of Service where encounter took place:  [facility] (U/SNF) [[id number]]    HPI:    Carolina Mari  is 67 year old (1955), who is being seen today for a federally mandated E/M visit.  HPI information obtained from: facility chart records, facility staff, patient report and [facility] chart review.     Today's concerns are:  -  - Resident seen and examined.   - Reports doing better with rehab. Can transfer her self to the .   - endorses ongoing chronic diarrhea. Had  Ileal resection about 15 years ago followed with colostomy  Which was reversed about 14 years ago. Endorses that diarrhea is worse with fat diet. Denies nausea/vomiting  -- Nursing staff and DNP have no concern today.     --------------------------------  - - Past Medical, social, family histories, medications, and allergies reviewed and updated  - Medications reviewed: in the chart and EHR.   - Case Management:   I have reviewed the care plan and MDS and do agree with the plan. Patient's desire to return to the community is not present.  Information reviewed:  Medications, vital signs, orders, and nursing notes.    MEDICATIONS:  Current Outpatient Medications   Medication Sig Dispense Refill     acetaminophen (TYLENOL) 500 MG tablet Take 1,000 mg by mouth 3 times daily        amLODIPine (NORVASC) 5 MG tablet Take 5 mg by mouth At Bedtime       Ascorbic Acid (VITAMIN C) 500 MG CAPS Take 500 mg by mouth daily       cyanocobalamin (CYANOCOBALAMIN) 1000 MCG/ML injection Inject 1 mL (1,000 mcg) into the muscle every 30 days       diclofenac (VOLTAREN) 1 % topical gel Apply 2 g topically 4 times daily To affected area       famotidine (PEPCID) 20 MG tablet Take 20 mg by mouth daily       ferrous sulfate (FEROSUL) 325 (65 Fe) MG tablet Take 325 mg by mouth daily (with breakfast)    "    FLUoxetine HCl 60 MG TABS Take 1 tablet by mouth every morning        furosemide (LASIX) 20 MG tablet Take 20 mg by mouth daily       gabapentin (NEURONTIN) 600 MG tablet Take 100 tablets by mouth 2 times daily       levothyroxine (SYNTHROID/LEVOTHROID) 200 MCG tablet Take 225 mcg by mouth daily       lidocaine patch in PLACE Place 1 patch onto the skin every morning Remove at HS       loperamide (IMODIUM) 2 MG capsule Take 1 capsule (2 mg) by mouth 4 times daily as needed for diarrhea       losartan (COZAAR) 100 MG tablet Take 100 mg by mouth daily       metoprolol tartrate (LOPRESSOR) 100 MG tablet Take 50 mg by mouth 2 times daily       MICRO GUARD 2 % external powder USE AS DIRECTED PER WOUND CARE ORDERS       mineral oil-hydrophilic petrolatum (AQUAPHOR) external ointment USE AS DIRECTED PER WOUND CARE ORDERS       NARCAN 4 MG/0.1ML nasal spray CALL 911. SPR CONTENTS OF ONE SPRAYER (0.1ML) INTO ONE NOSTRIL. REPEAT IN 2-3 MIN IF SYMPTOMS OF OPIOID EMERGENCY PERSIST, ALTERNATE NOSTRILS       oxyCODONE (OXYCONTIN) 20 MG 12 hr tablet Take 1 tablet (20 mg) by mouth every 12 hours 30 tablet 0     oxyCODONE (ROXICODONE) 5 MG tablet Take 1 tablet (5 mg) by mouth every 4 hours as needed for pain 45 tablet 0     [START ON 3/21/2022] pregabalin (LYRICA) 25 MG capsule Take 1 capsule (25 mg) by mouth At Bedtime 15 capsule 0     simvastatin (ZOCOR) 20 MG tablet Take 20 mg by mouth At Bedtime        ROS:  4 point ROS including Respiratory, CV, GI and , other than that noted in the HPI,  is negative    Vitals:  /64   Pulse 62   Temp 97.4  F (36.3  C)   Resp 18   Ht 1.753 m (5' 9\")   Wt 117 kg (258 lb)   SpO2 96%   BMI 38.10 kg/m    Body mass index is 38.1 kg/m .  Exam:  GENERAL APPEARANCE:  in no distress, cooperative  RESP:  lungs clear to auscultation   CV:  S1S2 audible, regular HR, no murmur appreciated.   ABDOMEN:  Morbidly obese, soft, NT, BS audible. no mass appreciated on palpation.   M/S:   no joint " deformity noted on observation.   SKIN:  Wound over abdomen with a good granulation formation.   NEURO:   No NFD appreciated on observation. Hand  5/5 b/l  PSYCH:  normal insight, judgement and memory, affect and mood normal    Lab/Diagnostic data: Reviewed in the chart and EHR.        ASSESSMENT/PLAN  ------------------------------  Adult failure to thrive syndrome  Recurrent hospitalization and TCU placement  Right shoulder acute pain, query rotator cuff injury  Wheel chair bound  - analgesia optimal  - making a progress with the rehab program, continue until desired goal is achieved.        Diet Controlled diabetes (H): HBA1C 5.2%    Essential HTN: controlled. No concern      Moderate episode of recurrent major depressive disorder (H):  adjusting well. No concern.   Class 2 severe obesity due to excess calories with serious comorbidity and body mass index Body mass index is 38.1 kg/m . from 66.71 kg/m  in adult (H)  -  Does have serious complications related to obesity which are Diabetes, Hyperlipidemia and Hypertension. Encouraged to    to reduce portion sizes, healthy choices and increased activity when feasible.        Chronic diarrhea in setting of hx of ileal resection: on imodium, could benefit from cholestyramine. Counseled to avoid fat-rich food.        Chronic Anemia, normocytic: Hb 10.1 mg/dl.  Asymptomatic.      COVID 19 infection: detected upon admission to TCU, fully vaccinated. Asymptomatic, and today completed isolation precaution.   Electronically signed by:  Ariana Bose MD

## 2022-03-07 NOTE — LETTER
3/7/2022        RE: Carolina Mari  [address] [address]  [address]  [place] [address]        [place] [facility]  Chief Complaint   Patient presents with     custodial Regulatory     [place] Medical Record Number:  [id number]  Place of Service where encounter took place:  [facility] (U/SNF) [[id number]]    HPI:    Carolina Mari  is 67 year old (1955), who is being seen today for a federally mandated E/M visit.  HPI information obtained from: facility chart records, facility staff, patient report and [place] Epic chart review.     Today's concerns are:  -  - Resident seen and examined.   - Reports doing better with rehab. Can transfer her self to the .   - endorses ongoing chronic diarrhea. Had  Ileal resection about 15 years ago followed with colostomy  Which was reversed about 14 years ago. Endorses that diarrhea is worse with fat diet. Denies nausea/vomiting  -- Nursing staff and DNP have no concern today.     --------------------------------  - - Past Medical, social, family histories, medications, and allergies reviewed and updated  - Medications reviewed: in the chart and EHR.   - Case Management:   I have reviewed the care plan and MDS and do agree with the plan. Patient's desire to return to the community is not present.  Information reviewed:  Medications, vital signs, orders, and nursing notes.    MEDICATIONS:  Current Outpatient Medications   Medication Sig Dispense Refill     acetaminophen (TYLENOL) 500 MG tablet Take 1,000 mg by mouth 3 times daily        amLODIPine (NORVASC) 5 MG tablet Take 5 mg by mouth At Bedtime       Ascorbic Acid (VITAMIN C) 500 MG CAPS Take 500 mg by mouth daily       cyanocobalamin (CYANOCOBALAMIN) 1000 MCG/ML injection Inject 1 mL (1,000 mcg) into the muscle every 30 days       diclofenac (VOLTAREN) 1 % topical gel Apply 2 g topically 4 times daily To affected area       famotidine (PEPCID) 20 MG tablet Take 20 mg by  "mouth daily       ferrous sulfate (FEROSUL) 325 (65 Fe) MG tablet Take 325 mg by mouth daily (with breakfast)       FLUoxetine HCl 60 MG TABS Take 1 tablet by mouth every morning        furosemide (LASIX) 20 MG tablet Take 20 mg by mouth daily       gabapentin (NEURONTIN) 600 MG tablet Take 100 tablets by mouth 2 times daily       levothyroxine (SYNTHROID/LEVOTHROID) 200 MCG tablet Take 225 mcg by mouth daily       lidocaine patch in PLACE Place 1 patch onto the skin every morning Remove at HS       loperamide (IMODIUM) 2 MG capsule Take 1 capsule (2 mg) by mouth 4 times daily as needed for diarrhea       losartan (COZAAR) 100 MG tablet Take 100 mg by mouth daily       metoprolol tartrate (LOPRESSOR) 100 MG tablet Take 50 mg by mouth 2 times daily       MICRO GUARD 2 % external powder USE AS DIRECTED PER WOUND CARE ORDERS       mineral oil-hydrophilic petrolatum (AQUAPHOR) external ointment USE AS DIRECTED PER WOUND CARE ORDERS       NARCAN 4 MG/0.1ML nasal spray CALL 911. SPR CONTENTS OF ONE SPRAYER (0.1ML) INTO ONE NOSTRIL. REPEAT IN 2-3 MIN IF SYMPTOMS OF OPIOID EMERGENCY PERSIST, ALTERNATE NOSTRILS       oxyCODONE (OXYCONTIN) 20 MG 12 hr tablet Take 1 tablet (20 mg) by mouth every 12 hours 30 tablet 0     oxyCODONE (ROXICODONE) 5 MG tablet Take 1 tablet (5 mg) by mouth every 4 hours as needed for pain 45 tablet 0     [START ON 3/21/2022] pregabalin (LYRICA) 25 MG capsule Take 1 capsule (25 mg) by mouth At Bedtime 15 capsule 0     simvastatin (ZOCOR) 20 MG tablet Take 20 mg by mouth At Bedtime        ROS:  4 point ROS including Respiratory, CV, GI and , other than that noted in the HPI,  is negative    Vitals:  /64   Pulse 62   Temp 97.4  F (36.3  C)   Resp 18   Ht 1.753 m (5' 9\")   Wt 117 kg (258 lb)   SpO2 96%   BMI 38.10 kg/m    Body mass index is 38.1 kg/m .  Exam:  GENERAL APPEARANCE:  in no distress, cooperative  RESP:  lungs clear to auscultation   CV:  S1S2 audible, regular HR, no murmur " appreciated.   ABDOMEN:  Morbidly obese, soft, NT, BS audible. no mass appreciated on palpation.   M/S:   no joint deformity noted on observation.   SKIN:  Wound over abdomen with a good granulation formation.   NEURO:   No NFD appreciated on observation. Hand  5/5 b/l  PSYCH:  normal insight, judgement and memory, affect and mood normal    Lab/Diagnostic data: Reviewed in the chart and EHR.        ASSESSMENT/PLAN  ------------------------------  Adult failure to thrive syndrome  Recurrent hospitalization and TCU placement  Right shoulder acute pain, query rotator cuff injury  Wheel chair bound  - analgesia optimal  - making a progress with the rehab program, continue until desired goal is achieved.        Diet Controlled diabetes (H): HBA1C 5.2%    Essential HTN: controlled. No concern      Moderate episode of recurrent major depressive disorder (H):  adjusting well. No concern.   Class 2 severe obesity due to excess calories with serious comorbidity and body mass index Body mass index is 38.1 kg/m . from 66.71 kg/m  in adult (H)  -  Does have serious complications related to obesity which are Diabetes, Hyperlipidemia and Hypertension. Encouraged to    to reduce portion sizes, healthy choices and increased activity when feasible.        Chronic diarrhea in setting of hx of ileal resection: on imodium, could benefit from cholestyramine. Counseled to avoid fat-rich food.        Chronic Anemia, normocytic: Hb 10.1 mg/dl.  Asymptomatic.      COVID 19 infection: detected upon admission to TCU, fully vaccinated. Asymptomatic, and today completed isolation precaution.   Electronically signed by:  Ariana Bose MD               Sincerely,        Ariana Bose MD

## 2022-03-08 DIAGNOSIS — F11.90 CHRONIC, CONTINUOUS USE OF OPIOIDS: ICD-10-CM

## 2022-03-08 RX ORDER — OXYCODONE HYDROCHLORIDE 5 MG/1
5 TABLET ORAL EVERY 4 HOURS PRN
Qty: 45 TABLET | Refills: 0 | Status: SHIPPED | OUTPATIENT
Start: 2022-03-08 | End: 2022-03-14

## 2022-03-08 RX ORDER — OXYCODONE HCL 20 MG/1
20 TABLET, FILM COATED, EXTENDED RELEASE ORAL EVERY 12 HOURS
Qty: 30 TABLET | Refills: 0 | Status: SHIPPED | OUTPATIENT
Start: 2022-03-08 | End: 2022-03-14

## 2022-03-14 ENCOUNTER — TRANSITIONAL CARE UNIT VISIT (OUTPATIENT)
Dept: GERIATRICS | Facility: CLINIC | Age: 67
End: 2022-03-14
Payer: MEDICARE

## 2022-03-14 VITALS
BODY MASS INDEX: 38.51 KG/M2 | HEART RATE: 76 BPM | WEIGHT: 260 LBS | DIASTOLIC BLOOD PRESSURE: 88 MMHG | OXYGEN SATURATION: 95 % | SYSTOLIC BLOOD PRESSURE: 164 MMHG | RESPIRATION RATE: 16 BRPM | TEMPERATURE: 98.2 F | HEIGHT: 69 IN

## 2022-03-14 DIAGNOSIS — L98.491 CHRONIC SKIN ULCER, LIMITED TO BREAKDOWN OF SKIN (H): ICD-10-CM

## 2022-03-14 DIAGNOSIS — R19.7 DIARRHEA, UNSPECIFIED TYPE: ICD-10-CM

## 2022-03-14 DIAGNOSIS — I87.329 STASIS DERMATITIS OF LOWER EXTREMITY DUE TO CHRONIC PERIPHERAL VASCULAR HYPERTENSION: ICD-10-CM

## 2022-03-14 DIAGNOSIS — F33.1 MODERATE EPISODE OF RECURRENT MAJOR DEPRESSIVE DISORDER (H): ICD-10-CM

## 2022-03-14 DIAGNOSIS — F11.90 CHRONIC, CONTINUOUS USE OF OPIOIDS: Primary | ICD-10-CM

## 2022-03-14 PROCEDURE — 99310 SBSQ NF CARE HIGH MDM 45: CPT | Performed by: NURSE PRACTITIONER

## 2022-03-14 RX ORDER — OXYCODONE HYDROCHLORIDE 5 MG/1
5 TABLET ORAL EVERY 4 HOURS PRN
Qty: 45 TABLET | Refills: 0 | Status: SHIPPED | OUTPATIENT
Start: 2022-03-14 | End: 2022-03-26

## 2022-03-14 RX ORDER — PREGABALIN 25 MG/1
25 CAPSULE ORAL AT BEDTIME
Qty: 15 CAPSULE | Refills: 0 | Status: SHIPPED | OUTPATIENT
Start: 2022-03-21 | End: 2023-01-01

## 2022-03-14 RX ORDER — OXYCODONE HCL 20 MG/1
20 TABLET, FILM COATED, EXTENDED RELEASE ORAL EVERY 12 HOURS
Qty: 30 TABLET | Refills: 0 | Status: SHIPPED | OUTPATIENT
Start: 2022-03-14 | End: 2022-04-01

## 2022-03-14 RX ORDER — PREGABALIN 25 MG/1
25 CAPSULE ORAL AT BEDTIME
COMMUNITY
Start: 2022-03-21 | End: 2022-03-14

## 2022-03-14 NOTE — LETTER
3/14/2022        RE: Carolina Mari  [address]  [address]  [place] [address]        [facility]  Episodic/Acute/Follow-Up  [place] MRN: [id number]. Place of Service where encounter took place:  [facility] (U/SNF) [4000]   Chief Complaint   Patient presents with     RECHECK    HPI: Carolina Mari  is a 67 year old (1955), who is being seen today for an episodic care visit.  HPI information obtained from: facility chart records, facility staff, patient report and [facility] chart review. Today's concern is:    Rebecca seen today on routine follow-up as she continues to rehab in TCU.  Today, she states that she still having diarrhea and it is really getting her down.  She is willing to try anything new.  She denies fever, cough, bladder problems, but does feel a little bit short of breath today.  She admits that her legs were dependent for much of the morning, and is putting them up now.  She feels this is the reasoning behind her swelling.  She still wants to continue with gabapentin reduction and switch to Lyrica.    Past Medical and Surgical History reviewed in Epic today.  MEDICATIONS:  Current Outpatient Medications   Medication Sig Dispense Refill     oxyCODONE (OXYCONTIN) 20 MG 12 hr tablet Take 1 tablet (20 mg) by mouth every 12 hours 30 tablet 0     oxyCODONE (ROXICODONE) 5 MG tablet Take 1 tablet (5 mg) by mouth every 4 hours as needed for pain 45 tablet 0     [START ON 3/21/2022] pregabalin (LYRICA) 25 MG capsule Take 1 capsule (25 mg) by mouth At Bedtime 15 capsule 0     acetaminophen (TYLENOL) 500 MG tablet Take 1,000 mg by mouth 3 times daily        amLODIPine (NORVASC) 5 MG tablet Take 5 mg by mouth At Bedtime       Ascorbic Acid (VITAMIN C) 500 MG CAPS Take 500 mg by mouth daily       cyanocobalamin (CYANOCOBALAMIN) 1000 MCG/ML injection Inject 1 mL (1,000 mcg) into the muscle every 30 days       diclofenac (VOLTAREN) 1 %  "topical gel Apply 2 g topically 4 times daily To affected area       famotidine (PEPCID) 20 MG tablet Take 20 mg by mouth daily       ferrous sulfate (FEROSUL) 325 (65 Fe) MG tablet Take 325 mg by mouth daily (with breakfast)       FLUoxetine HCl 60 MG TABS Take 1 tablet by mouth every morning        furosemide (LASIX) 20 MG tablet Take 20 mg by mouth daily       gabapentin (NEURONTIN) 600 MG tablet Take 100 tablets by mouth 2 times daily       levothyroxine (SYNTHROID/LEVOTHROID) 200 MCG tablet Take 225 mcg by mouth daily       lidocaine patch in PLACE Place 1 patch onto the skin every morning Remove at HS       loperamide (IMODIUM) 2 MG capsule Take 1 capsule (2 mg) by mouth 4 times daily as needed for diarrhea       losartan (COZAAR) 100 MG tablet Take 100 mg by mouth daily       metoprolol tartrate (LOPRESSOR) 100 MG tablet Take 50 mg by mouth 2 times daily       MICRO GUARD 2 % external powder USE AS DIRECTED PER WOUND CARE ORDERS       mineral oil-hydrophilic petrolatum (AQUAPHOR) external ointment USE AS DIRECTED PER WOUND CARE ORDERS       NARCAN 4 MG/0.1ML nasal spray CALL 911. SPR CONTENTS OF ONE SPRAYER (0.1ML) INTO ONE NOSTRIL. REPEAT IN 2-3 MIN IF SYMPTOMS OF OPIOID EMERGENCY PERSIST, ALTERNATE NOSTRILS       simvastatin (ZOCOR) 20 MG tablet Take 20 mg by mouth At Bedtime        REVIEW OF SYSTEMS: Limited secondary to cognitive impairment but today pt reports the above and 10 point ROS including Respiratory, CV, GI and , other than that noted in the HPI, is negative.    Objective: BP (!) 164/88   Pulse 76   Temp 98.2  F (36.8  C)   Resp 16   Ht 1.753 m (5' 9\")   Wt 117.9 kg (260 lb)   SpO2 95%   BMI 38.40 kg/m     GENERAL APPEARANCE: Alert, in no distress, cooperative.   ENT: Mouth/posterior oropharynx intact w/ moist mucous membranes, hearing acuity [place].  EYES: EOM, conjunctivae, lids, pupils and irises normal, PERRL2.   RESP: Respiratory effort good, no respiratory distress, Lung sounds " clear. On RA.   CV: Auscultation of heart reveals S1, S2, rate and rhythm regular, no murmur, no rub or gallop, Edema 2+ BLE. Peripheral pulses are 2+.  ABDOMEN: Normal bowel sounds, soft, non-tender abdomen, and no masses palpated.  SKIN: Inspection/Palpation of skin and subcutaneous tissue baseline w/ fragility. Legs are improving (covered at this time), MASD is improved, abdomen is shown below:    NEURO: CN II-XII at patient's baseline, sensation baseline PPS.  PSYCH: Insight, judgement, and memory are baseline, affect and mood are happy/engaged.    Labs: Labs done in facility are in EPIC. Please refer to them using [employer]ids Note Everywhere.    ASSESSMENT/PLAN:  Chronic, continuous use of opioids  Chronic skin ulcer, limited to breakdown of skin (H)  Stasis dermatitis of lower extremity due to chronic peripheral vascular hypertension  Diarrhea, unspecified type  Major depressive disorder (H)  Acute on chronic. Tenuous.     Rebecca continues to have diarrhea, and is ready to try Questran.  Will order as noted below and counseled Rebecca on using this type of medicine.    Noting that stasis dermatitis is improving, but some edema and shortness of breath noted today.  We will have to carefully monitor, though weight has only changed by 1 pound in the last week.    We will continue taper of gabapentin which will be complete later this week.  Will then start very low-dose Lyrica.  Goal for Lyrica to increase while oxycodone decreases.    Noting changes in gabapentin, Lyrica, and ongoing diarrhea.  Because of this, will trend BMP as noted below.    These are high risk medications, and though changes have been tolerated well, Rebecca will need close follow-up.  Follow up w/in 1 week or as needed.    Orders:  1. Decrease Gabapentin to 100mg PO BID x 5 days then discontinue.   2. On 3/21, start Lyrica 25mg PO at bedtime. Dx: neuropathy.  3. BMP x1 on 3/17. Dx: diarrhea.   4. Questran Light 4G PO BID. Dx: diarrhea.     Electronically  signed by:  Dr. Adilene Aguayo, APRN CNP DNP        Sincerely,        Adilene Aguayo, RACHEL CNP

## 2022-03-14 NOTE — PROGRESS NOTES
[facility] [place] [facility]  Episodic/Acute/Follow-Up  [place] MRN: [id number]. Place of Service where encounter took place:  [facility] (U/SNF) [4000]   Chief Complaint   Patient presents with     RECHECK    HPI: Carolina Mari  is a 67 year old (1955), who is being seen today for an episodic care visit.  HPI information obtained from: facility chart records, facility staff, patient report and [facility] chart review. Today's concern is:    Rebecca seen today on routine follow-up as she continues to rehab in TCU.  Today, she states that she still having diarrhea and it is really getting her down.  She is willing to try anything new.  She denies fever, cough, bladder problems, but does feel a little bit short of breath today.  She admits that her legs were dependent for much of the morning, and is putting them up now.  She feels this is the reasoning behind her swelling.  She still wants to continue with gabapentin reduction and switch to Lyrica.    Past Medical and Surgical History reviewed in Epic today.  MEDICATIONS:  Current Outpatient Medications   Medication Sig Dispense Refill     oxyCODONE (OXYCONTIN) 20 MG 12 hr tablet Take 1 tablet (20 mg) by mouth every 12 hours 30 tablet 0     oxyCODONE (ROXICODONE) 5 MG tablet Take 1 tablet (5 mg) by mouth every 4 hours as needed for pain 45 tablet 0     [START ON 3/21/2022] pregabalin (LYRICA) 25 MG capsule Take 1 capsule (25 mg) by mouth At Bedtime 15 capsule 0     acetaminophen (TYLENOL) 500 MG tablet Take 1,000 mg by mouth 3 times daily        amLODIPine (NORVASC) 5 MG tablet Take 5 mg by mouth At Bedtime       Ascorbic Acid (VITAMIN C) 500 MG CAPS Take 500 mg by mouth daily       cyanocobalamin (CYANOCOBALAMIN) 1000 MCG/ML injection Inject 1 mL (1,000 mcg) into the muscle every 30 days       diclofenac (VOLTAREN) 1 % topical gel Apply 2 g topically 4 times daily To affected area       famotidine (PEPCID) 20 MG tablet Take 20  "mg by mouth daily       ferrous sulfate (FEROSUL) 325 (65 Fe) MG tablet Take 325 mg by mouth daily (with breakfast)       FLUoxetine HCl 60 MG TABS Take 1 tablet by mouth every morning        furosemide (LASIX) 20 MG tablet Take 20 mg by mouth daily       gabapentin (NEURONTIN) 600 MG tablet Take 100 tablets by mouth 2 times daily       levothyroxine (SYNTHROID/LEVOTHROID) 200 MCG tablet Take 225 mcg by mouth daily       lidocaine patch in PLACE Place 1 patch onto the skin every morning Remove at HS       loperamide (IMODIUM) 2 MG capsule Take 1 capsule (2 mg) by mouth 4 times daily as needed for diarrhea       losartan (COZAAR) 100 MG tablet Take 100 mg by mouth daily       metoprolol tartrate (LOPRESSOR) 100 MG tablet Take 50 mg by mouth 2 times daily       MICRO GUARD 2 % external powder USE AS DIRECTED PER WOUND CARE ORDERS       mineral oil-hydrophilic petrolatum (AQUAPHOR) external ointment USE AS DIRECTED PER WOUND CARE ORDERS       NARCAN 4 MG/0.1ML nasal spray CALL 911. SPR CONTENTS OF ONE SPRAYER (0.1ML) INTO ONE NOSTRIL. REPEAT IN 2-3 MIN IF SYMPTOMS OF OPIOID EMERGENCY PERSIST, ALTERNATE NOSTRILS       simvastatin (ZOCOR) 20 MG tablet Take 20 mg by mouth At Bedtime        REVIEW OF SYSTEMS: Limited secondary to cognitive impairment but today pt reports the above and 10 point ROS including Respiratory, CV, GI and , other than that noted in the HPI, is negative.    Objective: BP (!) 164/88   Pulse 76   Temp 98.2  F (36.8  C)   Resp 16   Ht 1.753 m (5' 9\")   Wt 117.9 kg (260 lb)   SpO2 95%   BMI 38.40 kg/m     GENERAL APPEARANCE: Alert, in no distress, cooperative.   ENT: Mouth/posterior oropharynx intact w/ moist mucous membranes, hearing acuity [place].  EYES: EOM, conjunctivae, lids, pupils and irises normal, PERRL2.   RESP: Respiratory effort good, no respiratory distress, Lung sounds clear. On RA.   CV: Auscultation of heart reveals S1, S2, rate and rhythm regular, no murmur, no rub or gallop, " Edema 2+ BLE. Peripheral pulses are 2+.  ABDOMEN: Normal bowel sounds, soft, non-tender abdomen, and no masses palpated.  SKIN: Inspection/Palpation of skin and subcutaneous tissue baseline w/ fragility. Legs are improving (covered at this time), MASD is improved, abdomen is shown below:    NEURO: CN II-XII at patient's baseline, sensation baseline PPS.  PSYCH: Insight, judgement, and memory are baseline, affect and mood are happy/engaged.    Labs: Labs done in facility are in EPIC. Please refer to them using [employer] Everywhere.    ASSESSMENT/PLAN:  Chronic, continuous use of opioids  Chronic skin ulcer, limited to breakdown of skin (H)  Stasis dermatitis of lower extremity due to chronic peripheral vascular hypertension  Diarrhea, unspecified type  Major depressive disorder (H)  Acute on chronic. Tenuous.     Rebecca continues to have diarrhea, and is ready to try Questran.  Will order as noted below and counseled Rebecca on using this type of medicine.    Noting that stasis dermatitis is improving, but some edema and shortness of breath noted today.  We will have to carefully monitor, though weight has only changed by 1 pound in the last week.    We will continue taper of gabapentin which will be complete later this week.  Will then start very low-dose Lyrica.  Goal for Lyrica to increase while oxycodone decreases.    Noting changes in gabapentin, Lyrica, and ongoing diarrhea.  Because of this, will trend BMP as noted below.    These are high risk medications, and though changes have been tolerated well, Rebecca will need close follow-up.  Follow up w/in 1 week or as needed.    Orders:  1. Decrease Gabapentin to 100mg PO BID x 5 days then discontinue.   2. On 3/21, start Lyrica 25mg PO at bedtime. Dx: neuropathy.  3. BMP x1 on 3/17. Dx: diarrhea.   4. Questran Light 4G PO BID. Dx: diarrhea.     Electronically signed by:  Dr. Adilene Aguayo, APRN CNP DNP

## 2022-03-16 ENCOUNTER — LAB REQUISITION (OUTPATIENT)
Dept: LAB | Facility: CLINIC | Age: 67
End: 2022-03-16
Payer: MEDICARE

## 2022-03-16 DIAGNOSIS — R19.7 DIARRHEA, UNSPECIFIED: ICD-10-CM

## 2022-03-17 PROBLEM — D63.8 ANEMIA IN OTHER CHRONIC DISEASES CLASSIFIED ELSEWHERE: Status: ACTIVE | Noted: 2022-03-17

## 2022-03-17 LAB
ANION GAP SERPL CALCULATED.3IONS-SCNC: 9 MMOL/L (ref 5–18)
BUN SERPL-MCNC: 12 MG/DL (ref 8–22)
CALCIUM SERPL-MCNC: 9.3 MG/DL (ref 8.5–10.5)
CHLORIDE BLD-SCNC: 108 MMOL/L (ref 98–107)
CO2 SERPL-SCNC: 23 MMOL/L (ref 22–31)
CREAT SERPL-MCNC: 0.75 MG/DL (ref 0.6–1.1)
GFR SERPL CREATININE-BSD FRML MDRD: 86 ML/MIN/1.73M2
GLUCOSE BLD-MCNC: 87 MG/DL (ref 70–125)
POTASSIUM BLD-SCNC: 4.3 MMOL/L (ref 3.5–5)
SODIUM SERPL-SCNC: 140 MMOL/L (ref 136–145)

## 2022-03-17 PROCEDURE — P9603 ONE-WAY ALLOW PRORATED MILES: HCPCS | Performed by: FAMILY MEDICINE

## 2022-03-17 PROCEDURE — 80048 BASIC METABOLIC PNL TOTAL CA: CPT | Performed by: FAMILY MEDICINE

## 2022-03-17 PROCEDURE — 36415 COLL VENOUS BLD VENIPUNCTURE: CPT | Performed by: FAMILY MEDICINE

## 2022-03-23 ENCOUNTER — VIRTUAL VISIT (OUTPATIENT)
Dept: GERIATRICS | Facility: CLINIC | Age: 67
End: 2022-03-23
Payer: MEDICARE

## 2022-03-23 VITALS
SYSTOLIC BLOOD PRESSURE: 118 MMHG | OXYGEN SATURATION: 94 % | RESPIRATION RATE: 18 BRPM | BODY MASS INDEX: 38.82 KG/M2 | TEMPERATURE: 97.7 F | HEIGHT: 69 IN | HEART RATE: 70 BPM | DIASTOLIC BLOOD PRESSURE: 75 MMHG | WEIGHT: 262.1 LBS

## 2022-03-23 DIAGNOSIS — E11.9 DIET-CONTROLLED DIABETES MELLITUS (H): ICD-10-CM

## 2022-03-23 DIAGNOSIS — G89.4 CHRONIC PAIN SYNDROME: ICD-10-CM

## 2022-03-23 DIAGNOSIS — R19.7 DIARRHEA DUE TO MALABSORPTION: ICD-10-CM

## 2022-03-23 DIAGNOSIS — F33.1 MODERATE EPISODE OF RECURRENT MAJOR DEPRESSIVE DISORDER (H): ICD-10-CM

## 2022-03-23 DIAGNOSIS — I10 ESSENTIAL HYPERTENSION: ICD-10-CM

## 2022-03-23 DIAGNOSIS — K90.9 DIARRHEA DUE TO MALABSORPTION: ICD-10-CM

## 2022-03-23 DIAGNOSIS — F11.90 CHRONIC, CONTINUOUS USE OF OPIOIDS: Primary | ICD-10-CM

## 2022-03-23 PROCEDURE — 99309 SBSQ NF CARE MODERATE MDM 30: CPT | Performed by: FAMILY MEDICINE

## 2022-03-23 NOTE — LETTER
"    3/23/2022        RE: Carolina Mari  [address]  [address]  [facility] [address]        [place] [facility]   Carolina Mari is being evaluated via a billable video visit.   The patient has been notified of following:  \"This video visit will be conducted via a call between you and your provider. We have found that certain health care needs can be provided without the need for an in-person physical exam.  This service lets us provide the care you need with a video conversation. If during the course of the call the provider feels a video visit is not appropriate, you will not be charged for this service.\"   The provider has received verbal consent for a Video Visit from the patient or first contact? Yes  Patient  or facility staff would like the video invitation sent by: N/A   Video Start Time: 11:54  [place] Medical Record Number:  [id number]  Place of Location at the time of visit: [facility]  Chief Complaint   Patient presents with     Nursing Home Acute     Video Visit     HPI:  Carolina Mari  is a 67 year old (1955), who is being seen today for a visit.  HPI information obtained from: facility chart records, facility staff, patient report and [facility] chart review.       Today's concern is:  - rehab: will be moving to LT today. Stand up to pivot to commode.     - Diarrhea: ongoing. Started on cholestyramine, feels like chalky taste, still on imodium. Goes at least three times a day, sometimes misses it. No BM while asleep. Worse when she is sitting up and doing thing, moving, cleaning little bit.  The volume has decreased a little bit.     - Gabapentin GDRed, started on Lyrica 3/21: Pt is does not know if she started on it.     ------------------------------------------------------------  Past Medical and Surgical History reviewed in Epic today.  MEDICATIONS:    Current Outpatient Medications   Medication Sig Dispense Refill     acetaminophen (TYLENOL) " 500 MG tablet Take 1,000 mg by mouth 3 times daily        amLODIPine (NORVASC) 5 MG tablet Take 5 mg by mouth At Bedtime       Ascorbic Acid (VITAMIN C) 500 MG CAPS Take 500 mg by mouth daily       cyanocobalamin (CYANOCOBALAMIN) 1000 MCG/ML injection Inject 1 mL (1,000 mcg) into the muscle every 30 days       diclofenac (VOLTAREN) 1 % topical gel Apply 2 g topically 4 times daily To affected area       famotidine (PEPCID) 20 MG tablet Take 20 mg by mouth daily       ferrous sulfate (FEROSUL) 325 (65 Fe) MG tablet Take 325 mg by mouth daily (with breakfast)       FLUoxetine HCl 60 MG TABS Take 1 tablet by mouth every morning        furosemide (LASIX) 20 MG tablet Take 20 mg by mouth daily       gabapentin (NEURONTIN) 600 MG tablet Take 100 tablets by mouth 2 times daily       levothyroxine (SYNTHROID/LEVOTHROID) 200 MCG tablet Take 225 mcg by mouth daily       lidocaine patch in PLACE Place 1 patch onto the skin every morning Remove at HS       loperamide (IMODIUM) 2 MG capsule Take 1 capsule (2 mg) by mouth 4 times daily as needed for diarrhea       losartan (COZAAR) 100 MG tablet Take 100 mg by mouth daily       metoprolol tartrate (LOPRESSOR) 100 MG tablet Take 50 mg by mouth 2 times daily       MICRO GUARD 2 % external powder USE AS DIRECTED PER WOUND CARE ORDERS       mineral oil-hydrophilic petrolatum (AQUAPHOR) external ointment USE AS DIRECTED PER WOUND CARE ORDERS       NARCAN 4 MG/0.1ML nasal spray CALL 911. SPR CONTENTS OF ONE SPRAYER (0.1ML) INTO ONE NOSTRIL. REPEAT IN 2-3 MIN IF SYMPTOMS OF OPIOID EMERGENCY PERSIST, ALTERNATE NOSTRILS       oxyCODONE (OXYCONTIN) 20 MG 12 hr tablet Take 1 tablet (20 mg) by mouth every 12 hours 30 tablet 0     oxyCODONE (ROXICODONE) 5 MG tablet Take 1 tablet (5 mg) by mouth every 4 hours as needed for pain 45 tablet 0     pregabalin (LYRICA) 25 MG capsule Take 1 capsule (25 mg) by mouth At Bedtime 15 capsule 0     simvastatin (ZOCOR) 20 MG tablet Take 20 mg by mouth At  "Bedtime        REVIEW OF SYSTEMS: 4 point ROS including Respiratory, CV, GI and , other than that noted in the HPI,  is negative  Objective: /75   Pulse 70   Temp 97.7  F (36.5  C)   Resp 18   Ht 1.753 m (5' 9\")   Wt 118.9 kg (262 lb 1.6 oz)   SpO2 94%   BMI 38.71 kg/m    Limited visit exam done given COVID-19 precautions.   GENERAL APPEARANCE:  in no distress  RESP:  unlabored breathing  M/S:   no joint deformity noted  SKIN:  no rash noted  NEURO:   no purposeful movement in upper and lower extremities  PSYCH:  affect and mood normal    Labs: Reviewed in the chart and EHR.        ASSESSMENT/PLAN:  ------------------------------  Adult failure to thrive syndrome  Recurrent hospitalization and TCU placement  Right shoulder chronic pain, query rotator cuff injury  Wheel chair bound  - analgesia optimal  - moved to LTC today.       Chronic pain syndrome and chronic opioid use (H)  -  Off gabapentin, on Lyrica now.       Abdominal wound, healing by second intention: no change. No concern, ongoing.     Diet Controlled diabetes (H): HBA1C 5.2%     Essential HTN: controlled. No concern        Moderate episode of recurrent major depressive disorder (H):  adjusting well. No concern.        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        Chronic Anemia, normocytic: Hb 10.1 mg/dl.  Asymptomatic.        Electronically signed by:  Ariana Bose MD     Video-Visit Details  Type of service:  Video Visit  Video End Time (time video stopped): 12:02 pm    Distant Location (provider location):  [place] [facility]               Sincerely,        Ariana Bose MD    "

## 2022-03-23 NOTE — PROGRESS NOTES
"[place] [facility]   Carolina Mari is being evaluated via a billable video visit.   The patient has been notified of following:  \"This video visit will be conducted via a call between you and your provider. We have found that certain health care needs can be provided without the need for an in-person physical exam.  This service lets us provide the care you need with a video conversation. If during the course of the call the provider feels a video visit is not appropriate, you will not be charged for this service.\"   The provider has received verbal consent for a Video Visit from the patient or first contact? Yes  Patient  or facility staff would like the video invitation sent by: N/A   Video Start Time: 11:54  [place] Medical Record Number:  [id number]  Place of Location at the time of visit: [facility]  Chief Complaint   Patient presents with     Nursing Home Acute     Video Visit     HPI:  Carolina Mari  is a 67 year old (1955), who is being seen today for a visit.  HPI information obtained from: facility chart records, facility staff, patient report and [facility] chart review.       Today's concern is:  - rehab: will be moving to LT today. Stand up to pivot to commode.     - Diarrhea: ongoing. Started on cholestyramine, feels like chalky taste, still on imodium. Goes at least three times a day, sometimes misses it. No BM while asleep. Worse when she is sitting up and doing thing, moving, cleaning little bit.  The volume has decreased a little bit.     - Gabapentin GDRed, started on Lyrica 3/21: Pt is does not know if she started on it.     ------------------------------------------------------------  Past Medical and Surgical History reviewed in Epic today.  MEDICATIONS:    Current Outpatient Medications   Medication Sig Dispense Refill     acetaminophen (TYLENOL) 500 MG tablet Take 1,000 mg by mouth 3 times daily        amLODIPine (NORVASC) 5 MG tablet Take 5 mg by " mouth At Bedtime       Ascorbic Acid (VITAMIN C) 500 MG CAPS Take 500 mg by mouth daily       cyanocobalamin (CYANOCOBALAMIN) 1000 MCG/ML injection Inject 1 mL (1,000 mcg) into the muscle every 30 days       diclofenac (VOLTAREN) 1 % topical gel Apply 2 g topically 4 times daily To affected area       famotidine (PEPCID) 20 MG tablet Take 20 mg by mouth daily       ferrous sulfate (FEROSUL) 325 (65 Fe) MG tablet Take 325 mg by mouth daily (with breakfast)       FLUoxetine HCl 60 MG TABS Take 1 tablet by mouth every morning        furosemide (LASIX) 20 MG tablet Take 20 mg by mouth daily       gabapentin (NEURONTIN) 600 MG tablet Take 100 tablets by mouth 2 times daily       levothyroxine (SYNTHROID/LEVOTHROID) 200 MCG tablet Take 225 mcg by mouth daily       lidocaine patch in PLACE Place 1 patch onto the skin every morning Remove at HS       loperamide (IMODIUM) 2 MG capsule Take 1 capsule (2 mg) by mouth 4 times daily as needed for diarrhea       losartan (COZAAR) 100 MG tablet Take 100 mg by mouth daily       metoprolol tartrate (LOPRESSOR) 100 MG tablet Take 50 mg by mouth 2 times daily       MICRO GUARD 2 % external powder USE AS DIRECTED PER WOUND CARE ORDERS       mineral oil-hydrophilic petrolatum (AQUAPHOR) external ointment USE AS DIRECTED PER WOUND CARE ORDERS       NARCAN 4 MG/0.1ML nasal spray CALL 911. SPR CONTENTS OF ONE SPRAYER (0.1ML) INTO ONE NOSTRIL. REPEAT IN 2-3 MIN IF SYMPTOMS OF OPIOID EMERGENCY PERSIST, ALTERNATE NOSTRILS       oxyCODONE (OXYCONTIN) 20 MG 12 hr tablet Take 1 tablet (20 mg) by mouth every 12 hours 30 tablet 0     oxyCODONE (ROXICODONE) 5 MG tablet Take 1 tablet (5 mg) by mouth every 4 hours as needed for pain 45 tablet 0     pregabalin (LYRICA) 25 MG capsule Take 1 capsule (25 mg) by mouth At Bedtime 15 capsule 0     simvastatin (ZOCOR) 20 MG tablet Take 20 mg by mouth At Bedtime        REVIEW OF SYSTEMS: 4 point ROS including Respiratory, CV, GI and , other than that noted  "in the HPI,  is negative  Objective: /75   Pulse 70   Temp 97.7  F (36.5  C)   Resp 18   Ht 1.753 m (5' 9\")   Wt 118.9 kg (262 lb 1.6 oz)   SpO2 94%   BMI 38.71 kg/m    Limited visit exam done given COVID-19 precautions.   GENERAL APPEARANCE:  in no distress  RESP:  unlabored breathing  M/S:   no joint deformity noted  SKIN:  no rash noted  NEURO:   no purposeful movement in upper and lower extremities  PSYCH:  affect and mood normal    Labs: Reviewed in the chart and EHR.        ASSESSMENT/PLAN:  ------------------------------  Adult failure to thrive syndrome  Recurrent hospitalization and TCU placement  Right shoulder chronic pain, query rotator cuff injury  Wheel chair bound  - analgesia optimal  - moved to LTC today.       Chronic pain syndrome and chronic opioid use (H)  -  Off gabapentin, on Lyrica now.       Abdominal wound, healing by second intention: no change. No concern, ongoing.     Diet Controlled diabetes (H): HBA1C 5.2%     Essential HTN: controlled. No concern        Moderate episode of recurrent major depressive disorder (H):  adjusting well. No concern.        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        Chronic Anemia, normocytic: Hb 10.1 mg/dl.  Asymptomatic.        Electronically signed by:  Ariana Bose MD     Video-Visit Details  Type of service:  Video Visit  Video End Time (time video stopped): 12:02 pm    Distant Location (provider location):  [place] [facility]         "

## 2022-03-24 ENCOUNTER — LAB REQUISITION (OUTPATIENT)
Dept: LAB | Facility: CLINIC | Age: 67
End: 2022-03-24
Payer: MEDICARE

## 2022-03-24 DIAGNOSIS — E03.9 HYPOTHYROIDISM, UNSPECIFIED: ICD-10-CM

## 2022-03-26 DIAGNOSIS — F11.90 CHRONIC, CONTINUOUS USE OF OPIOIDS: ICD-10-CM

## 2022-03-26 RX ORDER — OXYCODONE HYDROCHLORIDE 5 MG/1
5 TABLET ORAL EVERY 4 HOURS PRN
Qty: 24 TABLET | Refills: 0 | Status: SHIPPED | OUTPATIENT
Start: 2022-03-26 | End: 2022-04-01

## 2022-03-28 LAB
T3 SERPL-MCNC: 87 NG/DL (ref 60–181)
T4 FREE SERPL-MCNC: 1.13 NG/DL (ref 0.7–1.8)
T4 SERPL-MCNC: 9.7 UG/DL (ref 4.5–13)
TSH SERPL DL<=0.005 MIU/L-ACNC: 0.81 UIU/ML (ref 0.3–5)

## 2022-03-28 PROCEDURE — 84436 ASSAY OF TOTAL THYROXINE: CPT | Performed by: FAMILY MEDICINE

## 2022-03-28 PROCEDURE — 84480 ASSAY TRIIODOTHYRONINE (T3): CPT | Performed by: FAMILY MEDICINE

## 2022-03-28 PROCEDURE — 36415 COLL VENOUS BLD VENIPUNCTURE: CPT | Performed by: FAMILY MEDICINE

## 2022-03-28 PROCEDURE — P9603 ONE-WAY ALLOW PRORATED MILES: HCPCS | Performed by: FAMILY MEDICINE

## 2022-03-28 PROCEDURE — 84443 ASSAY THYROID STIM HORMONE: CPT | Performed by: FAMILY MEDICINE

## 2022-03-28 PROCEDURE — 84439 ASSAY OF FREE THYROXINE: CPT | Performed by: FAMILY MEDICINE

## 2022-03-31 ENCOUNTER — DISCHARGE SUMMARY NURSING HOME (OUTPATIENT)
Dept: GERIATRICS | Facility: CLINIC | Age: 67
End: 2022-03-31
Payer: MEDICARE

## 2022-03-31 ENCOUNTER — TELEPHONE (OUTPATIENT)
Dept: GERIATRICS | Facility: CLINIC | Age: 67
End: 2022-03-31

## 2022-03-31 VITALS
TEMPERATURE: 98.7 F | HEART RATE: 88 BPM | WEIGHT: 262.1 LBS | DIASTOLIC BLOOD PRESSURE: 76 MMHG | OXYGEN SATURATION: 95 % | RESPIRATION RATE: 16 BRPM | HEIGHT: 69 IN | BODY MASS INDEX: 38.82 KG/M2 | SYSTOLIC BLOOD PRESSURE: 126 MMHG

## 2022-03-31 DIAGNOSIS — R52 PAIN: Primary | ICD-10-CM

## 2022-03-31 DIAGNOSIS — L98.491 CHRONIC SKIN ULCER, LIMITED TO BREAKDOWN OF SKIN (H): ICD-10-CM

## 2022-03-31 DIAGNOSIS — F11.90 CHRONIC, CONTINUOUS USE OF OPIOIDS: ICD-10-CM

## 2022-03-31 DIAGNOSIS — F11.90 CHRONIC, CONTINUOUS USE OF OPIOIDS: Primary | ICD-10-CM

## 2022-03-31 DIAGNOSIS — K90.9 DIARRHEA DUE TO MALABSORPTION: ICD-10-CM

## 2022-03-31 DIAGNOSIS — F33.1 MODERATE EPISODE OF RECURRENT MAJOR DEPRESSIVE DISORDER (H): ICD-10-CM

## 2022-03-31 DIAGNOSIS — F11.10 OPIOID ABUSE (H): ICD-10-CM

## 2022-03-31 DIAGNOSIS — R19.7 DIARRHEA DUE TO MALABSORPTION: ICD-10-CM

## 2022-03-31 DIAGNOSIS — G89.4 CHRONIC PAIN SYNDROME: ICD-10-CM

## 2022-03-31 PROCEDURE — 99316 NF DSCHRG MGMT 30 MIN+: CPT | Performed by: NURSE PRACTITIONER

## 2022-03-31 RX ORDER — OXYCODONE HYDROCHLORIDE 5 MG/1
5 TABLET ORAL EVERY 4 HOURS PRN
Qty: 3 TABLET | Refills: 0 | Status: SHIPPED | OUTPATIENT
Start: 2022-03-31 | End: 2022-04-01

## 2022-03-31 RX ORDER — OXYCODONE HYDROCHLORIDE 5 MG/1
5 TABLET ORAL EVERY 4 HOURS
Qty: 3 TABLET | Refills: 0 | Status: SHIPPED | OUTPATIENT
Start: 2022-03-31 | End: 2022-03-31

## 2022-03-31 NOTE — PROGRESS NOTES
[facility] TCU DISCHARGE SUMMARY  PATIENT'S NAME: Carolina Mari : 1955 MRN: [id number] Place of Service where encounter took place:  [facility] (TCU/SNF) [4000] PRIMARY CARE PROVIDER AND CLINIC RESPONSIBLE AFTER TRANSFER: Le Romo MD, [facility] [address] [place] / [facility]. Non-[facility] Provider     Transferring providers: Dr. Adilene Aguayo, APRN CNP DNP.  Recent Hospitalization/ED: [facility] stay 22 to 22.  Date of TCU Admission: 2022.  Date of TCU (anticipated) Discharge: 22  Discharged to: new assisted living for patient.  Cognitive Scores: BIMS: 15/15, SLUMS: 24/30 and CPT: 5.3/5.6  Physical Function: Wheelchair dependent.  DME: None.  CODE STATUS/ADVANCE DIRECTIVES DISCUSSION: DNR/DNI  ALLERGIES: Aspirin, Colchicine, and Colcrys    DISCHARGE DIAGNOSIS/NURSING FACILITY COURSE:   Chronic, continuous use of opioids  Opioid abuse (H)  Chronic pain syndrome  Moderate episode of recurrent major depressive disorder (H)  Chronic skin ulcer, limited to breakdown of skin (H)  Diarrhea due to malabsorption    Rebecca presented to [facility] on  w/ weakness, diarrhea, and dehydration.  She was unable to care for herself at home secondary to new onset right shoulder pain. Ortho consulted and no injury found. Notable underlying inflammatory arthritis. She was supported through nursing interventions via skin care, pain control, and hydration until placement for TCU was made. Rebecca presented to TCU on  s/p hospitalization. This was her third rehab stent w/ us in 1 year.    Her BLE were diuresed, she received lymphedema therapy, several drug trials to address diarrhea (which is a mix of functional and dietary in nature), and she received extensive wound care, some of which is ongoing to her abdomen as pictured below.    She has chronic inflammatory arthritis which does cause notable joint information and is even visible at times. However,  she will utilize PRNs consistently and declines any trial of reduction because she believes she needs it. It is clear that over time her pain and mental health issues have caused dependence. Recommending PCP consider reduction, counseling, and pain contract.     Today, after starting Lyrica, we will complete GDR of high dose Gabapentin and completely discontinue. Goal for Lyrica to cut needs of Oxycodone. PCP to follow. She has had more regular BMs since the addition of Questran, but does still have diarrhea. She is irritated with staff and cannot discharge fast enough.     Past Medical History:  has a past medical history of Diabetes mellitus (H), History of stomach ulcers, HTN (hypertension), Knee osteoarthritis, Osteoarthritis of right hip, Osteoporosis, Osteoporosis, and Thyroid disease.  Discharge Medications:  Current Outpatient Medications   Medication Sig Dispense Refill     acetaminophen (TYLENOL) 500 MG tablet Take 1,000 mg by mouth 3 times daily        amLODIPine (NORVASC) 5 MG tablet Take 5 mg by mouth At Bedtime       Ascorbic Acid (VITAMIN C) 500 MG CAPS Take 500 mg by mouth daily       cyanocobalamin (CYANOCOBALAMIN) 1000 MCG/ML injection Inject 1 mL (1,000 mcg) into the muscle every 30 days       diclofenac (VOLTAREN) 1 % topical gel Apply 2 g topically 4 times daily To affected area       famotidine (PEPCID) 20 MG tablet Take 20 mg by mouth daily       ferrous sulfate (FEROSUL) 325 (65 Fe) MG tablet Take 325 mg by mouth daily (with breakfast)       FLUoxetine HCl 60 MG TABS Take 1 tablet by mouth every morning        furosemide (LASIX) 20 MG tablet Take 20 mg by mouth daily       levothyroxine (SYNTHROID/LEVOTHROID) 200 MCG tablet Take 225 mcg by mouth daily       lidocaine patch in PLACE Place 1 patch onto the skin every morning Remove at HS       loperamide (IMODIUM) 2 MG capsule Take 1 capsule (2 mg) by mouth 4 times daily as needed for diarrhea       losartan (COZAAR) 100 MG tablet Take 100 mg  "by mouth daily       metoprolol tartrate (LOPRESSOR) 100 MG tablet Take 50 mg by mouth 2 times daily       MICRO GUARD 2 % external powder USE AS DIRECTED PER WOUND CARE ORDERS       mineral oil-hydrophilic petrolatum (AQUAPHOR) external ointment USE AS DIRECTED PER WOUND CARE ORDERS       NARCAN 4 MG/0.1ML nasal spray CALL 911. SPR CONTENTS OF ONE SPRAYER (0.1ML) INTO ONE NOSTRIL. REPEAT IN 2-3 MIN IF SYMPTOMS OF OPIOID EMERGENCY PERSIST, ALTERNATE NOSTRILS       oxyCODONE (OXYCONTIN) 20 MG 12 hr tablet Take 1 tablet (20 mg) by mouth every 12 hours 30 tablet 0     oxyCODONE (ROXICODONE) 5 MG tablet Take 1 tablet (5 mg) by mouth every 4 hours as needed for pain 24 tablet 0     pregabalin (LYRICA) 25 MG capsule Take 1 capsule (25 mg) by mouth At Bedtime 15 capsule 0     simvastatin (ZOCOR) 20 MG tablet Take 20 mg by mouth At Bedtime         ROS: 4 point ROS including Respiratory, CV, GI and , other than that noted in the HPI, is negative.    Physical Exam: Vitals: /76   Pulse 88   Temp 98.7  F (37.1  C)   Resp 16   Ht 1.753 m (5' 9\")   Wt 118.9 kg (262 lb 1.6 oz)   SpO2 95%   BMI 38.71 kg/m    GENERAL APPEARANCE: Alert, in no distress, cooperative.   ENT: Mouth/posterior oropharynx intact w/ moist mucous membranes, hearing acuity [place].  EYES: EOM, conjunctivae, lids, pupils and irises normal, PERRL2.   RESP: Respiratory effort unlabored, no respiratory distress, On RA.   CV: Edema 1+ BLE. Peripheral pulses are 2+.  ABDOMEN: Normal bowel sounds, soft, non-tender abdomen, and no masses palpated.  SKIN: Inspection/Palpation of skin and subcutaneous tissue baseline w/ fragility. Abdomen is as pictured here:      NEURO: CN II-XII at patient's baseline, sensation baseline PPS.  PSYCH: Insight, judgement, and memory are baseline, affect and mood are irritable.    Facility Labs: Labs done in SNF are in [place] EPIC. Please refer to them using EPIC/Care Everywhere.    DISCHARGE PLAN:    Follow up labs: No labs " orders/due    Medical Follow Up:  Follow up with primary care provider in 4 weeks    MTM referral needed and placed by this provider: No    Current [place] scheduled appointments: None.  Discharge Services: Home Care:  Occupational Therapy, Physical Therapy, Registered Nurse and Home Health Aide    Orders:  1. Discontinue Gabapentin    TOTAL DISCHARGE TIME:   Greater than 30 minutes    Electronically signed by:  Dr. Adilene Aguayo, APRN CNP DNP  ______________      Documentation of Face-to-Face and Certification for Home Health Services   Patient: Carolina Mari YOB: 1955  MRN: [id number]  Today's Date: 3/31/2022  I certify that patient: Carolina Mari is under my care and that I had a face-to-face encounter that meets the provider  face-to-face encounter requirements with this patient on: 3/31/2022. This encounter with the patient was in whole, or in part, for the following medical condition, which is the primary reason for home health care: chronic pain and . I certify that, based on my findings, the following services are medically necessary home health services: Nursing, Occupational Therapy and Physical Therapy. My clinical findings support the need for the above services because: Nurse is needed: To provide assessment and oversight required in the home to assure adherence to the medical plan due to: wound care/skin care needs.., Occupational Therapy Services are needed to assess and treat cognitive ability and address ADL safety due to impairment in mobility. and Physical Therapy Services are needed to assess and treat the following functional impairments: mobility.    Further, I certify that my clinical findings support that this patient is homebound (i.e. absences from home require considerable and taxing effort and are for medical reasons or [employer] services or infrequently or of short duration when for other reasons) because: Requires assistance of another person or  specialized equipment to access medical services because patient: Range of motion limitations prevents ability to exit home safely...    Based on the above findings. I certify that this patient is confined to the home and needs intermittent skilled nursing care, physical therapy and/or speech therapy.  The patient is under my care, and I have initiated the establishment of the plan of care.  This patient will be followed by a provider who will periodically review the plan of care.    Provider to give follow up care: Le Romo    Responsible Medicare certified PECOS Provider: RACHEL Alvarez CNP DNP  Provider Signature: See electronic signature associated with these discharge orders.  Date: 3/31/2022

## 2022-04-01 RX ORDER — OXYCODONE HYDROCHLORIDE 5 MG/1
5 TABLET ORAL EVERY 4 HOURS PRN
Qty: 24 TABLET | Refills: 0 | Status: SHIPPED | OUTPATIENT
Start: 2022-04-01 | End: 2022-04-05

## 2022-04-01 RX ORDER — OXYCODONE HCL 20 MG/1
20 TABLET, FILM COATED, EXTENDED RELEASE ORAL EVERY 12 HOURS
Qty: 30 TABLET | Refills: 0 | Status: SHIPPED | OUTPATIENT
Start: 2022-04-01 | End: 2022-04-05

## 2022-04-01 NOTE — TELEPHONE ENCOUNTER
Refills for oxycodone, oxycontin not received by pharmacy earlier today.  Will send refill for oxycodone 3 tabs as resident is now out.

## 2022-04-05 DIAGNOSIS — F11.90 CHRONIC, CONTINUOUS USE OF OPIOIDS: ICD-10-CM

## 2022-04-05 RX ORDER — OXYCODONE HCL 20 MG/1
20 TABLET, FILM COATED, EXTENDED RELEASE ORAL EVERY 12 HOURS
Qty: 6 TABLET | Refills: 0 | Status: SHIPPED | OUTPATIENT
Start: 2022-04-05 | End: 2022-04-08

## 2022-04-05 RX ORDER — OXYCODONE HYDROCHLORIDE 5 MG/1
5 TABLET ORAL EVERY 4 HOURS PRN
Qty: 18 TABLET | Refills: 0 | Status: SHIPPED | OUTPATIENT
Start: 2022-04-05 | End: 2022-04-08

## 2022-04-20 ENCOUNTER — LAB REQUISITION (OUTPATIENT)
Dept: LAB | Facility: CLINIC | Age: 67
End: 2022-04-20
Payer: MEDICARE

## 2022-04-20 DIAGNOSIS — I50.9 HEART FAILURE, UNSPECIFIED (H): ICD-10-CM

## 2022-04-20 DIAGNOSIS — I10 ESSENTIAL (PRIMARY) HYPERTENSION: ICD-10-CM

## 2022-04-21 LAB
ANION GAP SERPL CALCULATED.3IONS-SCNC: 9 MMOL/L (ref 5–18)
BUN SERPL-MCNC: 10 MG/DL (ref 8–22)
CALCIUM SERPL-MCNC: 9 MG/DL (ref 8.5–10.5)
CHLORIDE BLD-SCNC: 106 MMOL/L (ref 98–107)
CO2 SERPL-SCNC: 25 MMOL/L (ref 22–31)
CREAT SERPL-MCNC: 0.82 MG/DL (ref 0.6–1.1)
ERYTHROCYTE [DISTWIDTH] IN BLOOD BY AUTOMATED COUNT: 14.4 % (ref 10–15)
GFR SERPL CREATININE-BSD FRML MDRD: 77 ML/MIN/1.73M2
GLUCOSE BLD-MCNC: 145 MG/DL (ref 70–125)
HCT VFR BLD AUTO: 34.4 % (ref 35–47)
HGB BLD-MCNC: 10.4 G/DL (ref 11.7–15.7)
MCH RBC QN AUTO: 27.4 PG (ref 26.5–33)
MCHC RBC AUTO-ENTMCNC: 30.2 G/DL (ref 31.5–36.5)
MCV RBC AUTO: 91 FL (ref 78–100)
PLATELET # BLD AUTO: 255 10E3/UL (ref 150–450)
POTASSIUM BLD-SCNC: 5.4 MMOL/L (ref 3.5–5)
RBC # BLD AUTO: 3.8 10E6/UL (ref 3.8–5.2)
SODIUM SERPL-SCNC: 140 MMOL/L (ref 136–145)
TSH SERPL DL<=0.005 MIU/L-ACNC: 0.59 UIU/ML (ref 0.3–5)
VIT B12 SERPL-MCNC: 408 PG/ML (ref 213–816)
WBC # BLD AUTO: 8.2 10E3/UL (ref 4–11)

## 2022-04-21 PROCEDURE — 82607 VITAMIN B-12: CPT | Mod: ORL | Performed by: FAMILY MEDICINE

## 2022-04-21 PROCEDURE — 36415 COLL VENOUS BLD VENIPUNCTURE: CPT | Mod: ORL | Performed by: FAMILY MEDICINE

## 2022-04-21 PROCEDURE — 80048 BASIC METABOLIC PNL TOTAL CA: CPT | Mod: ORL | Performed by: FAMILY MEDICINE

## 2022-04-21 PROCEDURE — 84443 ASSAY THYROID STIM HORMONE: CPT | Mod: ORL | Performed by: FAMILY MEDICINE

## 2022-04-21 PROCEDURE — P9604 ONE-WAY ALLOW PRORATED TRIP: HCPCS | Mod: ORL | Performed by: FAMILY MEDICINE

## 2022-04-21 PROCEDURE — 85027 COMPLETE CBC AUTOMATED: CPT | Mod: ORL | Performed by: FAMILY MEDICINE

## 2022-05-31 ENCOUNTER — LAB REQUISITION (OUTPATIENT)
Dept: LAB | Facility: CLINIC | Age: 67
End: 2022-05-31
Payer: MEDICARE

## 2022-05-31 DIAGNOSIS — Z03.818 ENCOUNTER FOR OBSERVATION FOR SUSPECTED EXPOSURE TO OTHER BIOLOGICAL AGENTS RULED OUT: ICD-10-CM

## 2022-06-01 PROCEDURE — U0003 INFECTIOUS AGENT DETECTION BY NUCLEIC ACID (DNA OR RNA); SEVERE ACUTE RESPIRATORY SYNDROME CORONAVIRUS 2 (SARS-COV-2) (CORONAVIRUS DISEASE [COVID-19]), AMPLIFIED PROBE TECHNIQUE, MAKING USE OF HIGH THROUGHPUT TECHNOLOGIES AS DESCRIBED BY CMS-2020-01-R: HCPCS | Mod: ORL | Performed by: FAMILY MEDICINE

## 2022-06-02 LAB — SARS-COV-2 RNA RESP QL NAA+PROBE: NEGATIVE

## 2023-01-01 ENCOUNTER — APPOINTMENT (OUTPATIENT)
Dept: PHYSICAL THERAPY | Facility: HOSPITAL | Age: 68
DRG: 871 | End: 2023-01-01
Payer: MEDICARE

## 2023-01-01 ENCOUNTER — LAB REQUISITION (OUTPATIENT)
Dept: LAB | Facility: CLINIC | Age: 68
End: 2023-01-01
Payer: MEDICARE

## 2023-01-01 ENCOUNTER — TELEPHONE (OUTPATIENT)
Dept: VASCULAR SURGERY | Facility: CLINIC | Age: 68
End: 2023-01-01
Payer: MEDICARE

## 2023-01-01 ENCOUNTER — APPOINTMENT (OUTPATIENT)
Dept: PHYSICAL THERAPY | Facility: HOSPITAL | Age: 68
DRG: 871 | End: 2023-01-01
Attending: NURSE PRACTITIONER
Payer: MEDICARE

## 2023-01-01 ENCOUNTER — APPOINTMENT (OUTPATIENT)
Dept: CT IMAGING | Facility: HOSPITAL | Age: 68
DRG: 871 | End: 2023-01-01
Attending: EMERGENCY MEDICINE
Payer: MEDICARE

## 2023-01-01 ENCOUNTER — APPOINTMENT (OUTPATIENT)
Dept: CARDIOLOGY | Facility: HOSPITAL | Age: 68
DRG: 871 | End: 2023-01-01
Attending: INTERNAL MEDICINE
Payer: MEDICARE

## 2023-01-01 ENCOUNTER — TELEPHONE (OUTPATIENT)
Dept: FAMILY MEDICINE | Facility: CLINIC | Age: 68
End: 2023-01-01

## 2023-01-01 ENCOUNTER — HOSPITAL ENCOUNTER (INPATIENT)
Facility: HOSPITAL | Age: 68
LOS: 12 days | Discharge: NURSING, REHAB OR LONG-TERM CARE | DRG: 871 | End: 2023-05-10
Attending: EMERGENCY MEDICINE | Admitting: HOSPITALIST
Payer: MEDICARE

## 2023-01-01 ENCOUNTER — TRANSITIONAL CARE UNIT VISIT (OUTPATIENT)
Dept: GERIATRICS | Facility: CLINIC | Age: 68
End: 2023-01-01
Payer: MEDICARE

## 2023-01-01 ENCOUNTER — APPOINTMENT (OUTPATIENT)
Dept: CT IMAGING | Facility: CLINIC | Age: 68
DRG: 871 | End: 2023-01-01
Attending: EMERGENCY MEDICINE
Payer: MEDICARE

## 2023-01-01 ENCOUNTER — APPOINTMENT (OUTPATIENT)
Dept: RADIOLOGY | Facility: HOSPITAL | Age: 68
DRG: 871 | End: 2023-01-01
Attending: EMERGENCY MEDICINE
Payer: MEDICARE

## 2023-01-01 ENCOUNTER — APPOINTMENT (OUTPATIENT)
Dept: OCCUPATIONAL THERAPY | Facility: HOSPITAL | Age: 68
DRG: 871 | End: 2023-01-01
Payer: MEDICARE

## 2023-01-01 ENCOUNTER — APPOINTMENT (OUTPATIENT)
Dept: CT IMAGING | Facility: HOSPITAL | Age: 68
DRG: 871 | End: 2023-01-01
Attending: HOSPITALIST
Payer: MEDICARE

## 2023-01-01 ENCOUNTER — APPOINTMENT (OUTPATIENT)
Dept: OCCUPATIONAL THERAPY | Facility: HOSPITAL | Age: 68
DRG: 871 | End: 2023-01-01
Attending: INTERNAL MEDICINE
Payer: MEDICARE

## 2023-01-01 ENCOUNTER — HOSPITAL ENCOUNTER (INPATIENT)
Facility: CLINIC | Age: 68
LOS: 1 days | DRG: 871 | End: 2023-06-07
Attending: EMERGENCY MEDICINE | Admitting: HOSPITALIST
Payer: MEDICARE

## 2023-01-01 ENCOUNTER — DOCUMENTATION ONLY (OUTPATIENT)
Dept: OTHER | Facility: CLINIC | Age: 68
End: 2023-01-01
Payer: MEDICARE

## 2023-01-01 ENCOUNTER — MEDICAL CORRESPONDENCE (OUTPATIENT)
Dept: HEALTH INFORMATION MANAGEMENT | Facility: CLINIC | Age: 68
End: 2023-01-01

## 2023-01-01 ENCOUNTER — TRANSFERRED RECORDS (OUTPATIENT)
Dept: HEALTH INFORMATION MANAGEMENT | Facility: CLINIC | Age: 68
End: 2023-01-01
Payer: MEDICARE

## 2023-01-01 VITALS
BODY MASS INDEX: 38.82 KG/M2 | WEIGHT: 262.1 LBS | DIASTOLIC BLOOD PRESSURE: 70 MMHG | TEMPERATURE: 98.1 F | SYSTOLIC BLOOD PRESSURE: 124 MMHG | OXYGEN SATURATION: 94 % | HEART RATE: 78 BPM | RESPIRATION RATE: 18 BRPM | HEIGHT: 69 IN

## 2023-01-01 VITALS
WEIGHT: 263.5 LBS | HEIGHT: 69 IN | BODY MASS INDEX: 39.03 KG/M2 | HEART RATE: 89 BPM | SYSTOLIC BLOOD PRESSURE: 110 MMHG | TEMPERATURE: 98.2 F | OXYGEN SATURATION: 96 % | RESPIRATION RATE: 18 BRPM | DIASTOLIC BLOOD PRESSURE: 67 MMHG

## 2023-01-01 VITALS
SYSTOLIC BLOOD PRESSURE: 100 MMHG | HEART RATE: 70 BPM | TEMPERATURE: 98.1 F | WEIGHT: 262.1 LBS | HEIGHT: 69 IN | DIASTOLIC BLOOD PRESSURE: 49 MMHG | OXYGEN SATURATION: 94 % | BODY MASS INDEX: 38.82 KG/M2 | RESPIRATION RATE: 16 BRPM

## 2023-01-01 VITALS
WEIGHT: 262.1 LBS | TEMPERATURE: 98.1 F | OXYGEN SATURATION: 96 % | SYSTOLIC BLOOD PRESSURE: 128 MMHG | RESPIRATION RATE: 16 BRPM | HEIGHT: 69 IN | BODY MASS INDEX: 38.82 KG/M2 | HEART RATE: 76 BPM | DIASTOLIC BLOOD PRESSURE: 77 MMHG

## 2023-01-01 VITALS
TEMPERATURE: 96.6 F | SYSTOLIC BLOOD PRESSURE: 56 MMHG | HEART RATE: 57 BPM | DIASTOLIC BLOOD PRESSURE: 40 MMHG | WEIGHT: 264 LBS | OXYGEN SATURATION: 81 % | RESPIRATION RATE: 24 BRPM | BODY MASS INDEX: 38.99 KG/M2

## 2023-01-01 VITALS
SYSTOLIC BLOOD PRESSURE: 136 MMHG | OXYGEN SATURATION: 95 % | HEIGHT: 69 IN | DIASTOLIC BLOOD PRESSURE: 64 MMHG | BODY MASS INDEX: 39.03 KG/M2 | WEIGHT: 263.5 LBS | RESPIRATION RATE: 12 BRPM | HEART RATE: 80 BPM | TEMPERATURE: 97.5 F

## 2023-01-01 VITALS
RESPIRATION RATE: 18 BRPM | TEMPERATURE: 97.8 F | HEIGHT: 69 IN | BODY MASS INDEX: 43.4 KG/M2 | DIASTOLIC BLOOD PRESSURE: 63 MMHG | HEART RATE: 97 BPM | WEIGHT: 293 LBS | OXYGEN SATURATION: 92 % | SYSTOLIC BLOOD PRESSURE: 139 MMHG

## 2023-01-01 VITALS
DIASTOLIC BLOOD PRESSURE: 69 MMHG | BODY MASS INDEX: 38.82 KG/M2 | SYSTOLIC BLOOD PRESSURE: 126 MMHG | RESPIRATION RATE: 16 BRPM | TEMPERATURE: 98.4 F | HEIGHT: 69 IN | OXYGEN SATURATION: 94 % | WEIGHT: 262.1 LBS | HEART RATE: 77 BPM

## 2023-01-01 VITALS
RESPIRATION RATE: 20 BRPM | WEIGHT: 263.5 LBS | BODY MASS INDEX: 39.03 KG/M2 | SYSTOLIC BLOOD PRESSURE: 97 MMHG | TEMPERATURE: 99 F | DIASTOLIC BLOOD PRESSURE: 55 MMHG | HEIGHT: 69 IN | OXYGEN SATURATION: 94 % | HEART RATE: 109 BPM

## 2023-01-01 DIAGNOSIS — I10 ESSENTIAL HYPERTENSION: ICD-10-CM

## 2023-01-01 DIAGNOSIS — G89.4 CHRONIC PAIN SYNDROME: ICD-10-CM

## 2023-01-01 DIAGNOSIS — G82.20 PARAPARESIS OF BOTH LOWER LIMBS (H): Primary | ICD-10-CM

## 2023-01-01 DIAGNOSIS — M06.4 UNDIFFERENTIATED INFLAMMATORY ARTHRITIS (H): ICD-10-CM

## 2023-01-01 DIAGNOSIS — I89.0 LYMPHEDEMA: ICD-10-CM

## 2023-01-01 DIAGNOSIS — I89.0 LYMPHEDEMA: Primary | ICD-10-CM

## 2023-01-01 DIAGNOSIS — E87.20 METABOLIC ACIDOSIS: ICD-10-CM

## 2023-01-01 DIAGNOSIS — N31.9 NEUROGENIC BLADDER: ICD-10-CM

## 2023-01-01 DIAGNOSIS — R53.81 PHYSICAL DECONDITIONING: ICD-10-CM

## 2023-01-01 DIAGNOSIS — K52.9 CHRONIC DIARRHEA: ICD-10-CM

## 2023-01-01 DIAGNOSIS — R19.7 DIARRHEA, UNSPECIFIED TYPE: ICD-10-CM

## 2023-01-01 DIAGNOSIS — G47.00 INSOMNIA, UNSPECIFIED TYPE: ICD-10-CM

## 2023-01-01 DIAGNOSIS — G47.09 OTHER INSOMNIA: ICD-10-CM

## 2023-01-01 DIAGNOSIS — E66.812 CLASS 2 SEVERE OBESITY DUE TO EXCESS CALORIES WITH SERIOUS COMORBIDITY AND BODY MASS INDEX (BMI) OF 38.0 TO 38.9 IN ADULT (H): ICD-10-CM

## 2023-01-01 DIAGNOSIS — F33.1 MODERATE EPISODE OF RECURRENT MAJOR DEPRESSIVE DISORDER (H): ICD-10-CM

## 2023-01-01 DIAGNOSIS — N17.9 AKI (ACUTE KIDNEY INJURY) (H): ICD-10-CM

## 2023-01-01 DIAGNOSIS — L03.116 CELLULITIS OF LEFT LOWER EXTREMITY: Primary | ICD-10-CM

## 2023-01-01 DIAGNOSIS — F11.90 CHRONIC, CONTINUOUS USE OF OPIOIDS: ICD-10-CM

## 2023-01-01 DIAGNOSIS — A41.9 SEPTIC SHOCK (H): ICD-10-CM

## 2023-01-01 DIAGNOSIS — L03.119 CELLULITIS AND ABSCESS OF LEG: ICD-10-CM

## 2023-01-01 DIAGNOSIS — F33.0 MILD EPISODE OF RECURRENT MAJOR DEPRESSIVE DISORDER (H): ICD-10-CM

## 2023-01-01 DIAGNOSIS — L03.116 CELLULITIS OF LEFT LOWER EXTREMITY: ICD-10-CM

## 2023-01-01 DIAGNOSIS — D63.8 ANEMIA IN OTHER CHRONIC DISEASES CLASSIFIED ELSEWHERE: ICD-10-CM

## 2023-01-01 DIAGNOSIS — I10 ESSENTIAL HYPERTENSION: Primary | ICD-10-CM

## 2023-01-01 DIAGNOSIS — L02.419 CELLULITIS AND ABSCESS OF LEG: ICD-10-CM

## 2023-01-01 DIAGNOSIS — F33.1 MODERATE EPISODE OF RECURRENT MAJOR DEPRESSIVE DISORDER (H): Primary | ICD-10-CM

## 2023-01-01 DIAGNOSIS — E66.01 CLASS 2 SEVERE OBESITY DUE TO EXCESS CALORIES WITH SERIOUS COMORBIDITY AND BODY MASS INDEX (BMI) OF 38.0 TO 38.9 IN ADULT (H): ICD-10-CM

## 2023-01-01 DIAGNOSIS — K63.1 PERFORATION OF INTESTINE (H): ICD-10-CM

## 2023-01-01 DIAGNOSIS — N17.9 ACUTE RENAL FAILURE, UNSPECIFIED ACUTE RENAL FAILURE TYPE (H): Primary | ICD-10-CM

## 2023-01-01 DIAGNOSIS — K52.9 NONINFECTIOUS DIARRHEA: ICD-10-CM

## 2023-01-01 DIAGNOSIS — R19.7 DIARRHEA, UNSPECIFIED: ICD-10-CM

## 2023-01-01 DIAGNOSIS — R53.81 PHYSICAL DECONDITIONING: Primary | ICD-10-CM

## 2023-01-01 DIAGNOSIS — R94.6 ABNORMAL RESULTS OF THYROID FUNCTION STUDIES: ICD-10-CM

## 2023-01-01 DIAGNOSIS — D72.829 LEUKOCYTOSIS, UNSPECIFIED TYPE: ICD-10-CM

## 2023-01-01 DIAGNOSIS — R65.21 SEPTIC SHOCK (H): ICD-10-CM

## 2023-01-01 DIAGNOSIS — I10 ESSENTIAL (PRIMARY) HYPERTENSION: ICD-10-CM

## 2023-01-01 DIAGNOSIS — E87.5 HYPERKALEMIA: ICD-10-CM

## 2023-01-01 DIAGNOSIS — I87.2 VENOUS STASIS DERMATITIS OF BOTH LOWER EXTREMITIES: ICD-10-CM

## 2023-01-01 LAB
ALBUMIN SERPL BCG-MCNC: 1.9 G/DL (ref 3.5–5.2)
ALBUMIN SERPL BCG-MCNC: 1.9 G/DL (ref 3.5–5.2)
ALBUMIN SERPL BCG-MCNC: 3 G/DL (ref 3.5–5.2)
ALBUMIN UR-MCNC: 30 MG/DL
ALBUMIN UR-MCNC: 30 MG/DL
ALP SERPL-CCNC: 113 U/L (ref 35–104)
ALP SERPL-CCNC: 189 U/L (ref 35–104)
ALP SERPL-CCNC: 216 U/L (ref 35–104)
ALT SERPL W P-5'-P-CCNC: 12 U/L (ref 10–35)
ALT SERPL W P-5'-P-CCNC: 17 U/L (ref 10–35)
ALT SERPL W P-5'-P-CCNC: 9 U/L (ref 10–35)
AMPHETAMINES UR QL SCN: NORMAL
ANION GAP SERPL CALCULATED.3IONS-SCNC: 10 MMOL/L (ref 7–15)
ANION GAP SERPL CALCULATED.3IONS-SCNC: 10 MMOL/L (ref 7–15)
ANION GAP SERPL CALCULATED.3IONS-SCNC: 11 MMOL/L (ref 7–15)
ANION GAP SERPL CALCULATED.3IONS-SCNC: 11 MMOL/L (ref 7–15)
ANION GAP SERPL CALCULATED.3IONS-SCNC: 12 MMOL/L (ref 7–15)
ANION GAP SERPL CALCULATED.3IONS-SCNC: 13 MMOL/L (ref 7–15)
ANION GAP SERPL CALCULATED.3IONS-SCNC: 13 MMOL/L (ref 7–15)
ANION GAP SERPL CALCULATED.3IONS-SCNC: 17 MMOL/L (ref 7–15)
ANION GAP SERPL CALCULATED.3IONS-SCNC: 18 MMOL/L (ref 7–15)
ANION GAP SERPL CALCULATED.3IONS-SCNC: 20 MMOL/L (ref 7–15)
ANION GAP SERPL CALCULATED.3IONS-SCNC: 21 MMOL/L (ref 7–15)
ANION GAP SERPL CALCULATED.3IONS-SCNC: 34 MMOL/L (ref 7–15)
ANION GAP SERPL CALCULATED.3IONS-SCNC: 9 MMOL/L (ref 7–15)
APPEARANCE UR: ABNORMAL
APPEARANCE UR: ABNORMAL
AST SERPL W P-5'-P-CCNC: 18 U/L (ref 10–35)
AST SERPL W P-5'-P-CCNC: 31 U/L (ref 10–35)
AST SERPL W P-5'-P-CCNC: 49 U/L (ref 10–35)
BACTERIA #/AREA URNS HPF: ABNORMAL /HPF
BACTERIA #/AREA URNS HPF: ABNORMAL /HPF
BACTERIA BLD CULT: NO GROWTH
BACTERIA BLD CULT: NO GROWTH
BARBITURATES UR QL SCN: NORMAL
BASE EXCESS BLDV CALC-SCNC: -10.9 MMOL/L
BASE EXCESS BLDV CALC-SCNC: -13.1 MMOL/L
BASE EXCESS BLDV CALC-SCNC: -15.9 MMOL/L
BASE EXCESS BLDV CALC-SCNC: -17.4 MMOL/L
BASE EXCESS BLDV CALC-SCNC: -2.9 MMOL/L
BASE EXCESS BLDV CALC-SCNC: -3.5 MMOL/L
BASE EXCESS BLDV CALC-SCNC: -4.5 MMOL/L
BASE EXCESS BLDV CALC-SCNC: 16.2 MMOL/L
BASE EXCESS BLDV CALC-SCNC: 2.9 MMOL/L
BASE EXCESS BLDV CALC-SCNC: 3.2 MMOL/L
BASE EXCESS BLDV CALC-SCNC: 3.4 MMOL/L
BASE EXCESS BLDV CALC-SCNC: 4.2 MMOL/L
BASE EXCESS BLDV CALC-SCNC: 5.8 MMOL/L
BASE EXCESS BLDV CALC-SCNC: 6.2 MMOL/L
BASE EXCESS BLDV CALC-SCNC: 6.5 MMOL/L
BASE EXCESS BLDV CALC-SCNC: 7.3 MMOL/L
BASOPHILS # BLD AUTO: 0.1 10E3/UL (ref 0–0.2)
BASOPHILS NFR BLD AUTO: 0 %
BENZODIAZ UR QL SCN: NORMAL
BILIRUB SERPL-MCNC: 0.3 MG/DL
BILIRUB SERPL-MCNC: 0.5 MG/DL
BILIRUB SERPL-MCNC: 0.7 MG/DL
BILIRUB UR QL STRIP: NEGATIVE
BILIRUB UR QL STRIP: NEGATIVE
BUN SERPL-MCNC: 15.3 MG/DL (ref 8–23)
BUN SERPL-MCNC: 16 MG/DL (ref 8–23)
BUN SERPL-MCNC: 16.6 MG/DL (ref 8–23)
BUN SERPL-MCNC: 17.4 MG/DL (ref 8–23)
BUN SERPL-MCNC: 22.2 MG/DL (ref 8–23)
BUN SERPL-MCNC: 25.9 MG/DL (ref 8–23)
BUN SERPL-MCNC: 28.7 MG/DL (ref 8–23)
BUN SERPL-MCNC: 38 MG/DL (ref 8–23)
BUN SERPL-MCNC: 51.2 MG/DL (ref 8–23)
BUN SERPL-MCNC: 60.5 MG/DL (ref 8–23)
BUN SERPL-MCNC: 68.9 MG/DL (ref 8–23)
BUN SERPL-MCNC: 77.2 MG/DL (ref 8–23)
BUN SERPL-MCNC: 82.7 MG/DL (ref 8–23)
BUN SERPL-MCNC: 84.6 MG/DL (ref 8–23)
BUN SERPL-MCNC: 84.6 MG/DL (ref 8–23)
BUN SERPL-MCNC: 86.1 MG/DL (ref 8–23)
BUN SERPL-MCNC: 86.4 MG/DL (ref 8–23)
BUN SERPL-MCNC: 87.2 MG/DL (ref 8–23)
BZE UR QL SCN: NORMAL
C COLI+JEJUNI+LARI FUSA STL QL NAA+PROBE: NOT DETECTED
C DIFF TOX B STL QL: NEGATIVE
C DIFF TOX B STL QL: NEGATIVE
CALCIUM SERPL-MCNC: 6.9 MG/DL (ref 8.8–10.2)
CALCIUM SERPL-MCNC: 7.4 MG/DL (ref 8.8–10.2)
CALCIUM SERPL-MCNC: 7.5 MG/DL (ref 8.8–10.2)
CALCIUM SERPL-MCNC: 7.6 MG/DL (ref 8.8–10.2)
CALCIUM SERPL-MCNC: 7.6 MG/DL (ref 8.8–10.2)
CALCIUM SERPL-MCNC: 7.7 MG/DL (ref 8.8–10.2)
CALCIUM SERPL-MCNC: 7.9 MG/DL (ref 8.8–10.2)
CALCIUM SERPL-MCNC: 8 MG/DL (ref 8.8–10.2)
CALCIUM SERPL-MCNC: 8.2 MG/DL (ref 8.8–10.2)
CALCIUM SERPL-MCNC: 8.3 MG/DL (ref 8.8–10.2)
CALCIUM SERPL-MCNC: 8.5 MG/DL (ref 8.8–10.2)
CALCIUM SERPL-MCNC: 8.7 MG/DL (ref 8.8–10.2)
CALCIUM SERPL-MCNC: 9.8 MG/DL (ref 8.8–10.2)
CANNABINOIDS UR QL SCN: NORMAL
CHLORIDE SERPL-SCNC: 101 MMOL/L (ref 98–107)
CHLORIDE SERPL-SCNC: 102 MMOL/L (ref 98–107)
CHLORIDE SERPL-SCNC: 103 MMOL/L (ref 98–107)
CHLORIDE SERPL-SCNC: 104 MMOL/L (ref 98–107)
CHLORIDE SERPL-SCNC: 104 MMOL/L (ref 98–107)
CHLORIDE SERPL-SCNC: 105 MMOL/L (ref 98–107)
CHLORIDE SERPL-SCNC: 107 MMOL/L (ref 98–107)
CHLORIDE SERPL-SCNC: 94 MMOL/L (ref 98–107)
CHLORIDE SERPL-SCNC: 96 MMOL/L (ref 98–107)
CHLORIDE SERPL-SCNC: 99 MMOL/L (ref 98–107)
CK SERPL-CCNC: 157 U/L (ref 26–192)
COLOR UR AUTO: YELLOW
COLOR UR AUTO: YELLOW
CORTIS SERPL-MCNC: 25 UG/DL
CREAT SERPL-MCNC: 0.98 MG/DL (ref 0.51–0.95)
CREAT SERPL-MCNC: 0.98 MG/DL (ref 0.51–0.95)
CREAT SERPL-MCNC: 0.99 MG/DL (ref 0.51–0.95)
CREAT SERPL-MCNC: 1.05 MG/DL (ref 0.51–0.95)
CREAT SERPL-MCNC: 1.25 MG/DL (ref 0.51–0.95)
CREAT SERPL-MCNC: 1.31 MG/DL (ref 0.51–0.95)
CREAT SERPL-MCNC: 1.55 MG/DL (ref 0.51–0.95)
CREAT SERPL-MCNC: 1.64 MG/DL (ref 0.51–0.95)
CREAT SERPL-MCNC: 1.9 MG/DL (ref 0.51–0.95)
CREAT SERPL-MCNC: 2.23 MG/DL (ref 0.51–0.95)
CREAT SERPL-MCNC: 2.58 MG/DL (ref 0.51–0.95)
CREAT SERPL-MCNC: 3.02 MG/DL (ref 0.51–0.95)
CREAT SERPL-MCNC: 3.63 MG/DL (ref 0.51–0.95)
CREAT SERPL-MCNC: 3.81 MG/DL (ref 0.51–0.95)
CREAT SERPL-MCNC: 4.03 MG/DL (ref 0.51–0.95)
CREAT SERPL-MCNC: 4.11 MG/DL (ref 0.51–0.95)
CREAT SERPL-MCNC: 4.42 MG/DL (ref 0.51–0.95)
CREAT SERPL-MCNC: 4.92 MG/DL (ref 0.51–0.95)
DEPRECATED HCO3 PLAS-SCNC: 11 MMOL/L (ref 22–29)
DEPRECATED HCO3 PLAS-SCNC: 13 MMOL/L (ref 22–29)
DEPRECATED HCO3 PLAS-SCNC: 13 MMOL/L (ref 22–29)
DEPRECATED HCO3 PLAS-SCNC: 17 MMOL/L (ref 22–29)
DEPRECATED HCO3 PLAS-SCNC: 19 MMOL/L (ref 22–29)
DEPRECATED HCO3 PLAS-SCNC: 21 MMOL/L (ref 22–29)
DEPRECATED HCO3 PLAS-SCNC: 23 MMOL/L (ref 22–29)
DEPRECATED HCO3 PLAS-SCNC: 24 MMOL/L (ref 22–29)
DEPRECATED HCO3 PLAS-SCNC: 25 MMOL/L (ref 22–29)
DEPRECATED HCO3 PLAS-SCNC: 26 MMOL/L (ref 22–29)
DEPRECATED HCO3 PLAS-SCNC: 26 MMOL/L (ref 22–29)
DEPRECATED HCO3 PLAS-SCNC: 27 MMOL/L (ref 22–29)
DEPRECATED HCO3 PLAS-SCNC: 6 MMOL/L (ref 22–29)
DEPRECATED HCO3 PLAS-SCNC: 9 MMOL/L (ref 22–29)
EC STX1 GENE STL QL NAA+PROBE: NOT DETECTED
EC STX2 GENE STL QL NAA+PROBE: NOT DETECTED
EOSINOPHIL # BLD AUTO: 0 10E3/UL (ref 0–0.7)
EOSINOPHIL # BLD AUTO: 0 10E3/UL (ref 0–0.7)
EOSINOPHIL # BLD AUTO: 0.4 10E3/UL (ref 0–0.7)
EOSINOPHIL NFR BLD AUTO: 0 %
EOSINOPHIL NFR BLD AUTO: 0 %
EOSINOPHIL NFR BLD AUTO: 3 %
ERYTHROCYTE [DISTWIDTH] IN BLOOD BY AUTOMATED COUNT: 14.9 % (ref 10–15)
ERYTHROCYTE [DISTWIDTH] IN BLOOD BY AUTOMATED COUNT: 15 % (ref 10–15)
ERYTHROCYTE [DISTWIDTH] IN BLOOD BY AUTOMATED COUNT: 15.1 % (ref 10–15)
ERYTHROCYTE [DISTWIDTH] IN BLOOD BY AUTOMATED COUNT: 15.3 % (ref 10–15)
ERYTHROCYTE [DISTWIDTH] IN BLOOD BY AUTOMATED COUNT: 15.6 % (ref 10–15)
ERYTHROCYTE [DISTWIDTH] IN BLOOD BY AUTOMATED COUNT: 15.9 % (ref 10–15)
ERYTHROCYTE [DISTWIDTH] IN BLOOD BY AUTOMATED COUNT: 16 % (ref 10–15)
ERYTHROCYTE [DISTWIDTH] IN BLOOD BY AUTOMATED COUNT: 16.1 % (ref 10–15)
FLUAV RNA SPEC QL NAA+PROBE: NEGATIVE
FLUBV RNA RESP QL NAA+PROBE: NEGATIVE
GFR SERPL CREATININE-BSD FRML MDRD: 10 ML/MIN/1.73M2
GFR SERPL CREATININE-BSD FRML MDRD: 11 ML/MIN/1.73M2
GFR SERPL CREATININE-BSD FRML MDRD: 11 ML/MIN/1.73M2
GFR SERPL CREATININE-BSD FRML MDRD: 12 ML/MIN/1.73M2
GFR SERPL CREATININE-BSD FRML MDRD: 13 ML/MIN/1.73M2
GFR SERPL CREATININE-BSD FRML MDRD: 16 ML/MIN/1.73M2
GFR SERPL CREATININE-BSD FRML MDRD: 19 ML/MIN/1.73M2
GFR SERPL CREATININE-BSD FRML MDRD: 23 ML/MIN/1.73M2
GFR SERPL CREATININE-BSD FRML MDRD: 28 ML/MIN/1.73M2
GFR SERPL CREATININE-BSD FRML MDRD: 34 ML/MIN/1.73M2
GFR SERPL CREATININE-BSD FRML MDRD: 36 ML/MIN/1.73M2
GFR SERPL CREATININE-BSD FRML MDRD: 44 ML/MIN/1.73M2
GFR SERPL CREATININE-BSD FRML MDRD: 46 ML/MIN/1.73M2
GFR SERPL CREATININE-BSD FRML MDRD: 57 ML/MIN/1.73M2
GFR SERPL CREATININE-BSD FRML MDRD: 61 ML/MIN/1.73M2
GFR SERPL CREATININE-BSD FRML MDRD: 62 ML/MIN/1.73M2
GFR SERPL CREATININE-BSD FRML MDRD: 62 ML/MIN/1.73M2
GFR SERPL CREATININE-BSD FRML MDRD: 9 ML/MIN/1.73M2
GLUCOSE BLDC GLUCOMTR-MCNC: 100 MG/DL (ref 70–99)
GLUCOSE BLDC GLUCOMTR-MCNC: 101 MG/DL (ref 70–99)
GLUCOSE BLDC GLUCOMTR-MCNC: 104 MG/DL (ref 70–99)
GLUCOSE BLDC GLUCOMTR-MCNC: 105 MG/DL (ref 70–99)
GLUCOSE BLDC GLUCOMTR-MCNC: 107 MG/DL (ref 70–99)
GLUCOSE BLDC GLUCOMTR-MCNC: 108 MG/DL (ref 70–99)
GLUCOSE BLDC GLUCOMTR-MCNC: 110 MG/DL (ref 70–99)
GLUCOSE BLDC GLUCOMTR-MCNC: 112 MG/DL (ref 70–99)
GLUCOSE BLDC GLUCOMTR-MCNC: 113 MG/DL (ref 70–99)
GLUCOSE BLDC GLUCOMTR-MCNC: 117 MG/DL (ref 70–99)
GLUCOSE BLDC GLUCOMTR-MCNC: 117 MG/DL (ref 70–99)
GLUCOSE BLDC GLUCOMTR-MCNC: 120 MG/DL (ref 70–99)
GLUCOSE BLDC GLUCOMTR-MCNC: 121 MG/DL (ref 70–99)
GLUCOSE BLDC GLUCOMTR-MCNC: 123 MG/DL (ref 70–99)
GLUCOSE BLDC GLUCOMTR-MCNC: 126 MG/DL (ref 70–99)
GLUCOSE BLDC GLUCOMTR-MCNC: 128 MG/DL (ref 70–99)
GLUCOSE BLDC GLUCOMTR-MCNC: 129 MG/DL (ref 70–99)
GLUCOSE BLDC GLUCOMTR-MCNC: 131 MG/DL (ref 70–99)
GLUCOSE BLDC GLUCOMTR-MCNC: 132 MG/DL (ref 70–99)
GLUCOSE BLDC GLUCOMTR-MCNC: 133 MG/DL (ref 70–99)
GLUCOSE BLDC GLUCOMTR-MCNC: 134 MG/DL (ref 70–99)
GLUCOSE BLDC GLUCOMTR-MCNC: 135 MG/DL (ref 70–99)
GLUCOSE BLDC GLUCOMTR-MCNC: 136 MG/DL (ref 70–99)
GLUCOSE BLDC GLUCOMTR-MCNC: 142 MG/DL (ref 70–99)
GLUCOSE BLDC GLUCOMTR-MCNC: 143 MG/DL (ref 70–99)
GLUCOSE BLDC GLUCOMTR-MCNC: 144 MG/DL (ref 70–99)
GLUCOSE BLDC GLUCOMTR-MCNC: 144 MG/DL (ref 70–99)
GLUCOSE BLDC GLUCOMTR-MCNC: 147 MG/DL (ref 70–99)
GLUCOSE BLDC GLUCOMTR-MCNC: 148 MG/DL (ref 70–99)
GLUCOSE BLDC GLUCOMTR-MCNC: 150 MG/DL (ref 70–99)
GLUCOSE BLDC GLUCOMTR-MCNC: 152 MG/DL (ref 70–99)
GLUCOSE BLDC GLUCOMTR-MCNC: 157 MG/DL (ref 70–99)
GLUCOSE BLDC GLUCOMTR-MCNC: 164 MG/DL (ref 70–99)
GLUCOSE BLDC GLUCOMTR-MCNC: 87 MG/DL (ref 70–99)
GLUCOSE BLDC GLUCOMTR-MCNC: 93 MG/DL (ref 70–99)
GLUCOSE BLDC GLUCOMTR-MCNC: 97 MG/DL (ref 70–99)
GLUCOSE BLDC GLUCOMTR-MCNC: 97 MG/DL (ref 70–99)
GLUCOSE SERPL-MCNC: 101 MG/DL (ref 70–99)
GLUCOSE SERPL-MCNC: 104 MG/DL (ref 70–99)
GLUCOSE SERPL-MCNC: 106 MG/DL (ref 70–99)
GLUCOSE SERPL-MCNC: 108 MG/DL (ref 70–99)
GLUCOSE SERPL-MCNC: 124 MG/DL (ref 70–99)
GLUCOSE SERPL-MCNC: 126 MG/DL (ref 70–99)
GLUCOSE SERPL-MCNC: 132 MG/DL (ref 70–99)
GLUCOSE SERPL-MCNC: 145 MG/DL (ref 70–99)
GLUCOSE SERPL-MCNC: 163 MG/DL (ref 70–99)
GLUCOSE SERPL-MCNC: 165 MG/DL (ref 70–99)
GLUCOSE SERPL-MCNC: 167 MG/DL (ref 70–99)
GLUCOSE SERPL-MCNC: 231 MG/DL (ref 70–99)
GLUCOSE SERPL-MCNC: 453 MG/DL (ref 70–99)
GLUCOSE SERPL-MCNC: 89 MG/DL (ref 70–99)
GLUCOSE SERPL-MCNC: 93 MG/DL (ref 70–99)
GLUCOSE SERPL-MCNC: 99 MG/DL (ref 70–99)
GLUCOSE UR STRIP-MCNC: NEGATIVE MG/DL
GLUCOSE UR STRIP-MCNC: NEGATIVE MG/DL
HBA1C MFR BLD: 4.9 %
HCO3 BLDV-SCNC: 12 MMOL/L (ref 24–30)
HCO3 BLDV-SCNC: 13 MMOL/L (ref 24–30)
HCO3 BLDV-SCNC: 15 MMOL/L (ref 24–30)
HCO3 BLDV-SCNC: 16 MMOL/L (ref 24–30)
HCO3 BLDV-SCNC: 21 MMOL/L (ref 24–30)
HCO3 BLDV-SCNC: 21 MMOL/L (ref 24–30)
HCO3 BLDV-SCNC: 22 MMOL/L (ref 24–30)
HCO3 BLDV-SCNC: 27 MMOL/L (ref 24–30)
HCO3 BLDV-SCNC: 27 MMOL/L (ref 24–30)
HCO3 BLDV-SCNC: 28 MMOL/L (ref 24–30)
HCO3 BLDV-SCNC: 28 MMOL/L (ref 24–30)
HCO3 BLDV-SCNC: 30 MMOL/L (ref 24–30)
HCO3 BLDV-SCNC: 30 MMOL/L (ref 24–30)
HCO3 BLDV-SCNC: 31 MMOL/L (ref 24–30)
HCO3 BLDV-SCNC: 31 MMOL/L (ref 24–30)
HCO3 BLDV-SCNC: 40 MMOL/L (ref 24–30)
HCT VFR BLD AUTO: 30.5 % (ref 35–47)
HCT VFR BLD AUTO: 31.5 % (ref 35–47)
HCT VFR BLD AUTO: 31.5 % (ref 35–47)
HCT VFR BLD AUTO: 32.1 % (ref 35–47)
HCT VFR BLD AUTO: 32.3 % (ref 35–47)
HCT VFR BLD AUTO: 33.3 % (ref 35–47)
HCT VFR BLD AUTO: 33.7 % (ref 35–47)
HCT VFR BLD AUTO: 33.9 % (ref 35–47)
HCT VFR BLD AUTO: 33.9 % (ref 35–47)
HCT VFR BLD AUTO: 35.6 % (ref 35–47)
HCT VFR BLD AUTO: 37.1 % (ref 35–47)
HCT VFR BLD AUTO: 40 % (ref 35–47)
HEMOCCULT STL QL: NEGATIVE
HGB BLD-MCNC: 10.1 G/DL (ref 11.7–15.7)
HGB BLD-MCNC: 10.2 G/DL (ref 11.7–15.7)
HGB BLD-MCNC: 10.3 G/DL (ref 11.7–15.7)
HGB BLD-MCNC: 10.5 G/DL (ref 11.7–15.7)
HGB BLD-MCNC: 11.3 G/DL (ref 11.7–15.7)
HGB BLD-MCNC: 12 G/DL (ref 11.7–15.7)
HGB BLD-MCNC: 9.6 G/DL (ref 11.7–15.7)
HGB BLD-MCNC: 9.6 G/DL (ref 11.7–15.7)
HGB BLD-MCNC: 9.7 G/DL (ref 11.7–15.7)
HGB BLD-MCNC: 9.8 G/DL (ref 11.7–15.7)
HGB UR QL STRIP: NEGATIVE
HGB UR QL STRIP: NEGATIVE
HOLD SPECIMEN: NORMAL
IMM GRANULOCYTES # BLD: 0.2 10E3/UL
IMM GRANULOCYTES # BLD: 0.3 10E3/UL
IMM GRANULOCYTES # BLD: 1.1 10E3/UL
IMM GRANULOCYTES NFR BLD: 1 %
IMM GRANULOCYTES NFR BLD: 2 %
IMM GRANULOCYTES NFR BLD: 4 %
KETONES UR STRIP-MCNC: NEGATIVE MG/DL
KETONES UR STRIP-MCNC: NEGATIVE MG/DL
LACTATE SERPL-SCNC: 1 MMOL/L (ref 0.7–2)
LACTATE SERPL-SCNC: 13.1 MMOL/L (ref 0.7–2)
LACTATE SERPL-SCNC: 14 MMOL/L (ref 0.7–2)
LACTATE SERPL-SCNC: 2.1 MMOL/L (ref 0.7–2)
LEUKOCYTE ESTERASE UR QL STRIP: ABNORMAL
LEUKOCYTE ESTERASE UR QL STRIP: NEGATIVE
LVEF ECHO: NORMAL
LYMPHOCYTES # BLD AUTO: 0.5 10E3/UL (ref 0.8–5.3)
LYMPHOCYTES # BLD AUTO: 0.8 10E3/UL (ref 0.8–5.3)
LYMPHOCYTES # BLD AUTO: 2.1 10E3/UL (ref 0.8–5.3)
LYMPHOCYTES NFR BLD AUTO: 2 %
LYMPHOCYTES NFR BLD AUTO: 5 %
LYMPHOCYTES NFR BLD AUTO: 9 %
MAGNESIUM SERPL-MCNC: 1.3 MG/DL (ref 1.7–2.3)
MAGNESIUM SERPL-MCNC: 1.4 MG/DL (ref 1.7–2.3)
MAGNESIUM SERPL-MCNC: 1.4 MG/DL (ref 1.7–2.3)
MAGNESIUM SERPL-MCNC: 1.5 MG/DL (ref 1.7–2.3)
MAGNESIUM SERPL-MCNC: 1.5 MG/DL (ref 1.7–2.3)
MAGNESIUM SERPL-MCNC: 1.6 MG/DL (ref 1.7–2.3)
MAGNESIUM SERPL-MCNC: 1.7 MG/DL (ref 1.7–2.3)
MAGNESIUM SERPL-MCNC: 1.8 MG/DL (ref 1.7–2.3)
MAGNESIUM SERPL-MCNC: 2 MG/DL (ref 1.7–2.3)
MAGNESIUM SERPL-MCNC: 2 MG/DL (ref 1.7–2.3)
MAGNESIUM SERPL-MCNC: 2.4 MG/DL (ref 1.7–2.3)
MCH RBC QN AUTO: 27.2 PG (ref 26.5–33)
MCH RBC QN AUTO: 27.5 PG (ref 26.5–33)
MCH RBC QN AUTO: 27.6 PG (ref 26.5–33)
MCH RBC QN AUTO: 27.7 PG (ref 26.5–33)
MCH RBC QN AUTO: 27.7 PG (ref 26.5–33)
MCH RBC QN AUTO: 28 PG (ref 26.5–33)
MCH RBC QN AUTO: 28.1 PG (ref 26.5–33)
MCH RBC QN AUTO: 28.2 PG (ref 26.5–33)
MCHC RBC AUTO-ENTMCNC: 27.2 G/DL (ref 31.5–36.5)
MCHC RBC AUTO-ENTMCNC: 29.7 G/DL (ref 31.5–36.5)
MCHC RBC AUTO-ENTMCNC: 29.8 G/DL (ref 31.5–36.5)
MCHC RBC AUTO-ENTMCNC: 29.9 G/DL (ref 31.5–36.5)
MCHC RBC AUTO-ENTMCNC: 30 G/DL (ref 31.5–36.5)
MCHC RBC AUTO-ENTMCNC: 30 G/DL (ref 31.5–36.5)
MCHC RBC AUTO-ENTMCNC: 30.6 G/DL (ref 31.5–36.5)
MCHC RBC AUTO-ENTMCNC: 30.8 G/DL (ref 31.5–36.5)
MCHC RBC AUTO-ENTMCNC: 31 G/DL (ref 31.5–36.5)
MCHC RBC AUTO-ENTMCNC: 31.7 G/DL (ref 31.5–36.5)
MCHC RBC AUTO-ENTMCNC: 32.1 G/DL (ref 31.5–36.5)
MCHC RBC AUTO-ENTMCNC: 32.7 G/DL (ref 31.5–36.5)
MCV RBC AUTO: 100 FL (ref 78–100)
MCV RBC AUTO: 86 FL (ref 78–100)
MCV RBC AUTO: 87 FL (ref 78–100)
MCV RBC AUTO: 88 FL (ref 78–100)
MCV RBC AUTO: 90 FL (ref 78–100)
MCV RBC AUTO: 91 FL (ref 78–100)
MCV RBC AUTO: 92 FL (ref 78–100)
MCV RBC AUTO: 93 FL (ref 78–100)
MONOCYTES # BLD AUTO: 0.6 10E3/UL (ref 0–1.3)
MONOCYTES # BLD AUTO: 0.7 10E3/UL (ref 0–1.3)
MONOCYTES # BLD AUTO: 1.4 10E3/UL (ref 0–1.3)
MONOCYTES NFR BLD AUTO: 3 %
MONOCYTES NFR BLD AUTO: 4 %
MONOCYTES NFR BLD AUTO: 6 %
MUCOUS THREADS #/AREA URNS LPF: PRESENT /LPF
MUCOUS THREADS #/AREA URNS LPF: PRESENT /LPF
NEUTROPHILS # BLD AUTO: 13.7 10E3/UL (ref 1.6–8.3)
NEUTROPHILS # BLD AUTO: 20.3 10E3/UL (ref 1.6–8.3)
NEUTROPHILS # BLD AUTO: 22.4 10E3/UL (ref 1.6–8.3)
NEUTROPHILS NFR BLD AUTO: 84 %
NEUTROPHILS NFR BLD AUTO: 86 %
NEUTROPHILS NFR BLD AUTO: 91 %
NITRATE UR QL: NEGATIVE
NITRATE UR QL: NEGATIVE
NOROV GI+II ORF1-ORF2 JNC STL QL NAA+PR: NOT DETECTED
NRBC # BLD AUTO: 0 10E3/UL
NRBC # BLD AUTO: 0 10E3/UL
NRBC # BLD AUTO: 0.1 10E3/UL
NRBC BLD AUTO-RTO: 0 /100
OPIATES UR QL SCN: NORMAL
OXYHGB MFR BLDV: 59.6 % (ref 70–75)
OXYHGB MFR BLDV: 60.4 % (ref 70–75)
OXYHGB MFR BLDV: 62.4 % (ref 70–75)
OXYHGB MFR BLDV: 62.7 % (ref 70–75)
OXYHGB MFR BLDV: 63.7 % (ref 70–75)
OXYHGB MFR BLDV: 63.9 % (ref 70–75)
OXYHGB MFR BLDV: 64.7 % (ref 70–75)
OXYHGB MFR BLDV: 66.7 % (ref 70–75)
OXYHGB MFR BLDV: 69.1 % (ref 70–75)
OXYHGB MFR BLDV: 71 % (ref 70–75)
OXYHGB MFR BLDV: 71.3 % (ref 70–75)
OXYHGB MFR BLDV: 74.5 % (ref 70–75)
OXYHGB MFR BLDV: 75.1 % (ref 70–75)
OXYHGB MFR BLDV: 75.8 % (ref 70–75)
OXYHGB MFR BLDV: 77.8 % (ref 70–75)
OXYHGB MFR BLDV: 93.2 % (ref 70–75)
PCO2 BLDV: 34 MM HG (ref 35–50)
PCO2 BLDV: 35 MM HG (ref 35–50)
PCO2 BLDV: 36 MM HG (ref 35–50)
PCO2 BLDV: 36 MM HG (ref 35–50)
PCO2 BLDV: 38 MM HG (ref 35–50)
PCO2 BLDV: 39 MM HG (ref 35–50)
PCO2 BLDV: 39 MM HG (ref 35–50)
PCO2 BLDV: 41 MM HG (ref 35–50)
PCO2 BLDV: 42 MM HG (ref 35–50)
PCO2 BLDV: 43 MM HG (ref 35–50)
PCO2 BLDV: 44 MM HG (ref 35–50)
PCO2 BLDV: 45 MM HG (ref 35–50)
PCO2 BLDV: 46 MM HG (ref 35–50)
PCO2 BLDV: 58 MM HG (ref 35–50)
PCP QUAL URINE (ROCHE): NORMAL
PH BLDV: 7.13 [PH] (ref 7.35–7.45)
PH BLDV: 7.17 [PH] (ref 7.35–7.45)
PH BLDV: 7.2 [PH] (ref 7.35–7.45)
PH BLDV: 7.26 [PH] (ref 7.35–7.45)
PH BLDV: 7.37 [PH] (ref 7.35–7.45)
PH BLDV: 7.4 [PH] (ref 7.35–7.45)
PH BLDV: 7.41 [PH] (ref 7.35–7.45)
PH BLDV: 7.43 [PH] (ref 7.35–7.45)
PH BLDV: 7.43 [PH] (ref 7.35–7.45)
PH BLDV: 7.44 [PH] (ref 7.35–7.45)
PH BLDV: 7.44 [PH] (ref 7.35–7.45)
PH BLDV: 7.45 [PH] (ref 7.35–7.45)
PH BLDV: 7.47 [PH] (ref 7.35–7.45)
PH UR STRIP: 5 [PH] (ref 5–7)
PH UR STRIP: 5 [PH] (ref 5–7)
PLATELET # BLD AUTO: 201 10E3/UL (ref 150–450)
PLATELET # BLD AUTO: 233 10E3/UL (ref 150–450)
PLATELET # BLD AUTO: 238 10E3/UL (ref 150–450)
PLATELET # BLD AUTO: 243 10E3/UL (ref 150–450)
PLATELET # BLD AUTO: 291 10E3/UL (ref 150–450)
PLATELET # BLD AUTO: 309 10E3/UL (ref 150–450)
PLATELET # BLD AUTO: 352 10E3/UL (ref 150–450)
PLATELET # BLD AUTO: 358 10E3/UL (ref 150–450)
PLATELET # BLD AUTO: 459 10E3/UL (ref 150–450)
PLATELET # BLD AUTO: 477 10E3/UL (ref 150–450)
PLATELET # BLD AUTO: 537 10E3/UL (ref 150–450)
PLATELET # BLD AUTO: 844 10E3/UL (ref 150–450)
PO2 BLDV: 32 MM HG (ref 25–47)
PO2 BLDV: 34 MM HG (ref 25–47)
PO2 BLDV: 34 MM HG (ref 25–47)
PO2 BLDV: 35 MM HG (ref 25–47)
PO2 BLDV: 35 MM HG (ref 25–47)
PO2 BLDV: 36 MM HG (ref 25–47)
PO2 BLDV: 39 MM HG (ref 25–47)
PO2 BLDV: 40 MM HG (ref 25–47)
PO2 BLDV: 40 MM HG (ref 25–47)
PO2 BLDV: 44 MM HG (ref 25–47)
PO2 BLDV: 44 MM HG (ref 25–47)
PO2 BLDV: 45 MM HG (ref 25–47)
PO2 BLDV: 46 MM HG (ref 25–47)
PO2 BLDV: 68 MM HG (ref 25–47)
POTASSIUM SERPL-SCNC: 3 MMOL/L (ref 3.4–5.3)
POTASSIUM SERPL-SCNC: 3 MMOL/L (ref 3.4–5.3)
POTASSIUM SERPL-SCNC: 3.2 MMOL/L (ref 3.4–5.3)
POTASSIUM SERPL-SCNC: 3.3 MMOL/L (ref 3.4–5.3)
POTASSIUM SERPL-SCNC: 3.4 MMOL/L (ref 3.4–5.3)
POTASSIUM SERPL-SCNC: 3.5 MMOL/L (ref 3.4–5.3)
POTASSIUM SERPL-SCNC: 3.6 MMOL/L (ref 3.4–5.3)
POTASSIUM SERPL-SCNC: 3.7 MMOL/L (ref 3.4–5.3)
POTASSIUM SERPL-SCNC: 3.7 MMOL/L (ref 3.4–5.3)
POTASSIUM SERPL-SCNC: 3.9 MMOL/L (ref 3.4–5.3)
POTASSIUM SERPL-SCNC: 4 MMOL/L (ref 3.4–5.3)
POTASSIUM SERPL-SCNC: 4.3 MMOL/L (ref 3.4–5.3)
POTASSIUM SERPL-SCNC: 4.4 MMOL/L (ref 3.4–5.3)
POTASSIUM SERPL-SCNC: 4.6 MMOL/L (ref 3.4–5.3)
POTASSIUM SERPL-SCNC: 4.9 MMOL/L (ref 3.4–5.3)
POTASSIUM SERPL-SCNC: 5.9 MMOL/L (ref 3.4–5.3)
POTASSIUM SERPL-SCNC: 6.6 MMOL/L (ref 3.4–5.3)
POTASSIUM SERPL-SCNC: 6.7 MMOL/L (ref 3.4–5.3)
PROT SERPL-MCNC: 4 G/DL (ref 6.4–8.3)
PROT SERPL-MCNC: 6.9 G/DL (ref 6.4–8.3)
PROT SERPL-MCNC: 7.5 G/DL (ref 6.4–8.3)
RADIOLOGIST FLAGS: ABNORMAL
RBC # BLD AUTO: 3.46 10E6/UL (ref 3.8–5.2)
RBC # BLD AUTO: 3.48 10E6/UL (ref 3.8–5.2)
RBC # BLD AUTO: 3.5 10E6/UL (ref 3.8–5.2)
RBC # BLD AUTO: 3.5 10E6/UL (ref 3.8–5.2)
RBC # BLD AUTO: 3.62 10E6/UL (ref 3.8–5.2)
RBC # BLD AUTO: 3.67 10E6/UL (ref 3.8–5.2)
RBC # BLD AUTO: 3.67 10E6/UL (ref 3.8–5.2)
RBC # BLD AUTO: 3.68 10E6/UL (ref 3.8–5.2)
RBC # BLD AUTO: 3.71 10E6/UL (ref 3.8–5.2)
RBC # BLD AUTO: 3.74 10E6/UL (ref 3.8–5.2)
RBC # BLD AUTO: 4.04 10E6/UL (ref 3.8–5.2)
RBC # BLD AUTO: 4.37 10E6/UL (ref 3.8–5.2)
RBC URINE: 4 /HPF
RBC URINE: 73 /HPF
RSV RNA SPEC NAA+PROBE: NEGATIVE
RVA NSP5 STL QL NAA+PROBE: NOT DETECTED
SALMONELLA SP RPOD STL QL NAA+PROBE: NOT DETECTED
SAO2 % BLDV: 60.6 % (ref 70–75)
SAO2 % BLDV: 61.7 % (ref 70–75)
SAO2 % BLDV: 63.5 % (ref 70–75)
SAO2 % BLDV: 63.9 % (ref 70–75)
SAO2 % BLDV: 65 % (ref 70–75)
SAO2 % BLDV: 65.3 % (ref 70–75)
SAO2 % BLDV: 65.8 % (ref 70–75)
SAO2 % BLDV: 68 % (ref 70–75)
SAO2 % BLDV: 70.5 % (ref 70–75)
SAO2 % BLDV: 72.3 % (ref 70–75)
SAO2 % BLDV: 72.4 % (ref 70–75)
SAO2 % BLDV: 75.9 % (ref 70–75)
SAO2 % BLDV: 76.4 % (ref 70–75)
SAO2 % BLDV: 77 % (ref 70–75)
SAO2 % BLDV: 78.8 % (ref 70–75)
SAO2 % BLDV: 94.5 % (ref 70–75)
SARS-COV-2 RNA RESP QL NAA+PROBE: NEGATIVE
SHIGELLA SP+EIEC IPAH STL QL NAA+PROBE: NOT DETECTED
SODIUM SERPL-SCNC: 126 MMOL/L (ref 136–145)
SODIUM SERPL-SCNC: 130 MMOL/L (ref 136–145)
SODIUM SERPL-SCNC: 133 MMOL/L (ref 136–145)
SODIUM SERPL-SCNC: 134 MMOL/L (ref 136–145)
SODIUM SERPL-SCNC: 134 MMOL/L (ref 136–145)
SODIUM SERPL-SCNC: 136 MMOL/L (ref 136–145)
SODIUM SERPL-SCNC: 137 MMOL/L (ref 136–145)
SODIUM SERPL-SCNC: 138 MMOL/L (ref 136–145)
SODIUM SERPL-SCNC: 139 MMOL/L (ref 136–145)
SODIUM SERPL-SCNC: 141 MMOL/L (ref 136–145)
SODIUM SERPL-SCNC: 145 MMOL/L (ref 136–145)
SP GR UR STRIP: 1.01 (ref 1–1.03)
SP GR UR STRIP: 1.02 (ref 1–1.03)
SQUAMOUS EPITHELIAL: 1 /HPF
T3 SERPL-MCNC: 92 NG/DL (ref 85–202)
T4 FREE SERPL-MCNC: 1.12 NG/DL (ref 0.9–1.7)
TRANSITIONAL EPI: <1 /HPF
TROPONIN T SERPL HS-MCNC: 47 NG/L
TSH SERPL DL<=0.005 MIU/L-ACNC: 0.44 UIU/ML (ref 0.3–4.2)
TSH SERPL DL<=0.005 MIU/L-ACNC: 8.54 UIU/ML (ref 0.3–4.2)
UROBILINOGEN UR STRIP-MCNC: <2 MG/DL
UROBILINOGEN UR STRIP-MCNC: NORMAL MG/DL
V CHOL+PARA RFBL+TRKH+TNAA STL QL NAA+PR: NOT DETECTED
VANCOMYCIN SERPL-MCNC: 11.5 UG/ML
VIT B12 SERPL-MCNC: 1489 PG/ML (ref 232–1245)
WBC # BLD AUTO: 10.1 10E3/UL (ref 4–11)
WBC # BLD AUTO: 11.3 10E3/UL (ref 4–11)
WBC # BLD AUTO: 12 10E3/UL (ref 4–11)
WBC # BLD AUTO: 13.2 10E3/UL (ref 4–11)
WBC # BLD AUTO: 13.8 10E3/UL (ref 4–11)
WBC # BLD AUTO: 15.9 10E3/UL (ref 4–11)
WBC # BLD AUTO: 18.6 10E3/UL (ref 4–11)
WBC # BLD AUTO: 20.8 10E3/UL (ref 4–11)
WBC # BLD AUTO: 24.3 10E3/UL (ref 4–11)
WBC # BLD AUTO: 24.4 10E3/UL (ref 4–11)
WBC # BLD AUTO: 8.9 10E3/UL (ref 4–11)
WBC # BLD AUTO: 9.9 10E3/UL (ref 4–11)
WBC CLUMPS #/AREA URNS HPF: PRESENT /HPF
WBC URINE: 4 /HPF
WBC URINE: >182 /HPF
Y ENTERO RECN STL QL NAA+PROBE: NOT DETECTED

## 2023-01-01 PROCEDURE — 99309 SBSQ NF CARE MODERATE MDM 30: CPT | Performed by: NURSE PRACTITIONER

## 2023-01-01 PROCEDURE — 255N000002 HC RX 255 OP 636: Performed by: INTERNAL MEDICINE

## 2023-01-01 PROCEDURE — 99291 CRITICAL CARE FIRST HOUR: CPT | Performed by: HOSPITALIST

## 2023-01-01 PROCEDURE — 250N000013 HC RX MED GY IP 250 OP 250 PS 637: Performed by: PAIN MEDICINE

## 2023-01-01 PROCEDURE — 83735 ASSAY OF MAGNESIUM: CPT | Performed by: INTERNAL MEDICINE

## 2023-01-01 PROCEDURE — 76705 ECHO EXAM OF ABDOMEN: CPT

## 2023-01-01 PROCEDURE — 250N000011 HC RX IP 250 OP 636: Performed by: INTERNAL MEDICINE

## 2023-01-01 PROCEDURE — 250N000013 HC RX MED GY IP 250 OP 250 PS 637: Performed by: INTERNAL MEDICINE

## 2023-01-01 PROCEDURE — 87493 C DIFF AMPLIFIED PROBE: CPT | Mod: ORL | Performed by: FAMILY MEDICINE

## 2023-01-01 PROCEDURE — 80202 ASSAY OF VANCOMYCIN: CPT | Performed by: INTERNAL MEDICINE

## 2023-01-01 PROCEDURE — 120N000001 HC R&B MED SURG/OB

## 2023-01-01 PROCEDURE — 250N000013 HC RX MED GY IP 250 OP 250 PS 637: Performed by: NURSE PRACTITIONER

## 2023-01-01 PROCEDURE — C9113 INJ PANTOPRAZOLE SODIUM, VIA: HCPCS | Performed by: HOSPITALIST

## 2023-01-01 PROCEDURE — 250N000009 HC RX 250: Performed by: EMERGENCY MEDICINE

## 2023-01-01 PROCEDURE — 84484 ASSAY OF TROPONIN QUANT: CPT | Performed by: EMERGENCY MEDICINE

## 2023-01-01 PROCEDURE — 250N000013 HC RX MED GY IP 250 OP 250 PS 637: Performed by: STUDENT IN AN ORGANIZED HEALTH CARE EDUCATION/TRAINING PROGRAM

## 2023-01-01 PROCEDURE — 82533 TOTAL CORTISOL: CPT | Performed by: INTERNAL MEDICINE

## 2023-01-01 PROCEDURE — 250N000009 HC RX 250: Performed by: HOSPITALIST

## 2023-01-01 PROCEDURE — 97535 SELF CARE MNGMENT TRAINING: CPT | Mod: GO

## 2023-01-01 PROCEDURE — 93306 TTE W/DOPPLER COMPLETE: CPT | Mod: 26 | Performed by: INTERNAL MEDICINE

## 2023-01-01 PROCEDURE — 80048 BASIC METABOLIC PNL TOTAL CA: CPT | Performed by: INTERNAL MEDICINE

## 2023-01-01 PROCEDURE — 87637 SARSCOV2&INF A&B&RSV AMP PRB: CPT | Performed by: EMERGENCY MEDICINE

## 2023-01-01 PROCEDURE — 84443 ASSAY THYROID STIM HORMONE: CPT | Mod: ORL | Performed by: FAMILY MEDICINE

## 2023-01-01 PROCEDURE — 250N000013 HC RX MED GY IP 250 OP 250 PS 637: Performed by: CLINICAL NURSE SPECIALIST

## 2023-01-01 PROCEDURE — 84132 ASSAY OF SERUM POTASSIUM: CPT | Performed by: HOSPITALIST

## 2023-01-01 PROCEDURE — 250N000011 HC RX IP 250 OP 636: Performed by: EMERGENCY MEDICINE

## 2023-01-01 PROCEDURE — 250N000011 HC RX IP 250 OP 636: Performed by: HOSPITALIST

## 2023-01-01 PROCEDURE — 87086 URINE CULTURE/COLONY COUNT: CPT | Performed by: EMERGENCY MEDICINE

## 2023-01-01 PROCEDURE — 85027 COMPLETE CBC AUTOMATED: CPT | Performed by: INTERNAL MEDICINE

## 2023-01-01 PROCEDURE — 258N000003 HC RX IP 258 OP 636: Performed by: INTERNAL MEDICINE

## 2023-01-01 PROCEDURE — 82805 BLOOD GASES W/O2 SATURATION: CPT | Performed by: INTERNAL MEDICINE

## 2023-01-01 PROCEDURE — 99232 SBSQ HOSP IP/OBS MODERATE 35: CPT | Mod: GC | Performed by: STUDENT IN AN ORGANIZED HEALTH CARE EDUCATION/TRAINING PROGRAM

## 2023-01-01 PROCEDURE — 3E043XZ INTRODUCTION OF VASOPRESSOR INTO CENTRAL VEIN, PERCUTANEOUS APPROACH: ICD-10-PCS | Performed by: INTERNAL MEDICINE

## 2023-01-01 PROCEDURE — 96365 THER/PROPH/DIAG IV INF INIT: CPT

## 2023-01-01 PROCEDURE — 87040 BLOOD CULTURE FOR BACTERIA: CPT | Performed by: EMERGENCY MEDICINE

## 2023-01-01 PROCEDURE — 82805 BLOOD GASES W/O2 SATURATION: CPT | Performed by: HOSPITALIST

## 2023-01-01 PROCEDURE — 83735 ASSAY OF MAGNESIUM: CPT | Performed by: STUDENT IN AN ORGANIZED HEALTH CARE EDUCATION/TRAINING PROGRAM

## 2023-01-01 PROCEDURE — 84439 ASSAY OF FREE THYROXINE: CPT | Mod: ORL | Performed by: FAMILY MEDICINE

## 2023-01-01 PROCEDURE — 99291 CRITICAL CARE FIRST HOUR: CPT | Mod: 25

## 2023-01-01 PROCEDURE — 36415 COLL VENOUS BLD VENIPUNCTURE: CPT | Performed by: INTERNAL MEDICINE

## 2023-01-01 PROCEDURE — 200N000001 HC R&B ICU

## 2023-01-01 PROCEDURE — 99291 CRITICAL CARE FIRST HOUR: CPT | Performed by: INTERNAL MEDICINE

## 2023-01-01 PROCEDURE — 250N000011 HC RX IP 250 OP 636: Performed by: STUDENT IN AN ORGANIZED HEALTH CARE EDUCATION/TRAINING PROGRAM

## 2023-01-01 PROCEDURE — 84480 ASSAY TRIIODOTHYRONINE (T3): CPT | Mod: ORL | Performed by: FAMILY MEDICINE

## 2023-01-01 PROCEDURE — 82310 ASSAY OF CALCIUM: CPT | Performed by: INTERNAL MEDICINE

## 2023-01-01 PROCEDURE — 999N000208 ECHOCARDIOGRAM COMPLETE

## 2023-01-01 PROCEDURE — 96367 TX/PROPH/DG ADDL SEQ IV INF: CPT

## 2023-01-01 PROCEDURE — 99310 SBSQ NF CARE HIGH MDM 45: CPT | Performed by: NURSE PRACTITIONER

## 2023-01-01 PROCEDURE — 85025 COMPLETE CBC W/AUTO DIFF WBC: CPT | Performed by: INTERNAL MEDICINE

## 2023-01-01 PROCEDURE — 250N000011 HC RX IP 250 OP 636: Performed by: FAMILY MEDICINE

## 2023-01-01 PROCEDURE — 82550 ASSAY OF CK (CPK): CPT | Performed by: INTERNAL MEDICINE

## 2023-01-01 PROCEDURE — 36415 COLL VENOUS BLD VENIPUNCTURE: CPT | Mod: ORL | Performed by: FAMILY MEDICINE

## 2023-01-01 PROCEDURE — 99232 SBSQ HOSP IP/OBS MODERATE 35: CPT | Performed by: INTERNAL MEDICINE

## 2023-01-01 PROCEDURE — 82607 VITAMIN B-12: CPT | Performed by: STUDENT IN AN ORGANIZED HEALTH CARE EDUCATION/TRAINING PROGRAM

## 2023-01-01 PROCEDURE — 36556 INSERT NON-TUNNEL CV CATH: CPT | Performed by: EMERGENCY MEDICINE

## 2023-01-01 PROCEDURE — 250N000013 HC RX MED GY IP 250 OP 250 PS 637: Performed by: HOSPITALIST

## 2023-01-01 PROCEDURE — P9604 ONE-WAY ALLOW PRORATED TRIP: HCPCS | Mod: ORL | Performed by: FAMILY MEDICINE

## 2023-01-01 PROCEDURE — 84132 ASSAY OF SERUM POTASSIUM: CPT | Performed by: STUDENT IN AN ORGANIZED HEALTH CARE EDUCATION/TRAINING PROGRAM

## 2023-01-01 PROCEDURE — 99238 HOSP IP/OBS DSCHRG MGMT 30/<: CPT | Mod: GC | Performed by: STUDENT IN AN ORGANIZED HEALTH CARE EDUCATION/TRAINING PROGRAM

## 2023-01-01 PROCEDURE — 99233 SBSQ HOSP IP/OBS HIGH 50: CPT | Performed by: STUDENT IN AN ORGANIZED HEALTH CARE EDUCATION/TRAINING PROGRAM

## 2023-01-01 PROCEDURE — 250N000009 HC RX 250: Performed by: INTERNAL MEDICINE

## 2023-01-01 PROCEDURE — 85027 COMPLETE CBC AUTOMATED: CPT | Performed by: HOSPITALIST

## 2023-01-01 PROCEDURE — 99207 PR APP CREDIT; MD BILLING SHARED VISIT: CPT | Performed by: PHYSICIAN ASSISTANT

## 2023-01-01 PROCEDURE — 36569 INSJ PICC 5 YR+ W/O IMAGING: CPT

## 2023-01-01 PROCEDURE — 99285 EMERGENCY DEPT VISIT HI MDM: CPT | Mod: CS,25

## 2023-01-01 PROCEDURE — 93005 ELECTROCARDIOGRAM TRACING: CPT | Performed by: EMERGENCY MEDICINE

## 2023-01-01 PROCEDURE — 99222 1ST HOSP IP/OBS MODERATE 55: CPT | Performed by: PAIN MEDICINE

## 2023-01-01 PROCEDURE — 85027 COMPLETE CBC AUTOMATED: CPT | Performed by: STUDENT IN AN ORGANIZED HEALTH CARE EDUCATION/TRAINING PROGRAM

## 2023-01-01 PROCEDURE — 99291 CRITICAL CARE FIRST HOUR: CPT | Mod: 25 | Performed by: EMERGENCY MEDICINE

## 2023-01-01 PROCEDURE — 82805 BLOOD GASES W/O2 SATURATION: CPT | Performed by: EMERGENCY MEDICINE

## 2023-01-01 PROCEDURE — G0463 HOSPITAL OUTPT CLINIC VISIT: HCPCS

## 2023-01-01 PROCEDURE — 272N000452 HC KIT SHRLOCK 5FR POWER PICC TRIPLE LUMEN

## 2023-01-01 PROCEDURE — 97530 THERAPEUTIC ACTIVITIES: CPT | Mod: GP

## 2023-01-01 PROCEDURE — 99223 1ST HOSP IP/OBS HIGH 75: CPT | Mod: AI | Performed by: HOSPITALIST

## 2023-01-01 PROCEDURE — C9803 HOPD COVID-19 SPEC COLLECT: HCPCS

## 2023-01-01 PROCEDURE — 82962 GLUCOSE BLOOD TEST: CPT

## 2023-01-01 PROCEDURE — 76705 ECHO EXAM OF ABDOMEN: CPT | Mod: 26 | Performed by: EMERGENCY MEDICINE

## 2023-01-01 PROCEDURE — 80048 BASIC METABOLIC PNL TOTAL CA: CPT | Mod: ORL | Performed by: FAMILY MEDICINE

## 2023-01-01 PROCEDURE — 36415 COLL VENOUS BLD VENIPUNCTURE: CPT | Performed by: STUDENT IN AN ORGANIZED HEALTH CARE EDUCATION/TRAINING PROGRAM

## 2023-01-01 PROCEDURE — 83605 ASSAY OF LACTIC ACID: CPT | Performed by: HOSPITALIST

## 2023-01-01 PROCEDURE — 74177 CT ABD & PELVIS W/CONTRAST: CPT | Mod: MG

## 2023-01-01 PROCEDURE — 84132 ASSAY OF SERUM POTASSIUM: CPT | Performed by: EMERGENCY MEDICINE

## 2023-01-01 PROCEDURE — 96361 HYDRATE IV INFUSION ADD-ON: CPT

## 2023-01-01 PROCEDURE — 250N000013 HC RX MED GY IP 250 OP 250 PS 637

## 2023-01-01 PROCEDURE — 99305 1ST NF CARE MODERATE MDM 35: CPT | Performed by: FAMILY MEDICINE

## 2023-01-01 PROCEDURE — 80053 COMPREHEN METABOLIC PANEL: CPT | Performed by: INTERNAL MEDICINE

## 2023-01-01 PROCEDURE — 81001 URINALYSIS AUTO W/SCOPE: CPT | Performed by: EMERGENCY MEDICINE

## 2023-01-01 PROCEDURE — 258N000003 HC RX IP 258 OP 636: Performed by: HOSPITALIST

## 2023-01-01 PROCEDURE — 99232 SBSQ HOSP IP/OBS MODERATE 35: CPT | Performed by: PAIN MEDICINE

## 2023-01-01 PROCEDURE — 85014 HEMATOCRIT: CPT | Performed by: STUDENT IN AN ORGANIZED HEALTH CARE EDUCATION/TRAINING PROGRAM

## 2023-01-01 PROCEDURE — 99222 1ST HOSP IP/OBS MODERATE 55: CPT | Performed by: INTERNAL MEDICINE

## 2023-01-01 PROCEDURE — 258N000003 HC RX IP 258 OP 636: Performed by: EMERGENCY MEDICINE

## 2023-01-01 PROCEDURE — 97162 PT EVAL MOD COMPLEX 30 MIN: CPT | Mod: GP

## 2023-01-01 PROCEDURE — 250N000012 HC RX MED GY IP 250 OP 636 PS 637: Performed by: HOSPITALIST

## 2023-01-01 PROCEDURE — 85025 COMPLETE CBC W/AUTO DIFF WBC: CPT | Performed by: EMERGENCY MEDICINE

## 2023-01-01 PROCEDURE — 999N000127 HC STATISTIC PERIPHERAL IV START W US GUIDANCE

## 2023-01-01 PROCEDURE — 36415 COLL VENOUS BLD VENIPUNCTURE: CPT | Performed by: EMERGENCY MEDICINE

## 2023-01-01 PROCEDURE — 250N000013 HC RX MED GY IP 250 OP 250 PS 637: Performed by: FAMILY MEDICINE

## 2023-01-01 PROCEDURE — 80048 BASIC METABOLIC PNL TOTAL CA: CPT | Performed by: STUDENT IN AN ORGANIZED HEALTH CARE EDUCATION/TRAINING PROGRAM

## 2023-01-01 PROCEDURE — 82435 ASSAY OF BLOOD CHLORIDE: CPT | Performed by: EMERGENCY MEDICINE

## 2023-01-01 PROCEDURE — 99292 CRITICAL CARE ADDL 30 MIN: CPT

## 2023-01-01 PROCEDURE — 80053 COMPREHEN METABOLIC PANEL: CPT | Performed by: EMERGENCY MEDICINE

## 2023-01-01 PROCEDURE — P9047 ALBUMIN (HUMAN), 25%, 50ML: HCPCS | Performed by: INTERNAL MEDICINE

## 2023-01-01 PROCEDURE — G1010 CDSM STANSON: HCPCS

## 2023-01-01 PROCEDURE — 999N000287 HC ICU ADULT ROUNDING, EACH 10 MINS

## 2023-01-01 PROCEDURE — 250N000012 HC RX MED GY IP 250 OP 636 PS 637: Performed by: INTERNAL MEDICINE

## 2023-01-01 PROCEDURE — 83605 ASSAY OF LACTIC ACID: CPT | Performed by: EMERGENCY MEDICINE

## 2023-01-01 PROCEDURE — 84132 ASSAY OF SERUM POTASSIUM: CPT | Performed by: INTERNAL MEDICINE

## 2023-01-01 PROCEDURE — 258N000001 HC RX 258: Performed by: HOSPITALIST

## 2023-01-01 PROCEDURE — 99222 1ST HOSP IP/OBS MODERATE 55: CPT | Performed by: CLINICAL NURSE SPECIALIST

## 2023-01-01 PROCEDURE — 83735 ASSAY OF MAGNESIUM: CPT | Performed by: EMERGENCY MEDICINE

## 2023-01-01 PROCEDURE — 96375 TX/PRO/DX INJ NEW DRUG ADDON: CPT

## 2023-01-01 PROCEDURE — 87493 C DIFF AMPLIFIED PROBE: CPT | Performed by: HOSPITALIST

## 2023-01-01 PROCEDURE — 84155 ASSAY OF PROTEIN SERUM: CPT | Performed by: EMERGENCY MEDICINE

## 2023-01-01 PROCEDURE — 84443 ASSAY THYROID STIM HORMONE: CPT | Performed by: EMERGENCY MEDICINE

## 2023-01-01 PROCEDURE — 80307 DRUG TEST PRSMV CHEM ANLYZR: CPT | Performed by: EMERGENCY MEDICINE

## 2023-01-01 PROCEDURE — 83036 HEMOGLOBIN GLYCOSYLATED A1C: CPT | Performed by: INTERNAL MEDICINE

## 2023-01-01 PROCEDURE — 82947 ASSAY GLUCOSE BLOOD QUANT: CPT | Performed by: EMERGENCY MEDICINE

## 2023-01-01 PROCEDURE — 71045 X-RAY EXAM CHEST 1 VIEW: CPT

## 2023-01-01 PROCEDURE — 99233 SBSQ HOSP IP/OBS HIGH 50: CPT | Performed by: INTERNAL MEDICINE

## 2023-01-01 PROCEDURE — 82272 OCCULT BLD FECES 1-3 TESTS: CPT | Performed by: EMERGENCY MEDICINE

## 2023-01-01 PROCEDURE — 96374 THER/PROPH/DIAG INJ IV PUSH: CPT

## 2023-01-01 PROCEDURE — 87506 IADNA-DNA/RNA PROBE TQ 6-11: CPT | Performed by: HOSPITALIST

## 2023-01-01 PROCEDURE — 97166 OT EVAL MOD COMPLEX 45 MIN: CPT | Mod: GO

## 2023-01-01 PROCEDURE — 87040 BLOOD CULTURE FOR BACTERIA: CPT | Performed by: HOSPITALIST

## 2023-01-01 PROCEDURE — 36556 INSERT NON-TUNNEL CV CATH: CPT

## 2023-01-01 RX ORDER — POTASSIUM CHLORIDE 7.45 MG/ML
10 INJECTION INTRAVENOUS
Status: COMPLETED | OUTPATIENT
Start: 2023-01-01 | End: 2023-01-01

## 2023-01-01 RX ORDER — HEPARIN SODIUM 5000 [USP'U]/.5ML
5000 INJECTION, SOLUTION INTRAVENOUS; SUBCUTANEOUS EVERY 12 HOURS
Status: DISCONTINUED | OUTPATIENT
Start: 2023-01-01 | End: 2023-01-01 | Stop reason: HOSPADM

## 2023-01-01 RX ORDER — ONDANSETRON 4 MG/1
4 TABLET, ORALLY DISINTEGRATING ORAL EVERY 6 HOURS PRN
Status: DISCONTINUED | OUTPATIENT
Start: 2023-01-01 | End: 2023-01-01 | Stop reason: HOSPADM

## 2023-01-01 RX ORDER — OXYCODONE HYDROCHLORIDE 5 MG/1
10 TABLET ORAL EVERY 6 HOURS PRN
Status: DISCONTINUED | OUTPATIENT
Start: 2023-01-01 | End: 2023-01-01 | Stop reason: HOSPADM

## 2023-01-01 RX ORDER — FENTANYL CITRATE 50 UG/ML
100 INJECTION, SOLUTION INTRAMUSCULAR; INTRAVENOUS ONCE
Status: COMPLETED | OUTPATIENT
Start: 2023-01-01 | End: 2023-01-01

## 2023-01-01 RX ORDER — PREGABALIN 25 MG/1
25 CAPSULE ORAL DAILY
Status: DISCONTINUED | OUTPATIENT
Start: 2023-01-01 | End: 2023-01-01 | Stop reason: DRUGHIGH

## 2023-01-01 RX ORDER — OXYCODONE HYDROCHLORIDE 10 MG/1
10 TABLET ORAL EVERY 6 HOURS PRN
Qty: 60 TABLET | Refills: 0 | Status: SHIPPED | OUTPATIENT
Start: 2023-01-01

## 2023-01-01 RX ORDER — LOPERAMIDE HCL 2 MG
2 CAPSULE ORAL 4 TIMES DAILY PRN
COMMUNITY

## 2023-01-01 RX ORDER — PANTOPRAZOLE SODIUM 40 MG/1
40 TABLET, DELAYED RELEASE ORAL
Status: DISCONTINUED | OUTPATIENT
Start: 2023-01-01 | End: 2023-01-01 | Stop reason: HOSPADM

## 2023-01-01 RX ORDER — PROCHLORPERAZINE MALEATE 5 MG
5 TABLET ORAL EVERY 6 HOURS PRN
Status: DISCONTINUED | OUTPATIENT
Start: 2023-01-01 | End: 2023-01-01 | Stop reason: HOSPADM

## 2023-01-01 RX ORDER — PREGABALIN 25 MG/1
25 CAPSULE ORAL 2 TIMES DAILY
Status: ON HOLD | COMMUNITY
End: 2023-01-01

## 2023-01-01 RX ORDER — MAGNESIUM SULFATE 4 G/50ML
4 INJECTION INTRAVENOUS ONCE
Status: COMPLETED | OUTPATIENT
Start: 2023-01-01 | End: 2023-01-01

## 2023-01-01 RX ORDER — BENZOCAINE/MENTHOL 6 MG-10 MG
LOZENGE MUCOUS MEMBRANE 2 TIMES DAILY PRN
COMMUNITY

## 2023-01-01 RX ORDER — ACETAMINOPHEN 650 MG/1
650 SUPPOSITORY RECTAL EVERY 6 HOURS PRN
Status: DISCONTINUED | OUTPATIENT
Start: 2023-01-01 | End: 2023-01-01

## 2023-01-01 RX ORDER — ALBUMIN (HUMAN) 12.5 G/50ML
25 SOLUTION INTRAVENOUS ONCE
Status: COMPLETED | OUTPATIENT
Start: 2023-01-01 | End: 2023-01-01

## 2023-01-01 RX ORDER — ACETAMINOPHEN 650 MG/1
650 SUPPOSITORY RECTAL EVERY 6 HOURS PRN
Status: DISCONTINUED | OUTPATIENT
Start: 2023-01-01 | End: 2023-06-08 | Stop reason: HOSPADM

## 2023-01-01 RX ORDER — PREGABALIN 25 MG/1
25 CAPSULE ORAL 2 TIMES DAILY
Status: DISCONTINUED | OUTPATIENT
Start: 2023-01-01 | End: 2023-01-01 | Stop reason: HOSPADM

## 2023-01-01 RX ORDER — NICOTINE POLACRILEX 4 MG
15-30 LOZENGE BUCCAL
Status: DISCONTINUED | OUTPATIENT
Start: 2023-01-01 | End: 2023-01-01

## 2023-01-01 RX ORDER — MULTIPLE VITAMINS W/ MINERALS TAB 9MG-400MCG
1 TAB ORAL DAILY
Status: DISCONTINUED | OUTPATIENT
Start: 2023-01-01 | End: 2023-01-01 | Stop reason: HOSPADM

## 2023-01-01 RX ORDER — PROCHLORPERAZINE 25 MG
12.5 SUPPOSITORY, RECTAL RECTAL EVERY 12 HOURS PRN
Status: DISCONTINUED | OUTPATIENT
Start: 2023-01-01 | End: 2023-01-01 | Stop reason: HOSPADM

## 2023-01-01 RX ORDER — CHOLESTYRAMINE 4 G/9G
1 POWDER, FOR SUSPENSION ORAL 2 TIMES DAILY
Status: DISCONTINUED | OUTPATIENT
Start: 2023-01-01 | End: 2023-01-01 | Stop reason: HOSPADM

## 2023-01-01 RX ORDER — OXYCODONE HYDROCHLORIDE 10 MG/1
10 TABLET ORAL EVERY 6 HOURS PRN
Qty: 30 TABLET | Refills: 0 | Status: SHIPPED | OUTPATIENT
Start: 2023-01-01 | End: 2023-01-01

## 2023-01-01 RX ORDER — LIDOCAINE 50 MG/G
OINTMENT TOPICAL EVERY 4 HOURS PRN
Status: DISCONTINUED | OUTPATIENT
Start: 2023-01-01 | End: 2023-01-01 | Stop reason: HOSPADM

## 2023-01-01 RX ORDER — SALIVA STIMULANT COMB. NO.3
2 SPRAY, NON-AEROSOL (ML) MUCOUS MEMBRANE
Status: DISCONTINUED | OUTPATIENT
Start: 2023-01-01 | End: 2023-06-08 | Stop reason: HOSPADM

## 2023-01-01 RX ORDER — LOPERAMIDE HCL 2 MG
2 CAPSULE ORAL 2 TIMES DAILY PRN
Status: DISCONTINUED | OUTPATIENT
Start: 2023-01-01 | End: 2023-01-01

## 2023-01-01 RX ORDER — CETIRIZINE HYDROCHLORIDE 10 MG/1
10 TABLET ORAL DAILY
COMMUNITY

## 2023-01-01 RX ORDER — HYDROMORPHONE HYDROCHLORIDE 1 MG/ML
0.5 INJECTION, SOLUTION INTRAMUSCULAR; INTRAVENOUS; SUBCUTANEOUS
Status: DISCONTINUED | OUTPATIENT
Start: 2023-01-01 | End: 2023-06-08 | Stop reason: HOSPADM

## 2023-01-01 RX ORDER — NICOTINE POLACRILEX 4 MG
15-30 LOZENGE BUCCAL
Status: DISCONTINUED | OUTPATIENT
Start: 2023-01-01 | End: 2023-01-01 | Stop reason: HOSPADM

## 2023-01-01 RX ORDER — LANOLIN ALCOHOL/MO/W.PET/CERES
3 CREAM (GRAM) TOPICAL
Status: DISCONTINUED | OUTPATIENT
Start: 2023-01-01 | End: 2023-01-01 | Stop reason: HOSPADM

## 2023-01-01 RX ORDER — DEXTROSE MONOHYDRATE 25 G/50ML
25-50 INJECTION, SOLUTION INTRAVENOUS
Status: DISCONTINUED | OUTPATIENT
Start: 2023-01-01 | End: 2023-01-01

## 2023-01-01 RX ORDER — NALOXONE HYDROCHLORIDE 0.4 MG/ML
0.4 INJECTION, SOLUTION INTRAMUSCULAR; INTRAVENOUS; SUBCUTANEOUS
Status: DISCONTINUED | OUTPATIENT
Start: 2023-01-01 | End: 2023-01-01 | Stop reason: HOSPADM

## 2023-01-01 RX ORDER — LIDOCAINE 40 MG/G
CREAM TOPICAL
Status: ACTIVE | OUTPATIENT
Start: 2023-01-01 | End: 2023-01-01

## 2023-01-01 RX ORDER — POTASSIUM CHLORIDE 1.5 G/1.58G
40 POWDER, FOR SOLUTION ORAL ONCE
Status: COMPLETED | OUTPATIENT
Start: 2023-01-01 | End: 2023-01-01

## 2023-01-01 RX ORDER — PIPERACILLIN SODIUM, TAZOBACTAM SODIUM 3; .375 G/15ML; G/15ML
3.38 INJECTION, POWDER, LYOPHILIZED, FOR SOLUTION INTRAVENOUS EVERY 8 HOURS
Status: DISCONTINUED | OUTPATIENT
Start: 2023-01-01 | End: 2023-01-01

## 2023-01-01 RX ORDER — GLYCOPYRROLATE 0.2 MG/ML
0.2 INJECTION, SOLUTION INTRAMUSCULAR; INTRAVENOUS EVERY 4 HOURS PRN
Status: DISCONTINUED | OUTPATIENT
Start: 2023-01-01 | End: 2023-06-08 | Stop reason: HOSPADM

## 2023-01-01 RX ORDER — NALOXONE HYDROCHLORIDE 0.4 MG/ML
0.2 INJECTION, SOLUTION INTRAMUSCULAR; INTRAVENOUS; SUBCUTANEOUS
Status: DISCONTINUED | OUTPATIENT
Start: 2023-01-01 | End: 2023-06-08 | Stop reason: HOSPADM

## 2023-01-01 RX ORDER — POTASSIUM CHLORIDE 1.5 G/1.58G
20 POWDER, FOR SOLUTION ORAL ONCE
Status: COMPLETED | OUTPATIENT
Start: 2023-01-01 | End: 2023-01-01

## 2023-01-01 RX ORDER — ACETAMINOPHEN 325 MG/1
650 TABLET ORAL 3 TIMES DAILY
Status: DISCONTINUED | OUTPATIENT
Start: 2023-01-01 | End: 2023-01-01

## 2023-01-01 RX ORDER — HYDROMORPHONE HYDROCHLORIDE 2 MG/1
2 TABLET ORAL
Status: DISCONTINUED | OUTPATIENT
Start: 2023-01-01 | End: 2023-06-08 | Stop reason: HOSPADM

## 2023-01-01 RX ORDER — NALOXONE HYDROCHLORIDE 0.4 MG/ML
0.2 INJECTION, SOLUTION INTRAMUSCULAR; INTRAVENOUS; SUBCUTANEOUS
Status: DISCONTINUED | OUTPATIENT
Start: 2023-01-01 | End: 2023-01-01 | Stop reason: HOSPADM

## 2023-01-01 RX ORDER — HYDROCORTISONE 25 MG/G
CREAM TOPICAL 2 TIMES DAILY PRN
COMMUNITY

## 2023-01-01 RX ORDER — ACETAMINOPHEN 650 MG/20.3ML
650 LIQUID ORAL EVERY 6 HOURS PRN
Status: DISCONTINUED | OUTPATIENT
Start: 2023-01-01 | End: 2023-06-08 | Stop reason: HOSPADM

## 2023-01-01 RX ORDER — ZINC SULFATE 50(220)MG
CAPSULE ORAL
COMMUNITY
Start: 2023-01-01

## 2023-01-01 RX ORDER — CHOLESTYRAMINE LIGHT 4 G/5.7G
4 POWDER, FOR SUSPENSION ORAL 2 TIMES DAILY
COMMUNITY

## 2023-01-01 RX ORDER — AMMONIUM LACTATE 12 G/100G
LOTION TOPICAL
Status: DISCONTINUED | OUTPATIENT
Start: 2023-01-01 | End: 2023-01-01 | Stop reason: HOSPADM

## 2023-01-01 RX ORDER — OLANZAPINE 5 MG/1
5 TABLET, ORALLY DISINTEGRATING ORAL EVERY 6 HOURS PRN
Status: DISCONTINUED | OUTPATIENT
Start: 2023-01-01 | End: 2023-06-08 | Stop reason: HOSPADM

## 2023-01-01 RX ORDER — OXYCODONE HYDROCHLORIDE 5 MG/1
5 TABLET ORAL EVERY 6 HOURS PRN
Status: DISCONTINUED | OUTPATIENT
Start: 2023-01-01 | End: 2023-01-01 | Stop reason: ALTCHOICE

## 2023-01-01 RX ORDER — DEXTROSE MONOHYDRATE 25 G/50ML
25-50 INJECTION, SOLUTION INTRAVENOUS
Status: DISCONTINUED | OUTPATIENT
Start: 2023-01-01 | End: 2023-01-01 | Stop reason: HOSPADM

## 2023-01-01 RX ORDER — CALCIUM CARBONATE 500 MG/1
1-2 TABLET, CHEWABLE ORAL EVERY 6 HOURS PRN
COMMUNITY

## 2023-01-01 RX ORDER — PIPERACILLIN SODIUM, TAZOBACTAM SODIUM 3; .375 G/15ML; G/15ML
3.38 INJECTION, POWDER, LYOPHILIZED, FOR SOLUTION INTRAVENOUS ONCE
Status: COMPLETED | OUTPATIENT
Start: 2023-01-01 | End: 2023-01-01

## 2023-01-01 RX ORDER — LIDOCAINE HYDROCHLORIDE 20 MG/ML
JELLY TOPICAL ONCE
Status: COMPLETED | OUTPATIENT
Start: 2023-01-01 | End: 2023-01-01

## 2023-01-01 RX ORDER — PREGABALIN 25 MG/1
25 CAPSULE ORAL 2 TIMES DAILY
Qty: 60 CAPSULE | Refills: 0 | Status: SHIPPED | OUTPATIENT
Start: 2023-01-01

## 2023-01-01 RX ORDER — LORAZEPAM 2 MG/ML
1 INJECTION INTRAMUSCULAR
Status: DISCONTINUED | OUTPATIENT
Start: 2023-01-01 | End: 2023-06-08 | Stop reason: HOSPADM

## 2023-01-01 RX ORDER — LANOLIN ALCOHOL/MO/W.PET/CERES
3 CREAM (GRAM) TOPICAL
Qty: 90 TABLET | Refills: 0 | Status: SHIPPED | OUTPATIENT
Start: 2023-01-01 | End: 2023-01-01

## 2023-01-01 RX ORDER — GINSENG 100 MG
CAPSULE ORAL 2 TIMES DAILY PRN
COMMUNITY

## 2023-01-01 RX ORDER — PIPERACILLIN SODIUM, TAZOBACTAM SODIUM 3; .375 G/15ML; G/15ML
3.38 INJECTION, POWDER, LYOPHILIZED, FOR SOLUTION INTRAVENOUS EVERY 8 HOURS
Status: DISCONTINUED | OUTPATIENT
Start: 2023-01-01 | End: 2023-01-01 | Stop reason: DRUGHIGH

## 2023-01-01 RX ORDER — ACETAMINOPHEN 325 MG/1
650 TABLET ORAL EVERY 4 HOURS PRN
Status: DISCONTINUED | OUTPATIENT
Start: 2023-01-01 | End: 2023-01-01

## 2023-01-01 RX ORDER — LIDOCAINE 40 MG/G
CREAM TOPICAL
Status: DISCONTINUED | OUTPATIENT
Start: 2023-01-01 | End: 2023-01-01 | Stop reason: HOSPADM

## 2023-01-01 RX ORDER — AMOXICILLIN 250 MG
2 CAPSULE ORAL 2 TIMES DAILY PRN
Status: DISCONTINUED | OUTPATIENT
Start: 2023-01-01 | End: 2023-01-01

## 2023-01-01 RX ORDER — POTASSIUM CHLORIDE 1500 MG/1
40 TABLET, EXTENDED RELEASE ORAL ONCE
Status: COMPLETED | OUTPATIENT
Start: 2023-01-01 | End: 2023-01-01

## 2023-01-01 RX ORDER — BENZONATATE 100 MG/1
100 CAPSULE ORAL 3 TIMES DAILY PRN
Status: DISCONTINUED | OUTPATIENT
Start: 2023-01-01 | End: 2023-01-01 | Stop reason: HOSPADM

## 2023-01-01 RX ORDER — ONDANSETRON 4 MG/1
4 TABLET, ORALLY DISINTEGRATING ORAL EVERY 6 HOURS PRN
Status: DISCONTINUED | OUTPATIENT
Start: 2023-01-01 | End: 2023-06-08 | Stop reason: HOSPADM

## 2023-01-01 RX ORDER — SODIUM CHLORIDE, SODIUM LACTATE, POTASSIUM CHLORIDE, CALCIUM CHLORIDE 600; 310; 30; 20 MG/100ML; MG/100ML; MG/100ML; MG/100ML
125 INJECTION, SOLUTION INTRAVENOUS CONTINUOUS
Status: DISCONTINUED | OUTPATIENT
Start: 2023-01-01 | End: 2023-01-01

## 2023-01-01 RX ORDER — CARBOXYMETHYLCELLULOSE SODIUM 5 MG/ML
1-2 SOLUTION/ DROPS OPHTHALMIC
Status: DISCONTINUED | OUTPATIENT
Start: 2023-01-01 | End: 2023-06-08 | Stop reason: HOSPADM

## 2023-01-01 RX ORDER — HYDROMORPHONE HYDROCHLORIDE 1 MG/ML
0.5 INJECTION, SOLUTION INTRAMUSCULAR; INTRAVENOUS; SUBCUTANEOUS EVERY 6 HOURS PRN
Status: DISCONTINUED | OUTPATIENT
Start: 2023-01-01 | End: 2023-01-01

## 2023-01-01 RX ORDER — OXYCODONE HYDROCHLORIDE 5 MG/1
5 TABLET ORAL EVERY 4 HOURS PRN
Status: DISCONTINUED | OUTPATIENT
Start: 2023-01-01 | End: 2023-01-01

## 2023-01-01 RX ORDER — DULOXETIN HYDROCHLORIDE 20 MG/1
40 CAPSULE, DELAYED RELEASE ORAL DAILY
Status: ON HOLD | COMMUNITY
End: 2023-01-01

## 2023-01-01 RX ORDER — AMPICILLIN AND SULBACTAM 2; 1 G/1; G/1
3 INJECTION, POWDER, FOR SOLUTION INTRAMUSCULAR; INTRAVENOUS EVERY 6 HOURS
Status: DISCONTINUED | OUTPATIENT
Start: 2023-01-01 | End: 2023-01-01

## 2023-01-01 RX ORDER — PREGABALIN 25 MG/1
25 CAPSULE ORAL 2 TIMES DAILY
Qty: 60 CAPSULE | Refills: 0 | Status: SHIPPED | OUTPATIENT
Start: 2023-01-01 | End: 2023-01-01

## 2023-01-01 RX ORDER — NALOXONE HYDROCHLORIDE 0.4 MG/ML
0.1 INJECTION, SOLUTION INTRAMUSCULAR; INTRAVENOUS; SUBCUTANEOUS
Status: DISCONTINUED | OUTPATIENT
Start: 2023-01-01 | End: 2023-06-08 | Stop reason: HOSPADM

## 2023-01-01 RX ORDER — PIPERACILLIN SODIUM, TAZOBACTAM SODIUM 3; .375 G/15ML; G/15ML
3.38 INJECTION, POWDER, LYOPHILIZED, FOR SOLUTION INTRAVENOUS ONCE
Status: DISCONTINUED | OUTPATIENT
Start: 2023-01-01 | End: 2023-01-01

## 2023-01-01 RX ORDER — DEXTROSE MONOHYDRATE 25 G/50ML
25 INJECTION, SOLUTION INTRAVENOUS ONCE
Status: COMPLETED | OUTPATIENT
Start: 2023-01-01 | End: 2023-01-01

## 2023-01-01 RX ORDER — MINERAL OIL/HYDROPHIL PETROLAT
OINTMENT (GRAM) TOPICAL PRN
COMMUNITY

## 2023-01-01 RX ORDER — FLUOXETINE 10 MG/1
10 CAPSULE ORAL DAILY
COMMUNITY

## 2023-01-01 RX ORDER — GUAIFENESIN 200 MG/10ML
5-10 LIQUID ORAL EVERY 4 HOURS PRN
COMMUNITY

## 2023-01-01 RX ORDER — FUROSEMIDE 20 MG
40 TABLET ORAL DAILY
Status: DISCONTINUED | OUTPATIENT
Start: 2023-01-01 | End: 2023-01-01 | Stop reason: HOSPADM

## 2023-01-01 RX ORDER — MAGNESIUM HYDROXIDE/ALUMINUM HYDROXICE/SIMETHICONE 120; 1200; 1200 MG/30ML; MG/30ML; MG/30ML
30 SUSPENSION ORAL EVERY 6 HOURS PRN
Status: ON HOLD | COMMUNITY
End: 2023-01-01

## 2023-01-01 RX ORDER — ACETAMINOPHEN 325 MG/1
975 TABLET ORAL 3 TIMES DAILY
Status: DISCONTINUED | OUTPATIENT
Start: 2023-01-01 | End: 2023-01-01 | Stop reason: HOSPADM

## 2023-01-01 RX ORDER — LORAZEPAM 1 MG/1
1 TABLET ORAL
Status: DISCONTINUED | OUTPATIENT
Start: 2023-01-01 | End: 2023-06-08 | Stop reason: HOSPADM

## 2023-01-01 RX ORDER — HYDROMORPHONE HYDROCHLORIDE 1 MG/ML
0.3 INJECTION, SOLUTION INTRAMUSCULAR; INTRAVENOUS; SUBCUTANEOUS
Status: DISCONTINUED | OUTPATIENT
Start: 2023-01-01 | End: 2023-06-08 | Stop reason: HOSPADM

## 2023-01-01 RX ORDER — SODIUM PHOSPHATE, DIBASIC AND SODIUM PHOSPHATE, MONOBASIC, UNSPECIFIED FORM 7; 19 G/118ML; G/118ML
1 ENEMA RECTAL DAILY PRN
COMMUNITY

## 2023-01-01 RX ORDER — CHOLESTYRAMINE LIGHT 4 G/5.7G
4 POWDER, FOR SUSPENSION ORAL 2 TIMES DAILY
Status: DISCONTINUED | OUTPATIENT
Start: 2023-01-01 | End: 2023-01-01

## 2023-01-01 RX ORDER — HYDROMORPHONE HYDROCHLORIDE 1 MG/ML
2 SOLUTION ORAL
Status: DISCONTINUED | OUTPATIENT
Start: 2023-01-01 | End: 2023-06-08 | Stop reason: HOSPADM

## 2023-01-01 RX ORDER — ACETAMINOPHEN 325 MG/1
650 TABLET ORAL EVERY 6 HOURS PRN
Status: DISCONTINUED | OUTPATIENT
Start: 2023-01-01 | End: 2023-01-01

## 2023-01-01 RX ORDER — PROCHLORPERAZINE 25 MG
12.5 SUPPOSITORY, RECTAL RECTAL EVERY 12 HOURS PRN
Status: DISCONTINUED | OUTPATIENT
Start: 2023-01-01 | End: 2023-06-08 | Stop reason: HOSPADM

## 2023-01-01 RX ORDER — ATROPINE SULFATE 10 MG/ML
2 SOLUTION/ DROPS OPHTHALMIC EVERY 4 HOURS PRN
Status: DISCONTINUED | OUTPATIENT
Start: 2023-01-01 | End: 2023-06-08 | Stop reason: HOSPADM

## 2023-01-01 RX ORDER — ACETAMINOPHEN 325 MG/1
650 TABLET ORAL DAILY PRN
Status: DISCONTINUED | OUTPATIENT
Start: 2023-01-01 | End: 2023-01-01 | Stop reason: HOSPADM

## 2023-01-01 RX ORDER — ONDANSETRON 2 MG/ML
4 INJECTION INTRAMUSCULAR; INTRAVENOUS EVERY 6 HOURS PRN
Status: DISCONTINUED | OUTPATIENT
Start: 2023-01-01 | End: 2023-06-08 | Stop reason: HOSPADM

## 2023-01-01 RX ORDER — DOCUSATE SODIUM 100 MG/1
100 CAPSULE, LIQUID FILLED ORAL 2 TIMES DAILY PRN
COMMUNITY

## 2023-01-01 RX ORDER — CYANOCOBALAMIN 1000 UG/ML
1000 INJECTION, SOLUTION INTRAMUSCULAR; SUBCUTANEOUS
Status: DISCONTINUED | OUTPATIENT
Start: 2023-01-01 | End: 2023-01-01

## 2023-01-01 RX ORDER — FUROSEMIDE 40 MG
40 TABLET ORAL DAILY
COMMUNITY

## 2023-01-01 RX ORDER — BISACODYL 10 MG
10 SUPPOSITORY, RECTAL RECTAL DAILY PRN
Status: DISCONTINUED | OUTPATIENT
Start: 2023-01-01 | End: 2023-01-01 | Stop reason: HOSPADM

## 2023-01-01 RX ORDER — LOPERAMIDE HCL 2 MG
2 CAPSULE ORAL 4 TIMES DAILY PRN
Status: ON HOLD | COMMUNITY
End: 2023-01-01

## 2023-01-01 RX ORDER — SODIUM CHLORIDE 9 MG/ML
INJECTION, SOLUTION INTRAVENOUS CONTINUOUS
Status: DISCONTINUED | OUTPATIENT
Start: 2023-01-01 | End: 2023-01-01

## 2023-01-01 RX ORDER — AMOXICILLIN 250 MG
1 CAPSULE ORAL 2 TIMES DAILY PRN
Status: DISCONTINUED | OUTPATIENT
Start: 2023-01-01 | End: 2023-01-01

## 2023-01-01 RX ORDER — ONDANSETRON 2 MG/ML
4 INJECTION INTRAMUSCULAR; INTRAVENOUS EVERY 6 HOURS PRN
Status: DISCONTINUED | OUTPATIENT
Start: 2023-01-01 | End: 2023-01-01

## 2023-01-01 RX ORDER — NOREPINEPHRINE BITARTRATE 0.02 MG/ML
.01-.6 INJECTION, SOLUTION INTRAVENOUS CONTINUOUS
Status: DISCONTINUED | OUTPATIENT
Start: 2023-01-01 | End: 2023-01-01

## 2023-01-01 RX ORDER — VANCOMYCIN HYDROCHLORIDE 125 MG/1
250 CAPSULE ORAL 4 TIMES DAILY
Status: DISCONTINUED | OUTPATIENT
Start: 2023-01-01 | End: 2023-01-01

## 2023-01-01 RX ORDER — AMOXICILLIN 250 MG
2 CAPSULE ORAL 2 TIMES DAILY
Status: DISCONTINUED | OUTPATIENT
Start: 2023-01-01 | End: 2023-01-01

## 2023-01-01 RX ORDER — CALCIUM GLUCONATE 20 MG/ML
1 INJECTION, SOLUTION INTRAVENOUS ONCE
Status: COMPLETED | OUTPATIENT
Start: 2023-01-01 | End: 2023-01-01

## 2023-01-01 RX ORDER — IOPAMIDOL 755 MG/ML
100 INJECTION, SOLUTION INTRAVASCULAR ONCE
Status: COMPLETED | OUTPATIENT
Start: 2023-01-01 | End: 2023-01-01

## 2023-01-01 RX ORDER — OXYCODONE HYDROCHLORIDE 5 MG/1
5 TABLET ORAL EVERY 6 HOURS PRN
Status: DISCONTINUED | OUTPATIENT
Start: 2023-01-01 | End: 2023-01-01

## 2023-01-01 RX ORDER — ACETAMINOPHEN 325 MG/1
325-650 TABLET ORAL EVERY 6 HOURS PRN
COMMUNITY
Start: 2023-01-01 | End: 2023-06-09

## 2023-01-01 RX ORDER — AMOXICILLIN 250 MG
1 CAPSULE ORAL 2 TIMES DAILY
Status: DISCONTINUED | OUTPATIENT
Start: 2023-01-01 | End: 2023-01-01

## 2023-01-01 RX ORDER — LOPERAMIDE HCL 2 MG
2 CAPSULE ORAL 4 TIMES DAILY PRN
Qty: 60 CAPSULE | Refills: 0 | Status: SHIPPED | OUTPATIENT
Start: 2023-01-01 | End: 2023-01-01

## 2023-01-01 RX ORDER — SODIUM CHLORIDE, SODIUM LACTATE, POTASSIUM CHLORIDE, CALCIUM CHLORIDE 600; 310; 30; 20 MG/100ML; MG/100ML; MG/100ML; MG/100ML
INJECTION, SOLUTION INTRAVENOUS CONTINUOUS
Status: DISCONTINUED | OUTPATIENT
Start: 2023-01-01 | End: 2023-01-01

## 2023-01-01 RX ORDER — DULOXETIN HYDROCHLORIDE 20 MG/1
40 CAPSULE, DELAYED RELEASE ORAL DAILY
Status: DISCONTINUED | OUTPATIENT
Start: 2023-01-01 | End: 2023-01-01

## 2023-01-01 RX ORDER — HYDROMORPHONE HYDROCHLORIDE 1 MG/ML
1 SOLUTION ORAL
Status: DISCONTINUED | OUTPATIENT
Start: 2023-01-01 | End: 2023-06-08 | Stop reason: HOSPADM

## 2023-01-01 RX ORDER — HYDROMORPHONE HYDROCHLORIDE 1 MG/ML
.3-.5 INJECTION, SOLUTION INTRAMUSCULAR; INTRAVENOUS; SUBCUTANEOUS
Status: DISCONTINUED | OUTPATIENT
Start: 2023-01-01 | End: 2023-01-01

## 2023-01-01 RX ORDER — SIMVASTATIN 10 MG
20 TABLET ORAL AT BEDTIME
Status: DISCONTINUED | OUTPATIENT
Start: 2023-01-01 | End: 2023-01-01 | Stop reason: HOSPADM

## 2023-01-01 RX ORDER — LOPERAMIDE HCL 2 MG
2 CAPSULE ORAL 4 TIMES DAILY PRN
Status: DISCONTINUED | OUTPATIENT
Start: 2023-01-01 | End: 2023-01-01 | Stop reason: HOSPADM

## 2023-01-01 RX ORDER — OXYCODONE HYDROCHLORIDE 10 MG/1
5 TABLET ORAL EVERY 4 HOURS
Status: ON HOLD | COMMUNITY
End: 2023-01-01

## 2023-01-01 RX ORDER — POTASSIUM CHLORIDE 1500 MG/1
20 TABLET, EXTENDED RELEASE ORAL ONCE
Status: COMPLETED | OUTPATIENT
Start: 2023-01-01 | End: 2023-01-01

## 2023-01-01 RX ORDER — FENTANYL CITRATE 50 UG/ML
50 INJECTION, SOLUTION INTRAMUSCULAR; INTRAVENOUS ONCE
Status: DISCONTINUED | OUTPATIENT
Start: 2023-01-01 | End: 2023-01-01

## 2023-01-01 RX ORDER — FLUOXETINE 10 MG/1
CAPSULE ORAL
Status: ON HOLD | COMMUNITY
Start: 2023-01-01 | End: 2023-01-01

## 2023-01-01 RX ORDER — MINERAL OIL/HYDROPHIL PETROLAT
OINTMENT (GRAM) TOPICAL
Status: DISCONTINUED | OUTPATIENT
Start: 2023-01-01 | End: 2023-06-08 | Stop reason: HOSPADM

## 2023-01-01 RX ORDER — PIPERACILLIN SODIUM, TAZOBACTAM SODIUM 3; .375 G/15ML; G/15ML
3.38 INJECTION, POWDER, LYOPHILIZED, FOR SOLUTION INTRAVENOUS EVERY 12 HOURS
Status: DISCONTINUED | OUTPATIENT
Start: 2023-01-01 | End: 2023-01-01

## 2023-01-01 RX ORDER — HYDROMORPHONE HCL IN WATER/PF 6 MG/30 ML
0.2 PATIENT CONTROLLED ANALGESIA SYRINGE INTRAVENOUS EVERY 6 HOURS PRN
Status: DISCONTINUED | OUTPATIENT
Start: 2023-01-01 | End: 2023-01-01

## 2023-01-01 RX ORDER — NYSTATIN 100000 [USP'U]/G
POWDER TOPICAL 2 TIMES DAILY PRN
COMMUNITY
End: 2023-01-01

## 2023-01-01 RX ORDER — PROCHLORPERAZINE MALEATE 5 MG
5 TABLET ORAL EVERY 6 HOURS PRN
Status: DISCONTINUED | OUTPATIENT
Start: 2023-01-01 | End: 2023-06-08 | Stop reason: HOSPADM

## 2023-01-01 RX ORDER — PETROLATUM,WHITE 41 %
OINTMENT (GRAM) TOPICAL
COMMUNITY
Start: 2023-01-01

## 2023-01-01 RX ORDER — CEFAZOLIN SODIUM 1 G/50ML
2000 SOLUTION INTRAVENOUS ONCE
Status: COMPLETED | OUTPATIENT
Start: 2023-01-01 | End: 2023-01-01

## 2023-01-01 RX ORDER — ROPIVACAINE IN 0.9% SOD CHL/PF 0.1 %
.03-.125 PLASTIC BAG, INJECTION (ML) EPIDURAL CONTINUOUS
Status: DISCONTINUED | OUTPATIENT
Start: 2023-01-01 | End: 2023-01-01 | Stop reason: ALTCHOICE

## 2023-01-01 RX ADMIN — DICLOFENAC 2 G: 10 GEL TOPICAL at 08:57

## 2023-01-01 RX ADMIN — DULOXETINE HYDROCHLORIDE 40 MG: 20 CAPSULE, DELAYED RELEASE ORAL at 09:41

## 2023-01-01 RX ADMIN — Medication 1 TABLET: at 08:01

## 2023-01-01 RX ADMIN — ACETAMINOPHEN 650 MG: 325 TABLET ORAL at 09:41

## 2023-01-01 RX ADMIN — Medication 3 MG: at 23:38

## 2023-01-01 RX ADMIN — MAGNESIUM SULFATE HEPTAHYDRATE 4 G: 80 INJECTION, SOLUTION INTRAVENOUS at 08:00

## 2023-01-01 RX ADMIN — SODIUM ZIRCONIUM CYCLOSILICATE 10 G: 10 POWDER, FOR SUSPENSION ORAL at 14:21

## 2023-01-01 RX ADMIN — PIPERACILLIN AND TAZOBACTAM 3.38 G: 3; .375 INJECTION, POWDER, LYOPHILIZED, FOR SOLUTION INTRAVENOUS at 03:32

## 2023-01-01 RX ADMIN — FLUOXETINE 20 MG: 20 CAPSULE ORAL at 10:12

## 2023-01-01 RX ADMIN — OXYCODONE HYDROCHLORIDE 10 MG: 5 TABLET ORAL at 17:15

## 2023-01-01 RX ADMIN — OXYCODONE HYDROCHLORIDE 7.5 MG: 5 TABLET ORAL at 14:03

## 2023-01-01 RX ADMIN — SIMVASTATIN 20 MG: 10 TABLET, FILM COATED ORAL at 21:28

## 2023-01-01 RX ADMIN — OXYCODONE HYDROCHLORIDE 10 MG: 5 TABLET ORAL at 05:34

## 2023-01-01 RX ADMIN — SODIUM CHLORIDE 72 ML: 9 INJECTION, SOLUTION INTRAVENOUS at 10:47

## 2023-01-01 RX ADMIN — HEPARIN SODIUM 5000 UNITS: 5000 INJECTION, SOLUTION INTRAVENOUS; SUBCUTANEOUS at 08:00

## 2023-01-01 RX ADMIN — SODIUM CHLORIDE, POTASSIUM CHLORIDE, SODIUM LACTATE AND CALCIUM CHLORIDE 125 ML/HR: 600; 310; 30; 20 INJECTION, SOLUTION INTRAVENOUS at 10:34

## 2023-01-01 RX ADMIN — ANORECTAL OINTMENT: 15.7; .44; 24; 20.6 OINTMENT TOPICAL at 20:12

## 2023-01-01 RX ADMIN — ACETAMINOPHEN 975 MG: 325 TABLET ORAL at 20:19

## 2023-01-01 RX ADMIN — AMPICILLIN SODIUM AND SULBACTAM SODIUM 3 G: 2; 1 INJECTION, POWDER, FOR SOLUTION INTRAMUSCULAR; INTRAVENOUS at 16:09

## 2023-01-01 RX ADMIN — ACETAMINOPHEN 650 MG: 325 TABLET ORAL at 08:18

## 2023-01-01 RX ADMIN — LEVOTHYROXINE SODIUM 225 MCG: 0.03 TABLET ORAL at 06:05

## 2023-01-01 RX ADMIN — DICLOFENAC 2 G: 10 GEL TOPICAL at 17:53

## 2023-01-01 RX ADMIN — DICLOFENAC 2 G: 10 GEL TOPICAL at 20:12

## 2023-01-01 RX ADMIN — VANCOMYCIN HYDROCHLORIDE 2000 MG: 5 INJECTION, POWDER, LYOPHILIZED, FOR SOLUTION INTRAVENOUS at 17:54

## 2023-01-01 RX ADMIN — FLUOXETINE 20 MG: 20 CAPSULE ORAL at 08:58

## 2023-01-01 RX ADMIN — LEVOTHYROXINE SODIUM 225 MCG: 0.03 TABLET ORAL at 05:50

## 2023-01-01 RX ADMIN — LEVOTHYROXINE SODIUM 225 MCG: 0.03 TABLET ORAL at 05:27

## 2023-01-01 RX ADMIN — HEPARIN SODIUM 5000 UNITS: 5000 INJECTION, SOLUTION INTRAVENOUS; SUBCUTANEOUS at 21:42

## 2023-01-01 RX ADMIN — CHOLESTYRAMINE 4 G: 4 POWDER, FOR SUSPENSION ORAL at 11:24

## 2023-01-01 RX ADMIN — Medication: at 15:12

## 2023-01-01 RX ADMIN — DULOXETINE HYDROCHLORIDE 40 MG: 20 CAPSULE, DELAYED RELEASE ORAL at 08:07

## 2023-01-01 RX ADMIN — LEVOTHYROXINE SODIUM 225 MCG: 0.03 TABLET ORAL at 06:40

## 2023-01-01 RX ADMIN — PREGABALIN 25 MG: 25 CAPSULE ORAL at 10:07

## 2023-01-01 RX ADMIN — FENTANYL CITRATE 100 MCG: 50 INJECTION INTRAMUSCULAR; INTRAVENOUS at 10:35

## 2023-01-01 RX ADMIN — PANTOPRAZOLE SODIUM 40 MG: 40 TABLET, DELAYED RELEASE ORAL at 06:38

## 2023-01-01 RX ADMIN — SODIUM CHLORIDE, POTASSIUM CHLORIDE, SODIUM LACTATE AND CALCIUM CHLORIDE: 600; 310; 30; 20 INJECTION, SOLUTION INTRAVENOUS at 11:06

## 2023-01-01 RX ADMIN — CHOLESTYRAMINE 4 G: 4 POWDER, FOR SUSPENSION ORAL at 10:14

## 2023-01-01 RX ADMIN — FLUOXETINE 20 MG: 20 CAPSULE ORAL at 13:50

## 2023-01-01 RX ADMIN — SODIUM BICARBONATE 50 MEQ: 84 INJECTION, SOLUTION INTRAVENOUS at 15:44

## 2023-01-01 RX ADMIN — ACETAMINOPHEN 975 MG: 325 TABLET ORAL at 10:12

## 2023-01-01 RX ADMIN — OXYCODONE HYDROCHLORIDE 10 MG: 5 TABLET ORAL at 05:26

## 2023-01-01 RX ADMIN — OXYCODONE HYDROCHLORIDE 10 MG: 5 TABLET ORAL at 00:13

## 2023-01-01 RX ADMIN — HYDROMORPHONE HYDROCHLORIDE 0.5 MG: 1 INJECTION, SOLUTION INTRAMUSCULAR; INTRAVENOUS; SUBCUTANEOUS at 09:57

## 2023-01-01 RX ADMIN — AMPICILLIN SODIUM AND SULBACTAM SODIUM 3 G: 2; 1 INJECTION, POWDER, FOR SOLUTION INTRAMUSCULAR; INTRAVENOUS at 03:20

## 2023-01-01 RX ADMIN — DEXTROSE MONOHYDRATE 300 ML: 100 INJECTION, SOLUTION INTRAVENOUS at 16:30

## 2023-01-01 RX ADMIN — ACETAMINOPHEN 650 MG: 325 TABLET ORAL at 08:22

## 2023-01-01 RX ADMIN — SODIUM CHLORIDE, POTASSIUM CHLORIDE, SODIUM LACTATE AND CALCIUM CHLORIDE 1000 ML: 600; 310; 30; 20 INJECTION, SOLUTION INTRAVENOUS at 09:07

## 2023-01-01 RX ADMIN — OXYCODONE HYDROCHLORIDE 10 MG: 5 TABLET ORAL at 05:32

## 2023-01-01 RX ADMIN — CHOLESTYRAMINE 4 G: 4 POWDER, FOR SUSPENSION ORAL at 08:09

## 2023-01-01 RX ADMIN — PREGABALIN 25 MG: 25 CAPSULE ORAL at 20:12

## 2023-01-01 RX ADMIN — PIPERACILLIN AND TAZOBACTAM 3.38 G: 3; .375 INJECTION, POWDER, LYOPHILIZED, FOR SOLUTION INTRAVENOUS at 20:26

## 2023-01-01 RX ADMIN — LOPERAMIDE HYDROCHLORIDE 2 MG: 2 CAPSULE ORAL at 20:32

## 2023-01-01 RX ADMIN — Medication: at 09:52

## 2023-01-01 RX ADMIN — FUROSEMIDE 40 MG: 20 TABLET ORAL at 08:01

## 2023-01-01 RX ADMIN — FUROSEMIDE 40 MG: 20 TABLET ORAL at 08:06

## 2023-01-01 RX ADMIN — IOPAMIDOL 100 ML: 755 INJECTION, SOLUTION INTRAVENOUS at 10:46

## 2023-01-01 RX ADMIN — Medication 1 TABLET: at 10:11

## 2023-01-01 RX ADMIN — VANCOMYCIN HYDROCHLORIDE 250 MG: 125 CAPSULE ORAL at 20:44

## 2023-01-01 RX ADMIN — AMOXICILLIN AND CLAVULANATE POTASSIUM 1 TABLET: 875; 125 TABLET, FILM COATED ORAL at 21:19

## 2023-01-01 RX ADMIN — VANCOMYCIN HYDROCHLORIDE 250 MG: 125 CAPSULE ORAL at 08:08

## 2023-01-01 RX ADMIN — VANCOMYCIN HYDROCHLORIDE 250 MG: 125 CAPSULE ORAL at 08:49

## 2023-01-01 RX ADMIN — AMOXICILLIN AND CLAVULANATE POTASSIUM 1 TABLET: 875; 125 TABLET, FILM COATED ORAL at 21:40

## 2023-01-01 RX ADMIN — MAGNESIUM SULFATE HEPTAHYDRATE 4 G: 80 INJECTION, SOLUTION INTRAVENOUS at 08:54

## 2023-01-01 RX ADMIN — BENZONATATE 100 MG: 100 CAPSULE ORAL at 05:18

## 2023-01-01 RX ADMIN — OXYCODONE HYDROCHLORIDE 5 MG: 5 TABLET ORAL at 10:07

## 2023-01-01 RX ADMIN — ACETAMINOPHEN 650 MG: 325 TABLET ORAL at 04:00

## 2023-01-01 RX ADMIN — OXYCODONE HYDROCHLORIDE 10 MG: 5 TABLET ORAL at 17:29

## 2023-01-01 RX ADMIN — HYDROMORPHONE HYDROCHLORIDE 0.5 MG: 1 INJECTION, SOLUTION INTRAMUSCULAR; INTRAVENOUS; SUBCUTANEOUS at 04:39

## 2023-01-01 RX ADMIN — SIMVASTATIN 20 MG: 10 TABLET, FILM COATED ORAL at 21:19

## 2023-01-01 RX ADMIN — PANTOPRAZOLE SODIUM 40 MG: 40 TABLET, DELAYED RELEASE ORAL at 06:40

## 2023-01-01 RX ADMIN — PREGABALIN 25 MG: 25 CAPSULE ORAL at 08:18

## 2023-01-01 RX ADMIN — OXYCODONE HYDROCHLORIDE 10 MG: 5 TABLET ORAL at 23:17

## 2023-01-01 RX ADMIN — SIMVASTATIN 20 MG: 10 TABLET, FILM COATED ORAL at 22:06

## 2023-01-01 RX ADMIN — POTASSIUM CHLORIDE 40 MEQ: 1500 TABLET, EXTENDED RELEASE ORAL at 08:07

## 2023-01-01 RX ADMIN — SIMVASTATIN 20 MG: 10 TABLET, FILM COATED ORAL at 21:48

## 2023-01-01 RX ADMIN — ACETAMINOPHEN 650 MG: 325 TABLET ORAL at 16:11

## 2023-01-01 RX ADMIN — OXYCODONE HYDROCHLORIDE 10 MG: 5 TABLET ORAL at 11:08

## 2023-01-01 RX ADMIN — PANTOPRAZOLE SODIUM 40 MG: 40 TABLET, DELAYED RELEASE ORAL at 05:33

## 2023-01-01 RX ADMIN — FUROSEMIDE 40 MG: 20 TABLET ORAL at 10:12

## 2023-01-01 RX ADMIN — Medication 0.06 MCG/KG/MIN: at 10:44

## 2023-01-01 RX ADMIN — ANORECTAL OINTMENT: 15.7; .44; 24; 20.6 OINTMENT TOPICAL at 21:43

## 2023-01-01 RX ADMIN — PIPERACILLIN AND TAZOBACTAM 3.38 G: 3; .375 INJECTION, POWDER, LYOPHILIZED, FOR SOLUTION INTRAVENOUS at 00:16

## 2023-01-01 RX ADMIN — PIPERACILLIN AND TAZOBACTAM 3.38 G: 3; .375 INJECTION, POWDER, LYOPHILIZED, FOR SOLUTION INTRAVENOUS at 12:32

## 2023-01-01 RX ADMIN — ACETAMINOPHEN 650 MG: 325 TABLET ORAL at 20:42

## 2023-01-01 RX ADMIN — OXYCODONE HYDROCHLORIDE 10 MG: 5 TABLET ORAL at 23:38

## 2023-01-01 RX ADMIN — LEVOTHYROXINE SODIUM 225 MCG: 0.03 TABLET ORAL at 06:39

## 2023-01-01 RX ADMIN — Medication 1 TABLET: at 08:52

## 2023-01-01 RX ADMIN — VANCOMYCIN HYDROCHLORIDE 250 MG: 125 CAPSULE ORAL at 12:32

## 2023-01-01 RX ADMIN — PIPERACILLIN AND TAZOBACTAM 3.38 G: 3; .375 INJECTION, POWDER, LYOPHILIZED, FOR SOLUTION INTRAVENOUS at 19:59

## 2023-01-01 RX ADMIN — HYDROMORPHONE HYDROCHLORIDE 0.3 MG: 1 INJECTION, SOLUTION INTRAMUSCULAR; INTRAVENOUS; SUBCUTANEOUS at 11:43

## 2023-01-01 RX ADMIN — LEVOTHYROXINE SODIUM 225 MCG: 0.03 TABLET ORAL at 04:32

## 2023-01-01 RX ADMIN — LOPERAMIDE HYDROCHLORIDE 2 MG: 2 CAPSULE ORAL at 09:37

## 2023-01-01 RX ADMIN — AMPICILLIN SODIUM AND SULBACTAM SODIUM 3 G: 2; 1 INJECTION, POWDER, FOR SOLUTION INTRAMUSCULAR; INTRAVENOUS at 21:50

## 2023-01-01 RX ADMIN — OXYCODONE HYDROCHLORIDE 10 MG: 5 TABLET ORAL at 05:28

## 2023-01-01 RX ADMIN — INSULIN ASPART 1 UNITS: 100 INJECTION, SOLUTION INTRAVENOUS; SUBCUTANEOUS at 20:51

## 2023-01-01 RX ADMIN — POTASSIUM CHLORIDE 40 MEQ: 1.5 POWDER, FOR SOLUTION ORAL at 10:20

## 2023-01-01 RX ADMIN — SODIUM CHLORIDE 500 ML: 9 INJECTION, SOLUTION INTRAVENOUS at 15:05

## 2023-01-01 RX ADMIN — Medication 1 TABLET: at 08:07

## 2023-01-01 RX ADMIN — LIDOCAINE HYDROCHLORIDE 2 ML: 10 INJECTION, SOLUTION EPIDURAL; INFILTRATION; INTRACAUDAL; PERINEURAL at 15:57

## 2023-01-01 RX ADMIN — PREGABALIN 25 MG: 25 CAPSULE ORAL at 08:09

## 2023-01-01 RX ADMIN — OXYCODONE HYDROCHLORIDE 5 MG: 5 TABLET ORAL at 08:09

## 2023-01-01 RX ADMIN — SIMVASTATIN 20 MG: 10 TABLET, FILM COATED ORAL at 21:01

## 2023-01-01 RX ADMIN — LEVOTHYROXINE SODIUM 225 MCG: 0.03 TABLET ORAL at 05:42

## 2023-01-01 RX ADMIN — SODIUM ZIRCONIUM CYCLOSILICATE 10 G: 10 POWDER, FOR SUSPENSION ORAL at 16:46

## 2023-01-01 RX ADMIN — LOPERAMIDE HYDROCHLORIDE 2 MG: 2 CAPSULE ORAL at 16:17

## 2023-01-01 RX ADMIN — AMOXICILLIN AND CLAVULANATE POTASSIUM 1 TABLET: 875; 125 TABLET, FILM COATED ORAL at 09:09

## 2023-01-01 RX ADMIN — PIPERACILLIN AND TAZOBACTAM 3.38 G: 3; .375 INJECTION, POWDER, LYOPHILIZED, FOR SOLUTION INTRAVENOUS at 11:34

## 2023-01-01 RX ADMIN — SODIUM CHLORIDE, POTASSIUM CHLORIDE, SODIUM LACTATE AND CALCIUM CHLORIDE 1000 ML: 600; 310; 30; 20 INJECTION, SOLUTION INTRAVENOUS at 09:30

## 2023-01-01 RX ADMIN — INSULIN ASPART 1 UNITS: 100 INJECTION, SOLUTION INTRAVENOUS; SUBCUTANEOUS at 08:00

## 2023-01-01 RX ADMIN — HYDROMORPHONE HYDROCHLORIDE 0.5 MG: 1 INJECTION, SOLUTION INTRAMUSCULAR; INTRAVENOUS; SUBCUTANEOUS at 12:25

## 2023-01-01 RX ADMIN — Medication 1 TABLET: at 08:58

## 2023-01-01 RX ADMIN — OXYCODONE HYDROCHLORIDE 10 MG: 5 TABLET ORAL at 23:33

## 2023-01-01 RX ADMIN — ACETAMINOPHEN 975 MG: 325 TABLET ORAL at 20:04

## 2023-01-01 RX ADMIN — TAZOBACTAM SODIUM AND PIPERACILLIN SODIUM 2.25 G: 250; 2 INJECTION, SOLUTION INTRAVENOUS at 09:52

## 2023-01-01 RX ADMIN — Medication 0.03 MCG/KG/MIN: at 19:32

## 2023-01-01 RX ADMIN — CHOLESTYRAMINE 4 G: 4 POWDER, FOR SUSPENSION ORAL at 21:09

## 2023-01-01 RX ADMIN — PREGABALIN 25 MG: 25 CAPSULE ORAL at 10:11

## 2023-01-01 RX ADMIN — PREGABALIN 25 MG: 25 CAPSULE ORAL at 20:19

## 2023-01-01 RX ADMIN — AMOXICILLIN AND CLAVULANATE POTASSIUM 1 TABLET: 875; 125 TABLET, FILM COATED ORAL at 08:01

## 2023-01-01 RX ADMIN — OXYCODONE HYDROCHLORIDE 10 MG: 5 TABLET ORAL at 11:53

## 2023-01-01 RX ADMIN — PANTOPRAZOLE SODIUM 40 MG: 40 TABLET, DELAYED RELEASE ORAL at 06:47

## 2023-01-01 RX ADMIN — HEPARIN SODIUM 5000 UNITS: 5000 INJECTION, SOLUTION INTRAVENOUS; SUBCUTANEOUS at 20:20

## 2023-01-01 RX ADMIN — ANORECTAL OINTMENT: 15.7; .44; 24; 20.6 OINTMENT TOPICAL at 11:33

## 2023-01-01 RX ADMIN — CHOLESTYRAMINE 4 G: 4 POWDER, FOR SUSPENSION ORAL at 10:12

## 2023-01-01 RX ADMIN — ACETAMINOPHEN 650 MG: 325 TABLET ORAL at 17:26

## 2023-01-01 RX ADMIN — LOPERAMIDE HYDROCHLORIDE 2 MG: 2 CAPSULE ORAL at 06:47

## 2023-01-01 RX ADMIN — HEPARIN SODIUM 5000 UNITS: 5000 INJECTION, SOLUTION INTRAVENOUS; SUBCUTANEOUS at 09:09

## 2023-01-01 RX ADMIN — HEPARIN SODIUM 5000 UNITS: 5000 INJECTION, SOLUTION INTRAVENOUS; SUBCUTANEOUS at 21:13

## 2023-01-01 RX ADMIN — CALCIUM GLUCONATE 1 G: 20 INJECTION, SOLUTION INTRAVENOUS at 14:14

## 2023-01-01 RX ADMIN — DEXTROSE AND SODIUM CHLORIDE: 5; 900 INJECTION, SOLUTION INTRAVENOUS at 04:45

## 2023-01-01 RX ADMIN — SODIUM BICARBONATE: 84 INJECTION, SOLUTION INTRAVENOUS at 12:47

## 2023-01-01 RX ADMIN — ALBUMIN HUMAN 25 G: 0.25 SOLUTION INTRAVENOUS at 16:38

## 2023-01-01 RX ADMIN — SODIUM CHLORIDE 500 ML: 9 INJECTION, SOLUTION INTRAVENOUS at 15:30

## 2023-01-01 RX ADMIN — PIPERACILLIN AND TAZOBACTAM 3.38 G: 3; .375 INJECTION, POWDER, LYOPHILIZED, FOR SOLUTION INTRAVENOUS at 16:39

## 2023-01-01 RX ADMIN — OXYCODONE HYDROCHLORIDE 10 MG: 5 TABLET ORAL at 11:59

## 2023-01-01 RX ADMIN — ACETAMINOPHEN 975 MG: 325 TABLET ORAL at 09:07

## 2023-01-01 RX ADMIN — LOPERAMIDE HYDROCHLORIDE 2 MG: 2 CAPSULE ORAL at 05:18

## 2023-01-01 RX ADMIN — ACETAMINOPHEN 975 MG: 325 TABLET ORAL at 08:58

## 2023-01-01 RX ADMIN — OXYCODONE HYDROCHLORIDE 10 MG: 5 TABLET ORAL at 06:06

## 2023-01-01 RX ADMIN — PREGABALIN 25 MG: 25 CAPSULE ORAL at 09:41

## 2023-01-01 RX ADMIN — ACETAMINOPHEN 975 MG: 325 TABLET ORAL at 21:19

## 2023-01-01 RX ADMIN — ACETAMINOPHEN 650 MG: 325 TABLET ORAL at 20:05

## 2023-01-01 RX ADMIN — POTASSIUM CHLORIDE 10 MEQ: 7.46 INJECTION, SOLUTION INTRAVENOUS at 21:41

## 2023-01-01 RX ADMIN — VANCOMYCIN HYDROCHLORIDE 250 MG: 125 CAPSULE ORAL at 14:21

## 2023-01-01 RX ADMIN — ACETAMINOPHEN 975 MG: 325 TABLET ORAL at 14:00

## 2023-01-01 RX ADMIN — VANCOMYCIN HYDROCHLORIDE 250 MG: 125 CAPSULE ORAL at 09:05

## 2023-01-01 RX ADMIN — AMOXICILLIN AND CLAVULANATE POTASSIUM 1 TABLET: 875; 125 TABLET, FILM COATED ORAL at 20:12

## 2023-01-01 RX ADMIN — OXYCODONE HYDROCHLORIDE 10 MG: 5 TABLET ORAL at 11:24

## 2023-01-01 RX ADMIN — PREGABALIN 25 MG: 25 CAPSULE ORAL at 21:10

## 2023-01-01 RX ADMIN — VANCOMYCIN HYDROCHLORIDE 1500 MG: 5 INJECTION, POWDER, LYOPHILIZED, FOR SOLUTION INTRAVENOUS at 12:47

## 2023-01-01 RX ADMIN — VANCOMYCIN HYDROCHLORIDE 250 MG: 125 CAPSULE ORAL at 17:18

## 2023-01-01 RX ADMIN — Medication: at 08:41

## 2023-01-01 RX ADMIN — DICLOFENAC 2 G: 10 GEL TOPICAL at 09:40

## 2023-01-01 RX ADMIN — HEPARIN SODIUM 5000 UNITS: 5000 INJECTION, SOLUTION INTRAVENOUS; SUBCUTANEOUS at 21:10

## 2023-01-01 RX ADMIN — PREGABALIN 25 MG: 25 CAPSULE ORAL at 21:42

## 2023-01-01 RX ADMIN — ACETAMINOPHEN 975 MG: 325 TABLET ORAL at 21:42

## 2023-01-01 RX ADMIN — OXYCODONE HYDROCHLORIDE 5 MG: 5 TABLET ORAL at 18:13

## 2023-01-01 RX ADMIN — SODIUM ZIRCONIUM CYCLOSILICATE 10 G: 10 POWDER, FOR SUSPENSION ORAL at 20:40

## 2023-01-01 RX ADMIN — DULOXETINE HYDROCHLORIDE 40 MG: 20 CAPSULE, DELAYED RELEASE ORAL at 13:49

## 2023-01-01 RX ADMIN — PREGABALIN 25 MG: 25 CAPSULE ORAL at 21:19

## 2023-01-01 RX ADMIN — ACETAMINOPHEN 650 MG: 325 TABLET ORAL at 04:32

## 2023-01-01 RX ADMIN — OXYCODONE HYDROCHLORIDE 10 MG: 5 TABLET ORAL at 17:04

## 2023-01-01 RX ADMIN — ACETAMINOPHEN 650 MG: 325 TABLET ORAL at 03:57

## 2023-01-01 RX ADMIN — HYDROMORPHONE HYDROCHLORIDE 0.2 MG: 0.2 INJECTION, SOLUTION INTRAMUSCULAR; INTRAVENOUS; SUBCUTANEOUS at 06:01

## 2023-01-01 RX ADMIN — HEPARIN SODIUM 5000 UNITS: 5000 INJECTION, SOLUTION INTRAVENOUS; SUBCUTANEOUS at 08:41

## 2023-01-01 RX ADMIN — LOPERAMIDE HYDROCHLORIDE 2 MG: 2 CAPSULE ORAL at 14:08

## 2023-01-01 RX ADMIN — HYDROMORPHONE HYDROCHLORIDE 0.5 MG: 1 INJECTION, SOLUTION INTRAMUSCULAR; INTRAVENOUS; SUBCUTANEOUS at 16:10

## 2023-01-01 RX ADMIN — LEVOTHYROXINE SODIUM 225 MCG: 0.03 TABLET ORAL at 05:59

## 2023-01-01 RX ADMIN — DICLOFENAC 2 G: 10 GEL TOPICAL at 09:17

## 2023-01-01 RX ADMIN — HEPARIN SODIUM 5000 UNITS: 5000 INJECTION, SOLUTION INTRAVENOUS; SUBCUTANEOUS at 22:42

## 2023-01-01 RX ADMIN — PANTOPRAZOLE SODIUM 40 MG: 40 TABLET, DELAYED RELEASE ORAL at 09:41

## 2023-01-01 RX ADMIN — POTASSIUM CHLORIDE 10 MEQ: 7.46 INJECTION, SOLUTION INTRAVENOUS at 22:55

## 2023-01-01 RX ADMIN — VANCOMYCIN HYDROCHLORIDE 250 MG: 125 CAPSULE ORAL at 20:26

## 2023-01-01 RX ADMIN — OXYCODONE HYDROCHLORIDE 10 MG: 5 TABLET ORAL at 17:26

## 2023-01-01 RX ADMIN — CHOLESTYRAMINE 4 G: 4 POWDER, FOR SUSPENSION ORAL at 21:38

## 2023-01-01 RX ADMIN — DEXTROSE AND SODIUM CHLORIDE: 5; 900 INJECTION, SOLUTION INTRAVENOUS at 15:23

## 2023-01-01 RX ADMIN — PREGABALIN 25 MG: 25 CAPSULE ORAL at 08:40

## 2023-01-01 RX ADMIN — HYDROMORPHONE HYDROCHLORIDE 0.5 MG: 1 INJECTION, SOLUTION INTRAMUSCULAR; INTRAVENOUS; SUBCUTANEOUS at 10:07

## 2023-01-01 RX ADMIN — PIPERACILLIN AND TAZOBACTAM 3.38 G: 3; .375 INJECTION, POWDER, LYOPHILIZED, FOR SOLUTION INTRAVENOUS at 23:46

## 2023-01-01 RX ADMIN — DICLOFENAC 2 G: 10 GEL TOPICAL at 20:14

## 2023-01-01 RX ADMIN — HEPARIN SODIUM 5000 UNITS: 5000 INJECTION, SOLUTION INTRAVENOUS; SUBCUTANEOUS at 20:26

## 2023-01-01 RX ADMIN — Medication 3 MG: at 01:38

## 2023-01-01 RX ADMIN — AMPICILLIN SODIUM AND SULBACTAM SODIUM 3 G: 2; 1 INJECTION, POWDER, FOR SOLUTION INTRAMUSCULAR; INTRAVENOUS at 16:52

## 2023-01-01 RX ADMIN — OXYCODONE HYDROCHLORIDE 10 MG: 5 TABLET ORAL at 23:36

## 2023-01-01 RX ADMIN — HEPARIN SODIUM 5000 UNITS: 5000 INJECTION, SOLUTION INTRAVENOUS; SUBCUTANEOUS at 20:05

## 2023-01-01 RX ADMIN — OXYCODONE HYDROCHLORIDE 7.5 MG: 5 TABLET ORAL at 12:19

## 2023-01-01 RX ADMIN — SODIUM CHLORIDE 1000 ML: 9 INJECTION, SOLUTION INTRAVENOUS at 14:13

## 2023-01-01 RX ADMIN — FUROSEMIDE 40 MG: 20 TABLET ORAL at 08:52

## 2023-01-01 RX ADMIN — OXYCODONE HYDROCHLORIDE 10 MG: 5 TABLET ORAL at 22:57

## 2023-01-01 RX ADMIN — HEPARIN SODIUM 5000 UNITS: 5000 INJECTION, SOLUTION INTRAVENOUS; SUBCUTANEOUS at 09:42

## 2023-01-01 RX ADMIN — CHOLESTYRAMINE 4 G: 4 POWDER, FOR SUSPENSION ORAL at 22:06

## 2023-01-01 RX ADMIN — FENTANYL CITRATE 100 MCG: 50 INJECTION, SOLUTION INTRAMUSCULAR; INTRAVENOUS at 08:44

## 2023-01-01 RX ADMIN — ANORECTAL OINTMENT: 15.7; .44; 24; 20.6 OINTMENT TOPICAL at 16:19

## 2023-01-01 RX ADMIN — FUROSEMIDE 40 MG: 20 TABLET ORAL at 16:50

## 2023-01-01 RX ADMIN — OXYCODONE HYDROCHLORIDE 10 MG: 5 TABLET ORAL at 11:03

## 2023-01-01 RX ADMIN — ACETAMINOPHEN 650 MG: 325 TABLET ORAL at 06:48

## 2023-01-01 RX ADMIN — LOPERAMIDE HYDROCHLORIDE 2 MG: 2 CAPSULE ORAL at 17:33

## 2023-01-01 RX ADMIN — DEXTROSE AND SODIUM CHLORIDE: 5; 900 INJECTION, SOLUTION INTRAVENOUS at 00:17

## 2023-01-01 RX ADMIN — Medication 0.12 MCG/KG/MIN: at 00:15

## 2023-01-01 RX ADMIN — PREGABALIN 25 MG: 25 CAPSULE ORAL at 21:40

## 2023-01-01 RX ADMIN — ACETAMINOPHEN 975 MG: 325 TABLET ORAL at 13:42

## 2023-01-01 RX ADMIN — DOCUSATE SODIUM 50 MG AND SENNOSIDES 8.6 MG 1 TABLET: 8.6; 5 TABLET, FILM COATED ORAL at 20:44

## 2023-01-01 RX ADMIN — HEPARIN SODIUM 5000 UNITS: 5000 INJECTION, SOLUTION INTRAVENOUS; SUBCUTANEOUS at 08:18

## 2023-01-01 RX ADMIN — SIMVASTATIN 20 MG: 10 TABLET, FILM COATED ORAL at 21:41

## 2023-01-01 RX ADMIN — AMPICILLIN SODIUM AND SULBACTAM SODIUM 3 G: 2; 1 INJECTION, POWDER, FOR SOLUTION INTRAMUSCULAR; INTRAVENOUS at 10:16

## 2023-01-01 RX ADMIN — DULOXETINE HYDROCHLORIDE 40 MG: 20 CAPSULE, DELAYED RELEASE ORAL at 08:40

## 2023-01-01 RX ADMIN — OXYCODONE HYDROCHLORIDE 10 MG: 5 TABLET ORAL at 11:32

## 2023-01-01 RX ADMIN — AMOXICILLIN AND CLAVULANATE POTASSIUM 1 TABLET: 875; 125 TABLET, FILM COATED ORAL at 08:07

## 2023-01-01 RX ADMIN — FUROSEMIDE 40 MG: 20 TABLET ORAL at 09:08

## 2023-01-01 RX ADMIN — ACETAMINOPHEN 650 MG: 325 TABLET ORAL at 13:22

## 2023-01-01 RX ADMIN — DICLOFENAC 2 G: 10 GEL TOPICAL at 16:19

## 2023-01-01 RX ADMIN — OXYCODONE HYDROCHLORIDE 7.5 MG: 5 TABLET ORAL at 22:14

## 2023-01-01 RX ADMIN — HEPARIN SODIUM 5000 UNITS: 5000 INJECTION, SOLUTION INTRAVENOUS; SUBCUTANEOUS at 08:47

## 2023-01-01 RX ADMIN — DEXTROSE AND SODIUM CHLORIDE: 5; 900 INJECTION, SOLUTION INTRAVENOUS at 07:51

## 2023-01-01 RX ADMIN — PANTOPRAZOLE SODIUM 40 MG: 40 INJECTION, POWDER, FOR SOLUTION INTRAVENOUS at 09:05

## 2023-01-01 RX ADMIN — OXYCODONE HYDROCHLORIDE 5 MG: 5 TABLET ORAL at 00:11

## 2023-01-01 RX ADMIN — OXYCODONE HYDROCHLORIDE 7.5 MG: 5 TABLET ORAL at 06:40

## 2023-01-01 RX ADMIN — AMPICILLIN SODIUM AND SULBACTAM SODIUM 3 G: 2; 1 INJECTION, POWDER, FOR SOLUTION INTRAMUSCULAR; INTRAVENOUS at 22:06

## 2023-01-01 RX ADMIN — LOPERAMIDE HYDROCHLORIDE 2 MG: 2 CAPSULE ORAL at 09:40

## 2023-01-01 RX ADMIN — PANTOPRAZOLE SODIUM 40 MG: 40 TABLET, DELAYED RELEASE ORAL at 06:13

## 2023-01-01 RX ADMIN — SIMVASTATIN 20 MG: 10 TABLET, FILM COATED ORAL at 22:11

## 2023-01-01 RX ADMIN — HEPARIN SODIUM 5000 UNITS: 5000 INJECTION, SOLUTION INTRAVENOUS; SUBCUTANEOUS at 20:44

## 2023-01-01 RX ADMIN — PANTOPRAZOLE SODIUM 40 MG: 40 TABLET, DELAYED RELEASE ORAL at 06:05

## 2023-01-01 RX ADMIN — HYDROMORPHONE HYDROCHLORIDE 0.2 MG: 0.2 INJECTION, SOLUTION INTRAMUSCULAR; INTRAVENOUS; SUBCUTANEOUS at 00:11

## 2023-01-01 RX ADMIN — POTASSIUM CHLORIDE 40 MEQ: 1500 TABLET, EXTENDED RELEASE ORAL at 08:00

## 2023-01-01 RX ADMIN — VANCOMYCIN HYDROCHLORIDE 250 MG: 125 CAPSULE ORAL at 17:27

## 2023-01-01 RX ADMIN — HEPARIN SODIUM 5000 UNITS: 5000 INJECTION, SOLUTION INTRAVENOUS; SUBCUTANEOUS at 20:04

## 2023-01-01 RX ADMIN — ANORECTAL OINTMENT: 15.7; .44; 24; 20.6 OINTMENT TOPICAL at 15:47

## 2023-01-01 RX ADMIN — PREGABALIN 25 MG: 25 CAPSULE ORAL at 20:20

## 2023-01-01 RX ADMIN — HEPARIN SODIUM 5000 UNITS: 5000 INJECTION, SOLUTION INTRAVENOUS; SUBCUTANEOUS at 08:52

## 2023-01-01 RX ADMIN — HEPARIN SODIUM 5000 UNITS: 5000 INJECTION, SOLUTION INTRAVENOUS; SUBCUTANEOUS at 20:21

## 2023-01-01 RX ADMIN — LEVOTHYROXINE SODIUM 225 MCG: 0.03 TABLET ORAL at 05:34

## 2023-01-01 RX ADMIN — CHOLESTYRAMINE 4 G: 4 POWDER, FOR SUSPENSION ORAL at 21:17

## 2023-01-01 RX ADMIN — OXYCODONE HYDROCHLORIDE 7.5 MG: 5 TABLET ORAL at 22:09

## 2023-01-01 RX ADMIN — Medication 1 TABLET: at 09:09

## 2023-01-01 RX ADMIN — AMPICILLIN SODIUM AND SULBACTAM SODIUM 3 G: 2; 1 INJECTION, POWDER, FOR SOLUTION INTRAMUSCULAR; INTRAVENOUS at 10:15

## 2023-01-01 RX ADMIN — ACETAMINOPHEN 650 MG: 325 TABLET ORAL at 15:30

## 2023-01-01 RX ADMIN — PREGABALIN 25 MG: 25 CAPSULE ORAL at 08:01

## 2023-01-01 RX ADMIN — CHOLESTYRAMINE 4 G: 4 POWDER, FOR SUSPENSION ORAL at 10:27

## 2023-01-01 RX ADMIN — OXYCODONE HYDROCHLORIDE 10 MG: 5 TABLET ORAL at 11:51

## 2023-01-01 RX ADMIN — OXYCODONE HYDROCHLORIDE 10 MG: 5 TABLET ORAL at 17:33

## 2023-01-01 RX ADMIN — MAGNESIUM SULFATE HEPTAHYDRATE 4 G: 80 INJECTION, SOLUTION INTRAVENOUS at 10:14

## 2023-01-01 RX ADMIN — MAGNESIUM SULFATE HEPTAHYDRATE 4 G: 80 INJECTION, SOLUTION INTRAVENOUS at 08:14

## 2023-01-01 RX ADMIN — Medication 0.08 MCG/KG/MIN: at 18:10

## 2023-01-01 RX ADMIN — FLUOXETINE 20 MG: 20 CAPSULE ORAL at 08:52

## 2023-01-01 RX ADMIN — PANTOPRAZOLE SODIUM 40 MG: 40 TABLET, DELAYED RELEASE ORAL at 08:10

## 2023-01-01 RX ADMIN — HEPARIN SODIUM 5000 UNITS: 5000 INJECTION, SOLUTION INTRAVENOUS; SUBCUTANEOUS at 08:57

## 2023-01-01 RX ADMIN — SIMVASTATIN 20 MG: 10 TABLET, FILM COATED ORAL at 21:02

## 2023-01-01 RX ADMIN — CHOLESTYRAMINE 4 G: 4 POWDER, FOR SUSPENSION ORAL at 22:00

## 2023-01-01 RX ADMIN — DULOXETINE HYDROCHLORIDE 40 MG: 20 CAPSULE, DELAYED RELEASE ORAL at 10:12

## 2023-01-01 RX ADMIN — FLUOXETINE 20 MG: 20 CAPSULE ORAL at 09:08

## 2023-01-01 RX ADMIN — SODIUM BICARBONATE: 84 INJECTION, SOLUTION INTRAVENOUS at 01:55

## 2023-01-01 RX ADMIN — PIPERACILLIN AND TAZOBACTAM 3.38 G: 3; .375 INJECTION, POWDER, LYOPHILIZED, FOR SOLUTION INTRAVENOUS at 13:48

## 2023-01-01 RX ADMIN — Medication 0.08 MCG/KG/MIN: at 06:56

## 2023-01-01 RX ADMIN — SODIUM CHLORIDE, POTASSIUM CHLORIDE, SODIUM LACTATE AND CALCIUM CHLORIDE 1000 ML: 600; 310; 30; 20 INJECTION, SOLUTION INTRAVENOUS at 09:51

## 2023-01-01 RX ADMIN — MAGNESIUM SULFATE HEPTAHYDRATE 4 G: 80 INJECTION, SOLUTION INTRAVENOUS at 08:17

## 2023-01-01 RX ADMIN — HEPARIN SODIUM 5000 UNITS: 5000 INJECTION, SOLUTION INTRAVENOUS; SUBCUTANEOUS at 08:09

## 2023-01-01 RX ADMIN — OXYCODONE HYDROCHLORIDE 10 MG: 5 TABLET ORAL at 00:06

## 2023-01-01 RX ADMIN — VANCOMYCIN HYDROCHLORIDE 750 MG: 1 INJECTION, POWDER, LYOPHILIZED, FOR SOLUTION INTRAVENOUS at 11:02

## 2023-01-01 RX ADMIN — ACETAMINOPHEN 650 MG: 325 TABLET ORAL at 14:31

## 2023-01-01 RX ADMIN — ACETAMINOPHEN 975 MG: 325 TABLET ORAL at 08:06

## 2023-01-01 RX ADMIN — SIMVASTATIN 20 MG: 10 TABLET, FILM COATED ORAL at 21:40

## 2023-01-01 RX ADMIN — HYDROMORPHONE HYDROCHLORIDE 0.2 MG: 0.2 INJECTION, SOLUTION INTRAMUSCULAR; INTRAVENOUS; SUBCUTANEOUS at 18:13

## 2023-01-01 RX ADMIN — SODIUM ZIRCONIUM CYCLOSILICATE 10 G: 10 POWDER, FOR SUSPENSION ORAL at 08:03

## 2023-01-01 RX ADMIN — ACETAMINOPHEN 975 MG: 325 TABLET ORAL at 13:06

## 2023-01-01 RX ADMIN — PANTOPRAZOLE SODIUM 40 MG: 40 TABLET, DELAYED RELEASE ORAL at 10:37

## 2023-01-01 RX ADMIN — PIPERACILLIN AND TAZOBACTAM 3.38 G: 3; .375 INJECTION, POWDER, LYOPHILIZED, FOR SOLUTION INTRAVENOUS at 04:28

## 2023-01-01 RX ADMIN — ACETAMINOPHEN 650 MG: 325 TABLET ORAL at 20:20

## 2023-01-01 RX ADMIN — OXYCODONE HYDROCHLORIDE 10 MG: 5 TABLET ORAL at 11:16

## 2023-01-01 RX ADMIN — ACETAMINOPHEN 975 MG: 325 TABLET ORAL at 21:40

## 2023-01-01 RX ADMIN — PREGABALIN 25 MG: 25 CAPSULE ORAL at 20:04

## 2023-01-01 RX ADMIN — PANTOPRAZOLE SODIUM 40 MG: 40 INJECTION, POWDER, FOR SOLUTION INTRAVENOUS at 08:40

## 2023-01-01 RX ADMIN — PREGABALIN 25 MG: 25 CAPSULE ORAL at 08:52

## 2023-01-01 RX ADMIN — ACETAMINOPHEN 650 MG: 325 TABLET ORAL at 08:41

## 2023-01-01 RX ADMIN — FLUOXETINE 20 MG: 20 CAPSULE ORAL at 08:07

## 2023-01-01 RX ADMIN — AMPICILLIN SODIUM AND SULBACTAM SODIUM 3 G: 2; 1 INJECTION, POWDER, FOR SOLUTION INTRAMUSCULAR; INTRAVENOUS at 10:27

## 2023-01-01 RX ADMIN — HEPARIN SODIUM 5000 UNITS: 5000 INJECTION, SOLUTION INTRAVENOUS; SUBCUTANEOUS at 09:05

## 2023-01-01 RX ADMIN — OXYCODONE HYDROCHLORIDE 5 MG: 5 TABLET ORAL at 17:18

## 2023-01-01 RX ADMIN — AMPICILLIN SODIUM AND SULBACTAM SODIUM 3 G: 2; 1 INJECTION, POWDER, FOR SOLUTION INTRAMUSCULAR; INTRAVENOUS at 03:45

## 2023-01-01 RX ADMIN — DULOXETINE HYDROCHLORIDE 40 MG: 20 CAPSULE, DELAYED RELEASE ORAL at 08:18

## 2023-01-01 RX ADMIN — ACETAMINOPHEN 650 MG: 325 TABLET ORAL at 16:10

## 2023-01-01 RX ADMIN — POTASSIUM CHLORIDE 10 MEQ: 7.46 INJECTION, SOLUTION INTRAVENOUS at 20:24

## 2023-01-01 RX ADMIN — OXYCODONE HYDROCHLORIDE 7.5 MG: 5 TABLET ORAL at 03:12

## 2023-01-01 RX ADMIN — PERFLUTREN 2 ML: 6.52 INJECTION, SUSPENSION INTRAVENOUS at 08:41

## 2023-01-01 RX ADMIN — PREGABALIN 25 MG: 25 CAPSULE ORAL at 08:07

## 2023-01-01 RX ADMIN — ANORECTAL OINTMENT: 15.7; .44; 24; 20.6 OINTMENT TOPICAL at 09:02

## 2023-01-01 RX ADMIN — HEPARIN SODIUM 5000 UNITS: 5000 INJECTION, SOLUTION INTRAVENOUS; SUBCUTANEOUS at 21:37

## 2023-01-01 RX ADMIN — LEVOTHYROXINE SODIUM 225 MCG: 0.03 TABLET ORAL at 05:33

## 2023-01-01 RX ADMIN — ACETAMINOPHEN 650 MG: 325 TABLET ORAL at 01:52

## 2023-01-01 RX ADMIN — SIMVASTATIN 20 MG: 10 TABLET, FILM COATED ORAL at 22:00

## 2023-01-01 RX ADMIN — AMPICILLIN SODIUM AND SULBACTAM SODIUM 3 G: 2; 1 INJECTION, POWDER, FOR SOLUTION INTRAMUSCULAR; INTRAVENOUS at 21:09

## 2023-01-01 RX ADMIN — OXYCODONE HYDROCHLORIDE 10 MG: 5 TABLET ORAL at 05:49

## 2023-01-01 RX ADMIN — PREGABALIN 25 MG: 25 CAPSULE ORAL at 08:58

## 2023-01-01 RX ADMIN — PANTOPRAZOLE SODIUM 40 MG: 40 TABLET, DELAYED RELEASE ORAL at 05:29

## 2023-01-01 RX ADMIN — BENZONATATE 100 MG: 100 CAPSULE ORAL at 03:22

## 2023-01-01 RX ADMIN — ACETAMINOPHEN 650 MG: 325 TABLET ORAL at 09:05

## 2023-01-01 RX ADMIN — DICLOFENAC 2 G: 10 GEL TOPICAL at 12:37

## 2023-01-01 RX ADMIN — SIMVASTATIN 20 MG: 10 TABLET, FILM COATED ORAL at 21:09

## 2023-01-01 RX ADMIN — SODIUM BICARBONATE: 84 INJECTION, SOLUTION INTRAVENOUS at 15:04

## 2023-01-01 RX ADMIN — PREGABALIN 25 MG: 25 CAPSULE ORAL at 09:08

## 2023-01-01 RX ADMIN — ACETAMINOPHEN 975 MG: 325 TABLET ORAL at 08:52

## 2023-01-01 RX ADMIN — Medication: at 19:54

## 2023-01-01 RX ADMIN — POTASSIUM CHLORIDE 20 MEQ: 1500 TABLET, EXTENDED RELEASE ORAL at 11:08

## 2023-01-01 RX ADMIN — OXYCODONE HYDROCHLORIDE 7.5 MG: 5 TABLET ORAL at 18:14

## 2023-01-01 RX ADMIN — ACETAMINOPHEN 975 MG: 325 TABLET ORAL at 13:59

## 2023-01-01 RX ADMIN — SODIUM CHLORIDE 1000 ML: 9 INJECTION, SOLUTION INTRAVENOUS at 16:10

## 2023-01-01 RX ADMIN — POTASSIUM CHLORIDE 40 MEQ: 1500 TABLET, EXTENDED RELEASE ORAL at 08:40

## 2023-01-01 RX ADMIN — SODIUM BICARBONATE: 84 INJECTION, SOLUTION INTRAVENOUS at 23:57

## 2023-01-01 RX ADMIN — POTASSIUM CHLORIDE 10 MEQ: 7.46 INJECTION, SOLUTION INTRAVENOUS at 00:22

## 2023-01-01 RX ADMIN — ACETAMINOPHEN 650 MG: 325 TABLET ORAL at 03:30

## 2023-01-01 RX ADMIN — Medication 0.1 MCG/KG/MIN: at 00:00

## 2023-01-01 RX ADMIN — DICLOFENAC 2 G: 10 GEL TOPICAL at 17:03

## 2023-01-01 RX ADMIN — OXYCODONE HYDROCHLORIDE 7.5 MG: 5 TABLET ORAL at 02:08

## 2023-01-01 RX ADMIN — Medication 0.04 MCG/KG/MIN: at 21:28

## 2023-01-01 RX ADMIN — DICLOFENAC 2 G: 10 GEL TOPICAL at 14:00

## 2023-01-01 RX ADMIN — AMPICILLIN SODIUM AND SULBACTAM SODIUM 3 G: 2; 1 INJECTION, POWDER, FOR SOLUTION INTRAMUSCULAR; INTRAVENOUS at 16:04

## 2023-01-01 RX ADMIN — SODIUM ZIRCONIUM CYCLOSILICATE 10 G: 10 POWDER, FOR SUSPENSION ORAL at 09:06

## 2023-01-01 RX ADMIN — AMPICILLIN SODIUM AND SULBACTAM SODIUM 3 G: 2; 1 INJECTION, POWDER, FOR SOLUTION INTRAMUSCULAR; INTRAVENOUS at 03:14

## 2023-01-01 RX ADMIN — SODIUM BICARBONATE 50 MEQ: 84 INJECTION, SOLUTION INTRAVENOUS at 10:22

## 2023-01-01 RX ADMIN — SODIUM CHLORIDE 10 UNITS: 9 INJECTION, SOLUTION INTRAVENOUS at 15:54

## 2023-01-01 RX ADMIN — DICLOFENAC 2 G: 10 GEL TOPICAL at 21:43

## 2023-01-01 RX ADMIN — Medication 1 TABLET: at 16:50

## 2023-01-01 RX ADMIN — OXYCODONE HYDROCHLORIDE 5 MG: 5 TABLET ORAL at 07:46

## 2023-01-01 RX ADMIN — ACETAMINOPHEN 975 MG: 325 TABLET ORAL at 08:01

## 2023-01-01 RX ADMIN — OXYCODONE HYDROCHLORIDE 10 MG: 5 TABLET ORAL at 05:42

## 2023-01-01 RX ADMIN — MAGNESIUM SULFATE HEPTAHYDRATE 4 G: 80 INJECTION, SOLUTION INTRAVENOUS at 11:53

## 2023-01-01 RX ADMIN — HEPARIN SODIUM 5000 UNITS: 5000 INJECTION, SOLUTION INTRAVENOUS; SUBCUTANEOUS at 20:08

## 2023-01-01 RX ADMIN — CHOLESTYRAMINE 4 G: 4 POWDER, FOR SUSPENSION ORAL at 21:48

## 2023-01-01 RX ADMIN — ACETAMINOPHEN 975 MG: 325 TABLET ORAL at 20:10

## 2023-01-01 RX ADMIN — OXYCODONE HYDROCHLORIDE 10 MG: 5 TABLET ORAL at 17:36

## 2023-01-01 RX ADMIN — ACETAMINOPHEN 975 MG: 325 TABLET ORAL at 15:45

## 2023-01-01 RX ADMIN — OXYCODONE HYDROCHLORIDE 10 MG: 5 TABLET ORAL at 17:52

## 2023-01-01 RX ADMIN — DICLOFENAC 2 G: 10 GEL TOPICAL at 15:46

## 2023-01-01 RX ADMIN — HEPARIN SODIUM 5000 UNITS: 5000 INJECTION, SOLUTION INTRAVENOUS; SUBCUTANEOUS at 08:07

## 2023-01-01 RX ADMIN — DEXTROSE MONOHYDRATE 25 G: 25 INJECTION, SOLUTION INTRAVENOUS at 15:55

## 2023-01-01 RX ADMIN — FLUOXETINE 20 MG: 20 CAPSULE ORAL at 08:01

## 2023-01-01 RX ADMIN — HYDROMORPHONE HYDROCHLORIDE 0.5 MG: 1 INJECTION, SOLUTION INTRAMUSCULAR; INTRAVENOUS; SUBCUTANEOUS at 22:12

## 2023-01-01 RX ADMIN — INSULIN ASPART 1 UNITS: 100 INJECTION, SOLUTION INTRAVENOUS; SUBCUTANEOUS at 12:33

## 2023-01-01 RX ADMIN — ACETAMINOPHEN 650 MG: 325 TABLET ORAL at 10:59

## 2023-01-01 RX ADMIN — LEVOTHYROXINE SODIUM 225 MCG: 0.03 TABLET ORAL at 05:29

## 2023-01-01 RX ADMIN — ACETAMINOPHEN 650 MG: 325 TABLET ORAL at 21:10

## 2023-01-01 RX ADMIN — HEPARIN SODIUM 5000 UNITS: 5000 INJECTION, SOLUTION INTRAVENOUS; SUBCUTANEOUS at 10:38

## 2023-01-01 RX ADMIN — POTASSIUM CHLORIDE 20 MEQ: 1.5 POWDER, FOR SOLUTION ORAL at 12:25

## 2023-01-01 RX ADMIN — ACETAMINOPHEN 975 MG: 325 TABLET ORAL at 13:49

## 2023-01-01 RX ADMIN — Medication 0.12 MCG/KG/MIN: at 05:06

## 2023-01-01 RX ADMIN — FUROSEMIDE 40 MG: 20 TABLET ORAL at 08:58

## 2023-01-01 RX ADMIN — SODIUM BICARBONATE: 84 INJECTION, SOLUTION INTRAVENOUS at 11:42

## 2023-01-01 ASSESSMENT — ACTIVITIES OF DAILY LIVING (ADL)
ADLS_ACUITY_SCORE: 58
FALL_HISTORY_WITHIN_LAST_SIX_MONTHS: NO
ADLS_ACUITY_SCORE: 47
ADLS_ACUITY_SCORE: 51
ADLS_ACUITY_SCORE: 47
TOILETING_ISSUES: OTHER (SEE COMMENTS)
ADLS_ACUITY_SCORE: 45
ADLS_ACUITY_SCORE: 49
ADLS_ACUITY_SCORE: 35
DRESSING/BATHING: DRESSING DIFFICULTY, ASSISTANCE 1 PERSON
ADLS_ACUITY_SCORE: 51
DIFFICULTY_EATING/SWALLOWING: OTHER (SEE COMMENTS)
ADLS_ACUITY_SCORE: 57
DOING_ERRANDS_INDEPENDENTLY_DIFFICULTY: YES
DOING_ERRANDS_INDEPENDENTLY_DIFFICULTY: OTHER (SEE COMMENTS)
ADLS_ACUITY_SCORE: 52
ADLS_ACUITY_SCORE: 60
ADLS_ACUITY_SCORE: 54
ADLS_ACUITY_SCORE: 54
TOILETING_ASSISTANCE: TOILETING DIFFICULTY, ASSISTANCE 1 PERSON
ADLS_ACUITY_SCORE: 47
ADLS_ACUITY_SCORE: 51
ADLS_ACUITY_SCORE: 35
ADLS_ACUITY_SCORE: 47
ADLS_ACUITY_SCORE: 35
ADLS_ACUITY_SCORE: 54
ADLS_ACUITY_SCORE: 47
ADLS_ACUITY_SCORE: 54
WEAR_GLASSES_OR_BLIND: YES
ADLS_ACUITY_SCORE: 56
DEPENDENT_IADLS:: CLEANING;COOKING;LAUNDRY;SHOPPING;MEAL PREPARATION;MEDICATION MANAGEMENT;MONEY MANAGEMENT;TRANSPORTATION
ADLS_ACUITY_SCORE: 51
ADLS_ACUITY_SCORE: 51
ADLS_ACUITY_SCORE: 60
ADLS_ACUITY_SCORE: 56
ADLS_ACUITY_SCORE: 49
ADLS_ACUITY_SCORE: 53
ADLS_ACUITY_SCORE: 47
ADLS_ACUITY_SCORE: 52
BATHING: 1-->ASSISTANCE NEEDED
ADLS_ACUITY_SCORE: 52
ADLS_ACUITY_SCORE: 53
ADLS_ACUITY_SCORE: 51
ADLS_ACUITY_SCORE: 54
ADLS_ACUITY_SCORE: 35
ADLS_ACUITY_SCORE: 58
ADLS_ACUITY_SCORE: 47
ADLS_ACUITY_SCORE: 47
ADLS_ACUITY_SCORE: 57
ADLS_ACUITY_SCORE: 34
ADLS_ACUITY_SCORE: 35
ADLS_ACUITY_SCORE: 53
ADLS_ACUITY_SCORE: 49
ADLS_ACUITY_SCORE: 54
CHANGE_IN_FUNCTIONAL_STATUS_SINCE_ONSET_OF_CURRENT_ILLNESS/INJURY: NO
CONCENTRATING,_REMEMBERING_OR_MAKING_DECISIONS_DIFFICULTY: NO
ADLS_ACUITY_SCORE: 60
ADLS_ACUITY_SCORE: 52
ADLS_ACUITY_SCORE: 51
ADLS_ACUITY_SCORE: 56
ADLS_ACUITY_SCORE: 51
ADLS_ACUITY_SCORE: 34
ADLS_ACUITY_SCORE: 49
ADLS_ACUITY_SCORE: 54
ADLS_ACUITY_SCORE: 47
ADLS_ACUITY_SCORE: 49
ADLS_ACUITY_SCORE: 54
ADLS_ACUITY_SCORE: 58
CONCENTRATING,_REMEMBERING_OR_MAKING_DECISIONS_DIFFICULTY: NO
ADLS_ACUITY_SCORE: 53
ADLS_ACUITY_SCORE: 54
ADLS_ACUITY_SCORE: 53
EQUIPMENT_CURRENTLY_USED_AT_HOME: OTHER (SEE COMMENTS)
ADLS_ACUITY_SCORE: 34
TOILETING: 1-->ASSISTANCE (EQUIPMENT/PERSON) NEEDED (NOT DEVELOPMENTALLY APPROPRIATE)
ADLS_ACUITY_SCORE: 54
ADLS_ACUITY_SCORE: 35
ADLS_ACUITY_SCORE: 60
ADLS_ACUITY_SCORE: 47
DRESS: 1-->ASSISTANCE (EQUIPMENT/PERSON) NEEDED
ADLS_ACUITY_SCORE: 54
ADLS_ACUITY_SCORE: 51
ADLS_ACUITY_SCORE: 47
ADLS_ACUITY_SCORE: 60
ADLS_ACUITY_SCORE: 56
ADLS_ACUITY_SCORE: 57
ADLS_ACUITY_SCORE: 49
ADLS_ACUITY_SCORE: 51
ADLS_ACUITY_SCORE: 55
DRESSING/BATHING_DIFFICULTY: YES
ADLS_ACUITY_SCORE: 60
ADLS_ACUITY_SCORE: 49
ADLS_ACUITY_SCORE: 49
ADLS_ACUITY_SCORE: 35
ADLS_ACUITY_SCORE: 56
ADLS_ACUITY_SCORE: 51
ADLS_ACUITY_SCORE: 57
TOILETING: 1-->ASSISTANCE (EQUIPMENT/PERSON) NEEDED
ADLS_ACUITY_SCORE: 57
ADLS_ACUITY_SCORE: 54
ADLS_ACUITY_SCORE: 53
ADLS_ACUITY_SCORE: 47
ADLS_ACUITY_SCORE: 54
ADLS_ACUITY_SCORE: 49
ADLS_ACUITY_SCORE: 34
ADLS_ACUITY_SCORE: 51
ADLS_ACUITY_SCORE: 58
ADLS_ACUITY_SCORE: 56
ADLS_ACUITY_SCORE: 47
ADLS_ACUITY_SCORE: 49
ADLS_ACUITY_SCORE: 54
ADLS_ACUITY_SCORE: 53
EQUIPMENT_CURRENTLY_USED_AT_HOME: WHEELCHAIR, MANUAL
ADLS_ACUITY_SCORE: 47
ADLS_ACUITY_SCORE: 54
TRANSFERRING: 1-->ASSISTANCE (EQUIPMENT/PERSON) NEEDED (NOT DEVELOPMENTALLY APPROPRIATE)
ADLS_ACUITY_SCORE: 45
ADLS_ACUITY_SCORE: 49
CHANGE_IN_FUNCTIONAL_STATUS_SINCE_ONSET_OF_CURRENT_ILLNESS/INJURY: YES
ADLS_ACUITY_SCORE: 54
ADLS_ACUITY_SCORE: 47
ADLS_ACUITY_SCORE: 53
ADLS_ACUITY_SCORE: 58
ADLS_ACUITY_SCORE: 54
ADLS_ACUITY_SCORE: 54
ADLS_ACUITY_SCORE: 57
ADLS_ACUITY_SCORE: 52
ADLS_ACUITY_SCORE: 58
ADLS_ACUITY_SCORE: 35
ADLS_ACUITY_SCORE: 53
ADLS_ACUITY_SCORE: 49
ADLS_ACUITY_SCORE: 56
ADLS_ACUITY_SCORE: 34
ADLS_ACUITY_SCORE: 47
ADLS_ACUITY_SCORE: 54
ADLS_ACUITY_SCORE: 56
ADLS_ACUITY_SCORE: 56
TOILETING_ISSUES: YES
ADLS_ACUITY_SCORE: 54
ADLS_ACUITY_SCORE: 49
ADLS_ACUITY_SCORE: 60
DRESSING/BATHING_DIFFICULTY: OTHER (SEE COMMENTS)
ADLS_ACUITY_SCORE: 51
ADLS_ACUITY_SCORE: 54
ADLS_ACUITY_SCORE: 58
ADLS_ACUITY_SCORE: 51
ADLS_ACUITY_SCORE: 54
FALL_HISTORY_WITHIN_LAST_SIX_MONTHS: NO
WEAR_GLASSES_OR_BLIND: YES
ADLS_ACUITY_SCORE: 60
TRANSFERRING: 1-->ASSISTANCE (EQUIPMENT/PERSON) NEEDED
ADLS_ACUITY_SCORE: 52
ADLS_ACUITY_SCORE: 49
ADLS_ACUITY_SCORE: 54
ADLS_ACUITY_SCORE: 47
ADLS_ACUITY_SCORE: 51
DIFFICULTY_EATING/SWALLOWING: NO
ADLS_ACUITY_SCORE: 60
ADLS_ACUITY_SCORE: 47
ADLS_ACUITY_SCORE: 54
WALKING_OR_CLIMBING_STAIRS_DIFFICULTY: OTHER (SEE COMMENTS)
ADLS_ACUITY_SCORE: 49
ADLS_ACUITY_SCORE: 47
ADLS_ACUITY_SCORE: 47
ADLS_ACUITY_SCORE: 49
ADLS_ACUITY_SCORE: 60
ADLS_ACUITY_SCORE: 56
ADLS_ACUITY_SCORE: 53
ADLS_ACUITY_SCORE: 51
ADLS_ACUITY_SCORE: 47
ADLS_ACUITY_SCORE: 54
ADLS_ACUITY_SCORE: 51
ADLS_ACUITY_SCORE: 47
ADLS_ACUITY_SCORE: 49
ADLS_ACUITY_SCORE: 49
DRESS: 1-->ASSISTANCE (EQUIPMENT/PERSON) NEEDED (NOT DEVELOPMENTALLY APPROPRIATE)
WALKING_OR_CLIMBING_STAIRS_DIFFICULTY: YES
ADLS_ACUITY_SCORE: 60
ADLS_ACUITY_SCORE: 54
WALKING_OR_CLIMBING_STAIRS: OTHER (SEE COMMENTS)
ADLS_ACUITY_SCORE: 60
ADLS_ACUITY_SCORE: 58
ADLS_ACUITY_SCORE: 49
ADLS_ACUITY_SCORE: 49

## 2023-04-28 PROBLEM — E87.5 HYPERKALEMIA: Status: ACTIVE | Noted: 2023-01-01

## 2023-04-28 PROBLEM — E87.20 METABOLIC ACIDOSIS: Status: ACTIVE | Noted: 2023-01-01

## 2023-04-28 PROBLEM — D72.829 LEUKOCYTOSIS, UNSPECIFIED TYPE: Status: ACTIVE | Noted: 2023-01-01

## 2023-04-28 NOTE — ED TRIAGE NOTES
Pt arrives via EMS from assisted living facility. Reports generalized weakness and body aches. Mucous membranes dry. Falls asleep during triage questions.

## 2023-04-28 NOTE — ED PROVIDER NOTES
EMERGENCY DEPARTMENT ENCOUNTER      NAME: Carolina Mari  AGE: 68 year old female  YOB: 1955  MRN: [id number]  EVALUATION DATE & TIME: No admission date for patient encounter.    PCP: Le Romo    ED PROVIDER: Ethan Garcia M.D.      Chief Complaint   Patient presents with     Generalized Body Aches     Generalized Weakness         FINAL IMPRESSION:  1. NEELIMA (acute kidney injury) (H)    2. Hyperkalemia    3. Metabolic acidosis    4. Leukocytosis, unspecified type          ED COURSE & MEDICAL DECISION MAKIN:30 AM I introduced myself to the patient, obtained patient history, performed a physical exam, and discussed plan for ED workup including potential diagnostic laboratory/imaging studies and interventions.  12:18 PM Nurse reported that patient had a large bowel movement that required a lot if nursing help for cleaning. No decubitus ulcer reported.   1:13 PM I spoke with lab. Patient's pH Venous is 7.13.   1:30 PM Repeat exam is unchanged. Discussed findings and need for admission.   2:19 PM I spoke with Dr. Cardoza Nephrology regarding patient. She does not recommend emergent dialysis, can manage medically. I updated patient on care plan and need for admission.   2:43 PM I spoke with the hospitalist, Dr. Olson. We discussed the patient's case, and they agree to admit the patient.  3:18 PM I spoke with the hospitalist, Dr. Olson who is concerned that patient might need ICU.   3:49 PM I spoke with Intensivist Dr. Thompson regarding patient status and plan of care.   4:52 PM I spoke with staff from patient's care facility and updated them on patient.       Pertinent Labs & Imaging studies reviewed. (See chart for details)  68 year old female presents to the Emergency Department for evaluation of altered mental status. Patient appears non toxic with stable vitals signs, patient afebrile with no tachycardia or hypoxia, no increased work of breathing.  Patient appears somnolent  but arouses easily to voice and command on exam, she is not grossly confused just somnolent.  She is a poor historian but states that she has been taking her medications at delayed times in her assisted living.  I suspect some polypharmacy contributing to her somnolence and altered mental status.  Review the medical record shows discharge summary on 3/31/2022, patient has history of chronic, continuous use of opioids and opioid abuse, chronic pain syndrome, it appears as though they started Lyrica with the attempt to cut the need of oxycodone, gabapentin, unclear whether or not patient has followed up with primary care since this time.  Patient denies any acute pain, no outward signs of trauma, she denies any change in bowel or bladder habits, focal weakness, fevers or productive cough.  Certainly considered but overall low suspicion for any cranial bleed or mass, UTI, electrolyte abnormality, anemia.  We will obtain screening labs, urine studies, chest ray and a head CT.    Reassessment: Initial labs concerning for acute kidney injury and hyperkalemia.  Patient was started on hyperkalemia treatment with calcium glucagon, dextrose, insulin and IV fluids.  ECG showed no concerning dysrhythmias.  Did also note venous pH of 7.13 but no elevated PCO2, the patient continues to oxygenate well on room air and I suspect this is metabolic acidosis.  I did consult with nephrology who at this time does not recommend need for emergent dialysis, recommends IV fluids, nephrology will put in orders for bicarb drip and we will admit for continued management.  Did also note elevated white blood cell count but no fever, no body aches, patient did not endorse any infectious type symptoms, viral screen was negative.  Patient did not produce significant amount of urine and so we are waiting on the urine studies.  No indication for emergent antibiotics at this time as we await further imaging and urine studies.  Also at time of admission  CT head was pending, did discuss patient case with her care facility and they did not report any falls or trauma, she had no outward signs of trauma.  Patient had 1 or 2 transient episodes of systolics in the upper 80s but these resolved and they were during periods where the nurse was working on the patient per report, review of the vitals showed a persistent blood pressures above 90, maps in the 60s.  That said shortly after admitting the patient to the hospitalist service, hospice became concerned patient needed PICC line, broad-spectrum antibiotics, pressors and ICU placement.  I did discuss patient case with the intensivist as I had most of the background history.  There may have been some confusion as to who was primary but patient had been admitted to the hospitalist by this time.  I encouraged the hospitalist and intensivist to discuss patient case and if needed advance care to the ICU.  My repeat exam the patient remained unchanged, she was slightly confused but certainly not obtunded, she would answer my questions and follow my commands, did not feel she needed emergent intubation.    Medical Decision Making    History:    Supplemental history from: Documented in chart, if applicable    External Record(s) reviewed: Documented in chart, if applicable.    Work Up:    Chart documentation includes differential considered and any EKGs or imaging independently interpreted by provider, where specified.    In additional to work up documented, I considered the following work up: Documented in chart, if applicable.    External consultation:    Discussion of management with another provider: Documented in chart, if applicable    Complicating factors:    Care impacted by chronic illness: N/A    Care affected by social determinants of health: N/A    Disposition considerations: Admit.          At the conclusion of the encounter I discussed the results of all of the tests and the disposition. The questions were answered  and return precautions provided. The patient or family acknowledged understanding and was agreeable with the care plan.         MEDICATIONS GIVEN IN THE EMERGENCY:  Medications   glucose gel 15-30 g (has no administration in time range)     Or   dextrose 50 % injection 25-50 mL (has no administration in time range)     Or   glucagon injection 1 mg (has no administration in time range)   sodium bicarbonate 150 mEq in D5W 1,000 mL infusion ( Intravenous $New Bag 4/28/23 1504)   glucose gel 15-30 g (has no administration in time range)     Or   dextrose 50 % injection 25-50 mL (has no administration in time range)     Or   glucagon injection 1 mg (has no administration in time range)   dextrose 10% BOLUS (300 mLs Intravenous $New Bag 4/28/23 1630)   sodium zirconium cyclosilicate (LOKELMA) packet 10 g (10 g Oral $Given 4/28/23 1646)   lidocaine 1 % 0.1-5 mL (2 mLs Other $Given 4/28/23 1557)   lidocaine (LMX4) cream (has no administration in time range)   sodium chloride (PF) 0.9% PF flush 10-40 mL (has no administration in time range)   piperacillin-tazobactam (ZOSYN) 3.375 g vial to attach to  mL bag (3.375 g Intravenous $New Bag 4/28/23 1639)     Followed by   piperacillin-tazobactam (ZOSYN) 3.375 g vial to attach to  mL bag (has no administration in time range)   norepinephrine (LEVOPHED) 4 mg in  mL PERIPHERAL infusion (has no administration in time range)   vancomycin (VANCOCIN) 2,000 mg in sodium chloride 0.9 % 500 mL intermittent infusion (2,000 mg Intravenous $New Bag 4/28/23 7623)   vancomycin place crooks - receiving intermittent dosing (has no administration in time range)   naloxone (NARCAN) injection 0.2 mg (has no administration in time range)     Or   naloxone (NARCAN) injection 0.4 mg (has no administration in time range)     Or   naloxone (NARCAN) injection 0.2 mg (has no administration in time range)     Or   naloxone (NARCAN) injection 0.4 mg (has no administration in time range)    levothyroxine (SYNTHROID/LEVOTHROID) tablet 225 mcg (has no administration in time range)   simvastatin (ZOCOR) tablet 20 mg (has no administration in time range)   lidocaine 1 % 0.1-1 mL (has no administration in time range)   lidocaine (LMX4) cream (has no administration in time range)   sodium chloride (PF) 0.9% PF flush 3 mL (has no administration in time range)   sodium chloride (PF) 0.9% PF flush 3 mL (has no administration in time range)   acetaminophen (TYLENOL) tablet 650 mg (has no administration in time range)     Or   acetaminophen (TYLENOL) Suppository 650 mg (has no administration in time range)   melatonin tablet 3 mg (has no administration in time range)   senna-docusate (SENOKOT-S/PERICOLACE) 8.6-50 MG per tablet 1 tablet (has no administration in time range)     Or   senna-docusate (SENOKOT-S/PERICOLACE) 8.6-50 MG per tablet 2 tablet (has no administration in time range)   bisacodyl (DULCOLAX) suppository 10 mg (has no administration in time range)   prochlorperazine (COMPAZINE) injection 5 mg (has no administration in time range)     Or   prochlorperazine (COMPAZINE) tablet 5 mg (has no administration in time range)     Or   prochlorperazine (COMPAZINE) suppository 12.5 mg (has no administration in time range)   heparin ANTICOAGULANT injection 5,000 Units (has no administration in time range)   pantoprazole (PROTONIX) IV push injection 40 mg (has no administration in time range)   0.9% sodium chloride BOLUS (0 mLs Intravenous Stopped 4/28/23 1535)   0.9% sodium chloride BOLUS (0 mLs Intravenous Stopped 4/28/23 1500)   calcium gluconate 1 g in 50 mL sodium chloride intermittent infusion (premix) (1 g Intravenous $Given 4/28/23 1414)   dextrose 50 % injection 25 g (25 g Intravenous $Given 4/28/23 1555)   insulin regular 1 unit/mL injection 10 Units (10 Units Intravenous $Given 4/28/23 1554)   sodium bicarbonate 8.4 % injection 50 mEq (50 mEq Intravenous $Given 4/28/23 1544)   0.9% sodium chloride  "BOLUS (500 mLs Intravenous $New Bag 4/28/23 1530)   albumin human 25 % injection 25 g (0 g Intravenous Stopped 4/28/23 1715)       NEW PRESCRIPTIONS STARTED AT TODAY'S ER VISIT  New Prescriptions    No medications on file            =================================================================    HPI    Patient information was obtained from: Patient     Use of Intrepreter: N/A         Carolina DK Mari is a 68 year old female with a pertinent medical history of hypertension, hyperlipidemia, diabetes mellitus, CKD stage 3, chronic pain, and general weakness who presents to the ED via EMS for evaluation of generalized body aches and weakness.     Patient is a poor historian and reports that staff at her assisted living facility called EMS after she was \"feeling slow and acting goofy\". States that she also feels sleepy and would fall asleep during questions. She's been taking her daily medications and took them today, but notes that she would \"take half of it when the nurses leave\" and take the other half later because the medications \"make me goofy\". Patient reports that she hasn't taken any other medications.  Does state that her urinary output and bowel movements have been abnormal for the past few days as well.     Patient otherwise denies any new pain, vomiting, fever or blood in her stool. Denies any new falls or head traumas. No tobacco, alcohol or street drug use.      REVIEW OF SYSTEMS   Constitutional:  Denies fever, chills. Positive for fatigue.   Respiratory:  Denies productive cough or increased work of breathing  Cardiovascular:  Denies chest pain, palpitations  GI:  Denies abdominal pain, nausea, vomiting, or change in bowel or bladder habits   Musculoskeletal:  Denies any new muscle/joint swelling  Skin:  Denies rash   Neurologic:  Denies focal weakness  All systems negative except as marked.     PAST MEDICAL HISTORY:  Past Medical History:   Diagnosis Date     Diabetes mellitus (H)      " History of stomach ulcers      HTN (hypertension)      Knee osteoarthritis     right     Osteoarthritis of right hip      Osteoporosis      Osteoporosis      Thyroid disease        PAST SURGICAL HISTORY:  Past Surgical History:   Procedure Laterality Date     AMPUTATE TOE(S) Bilateral     3rd toe on both feet amputated.      FOOT SURGERY       GASTRIC BYPASS       HC REMOVAL GALLBLADDER N/A 12/25/2016    Procedure: CHOLECYSTECTOMY, OPEN;  Surgeon: Everardo Cisneros MD;  Location: [facility] [place];  Service: General     HERNIA REPAIR      Had 4 surgeries total     JOINT REPLACEMENT       PICC TRIPLE LUMEN PLACEMENT  4/28/2023          [place] SPINE SURGERY PROCEDURE UNLISTED       tka      right         CURRENT MEDICATIONS:    Prior to Admission medications    Medication Sig Start Date End Date Taking? Authorizing Provider   acetaminophen (TYLENOL) 500 MG tablet Take 1,000 mg by mouth 3 times daily     Reported, Patient   amLODIPine (NORVASC) 5 MG tablet Take 5 mg by mouth At Bedtime 6/21/21   Reported, Patient   Ascorbic Acid (VITAMIN C) 500 MG CAPS Take 500 mg by mouth daily    Reported, Patient   cyanocobalamin (CYANOCOBALAMIN) 1000 MCG/ML injection Inject 1 mL (1,000 mcg) into the muscle every 30 days 6/18/21   Bob Gil MD   diclofenac (VOLTAREN) 1 % topical gel Apply 2 g topically 4 times daily To affected area 9/24/21   Reported, Patient   famotidine (PEPCID) 20 MG tablet Take 20 mg by mouth daily 11/18/21   Reported, Patient   ferrous sulfate (FEROSUL) 325 (65 Fe) MG tablet Take 325 mg by mouth daily (with breakfast)    Reported, Patient   FLUoxetine HCl 60 MG TABS Take 1 tablet by mouth every morning  6/25/21   Reported, Patient   furosemide (LASIX) 20 MG tablet Take 20 mg by mouth daily 11/18/21   Reported, Patient   levothyroxine (SYNTHROID/LEVOTHROID) 200 MCG tablet Take 225 mcg by mouth daily 6/15/21   Reported, Patient   lidocaine patch in PLACE Place 1 patch onto the skin every morning Remove at  HS    Reported, Patient   loperamide (IMODIUM) 2 MG capsule Take 1 capsule (2 mg) by mouth 4 times daily as needed for diarrhea 1/6/22   Jeffery Martinez MD   losartan (COZAAR) 100 MG tablet Take 100 mg by mouth daily 11/30/21   Reported, Patient   metoprolol tartrate (LOPRESSOR) 100 MG tablet Take 50 mg by mouth 2 times daily    Reported, Patient   MICRO GUARD 2 % external powder USE AS DIRECTED PER WOUND CARE ORDERS 2/3/21   Reported, Patient   mineral oil-hydrophilic petrolatum (AQUAPHOR) external ointment USE AS DIRECTED PER WOUND CARE ORDERS 1/6/21   Reported, Patient   NARCAN 4 MG/0.1ML nasal spray CALL 911. SPR CONTENTS OF ONE SPRAYER (0.1ML) INTO ONE NOSTRIL. REPEAT IN 2-3 MIN IF SYMPTOMS OF OPIOID EMERGENCY PERSIST, ALTERNATE NOSTRILS 9/28/21   Reported, Patient   pregabalin (LYRICA) 25 MG capsule Take 1 capsule (25 mg) by mouth At Bedtime 3/21/22   Adilene Aguayo, APRN CNP   simvastatin (ZOCOR) 20 MG tablet Take 20 mg by mouth At Bedtime  12/9/21   Reported, Patient        ALLERGIES:  Allergies   Allergen Reactions     Aspirin Other (See Comments)     History of ulcers       Colchicine Angioedema and Swelling     And gout flare ups     Colchicine Angioedema and Swelling     And gout flare ups       FAMILY HISTORY:  Family History   Problem Relation Age of Onset     Coronary Artery Disease Father      Coronary Artery Disease Brother      Cancer Mother         bone     Cancer Brother         leukemia     Breast Cancer Mother      Cancer Brother 20.00        Leukemia     Coronary Artery Disease Brother        SOCIAL HISTORY:   Social History     Socioeconomic History     Marital status:      Number of children: 1   Tobacco Use     Smoking status: Never     Smokeless tobacco: Never   Substance and Sexual Activity     Alcohol use: No     Drug use: No   Social History Narrative    Son, Tim and daughter in law, Elizabeth, active & engaged with patient's cares.         VITALS:  Patient Vitals for the  past 24 hrs:   BP Temp Temp src Pulse Resp SpO2 Weight   04/28/23 1648 96/47 -- -- 69 21 98 % --   04/28/23 1643 97/49 -- -- 68 23 99 % --   04/28/23 1628 (!) 139/95 -- -- 69 22 99 % --   04/28/23 1613 (!) 85/48 -- -- 69 20 90 % --   04/28/23 1559 90/54 -- -- 70 23 99 % --   04/28/23 1528 92/52 -- -- 68 20 100 % --   04/28/23 1513 92/53 -- -- 67 22 100 % --   04/28/23 1458 (!) 82/63 -- -- 66 21 98 % --   04/28/23 1424 -- -- -- 62 16 98 % --   04/28/23 1409 109/45 -- -- 60 19 98 % --   04/28/23 1114 99/42 98.9  F (37.2  C) Oral 62 18 96 % 120.2 kg (265 lb)        PHYSICAL EXAM    Constitutional: Somnolent but easily arousable, in no respiratory distress  HENT:  Normocephalic, Atraumatic with no scalp hematomas or skull depressions. Bilateral external ears normal. Oropharynx moist. Nose normal. Neck- Normal range of motion with no guarding, No midline cervical tenderness, Supple, No stridor.   Eyes:  PERRL, EOMI with no signs of entrapment, Conjunctiva normal, No discharge.   Respiratory:  Normal breath sounds, No respiratory distress, No wheezing.    Cardiovascular:  Normal heart rate, Normal rhythm, No appreciable rubs or gallops.   GI:  Soft, No tenderness, No distension, No palpable masses  Musculoskeletal:  Intact distal pulses, bilateral peripheral edema.  No gross deformities  Integument:  Warm, Dry, chronic appearing skin breakdown over the anterior abdomen without signs of dehiscence or surrounding erythema, no discharge  Neurologic: Somnolent but easily arousable, no facial droop  Psychiatric: Irritable affect    LAB:  All pertinent labs reviewed and interpreted.  Results for orders placed or performed during the hospital encounter of 04/28/23   CT Head w/o Contrast    Impression    IMPRESSION:  1.  No CT evidence for acute intracranial process.  2.  Mild chronic microvascular ischemic changes as above.   CT Abdomen Pelvis w/o Contrast    Impression    IMPRESSION:   1.  No acute findings or other explanation  for symptoms.    2.  Large multicompartment ventral hernia containing most of the small and large bowel. No acute inflammation or obstruction.     XR Chest Port 1 View    Impression    IMPRESSION: Low lung volumes. Heart size prominent on this portable view, unchanged. Pulmonary vascularity normal. Elevated right hemidiaphragm. Subsegmental atelectasis both bases. Left PICC line tip distal SVC. Numerous surgical clips left upper   quadrant. Lower cervical spinal fusion. Degenerative changes both shoulders.   Comprehensive metabolic panel   Result Value Ref Range    Sodium 130 (L) 136 - 145 mmol/L    Potassium 6.6 (HH) 3.4 - 5.3 mmol/L    Chloride 99 98 - 107 mmol/L    Carbon Dioxide (CO2) 11 (L) 22 - 29 mmol/L    Anion Gap 20 (H) 7 - 15 mmol/L    Urea Nitrogen 84.6 (H) 8.0 - 23.0 mg/dL    Creatinine 4.92 (H) 0.51 - 0.95 mg/dL    Calcium 8.5 (L) 8.8 - 10.2 mg/dL    Glucose 145 (H) 70 - 99 mg/dL    Alkaline Phosphatase 216 (H) 35 - 104 U/L    AST 31 10 - 35 U/L    ALT 12 10 - 35 U/L    Protein Total 7.5 6.4 - 8.3 g/dL    Albumin 3.0 (L) 3.5 - 5.2 g/dL    Bilirubin Total 0.7 <=1.2 mg/dL    GFR Estimate 9 (L) >60 mL/min/1.73m2   UA with Microscopic reflex to Culture    Specimen: Urethra; Urine   Result Value Ref Range    Color Urine Yellow Colorless, Straw, Light Yellow, Yellow    Appearance Urine Turbid (A) Clear    Glucose Urine Negative Negative mg/dL    Bilirubin Urine Negative Negative    Ketones Urine Negative Negative mg/dL    Specific Gravity Urine 1.023 1.001 - 1.030    Blood Urine Negative Negative    pH Urine 5.0 5.0 - 7.0    Protein Albumin Urine 30 (A) Negative mg/dL    Urobilinogen Urine <2.0 <2.0 mg/dL    Nitrite Urine Negative Negative    Leukocyte Esterase Urine Negative Negative    Bacteria Urine Few (A) None Seen /HPF    Mucus Urine Present (A) None Seen /LPF    RBC Urine 4 (H) <=2 /HPF    WBC Urine 4 <=5 /HPF    Squamous Epithelials Urine 1 <=1 /HPF    Transitional Epithelials Urine <1 <=1 /HPF    Symptomatic Influenza A/B, RSV, & SARS-CoV2 PCR (COVID-19) Nasopharyngeal    Specimen: Nasopharyngeal; Swab   Result Value Ref Range    Influenza A PCR Negative Negative    Influenza B PCR Negative Negative    RSV PCR Negative Negative    SARS CoV2 PCR Negative Negative   CBC with platelets and differential   Result Value Ref Range    WBC Count 24.4 (H) 4.0 - 11.0 10e3/uL    RBC Count 4.37 3.80 - 5.20 10e6/uL    Hemoglobin 12.0 11.7 - 15.7 g/dL    Hematocrit 40.0 35.0 - 47.0 %    MCV 92 78 - 100 fL    MCH 27.5 26.5 - 33.0 pg    MCHC 30.0 (L) 31.5 - 36.5 g/dL    RDW 16.0 (H) 10.0 - 15.0 %    Platelet Count 537 (H) 150 - 450 10e3/uL    % Neutrophils 91 %    % Lymphocytes 2 %    % Monocytes 6 %    % Eosinophils 0 %    % Basophils 0 %    % Immature Granulocytes 1 %    NRBCs per 100 WBC 0 <1 /100    Absolute Neutrophils 22.4 (H) 1.6 - 8.3 10e3/uL    Absolute Lymphocytes 0.5 (L) 0.8 - 5.3 10e3/uL    Absolute Monocytes 1.4 (H) 0.0 - 1.3 10e3/uL    Absolute Eosinophils 0.0 0.0 - 0.7 10e3/uL    Absolute Basophils 0.1 0.0 - 0.2 10e3/uL    Absolute Immature Granulocytes 0.2 <=0.4 10e3/uL    Absolute NRBCs 0.0 10e3/uL   Blood gas venous   Result Value Ref Range    pH Venous 7.13 (LL) 7.35 - 7.45    pCO2 Venous 35 35 - 50 mm Hg    pO2 Venous 39 25 - 47 mm Hg    Bicarbonate Venous 12 (L) 24 - 30 mmol/L    Base Excess/Deficit (+/-) -17.4   mmol/L    Oxyhemoglobin Venous 63.9 (L) 70.0 - 75.0 %    O2 Sat, Venous 65.3 (L) 70.0 - 75.0 %   TSH with free T4 reflex   Result Value Ref Range    TSH 0.44 0.30 - 4.20 uIU/mL   Extra Blood Culture Bottle   Result Value Ref Range    Hold Specimen JIC    Extra Blue Top Tube   Result Value Ref Range    Hold Specimen JIC    Extra Red Top Tube   Result Value Ref Range    Hold Specimen JIC    Result Value Ref Range    Potassium 4.3 3.4 - 5.3 mmol/L   Lactic acid whole blood   Result Value Ref Range    Lactic Acid 2.1 (H) 0.7 - 2.0 mmol/L   Result Value Ref Range     26 - 192 U/L   Drug abuse  screen 77 urine (FL, RH, SH)   Result Value Ref Range    Amphetamines Urine Screen Negative Screen Negative    Barbituates Urine Screen Negative Screen Negative    Benzodiazepine Urine Screen Negative Screen Negative    Cannabinoids Urine Screen Negative Screen Negative    Opiates Urine Screen Negative Screen Negative    PCP Urine Screen Negative Screen Negative    Cocaine Urine Screen Negative Screen Negative   Glucose by meter   Result Value Ref Range    GLUCOSE BY METER POCT 110 (H) 70 - 99 mg/dL       RADIOLOGY:  XR Chest Port 1 View   Final Result   IMPRESSION: Low lung volumes. Heart size prominent on this portable view, unchanged. Pulmonary vascularity normal. Elevated right hemidiaphragm. Subsegmental atelectasis both bases. Left PICC line tip distal SVC. Numerous surgical clips left upper    quadrant. Lower cervical spinal fusion. Degenerative changes both shoulders.      CT Abdomen Pelvis w/o Contrast   Final Result   IMPRESSION:    1.  No acute findings or other explanation for symptoms.      2.  Large multicompartment ventral hernia containing most of the small and large bowel. No acute inflammation or obstruction.         CT Head w/o Contrast   Final Result   IMPRESSION:   1.  No CT evidence for acute intracranial process.   2.  Mild chronic microvascular ischemic changes as above.             EKG:    Sinus rhythm with first-degree AV block, no specific ST acute ischemic changes, no concerning dysrhythmias or interval prolongation, when compared to ECG of December 23, 2016, no specific ST acute ischemic change appreciated  I have independently reviewed and interpreted the EKG(s) documented above.    PROCEDURES:          I, Jalil Fritz, am serving as a scribe to document services personally performed by Ethan Garcia MD, based on my observation and the provider's statements to me. I, Ethan Garcia MD attest that Jalil Fritz is acting in a scribe capacity, has observed my  performance of the services and has documented them in accordance with my direction.    Ethan Garcia M.D.  Emergency Medicine  [facility] EMERGENCY DEPARTMENT  [address] [place] [id number] [phone]  Dept: [phone]     Ethan Garcia MD  04/28/23 1909

## 2023-04-28 NOTE — PHARMACY-VANCOMYCIN DOSING SERVICE
Pharmacy Vancomycin Initial Note  Date of Service 2023  Patient's  1955  68 year old, female    Indication: Sepsis    Current estimated CrCl = CrCl cannot be calculated (Unknown ideal weight.).    Creatinine for last 3 days  2023: 12:53 PM Creatinine 4.92 mg/dL    Recent Vancomycin Level(s) for last 3 days  No results found for requested labs within last 3 days.      Vancomycin IV Administrations (past 72 hours)      No vancomycin orders with administrations in past 72 hours.                Nephrotoxins and other renal medications (From now, onward)    Start     Dose/Rate Route Frequency Ordered Stop    23 0000  piperacillin-tazobactam (ZOSYN) 3.375 g vial to attach to  mL bag        Note to Pharmacy: Extended infusion dosing to start 6 hours after initial infusion.   See Hyperspace for full Linked Orders Report.    3.375 g  over 240 Minutes Intravenous EVERY 12 HOURS 23 1511      23 1600  vancomycin (VANCOCIN) 2,000 mg in sodium chloride 0.9 % 500 mL intermittent infusion         2,000 mg  over 2 Hours Intravenous ONCE 23 1516      23 1530  piperacillin-tazobactam (ZOSYN) 3.375 g vial to attach to  mL bag        See Hyperspace for full Linked Orders Report.    3.375 g  over 30 Minutes Intravenous ONCE 23 1511      23 1530  norepinephrine (LEVOPHED) 4 mg in  mL PERIPHERAL infusion         0.03-0.125 mcg/kg/min × 120.2 kg  13.5-56.3 mL/hr  Intravenous CONTINUOUS 23 1511      23 1514  vancomycin place crooks - receiving intermittent dosing         1 each Intravenous SEE ADMIN INSTRUCTIONS 23 1516            Contrast Orders - past 72 hours (72h ago, onward)    None              Plan:  1. Start vancomycin  2000 mg IV once, followed by intermittent dosing given degree of NEELIMA   2. Vancomycin monitoring method: Trough (Method 2 = manual dose calculation), AUC once renal function improved  3. Vancomycin therapeutic  monitoring goal: 15-20 mg/L  4. Pharmacy will check vancomycin levels as appropriate in 1-3 Days.      Stephanie Pastrana RPH

## 2023-04-28 NOTE — PROGRESS NOTES
[facility] Hospitalist Progress Note  [facility]  Full H&P to follow.  Asked to admit for hyperkalemia      68F with DM, obesity, CKD3 who presents with lethargy and found to have acute renal failure, hyperkalemia with peaked T-waves, metabolic acidosis, leukocytosis, hypotension, encephalopathy.      Plan:    #Hypotension + leukocytosis - Concern for severe sepsis impending shock  -completing 1500cc IVF bolus.  -check lactic acid  -blood cultures, zosyn+vanco  -levophed  -CT A/P and CXR, UA  -PICC    #Acute renal failure  #Hyperkalemia with peaked T-waves  #Severe metabolic acidosis  -S/p CaGluconate  -Insulin + D50  -Lokelma  -Bicarb 50mEq x 1 followed by infusion  -mo  -nephrology consult    #Acute metabolic encephalopathy - CT r/o other.    Orders for above have been placed.   Not appropriate for medical rios at this time.   D/w Dr. Galvan and nursing    Elijah Olson MD, [facility]  Internal Medicine Hospitalist  4/28/2023

## 2023-04-28 NOTE — CONSULTS
Care Management Initial Consult    General Information  Assessment completed with: Caregiver, staff at [facility]  Type of CM/SW Visit: Initial Assessment    Primary Care Provider verified and updated as needed: Yes   Readmission within the last 30 days: no previous admission in last 30 days      Reason for Consult: discharge planning  Advance Care Planning: Advance Care Planning Reviewed: no concerns identified          Communication Assessment  Patient's communication style: spoken language (English or Bilingual)             Cognitive  Cognitive/Neuro/Behavioral: level of consciousness (Pt very sleepy on arrival.)  Level of Consciousness: confused (Confused as to date/time/place.)                   Living Environment:   People in home: facility resident     Current living Arrangements: assisted living  Name of Facility: [facility]   Able to return to prior arrangements: other (see comments) (unknown at this time)       Family/Social Support:  Care provided by: other (see comments), self (Assisted Living staff)  Provides care for: no one, unable/limited ability to care for self     Facility resident(s)/Staff, Children          Description of Support System: Supportive, Involved    Support Assessment: Adequate family and caregiver support, Adequate social supports    Current Resources:   Patient receiving home care services: Yes  Skilled Home Care Services: Skilled Nursing, Physical Therapy ([facility] for wound cares and PT)  Community Resources: County Worker, Skilled Nursing Facility, Home Care, DME ([facility])  Equipment currently used at home: wheelchair, manual  Supplies currently used at home: Incontinence Supplies, Wound Care Supplies, Gloves, Wipes, Other, Diabetic Supplies (Lymphedema wraps)    Employment/Financial:  Employment Status: retired        Financial Concerns:     Referral to Financial Worker: No       Does the patient's  "insurance plan have a 3 day qualifying hospital stay waiver?  No    Lifestyle & Psychosocial Needs:  Social Determinants of Health     Tobacco Use: Low Risk  (4/28/2023)    Patient History      Smoking Tobacco Use: Never      Smokeless Tobacco Use: Never      Passive Exposure: Not on file   Alcohol Use: Not on file   Financial Resource Strain: Not on file   Food Insecurity: Not on file   Transportation Needs: Not on file   Physical Activity: Not on file   Stress: Not on file   Social Connections: Not on file   Intimate Partner Violence: Not on file   Depression: Not at risk (2/14/2022)    PHQ-2      PHQ-2 Score: 1   Housing Stability: Not on file       Functional Status:  Prior to admission patient needed assistance:   Dependent ADLs:: Bathing, Wheelchair-independent, Wheelchair-with assist  Dependent IADLs:: Cleaning, Cooking, Laundry, Shopping, Meal Preparation, Medication Management, Money Management, Transportation       Mental Health Status:  Mental Health Status: Past Concern  Mental Health Management: Medication    Chemical Dependency Status:                Values/Beliefs:  Spiritual, Cultural Beliefs, Nondenominational Practices, Values that affect care:                 Additional Information:  Rebecca lives at [facility] in Assisted Living. She gets assistance with IADLs but not a high level of assistance with ADLs.    She also has [facility] RN for wound cares and PT assistance.    Unknown discharge needs at this time. It will depend on hospital course. Right now the current plan is that they are placing a PICC and planning on starting levophed.    [facility] transport at discharge.    CM to follow for medical progression of care, discharge recommendations, and final discharge plan.    [facility] [phone].  [facility] for \"wipes, pads, briefs and son orders them: [phone].  [place] - provides wheelchair.    Carmella Whitlock RN      "

## 2023-04-28 NOTE — PROCEDURES
"PICC Line Insertion Procedure Note    Pt. Name:   Carolina Mari  MRN:          [id number]    Procedure:     Insertion of a  TRIPLE Lumen  5 fr  Bard SOLO (valved) Power PICC, Lot number [id number]    Indications: Multiple Infusions    Contraindications : None    Procedure Details:    Patient identified with 2 identifiers and \"Time Out\" conducted.    Central line insertion bundle followed: Hand hygiene performed prior to procedure, site cleansed with Cholraprep (CHG), hat, mask, sterile gloves, sterile gown worn, patient draped with maximum barrier head to toe drape, sterile field maintained.    The left upper arm BRACHIAL-LAT vein was assessed by ultrasound and found to be anechoic, compressible, patent, and of adequate size.     Lidocaine 1% 2.5 ml administered SQ to the insertion site.     Modified Seldinger Technique (MST) used for insertion, ONE attempt(s) required to access vein. Imaging during access shows the needle within the vein.     PICC was advanced through peel-away sheath.    Catheter threaded without difficulty. The tip of the catheter was positioned in the SVC. Good blood return noted.    Catheter was flushed with 30 cc normal saline.     Catheter secured with Statlock, tissue adhesive (on insertion site only), Biopatch (CHG), and Tegaderm dressing applied.    The sharps that are included in the PICC insertion kit were accounted for and disposed of in the sharps container prior to breakdown of the sterile field.    CLABSI prevention brochure left at bedside.    Patient  tolerated procedure well.     Patient's primary RN notified PICC is ready for use.      Findings:    Total catheter length  50 cm, with 3 cm exposed. Mid upper arm circumference is 30 cm.     Tip placement verified in the distal SVC by [employer]:        Comments:  None        Sebastian Hoang, MSN, RN, [facility]   Vascular Access - [facility]        "

## 2023-04-28 NOTE — PHARMACY-ADMISSION MEDICATION HISTORY
Pharmacist Admission Medication History    Admission medication history is complete. The information provided in this note is only as accurate as the sources available at the time of the update.    Medication reconciliation/reorder completed by provider prior to medication history? No    Information Source(s): Facility ([facility]/) medication list/MAR via in-person    Pertinent Information: called facility for medication list, RN at [facility] stating this drowsiness noted today is much different than baseline (phone number [phone])    Changes made to PTA medication list:    Added: Zyrtec, duloxetine, oxycodone    Deleted: fluoxetine, amlodipine, B12    Changed: furosemide, pregabalin    Medication Affordability:       Allergies reviewed with patient and updates made in EHR: yes    Medication History Completed By: Funmi Almanza RPH 4/28/2023 2:49 PM    Prior to Admission medications    Medication Sig Last Dose Taking? Auth Provider Long Term End Date   acetaminophen (TYLENOL) 500 MG tablet Take 1,000 mg by mouth 3 times daily  4/28/2023 at am Yes Reported, Patient     alum & mag hydroxide-simethicone (MAALOX) 200-200-20 MG/5ML SUSP suspension Take 30 mLs by mouth every 6 hours as needed for indigestion Moderate heartburn or indigestion Unknown at PRN Yes Unknown, Entered By History     Ascorbic Acid (VITAMIN C) 500 MG CAPS Take 500 mg by mouth daily 4/28/2023 at am Yes Reported, Patient     bacitracin 500 UNIT/GM OINT Apply topically 2 times daily as needed for wound care Minor or superficial abrasions, lacerations, or sores Unknown at PRN Yes Unknown, Entered By History     calcium carbonate (TUMS) 500 MG chewable tablet Take 1-2 chew tab by mouth every 6 hours as needed for heartburn Mild heartburn or indigestion Unknown at PRN Yes Unknown, Entered By History     carbamide peroxide (DEBROX) 6.5 % otic solution Place 5 drops into both ears daily as needed for other (earwax buildup) Unknown at  PRN Yes Unknown, Entered By History     cetirizine (ZYRTEC) 10 MG tablet Take 10 mg by mouth 2 times daily 4/28/2023 at am Yes Unknown, Entered By History     cholestyramine light (PREVALITE) 4 GM powder Take 4 g by mouth 2 times daily diarrhea 4/28/2023 at am Yes Unknown, Entered By History Yes    diclofenac (VOLTAREN) 1 % topical gel Apply 2 g topically 4 times daily To affected area 4/28/2023 at am Yes Reported, Patient     dimethicone 1 % external cream Apply topically 2 times daily To periarea/buttocks 4/28/2023 at am Yes Unknown, Entered By History     docusate sodium (COLACE) 100 MG capsule Take 100 mg by mouth 2 times daily as needed for constipation Mild constipation (no BM in 1-2 days) Unknown at PRN Yes Unknown, Entered By History     DULoxetine (CYMBALTA) 20 MG capsule Take 40 mg by mouth daily 4/28/2023 at am Yes Unknown, Entered By History Yes    famotidine (PEPCID) 20 MG tablet Take 20 mg by mouth daily 4/28/2023 at am Yes Reported, Patient     ferrous sulfate (FEROSUL) 325 (65 Fe) MG tablet Take 325 mg by mouth daily (with breakfast) 4/28/2023 at am Yes Reported, Patient     furosemide (LASIX) 40 MG tablet Take 40 mg by mouth daily 4/28/2023 at am Yes Unknown, Entered By History No    glycerin (ADULT) 2 g suppository Place 1 suppository rectally daily as needed for constipation Moderate constipation (no BM in 3-5 days) Unknown at PRN Yes Unknown, Entered By History     guaiFENesin (ROBITUSSIN) 20 mg/mL liquid Take 5-10 mLs by mouth every 4 hours as needed for cough 5 ml mild cough  10ml moderate/severe cough Unknown at PRN Yes Unknown, Entered By History     hydrocortisone (CORTAID) 1 % external cream Apply topically 2 times daily as needed for itching Unknown at PRN Yes Unknown, Entered By History     hydrocortisone, Perianal, (ANUSOL-HC) 2.5 % cream Place rectally 2 times daily as needed for hemorrhoids Unknown at PRN Yes Unknown, Entered By History     levothyroxine (SYNTHROID/LEVOTHROID) 200 MCG  tablet Take 225 mcg by mouth daily 4/28/2023 at am Yes Reported, Patient Yes    lidocaine patch in PLACE Place 1 patch onto the skin every morning Remove at HS 4/28/2023 at am Yes Reported, Patient     loperamide (IMODIUM) 2 MG capsule Take 2 mg by mouth 4 times daily as needed for diarrhea Unknown at PRN Yes Unknown, Entered By History     loperamide (IMODIUM) 2 MG capsule Take 1 capsule (2 mg) by mouth 4 times daily as needed for diarrhea  Patient taking differently: Take 2 mg by mouth 2 times daily Hold if constipation 4/28/2023 at am Yes Jeffery Martinez MD     losartan (COZAAR) 100 MG tablet Take 100 mg by mouth daily Hold if SBP less than 100 4/28/2023 at am Yes Reported, Patient No    magnesium hydroxide (MOM) 2400 MG/10ML SUSP Take 30 mLs by mouth daily as needed for constipation For MILD constipation (no BM in 2-3 days) Unknown at PRN Yes Unknown, Entered By History     metoprolol tartrate (LOPRESSOR) 100 MG tablet Take 50 mg by mouth 2 times daily Hold for SBP < 100 or HR < 60 4/28/2023 Yes Reported, Patient Yes    miconazole (MICATIN) 2 % AERP powder Apply topically 2 times daily as needed for other (to abdominal/groin folds) Unknown at PRN Yes Unknown, Entered By History     mineral oil-hydrophilic petrolatum (AQUAPHOR) external ointment Apply topically as needed for dry skin Unknown at PRN Yes Unknown, Entered By History     NARCAN 4 MG/0.1ML nasal spray CALL 911. SPR CONTENTS OF ONE SPRAYER (0.1ML) INTO ONE NOSTRIL. REPEAT IN 2-3 MIN IF SYMPTOMS OF OPIOID EMERGENCY PERSIST, ALTERNATE NOSTRILS Unknown at PRN Yes Reported, Patient     nystatin (MYCOSTATIN) 457804 UNIT/GM external powder Apply topically 2 times daily as needed for other (skin irritation) Unknown at PRN Yes Unknown, Entered By History     oxyCODONE IR (ROXICODONE) 10 MG tablet Take 5 mg by mouth every 4 hours Takes at Midnight, 0400, 0800, 1200, 1600, 2000 4/28/2023 at 0800 Yes Unknown, Entered By History     polyethylene glycol-propylene  glycol (SYSTANE ULTRA) 0.4-0.3 % SOLN ophthalmic solution Place 1 drop into both eyes 4 times daily as needed for dry eyes Unknown at PRN Yes Unknown, Entered By History     pregabalin (LYRICA) 25 MG capsule Take 25 mg by mouth 2 times daily 4/28/2023 at am Yes Unknown, Entered By History No    simvastatin (ZOCOR) 20 MG tablet Take 20 mg by mouth At Bedtime  4/27/2023 at pm Yes Reported, Patient Yes    sodium phosphate (FLEET ENEMA) 7-19 GM/118ML rectal enema Place 1 enema rectally daily as needed for constipation Severe constipation (no BM in 5 or more days) Unknown at PRN Yes Unknown, Entered By History     witch hazel-glycerin (TUCKS) pad Apply topically as needed for hemorrhoids Unknown at PRN Yes Unknown, Entered By History

## 2023-04-28 NOTE — ED NOTES
Blood cultures were drawn earlier today with US IV start. Hospitalist notified, no need to draw a second set.

## 2023-04-28 NOTE — H&P
Admission History and Physical   Carolina Mari,  1955, MRN [id number]    [facility]    PCP: Le Romo, [phone]          Extended Emergency Contact Information  Primary Emergency Contact: Tim Munoz  Address:  [address]  Home Phone: [phone]  Mobile Phone: [phone]  Relation: Son  Secondary Emergency Contact: Elizabeth Munoz  Address:  [address]  Home Phone: [phone]  Mobile Phone: [phone]  Relation: Daughter-in-Law       Assessment and Plan     68F with DM, obesity, CKD3 who presents with lethargy and found to have acute renal failure, hyperkalemia with peaked T-waves, metabolic acidosis, leukocytosis, hypotension, encephalopathy.     #Hypotension + leukocytosis   #Concern for severe sepsis impending shock  #possible hypotension just from dehydration, meds, severe acidosis  -completed 2L in boluses, now D5 bicarb @ 125  -check lactic acid  -blood cultures, CT A/P and CXR, UA  -empiric zosyn+vanco  -levophed if recurrent hypotension  -PICC    Addendum:  -Now on levophed for septic shock.    -On secondary exam Ms. Munoz superficial posterior calf wound without drainage and surrounding erythema L>R.  L ankle is more erythematous and tender than right.  Already with good soft tissue abx coverage.  Suspect this is the source.  CT r/o joint effusion/septic joint.    -Also now noted to have multiple bouts diarrhea.  Stool LEON+c.diff.  Empiric oral vanco for now.  -Repeat VBG for worsening met.acidosis.  -WOC  -D/w Dr. Swan, ICU  21:23       #Acute renal failure  #Hyperkalemia with peaked T-waves  #Severe metabolic acidosis  -S/p CaGluconate  -Insulin + glucose per protocol  -Lokelma  -Bicarb 50mEq x 1 followed by infusion  -chepe  -nephrology consult     #Acute metabolic encephalopathy - likely from accumulation of multiple  "centrally acting medications in renal failure  -hold oxycodone, lyrica, cymbalta  -CT r/o other.    #chronic pain with opiate dependence     #Hypothyroid - synthroid    #HTN - hold losartan, lasix, and lopressor    #DM2 - listed on chart.  Not on meds. Monitor.          GI px with protonix, heparin DVT px    Checklist:  Code Status: Full Code    Diet: NPO for Medical/Clinical Reasons Except for: Meds    Rodriguez Catheter: Not present  Lines: PRESENT      PICC 04/28/23 Triple Lumen Left Brachial vein lateral Multiple medications-Site Assessment: WDL        Auto-populated based on system request - if relevant they will be addressed above:  Clinically Significant Risk Factors Present on Admission        # Hyperkalemia: Highest K = 6.6 mmol/L in last 2 days, will monitor as appropriate      # Anion Gap Metabolic Acidosis: Highest Anion Gap = 20 mmol/L in last 2 days, will monitor and treat as appropriate  # Hypoalbuminemia: Lowest albumin = 3 g/dL at 4/28/2023 12:53 PM, will monitor as appropriate    # Acute Kidney Injury, unspecified: based on a >150% or 0.3 mg/dL increase in last creatinine compared to past 90 day average, will monitor renal function  # Hypertension: home medication list includes antihypertensive(s)          #morbid obesity Body mass index is 39.13 kg/m .        Advanced Care Planning  I anticipate the patient will be admitted to the hospital for at least 2 midnights for the evaluation and treatment of the conditions discussed above.     Chief Complaint: weakness     HPI:    Carolina Mari is a 68 year old female with morbid obesity, CKD3, DM2, anxiety/depression, chronic pain with opiate dependence who is sent in from assisted living with weakness and body aches.  Son reports confusion, \"delirious\" on Wednesday when he visited.   This AM got a call that she she was hallucinating and was being sent to ER.  Patient reports weakness and abdominal pain.  Unable to give reliable hx due to confusion. "  I reached out to RN care manager for more history and left a message.      In the ER: found to have leukocytosis, renal failure and hyperkelamia.  Received 1L saline bolus, calcium gluconate, being started on bicarb infusion.    History is provided by patient, son, and chart       Physical Exam:  Temp:  [98.9  F (37.2  C)] 98.9  F (37.2  C)  Pulse:  [60-70] 69  Resp:  [16-23] 21  BP: ()/(42-95) 96/47  SpO2:  [90 %-100 %] 98 %  BP 96/47   Pulse 69   Temp 98.9  F (37.2  C) (Oral)   Resp 21   Wt 120.2 kg (265 lb)   SpO2 98%   BMI 39.13 kg/m       General:  Lethargic but arouses to voice and answers questions, confused, ill appearing.    Neurologic:  Intermittent myoclonus  Psych: calm  HEENT:  Dry MM, MMM, unremarkable dentition  CV: RRR no MRG, normal S1 and S2, no pitting edema  Lungs: CTAB.  Easyrespirations  Abd: obese, diffusely TTP, anterior, no sign soft tissue infection   Skin: bilateral venous statis dermatitis.  Erythematous but not clearly infected.  Central Lines and Tubes: None (no mo, CVC, feeding tubes)         Pertinent Test Findings  Radiology Results (results reviewed):   Results for orders placed or performed during the hospital encounter of 04/28/23   CT Head w/o Contrast    Impression    IMPRESSION:  1.  No CT evidence for acute intracranial process.  2.  Mild chronic microvascular ischemic changes as above.   CT Abdomen Pelvis w/o Contrast    Impression    IMPRESSION:   1.  No acute findings or other explanation for symptoms.    2.  Large multicompartment ventral hernia containing most of the small and large bowel. No acute inflammation or obstruction.     XR Chest Port 1 View    Impression    IMPRESSION: Low lung volumes. Heart size prominent on this portable view, unchanged. Pulmonary vascularity normal. Elevated right hemidiaphragm. Subsegmental atelectasis both bases. Left PICC line tip distal SVC. Numerous surgical clips left upper   quadrant. Lower cervical spinal fusion.  Degenerative changes both shoulders.       EKG (personally reviewed): sinus 1st degree AV block, no acute ischemic changes, peaked T      Medical History  Past Medical History:   Diagnosis Date     Diabetes mellitus (H)      History of stomach ulcers      HTN (hypertension)      Knee osteoarthritis     right     Osteoarthritis of right hip      Osteoporosis      Osteoporosis      Thyroid disease         Surgical History  Past Surgical History:   Procedure Laterality Date     AMPUTATE TOE(S) Bilateral     3rd toe on both feet amputated.      FOOT SURGERY       GASTRIC BYPASS       HC REMOVAL GALLBLADDER N/A 12/25/2016    Procedure: CHOLECYSTECTOMY, OPEN;  Surgeon: Everardo Cisneros MD;  Location: [facility];  Service: General     HERNIA REPAIR      Had 4 surgeries total     JOINT REPLACEMENT       PICC TRIPLE LUMEN PLACEMENT  4/28/2023          VA SPINE SURGERY PROCEDURE UNLISTED       tka      right          Social History  Social History     Tobacco Use     Smoking status: Never     Smokeless tobacco: Never   Substance Use Topics     Alcohol use: No     Drug use: No          Allergies  Allergies   Allergen Reactions     Aspirin Other (See Comments)     History of ulcers       Colchicine Angioedema and Swelling     And gout flare ups     Colchicine Angioedema and Swelling     And gout flare ups    Family History  I have reviewed this patient's family history and updated it with pertinent information if needed.  Family History   Problem Relation Age of Onset     Coronary Artery Disease Father      Coronary Artery Disease Brother      Cancer Mother         bone     Cancer Brother         leukemia     Breast Cancer Mother      Cancer Brother 20.00        Leukemia     Coronary Artery Disease Brother                  Prior to Admission Medications   Prior to Admission Medications   Prescriptions Last Dose Informant Patient Reported? Taking?   Ascorbic Acid (VITAMIN C) 500 MG CAPS 4/28/2023 at am  Yes Yes   Sig:  Take 500 mg by mouth daily   DULoxetine (CYMBALTA) 20 MG capsule 4/28/2023 at am  Yes Yes   Sig: Take 40 mg by mouth daily   NARCAN 4 MG/0.1ML nasal spray Unknown at PRN Self Yes Yes   Sig: CALL 911. SPR CONTENTS OF ONE SPRAYER (0.1ML) INTO ONE NOSTRIL. REPEAT IN 2-3 MIN IF SYMPTOMS OF OPIOID EMERGENCY PERSIST, ALTERNATE NOSTRILS   acetaminophen (TYLENOL) 500 MG tablet 4/28/2023 at am Self Yes Yes   Sig: Take 1,000 mg by mouth 3 times daily    alum & mag hydroxide-simethicone (MAALOX) 200-200-20 MG/5ML SUSP suspension Unknown at PRN  Yes Yes   Sig: Take 30 mLs by mouth every 6 hours as needed for indigestion Moderate heartburn or indigestion   bacitracin 500 UNIT/GM OINT Unknown at PRN  Yes Yes   Sig: Apply topically 2 times daily as needed for wound care Minor or superficial abrasions, lacerations, or sores   calcium carbonate (TUMS) 500 MG chewable tablet Unknown at PRN  Yes Yes   Sig: Take 1-2 chew tab by mouth every 6 hours as needed for heartburn Mild heartburn or indigestion   carbamide peroxide (DEBROX) 6.5 % otic solution Unknown at PRN  Yes Yes   Sig: Place 5 drops into both ears daily as needed for other (earwax buildup)   cetirizine (ZYRTEC) 10 MG tablet 4/28/2023 at am  Yes Yes   Sig: Take 10 mg by mouth 2 times daily   cholestyramine light (PREVALITE) 4 GM powder 4/28/2023 at am  Yes Yes   Sig: Take 4 g by mouth 2 times daily diarrhea   diclofenac (VOLTAREN) 1 % topical gel 4/28/2023 at am Self Yes Yes   Sig: Apply 2 g topically 4 times daily To affected area   dimethicone 1 % external cream 4/28/2023 at am  Yes Yes   Sig: Apply topically 2 times daily To periarea/buttocks   docusate sodium (COLACE) 100 MG capsule Unknown at PRN  Yes Yes   Sig: Take 100 mg by mouth 2 times daily as needed for constipation Mild constipation (no BM in 1-2 days)   famotidine (PEPCID) 20 MG tablet 4/28/2023 at am Self Yes Yes   Sig: Take 20 mg by mouth daily   ferrous sulfate (FEROSUL) 325 (65 Fe) MG tablet 4/28/2023 at am   Yes Yes   Sig: Take 325 mg by mouth daily (with breakfast)   furosemide (LASIX) 40 MG tablet 4/28/2023 at am  Yes Yes   Sig: Take 40 mg by mouth daily   glycerin (ADULT) 2 g suppository Unknown at PRN  Yes Yes   Sig: Place 1 suppository rectally daily as needed for constipation Moderate constipation (no BM in 3-5 days)   guaiFENesin (ROBITUSSIN) 20 mg/mL liquid Unknown at PRN  Yes Yes   Sig: Take 5-10 mLs by mouth every 4 hours as needed for cough 5 ml mild cough  10ml moderate/severe cough   hydrocortisone (CORTAID) 1 % external cream Unknown at PRN  Yes Yes   Sig: Apply topically 2 times daily as needed for itching   hydrocortisone, Perianal, (ANUSOL-HC) 2.5 % cream Unknown at PRN  Yes Yes   Sig: Place rectally 2 times daily as needed for hemorrhoids   levothyroxine (SYNTHROID/LEVOTHROID) 200 MCG tablet 4/28/2023 at am Self Yes Yes   Sig: Take 225 mcg by mouth daily   lidocaine patch in PLACE 4/28/2023 at am  Yes Yes   Sig: Place 1 patch onto the skin every morning Remove at HS   loperamide (IMODIUM) 2 MG capsule 4/28/2023 at am  No Yes   Sig: Take 1 capsule (2 mg) by mouth 4 times daily as needed for diarrhea   Patient taking differently: Take 2 mg by mouth 2 times daily Hold if constipation   loperamide (IMODIUM) 2 MG capsule Unknown at PRN  Yes Yes   Sig: Take 2 mg by mouth 4 times daily as needed for diarrhea   losartan (COZAAR) 100 MG tablet 4/28/2023 at am Self Yes Yes   Sig: Take 100 mg by mouth daily Hold if SBP less than 100   magnesium hydroxide (MOM) 2400 MG/10ML SUSP Unknown at PRN  Yes Yes   Sig: Take 30 mLs by mouth daily as needed for constipation For MILD constipation (no BM in 2-3 days)   metoprolol tartrate (LOPRESSOR) 100 MG tablet 4/28/2023 Self Yes Yes   Sig: Take 50 mg by mouth 2 times daily Hold for SBP < 100 or HR < 60   miconazole (MICATIN) 2 % AERP powder Unknown at PRN  Yes Yes   Sig: Apply topically 2 times daily as needed for other (to abdominal/groin folds)   mineral oil-hydrophilic  petrolatum (AQUAPHOR) external ointment Unknown at PRN  Yes Yes   Sig: Apply topically as needed for dry skin   nystatin (MYCOSTATIN) 517529 UNIT/GM external powder Unknown at PRN  Yes Yes   Sig: Apply topically 2 times daily as needed for other (skin irritation)   oxyCODONE IR (ROXICODONE) 10 MG tablet 4/28/2023 at 0800  Yes Yes   Sig: Take 5 mg by mouth every 4 hours Takes at Midnight, 0400, 0800, 1200, 1600, 2000   polyethylene glycol-propylene glycol (SYSTANE ULTRA) 0.4-0.3 % SOLN ophthalmic solution Unknown at PRN  Yes Yes   Sig: Place 1 drop into both eyes 4 times daily as needed for dry eyes   pregabalin (LYRICA) 25 MG capsule 4/28/2023 at am  Yes Yes   Sig: Take 25 mg by mouth 2 times daily   simvastatin (ZOCOR) 20 MG tablet 4/27/2023 at pm Self Yes Yes   Sig: Take 20 mg by mouth At Bedtime    sodium phosphate (FLEET ENEMA) 7-19 GM/118ML rectal enema Unknown at PRN  Yes Yes   Sig: Place 1 enema rectally daily as needed for constipation Severe constipation (no BM in 5 or more days)   witch hazel-glycerin (TUCKS) pad Unknown at PRN  Yes Yes   Sig: Apply topically as needed for hemorrhoids      Facility-Administered Medications: None              Pertinent Labs    Most Recent 3 CBC's:  Recent Labs   Lab Test 04/28/23  1252 12/07/22  0852 04/21/22  0814   WBC 24.4* 9.4 8.2   HGB 12.0 10.9* 10.4*   MCV 92 93 91   * 332 255     Most Recent 3 BMP's:  Recent Labs   Lab Test 04/28/23  1253 04/19/23  0817 12/07/22  0852   * 136 138   POTASSIUM 6.6* 4.9 4.9   CHLORIDE 99 105 105   CO2 11* 21* 20*   BUN 84.6* 25.9* 10.0   CR 4.92* 1.64* 0.86   ANIONGAP 20* 10 13   JAYA 8.5* 8.7* 8.7*   * 108* 158*     Most Recent 2 LFT's:  Recent Labs   Lab Test 04/28/23  1253 01/04/22  1358   AST 31 25   ALT 12 17   ALKPHOS 216* 167*   BILITOTAL 0.7 1.0     Most Recent 3 INR's:No lab results found.  Most Recent 3 Troponin's:  Recent Labs   Lab Test 05/20/21 2022   TROPI <0.015       Medical Decision Making         Medical Decision Making             Critical care time: 60 minutes.    (Time spent immediately at the bedside or on the unit devoted exclusively to evaluating, providing care, and managing this critically ill patient)  Life threatening organ failures include: hypotension, severe metabolic derangements  Failure to initiate these interventions on an urgent basis would likely result in sudden, clinically significant or life threatening deterioration in the patient's condition.    Elijah Olson MD, [facility]  Internal Medicine Hospitalist  4/28/2023  18:18

## 2023-04-28 NOTE — CONSULTS
[facility]  [facility]   Nephrology Consultation/Initial Inpatient Care    Carolina Mrai   MRN: [id number]  : 1955   DOA: 2023     REASON FOR CONSULTATION: We are asked to see pt by Dr. Garcia for ARF and hyperkalemia    HISTORY OF PRESENT ILLNESS:68 year old female brought to the ER from her AL with complaints of malaise and weakness  Found to have ARF and hyperkalemia and acidosis  Appears dry  On many meds including sedating meds  Has not made any urine since being here  Pt cannot say if she has been urinating in last week or any sxs; says entire last week is fuzzy  Acknowledges some ab pain; diffuse and no specific  +myoclonus at home in last month  Also cramps like tarun horses      REVIEW OF SYSTEMS:  ROS was completely reviewed and otherwise negative and non-contributory    Past Medical History:   Diagnosis Date     Diabetes mellitus (H)      History of stomach ulcers      HTN (hypertension)      Knee osteoarthritis     right     Osteoarthritis of right hip      Osteoporosis      Osteoporosis      Thyroid disease        Social History     Socioeconomic History     Marital status:      Spouse name: Not on file     Number of children: 1     Years of education: Not on file     Highest education level: Not on file   Occupational History     Not on file   Tobacco Use     Smoking status: Never     Smokeless tobacco: Never   Vaping Use     Vaping status: Not on file   Substance and Sexual Activity     Alcohol use: No     Drug use: No     Sexual activity: Not on file   Other Topics Concern     Not on file   Social History Narrative    SonTim and daughter in law, Elizabeth, active & engaged with patient's cares.       Social Determinants of Health     Financial Resource Strain: Not on file   Food Insecurity: Not on file   Transportation Needs: Not on file   Physical Activity: Not on file   Stress: Not on file   Social  Connections: Not on file   Intimate Partner Violence: Not on file   Housing Stability: Not on file       Family History   Problem Relation Age of Onset     Coronary Artery Disease Father      Coronary Artery Disease Brother      Cancer Mother         bone     Cancer Brother         leukemia     Breast Cancer Mother      Cancer Brother 20.00        Leukemia     Coronary Artery Disease Brother        Allergies   Allergen Reactions     Aspirin Other (See Comments)     History of ulcers       Colchicine Angioedema and Swelling     And gout flare ups     Colchicine Angioedema and Swelling     And gout flare ups       MEDICATIONS:    sodium chloride 0.9%  500 mL Intravenous Once     sodium chloride 0.9%  1,000 mL Intravenous Once     calcium gluconate  1 g Intravenous Once         PHYSICAL EXAM    BP 99/42   Pulse 62   Temp 98.9  F (37.2  C) (Oral)   Resp 18   Wt 120.2 kg (265 lb)   SpO2 96%   BMI 39.13 kg/m      No intake or output data in the 24 hours ending 04/28/23 1418    Wakes and can answer some questions but spacy  HEENT NC/AT; perrla; OP clear without lesions; mmm  Neck supple without LAD, TM  CV; RRR without rub or murmur  Lung: clear and equal; no extra sounds  Ab: soft and NT; not distended; normal bs  Ext: some chronic edema but shrunken wrinkled skin suggesting dehydraton  Skin; no rash  Neuro; grossly intact    LABORATORIES    Last Renal Panel:  Sodium   Date Value Ref Range Status   04/28/2023 130 (L) 136 - 145 mmol/L Final   05/22/2021 143 133 - 144 mmol/L Final     Potassium   Date Value Ref Range Status   04/28/2023 6.6 (HH) 3.4 - 5.3 mmol/L Final   04/21/2022 5.4 (H) 3.5 - 5.0 mmol/L Final   05/22/2021 3.9 3.4 - 5.3 mmol/L Final     Chloride   Date Value Ref Range Status   04/28/2023 99 98 - 107 mmol/L Final   04/21/2022 106 98 - 107 mmol/L Final   05/22/2021 113 (H) 94 - 109 mmol/L Final     Carbon Dioxide   Date Value Ref Range Status   05/22/2021 25 20 - 32 mmol/L Final     Carbon Dioxide  (CO2)   Date Value Ref Range Status   04/28/2023 11 (L) 22 - 29 mmol/L Final   04/21/2022 25 22 - 31 mmol/L Final     Anion Gap   Date Value Ref Range Status   04/28/2023 20 (H) 7 - 15 mmol/L Final   04/21/2022 9 5 - 18 mmol/L Final   05/22/2021 5 3 - 14 mmol/L Final     Glucose   Date Value Ref Range Status   04/28/2023 145 (H) 70 - 99 mg/dL Final   04/21/2022 145 (H) 70 - 125 mg/dL Final   05/22/2021 90 70 - 99 mg/dL Final     Urea Nitrogen   Date Value Ref Range Status   04/28/2023 84.6 (H) 8.0 - 23.0 mg/dL Final   04/21/2022 10 8 - 22 mg/dL Final   05/22/2021 10 7 - 30 mg/dL Final     Creatinine   Date Value Ref Range Status   04/28/2023 4.92 (H) 0.51 - 0.95 mg/dL Final   05/22/2021 0.92 0.52 - 1.04 mg/dL Final     GFR Estimate   Date Value Ref Range Status   04/28/2023 9 (L) >60 mL/min/1.73m2 Final     Comment:     eGFR calculated using 2021 CKD-EPI equation.   05/27/2021 >60 >60 mL/min/1.73m2 Final   05/22/2021 64 >60 mL/min/[1.73_m2] Final     Comment:     Non  GFR Calc  Starting 12/18/2018, serum creatinine based estimated GFR (eGFR) will be   calculated using the Chronic Kidney Disease Epidemiology Collaboration   (CKD-EPI) equation.       Calcium   Date Value Ref Range Status   04/28/2023 8.5 (L) 8.8 - 10.2 mg/dL Final   05/22/2021 7.8 (L) 8.5 - 10.1 mg/dL Final     Albumin   Date Value Ref Range Status   04/28/2023 3.0 (L) 3.5 - 5.2 g/dL Final   01/04/2022 2.3 (L) 3.4 - 5.0 g/dL Final   05/22/2021 2.3 (L) 3.4 - 5.0 g/dL Final     No components found for: URINE   Lab Results   Component Value Date    WBC 24.4 04/28/2023    WBC 7.4 05/22/2021     Lab Results   Component Value Date    RBC 4.37 04/28/2023    RBC 3.42 05/22/2021     Lab Results   Component Value Date    HGB 12.0 04/28/2023    HGB 9.2 05/22/2021     Lab Results   Component Value Date    HCT 40.0 04/28/2023    HCT 30.6 05/22/2021     No components found for: MCT  Lab Results   Component Value Date    MCV 92 04/28/2023    MCV 90  05/22/2021     Lab Results   Component Value Date    MCH 27.5 04/28/2023    MCH 26.9 05/22/2021     Lab Results   Component Value Date    MCHC 30.0 04/28/2023    MCHC 30.1 05/22/2021     Lab Results   Component Value Date    RDW 16.0 04/28/2023    RDW 14.6 05/22/2021     Lab Results   Component Value Date     04/28/2023     05/22/2021         I reviewed all labs    ASSESSMENT/PLAN:  68 year old female    1. ARF/CKD; CR on 4/19/23 1.6; now acutely rising and contributors are low bp and dehydration (on diuretics and poor PO) and vasodialtion due to meds while on ARB all leading to a hemodynamic/ATN injury; will hydrate pt and optimize hemodynamics; IVF and albumin to expand intravascular space; hold ARB; follow need to consider dialysis but expect we can manage her hyperkalemia medically  2. Hyperkalemia; manage medically with IVF and bicarb and follow labs  3. Acidosis; IVF with bicarb; s/p some amps of bicarb; check lactate  4. MS; altered due to ARF and medical illness and sedating meds with metabolites increased due to ARF;   5. Polypharmacy  6. Hyperlipid; on statin; check CPK  7. HTN; typically on meds (metoprolol and ARB and diuretics); holding with low bp and follow need to resume; IVF and albumin; pressors if needed; working up ab pain and HoTN and elevated WBC with imaging and cultures and plan to start abx  8. Hypothyroid; on replacment  9. Anemia; on oral iron; follow hgb  10. Chronic pain; on meds; ?hannah Cardoza MD  Nephrology

## 2023-04-28 NOTE — ED NOTES
Nurse updated son Tim of mothers condition via phone. He stated he could tell his mother wasn't well on Wednesday when he visited. Pt has periods of confusion and having some hallucinations. Is redirectable.

## 2023-04-29 NOTE — PROGRESS NOTES
Patient remains in the ICU on pressor support for septic shock thought due to LLE cellulitis.  Phalen Dorian will continue to follow along peripherally until stepdown out of the unit.    Morgan Lopez MD - PGY3  [facility]

## 2023-04-29 NOTE — PROGRESS NOTES
[facility]  [facility]   Follow up    Carolina Mari   MRN: [id number]  : 1955   DOA: 2023   CC: ARF/CKD      Assessment and Plan:    68 year old female     1. ARF/CKD; CR 1.6 PTA; now ARF secondary to hemodynamic injury; improving with IVF and optimizing hemodynamics; no indication for dialysis; following and managing expectantly  2. Hyperkalemia; improved with medical management   3. Acidosis; IVF with bicarb and some prn amps; most recent gas alkalotic --?gathered downstream from IV bicarb given; will reduce rate of bicarb infusing and recheck gas; stop bicarb gtt if truly alkalotic  4. MS; altered due to ARF and medical illness and sedating meds with metabolites increased due to ARF; narcan prn; sedated today  5. Polypharmacy  6. Hyperlipid; on statin; normal CPK  7. HTN; typically on meds (metoprolol and ARB and diuretics); holding with low bp and follow need to resume; IVF and albumin and pressors  8. ID; suspect sepsis from ? Cellulitis vs ??; urine not looking awful and blood cultures negative so far; ab CT negative for source  9.  Hypothyroid; on replacment  10. Anemia; on oral iron; follow hgb  11. Chronic pain; on meds typically; holding for now    Subjective  Events reviewed; on some pressors; pt unable to give any history  Objective    Vital signs in last 24 hours  Temp:  [97.9  F (36.6  C)-98.9  F (37.2  C)] 98.6  F (37  C)  Pulse:  [60-90] 86  Resp:  [16-35] 23  BP: ()/(36-95) 107/55  SpO2:  [72 %-100 %] 97 %      Intake/Output last 3 shifts  I/O last 3 completed shifts:  In: 1610 [I.V.:10; IV Piggyback:1500]  Out: 820 [Urine:820]  Intake/Output this shift:  I/O this shift:  In: 2661.78 [I.V.:2661.78]  Out: 150 [Urine:150]    Physical Exam  Deeply asleep; does not arouse ot voice or light shake  CV: RRR without murmur or rub  Lung: clear and equal; no extra sounds  Ab: soft and NT; not distended; normal bs  Ext:  chronic edema and stasis changes; redness to shinsL>R      Pertinent Labs     Last Renal Panel:  Sodium   Date Value Ref Range Status   04/29/2023 134 (L) 136 - 145 mmol/L Final   05/22/2021 143 133 - 144 mmol/L Final     Potassium   Date Value Ref Range Status   04/29/2023 4.6 3.4 - 5.3 mmol/L Final   04/21/2022 5.4 (H) 3.5 - 5.0 mmol/L Final   05/22/2021 3.9 3.4 - 5.3 mmol/L Final     Chloride   Date Value Ref Range Status   04/29/2023 103 98 - 107 mmol/L Final   04/21/2022 106 98 - 107 mmol/L Final   05/22/2021 113 (H) 94 - 109 mmol/L Final     Carbon Dioxide   Date Value Ref Range Status   05/22/2021 25 20 - 32 mmol/L Final     Carbon Dioxide (CO2)   Date Value Ref Range Status   04/29/2023 13 (L) 22 - 29 mmol/L Final   04/21/2022 25 22 - 31 mmol/L Final     Anion Gap   Date Value Ref Range Status   04/29/2023 18 (H) 7 - 15 mmol/L Final   04/21/2022 9 5 - 18 mmol/L Final   05/22/2021 5 3 - 14 mmol/L Final     Glucose   Date Value Ref Range Status   04/29/2023 163 (H) 70 - 99 mg/dL Final   04/21/2022 145 (H) 70 - 125 mg/dL Final   05/22/2021 90 70 - 99 mg/dL Final     GLUCOSE BY METER POCT   Date Value Ref Range Status   04/29/2023 150 (H) 70 - 99 mg/dL Final     Urea Nitrogen   Date Value Ref Range Status   04/29/2023 86.1 (H) 8.0 - 23.0 mg/dL Final   04/21/2022 10 8 - 22 mg/dL Final   05/22/2021 10 7 - 30 mg/dL Final     Creatinine   Date Value Ref Range Status   04/29/2023 4.03 (H) 0.51 - 0.95 mg/dL Final   05/22/2021 0.92 0.52 - 1.04 mg/dL Final     GFR Estimate   Date Value Ref Range Status   04/29/2023 11 (L) >60 mL/min/1.73m2 Final     Comment:     eGFR calculated using 2021 CKD-EPI equation.   05/27/2021 >60 >60 mL/min/1.73m2 Final   05/22/2021 64 >60 mL/min/[1.73_m2] Final     Comment:     Non  GFR Calc  Starting 12/18/2018, serum creatinine based estimated GFR (eGFR) will be   calculated using the Chronic Kidney Disease Epidemiology Collaboration   (CKD-EPI) equation.       Calcium    Date Value Ref Range Status   04/29/2023 7.7 (L) 8.8 - 10.2 mg/dL Final   05/22/2021 7.8 (L) 8.5 - 10.1 mg/dL Final     Albumin   Date Value Ref Range Status   04/28/2023 3.0 (L) 3.5 - 5.2 g/dL Final   01/04/2022 2.3 (L) 3.4 - 5.0 g/dL Final   05/22/2021 2.3 (L) 3.4 - 5.0 g/dL Final     Recent Labs   Lab 04/29/23  0527 04/28/23  1252   HGB 11.3* 12.0          I reviewed all lab results  Heather Cardoza MD

## 2023-04-29 NOTE — CONSULTS
CRITICAL CARE CONSULT:    Assessment/Plan:  68 year old female never smoker with a history of SBO s/p ileal resection and colostomy with subsequent takedown, large ventral hernia with most of small and large bowel herniated, obesity, adult failure to thrive, wheelchair-bound, chronic edema, chronic diarrhea, multiple skin ulcerations (anterior abdomen, perineum, bilateral legs), questionable DM (not on meds, A1c always <6), PUD/GERD, HTN, osteoporosis, hypothyroidism, chronic anemia, CKD, cholecystectomy, presented to ED on 4/28 with acute encephalopathy, acute on chronic kidney injury, hyperkalemia, peripheral neutrophilia, and shock, admitted to ICU, found to have erythematous left lower shin/ankle with likely cellulitis.    RESP:  No acute issues. CXR without focal infiltrate. Chronic leg edema on furosemide but no noted h/o CHF. On room air.    Monitor airway with fluid resuscitation and encephalopathy    Currently protecting airway, no acute indication for intubation    Full code    CV:  Shock.  Likely combination of hypovolemia from chronic diarrhea and loop diuretic use in addition to severe sepsis with septic shock. Lactate 2.1, down to 1.0 with resuscitation    Received 2 liters crystalloid    Receiving D5W with 150 meq NaHCO3 at 125 mL/hr    norepi to keep MAP >65, currently 0.12; if up to 0.2 start vasopressin and consider stress-dose hydrocortisone    Hold home furosemide    Lactate normalized    TTE    NEURO:  Acute encephalopathy.  Multifactorial septic-metabolic encephalopathy with sepsis, NEELIMA, hypovolemia, electrolyte derangements. Knows the year, somnolent but arousable. CT head without acute findings.    Correct reversible septic-metabolic factors    Avoid opiates and benzos if possible    Hold home oxycodone, duloxetine, and pregabalin for now (though with her pain may need to restart)    GI:  Chronic diarrhea.  H/o SBO, ileal resection, colostomy (taken down), large ventral hernia.    C diff PCR  in process    Correct losses with fluid resuscitation    NPO until sufficiently alert    RENAL:  Acute on chronic kidney injury.  Hyperkalemia.  Metabolic acidosis.  Lactic acidosis.  Combination of hypovolemia with low intake from encephalopathy, diuresis with furosemide PTA, chronic diarrhea; ATN due to sepsis with septic shock; and ARB. Furosemide and ARB held. 2000 ml crystalloid, bicarb drip. Calcium, dextrose/insulin, sodium zirconium cyclosilicate given. K down from 6.6 to 4.3. VBG with persistent metabolic acidosis: pH 7.13, 7.17, bicarb 12, 13.    Appreciate nephrology consultation    Unsuccessful attempt at mo in ED, placed in ICU, there is cloudy yellow urine, unclear how much UOP at this point    Monitor BMP, VBG q 6 hrs    Lactate normalized    No emergent dialysis indication    Hold furosemide and losartan    ID:  Severe sepsis with septic shock.  Likely left lower extremity cellulitis.  Failure to thrive, immobility, multiple skin ulcers (ventral abdomen, perineum, bilateral legs).  No documented fever but marked peripheral neutrophilia, shock. Thrombocytosis may also be reactive to sepsis. Left shin, ankle and foot asymmetrically erythematous (has bilateral stasis dermopathy but worse on left, may be more tender though seems to have pain with any movement/exam). Baseline failure to thrive, immobility, wheelchair-bound. Chronic diarrhea. Multiple ulcers/wounds (ventral abdomen, perineum, bilateral legs). CXR without infiltrates, no reported cough. CT abdomen unrevealing except known herniation of most of bowel through ventral defect without incarceration. UA does not appear overtly infected. Chronic diarrhea but C diff possible.    Pip-tazo/vanco    PO vanco until stool C diff PCR resulted    CT left ankle    Blood cultures in process    HEMATOLOGIC:  Chronic anemia.  Peripheral neutrophilia.  Thrombocytosis.  Current Hgb normal likely due to hemoconcentration. Reactive thrombocytosis likely due to  infection.    Monitor with restrictive transfusion threshold    ENDOCRINE:  Listed DM Dx.  Questionable Dx. Not on meds, A1c always <6. Glucose spiked with D50/insulin given for hyper-K.    FSBG with sliding scale aspart    ICU PROPHYLAXIS:    Heparin subcutaneous    PPI until stable/taking PO (not on home PPI, but does take famotidine and TUMS for GERD/PUD)    Lines/Drains/Tubes:  Central line (LUE PICC) placed on 4/28  Rodriugez catheter placed on 4/28    CODE STATUS, DISPOSITION, FAMILY COMMUNICATION: Full code. Critically ill requiring continuous vasopressors. I updated her son, Tim, by telephone.    Restraints  Not indicated    Prince Swan MD  Pulmonary and Critical Care Medicine  [facility]  [employer]  Office [phone]  Pager [phone]  he/him    CC: severe sepsis with septic shock, acute encephalopathy, acute on chronic kidney injury, lactic acidosis, hyperkalemia    HPI: 68 year old female never smoker with a history of SBO s/p ileal resection and colostomy with subsequent takedown, large ventral hernia with most of small and large bowel herniated, obesity, adult failure to thrive, wheelchair-bound, chronic edema, chronic diarrhea, multiple skin ulcerations (anterior abdomen, perineum, bilateral legs), questionable DM (not on meds, A1c always <6), PUD/GERD, HTN, osteoporosis, hypothyroidism, chronic anemia, CKD, cholecystectomy, presented to ED on 4/28 with acute encephalopathy, acute on chronic kidney injury, hyperkalemia, peripheral neutrophilia, and shock, admitted to ICU, found to have erythematous left lower shin/ankle with likely cellulitis. Patient is wheelchair-bound, failure to thrive. Multiple skin wounds. Came in encephalopathic, hypovolemic, K 6.6, metabolic acidosis. Left shin/ankle/foot erythematous. Ulcer of left shin. UA does not appear infected, CXR without focal infiltrate. Protecting airway. Fluid resuscitated, D50/insulin, Ca, Lokelma. K now normalized, lactate normalized, on  bicarb drip, still acidemic.    Past Medical History:  SBO s/p ileal resection and colostomy with subsequent takedown  large ventral hernia with most of small and large bowel herniated  Obesity  adult failure to thrive, wheelchair-bound  chronic edema  chronic diarrhea  multiple skin ulcerations (anterior abdomen, perineum, bilateral legs)  questionable DM (not on meds, A1c always <6)  PUD/GERD  HTN  Osteoporosis  Hypothyroidism  chronic anemia  CKD  cholecystectomy    Past Surgical History:  Past Surgical History:   Procedure Laterality Date     AMPUTATE TOE(S) Bilateral     3rd toe on both feet amputated.      FOOT SURGERY       GASTRIC BYPASS       HC REMOVAL GALLBLADDER N/A 12/25/2016    Procedure: CHOLECYSTECTOMY, OPEN;  Surgeon: Everardo Cisneros MD;  Location: [facility] [place];  Service: General     HERNIA REPAIR      Had 4 surgeries total     JOINT REPLACEMENT       PICC TRIPLE LUMEN PLACEMENT  4/28/2023          AL SPINE SURGERY PROCEDURE UNLISTED       tka      right       Social History:  Social History     Socioeconomic History     Marital status:      Spouse name: Not on file     Number of children: 1     Years of education: Not on file     Highest education level: Not on file   Occupational History     Not on file   Tobacco Use     Smoking status: Never     Smokeless tobacco: Never   Vaping Use     Vaping status: Not on file   Substance and Sexual Activity     Alcohol use: No     Drug use: No     Sexual activity: Not on file   Other Topics Concern     Not on file   Social History Narrative    SonTim and daughter in law, Elizabeth, active & engaged with patient's cares.       Social Determinants of Health     Financial Resource Strain: Not on file   Food Insecurity: Not on file   Transportation Needs: Not on file   Physical Activity: Not on file   Stress: Not on file   Social Connections: Not on file   Intimate Partner Violence: Not on file   Housing Stability: Not on file       Family  History:  Family History   Problem Relation Age of Onset     Coronary Artery Disease Father      Coronary Artery Disease Brother      Cancer Mother         bone     Cancer Brother         leukemia     Breast Cancer Mother      Cancer Brother 20.00        Leukemia     Coronary Artery Disease Brother        Allergies:  Allergies   Allergen Reactions     Aspirin Other (See Comments)     History of ulcers       Colchicine Angioedema and Swelling     And gout flare ups     Colchicine Angioedema and Swelling     And gout flare ups       MAR Reviewed      Physical Exam:  Vent settings for last 24 hours:  Resp: 22      /53   Pulse 73   Temp 98.9  F (37.2  C) (Oral)   Resp 22   Wt 120.2 kg (265 lb)   SpO2 97%   BMI 39.13 kg/m      Intake/Output last 3 shifts:  No intake/output data recorded.  Intake/Output this shift:  I/O this shift:  In: 1600 [IV Piggyback:1500]  Out: -     Physical Exam  Gen: somnolent but arousable  HEENT: NT, no ALEKS  CV: RRR, no m/g/r  Resp: CTAB  Abd: soft, large ventral hernia, BS+, nonspecific diffuse tenderness without guarding  Skin: multiple skin wounds/ulcer, including ventral abdomen, perineum near labia/urethra/perianal, bilateral legs with prominent ulcer on left lateral lower leg, left shin/ankle/foot more erythematous and swollen than right, does have some stasis dermopathy right shin as well, trace woody edema  Ext: trace woody on right, a bit more edema left ankle/foot  Neuro: PERRL, nonfocal exam, somnolent but arousable, knows the year and knows she is in the hospital    IMAGING:  CT head (4/28/23):  - images directly reviewed, formal interpretation follows:  FINDINGS:  INTRACRANIAL CONTENTS: No intracranial hemorrhage, extraaxial collection, or mass effect.  No CT evidence of acute infarct. Mild presumed chronic small vessel ischemic changes. Mild generalized volume loss. No hydrocephalus.      VISUALIZED ORBITS/SINUSES/MASTOIDS: No intraorbital abnormality. No paranasal  sinus mucosal disease. No middle ear or mastoid effusion.     BONES/SOFT TISSUES: No acute abnormality.                                                                      IMPRESSION:  1.  No CT evidence for acute intracranial process.  2.  Mild chronic microvascular ischemic changes as above.    CT abd/pelvis (4/28/23):  - images directly reviewed, formal interpretation follows:  FINDINGS:   LOWER CHEST: Partially imaged coronary artery calcifications. Respiratory motion limits evaluation of the lung bases.     HEPATOBILIARY: Cholecystectomy. Liver and bile ducts are unremarkable.     PANCREAS: Normal.     SPLEEN: Normal.     ADRENAL GLANDS: Normal.     KIDNEYS/BLADDER: No significant mass, stones, or hydronephrosis. There are simple or benign cysts. No follow up is needed. Ptotic and partially malrotated right kidney.     BOWEL: Status post gastric bypass. Most of the bowel is within a large ventral hernia, but no evidence of acute inflammation or obstruction.     LYMPH NODES: Normal.     VASCULATURE: Unremarkable.     PELVIC ORGANS: Normal.     MUSCULOSKELETAL: Large ventral hernia, as above. Advanced degenerative changes in the hips. Osteopenia and multilevel degenerative changes in the spine.                                                                      IMPRESSION:   1.  No acute findings or other explanation for symptoms.     2.  Large multicompartment ventral hernia containing most of the small and large bowel. No acute inflammation or obstruction.    CXR (4/28/23):  - images directly reviewed, formal interpretation follows:  IMPRESSION: Low lung volumes. Heart size prominent on this portable view, unchanged. Pulmonary vascularity normal. Elevated right hemidiaphragm. Subsegmental atelectasis both bases. Left PICC line tip distal SVC. Numerous surgical clips left upper   quadrant. Lower cervical spinal fusion. Degenerative changes both shoulders.    Total Critical Care Time : 1 Hour 30  Minutes    Clinically Significant Risk Factors Present on Admission        # Hyperkalemia: Highest K = 6.6 mmol/L in last 2 days, will monitor as appropriate  # Hyponatremia: Lowest Na = 126 mmol/L in last 2 days, will monitor as appropriate     # Anion Gap Metabolic Acidosis: Highest Anion Gap = 21 mmol/L in last 2 days, will monitor and treat as appropriate  # Hypoalbuminemia: Lowest albumin = 3 g/dL at 4/28/2023 12:53 PM, will monitor as appropriate    # Acute Kidney Injury, unspecified: based on a >150% or 0.3 mg/dL increase in last creatinine compared to past 90 day average, will monitor renal function  # Hypertension: home medication list includes antihypertensive(s)   # Circulatory Shock: currently requiring pressors for blood pressure support  # Acute Respiratory Failure: Documented O2 saturation < 91%.  Continue supplemental oxygen as needed

## 2023-04-29 NOTE — ED NOTES
Systolic b/p in the 60's, pt feels clammy. Levophed drip being started, Dr. Olson aware. Pt is responsive, however continues to hallucinate.

## 2023-04-29 NOTE — PROGRESS NOTES
Since this patient belongs to [facility], transfer the patient to resident service.  Dr. Sanchez/resident accepted the patient.

## 2023-04-29 NOTE — PROGRESS NOTES
PULMONARY / CRITICAL CARE PROGRESS NOTE    Date / Time of Admission:  4/28/2023 11:24 AM    Assessment:     Carolina Mari is a 68 year old female with history of HTN, bowel obstruction s/p ileal resection and colostomy, s/p takedown, large ventral hernia, skin wound, PUD, hypothyroidism, depression, obesity,  chronic pain syndrome, osteoarthrosis, wheelchair bounded.   Brought to ED on 4/28 from assisted living facility for evaluation of altered mental status, diarrhea, generalized pain, also swelling and pain of left ankle.   Lab showed elevated WBC, BUN/Cr, hyperkalemia, metabolic acidosis, mild lactic acidosis.   Abdomen pelvis CT scan showed large multi compartment ventral hernia containing small and large bowel, without obstruction. Treated for hyperkalemia protocol , started on bicarb drip. Abx for soft tissue infection.   Patient was started on pressors, admitted to ICU.     1. Sepsis  Due to soft tissue infection.   Swelling and pain left ankle, severe osteoarthrosis noted in previous foot x ray.   Schedule for left ankle CT scan. Follow up blood cultures  Continue broad Abx. Titrate pressors to keep MAP > 65  2. Soft tissue infection   3. Swelling of left ankle  4. Acute kidney injury   5. Hyperkalemia   6. Metabolic acidosis   7. Metabolic encephalopathy   8. Diarrhea  9. Large ventral hernia with open skin wounds  10. Hx take down colostomy   11. Chronic pain syndrome  12. Osteoarthrosis   13. Wheel chair bounded  14. Anemia  15. Obesity Body mass index is 39.13 kg/m .    Advance Directives:  Full code    Plan:   1. Titrate FiO2 to keep SpO2 > 90%, currently on RA  2. IV fluids bicarb drip  3. Pressors to keep MAP > 65  4. Follow up blood cultures   5. C diff in stool pending   6. Continue broad Abx zosyn and vancomycin IV and oral vancomycin   7. Schedule for CT scan left ankle  8. Monitor kidney function   9. Supplement electrolytes  10. Nephrology service is following  11. NPO  12. PPI for GI  prophylaxis   13. Glucose level monitoring   14. Minimize pain meds and sedatives  15. DVT prophylaxis heparin subcutaneous  16. Wound care    Please contact me if you have any questions.  Total critical care time, not including separately billable procedure time: 45 minutes  This patient had a high probability of imminent or life threatening deterioration due to acute respiratory failure which required my direct attention, intervention and personal management.     Brown Pope  Pulmonary / Critical Care  04/29/2023  8:57 AM          ICU DAILY CHECKLIST                           Can patient transfer out of MICU? no    FAST HUG:    Feeding:  Feeding: no.  Patient is receiving NPO    Rodriguez: yes  Analgesia/Sedation:no    Thromboembolic prophylaxis: Yes; Mode:  Heparin  HOB>30:  Yes  Stress Ulcer Protocol Active: Yes; Mode: PPI  Glycemic Control: Any glucose > 180 no; Mode of Insulin Therapy: Sliding Scale Insulin    INTUBATED:  Can patient have daily waking:  No  Can patient have spontaneous breathing trial:  No    Restraints? no    PHYSICAL THERAPY AND MOBILITY:  Can patient have PT and mobility trial: no  Activity: bedrest    Subjective:   HPI:  Carolina Mari is a 68 year old female with history of HTN, bowel obstruction s/p ileal resection and colostomy, s/p takedown, large ventral hernia, skin wound, PUD, hypothyroidism, depression, obesity,  chronic pain syndrome, osteoarthrosis, wheelchair bounded.   Brought to ED on 4/28 from assisted living facility for evaluation of altered mental status, diarrhea, generalized pain, also swelling and pain of left ankle.   Lab showed elevated WBC, BUN/Cr, hyperkalemia, metabolic acidosis, mild lactic acidosis.   Abdomen pelvis CT scan showed large multi compartment ventral hernia containing small and large bowel, without obstruction.   Treated for hyperkalemia protocol , started on bicarb drip. Abx for soft tissue infection.   Patient was started on  pressors , admitted to ICU.     Events overnight  - Improvement of hyperkalemia, resolving metabolic acidosis  - Improvement of urine output  - Fluctuating mental status  - Decrease pressor requirements    Allergies: Aspirin, Colchicine, and Colchicine     MEDS:  Current Facility-Administered Medications   Medication     acetaminophen (TYLENOL) tablet 650 mg    Or     acetaminophen (TYLENOL) Suppository 650 mg     bisacodyl (DULCOLAX) suppository 10 mg     glucose gel 15-30 g    Or     dextrose 50 % injection 25-50 mL    Or     glucagon injection 1 mg     glucose gel 15-30 g    Or     dextrose 50 % injection 25-50 mL    Or     glucagon injection 1 mg     glucose gel 15-30 g    Or     dextrose 50 % injection 25-50 mL    Or     glucagon injection 1 mg     heparin ANTICOAGULANT injection 5,000 Units     insulin aspart (NovoLOG) injection (RAPID ACTING)     levothyroxine (SYNTHROID/LEVOTHROID) tablet 225 mcg     lidocaine (LMX4) cream     lidocaine (LMX4) cream     lidocaine 1 % 0.1-1 mL     lidocaine 1 % 0.1-5 mL     melatonin tablet 3 mg     naloxone (NARCAN) injection 0.2 mg    Or     naloxone (NARCAN) injection 0.4 mg    Or     naloxone (NARCAN) injection 0.2 mg    Or     naloxone (NARCAN) injection 0.4 mg     norepinephrine (LEVOPHED) 4 mg in  mL PERIPHERAL infusion     ondansetron (ZOFRAN ODT) ODT tab 4 mg    Or     ondansetron (ZOFRAN) injection 4 mg     pantoprazole (PROTONIX) IV push injection 40 mg     piperacillin-tazobactam (ZOSYN) 3.375 g vial to attach to  mL bag     prochlorperazine (COMPAZINE) injection 5 mg    Or     prochlorperazine (COMPAZINE) tablet 5 mg    Or     prochlorperazine (COMPAZINE) suppository 12.5 mg     senna-docusate (SENOKOT-S/PERICOLACE) 8.6-50 MG per tablet 1 tablet    Or     senna-docusate (SENOKOT-S/PERICOLACE) 8.6-50 MG per tablet 2 tablet     simvastatin (ZOCOR) tablet 20 mg     sodium bicarbonate 150 mEq in D5W 1,000 mL infusion     sodium chloride (PF) 0.9% PF flush  10-40 mL     sodium chloride (PF) 0.9% PF flush 3 mL     sodium chloride (PF) 0.9% PF flush 3 mL     sodium zirconium cyclosilicate (LOKELMA) packet 10 g     vancomycin (VANCOCIN) capsule 250 mg     vancomycin place crooks - receiving intermittent dosing     Objective:   VITALS:  /55   Pulse 86   Temp 98.6  F (37  C) (Oral)   Resp 23   Wt 120.2 kg (265 lb)   SpO2 97%   BMI 39.13 kg/m    VENT:  Resp: 23    EXAM:   Gen: obese, lethargic, arousable, moderate distress  HEENT: pale conjunctiva, dry mucosa  Neck: no thyromegaly, masses or JVD  Lungs: decrease breath sounds at the bases  CV: regular, no murmurs or gallops appreciated  Abdomen: soft, distended, ventral hernia open wounds midline , mild tenderness on palpation, BS increase in frequency   Ext: warm, lymphedema, swelling and pain of left ankle, redness of skin  Neuro: CN II-XII intact, non focal       Data Review:  Recent Labs   Lab 04/29/23  0738 04/29/23  0527 04/29/23  0430 04/29/23  0021 04/29/23  0004 04/28/23  2121   * 163* 143* 147* 165* 164*        04/29/23 05:27   Sodium 134 (L)   Potassium 4.6   Chloride 103   Carbon Dioxide (CO2) 13 (L)   Urea Nitrogen 86.1 (H)   Creatinine 4.03 (H)   GFR Estimate 11 (L)   Calcium 7.7 (L)   Anion Gap 18 (H)      04/28/23 12:52 04/29/23 05:27   WBC 24.4 (H) 20.8 (H)   Hemoglobin 12.0 11.3 (L)   Hematocrit 40.0 35.6   Platelet Count 537 (H) 477 (H)   RBC Count 4.37 4.04   MCV 92 88   MCH 27.5 28.0   MCHC 30.0 (L) 31.7   RDW 16.0 (H) 16.0 (H)   % Neutrophils 91    % Lymphocytes 2    % Monocytes 6    % Eosinophils 0    % Basophils 0      CT ABDOMEN PELVIS W/O CONTRAST  LOCATION: [facility]  DATE/TIME: 4/28/2023 5:18 PM CDT     INDICATION: pain, renal failure, leukocytosis.  r o obstruction, infection source  COMPARISON: 01/04/2022  FINDINGS:   LOWER CHEST: Partially imaged coronary artery calcifications. Respiratory motion limits evaluation of the lung bases.  HEPATOBILIARY:  Cholecystectomy. Liver and bile ducts are unremarkable.  PANCREAS: Normal.  SPLEEN: Normal.  ADRENAL GLANDS: Normal.  KIDNEYS/BLADDER: No significant mass, stones, or hydronephrosis. There are simple or benign cysts. No follow up is needed. Ptotic and partially malrotated right kidney.  BOWEL: Status post gastric bypass. Most of the bowel is within a large ventral hernia, but no evidence of acute inflammation or obstruction.  LYMPH NODES: Normal.  VASCULATURE: Unremarkable.  PELVIC ORGANS: Normal.  MUSCULOSKELETAL: Large ventral hernia, as above. Advanced degenerative changes in the hips. Osteopenia and multilevel degenerative changes in the spine.  IMPRESSION:   1.  No acute findings or other explanation for symptoms.   2.  Large multicompartment ventral hernia containing most of the small and large bowel. No acute inflammation or obstruction.       XR CHEST PORT 1 VIEW  LOCATION: [facility]  DATE/TIME: 4/28/2023 5:27 PM CDT   INDICATION: ams  COMPARISON: 05/20/2021  IMPRESSION: Low lung volumes. Heart size prominent on this portable view, unchanged. Pulmonary vascularity normal. Elevated right hemidiaphragm. Subsegmental atelectasis both bases. Left PICC line tip distal SVC. Numerous surgical clips left upper   quadrant. Lower cervical spinal fusion. Degenerative changes both shoulders.    By:  Brown Pope MD, 04/29/2023  8:57 AM    Primary Care Physician:  Le Romo

## 2023-04-29 NOTE — ED NOTES
Pt incontinent of large brown stool. Reported to Dr. Olson. Nurse unable to insert mo, pt became aggressive and refused. Provider aware.

## 2023-04-29 NOTE — PLAN OF CARE
Patient was found to have urine bypassing her mo catheter. Patient had saturated the blue chux, the slider sheet and the sheet on the bed. Amount of air and fluid in the balloon that holds the mo in place was checked, found 8 ml's. Added and additional 2 ml's for a total of 10 ml's. Did check and make sure mo was secure. Have had only a small amount of urine out of her mo. Urine is malodorous. Will continue to monitor and see of this happens again.      0330 patient had another smaller incontinence of urine, bypassing the mo catheter. Patient's mo catheter removed and a new catheter inserted. Immediate 500 ml's of urine.

## 2023-04-30 NOTE — PROGRESS NOTES
"[facility]  [facility]   Follow up    Carolina Mari   MRN: [id number]  : 1955   DOA: 2023   CC: ARF/CKD      Assessment and Plan:    68 year old female     1. ARF/CKD; CR 1.6 PTA; now ARF secondary to hemodynamic injury; continued improvement with IVF; following  2. Hyperkalemia; resolved; now on lower side  3. Acidosis; IVF with bicarb and improved; will finish this bag and change to saline  4. MS; altered due to ARF and medical illness and sedating meds with metabolites increased due to ARF; awake today  5. Polypharmacy  6. Hyperlipid; on statin; normal CPK  7. HTN; typically on meds (metoprolol and ARB and diuretics); holding with low bp and follow need to resume; IVF and albumin and pressors  8. ID; suspect sepsis from ? Cellulitis vs ??; urine not looking awful and blood cultures negative so far; ab CT negative for source; on abx and hemodynamics improving  9.  Hypothyroid; on replacment  10. Anemia; on oral iron; follow hgb  11. Chronic pain; on meds typically; holding for now    Subjective  Pt awake and crabby this am; wanting drinks of water and \"I just want some ice\"  Cannot focus on questions; says she is uncomfortable overall  Objective    Vital signs in last 24 hours  Temp:  [97.8  F (36.6  C)-99.5  F (37.5  C)] 97.9  F (36.6  C)  Pulse:  [] 100  Resp:  [20-46] 21  BP: ()/(43-99) 96/50  SpO2:  [93 %-98 %] 95 %      Intake/Output last 3 shifts  I/O last 3 completed shifts:  In: 6367.33 [P.O.:1000; I.V.:5367.33]  Out: 1675 [Urine:1675]  Intake/Output this shift:  I/O this shift:  In: 470.7 [P.O.:240; I.V.:230.7]  Out: 140 [Urine:140]    Physical Exam  Awake and restless and crabby  CV: RRR without murmur or rub  Lung: clear and equal; no extra sounds  Ab: soft and NT; not distended; normal bs  Ext: chronic edema and stasis changes; redness to shinsL>R      Pertinent Labs     Last Renal Panel:  Sodium   Date " Value Ref Range Status   04/30/2023 138 136 - 145 mmol/L Final   05/22/2021 143 133 - 144 mmol/L Final     Potassium   Date Value Ref Range Status   04/30/2023 3.4 3.4 - 5.3 mmol/L Final   04/21/2022 5.4 (H) 3.5 - 5.0 mmol/L Final   05/22/2021 3.9 3.4 - 5.3 mmol/L Final     Chloride   Date Value Ref Range Status   04/30/2023 102 98 - 107 mmol/L Final   04/21/2022 106 98 - 107 mmol/L Final   05/22/2021 113 (H) 94 - 109 mmol/L Final     Carbon Dioxide   Date Value Ref Range Status   05/22/2021 25 20 - 32 mmol/L Final     Carbon Dioxide (CO2)   Date Value Ref Range Status   04/30/2023 19 (L) 22 - 29 mmol/L Final   04/21/2022 25 22 - 31 mmol/L Final     Anion Gap   Date Value Ref Range Status   04/30/2023 17 (H) 7 - 15 mmol/L Final   04/21/2022 9 5 - 18 mmol/L Final   05/22/2021 5 3 - 14 mmol/L Final     Glucose   Date Value Ref Range Status   04/30/2023 132 (H) 70 - 99 mg/dL Final   04/21/2022 145 (H) 70 - 125 mg/dL Final   05/22/2021 90 70 - 99 mg/dL Final     GLUCOSE BY METER POCT   Date Value Ref Range Status   04/30/2023 144 (H) 70 - 99 mg/dL Final     Urea Nitrogen   Date Value Ref Range Status   04/30/2023 77.2 (H) 8.0 - 23.0 mg/dL Final   04/21/2022 10 8 - 22 mg/dL Final   05/22/2021 10 7 - 30 mg/dL Final     Creatinine   Date Value Ref Range Status   04/30/2023 3.02 (H) 0.51 - 0.95 mg/dL Final   05/22/2021 0.92 0.52 - 1.04 mg/dL Final     GFR Estimate   Date Value Ref Range Status   04/30/2023 16 (L) >60 mL/min/1.73m2 Final     Comment:     eGFR calculated using 2021 CKD-EPI equation.   05/27/2021 >60 >60 mL/min/1.73m2 Final   05/22/2021 64 >60 mL/min/[1.73_m2] Final     Comment:     Non  GFR Calc  Starting 12/18/2018, serum creatinine based estimated GFR (eGFR) will be   calculated using the Chronic Kidney Disease Epidemiology Collaboration   (CKD-EPI) equation.       Calcium   Date Value Ref Range Status   04/30/2023 6.9 (L) 8.8 - 10.2 mg/dL Final   05/22/2021 7.8 (L) 8.5 - 10.1 mg/dL Final      Albumin   Date Value Ref Range Status   04/30/2023 1.9 (L) 3.5 - 5.2 g/dL Final   01/04/2022 2.3 (L) 3.4 - 5.0 g/dL Final   05/22/2021 2.3 (L) 3.4 - 5.0 g/dL Final     Recent Labs   Lab 04/30/23  0356 04/29/23  0527 04/28/23  1252   HGB 10.3* 11.3* 12.0          I reviewed all lab results  Heather Cardoza MD

## 2023-04-30 NOTE — PROGRESS NOTES
PULMONARY / CRITICAL CARE PROGRESS NOTE    Date / Time of Admission:  4/28/2023 11:24 AM    Assessment:     Carolina Mari is a 68 year old female with history of HTN, bowel obstruction s/p ileal resection and colostomy, s/p takedown, large ventral hernia, skin wound, PUD, hypothyroidism, depression, obesity,  chronic pain syndrome, osteoarthrosis, wheelchair bounded.   Brought to ED on 4/28 from assisted living facility for evaluation of altered mental status, diarrhea, generalized pain, also swelling and pain of left ankle.   Lab showed elevated WBC, BUN/Cr, hyperkalemia, metabolic acidosis, mild lactic acidosis.   Abdomen pelvis CT scan showed large multi compartment ventral hernia containing small and large bowel, without obstruction. Treated for hyperkalemia protocol , started on bicarb drip. Abx for soft tissue infection.   Patient was started on pressors, admitted to ICU.     1. Sepsis  Due to soft tissue infection left LE.   Swelling and pain of left ankle, severe osteoarthrosis noted in previous foot x ray.   Left ankle CT scan showed no sizable joint effusion.   Blood cultures are negative. Continue Abx, titrate pressors.   2. Soft tissue infection Left LE  Continue broad Abx. Wound care   3. Acute kidney injury  4. Resolved hyperkalemia   5. Resolving metabolic acidosis   6. Improvement of metabolic encephalopathy   7. Diarrhea  8. Large ventral hernia with open skin wounds  9. Hx take down colostomy   10. Chronic pain syndrome  11. Osteoarthrosis   12. Wheel chair bounded  13. Anemia  14. Obesity Body mass index is 42.16 kg/m .    Advance Directives:  Full code    Plan:   1. Titrate FiO2 to keep SpO2 > 90%, currently on RA  2. IV fluids bicarb drip  3. Pressors to keep MAP > 65  4. Follow up blood cultures   5. C diff in stool pending   6. Continue broad Abx zosyn and vancomycin IV and oral vancomycin   7. Monitor kidney function   8. Supplement electrolytes  9. Nephrology service is  following  10. Swallow screen by RN  11. Start full liquid diet and advance as tolerated  12. PPI for GI prophylaxis   13. Glucose level monitoring   14. Resume chronic pain meds at a lower dose ant titrate up as needed  15. DVT prophylaxis heparin subcutaneous  16. Wound care    Please contact me if you have any questions.  Total critical care time, not including separately billable procedure time: 45 minutes  This patient had a high probability of imminent or life threatening deterioration due to acute respiratory failure which required my direct attention, intervention and personal management.     Brown Pope  Pulmonary / Critical Care  04/30/2023  9:59 AM      ICU DAILY CHECKLIST                           Can patient transfer out of MICU? no    FAST HUG:    Feeding:  Feeding: yes Patient is receiving oral  Rodriguez: yes  Analgesia/Sedation:no    Thromboembolic prophylaxis: Yes; Mode:  Heparin  HOB>30:  Yes  Stress Ulcer Protocol Active: Yes; Mode: PPI  Glycemic Control: Any glucose > 180 no; Mode of Insulin Therapy: Sliding Scale Insulin    INTUBATED:  Can patient have daily waking:  No  Can patient have spontaneous breathing trial:  No    Restraints? no    PHYSICAL THERAPY AND MOBILITY:  Can patient have PT and mobility trial: no  Activity: bedrest    Subjective:   HPI:  Carolina Mari is a 68 year old female with history of HTN, bowel obstruction s/p ileal resection and colostomy, s/p takedown, large ventral hernia, skin wound, PUD, hypothyroidism, depression, obesity,  chronic pain syndrome, osteoarthrosis, wheelchair bounded.   Brought to ED on 4/28 from assisted living facility for evaluation of altered mental status, diarrhea, generalized pain, also swelling and pain of left ankle.   Lab showed elevated WBC, BUN/Cr, hyperkalemia, metabolic acidosis, mild lactic acidosis.   Abdomen pelvis CT scan showed large multi compartment ventral hernia containing small and large bowel, without  obstruction.   Treated for hyperkalemia protocol , started on bicarb drip. Abx for soft tissue infection.   Patient was started on pressors , admitted to ICU.     Events overnight  - Improvement of hyperkalemia, resolving metabolic acidosis  - Improvement of urine output  - Improvement of blood pressure, low dose pressors  - Improvement of mental status  - Complains of ongoing pain both feet    Allergies: Aspirin, Colchicine, and Colchicine     MEDS:  Current Facility-Administered Medications   Medication     acetaminophen (TYLENOL) tablet 650 mg    Or     acetaminophen (TYLENOL) Suppository 650 mg     bisacodyl (DULCOLAX) suppository 10 mg     glucose gel 15-30 g    Or     dextrose 50 % injection 25-50 mL    Or     glucagon injection 1 mg     heparin ANTICOAGULANT injection 5,000 Units     HYDROmorphone (PF) (DILAUDID) injection 0.5 mg     insulin aspart (NovoLOG) injection (RAPID ACTING)     levothyroxine (SYNTHROID/LEVOTHROID) tablet 225 mcg     lidocaine (LMX4) cream     lidocaine (LMX4) cream     lidocaine 1 % 0.1-1 mL     lidocaine 1 % 0.1-5 mL     melatonin tablet 3 mg     naloxone (NARCAN) injection 0.2 mg    Or     naloxone (NARCAN) injection 0.4 mg    Or     naloxone (NARCAN) injection 0.2 mg    Or     naloxone (NARCAN) injection 0.4 mg     norepinephrine (LEVOPHED) 4 mg in  mL infusion PREMIX     ondansetron (ZOFRAN ODT) ODT tab 4 mg    Or     ondansetron (ZOFRAN) injection 4 mg     oxyCODONE (ROXICODONE) tablet 5 mg     pantoprazole (PROTONIX) IV push injection 40 mg     piperacillin-tazobactam (ZOSYN) 3.375 g vial to attach to  mL bag     pregabalin (LYRICA) capsule 25 mg     prochlorperazine (COMPAZINE) injection 5 mg    Or     prochlorperazine (COMPAZINE) tablet 5 mg    Or     prochlorperazine (COMPAZINE) suppository 12.5 mg     senna-docusate (SENOKOT-S/PERICOLACE) 8.6-50 MG per tablet 1 tablet    Or     senna-docusate (SENOKOT-S/PERICOLACE) 8.6-50 MG per tablet 2 tablet     simvastatin  "(ZOCOR) tablet 20 mg     sodium bicarbonate 150 mEq in D5W 1,000 mL infusion     sodium chloride (PF) 0.9% PF flush 10-40 mL     sodium chloride (PF) 0.9% PF flush 3 mL     sodium chloride (PF) 0.9% PF flush 3 mL     vancomycin (VANCOCIN) capsule 250 mg     vancomycin place crooks - receiving intermittent dosing     Objective:   VITALS:  /53   Pulse 92   Temp 97.9  F (36.6  C) (Oral)   Resp 21   Ht 1.753 m (5' 9\")   Wt 129.5 kg (285 lb 8 oz)   SpO2 95%   BMI 42.16 kg/m    VENT:  Resp: 21    EXAM:   Gen: obese, awake, moderate distress due to pain LEs  HEENT: pale conjunctiva, dry mucosa  Neck: no thyromegaly, masses or JVD  Lungs: decrease breath sounds at the bases  CV: regular, no murmurs or gallops appreciated  Abdomen: soft, distended, ventral hernia, open wounds midline , mild tenderness on palpation, BS increase in frequency   Ext: warm, lymphedema, swelling and pain of left ankle, redness of skin  Neuro: CN II-XII intact, decrease strength both LEs, pain on on light touch both LEs      Data Review:  Recent Labs   Lab 04/30/23  0759 04/30/23  0410 04/30/23  0356 04/30/23  0006 04/29/23 2050 04/29/23  1612   * 134* 132* 131* 152* 148*      04/30/23 03:56   Sodium 138   Potassium 3.4   Chloride 102   Carbon Dioxide (CO2) 19 (L)   Urea Nitrogen 77.2 (H)   Creatinine 3.02 (H)   GFR Estimate 16 (L)   Calcium 6.9 (L)   Anion Gap 17 (H)   Albumin 1.9 (L)   Protein Total 4.0 (L)   Alkaline Phosphatase 113 (H)   ALT 9 (L)   AST 18   Bilirubin Total 0.3      04/30/23 03:56   WBC 18.6 (H)   Hemoglobin 10.3 (L)   Hematocrit 31.5 (L)   Platelet Count 358   RBC Count 3.68 (L)   MCV 86   MCH 28.0   MCHC 32.7   RDW 16.1 (H)     CT ABDOMEN PELVIS W/O CONTRAST  LOCATION: [facility]  DATE/TIME: 4/28/2023 5:18 PM CDT     INDICATION: pain, renal failure, leukocytosis.  r o obstruction, infection source  COMPARISON: 01/04/2022  FINDINGS:   LOWER CHEST: Partially imaged coronary artery " calcifications. Respiratory motion limits evaluation of the lung bases.  HEPATOBILIARY: Cholecystectomy. Liver and bile ducts are unremarkable.  PANCREAS: Normal.  SPLEEN: Normal.  ADRENAL GLANDS: Normal.  KIDNEYS/BLADDER: No significant mass, stones, or hydronephrosis. There are simple or benign cysts. No follow up is needed. Ptotic and partially malrotated right kidney.  BOWEL: Status post gastric bypass. Most of the bowel is within a large ventral hernia, but no evidence of acute inflammation or obstruction.  LYMPH NODES: Normal.  VASCULATURE: Unremarkable.  PELVIC ORGANS: Normal.  MUSCULOSKELETAL: Large ventral hernia, as above. Advanced degenerative changes in the hips. Osteopenia and multilevel degenerative changes in the spine.  IMPRESSION:   1.  No acute findings or other explanation for symptoms.   2.  Large multicompartment ventral hernia containing most of the small and large bowel. No acute inflammation or obstruction.    XR CHEST PORT 1 VIEW  LOCATION: [facility]  DATE/TIME: 4/28/2023 5:27 PM CDT   INDICATION: ams  COMPARISON: 05/20/2021  IMPRESSION: Low lung volumes. Heart size prominent on this portable view, unchanged. Pulmonary vascularity normal. Elevated right hemidiaphragm. Subsegmental atelectasis both bases. Left PICC line tip distal SVC. Numerous surgical clips left upper   quadrant. Lower cervical spinal fusion. Degenerative changes both shoulders.    CT ANKLE LEFT W/O CONTRAST  LOCATION: [facility]  DATE/TIME: 4/29/2023 5:26 PM CDT  INDICATION: Left ankle pain.  COMPARISON: None.  FINDINGS:   I have no left ankle radiographic exam for review.  BONES:  -Anatomic alignment. No acute displaced fracture. Tarsal bones and metatarsal bases are intact. No aggressive bone lesion. Heel spur and tiny enthesophyte at the distal Achilles tendon insertion on the calcaneus. Mild tibiotalar and hindfoot arthrosis. More advanced midfoot osteoarthritis.  SOFT  TISSUES:  -No full-thickness proximally retracted ankle tendon tear. Global intrinsic leg and ankle as well as foot muscle atrophy. No drainable fluid collection. Skin thickening and slight infiltration of the subcutaneous fat in the ankle and dorsal foot. Thickened proximal medial bundle plantar fascia with mineralization. Sinus tarsi fat attenuation preserved. No sizable ankle or hindfoot joint effusion.  IMPRESSION:  1.  No sizable ankle or hindfoot joint effusion. Septic arthritis should be excluded clinically.  2.  No acute displaced left ankle fracture or malalignment.  3.  Moderate midfoot osteoarthritis.  4.  No full-thickness proximally retracted ankle tendon tear.  5.  Global intrinsic leg, ankle and foot muscle atrophy.    By:  Brown Pope MD, 04/30/2023  9:59 AM    Primary Care Physician:  Le Room

## 2023-04-30 NOTE — PROGRESS NOTES
"Care Management Follow Up    Length of Stay (days): 2    Expected Discharge Date: 05/01/2023     Concerns to be Addressed:     Requires ICU level of care due to IV medications. Pain control-adjusting medications. Monitor labs, C Diff pending. IV antibiotics.   Patient plan of care discussed at interdisciplinary rounds: Yes    Anticipated Discharge Disposition:  Goal Return to [facility] with home care services      Anticipated Discharge Services:  To be determined pending progression and recommendations.   Anticipated Discharge DME:  To be determined closer to time of discharge.     Patient/family educated on Medicare website which has current facility and service quality ratings:  Not needed at this time.   Education Provided on the Discharge Plan:  Per Team  Patient/Family in Agreement with the Plan:   Yes    Referrals Placed by CM/SW:  None at this time   Private pay costs discussed: Not applicable    Additional Information:  Chart Reviewed. Per discussion in rounds work on weaning Levo drip. Legs look painful with cellulitis and open sores; now that patient more alert will work on resuming home pain medications.     Discharge plan pending progression and recommendations. Will likely need  [facility] at discharge. Care manager to continue to follow.    History:   Patient from [facility] in [place] where she receives assistance with IADLs, but not a high level assistance with ADLs.Is wheelchair bound at baseline. Open to [facility] prior to admission for RN would cares and PT assistance.     [facility] [phone].  [employer] for \"wipes, pads, briefs and son orders them: [phone].  Jose - provides wheelchair.      Florecita Shipley RN       "

## 2023-04-30 NOTE — PHARMACY-VANCOMYCIN DOSING SERVICE
Pharmacy Vancomycin Note  Date of Service 2023  Patient's  1955   68 year old, female    Indication: Sepsis  Day of Therapy: 3  Current vancomycin regimen:  intermittent  Current vancomycin monitoring method: Trough (Method 2 = manual dose calculation)  Current vancomycin therapeutic monitoring goal: 15-20 mg/L    Current estimated CrCl = Estimated Creatinine Clearance: 25.4 mL/min (A) (based on SCr of 3.02 mg/dL (H)).    Creatinine for last 3 days  2023: 12:53 PM Creatinine 4.92 mg/dL;  5:47 PM Creatinine 4.42 mg/dL  2023: 12:04 AM Creatinine 4.11 mg/dL;  5:27 AM Creatinine 4.03 mg/dL;  2:35 PM Creatinine 3.81 mg/dL  2023:  3:56 AM Creatinine 3.02 mg/dL    Recent Vancomycin Levels (past 3 days)  2023:  3:56 AM Vancomycin 11.5 ug/mL    Vancomycin IV Administrations (past 72 hours)                   vancomycin (VANCOCIN) 2,000 mg in sodium chloride 0.9 % 500 mL intermittent infusion (mg) 2,000 mg New Bag 23 1754                Nephrotoxins and other renal medications (From now, onward)    Start     Dose/Rate Route Frequency Ordered Stop    23 1000  norepinephrine (LEVOPHED) 4 mg in  mL infusion PREMIX         0.01-0.6 mcg/kg/min × 120.2 kg (Dosing Weight)  4.5-270.5 mL/hr  Intravenous CONTINUOUS 23 0947      23 0000  piperacillin-tazobactam (ZOSYN) 3.375 g vial to attach to  mL bag        Note to Pharmacy: Extended infusion dosing to start 6 hours after initial infusion.   See Hyperspace for full Linked Orders Report.    3.375 g  over 240 Minutes Intravenous EVERY 12 HOURS 23 1511      23 2300  vancomycin (VANCOCIN) capsule 250 mg         250 mg Oral 4 TIMES DAILY 23 21023 1514  vancomycin place crooks - receiving intermittent dosing         1 each Intravenous SEE ADMIN INSTRUCTIONS 23 1516               Contrast Orders - past 72 hours (72h ago, onward)    Start     Dose/Rate Route Frequency Stop    23  0900  perflutren lipid microsphere (DEFINITY) injection SUSP 2 mL         2 mL Intravenous ONCE 04/29/23 0841          Interpretation of levels and current regimen:  Obtained random vanco level to assess clearance and determine if patient could switch to AUC monitoring.    Renal Function: Improving    InsightRX Prediction of Planned New Vancomycin Regimen  Regimen: 1500 mg IV every 48 hours.  Start time: 10:29 on 04/30/2023  Exposure target: AUC24 (range)400-600 mg/L.hr   AUC24,ss: 618 mg/L.hr  Probability of AUC24 > 400: 98 %  Ctrough,ss: 21.1 mg/L  Probability of Ctrough,ss > 20: 57 %  Probability of nephrotoxicity (Lodise INDERJIT 2009): 19 %    Plan:  1. Begin scheduled vancomycin dosing at 1500 mg q48h. Predicted AUC is slightly above 600, but with improving renal function, this dosing regimen is reasonable for now.  2. Vancomycin monitoring method: AUC  3. Vancomycin therapeutic monitoring goal: 400-600 mg*h/L  4. Pharmacy will check vancomycin levels as appropriate in 1-3 Days.  5. Serum creatinine levels will be ordered daily for the first week of therapy and at least twice weekly for subsequent weeks.    Florecita Ruth, [facility]

## 2023-04-30 NOTE — PROGRESS NOTES
Patient remains in the ICU on pressor support for septic shock thought due to LLE cellulitis.      Visited patient today - she was in more acute pain in her legs, moaning and crying.     [place] will continue to follow along peripherally until stepdown out of the unit.    Ling Garcia MD  [facility], PGY-2  Contact via [employer] breann or pager #: [phone].

## 2023-04-30 NOTE — PLAN OF CARE
[facility] - ICU    RN Progress Note:    Pt is alert, grossly oriented, and able to make needs known. Broad spectrum IV ABX (Zosyn) administered as scheduled. Blood pressure maintained within ordered parameters with titraion of norepinephrine to MAP. Cardiac rhythm continues to fluctuate between normal sinus, and rate controlled atrial fibrillation. Subcutaneous anticoagulation administered, as scheduled.  VBG showing compensated metabolic acidosis (pH 7.41, CO2 34, Bicarb 22). Sodium bicarbonate infusing at 75 mL/hr, as ordered. Over NOC, blood glucose 131 - 152 mg/dL. Last bowel movement 4/28. Stool sample for C. Diff r/o remains uncollected. P.O. vancomycin administered, as scheduled. IV dilaudid, and P.O. Tylenol administered PRN, per Pt request, s/t left ankle pain. Intermittent cold pack application to left ankle s/t pain/swelling as well. Melatonin administered PRN, at HS, for sleep. Will continue to monitor. Michael Wing RN            Pertinent Assessments:      Please refer to flowsheet rows for full assessment     Vital signs.            Key Events - This Shift:       No overnight events.             Barriers to Discharge / Downgrade:     Hemodynamic instability requiring titratable vasoactive medication (norepinephrine).

## 2023-05-01 NOTE — PROGRESS NOTES
Care Management Follow Up    Length of Stay (days): 3    Expected Discharge Date: 05/02/2023     Concerns to be Addressed:  Remains on pressors  Patient plan of care discussed at interdisciplinary rounds: Yes    Anticipated Discharge Disposition:  Likely back to [facility]     Anticipated Discharge Services:    Wound care and PT  Anticipated Discharge DME:  TBD    Patient/family educated on Medicare website which has current facility and service quality ratings:  NA  Education Provided on the Discharge Plan:  Per tams  Patient/Family in Agreement with the Plan:  Yes    Referrals Placed by CM/SW:  NA  Private pay costs discussed: NA    Additional Information:  Pt is a 68 year old female with an extensive past medical history. She was admitted on 042/23 with generalized body aches and weakness and was found to have leukocytosis, metabolic acidosis, hyperkalemia and an acute kidney injury.    She does not have a health care directive on file and does not have a designated health care agent. Pt primary contact is her son Tim.    Spoke with Pt [facility] Care manager Rosario.  Pt is independent in transferring from her bed to the wheelchair and back to bed. Rosario can help with setting up and home care as needed ( [phone]). Pt was receiving [facility] care through [facility] for wound care and PT.  Requested copy of POLST be sent to the hospital    Also spoke tot he nursing staff at [facility] ([phone]) Per the nursing staff the patient receives assist of 1 in dressing,  And other ADLs including incontinence and transfers as needed, although she does not call for these.  She can receive care up to a johanna lift but the johanna would need to be provided by the patient and the facility states she would also need a hospital bed as they have tried this before with her and it has not gone well with a regular bed.    Kenyetta Gonzalez RN

## 2023-05-01 NOTE — CONSULTS
"[facility] Nurse Inpatient Assessment     Consulted for: Abd, buttocks, BLE    Summary:     Patient History (according to provider note(s):      Carolina Mari is a 68 year old female with morbid obesity, CKD3, DM2, anxiety/depression, chronic pain with opiate dependence who is sent in from assisted living with weakness and body aches.  Son reports confusion, \"delirious\" on Wednesday when he visited.   This AM got a call that she she was hallucinating and was being sent to ER.  Patient reports weakness and abdominal pain.  Unable to give reliable hx due to confusion.  I reached out to RN care manager for more history and left a message.       In the ER: found to have leukocytosis, renal failure and hyperkelamia.  Received 1L saline bolus, calcium gluconate, being started on bicarb infusion.     History is provided by patient, son, and chart    Assessment:      Skin Injury Location: BLE        5/1 R Calf                                                               BLE                                                                L calf                                                                   Last photo: 5/1  Skin injury due to: Venous stasis dermatitis  Skin history and plan of care:   Unable to give full history of wounds due to confusion, these wounds are chronic  Affected area:      Skin assessment: Denudement, Erosion of epidermis and Erythema     Measurements (length x width x depth, in cm)    1. R calf 11  x 8.5  x  0.2 cm    2. L calf 8 x 5 x 0.2 cm     Color: red     Temperature  warm     Drainage: small .      Color: serosanguinous      Odor: mild  Pain: severe, Irritable or crying out at intervals, facial expression of distress and during dressing change, throbbing, shooting and radiating  Pain interventions prior to dressing change: slow and gentle cares   Treatment goal: Drainage control, Decrease moisture, Protection and Promote epidermal " migration  STATUS: initial assessment  Supplies ordered: ordered silvercel and vashe  ___________________________________________________________________________________________________________________________________________________________    Skin Injury Location: abdomen    5/1    Last photo: 5/1  Skin injury due to: scab, previous abdominal surgical wound  Skin history and plan of care:   Hx of abdominal surgery  Affected area:      Skin assessment: Superficial scabbing     Measurements (length x width x depth, in cm) 8  x 2 cm      Color: pink     Temperature  normal      Drainage: none .      Color: none      Odor: none  Pain: absent, none  Pain interventions prior to dressing change: no significant pain present   Treatment goal: Keep dry eschar stable to prevent wet gangrene and Protection  STATUS: initial assessment  Supplies ordered: supplies stored on unit  _____________________________________________________________________________________________________________________________________________    Skin Injury Location: buttocks    5/1    Last photo: 5/1  Skin injury due to: Chronic skin discoloration, Chronic skin injury (often related to prolonged recliner use) and Incontinence associated dermatitis (IAD)  Skin history and plan of care:   Patient is chair bound, chronic incontinence and skin discoloration  Affected area:      Skin assessment: Denudement and chronic discoloration     Measurements (length x width x depth, in cm) diffuse area, scant openings along gluteal cleft measuring 1 x 0.2 x 0.1 cm from MASD     Color: pink and purple     Temperature  normal      Drainage: scant .      Color: serous      Odor: none  Pain: Irritable or crying out at intervals and with movement, aching  Pain interventions prior to dressing change: slow and gentle cares   Treatment goal: Protection  STATUS: initial assessment  Supplies ordered: supplies stored on unit           Treatment Plan:     BLE  1. Soak wounds  with vashe moistened gauze for 5 minutes  2. Apply ammonium lactate lotion from toes to knees around wounds  3. Place pieces of silvercel over weeping calf wounds, cover with abd pads  4. Secure with gauze roll from toes to knees  5. Bedside nurse to perform wound care twice weekly before Lymphedema wraps    Abdomen  1. Paint wounds with betadine daily, allow to dry    Buttocks  1. Cleanse with bath wipes, pat dry  2. Apply barrier ointment (blue top in storage room) BID at minimum + PRN after cleansing ryan area  3. Do not use briefs in bed, instead use blue/white absorbable underpads    Orders: Written    RECOMMEND PRIMARY TEAM ORDER: Lymphedema consult  Education provided: importance of repositioning, plan of care, wound progress, Moisture management, Hygiene and Off-loading pressure  Discussed plan of care with: Patient and Nurse  WOC nurse follow-up plan: weekly  Notify WOC if wound(s) deteriorate.  Nursing to notify the Provider(s) and re-consult the WOC Nurse if new skin concern.    DATA:     Current support surface: Standard  Low air loss (FABIO pump, Isolibrium, Pulsate, skin guard, etc)  Containment of urine/stool: Incontinence Protocol and Indwelling catheter  BMI: Body mass index is 43.28 kg/m .   Active diet order: Orders Placed This Encounter      Moderate Consistent Carb (60 g CHO per Meal) Diet     Output: I/O last 3 completed shifts:  In: 3475.5 [P.O.:840; I.V.:2635.5]  Out: 2205 [Urine:2205]     Labs: Recent Labs   Lab 05/01/23  0450 04/30/23  0356 04/29/23  0527 04/28/23  2241   ALBUMIN  --  1.9*  --   --    HGB 9.8* 10.3*   < >  --    WBC 15.9* 18.6*   < >  --    A1C  --   --   --  4.9    < > = values in this interval not displayed.     Pressure injury risk assessment:   Sensory Perception: 3-->slightly limited  Moisture: 3-->occasionally moist  Activity: 1-->bedfast  Mobility: 2-->very limited  Nutrition: 2-->probably inadequate  Friction and Shear: 2-->potential problem  Jesus Score:  13    SCOT Nickerson RN CWOCN  Pager no longer in use, please contact through [employer] group: [facility] [employer]

## 2023-05-01 NOTE — PROGRESS NOTES
[facility] ACUTE PAIN SERVICE    ([facility], [facility], [facility])   Consult Note    Date of Admission:  4/28/2023  Date of Consult: 05/01/23  Physician requesting consult: Dr. Prince Swan   Reason for consult: severe chronic pain     Assessment/Plan:     Carolina Mari is a 68 year old female who was admitted on 4/28/2023.  Acute Management Team was asked to see the patient for severe chronic pain.   Admitted for altered mental status NEELIMA, metabolic acidosis, leukocytosis, hyperkalemia with reports of generalized body aches and weakness. Patient also with  Left lower shin/ankle cellulitis, multiple skin ulcers on ventral abdomen, perineum and bilateral legs.  Patient with severe sepsis with septic shouck , in ICU with pressors.History including DM2, Hx stomach ulcers, SBO, BMI 42,  wheelchair-bound, chronic edema, CKD HTN, OA of right knew, right hip, osteoporosis. . Describes pain as foot and leg pain, both legs, left greater than right, sharp at times, shooting pains, rating at 10/10.. In the last 24 hours, patient has utilized 4 doses of oxycodone 5 mg, and 4 doses of Dilaudid IV 0.5 mg, for total MME of about 70 mg.  RN and Intensivist noted low blood pressure after IV Dilaudid, has been discontinued. I discussed the need to be careful with opioids with her now with her renal function, and previous confusion, hallucinations, sedation, but she reported she does not remember any of these.Home MME was about 90 mg, taking 6 oxycodone 10 mg doses per day.   The patient does not smoke and denies chemical dependency history.     Discussed plan with Rebecca Parker, CNS, DNP, Acute Pain Management Team     PLAN:   1) Pain is consistent with chronic severe pain.  The patient's home MME was 90 mg daily. .   2)Multimodal Medication Therapy  Topical: none, several skin ulcers  NSAID'S: none, NEELIMA, CrCl 26 ml/min.  Muscle Relaxants:none  Adjuvants: Tylenol 650 mg q 4 h prn, Lyrica 25 mg po  bid  Antidepressants/anxiolytics: Cymbalta 40 mg daily  Opioids: increase oxycodone from 5 to 5-7.5 mg q 6 h prn  IV Pain medication: discontinue IV Dilaudid  3)Non-medication interventions  Pharmacy consult- appreciate recommendations   Acupuncture consult- would like, put in order    Integrative consult - called referral to [phone]   4)Constipation Prophylaxis  Senna/docusate 1-2 po bid   5) Follow up   -Opioid prescriber has been Dr. Lukasz Kwok, [facility] from Assisted Living  -Discharge Recommendations - We recommend prescribing the following at the time of discharge: To be determined.     MN   pulled from system on  05/01/23  Last refill on 04/04/23. This indicated  opioid use.   Most recent MN  entries:  04/17/23 pregabalin 25 mg #60  04/04/23 oxycodone 10 mg #180  03/22/23 pregabalin 25 mg #60  03/09/23 oxycodone 10 mg #180  Consistent Rx for oxycodone and pregabalin      History of Present Illness (HPI):       Carolina Mari is a 68 year old old female admitted for mental status change, NEELIMA, sepsis.   The pain is reported to be chronic leg pain.  Currrent pain is rated at 10/10, bilateral leg pain, left worse than right, has wounds more on left. Per MN  review, the patient has an opioid tolerance. Pain Team had followed her in 2016, and was on higher doses of opioids.      Past pain treatments have included pain clinic-no.      Medical History  Patient Active Problem List    Diagnosis Date Noted     Hyperkalemia 04/28/2023     Priority: Medium     Metabolic acidosis 04/28/2023     Priority: Medium     Leukocytosis, unspecified type 04/28/2023     Priority: Medium     Anemia in other chronic diseases classified elsewhere 03/17/2022     Priority: Medium     First degree atrioventricular block 01/17/2022     Priority: Medium     Shoulder joint pain 01/17/2022     Priority: Medium     Undifferentiated inflammatory arthritis (H) 01/07/2022     Priority: Medium     Diarrhea of  presumed infectious origin 01/04/2022     Priority: Medium     Dehydration 01/04/2022     Priority: Medium     General weakness 01/04/2022     Priority: Medium     Acute pain of right shoulder 01/04/2022     Priority: Medium     Abdominal pain, generalized 08/03/2021     Priority: Medium     Non-intractable vomiting with nausea, unspecified vomiting type 08/03/2021     Priority: Medium     Morbid obesity (H) 05/26/2021     Priority: Medium     Chronic ulcer of skin (H) 05/23/2021     Priority: Medium     Diabetic renal disease (H) 05/23/2021     Priority: Medium     Edema 05/23/2021     Priority: Medium     History of cholecystectomy 05/23/2021     Priority: Medium     Hyperlipidemia 05/23/2021     Priority: Medium     Iron deficiency anemia 05/23/2021     Priority: Medium     Nondependent opioid abuse (H) 05/23/2021     Priority: Medium     Vitamin B deficiency 05/23/2021     Priority: Medium     Debility 05/23/2021     Priority: Medium     Encounter for screening laboratory testing for severe acute respiratory syndrome coronavirus 2 (SARS-CoV-2) 05/21/2021     Priority: Medium     Acute kidney failure, unspecified (H) 05/21/2021     Priority: Medium     Depression 01/26/2021     Priority: Medium     Chronic kidney disease, stage 3 unspecified (H) 01/14/2021     Priority: Medium     Hypothyroidism 01/06/2021     Priority: Medium     Generalized anxiety disorder 01/06/2021     Priority: Medium     Gastro-esophageal reflux disease without esophagitis 01/06/2021     Priority: Medium     Hypertension, benign essential, goal below 140/90 01/06/2021     Priority: Medium     Nicotine dependence, unspecified, uncomplicated 01/06/2021     Priority: Medium     Recurrent major depression (H) 01/06/2021     Priority: Medium     Primary osteoarthritis 01/06/2021     Priority: Medium     Cellulitis of buttock 01/05/2021     Priority: Medium     Pressure injury of right buttock, stage 2 (H) 01/05/2021     Priority: Medium     NEELIMA  (acute kidney injury) (H) 12/23/2016     Priority: Medium     Acute gangrenous cholecystitis 12/23/2016     Priority: Medium     Hyponatremia 12/23/2016     Priority: Medium     Hypomagnesemia 12/23/2016     Priority: Medium     Hyperbilirubinemia 12/23/2016     Priority: Medium     Leukocytosis 12/23/2016     Priority: Medium     Adult failure to thrive syndrome 08/04/2015     Priority: Medium     UTI (urinary tract infection) 08/04/2015     Priority: Medium     Unspecified open wound of abdominal wall, unspecified quadrant without penetration into peritoneal cavity, subsequent encounter 07/01/2015     Priority: Medium     Diabetes mellitus (H) 07/01/2015     Priority: Medium     Tobacco user 07/01/2015     Priority: Medium     Obesity 06/05/2015     Priority: Medium     Chronic, continuous use of opioids 06/05/2015     Priority: Medium     Tinea corporis 06/05/2015     Priority: Medium     Weak 06/05/2015     Priority: Medium     Hip pain, bilateral 06/04/2015     Priority: Medium     Chronic pain 05/07/2015     Priority: Medium     Small bowel obstruction (H) 05/06/2015     Priority: Medium     Unilateral primary osteoarthritis, right hip 04/22/2015     Priority: Medium        Surgical History  She  has a past surgical history that includes tka; gastric bypass; Foot surgery; SACROPLASTY; joint replacement; hernia repair; Amputate toe(s) (Bilateral); REMOVAL GALLBLADDER (N/A, 12/25/2016); and PICC/Midline Placement (4/28/2023).     Past Surgical History:   Procedure Laterality Date     AMPUTATE TOE(S) Bilateral     3rd toe on both feet amputated.      FOOT SURGERY       GASTRIC BYPASS       HC REMOVAL GALLBLADDER N/A 12/25/2016    Procedure: CHOLECYSTECTOMY, OPEN;  Surgeon: Everardo Cisneros MD;  Location: [facility] OR;  Service: General     HERNIA REPAIR      Had 4 surgeries total     JOINT REPLACEMENT       PICC TRIPLE LUMEN PLACEMENT  4/28/2023          VA SPINE SURGERY PROCEDURE UNLISTED       tka       right       Allergies  Allergies   Allergen Reactions     Aspirin Other (See Comments)     History of ulcers       Colchicine Angioedema and Swelling     And gout flare ups     Colchicine Angioedema and Swelling     And gout flare ups       Prior to Admission Medications   Medications Prior to Admission   Medication Sig Dispense Refill Last Dose     acetaminophen (TYLENOL) 500 MG tablet Take 1,000 mg by mouth 3 times daily    4/28/2023 at am     alum & mag hydroxide-simethicone (MAALOX) 200-200-20 MG/5ML SUSP suspension Take 30 mLs by mouth every 6 hours as needed for indigestion Moderate heartburn or indigestion   Unknown at PRN     Ascorbic Acid (VITAMIN C) 500 MG CAPS Take 500 mg by mouth daily   4/28/2023 at am     bacitracin 500 UNIT/GM OINT Apply topically 2 times daily as needed for wound care Minor or superficial abrasions, lacerations, or sores   Unknown at PRN     calcium carbonate (TUMS) 500 MG chewable tablet Take 1-2 chew tab by mouth every 6 hours as needed for heartburn Mild heartburn or indigestion   Unknown at PRN     carbamide peroxide (DEBROX) 6.5 % otic solution Place 5 drops into both ears daily as needed for other (earwax buildup)   Unknown at PRN     cetirizine (ZYRTEC) 10 MG tablet Take 10 mg by mouth 2 times daily   4/28/2023 at am     cholestyramine light (PREVALITE) 4 GM powder Take 4 g by mouth 2 times daily diarrhea   4/28/2023 at am     diclofenac (VOLTAREN) 1 % topical gel Apply 2 g topically 4 times daily To affected area   4/28/2023 at am     dimethicone 1 % external cream Apply topically 2 times daily To periarea/buttocks   4/28/2023 at am     docusate sodium (COLACE) 100 MG capsule Take 100 mg by mouth 2 times daily as needed for constipation Mild constipation (no BM in 1-2 days)   Unknown at PRN     DULoxetine (CYMBALTA) 20 MG capsule Take 40 mg by mouth daily   4/28/2023 at am     famotidine (PEPCID) 20 MG tablet Take 20 mg by mouth daily   4/28/2023 at am     ferrous sulfate  (FEROSUL) 325 (65 Fe) MG tablet Take 325 mg by mouth daily (with breakfast)   4/28/2023 at am     furosemide (LASIX) 40 MG tablet Take 40 mg by mouth daily   4/28/2023 at am     glycerin (ADULT) 2 g suppository Place 1 suppository rectally daily as needed for constipation Moderate constipation (no BM in 3-5 days)   Unknown at PRN     guaiFENesin (ROBITUSSIN) 20 mg/mL liquid Take 5-10 mLs by mouth every 4 hours as needed for cough 5 ml mild cough  10ml moderate/severe cough   Unknown at PRN     hydrocortisone (CORTAID) 1 % external cream Apply topically 2 times daily as needed for itching   Unknown at PRN     hydrocortisone, Perianal, (ANUSOL-HC) 2.5 % cream Place rectally 2 times daily as needed for hemorrhoids   Unknown at PRN     levothyroxine (SYNTHROID/LEVOTHROID) 200 MCG tablet Take 225 mcg by mouth daily   4/28/2023 at am     lidocaine patch in PLACE Place 1 patch onto the skin every morning Remove at HS   4/28/2023 at am     loperamide (IMODIUM) 2 MG capsule Take 2 mg by mouth 4 times daily as needed for diarrhea   Unknown at PRN     loperamide (IMODIUM) 2 MG capsule Take 1 capsule (2 mg) by mouth 4 times daily as needed for diarrhea (Patient taking differently: Take 2 mg by mouth 2 times daily Hold if constipation)   4/28/2023 at am     losartan (COZAAR) 100 MG tablet Take 100 mg by mouth daily Hold if SBP less than 100   4/28/2023 at am     magnesium hydroxide (MOM) 2400 MG/10ML SUSP Take 30 mLs by mouth daily as needed for constipation For MILD constipation (no BM in 2-3 days)   Unknown at PRN     metoprolol tartrate (LOPRESSOR) 100 MG tablet Take 50 mg by mouth 2 times daily Hold for SBP < 100 or HR < 60   4/28/2023     miconazole (MICATIN) 2 % AERP powder Apply topically 2 times daily as needed for other (to abdominal/groin folds)   Unknown at PRN     mineral oil-hydrophilic petrolatum (AQUAPHOR) external ointment Apply topically as needed for dry skin   Unknown at PRN     NARCAN 4 MG/0.1ML nasal spray  CALL 911. SPR CONTENTS OF ONE SPRAYER (0.1ML) INTO ONE NOSTRIL. REPEAT IN 2-3 MIN IF SYMPTOMS OF OPIOID EMERGENCY PERSIST, ALTERNATE NOSTRILS   Unknown at PRN     nystatin (MYCOSTATIN) 949018 UNIT/GM external powder Apply topically 2 times daily as needed for other (skin irritation)   Unknown at PRN     oxyCODONE IR (ROXICODONE) 10 MG tablet Take 5 mg by mouth every 4 hours Takes at Midnight, 0400, 0800, 1200, 1600, 2000   4/28/2023 at 0800     polyethylene glycol-propylene glycol (SYSTANE ULTRA) 0.4-0.3 % SOLN ophthalmic solution Place 1 drop into both eyes 4 times daily as needed for dry eyes   Unknown at PRN     pregabalin (LYRICA) 25 MG capsule Take 25 mg by mouth 2 times daily   4/28/2023 at am     simvastatin (ZOCOR) 20 MG tablet Take 20 mg by mouth At Bedtime    4/27/2023 at pm     sodium phosphate (FLEET ENEMA) 7-19 GM/118ML rectal enema Place 1 enema rectally daily as needed for constipation Severe constipation (no BM in 5 or more days)   Unknown at PRN     witch hazel-glycerin (TUCKS) pad Apply topically as needed for hemorrhoids   Unknown at PRN       Social History  Reviewed, and she  reports that she has never smoked. She has never used smokeless tobacco. She reports that she does not drink alcohol and does not use drugs.  Social History     Tobacco Use     Smoking status: Never     Smokeless tobacco: Never   Vaping Use     Vaping status: Not on file   Substance Use Topics     Alcohol use: No         Marleen Pardo [facility]  Acute Care Pain Management Program  [facility] (ANNE MARIE, Johnny, Kari)   With questions call [phone]  Preference if for [employer] Paging - [place]  Click HERE to page Rebecca

## 2023-05-01 NOTE — PROGRESS NOTES
CRITICAL CARE PROGRESS NOTE:    Assessment/Plan:  68 year old female never smoker with a history of SBO s/p ileal resection and colostomy with subsequent takedown, large ventral hernia with most of small and large bowel herniated, obesity, adult failure to thrive, wheelchair-bound, chronic edema, chronic diarrhea, multiple skin ulcerations (anterior abdomen, perineum, bilateral legs), questionable DM (not on meds, A1c always <6), PUD/GERD, HTN, osteoporosis, hypothyroidism, chronic anemia, CKD, cholecystectomy, presented to ED on 4/28 with acute encephalopathy, acute on chronic kidney injury, hyperkalemia, peripheral neutrophilia, and shock, admitted to ICU, found to have left lower shin/ankle cellulitis.     RESP:  No acute issues. CXR without focal infiltrate. Chronic leg edema on furosemide but no noted h/o CHF. On room air.    Monitor airway with fluid resuscitation and encephalopathy    Currently protecting airway, no acute indication for intubation    DNR/DNI after discussion with patient on 5/1     CV:  Shock.  Likely combination of hypovolemia from chronic diarrhea and loop diuretic use in addition to severe sepsis with septic shock. Lactate 2.1, down to 1.0 with resuscitation. Also with high opiate use at home, likely some degree of opiate-induced vasodilation. TTE with EF 60-65%, evidence of diastolic dysfunction.    Received 2 liters crystalloid and bicarb drip initially, now D5NS at 75 mL/hr    norepi to keep MAP >65, attempt to wean off today    Hold home furosemide, consider reassessing dosing given chronic diarrhea, opiates, poor PO intake, normal EF    Lactate normalized    TTE     NEURO:  Acute encephalopathy.  Chronic pain.  Multifactorial septic-metabolic encephalopathy with sepsis, NEELIMA, hypovolemia, electrolyte derangements. CT head without acute findings. More alert, severe 10/10 pain chronically and acutely, all over especially left lower leg ulcer/cellulitis. Takes oxycodone 10 mg 6 times  daily at assisted living, also on duloxetine and pregabalin. States that pain is 10/10 at home despite the oxycodone. Failure to thrive, immobile with essentially bedbound status at baseline.    Correct reversible septic-metabolic factors    Pain service consult appreciated    Stopped IV hydromorphone, increased the oxycodone a bit but still less than baseline    Restart duloxetine and pregabalin    Poor quality of life with severe pain and bedbound status at baseline; could consider palliative care consultation, possible hospice enrollment     GI:  Chronic diarrhea.  H/o SBO, ileal resection, colostomy (taken down), large ventral hernia.  C diff PCR negative.    Correct losses with fluid resuscitation    Takes loperamide at home, can restart if needed    ADAT     RENAL:  Acute on chronic kidney injury.  Hyperkalemia.  Metabolic acidosis.  Lactic acidosis.  Combination of hypovolemia with low intake from encephalopathy, diuresis with furosemide PTA, chronic diarrhea; ATN due to sepsis with septic shock; and ARB. Furosemide and ARB held. 2000 ml crystalloid, bicarb drip. Calcium, dextrose/insulin, sodium zirconium cyclosilicate given. K 6.6 on admission, now normalized. VBG with persistent metabolic acidosis initially, corrected.    Appreciate nephrology consultation    Continue gently hydration    Monitor BUN/Cr, UOP, weight, I/O    Lactate normalized    Hold furosemide and losartan for now     ID:  Severe sepsis with septic shock.  Likely left lower extremity cellulitis.  Failure to thrive, immobility, multiple skin ulcers (ventral abdomen, perineum, bilateral legs).  No documented fever but marked peripheral neutrophilia, shock. Thrombocytosis may also be reactive to sepsis. Left shin, ankle and foot asymmetrically erythematous (has bilateral stasis dermopathy but worse on left, may be more tender though seems to have pain with any movement/exam). Baseline failure to thrive, immobility, wheelchair-bound. Chronic  "diarrhea. Multiple ulcers/wounds (ventral abdomen, perineum, bilateral legs). CXR without infiltrates, no reported cough. CT abdomen unrevealing except known herniation of most of bowel through ventral defect without incarceration. UA does not appear overtly infected. Chronic diarrhea, stool C diff PCR negative. CT left ankle without evidence of fracture, osteomyelitis or joint effusion.    Pip-tazo/vanco    Stop PO vanco    Blood cultures NGTD    Appreciate WOC consult     HEMATOLOGIC:  Chronic anemia.  Peripheral neutrophilia.  Thrombocytosis.  ACD. No evidence of active hemorrhage.    Monitor with restrictive transfusion threshold     ENDOCRINE:  Listed DM Dx.  Questionable Dx. Not on meds, A1c always <6. Has not needed sliding scale.    FSBG with sliding scale aspart; has not needed, low glucose    Removed DM from PMHx given lack of confirmed diagnosis     ICU PROPHYLAXIS:    Heparin subcutaneous    PPI (not on home PPI, but continue PPI for GERD/PUD while inpatient; on famotidine and Ca carbonate at home)     Lines/Drains/Tubes:  Central line (LUE PICC) placed on 4/28  Rodriguez catheter placed on 4/28     CODE STATUS, DISPOSITION, FAMILY COMMUNICATION: DNR/DNI; discussed with patient and later confirmed with son, Augustus, by telephone. Critically ill requiring continuous vasopressors. I updated her son, Augustus, by telephone.     Restraints  Not indicated    Prince Swan MD  Pulmonary and Critical Care Medicine  [facility]  [employer]  Office [phone]  Pager [phone]  he/him    Overnight events:  No new events. Almost off norepi.    Subjective:  Complains of constant 10/10 pain all over especially left leg.    Objective:  Physical Exam:  Vent settings for last 24 hours:  Resp: 18      BP 91/51   Pulse 93   Temp 97.9  F (36.6  C)   Resp 18   Ht 1.753 m (5' 9\")   Wt 132.9 kg (293 lb 1.6 oz)   SpO2 94%   BMI 43.28 kg/m      Intake/Output last 3 shifts:  I/O last 3 completed shifts:  In: 3475.5 " [P.O.:840; I.V.:2635.5]  Out: 2205 [Urine:2205]  Intake/Output this shift:  No intake/output data recorded.    Physical Exam  Gen: alert, oriented, in pain, will drift off after IV hydromorphone  HEENT: no OP lesions, no ALEKS  CV: tachy, regular, no m/g/r  Resp: CTA anterolaterally  Abd: soft, large ventral hernia, BS+, nonspecific diffuse tenderness without guarding  Skin: multiple skin wounds/ulcer, including ventral abdomen, perineum near labia/urethra/perianal, bilateral legs with prominent ulcer on left lateral lower leg, left shin/ankle/foot more erythematous and swollen than right, does have some stasis dermopathy right shin as well, trace woody edema  Ext: trace woody on right, a bit more edema left ankle/foot  Neuro: PERRL, nonfocal exam, c/o severe pain but somnolent after dilaudid    LAB:  Recent Labs   Lab 05/01/23  0450   WBC 15.9*   HGB 9.8*   HCT 30.5*        Recent Labs   Lab 04/30/23  0356 04/29/23  1435 04/29/23  0527 04/28/23  1747 04/28/23  1253    137 134*   < > 130*   CO2 19* 17* 13*   < > 11*   BUN 77.2* 87.2* 86.1*   < > 84.6*   ALKPHOS 113*  --   --   --  216*   ALT 9*  --   --   --  12   AST 18  --   --   --  31    < > = values in this interval not displayed.       No current outpatient medications on file.       Total Critical Care Time: 1 Hour

## 2023-05-01 NOTE — DISCHARGE INSTRUCTIONS
WOC DISCHARGE INSTRUCTIONS:  BLE  1. Soak wounds with vashe moistened gauze for 5 minutes  2. Apply ammonium lactate lotion from toes to knees around wounds  3. Place pieces of silvercel over weeping calf wounds  4. Secure with gauze roll from toes to knees  5. Bedside nurse to perform wound care twice weekly before Lymphedema wraps     Abdomen  1. Paint wounds with betadine daily, allow to dry     Buttocks  1. Cleanse with bath wipes, pat dry  2. Apply Calmoseptine BID at minimum + PRN after cleansing ryan area  3. Do not use briefs in bed, instead use blue/white absorbable underpads    Recommend Follow Up for wound care at  [facility]  [address], [address]  [place] [address]  T: [phone]     Surgeon or Hospitalist Following:  Prince Swan    Care Manager:  If surgeon is following wound, please coordinate with Hospitalist to ask surgeon if willing to sign off and allow clinic providers to follow.  Send referral with current WOC orders

## 2023-05-01 NOTE — PROGRESS NOTES
05/01/23 0976   Appointment Info   Signing Clinician's Name / Credentials (PT) Addis Means DPT   Living Environment   People in Home facility resident   Current Living Arrangements assisted living   Home Accessibility wheelchair accessible   Self-Care   Usual Activity Tolerance fair   Current Activity Tolerance poor   Equipment Currently Used at Home wheelchair, manual   Activity/Exercise/Self-Care Comment per CM, pt does self transfer at baseline. She is able to get assist from staff, however, she does not call for assist.   General Information   Onset of Illness/Injury or Date of Surgery 04/28/23   Referring Physician Ankita Delaney, RACHEL CNP   Patient/Family Therapy Goals Statement (PT) none stated   Pertinent History of Current Problem (include personal factors and/or comorbidities that impact the POC) history of HTN, bowel obstruction s/p ileal resection and colostomy, s/p takedown, large ventral hernia, skin wound, PUD, hypothyroidism, depression, obesity,  chronic pain syndrome, osteoarthrosis, wheelchair bounded.   Brought to ED on 4/28 from assisted living facility for evaluation of altered mental status, diarrhea, generalized pain, also swelling and pain of left ankle.   Existing Precautions/Restrictions fall   Pain Assessment   Patient Currently in Pain Yes, see Vital Sign flowsheet  (with minimal movement of BLEs)   Range of Motion (ROM)   Range of Motion ROM deficits secondary to pain;ROM deficits secondary to weakness;ROM deficits secondary to swelling   Strength (Manual Muscle Testing)   Strength (Manual Muscle Testing) Deficits observed during functional mobility   Strength Comments significant pain BLEs limiting strength testing, inc difficulty/decreased strength with all mobility   Bed Mobility   Bed Mobility rolling left   Rolling Left [place] (Bed Mobility) dependent (less than 25% patient effort);2 person assist   Impairments Contributing to Impaired Bed Mobility pain    Assistive Device (Bed Mobility) lift device   Comment, (Bed Mobility) needing significant assist for rolling to L with mechanical lift, significant pain limiting pt's mobility   Transfers   Comment, (Transfers) unable d/t pain and BP. BP when PT in room was 76/46.   Clinical Impression   Criteria for Skilled Therapeutic Intervention Yes, treatment indicated   PT Diagnosis (PT) impaired functional mobility, gait abnormality   Influenced by the following impairments decreased strength, decreased ROM, inc pain, inc swelling   Functional limitations due to impairments bed mob, transfers   Clinical Presentation (PT Evaluation Complexity) Evolving/Changing   Clinical Presentation Rationale pt presents as medically diagnosed   Clinical Decision Making (Complexity) moderate complexity   Planned Therapy Interventions (PT) balance training;bed mobility training;transfer training;home exercise program;neuromuscular re-education;patient/family education;strengthening   Anticipated Equipment Needs at Discharge (PT) wheelchair;lift device  (may need johanna for returning to longterm)   Risk & Benefits of therapy have been explained evaluation/treatment results reviewed;patient   PT Total Evaluation Time   PT Eval, Moderate Complexity Minutes (45156) 12   Physical Therapy Goals   PT Frequency 3x/week   PT Predicted Duration/Target Date for Goal Attainment 05/08/23   PT Goals Bed Mobility;Transfers   PT: Bed Mobility Moderate assist;Supine to/from sit;Rolling   PT: Transfers Moderate assist;Sit to/from stand;Bed to/from chair  (2 person)   PT Discharge Planning   PT Plan coordinate with RN for pain meds, trial sitting EOB, A x 2   PT Discharge Recommendation (DC Rec) Long term care facility   PT Rationale for DC Rec likely unable to return to longterm at this time, will likely need higher level of care   PT Brief overview of current status dependent A x 2 rolling to L for repositionoing   Total Session Time   Total Session Time (sum of timed  and untimed services) 12

## 2023-05-01 NOTE — PROGRESS NOTES
[facility] - ICU    RN Progress Note:            Pertinent Assessments:      Please refer to flowsheet rows for full assessment     NS to ST with occasional runs of Afib. No BM during this shift, CDiff results reordered as an enteric panel was sent previously w/o cdiff/ VBG results with improved bicarb, nephro disconitnued Na Bicarb gtt aftert bag infusion is completed. PRN pain management has been ongoing issue. PRN dilaudid, oxycodone, and tylenol given for BLE pain.            Key Events - This Shift:       No acute concerns during this shift. Frquent requests for pain medication by the patient. Levophed uptitrated for low BP.              Barriers to Discharge / Downgrade:     Continued need for levophed gtt.

## 2023-05-01 NOTE — PROGRESS NOTES
RENAL PROGRESS NOTE    CC:  Prince Swan MD    ASSESSMENT & PLAN:   68 year old female brought to the ER from her AL with complaints of malaise and weakness  Found to have ARF and hyperkalemia and acidosis.    1. ARF/CKD; CR 1.6 PTA with eGFR in 70s in 12/2022.; now ARF secondary to hemodynamic injury; continued improvement with IVF; following. Lab pending. Urine output seems improving.   2. Hyperkalemia; resolved; now on lower side. Follow up labs.   3. Acidosis; IVF with bicarb and improved; will finish this bag and change to saline. Follow up lab.   4. MS; altered due to ARF and medical illness and sedating meds with metabolites increased due to ARF; awake today. Seems improving.   5. Polypharmacy  6. Hyperlipid; on statin; normal CPK  7. HTN; typically on meds (metoprolol and ARB and diuretics); holding with low bp and follow need to resume; IVF and albumin and pressors  8. ID; suspect sepsis from ? Cellulitis vs ??; urine not looking awful and blood cultures negative so far; ab CT negative for source; on abx and hemodynamics improving  9.  Hypothyroid; on replacment  10. Anemia; on oral iron; follow hgb  11. Chronic pain; on meds typically; holding for now       SUBJECTIVE:  Patient seen in ICU. Improving vasopressor requirement and urine output improving. Denies any CP, palpitation, nausea.     OBJECTIVE:  Physical Exam   Temp: 97.9  F (36.6  C) Temp src: Oral BP: (!) 75/46 (<drip) Pulse: 93   Resp: 22 SpO2: 93 % O2 Device: None (Room air)    Vitals:    04/28/23 1114 04/30/23 0000 05/01/23 0630   Weight: 120.2 kg (265 lb) 129.5 kg (285 lb 8 oz) 132.9 kg (293 lb 1.6 oz)     Vital Signs with Ranges  Temp:  [97.9  F (36.6  C)-98.3  F (36.8  C)] 97.9  F (36.6  C)  Pulse:  [] 93  Resp:  [17-34] 22  BP: ()/(44-59) 75/46  SpO2:  [92 %-96 %] 93 %  I/O last 3 completed shifts:  In: 3475.5 [P.O.:840; I.V.:2635.5]  Out: 2205 [Urine:2205]    @TMAXR(24)@    Patient Vitals for the past 72 hrs:    Weight   05/01/23 0630 132.9 kg (293 lb 1.6 oz)   04/30/23 0000 129.5 kg (285 lb 8 oz)   04/28/23 1114 120.2 kg (265 lb)       Intake/Output Summary (Last 24 hours) at 5/1/2023 1038  Last data filed at 5/1/2023 0600  Gross per 24 hour   Intake 2667.2 ml   Output 1790 ml   Net 877.2 ml       PHYSICAL EXAM:  General - Alert and oriented x3, appears comfortable, NAD  Cardiovascular - Regular rate and rhythm, no rub  Respiratory - Clear to auscultation bilaterally, no crackles or wheezes  Abd: BS present, no guarding or pain with palpation, no ascites  Extremities - No lower extremity edema bilaterally  Skin: dry, intact, no rash, good turgor  Neuro:  Grossly intact, no focal deficits  MSK:  Grossly intact  Psych:  Normal affect    LABORATORY STUDIES:     Recent Labs   Lab 05/01/23  0450 04/30/23  0356 04/29/23  0527 04/28/23  1252   WBC 15.9* 18.6* 20.8* 24.4*   RBC 3.50* 3.68* 4.04 4.37   HGB 9.8* 10.3* 11.3* 12.0   HCT 30.5* 31.5* 35.6 40.0    358 477* 537*       Basic Metabolic Panel:  Recent Labs   Lab 05/01/23  0817 05/01/23  0454 05/01/23  0025 04/30/23 2009 04/30/23  1602 04/30/23  1144 04/30/23  0410 04/30/23  0356 04/29/23  1612 04/29/23  1435 04/29/23  0738 04/29/23  0527 04/29/23  0021 04/29/23  0004 04/28/23  2123 04/28/23  1831 04/28/23  1747 04/28/23  1253   NA  --   --   --   --   --   --   --  138  --  137  --  134*  --  133*  --   --  126* 130*   POTASSIUM  --   --   --   --   --   --   --  3.4  --  4.0  --  4.6  --  4.4 4.3  --  4.3  4.3 6.6*   CHLORIDE  --   --   --   --   --   --   --  102  --  102  --  103  --  102  --   --  96* 99   CO2  --   --   --   --   --   --   --  19*  --  17*  --  13*  --  13*  --   --  9* 11*   BUN  --   --   --   --   --   --   --  77.2*  --  87.2*  --  86.1*  --  84.6*  --   --  82.7* 84.6*   CR  --   --   --   --   --   --   --  3.02*  --  3.81*  --  4.03*  --  4.11*  --   --  4.42* 4.92*   * 129* 110* 126* 120* 144*   < > 132*   < > 167*   < > 163*   <  > 165*  --    < > 453* 145*   JAYA  --   --   --   --   --   --   --  6.9*  --  7.6*  --  7.7*  --  7.7*  --   --  7.5* 8.5*    < > = values in this interval not displayed.       INRNo lab results found in last 7 days.     Recent Labs   Lab Test 05/01/23  0450 04/30/23  0356   WBC 15.9* 18.6*   HGB 9.8* 10.3*    358       Personally reviewed current labs      Wood May MD  [facility]  [phone]

## 2023-05-01 NOTE — PROGRESS NOTES
CLINICAL NUTRITION SERVICES - ASSESSMENT NOTE     Nutrition Prescription    RECOMMENDATIONS FOR MDs/PROVIDERS TO ORDER:  Consider MVI daily for wound healing needs    Malnutrition Status:    Not noted    Recommendations already ordered by Registered Dietitian (RD):  Glucerna with all meals.    Future/Additional Recommendations:       REASON FOR ASSESSMENT  Carolina Mari is a/an 68 year old female assessed by the dietitian for Team rounds referral, wounds, poor po intake.  Per chart patient has a history of gastric bypass, cholecystectomy, DM, CKD, ventral hernia, ileal resection and colostomy, obesity, wounds.  Patient is wheel chair bound.      NUTRITION HISTORY  Patient lives in assisted living facility, meals are provided.  Patient reports she usually eats well.  This morning patient only drinking water and does not want the eggs sent for breakfast.  Patient takes Centrum but not daily.      CURRENT NUTRITION ORDERS  Diet: Moderate Consistent Carbohydrate  Poor intake per staff.  Dextrose infusion provides 1800 ml fluid, 90 g carb/day.    LABS  Labs:  Electrolytes  Potassium (mmol/L)   Date Value   04/30/2023 3.4   04/29/2023 4.0   04/29/2023 4.6   04/21/2022 5.4 (H)   03/17/2022 4.3   01/13/2022 4.3   05/22/2021 3.9   05/21/2021 3.6   05/20/2021 3.9    Blood Glucose  Glucose (mg/dL)   Date Value   04/21/2022 145 (H)   03/17/2022 87   01/13/2022 77   01/06/2022 86   01/05/2022 112 (H)   05/22/2021 90   05/20/2021 100 (H)   01/29/2021 108   01/11/2021 75   01/06/2021 103 (H)     GLUCOSE BY METER POCT (mg/dL)   Date Value   05/01/2023 136 (H)   05/01/2023 129 (H)   05/01/2023 110 (H)   04/30/2023 126 (H)   04/30/2023 120 (H)     Hemoglobin A1C (%)   Date Value   04/28/2023 4.9   12/28/2022 5.3   01/13/2022 5.2   05/21/2021 5.5   01/06/2021 5.4   12/26/2016 5.0   05/09/2015 5.3    Inflammatory Markers  CRP Inflammation (mg/L)   Date Value   01/05/2021 33.7 (H)   02/15/2016 5.9   10/02/2015 18.0 (H)     WBC    Date Value   05/22/2021 7.4 10e9/L   05/20/2021 12.4 10e9/L (H)   01/11/2021 7.9 thou/uL     WBC Count (10e3/uL)   Date Value   05/01/2023 15.9 (H)   04/30/2023 18.6 (H)   04/29/2023 20.8 (H)     Albumin (g/dL)   Date Value   04/30/2023 1.9 (L)   04/28/2023 3.0 (L)   01/04/2022 2.3 (L)   08/03/2021 3.5   05/27/2021 2.6 (L)   05/22/2021 2.3 (L)   05/20/2021 2.8 (L)   01/11/2021 1.8 (A)      Magnesium (mg/dL)   Date Value   10/07/2021 1.7 (L)   05/21/2021 1.6     Sodium (mmol/L)   Date Value   04/30/2023 138   04/29/2023 137   04/29/2023 134 (L)   05/22/2021 143   05/20/2021 134   01/29/2021 139    Renal  Urea Nitrogen (mg/dL)   Date Value   04/30/2023 77.2 (H)   04/29/2023 87.2 (H)   04/29/2023 86.1 (H)   04/21/2022 10   03/17/2022 12   01/13/2022 13   05/22/2021 10   05/20/2021 20   01/29/2021 12     Creatinine (mg/dL)   Date Value   04/30/2023 3.02 (H)   04/29/2023 3.81 (H)   04/29/2023 4.03 (H)   05/22/2021 0.92   05/21/2021 1.04   05/20/2021 1.31 (H)     Additional  Triglycerides (mg/dL)   Date Value   10/07/2021 108   11/04/2020 99   02/21/2020 84     Ketones Urine (mg/dL)   Date Value   04/28/2023 Negative   05/20/2021 Negative        Labs reviewed    MEDICATIONS    ammonium lactate   Topical Q Mon Thurs AM     DULoxetine  40 mg Oral Daily     heparin ANTICOAGULANT  5,000 Units Subcutaneous Q12H     insulin aspart  1-6 Units Subcutaneous Q4H     levothyroxine  225 mcg Oral Daily     pantoprazole  40 mg Oral QAM AC    Or     pantoprazole  40 mg Intravenous QAM AC     piperacillin-tazobactam  3.375 g Intravenous Q8H     pregabalin  25 mg Oral BID     senna-docusate  1 tablet Oral BID    Or     senna-docusate  2 tablet Oral BID     simvastatin  20 mg Oral At Bedtime     sodium chloride (PF)  3 mL Intracatheter Q8H     vancomycin  1,500 mg Intravenous Q48H        dextrose 5% and 0.9% NaCl 75 mL/hr at 05/01/23 0017     norepinephrine 0.01 mcg/kg/min (05/01/23 1010)      acetaminophen **OR** acetaminophen,  "bisacodyl, glucose **OR** dextrose **OR** glucagon, lidocaine 4%, lidocaine 4%, lidocaine (buffered or not buffered), lidocaine (buffered or not buffered), melatonin, naloxone **OR** naloxone **OR** naloxone **OR** naloxone, ondansetron **OR** ondansetron, oxyCODONE, prochlorperazine **OR** prochlorperazine **OR** prochlorperazine, sodium chloride (PF), sodium chloride (PF)   Medications reviewed    ANTHROPOMETRICS  Height: 175.3 cm (5' 9\"[per prior records[)  Most Recent Weight: 132.9 kg (293 lb 1.6 oz)    BMI: Obesity Grade III BMI >40  Weight History:   Wt Readings from Last 10 Encounters:   05/01/23 132.9 kg (293 lb 1.6 oz)   03/31/22 118.9 kg (262 lb 1.6 oz)   03/23/22 118.9 kg (262 lb 1.6 oz)   03/14/22 117.9 kg (260 lb)   03/07/22 117 kg (258 lb)   02/28/22 119.8 kg (264 lb 3.2 oz)   02/21/22 119.9 kg (264 lb 4.8 oz)   02/14/22 120.7 kg (266 lb)   02/07/22 120.7 kg (266 lb)   01/31/22 121.5 kg (267 lb 12.8 oz)   270 lb 4/4/23, 273 lbs 8/24/23.    No wt loss noted in past year.    Dosing Weight: 82.7 kg, adjusted wt    ASSESSED NUTRITION NEEDS  Estimated Energy Needs: 1900+ kcals/day (23+ kcals/kg)  Justification: Obese and Wound healing  Estimated Protein Needs: 82-99 grams protein/day (1 - 1.2 grams of pro/kg)  Justification: CKD and Wound healing  Estimated Fluid Needs: 1900+ mL/day (1 mL/kcal)   Justification: Maintenance    PHYSICAL FINDINGS  See malnutrition section below.  jeevan LE edema  Wounds:  Buttocks, abdomen, thigh    MALNUTRITION:  % Weight Loss:  None noted  % Intake:  Decreased intake does not meet criteria for malnutrition, 2 days decreased intake.  Pt reports eating well at home.  Subcutaneous Fat Loss:  None observed  Muscle Loss:  None observed  Fluid Retention:  Moderate, jeevan LE per chart    Malnutrition Diagnosis: Patient does not meet two of the above criteria necessary for diagnosing malnutrition      NUTRITION DIAGNOSIS  Increased nutrient needs related to wound healing as evidenced by " wounds      INTERVENTIONS  Implementation  Nutrition Education: we discussed protein foods for healing to include with meals.   Medical food supplement therapy -Start Glucerna with meals.  Consider MVI daily for wound healing needs.    Goals  Wound healing  BG < 180  Patient to consume % of nutritionally adequate meals three times per day, or the equivalent with supplements/snacks.     Monitoring/Evaluation  Progress toward goals will be monitored and evaluated per protocol.

## 2023-05-02 NOTE — PROGRESS NOTES
CRITICAL CARE PROGRESS NOTE:    Assessment/Plan:  68 year old female never smoker with a history of SBO s/p ileal resection and colostomy with subsequent takedown, large ventral hernia with most of small and large bowel herniated, obesity, adult failure to thrive, wheelchair-bound, chronic edema, chronic diarrhea, multiple skin ulcerations (anterior abdomen, perineum, bilateral legs), questionable DM (not on meds, A1c always <6), PUD/GERD, HTN, osteoporosis, hypothyroidism, chronic anemia, CKD, cholecystectomy, presented to ED on 4/28 with acute encephalopathy, acute on chronic kidney injury, hyperkalemia, peripheral neutrophilia, and shock, admitted to ICU, found to have left lower shin/ankle cellulitis.     RESP:  No acute issues. CXR without focal infiltrate. Chronic leg edema on furosemide but no noted h/o CHF. On room air.    Monitor    DNR/DNI     CV:  Shock.  Likely combination of hypovolemia from chronic diarrhea and loop diuretic use in addition to severe sepsis with septic shock. Lactate 2.1, down to 1.0 with resuscitation. Also with high opiate use at home, likely some degree of opiate-induced vasodilation. TTE with EF 60-65%, evidence of diastolic dysfunction.    Received 2 liters crystalloid and bicarb drip initially, now D5NS at 75 mL/hr, encourage PO intake    Off pressor    Hold home furosemide, consider reassessing dosing given chronic diarrhea, opiates, poor PO intake, normal EF    Lactate normalized     NEURO:  Acute encephalopathy.  Chronic pain.  Multifactorial septic-metabolic encephalopathy with sepsis, NEELIMA, hypovolemia, electrolyte derangements. CT head without acute findings. More alert, severe 10/10 pain chronically and acutely, all over especially left lower leg ulcer/cellulitis. Takes oxycodone 10 mg 6 times daily at assisted living, also on duloxetine and pregabalin. States that pain is 10/10 at home despite the oxycodone. Failure to thrive, immobile with essentially bedbound status at  baseline.    Correct reversible septic-metabolic factors    Pain service consult appreciated    Stopped IV hydromorphone, increased the oxycodone close to baseline to avoid withdrawal    Restarted duloxetine and pregabalin    Poor quality of life with severe pain and bedbound status at baseline; will consult palliative care     GI:  Chronic diarrhea.  H/o SBO, ileal resection, colostomy (taken down), large ventral hernia.  C diff PCR negative.    Correct losses with fluid resuscitation    Restart cholestyramine    Takes loperamide at home, can restart if needed    ADAT     RENAL:  Acute on chronic kidney injury.  Hyperkalemia.  Metabolic acidosis.  Lactic acidosis.  Combination of hypovolemia with low intake from encephalopathy, diuresis with furosemide PTA, chronic diarrhea; ATN due to sepsis with septic shock; and ARB. Furosemide and ARB held. 2000 ml crystalloid, bicarb drip. Calcium, dextrose/insulin, sodium zirconium cyclosilicate given. K 6.6 on admission, now normalized. VBG with persistent metabolic acidosis initially, corrected.    Appreciate nephrology consultation    Continue gentle hydration    Monitor BUN/Cr, UOP, weight, I/O    Lactate normalized    Hold furosemide and losartan for now     ID:  Severe sepsis with septic shock.  Likely left lower extremity cellulitis.  Failure to thrive, immobility, multiple skin ulcers (ventral abdomen, perineum, bilateral legs).  No documented fever but marked peripheral neutrophilia, shock. Thrombocytosis may also be reactive to sepsis. Left shin, ankle and foot asymmetrically erythematous (has bilateral stasis dermopathy but worse on left, may be more tender though seems to have pain with any movement/exam). Baseline failure to thrive, immobility, wheelchair-bound. Chronic diarrhea. Multiple ulcers/wounds (ventral abdomen, perineum, bilateral legs). CXR without infiltrates, no reported cough. CT abdomen unrevealing except known herniation of most of bowel through  "ventral defect without incarceration. UA does not appear overtly infected. Chronic diarrhea, stool C diff PCR negative. CT left ankle without evidence of fracture, osteomyelitis or joint effusion.    Pip-tazo/vanco ongoing, but with negative cultures and NEELIMA will switch to ampicillin-sulbatam for cellulitis    Blood cultures NGTD    Appreciate WOC consult     HEMATOLOGIC:  Chronic anemia.  Peripheral neutrophilia.  Thrombocytosis.  ACD. No evidence of active hemorrhage.    Monitor with restrictive transfusion threshold     ENDOCRINE:  Listed DM Dx.  Questionable Dx. Not on meds, A1c always <6. Has not needed sliding scale.    FSBG with sliding scale aspart; has not needed, low glucose    Removed DM from PMHx given lack of confirmed diagnosis     ICU PROPHYLAXIS:    Heparin subcutaneous    PPI (not on home PPI, but continue PPI for GERD/PUD while inpatient; on famotidine and Ca carbonate at home)     Lines/Drains/Tubes:  Central line (LUE PICC) placed on 4/28  Rodriguez catheter placed on 4/28     CODE STATUS, DISPOSITION, FAMILY COMMUNICATION: DNR/DNI; discussed with patient and later confirmed with son, Augustus, by telephone. Clinically stable to transfer out of ICU. Placed hospitalist consult with my phone number to provide verbal handoff. Critical care service will sign off but please call if needed. Updated the patient directly, no family present.     Restraints  Not indicated    Prince Swan MD  Pulmonary and Critical Care Medicine  [facility]  Office [phone]  Pager [phone]  he/him    Overnight events:  No new events. Ongoing pain and discomfort, chronic.    Subjective:  Complains of constant 10/10 pain all over especially left leg, unaffected by opiates.    Objective:  Physical Exam:  Vent settings for last 24 hours:  Resp: 16      /56   Pulse 96   Temp 98.6  F (37  C) (Axillary)   Resp 16   Ht 1.753 m (5' 9\")   Wt 134.5 kg (296 lb 9.6 oz)   SpO2 93%   BMI 43.80 kg/m  "     Intake/Output last 3 shifts:  I/O last 3 completed shifts:  In: 3228.32 [P.O.:1200; I.V.:2028.32]  Out: 1895 [Urine:1895]  Intake/Output this shift:  I/O this shift:  In: 150 [P.O.:150]  Out: -     Physical Exam  Gen: alert, oriented, no distress  HEENT: no OP lesions, no ALEKS  CV: tachy, regular, no m/g/r  Resp: CTA anterolaterally  Abd: soft, large ventral hernia, BS+, nonspecific diffuse tenderness without guarding  Skin: multiple skin wounds/ulcer, including ventral abdomen, perineum near labia/urethra/perianal, bilateral legs with prominent ulcer on left lateral lower leg, left shin/ankle/foot more erythematous and swollen than right, does have some stasis dermopathy right shin as well, trace woody edema  Ext: trace woody on right, a bit more edema left ankle/foot  Neuro: PERRL, nonfocal exam, c/o severe pain but somnolent after dilaudid    LAB:  Recent Labs   Lab 05/02/23  0335   WBC 13.2*   HGB 9.7*   HCT 31.5*        Recent Labs   Lab 05/02/23  0335 05/01/23  1314 04/30/23  0356 04/28/23  1747 04/28/23  1253    138 138   < > 130*   CO2 26 27 19*   < > 11*   BUN 60.5* 68.9* 77.2*   < > 84.6*   ALKPHOS  --   --  113*  --  216*   ALT  --   --  9*  --  12   AST  --   --  18  --  31    < > = values in this interval not displayed.       No current outpatient medications on file.       Care Time: 45 minutes

## 2023-05-02 NOTE — PROGRESS NOTES
RENAL PROGRESS NOTE    CC:  Prince Swan MD    ASSESSMENT & PLAN:   68 year old female brought to the ER from her AL with complaints of malaise and weakness  Found to have ARF and hyperkalemia and acidosis.    1. ARF/CKD; CR 1.6 PTA with eGFR in 70s in 12/2022.; now ARF secondary to hemodynamic injury; continued improvement with IVF; following. Lab pending. Urine output seems improving. 2.1L yesterday and 645 ml as of 10 am. Cr trending down. Ok to continue IVF for now and titrate with PO intake.   2. Hyperkalemia; resolved; now on lower side. Follow up labs. Ok to replace by intensivist.   3. Acidosis; Resolved. Bicarb 26 today.    4. MS; altered due to ARF and medical illness and sedating meds with metabolites increased due to ARF; awake today. Seems improving.   5. Polypharmacy  6. Hyperlipid; on statin; normal CPK  7. HTN; typically on meds (metoprolol and ARB and diuretics); holding with low bp and follow need to resume; IVF and albumin and pressors  8. ID; suspect sepsis from ? Cellulitis vs ??; urine not looking awful and blood cultures negative so far; ab CT negative for source; on abx and hemodynamics improving  9.  Hypothyroid; on replacment  10. Anemia; on oral iron; follow hgb  11. Chronic pain; on meds typically; holding for now       SUBJECTIVE:  Patient seen in ICU. Off vasopressor. Good urine output.  Denies any CP, palpitation, nausea.     OBJECTIVE:  Physical Exam   Temp: 98.6  F (37  C) Temp src: Axillary BP: 102/56 Pulse: 92   Resp: 21 SpO2: 94 % O2 Device: None (Room air)    Vitals:    04/30/23 0000 05/01/23 0630 05/02/23 0346   Weight: 129.5 kg (285 lb 8 oz) 132.9 kg (293 lb 1.6 oz) 134.5 kg (296 lb 9.6 oz)     Vital Signs with Ranges  Temp:  [98  F (36.7  C)-99.1  F (37.3  C)] 98.6  F (37  C)  Pulse:  [] 92  Resp:  [17-38] 21  BP: ()/(46-63) 102/56  SpO2:  [84 %-98 %] 94 %  I/O last 3 completed shifts:  In: 3228.32 [P.O.:1200; I.V.:2028.32]  Out: 1895  [Urine:1895]    @TMAXR(24)@    Patient Vitals for the past 72 hrs:   Weight   05/02/23 0346 134.5 kg (296 lb 9.6 oz)   05/01/23 0630 132.9 kg (293 lb 1.6 oz)   04/30/23 0000 129.5 kg (285 lb 8 oz)       Intake/Output Summary (Last 24 hours) at 5/1/2023 1038  Last data filed at 5/1/2023 0600  Gross per 24 hour   Intake 2667.2 ml   Output 1790 ml   Net 877.2 ml       PHYSICAL EXAM:  General - Alert and oriented x3, appears comfortable, NAD  Cardiovascular - Regular rate and rhythm, no rub  Respiratory - Clear to auscultation bilaterally, no crackles or wheezes  Abd: BS present, no guarding or pain with palpation, no ascites  Extremities - No lower extremity edema bilaterally  Skin: dry, intact, no rash, good turgor  Neuro:  Grossly intact, no focal deficits  MSK:  Grossly intact  Psych:  Normal affect    LABORATORY STUDIES:     Recent Labs   Lab 05/02/23  0335 05/01/23  0450 04/30/23  0356 04/29/23  0527 04/28/23  1252   WBC 13.2* 15.9* 18.6* 20.8* 24.4*   RBC 3.50* 3.50* 3.68* 4.04 4.37   HGB 9.7* 9.8* 10.3* 11.3* 12.0   HCT 31.5* 30.5* 31.5* 35.6 40.0    309 358 477* 537*       Basic Metabolic Panel:  Recent Labs   Lab 05/02/23  0748 05/02/23  0341 05/02/23  0335 05/02/23  0016 05/01/23 2010 05/01/23  1616 05/01/23  1314 04/30/23  0410 04/30/23  0356 04/29/23  1612 04/29/23  1435 04/29/23  0738 04/29/23  0527 04/29/23  0021 04/29/23  0004   NA  --   --  138  --   --   --  138  --  138  --  137  --  134*  --  133*   POTASSIUM  --   --  3.0*  --   --   --  3.2*  --  3.4  --  4.0  --  4.6  --  4.4   CHLORIDE  --   --  99  --   --   --  99  --  102  --  102  --  103  --  102   CO2  --   --  26  --   --   --  27  --  19*  --  17*  --  13*  --  13*   BUN  --   --  60.5*  --   --   --  68.9*  --  77.2*  --  87.2*  --  86.1*  --  84.6*   CR  --   --  2.23*  --   --   --  2.58*  --  3.02*  --  3.81*  --  4.03*  --  4.11*   * 117* 124* 104* 105* 135* 126*   < > 132*   < > 167*   < > 163*   < > 165*   JAYA  --    --  7.4*  --   --   --  7.7*  --  6.9*  --  7.6*  --  7.7*  --  7.7*    < > = values in this interval not displayed.       INRNo lab results found in last 7 days.     Recent Labs   Lab Test 05/02/23  0335 05/01/23  0450   WBC 13.2* 15.9*   HGB 9.7* 9.8*    309       Personally reviewed current labs      Wood May MD  [facility]  [phone]

## 2023-05-02 NOTE — PLAN OF CARE
Occupational Therapy: Orders received. Chart reviewed and discussed with care team.? Occupational Therapy not indicated due to Pt seen by PT and is not ready for OT services.  Will defer OT until pt is able to participate in skilled ADLs.  .? Defer discharge recommendations to PT.? Will complete orders.

## 2023-05-02 NOTE — PLAN OF CARE
[facility] - ICU    RN Progress Note: Continues to complain of constant pain, though she was able to sleep well throughout the night.             Pertinent Assessments:      Please refer to flowsheet rows for full assessment     Complains of pain in back and legs, does not like to be moved or touched.           Key Events - This Shift:       Levophed off.         MARIANELA SAT (Sedation Awakening Trial): For use ONLY if intubated    SAT Safety Screen Not Applicable   If FAILED why?    SAT Performed Not Applicable   If FAILED why?               Barriers to Discharge / Downgrade:     Levophed currently off, no limiting factors to downgrading.          Point of Contact Update YES-OR-NO: Yes  Name:Qasim Pappas  Summary of Conversation: Plan of care.      Goal Outcome Evaluation:    Pain control, blood pressure stability.

## 2023-05-02 NOTE — CONSULTS
[facility]  Palliative Care Consultation Note    Patient: Carolina Mari  Date of Admission:  4/28/2023    Requesting Clinician / Team: Dr. Swan  Reason for consult: Goals of care    Code status: No CPR- Do NOT Intubate    Impression & Recommendations:  SYMPTOM ASSESSMENT  1. Acute, metabolic and septic encephalopathy due to sepsis, opiates, electrolyte imbalance -improved  - IV Zosyn and vancomycin  - Reduction of medications contributing    2.  Generalized weakness, multifactorial  -Recommend PT and OT for range of motion exercises and to evaluate patient's ability to rehab.  Patient appears to be more bedbound and immobile at baseline.    ADVANCED CARE PLANNING  - Code status: DNR/I  - Surrogate Decision Maker(s): son, Tim Munoz  - No POLST or HCD on file.  We will continue discussion regarding completion of these documents if patient is interested.    GOALS OF CARE DISCUSSION  - Goals are life-prolonging with limits-DNR/I.  -Discussions will be ongoing.  Patient not feeling up to extended visit and discussion today.  -Plan to revisit with patient tomorrow and reach out to son with her permission.  -Appreciate spiritual care visit and support.  -Will ask about having psychosocial support from palliative care LICSW when available.    Discussed with Sarah Krause RN.      Thank you for the opportunity to participate in the care of this patient and family. Our team: will continue to follow.     During regular M-F work hours -- if you are not sure who specifically to contact -- please contact us by calling us directly at the Palliative Care Main Line [phone]    After regular work hours and on weekends/holidays, you can leave a message at [phone]    History of Present Illness:  History gathered today from: patient, medical chart, medical team members, unit team members  Carolina Mari is a 68 year old female admitted to [facility] on 4/28/2023 from assisted living  facility with complaints of increased lethargy. Upon arrival patient found to have NEELIMA, ARF, hyperkalemia with peaked T waves on EKG, hypotension, metabolic acidosis, leukocytosis and lower extremity cellulitis. Started on IV vancomycin and Zosyn and Levophed drip and transferred to ICU.  PMH of chronic pain syndrome and chronic opiate use, SBO, s/p ileal resection with colostomy and subsequent takedown, chronic abdominal wound along midline incision, morbid obesity, Bed bound at baseline, GERD, HTN, HLD, hypothyroidism, depression, s/p gastric bypass, generalized anxiety, and CKD stage III.  Large ventral hernia without obstruction or inflammation seen on CT.    Today, the patient was seen for:  Introduction to palliative care specialty, initiation of goals of care discussion, patient support    Prognosis, Goals, & Planning:      Functional Status just prior to hospitalization: 4 (Completely disabled and dependent on others for selfcare; bedbound)      Prognosis, Goals, and/or Advance Care Planning were addressed today: Yes        Summary/Comments: Met with patient today and reviewed Palliative Care is specialized medical care for people with serious illness, focused on providing patients with relief from symptoms, pain and stresses of serious illness.  The goal is to improve quality of life for both the patient.  Patient understands that she is critically ill and is here for infection and sepsis.  She eloquently goes through her medical history including innumerable abdominal surgeries.  Articulates wishes for restorative treatments short of intubation and CPR.  She identifies her son, Tim, as her primary decision maker.  Admits to not having healthcare documents and may be interested in completing these during this hospitalization.  Then informed writer that she was hungry and tired and requested time to rest.  Discussions will be ongoing.        Patient's decision making preferences: independently           Patient has decision-making capacity today for complex decisions: Yes            I have concerns about the patient/family's health literacy today: No           Patient has a completed Health Care Directive: No.       Code status: No CPR / No Intubation    ROS:  Comprehensive ROS is reviewed and is negative except as here & per HPI.  Pain: Complains of bilateral lower extremity pain with left greater than right.  Feels pain is better controlled today.  5/10.  Dyspnea: Denies  Anxiety: Denies  Nausea: Denies  Weakness/Fatigue: Severe.  Bedbound at baseline.  Nonambulatory.  Constipation: Denies.  Having loose stools.  Patient is on opioids: assessed and bowels ok/no needed changes to plan of care today.     Past Medical History:  Past Medical History:   Diagnosis Date     History of stomach ulcers      HTN (hypertension)      Knee osteoarthritis     right     Osteoarthritis of right hip      Osteoporosis      Osteoporosis      Thyroid disease         Past Surgical History:  Past Surgical History:   Procedure Laterality Date     AMPUTATE TOE(S) Bilateral     3rd toe on both feet amputated.      FOOT SURGERY       GASTRIC BYPASS       HC REMOVAL GALLBLADDER N/A 12/25/2016    Procedure: CHOLECYSTECTOMY, OPEN;  Surgeon: Everardo Cisneros MD;  Location: [facility] OR;  Service: General     HERNIA REPAIR      Had 4 surgeries total     JOINT REPLACEMENT       PICC TRIPLE LUMEN PLACEMENT  4/28/2023          [place] SPINE SURGERY PROCEDURE UNLISTED       tka      right         Family History:  Family History   Problem Relation Age of Onset     Coronary Artery Disease Father      Coronary Artery Disease Brother      Cancer Mother         bone     Cancer Brother         leukemia     Breast Cancer Mother      Cancer Brother 20.00        Leukemia     Coronary Artery Disease Brother         Social:     Living situation: Lives in assisted living facility.  Likely requires long-term care placement.    Key family / caregivers: Son,  Tim.  Patient also has an older son, Kenan.  Tim is more involved in her care.     Allergies:  Allergies   Allergen Reactions     Aspirin Other (See Comments)     History of ulcers       Colchicine Angioedema and Swelling     And gout flare ups     Colchicine Angioedema and Swelling     And gout flare ups        Medications:  I have reviewed this patient's medication profile and medications from this hospitalization.     Lab Results: personally reviewed.     Lab Results   Component Value Date    WBC 13.2 05/02/2023    WBC 7.4 05/22/2021    HGB 9.7 05/02/2023    HGB 9.2 05/22/2021    HCT 31.5 05/02/2023    HCT 30.6 05/22/2021    MCV 90 05/02/2023    MCV 90 05/22/2021     05/02/2023     05/22/2021     AST   Date Value Ref Range Status   04/30/2023 18 10 - 35 U/L Final   05/22/2021 12 0 - 45 U/L Final     ALT   Date Value Ref Range Status   04/30/2023 9 (L) 10 - 35 U/L Final   05/22/2021 10 0 - 50 U/L Final     Alkaline Phosphatase   Date Value Ref Range Status   04/30/2023 113 (H) 35 - 104 U/L Final   05/22/2021 82 40 - 150 U/L Final     Albumin   Date Value Ref Range Status   04/30/2023 1.9 (L) 3.5 - 5.2 g/dL Final   01/04/2022 2.3 (L) 3.4 - 5.0 g/dL Final   05/22/2021 2.3 (L) 3.4 - 5.0 g/dL Final       RADIOLOGY:  CT Ankle Left w/o Contrast    Result Date: 4/29/2023  EXAM: CT ANKLE LEFT W/O CONTRAST LOCATION: [facility] DATE/TIME: 4/29/2023 5:26 PM CDT INDICATION: Left ankle pain. COMPARISON: None. TECHNIQUE: Noncontrast. Axial, sagittal and coronal thin-section reconstruction. Dose reduction techniques were used. FINDINGS: I have no left ankle radiographic exam for review. BONES: -Anatomic alignment. No acute displaced fracture. Tarsal bones and metatarsal bases are intact. No aggressive bone lesion. Heel spur and tiny enthesophyte at the distal Achilles tendon insertion on the calcaneus. Mild tibiotalar and hindfoot arthrosis. More advanced midfoot osteoarthritis.  SOFT TISSUES: -No full-thickness proximally retracted ankle tendon tear. Global intrinsic leg and ankle as well as foot muscle atrophy. No drainable fluid collection. Skin thickening and slight infiltration of the subcutaneous fat in the ankle and dorsal foot. Thickened proximal medial bundle plantar fascia with mineralization. Sinus tarsi fat attenuation preserved. No sizable ankle or hindfoot joint effusion.     IMPRESSION: 1.  No sizable ankle or hindfoot joint effusion. Septic arthritis should be excluded clinically. 2.  No acute displaced left ankle fracture or malalignment. 3.  Moderate midfoot osteoarthritis. 4.  No full-thickness proximally retracted ankle tendon tear. 5.  Global intrinsic leg, ankle and foot muscle atrophy.     Echocardiogram Complete    Result Date: 2023  [id number]^BRITANY^ZULEIMA^[place] [address]  Name: AL WILLARD MRN: [id number] : 1955 Study Date: 2023 08:11 AM Age: 68 yrs Gender: Female Patient Location: [facility] Reason For Study: Shock Ordering Physician: ZULEIMA GARG Performed By: MB  BSA: 2.3 m2 Height: 69 in Weight: 265 lb HR: 73 BP: 107/55 mmHg ______________________________________________________________________________ Procedure Complete Echo Adult. Definity (NDC #60569-745) given intravenously. ______________________________________________________________________________ Interpretation Summary  The left ventricle is normal in size. The visual ejection fraction is 60-65%. Diastolic Doppler findings (E/E' ratio and/or other parameters) suggest left ventricular filling pressures are increased. No regional wall motion abnormalities noted. The right ventricle is not well visualized. TAPSE is normal, which is consistent with normal right ventricular systolic function. The left atrium is mild to moderately dilated. In one view the AV appears to open well. Peak velocity of  4 m/sec is suspected to be in the LVOT. The waveform is also more c/w/ dynamic outflow tract obstruction. No MICHELLE noted. Consider RAMSEY if clinically warranted. Inferior vena cava not well visualized for estimation of right atrial pressure. Sinus rhythm was noted. Technically difficult, suboptimal study. There is no comparison study available. ______________________________________________________________________________ Left Ventricle The left ventricle is normal in size. There is normal left ventricular wall thickness. Diastolic Doppler findings (E/E' ratio and/or other parameters) suggest left ventricular filling pressures are increased. The visual ejection fraction is 60-65%. No regional wall motion abnormalities noted.  Right Ventricle The right ventricle is not well visualized. TAPSE is normal, which is consistent with normal right ventricular systolic function.  Atria The left atrium is mild to moderately dilated. Right atrium not well visualized.  Mitral Valve There is moderate mitral annular calcification. There is mild (1+) mitral regurgitation. There is mild to moderate mitral stenosis.  Tricuspid Valve The tricuspid valve is not well visualized. There is mild (1+) tricuspid regurgitation.  Aortic Valve The aortic valve is not well visualized. No aortic regurgitation is present. In one view the AV appears to open well. Peak velocity of 4 m/sec is suspected to be in the LVOT. The waveform is also more c/w/ dynamic outflow tract obstruction. No MICHELLE noted. Consider RAMSEY if clinically warranted.  Pulmonic Valve The pulmonic valve is not well visualized. There is no pulmonic valvular stenosis.  Vessels The aorta root is normal. The ascending aorta is Borderline dilated. Inferior vena cava not well visualized for estimation of right atrial pressure.  Pericardium There is no pericardial effusion.  Rhythm Sinus rhythm was noted. ______________________________________________________________________________ MMode/2D  Measurements & Calculations  IVSd: 1.0 cm LVIDd: 4.0 cm LVIDs: 2.7 cm LVPWd: 0.96 cm FS: 32.5 % LV mass(C)d: 126.2 grams LV mass(C)dI: 54.2 grams/m2 Ao root diam: 3.0 cm asc Aorta Diam: 3.8 cm LVOT diam: 2.1 cm LVOT area: 3.5 cm2 LA Volume Indexed (AL/bp): 37.5 ml/m2 RWT: 0.48 TAPSE: 2.0 cm  Time Measurements MM HR: 73.0 BPM  Doppler Measurements & Calculations MV E max arun: 128.0 cm/sec MV A max arun: 111.0 cm/sec MV E/A: 1.2 MV max P.0 mmHg MV mean P.0 mmHg MV V2 VTI: 42.7 cm MVA(VTI): 2.5 cm2 MV P1/2t max arun: 164.0 cm/sec MV P1/2t: 99.1 msec MVA(P1/2t): 2.2 cm2 MV dec slope: 484.5 cm/sec2 MV dec time: 0.24 sec Ao V2 max: 442.0 cm/sec Ao max P.0 mmHg Ao V2 mean: 250.0 cm/sec Ao mean P.0 mmHg Ao V2 VTI: 66.4 cm SERENE(I,D): 1.6 cm2 SERENE(V,D): 1.3 cm2 LV V1 max PG: 10.6 mmHg LV V1 max: 163.0 cm/sec LV V1 VTI: 30.9 cm SV(LVOT): 107.0 ml SI(LVOT): 46.0 ml/m2 PA acc time: 0.09 sec AV Arun Ratio (DI): 0.37 SERENE Index (cm2/m2): 0.69 E/E': 22.2 E/E' av.8  Lateral E/e': 22.2 Medial E/e': 21.4 Peak E' Arun: 5.8 cm/sec RV S Arun: 20.1 cm/sec  ______________________________________________________________________________ Report approved by: Marlon Moreno 2023 11:10 AM       CT Head w/o Contrast    Result Date: 2023  EXAM: CT HEAD W/O CONTRAST LOCATION: [facility] DATE/TIME: 2023 5:17 PM CDT INDICATION: Altered mental status, confusion. COMPARISON: None. TECHNIQUE: Routine CT Head without IV contrast. Multiplanar reformats. Dose reduction techniques were used. FINDINGS: INTRACRANIAL CONTENTS: No intracranial hemorrhage, extraaxial collection, or mass effect.  No CT evidence of acute infarct. Mild presumed chronic small vessel ischemic changes. Mild generalized volume loss. No hydrocephalus. VISUALIZED ORBITS/SINUSES/MASTOIDS: No intraorbital abnormality. No paranasal sinus mucosal disease. No middle ear or mastoid effusion. BONES/SOFT TISSUES: No acute  abnormality.     IMPRESSION: 1.  No CT evidence for acute intracranial process. 2.  Mild chronic microvascular ischemic changes as above.    XR Chest Port 1 View    Result Date: 4/28/2023  EXAM: XR CHEST PORT 1 VIEW LOCATION: [facility] DATE/TIME: 4/28/2023 5:27 PM CDT INDICATION: ams COMPARISON: 05/20/2021     IMPRESSION: Low lung volumes. Heart size prominent on this portable view, unchanged. Pulmonary vascularity normal. Elevated right hemidiaphragm. Subsegmental atelectasis both bases. Left PICC line tip distal SVC. Numerous surgical clips left upper quadrant. Lower cervical spinal fusion. Degenerative changes both shoulders.    CT Abdomen Pelvis w/o Contrast    Result Date: 4/28/2023  EXAM: CT ABDOMEN PELVIS W/O CONTRAST LOCATION: [facility] DATE/TIME: 4/28/2023 5:18 PM CDT INDICATION: pain, renal failure, leukocytosis.  r o obstruction, infection source COMPARISON: 01/04/2022 TECHNIQUE: CT scan of the abdomen and pelvis was performed without IV contrast. Multiplanar reformats were obtained. Dose reduction techniques were used. CONTRAST: None. FINDINGS: LOWER CHEST: Partially imaged coronary artery calcifications. Respiratory motion limits evaluation of the lung bases. HEPATOBILIARY: Cholecystectomy. Liver and bile ducts are unremarkable. PANCREAS: Normal. SPLEEN: Normal. ADRENAL GLANDS: Normal. KIDNEYS/BLADDER: No significant mass, stones, or hydronephrosis. There are simple or benign cysts. No follow up is needed. Ptotic and partially malrotated right kidney. BOWEL: Status post gastric bypass. Most of the bowel is within a large ventral hernia, but no evidence of acute inflammation or obstruction. LYMPH NODES: Normal. VASCULATURE: Unremarkable. PELVIC ORGANS: Normal. MUSCULOSKELETAL: Large ventral hernia, as above. Advanced degenerative changes in the hips. Osteopenia and multilevel degenerative changes in the spine.     IMPRESSION: 1.  No acute findings or  other explanation for symptoms. 2.  Large multicompartment ventral hernia containing most of the small and large bowel. No acute inflammation or obstruction.         Physical Exam:  Temp:  [98  F (36.7  C)-99.1  F (37.3  C)] 98.6  F (37  C)  Pulse:  [] 92  Resp:  [17-38] 21  BP: ()/(46-63) 102/56  SpO2:  [84 %-98 %] 94 %  Wt Readings from Last 3 Encounters:   05/02/23 134.5 kg (296 lb 9.6 oz)   03/31/22 118.9 kg (262 lb 1.6 oz)   03/23/22 118.9 kg (262 lb 1.6 oz)      General appearance: alert, appears stated age, cooperative, fatigued and morbidly obese  Head: Normocephalic, without obvious abnormality, atraumatic  Eyes: Sclera anicteric.  Pupils round.  Conjunctiva clear  Nose: no discharge  Lungs: Clear to auscultate at.  Nonlabored  Heart: Regular rate and rhythm  Abdomen: Obese.  Positive bowel sounds x4.  Abdominal wound noted along old midline scar present on admission.  Extremities: Bilateral lower extremities wrapped with dressings and Ace wrap's.  Skin: Multiple skin ulcerations noted: Mid abdomen along old incision, perineum, bilateral lower extremities  Neurologic: Alert.  Oriented x4    TTS: I have personally spent a total of 60 minutes today on unit in review of medical record, consultation with the medical providers and assessment of patient today with time spent coordinating of care and discussing goals of care with patient and family (reviewing diagnostic results, prognosis, symptom management, risks and benefits of management options) and development of plan of care as noted above.    RACHEL Diego, [facility] Palliative Medicine Service: [facility]  Department voice mail (checked M-F 8a-4pm approx): [phone]  Securely message with the Vocera Web Console (learn more here) or  Text page via [employer] Paging/Directory

## 2023-05-02 NOTE — PLAN OF CARE
Goal Outcome Evaluation:       report called to P4 RN. Pt to move to 406 / bed with personal items and aid and RN. Son to be called with new room number.

## 2023-05-02 NOTE — CONSULTS
"[facility] ACUTE PAIN SERVICE    ([facility], [place], [facility], [facility])  Consult     Assessment/Plan:  Carolina Mari is a 68 year old female who was admitted on 4/28/2023.  I was asked to see the patient for severe chronic pain. Admitted for altered mental status, NEELIMA, metabolic acidosis, leukocytosis, hyperkalemia, generalized body aches, failure to thrive and found to have cellulitis. History of SBO, ileal resection with colostomy and then takedown, obesity, wheelchair-bound, chronic edema, chronic pain, DM questionable, GERD, HTN, hypothyroidism, CKD.  Per chart review patient was a daily drinker back in 2021.   shows chronic opioid use and typically fills 180 tablets of 10 mg oxycodone per month..  Unable to describe pain due to encephalopathy..  In the last 24 hours she has utilized 40 mg of oxycodone and 0.5 mg of IV Dilaudid.    PLAN:  Acute pain secondary to cellulitis and wounds, in the setting of chronic pain and chronic opioid tolerance.  As encephalopathy improves can titrate pain medications.  Would continue oxycodone as well as duloxetine and pregabalin.  I agree that palliative care consultation may be helpful given severe pain, bedbound status, and very poor quality of life.  Multimodal Medication Therapy:     Adjuvants: Would avoid NSAIDs given NEELIMA, continue Tylenol, Lyrica 25 twice daily, Cymbalta    Opioids: Yesterday oxycodone was increased to 7.5 mg every 6 as needed which is still lower than home dose.  No need for IV Dilaudid    Non-medication interventions- Ice     Constipation Prophylaxis-on senna twice daily    Follow up /Discharge Recommendations - We recommend prescribing the following at the time of discharge: Resume chronic pain plan if able- with reductions           Subjective:    Patient states, \"I am tired\".  I asked her how her pain is and she inappropriately responds, \"I am tired\".  Her response to almost every question is, \"I am tired\".    Discussed " "with bedside nurse who notes patient's mood has been extremely labile.     Per chart review: \"She has chronic inflammatory arthritis which does cause notable joint information and is even visible at times. However, she will utilize PRNs consistently and declines any trial of reduction because she believes she needs it. It is clear that over time her pain and mental health issues have caused dependence. Recommending PCP consider reduction, counseling, and pain contract.\"    TCU notes indicate concern for ongoing mental health problems which has been unaddressed chronically.    Leukocytosis, unspecified type   Patient Active Problem List   Diagnosis     Unilateral primary osteoarthritis, right hip     Unspecified open wound of abdominal wall, unspecified quadrant without penetration into peritoneal cavity, subsequent encounter     Obesity     Diabetes mellitus (H)     Tobacco user     Cellulitis of buttock     Pressure injury of right buttock, stage 2 (H)     Chronic kidney disease, stage 3 unspecified (H)     Depression     Adult failure to thrive syndrome     Hypothyroidism     NEELIMA (acute kidney injury) (H)     Encounter for screening laboratory testing for severe acute respiratory syndrome coronavirus 2 (SARS-CoV-2)     Acute gangrenous cholecystitis     Generalized anxiety disorder     Chronic pain     Chronic ulcer of skin (H)     Chronic, continuous use of opioids     Diabetic renal disease (H)     Edema     Gastro-esophageal reflux disease without esophagitis     Hip pain, bilateral     History of cholecystectomy     Hyponatremia     Hypomagnesemia     Hypertension, benign essential, goal below 140/90     Hyperlipidemia     Hyperbilirubinemia     Iron deficiency anemia     Leukocytosis     Nicotine dependence, unspecified, uncomplicated     Nondependent opioid abuse (H)     Recurrent major depression (H)     Small bowel obstruction (H)     Tinea corporis     Primary osteoarthritis     UTI (urinary tract " "infection)     Vitamin B deficiency     Debility     Weak     Morbid obesity (H)     Abdominal pain, generalized     Non-intractable vomiting with nausea, unspecified vomiting type     Diarrhea of presumed infectious origin     Dehydration     General weakness     Acute pain of right shoulder     Acute kidney failure, unspecified (H)     Undifferentiated inflammatory arthritis (H)     First degree atrioventricular block     Shoulder joint pain     Anemia in other chronic diseases classified elsewhere     Hyperkalemia     Metabolic acidosis     Leukocytosis, unspecified type        History   Drug Use No         Tobacco Use      Smoking status: Never      Smokeless tobacco: Never    Vaping Use      Vaping status: None          acetaminophen  650 mg Oral TID     ammonium lactate   Topical Q Mon Thurs AM     DULoxetine  40 mg Oral Daily     heparin ANTICOAGULANT  5,000 Units Subcutaneous Q12H     insulin aspart  1-6 Units Subcutaneous Q4H     levothyroxine  225 mcg Oral Daily     pantoprazole  40 mg Oral QAM AC    Or     pantoprazole  40 mg Intravenous QAM AC     piperacillin-tazobactam  3.375 g Intravenous Q8H     pregabalin  25 mg Oral BID     senna-docusate  1 tablet Oral BID    Or     senna-docusate  2 tablet Oral BID     simvastatin  20 mg Oral At Bedtime     sodium chloride (PF)  3 mL Intracatheter Q8H     vancomycin  1,500 mg Intravenous Q48H       Objective:  Vital signs in last 24 hours:  B/P: 98/52, T: 98.6, P: 95, R: 18   Blood pressure 98/52, pulse 95, temperature 98.6  F (37  C), temperature source Axillary, resp. rate 18, height 1.753 m (5' 9\"), weight 134.5 kg (296 lb 9.6 oz), SpO2 95 %, not currently breastfeeding.      Weight:   Wt Readings from Last 2 Encounters:   05/02/23 134.5 kg (296 lb 9.6 oz)   03/31/22 118.9 kg (262 lb 1.6 oz)           Intake/Output:    Intake/Output Summary (Last 24 hours) at 5/2/2023 0641  Last data filed at 5/2/2023 0400  Gross per 24 hour   Intake 3038.32 ml   Output 1620 " ml   Net 1418.32 ml        Review of Systems:   As per subjective, all others negative.    Physical Exam:     General Appearance:  Alert, cooperative, no distress, appears stated age   Head:  Normocephalic, without obvious abnormality, atraumatic   Eyes:  PERRL, conjunctiva/corneas clear, EOM's intact   ENT/Throat: Lips somewhat dry   Lymph/Neck: Supple, symmetrical, trachea midline, no adenopathy, thyroid: not enlarged, symmetric    Lungs:    Diminished to auscultation, respirations unlabored   Chest Wall:  No tenderness or deformity   Cardiovascular/Heart:   Muffled   Abdomen:   Soft, non-tender, bowel sounds active all four quadrants,  no masses, no organomegaly  Open abdominal wound noted   Musculoskeletal: Extremities with ulcerations and currently covered with wraps although viewed photos which indicate ulcerations clear edema and skin discoloration noted.  +2 bilateral lower extremity edema   Skin: Skin is pale and abdominal open wound   Neurologic: Alert and confused, appears very weak cannot lift legs off bed          Imaging:  Personally Reviewed.    Results for orders placed or performed during the hospital encounter of 04/28/23   CT Head w/o Contrast    Impression    IMPRESSION:  1.  No CT evidence for acute intracranial process.  2.  Mild chronic microvascular ischemic changes as above.   CT Abdomen Pelvis w/o Contrast    Impression    IMPRESSION:   1.  No acute findings or other explanation for symptoms.    2.  Large multicompartment ventral hernia containing most of the small and large bowel. No acute inflammation or obstruction.     XR Chest Port 1 View    Impression    IMPRESSION: Low lung volumes. Heart size prominent on this portable view, unchanged. Pulmonary vascularity normal. Elevated right hemidiaphragm. Subsegmental atelectasis both bases. Left PICC line tip distal SVC. Numerous surgical clips left upper   quadrant. Lower cervical spinal fusion. Degenerative changes both shoulders.   CT  Ankle Left w/o Contrast    Impression    IMPRESSION:  1.  No sizable ankle or hindfoot joint effusion. Septic arthritis should be excluded clinically.  2.  No acute displaced left ankle fracture or malalignment.  3.  Moderate midfoot osteoarthritis.  4.  No full-thickness proximally retracted ankle tendon tear.  5.  Global intrinsic leg, ankle and foot muscle atrophy.     Echocardiogram Complete   Result Value Ref Range    LVEF  60-65%         Lab Results:  Personally Reviewed.   Last Comprehensive Metabolic Panel:  Sodium   Date Value Ref Range Status   05/02/2023 138 136 - 145 mmol/L Final   05/22/2021 143 133 - 144 mmol/L Final     Potassium   Date Value Ref Range Status   05/02/2023 3.0 (L) 3.4 - 5.3 mmol/L Final   04/21/2022 5.4 (H) 3.5 - 5.0 mmol/L Final   05/22/2021 3.9 3.4 - 5.3 mmol/L Final     Chloride   Date Value Ref Range Status   05/02/2023 99 98 - 107 mmol/L Final   04/21/2022 106 98 - 107 mmol/L Final   05/22/2021 113 (H) 94 - 109 mmol/L Final     Carbon Dioxide   Date Value Ref Range Status   05/22/2021 25 20 - 32 mmol/L Final     Carbon Dioxide (CO2)   Date Value Ref Range Status   05/02/2023 26 22 - 29 mmol/L Final   04/21/2022 25 22 - 31 mmol/L Final     Anion Gap   Date Value Ref Range Status   05/02/2023 13 7 - 15 mmol/L Final   04/21/2022 9 5 - 18 mmol/L Final   05/22/2021 5 3 - 14 mmol/L Final     Glucose   Date Value Ref Range Status   05/02/2023 124 (H) 70 - 99 mg/dL Final   04/21/2022 145 (H) 70 - 125 mg/dL Final   05/22/2021 90 70 - 99 mg/dL Final     GLUCOSE BY METER POCT   Date Value Ref Range Status   05/02/2023 117 (H) 70 - 99 mg/dL Final     Urea Nitrogen   Date Value Ref Range Status   05/02/2023 60.5 (H) 8.0 - 23.0 mg/dL Final   04/21/2022 10 8 - 22 mg/dL Final   05/22/2021 10 7 - 30 mg/dL Final     Creatinine   Date Value Ref Range Status   05/02/2023 2.23 (H) 0.51 - 0.95 mg/dL Final   05/22/2021 0.92 0.52 - 1.04 mg/dL Final     GFR Estimate   Date Value Ref Range Status    05/02/2023 23 (L) >60 mL/min/1.73m2 Final     Comment:     eGFR calculated using 2021 CKD-EPI equation.   05/27/2021 >60 >60 mL/min/1.73m2 Final   05/22/2021 64 >60 mL/min/[1.73_m2] Final     Comment:     Non  GFR Calc  Starting 12/18/2018, serum creatinine based estimated GFR (eGFR) will be   calculated using the Chronic Kidney Disease Epidemiology Collaboration   (CKD-EPI) equation.       Calcium   Date Value Ref Range Status   05/02/2023 7.4 (L) 8.8 - 10.2 mg/dL Final   05/22/2021 7.8 (L) 8.5 - 10.1 mg/dL Final        UA:   Amphetamines Urine   Date Value Ref Range Status   04/28/2023 Screen Negative Screen Negative Final     Comment:     Cutoff for a negative amphetamine is less than 500 ng/mL.     Barbituates Urine   Date Value Ref Range Status   04/28/2023 Screen Negative Screen Negative Final     Comment:     Cutoff for a negative barbiturate is less than 200 ng/mL.     Cannabinoids Urine   Date Value Ref Range Status   04/28/2023 Screen Negative Screen Negative Final     Comment:     Cutoff for a negative cannabinoid is less than 50 ng/mL.     Cocaine Urine   Date Value Ref Range Status   04/28/2023 Screen Negative Screen Negative Final     Comment:     Cutoff for a negative cocaine is less than 300 ng/mL.     Opiates Urine   Date Value Ref Range Status   04/28/2023 Screen Negative Screen Negative Final     Comment:     Cutoff for a negative opiate is less than 300 ng/mL.     PCP Urine   Date Value Ref Range Status   04/28/2023 Screen Negative Screen Negative Final     Comment:     Cutoff for a negative PCP is less than 25 ng/mL.              Please see A&P for additional details of medical decision making.  Medical complexity over the past 24 hours:  - Intensive monitoring for MEDICATION TOXICITY  - Patient currently hospitalized in ICU    Today I met with the patient. Discussed pain management with the clinical pharmacist and bedside ICU nurse.  Number of problems addressed: Acute lower  extremity pain, chronic pain, chronic opioid tolerance. Amount of data analyzed:  CrtCl2.23, Hgb9.7, 3Pages review fromTCU, reviewed hospitalist notes, reviewed nurses notes, reviewed pharmacy notes and reviewed the following image...  CT of foot which did not indicate a fracture but did show some osteoarthritis.  Offered prescription drug management and also checked the [place] . Risk of complications: offering drug therapy that will require intensive monitoring. Opioid administration /drug therapy requires intensive monitoring due to risk of confusion, hypoxia and also hypotension.        Rizwana RAMOS, CNS-BC, CNP, ACHPN  Acute Care Pain Management Program   Hours of pain coverage 7a-1700- after 1700 please call the house officer   [facility] (Johnny KENNEY, [place], [place], [place])   Page via [facility]- Click HERE to page Rizwana or [employer] text web console

## 2023-05-02 NOTE — PROGRESS NOTES
Care Management Follow Up    Length of Stay (days): 4    Expected Discharge Date: 05/03/2023     Concerns to be Addressed:       Patient plan of care discussed at interdisciplinary rounds: Yes    Anticipated Discharge Disposition:  TBD     Anticipated Discharge Services:  NA  Anticipated Discharge DME:  NA    Patient/family educated on Medicare website which has current facility and service quality ratings:  NA  Education Provided on the Discharge Plan:  NA  Patient/Family in Agreement with the Plan:  NA    Referrals Placed by CM/SW:  NA  Private pay costs discussed: Not applicable    Additional Information:  Discussed patient in ICU rounds. Per RN, off of Levo gtt. Pain team consulted. Poor overall intake from patient. On IV abx. MD consulting Palliative Care Team. Plan to downgrade patient today out of ICU.     Per notes, Patient is not able to make complex medical decisions. Patient's primary contact is Tim Pappas, [phone].     PT discharge recommendation is LTC, due to 2 assist, needing johanna lift.     CM awaiting follow up with Palliative care team and discussion on patient/ family wishes prior to addressing discharge planning.     Ling Gutierrez RN

## 2023-05-02 NOTE — PROGRESS NOTES
05/02/23    [facility] team peripherally following  Ready to assume care when ICU feels appropriate   Appears patient is off pressors.  Please page/vocera me directly when ready to transfer care. Appreciate    Rxoanna Gaona DO   [facility] Resident   Pager#[phone]

## 2023-05-03 NOTE — PROGRESS NOTES
"[facility]  Palliative Care Daily Progress Note       Recommendations & Counseling     ADVANCED CARE PLANNING  - Code status:  DNAR/DNI  - Completed POLST (5/3/2023)  - No HCD on file but son Tim states he is somewhere documented as being surrogate decision maker with his wife is the alternate.  - Surrogate Decision Maker(s):  Patient has verbalized a preference for yves Dumont Munoz to be surrogate.  Eldest son Sha verbalized as not an alternative.    GOALS OF CARE DISCUSSION  - Goals are life-prolonging with limits (DNAR/DNI)  - Given permission to discuss care plan with yves Dumont, he is aware patient is in the hospital and slowly improving.  Tim also confirms he is aware of DNAR/DNI order, furthermore does not believe patient would want dialysis.  - Patient explains that she has been wheelchair-bound for nearly 2 years secondary to left hip pain and deconditioning, although still finds enjoyment in quality of life via television programs and good meals.  Specifically enjoys the young and the restless soap opera and shows about [place].  Self-described \"Foodie\".  - Will ask about having psychosocial support from palliative care [facility] when available.     Discussed with primary team via note.       Thank you for the opportunity to participate in the care of this patient and family. Our team: will continue to follow.     During regular M-F work hours -- if you are not sure who specifically to contact -- please contact us by calling us directly at the Palliative Care Main Line [phone]    After regular work hours and on weekends/holidays, you can leave a message at [phone]      60 minutes spent on the date of the encounter doing chart review, conferring with primary team, history and exam, patient education & counseling, documentation and other activities as noted above.    Varun Jordan, DO  [facility], Palliative Care  Securely message with the [employer] Web Console (learn more here) " or  Text page via [facility] Paging/Directory         Assessments          Carolina Mari is a 68 year old female with documented history of arthritis with chronic left hip pain and wheelchair-bound functional state who was admitted to [facility] on 4/28/2023 from assisted living facility for evaluation of of increased confusion and lethargy. Upon arrival patient found to have NEELIMA, ARF, hyperkalemia with peaked T waves on EKG, hypotension, metabolic acidosis, leukocytosis and lower extremity cellulitis. Started on IV antibiotics and Levophed drip, transferred to ICU.  Patient has since transition back to hospital floor as she is no longer receiving vasopressors.    Today, the patient was seen for:  Symptom assessment and support with goals of care discussions.    Prognosis, Goals, or Advance Care Planning was addressed today with: Yes.  Mood, coping, and/or meaning in the context of serious illness were addressed today: Yes.              Interval History:     Chart review/discussion with unit or clinical team members:   Independently reviewed chart including notes from RN, SW , nephrology, pain service provider, and primary hospital physician.    Per patient or family/caregivers today:  Spoke with patient, son Tim also functioned as an independent historian.  Patient relays frustration over her chronic left hip pain developing around 3 years ago, subsequent decompensation and generalized weakness, followed by nearly 2 years of wheelchair-bound state.  She moved closer to her son Tim and daughter-in-law living in [facility], but did not get along with [facility] staff and has had several moves over the past 1-2 years.  Despite her loss of independence, she describes a good day as having her pain and diarrhea relatively well controlled, allowed to watch television and eat good meals.  Her favorite programs are the young and the restless soap opera, [place] and wilderness shows.  At this time she  "finds that there is still some quality of life despite physical limitations, however there is limitations on aggressive therapies in the future such as mechanical ventilation or dialysis.  Tim reiterates patient's course of illness, hobbies and quality of life, as well as patient's self-imposed potential future aggressive medical interventions.     Key Palliative Symptoms:  # Pain severity the last 12 hours: moderate  # Nausea severity the last 12 hours: none  # Anxiety severity the last 12 hours: low    Patient is on opioids: assessed and bowels ok/no needed changes to plan of care today.           Review of Systems:     Besides above, an additional system ROS was reviewed and is unremarkable          Medications:     I have reviewed this patient's medication profile and medications during this hospitalization.           Physical Exam:   /61 (BP Location: Right arm)   Pulse 90   Temp 97.8  F (36.6  C) (Oral)   Resp 18   Ht 1.753 m (5' 9\")   Wt 134.5 kg (296 lb 9.6 oz)   SpO2 95%   BMI 43.80 kg/m    General:  WDWN.  Appears stated age.  NAD.   HENT:  Normocephalic and atraumatic.  Hearing intact.    Eyes:  Conjunctivae are clear.    Cardiovascular:  Without cyanosis or mottling.  Respiratory:  Breathing comfortably without increased work of breathing or signs of respiratory distress.  Able to speak in complete sentences.    Gastrointestinal: Multiple postoperative scars and large ventral hernia.  Skin:  Without diaphoresis.    Musculoskeletal:  Moving spontaneously.   Neuro:  Alert, mentation intact, conversational and speech normal.   Psych:  Appropriate mood and affect.  No sign of confusion or delusion.  Patient seems to have insight with regards to medical condition and therapeutic options.    Wt Readings from Last 8 Encounters:   05/02/23 134.5 kg (296 lb 9.6 oz)   03/31/22 118.9 kg (262 lb 1.6 oz)   03/23/22 118.9 kg (262 lb 1.6 oz)   03/14/22 117.9 kg (260 lb)   03/07/22 117 kg (258 lb) "   02/28/22 119.8 kg (264 lb 3.2 oz)   02/21/22 119.9 kg (264 lb 4.8 oz)   02/14/22 120.7 kg (266 lb)                Data Reviewed:     Recent Labs   Lab Test 05/02/23  0335 05/01/23  0450 04/30/23  0356 04/29/23  0527 04/28/23  1252 01/13/22  0850 01/06/22  0454   WBC 13.2* 15.9* 18.6*   < > 24.4*   < > 10.0   HGB 9.7* 9.8* 10.3*   < > 12.0   < > 8.5*   MCV 90 87 86   < > 92   < > 90    309 358   < > 537*   < > 263   NEUTROPHIL  --  86  --   --  91  --  79    < > = values in this interval not displayed.     Recent Labs   Lab Test 05/03/23  0324 05/02/23  1818 05/02/23  0335 05/01/23  1314     --  138 138   POTASSIUM 3.6 3.2* 3.0* 3.2*   CHLORIDE 104  --  99 99   CO2 25  --  26 27   ANIONGAP 12  --  13 12   BUN 51.2*  --  60.5* 68.9*   CR 1.90*  --  2.23* 2.58*   JAYA 7.6*  --  7.4* 7.7*     Recent Labs   Lab Test 04/30/23  0356 04/28/23  1253 01/04/22  1358   AST 18 31 25   ALT 9* 12 17   ALKPHOS 113* 216* 167*   BILITOTAL 0.3 0.7 1.0   ALBUMIN 1.9* 3.0* 2.3*   PROTTOTAL 4.0* 7.5 7.5

## 2023-05-03 NOTE — PLAN OF CARE
Problem: Risk for Delirium  Goal: Improved Behavioral Control  Outcome: Not Progressing  Intervention: Minimize Safety Risk  Recent Flowsheet Documentation  Taken 5/3/2023 0818 by Christianne Correa RN  Trust Relationship/Rapport:   care explained   emotional support provided   questions answered     Problem: Infection  Goal: Absence of Infection Signs and Symptoms  5/3/2023 1433 by Christianne Correa RN  Outcome: Progressing  5/3/2023 1151 by Christianne Correa RN  Outcome: Progressing     Problem: Hyperglycemia  Goal: Blood Glucose Level Within Targeted Range  5/3/2023 1433 by Christianne Correa RN  Outcome: Progressing  5/3/2023 1151 by Christianne Correa RN  Outcome: Progressing     Problem: Fluid Volume Excess  Goal: Fluid Balance  Outcome: Progressing     Problem: Electrolyte Imbalance  Goal: Electrolyte Imbalance: Plan of Care  Outcome: Progressing     Problem: Pain Chronic (Persistent)  Goal: Optimal Pain Control and Function  Outcome: Progressing  Intervention: Develop Pain Management Plan  Recent Flowsheet Documentation  Taken 5/3/2023 1132 by Christianne Correa RN  Pain Management Interventions: medication (see MAR)  Taken 5/3/2023 0818 by Christianne Correa RN  Pain Management Interventions: medication (see MAR)   Goal Outcome Evaluation:             Pt yelling out and not using call light some of the times.  Pt remains on IV antibiotics, and MD stopped IV fluids as pt has 2+ edema.  Pt has pain of 8-9 and Oxycodone increased from 5mg to 10mg prn

## 2023-05-03 NOTE — PLAN OF CARE
"Goal Outcome Evaluation:         Problem: Plan of Care - These are the overarching goals to be used throughout the patient stay.    Goal: Absence of Hospital-Acquired Illness or Injury  Outcome: Progressing  Intervention: Prevent Skin Injury  Recent Flowsheet Documentation  Taken 5/3/2023 1707 by Maru Montes, RN  Body Position: (air mattress pump applied to the end of the bed.)   left   right   turned   supine   supine, legs elevated  Intervention: Prevent and Manage VTE (Venous Thromboembolism) Risk  Recent Flowsheet Documentation  Taken 5/3/2023 1707 by Maru Montes, RN  VTE Prevention/Management: compression stockings on   Has been turned from side to side, cleaned up after bowel movement, has a air pump attached to the bed. Pillows repositioned under the patient how she requested. Bilateral lymphedema wraps in place lower legs.    Problem: Oral Intake Inadequate  Goal: Improved Oral Intake  Outcome: Progressing   Is eating and drinking in fair amounts reports if she eats too much she will just have more stool    Problem: Infection  Goal: Absence of Infection Signs and Symptoms  Outcome: Progressing   Continues to be on IV antibiotics. Lower legs are wrapped, this writer cannot see the skin. She was changed once after a loose stool so far, skin in the perineum is very red and irritated.     Problem: Hyperglycemia  Goal: Blood Glucose Level Within Targeted Range  Outcome: Progressing   Blood sugar before evening meal was 110    Problem: Pain Chronic (Persistent)  Goal: Optimal Pain Control and Function  Outcome: Progressing  Intervention: Develop Pain Management Plan  Recent Flowsheet Documentation  Taken 5/3/2023 1707 by Maru Montes, RN  Pain Management Interventions:   aromatherapy   distraction   emotional support   diversional activity provided   essential oils   environmental changes   Reports she has pain \"from the waist down\" reports her left leg is more painful than the right. She " is tolerating oral oxycodone and tylenol for pain. Will continue to monitor.

## 2023-05-03 NOTE — PLAN OF CARE
"  Problem: Plan of Care - These are the overarching goals to be used throughout the patient stay.    Goal: Plan of Care Review  Description: The Plan of Care Review/Shift note should be completed every shift.  The Outcome Evaluation is a brief statement about your assessment that the patient is improving, declining, or no change.  This information will be displayed automatically on your shift note.  Outcome: Progressing  Goal: Patient-Specific Goal (Individualized)  Description: You can add care plan individualizations to a care plan. Examples of Individualization might be:  \"Parent requests to be called daily at 9am for status\", \"I have a hard time hearing out of my right ear\", or \"Do not touch me to wake me up as it startles me\".  Outcome: Progressing  Goal: Absence of Hospital-Acquired Illness or Injury  Outcome: Progressing  Intervention: Identify and Manage Fall Risk  Recent Flowsheet Documentation  Taken 5/3/2023 0000 by Joann Cordon RN  Safety Promotion/Fall Prevention:   safety round/check completed   nonskid shoes/slippers when out of bed  Intervention: Prevent Skin Injury  Recent Flowsheet Documentation  Taken 5/3/2023 0000 by Joann Cordon RN  Body Position:   supine   supine, head elevated  Goal: Optimal Comfort and Wellbeing  Outcome: Progressing  Intervention: Provide Person-Centered Care  Recent Flowsheet Documentation  Taken 5/3/2023 0000 by Joann Cordon RN  Trust Relationship/Rapport:   care explained   choices provided   emotional support provided   empathic listening provided  Goal: Readiness for Transition of Care  Outcome: Progressing     Problem: Risk for Delirium  Goal: Optimal Coping  Outcome: Progressing  Goal: Improved Behavioral Control  Outcome: Progressing  Intervention: Minimize Safety Risk  Recent Flowsheet Documentation  Taken 5/3/2023 0000 by Joann Cordon RN  Trust Relationship/Rapport:   care explained   choices provided   emotional support provided   empathic listening " provided  Goal: Improved Attention and Thought Clarity  Outcome: Progressing  Goal: Improved Sleep  Outcome: Progressing     Problem: Oral Intake Inadequate  Goal: Improved Oral Intake  Outcome: Progressing     Problem: Infection  Goal: Absence of Infection Signs and Symptoms  Outcome: Progressing     Problem: Hyperglycemia  Goal: Blood Glucose Level Within Targeted Range  Outcome: Progressing     Problem: Fluid Volume Excess  Goal: Fluid Balance  Outcome: Progressing     Problem: Electrolyte Imbalance  Goal: Electrolyte Imbalance: Plan of Care  Outcome: Progressing     Problem: Pain Chronic (Persistent)  Goal: Optimal Pain Control and Function  Outcome: Progressing  Intervention: Manage Persistent Pain  Recent Flowsheet Documentation  Taken 5/3/2023 0000 by Joann Cordon, RN  Medication Review/Management: medications reviewed   Goal Outcome Evaluation:  Pt complained of pain, see mar.  Pt has mo in place and iv fluids running.

## 2023-05-03 NOTE — PROGRESS NOTES
"[facility] ACUTE PAIN SERVICE    ([facility], [facility], [facility])  Daily PAIN Progress Note    Assessment/Plan:  Carolina Mari is a 68 year old female who was admitted on 4/28/2023.  I was asked to see the patient for severe chronic pain. Admitted for altered mental status, NEELIMA, metabolic acidosis, leukocytosis, hyperkalemia, generalized body aches, failure to thrive and found to have cellulitis. History of SBO, ileal resection with colostomy and then takedown, obesity, wheelchair-bound, chronic edema, chronic pain, DM questionable, GERD, HTN, hypothyroidism, CKD.  Per chart review patient was a daily drinker back in 2021.   shows chronic opioid use and typically fills 180 tablets of 10 mg oxycodone per month..  Asking for \"65mg\" of oxycodone.      PLAN:  Acute pain secondary to cellulitis and wounds, in the setting of chronic pain and chronic opioid tolerance.  As encephalopathy improves can titrate pain medications.  Given CrtCl is <42mL/min would be very hesitant to increase opioids to full home dose today. Would continue oxycodone as well as duloxetine and pregabalin.    Multimodal Medication Therapy:     Adjuvants: Would avoid NSAIDs given NEELIMA, continue Tylenol, Lyrica 25 twice daily, Cymbalta    Opioids:  oxycodone 10mg q6h prn - hold for sedation     Non-medication interventions- Ice     Constipation Prophylaxis-on senna twice daily    Follow up /Discharge Recommendations - We recommend prescribing the following at the time of discharge: Resume chronic pain plan if able- with reductions       Subjective:     States her pain is \"the same\". Leg pain. Asking for 20mg or more of oxycodone.     Nurse inform me that Rebecca is asking for \"65mg of oxycodone\"            Leukocytosis, unspecified type   Patient Active Problem List   Diagnosis     Unilateral primary osteoarthritis, right hip     Unspecified open wound of abdominal wall, unspecified quadrant without penetration into " peritoneal cavity, subsequent encounter     Obesity     Diabetes mellitus (H)     Tobacco user     Cellulitis of buttock     Pressure injury of right buttock, stage 2 (H)     Chronic kidney disease, stage 3 unspecified (H)     Depression     Adult failure to thrive syndrome     Hypothyroidism     NEELIMA (acute kidney injury) (H)     Encounter for screening laboratory testing for severe acute respiratory syndrome coronavirus 2 (SARS-CoV-2)     Acute gangrenous cholecystitis     Generalized anxiety disorder     Chronic pain     Chronic ulcer of skin (H)     Chronic, continuous use of opioids     Diabetic renal disease (H)     Edema     Gastro-esophageal reflux disease without esophagitis     Hip pain, bilateral     History of cholecystectomy     Hyponatremia     Hypomagnesemia     Hypertension, benign essential, goal below 140/90     Hyperlipidemia     Hyperbilirubinemia     Iron deficiency anemia     Leukocytosis     Nicotine dependence, unspecified, uncomplicated     Nondependent opioid abuse (H)     Recurrent major depression (H)     Small bowel obstruction (H)     Tinea corporis     Primary osteoarthritis     UTI (urinary tract infection)     Vitamin B deficiency     Debility     Weak     Morbid obesity (H)     Abdominal pain, generalized     Non-intractable vomiting with nausea, unspecified vomiting type     Diarrhea of presumed infectious origin     Dehydration     General weakness     Acute pain of right shoulder     Acute kidney failure, unspecified (H)     Undifferentiated inflammatory arthritis (H)     First degree atrioventricular block     Shoulder joint pain     Anemia in other chronic diseases classified elsewhere     Hyperkalemia     Metabolic acidosis     Leukocytosis, unspecified type        History   Drug Use No         Tobacco Use      Smoking status: Never      Smokeless tobacco: Never    Vaping Use      Vaping status: None          acetaminophen  650 mg Oral TID     ammonium lactate   Topical Q Mon  "Thurs AM     ampicillin-sulbactam  3 g Intravenous Q6H     cholestyramine  1 packet Oral BID     DULoxetine  40 mg Oral Daily     heparin ANTICOAGULANT  5,000 Units Subcutaneous Q12H     insulin aspart  1-6 Units Subcutaneous Q4H     levothyroxine  225 mcg Oral Daily     magnesium sulfate  4 g Intravenous Once     pantoprazole  40 mg Oral QAM AC    Or     pantoprazole  40 mg Intravenous QAM AC     pregabalin  25 mg Oral BID     senna-docusate  1 tablet Oral BID    Or     senna-docusate  2 tablet Oral BID     simvastatin  20 mg Oral At Bedtime     sodium chloride (PF)  3 mL Intracatheter Q8H       Objective:  Vital signs in last 24 hours:  B/P: 127/61, T: 97.8, P: 90, R: 18   Blood pressure 127/61, pulse 90, temperature 97.8  F (36.6  C), temperature source Oral, resp. rate 18, height 1.753 m (5' 9\"), weight 134.5 kg (296 lb 9.6 oz), SpO2 95 %, not currently breastfeeding.      Weight:   Wt Readings from Last 2 Encounters:   05/02/23 134.5 kg (296 lb 9.6 oz)   03/31/22 118.9 kg (262 lb 1.6 oz)           Intake/Output:    Intake/Output Summary (Last 24 hours) at 5/3/2023 0913  Last data filed at 5/3/2023 0751  Gross per 24 hour   Intake 2717 ml   Output 1600 ml   Net 1117 ml        Review of Systems:   As per subjective, all others negative.    Physical Exam:     General Appearance:  Alert, cooperative, no distress, appears stated age   Patient is sitting up in bed    Head:  Normocephalic, without obvious abnormality, atraumatic   Eyes:  PERRL, conjunctiva/corneas clear, EOM's intact   ENT/Throat: Lips dry     Lymph/Neck: Supple, symmetrical, trachea midline, no adenopathy, thyroid: not enlarged, symmetric    Lungs:     respirations unlabored   Chest Wall:  No tenderness or deformity   Cardiovascular/Heart:  No SOB   Abdomen:   Soft, non-tender, bowel sounds active all four quadrants,  no masses, no organomegaly   Musculoskeletal: Extremities wrapped    Skin: Skin is pale     Neurologic: Alert and oriented X 3, Moves " all 4 extremities           Imaging:  Personally Reviewed.    Results for orders placed or performed during the hospital encounter of 04/28/23   CT Head w/o Contrast    Impression    IMPRESSION:  1.  No CT evidence for acute intracranial process.  2.  Mild chronic microvascular ischemic changes as above.   CT Abdomen Pelvis w/o Contrast    Impression    IMPRESSION:   1.  No acute findings or other explanation for symptoms.    2.  Large multicompartment ventral hernia containing most of the small and large bowel. No acute inflammation or obstruction.     XR Chest Port 1 View    Impression    IMPRESSION: Low lung volumes. Heart size prominent on this portable view, unchanged. Pulmonary vascularity normal. Elevated right hemidiaphragm. Subsegmental atelectasis both bases. Left PICC line tip distal SVC. Numerous surgical clips left upper   quadrant. Lower cervical spinal fusion. Degenerative changes both shoulders.   CT Ankle Left w/o Contrast    Impression    IMPRESSION:  1.  No sizable ankle or hindfoot joint effusion. Septic arthritis should be excluded clinically.  2.  No acute displaced left ankle fracture or malalignment.  3.  Moderate midfoot osteoarthritis.  4.  No full-thickness proximally retracted ankle tendon tear.  5.  Global intrinsic leg, ankle and foot muscle atrophy.     Echocardiogram Complete   Result Value Ref Range    LVEF  60-65%         Lab Results:  Personally Reviewed.   Last Comprehensive Metabolic Panel:  Sodium   Date Value Ref Range Status   05/03/2023 141 136 - 145 mmol/L Final   05/22/2021 143 133 - 144 mmol/L Final     Potassium   Date Value Ref Range Status   05/03/2023 3.6 3.4 - 5.3 mmol/L Final   04/21/2022 5.4 (H) 3.5 - 5.0 mmol/L Final   05/22/2021 3.9 3.4 - 5.3 mmol/L Final     Chloride   Date Value Ref Range Status   05/03/2023 104 98 - 107 mmol/L Final   04/21/2022 106 98 - 107 mmol/L Final   05/22/2021 113 (H) 94 - 109 mmol/L Final     Carbon Dioxide   Date Value Ref Range Status    05/22/2021 25 20 - 32 mmol/L Final     Carbon Dioxide (CO2)   Date Value Ref Range Status   05/03/2023 25 22 - 29 mmol/L Final   04/21/2022 25 22 - 31 mmol/L Final     Anion Gap   Date Value Ref Range Status   05/03/2023 12 7 - 15 mmol/L Final   04/21/2022 9 5 - 18 mmol/L Final   05/22/2021 5 3 - 14 mmol/L Final     Glucose   Date Value Ref Range Status   05/03/2023 99 70 - 99 mg/dL Final   04/21/2022 145 (H) 70 - 125 mg/dL Final   05/22/2021 90 70 - 99 mg/dL Final     GLUCOSE BY METER POCT   Date Value Ref Range Status   05/03/2023 100 (H) 70 - 99 mg/dL Final     Urea Nitrogen   Date Value Ref Range Status   05/03/2023 51.2 (H) 8.0 - 23.0 mg/dL Final   04/21/2022 10 8 - 22 mg/dL Final   05/22/2021 10 7 - 30 mg/dL Final     Creatinine   Date Value Ref Range Status   05/03/2023 1.90 (H) 0.51 - 0.95 mg/dL Final   05/22/2021 0.92 0.52 - 1.04 mg/dL Final     GFR Estimate   Date Value Ref Range Status   05/03/2023 28 (L) >60 mL/min/1.73m2 Final     Comment:     eGFR calculated using 2021 CKD-EPI equation.   05/27/2021 >60 >60 mL/min/1.73m2 Final   05/22/2021 64 >60 mL/min/[1.73_m2] Final     Comment:     Non  GFR Calc  Starting 12/18/2018, serum creatinine based estimated GFR (eGFR) will be   calculated using the Chronic Kidney Disease Epidemiology Collaboration   (CKD-EPI) equation.       Calcium   Date Value Ref Range Status   05/03/2023 7.6 (L) 8.8 - 10.2 mg/dL Final   05/22/2021 7.8 (L) 8.5 - 10.1 mg/dL Final        UA:   Amphetamines Urine   Date Value Ref Range Status   04/28/2023 Screen Negative Screen Negative Final     Comment:     Cutoff for a negative amphetamine is less than 500 ng/mL.     Barbituates Urine   Date Value Ref Range Status   04/28/2023 Screen Negative Screen Negative Final     Comment:     Cutoff for a negative barbiturate is less than 200 ng/mL.     Cannabinoids Urine   Date Value Ref Range Status   04/28/2023 Screen Negative Screen Negative Final     Comment:     Cutoff for a  negative cannabinoid is less than 50 ng/mL.     Cocaine Urine   Date Value Ref Range Status   04/28/2023 Screen Negative Screen Negative Final     Comment:     Cutoff for a negative cocaine is less than 300 ng/mL.     Opiates Urine   Date Value Ref Range Status   04/28/2023 Screen Negative Screen Negative Final     Comment:     Cutoff for a negative opiate is less than 300 ng/mL.     PCP Urine   Date Value Ref Range Status   04/28/2023 Screen Negative Screen Negative Final     Comment:     Cutoff for a negative PCP is less than 25 ng/mL.              Please see A&P for additional details of medical decision making.  Medical complexity over the past 24 hours:  - Intensive monitoring for MEDICATION TOXICITY  - Prescription DRUG MANAGEMENT performed    Today I met with the patient. Discussed pain management with the nurse and pt. Changed oxycodone.       Rizwana RAMOS, CNS-BC, CNP, [facility]  Acute Care Pain Management Program   Hours of pain coverage 7a-1700- after 1700 please call the house officer   [facility] (Johnny KENNEY, [place], [place], [place])   Page via [employer]- Click HERE to page Riz[place]ana or [employer] text web console

## 2023-05-03 NOTE — PLAN OF CARE
Problem: Infection  Goal: Absence of Infection Signs and Symptoms  Outcome: Progressing     Problem: Hyperglycemia  Goal: Blood Glucose Level Within Targeted Range  Outcome: Progressing     Problem: Electrolyte Imbalance  Goal: Electrolyte Imbalance: Plan of Care  Outcome: Progressing     Problem: Pain Chronic (Persistent)  Goal: Optimal Pain Control and Function  Outcome: Progressing  Intervention: Develop Pain Management Plan  Recent Flowsheet Documentation  Taken 5/3/2023 1132 by Christianne Correa, RN  Pain Management Interventions: medication (see MAR)  Taken 5/3/2023 0818 by Christianne Correa, RN  Pain Management Interventions: medication (see MAR)   Goal Outcome Evaluation:         Pt's BG this shift 110 and 124, and no insulin per sliding scale.  Pain 8 and 9 and received Oxycodone prn.  Pain team increased Oxycodone from 5mg to 10mg prn and now every 6 hours.   Rodriguez remains intact.  Stool softners held this morning for loose tool.

## 2023-05-03 NOTE — PLAN OF CARE
Problem: Pain Chronic (Persistent)  Goal: Optimal Pain Control and Function  Outcome: Not Progressing  Intervention: Develop Pain Management Plan  Recent Flowsheet Documentation  Taken 5/2/2023 1814 by Lilli Griffin RN  Pain Management Interventions: medication (see MAR)     Problem: Electrolyte Imbalance  Goal: Electrolyte Imbalance: Plan of Care  Outcome: Not Progressing     Problem: Fluid Volume Excess  Goal: Fluid Balance  Outcome: Not Progressing     Problem: Hyperglycemia  Goal: Blood Glucose Level Within Targeted Range  Outcome: Progressing     Goal Outcome Evaluation:       Pt presented to the hospital on 4/28 with sepsis related to lower extremity cellulitis from multiple skin ulcerations, increased weakness, chronic diarrhea with incontinence, chronic pain with opioid dependence and inability to care for self in the setting of multiple comorbidities. Pt transferred from ICU to med-surg unit this evening. Receiving IV abx in the form of Unasym. Blood cultures show no growth. WOC consulting. Pt is obese. Lymphedema wraps in place with 2-3+ edema to the BLE. Diuretics currently on hold.Creatinine 2.23.  Lungs are diminished but clear. Maintaining O2 sats >92% on RA. Denies shortness of breath. Pt is w/c bound at baseline. Requires full mechanical lift for transfers. Pt has decreased movement 2/2 chronic pain issues. Pain team consulting. Received scheduled Tylenol, Lyrica and Cymbalta along with prn Oxycodone 7.5 mg at 1815 and 2215 for pain management however, she consistently rates her pain 8-10, primarily in her back and lower extremities. Palliative care consulted. Abdomen is distended and edematous. Large ventral hernia present. Pt had a recent colostomy takedown with large intact scab present mid abdomen. Painted with Betadine. Appetite remains poor. Tolerating a diabetic diet. Blood sugar was 112 before meal and 110 at hs. IV fluids running at 75 ml/hr of D5 with NS through left triple lumen  PICC. Flushed and patent. Unit blood draws. On Mg and K+ protocols. Receiving IV K+ replacement. Rodriguez catheter is patent and draining kasie colored urine. Pt has chronic watery incontinent diarrhea. Buttocks has purple discoloration. Receiving scheduled Questran. Scheduled bowel meds held.

## 2023-05-03 NOTE — PROGRESS NOTES
[facility]    Progress Note - Hospitalist Service       Date of Admission:  4/28/2023    Assessment & Plan   Carolina Mari 68 year old female never smoker with a history of SBO s/p ileal resection and colostomy with subsequent takedown, large ventral hernia with most of small and large bowel herniated, obesity, adult failure to thrive, wheelchair-bound, chronic edema, chronic diarrhea, multiple skin ulcerations (anterior abdomen, perineum, bilateral legs), questionable DM (not on meds, A1c always <6), PUD/GERD, HTN, osteoporosis, hypothyroidism, chronic anemia, CKD, cholecystectomy, presented to ED on 4/28 with acute encephalopathy, acute on chronic kidney injury, hyperkalemia, peripheral neutrophilia, and shock, admitted to ICU, found to have left lower shin/ankle cellulitis. Had been on pressors through 5/2; able to wean off and transferred out of ICU that date -- Phalen started following at that time.     Failure to thrive   Baseline failure to thrive. Poor mobility, wheel-chair bound. Frail. Multiple ulcers/wounds (ventral abdomen, perineum, bilateral legs). Assist of 2 with [employer] lift   -PT/OT/CC  -Wound care   -spiritual consult   -Placement plan   PT recommending LTC     Left skin cellulitis, improving   Felt to be etiology of sepsis. Noted: left shin, ankle and foot asymmetrically erythematous (has bilateral stasis dermopathy but worse on left, may be more tender though seems to have pain with any movement/exam). CT left ankle without evidence of fracture, osteomyelitis or joint effusion.   Status: appears to be improving on abx. S/p several days Vanc/Zosyn   -Continue Unasyn as ordered by ICU  -Will need to determine duration and route in coming days     Leg pain, persistent    Chronic pain    Main complaint of patient.  Severe 10/10 pain chronically and acutely, all over especially left lower leg ulcer/cellulitis. Takes oxycodone 10 mg 6 times daily at assisted  living, also on duloxetine and pregabalin. States that pain is 10/10 at home despite the oxycodone. Had been on IV opioids prior in hospital stay.   -Pain service consult appreciated  -Continue PTA duloxetine and pregabalin    Chronic diarrhea  H/o SBO, ileal resection, colostomy (taken down), large ventral hernia  Noted. C diff PCR negative. Abdomen is quite large -- likely 2/2 to habitus and known hernia. Consider volume component.   -At risk with ongoing abx   -Continue PTA cholestyramine  -Move bowel med to PRN rather than scheduled with ongoing diarrhea    Watch carefully while she is on opioid   -Consider restarting loperamide at home, can restart if needed  -Continue diet, ADAT    Acute on chronic kidney injury, improving   Cr as high as ~5 early admission (baseline 1-1.6). Felt to be 2/2 combination of hypovolemia with low intake from encephalopathy, diuresis with furosemide PTA, chronic diarrhea; ATN due to sepsis with septic shock; and ARB.   Was fluid repleted and s/p pressors.   Renal function gradually improving with correcting the above measures.   PLAN:   -Daily BMP   -Nephro consulted, appreciate   -Lasix and lisinopril on hold currently   -Strict I&Os   -Discontinue fluids as she is eating   -Avoid nephrotoxins     Acute encephalopathy, improving   Multifactorial septic-metabolic encephalopathy with sepsis, NEELIMA, hypovolemia, electrolyte derangements. CT head without acute findings. More alert, severe 10/10 pain chronically and acutely, all over especially left lower leg ulcer/cellulitis. Improving s/p ICU stay   -Monitor   -Delirium protocol     HFpEF, not in current exacerbation   Noted hx of   TTE with EF 60-65%, evidence of diastolic dysfunction.  Appears volume up on exam 5/3 though difficult to tell based on habitus.   PLAN:  -Monitor   -Hold diuresis for now   -May consider repeat imaging vs resuming diuresis in collaboration with nephro in coming days     Chronic anemia  HGB in 9s. ACD. No  "evidence of active hemorrhage.  -CBC daily     CODE STATUS, DISPOSITION, FAMILY COMMUNICATION:   DNR/DNI; discussed with patient and later confirmed with son, Augustus, by telephone.     Resolved during hospitalization:  Shock. Sepsis in setting of cellulitis. S/p several days in ICU. Low threshold to discuss with ICU if recurrent shock.  Hyperkalemia.  Metabolic acidosis.  Lactic acidosis.  Thrombocytosis.    Lines/Drains/Tubes:  Central line (LUE PICC) placed on 4/28  Rodriguez catheter placed on 4/28         Diet: Moderate Consistent Carb (60 g CHO per Meal) Diet  Snacks/Supplements Adult: Glucerna; With Meals    DVT Prophylaxis: Heparin SQ  Rodriguez Catheter: PRESENT, indication: Strict 1-2 Hour I&O, Pressure Ulcer with Incontinence;Strict 1-2 Hour I&O  Fluids: stopped 5/3   Lines: PRESENT      PICC 04/28/23 Triple Lumen Left Brachial vein lateral Multiple medications-Site Assessment: WDL      Cardiac Monitoring: ACTIVE order. Indication: ICU  Code Status: No CPR- Do NOT Intubate      Clinically Significant Risk Factors        # Hypokalemia: Lowest K = 3 mmol/L in last 2 days, will replace as needed     # Hypomagnesemia: Lowest Mg = 1.3 mg/dL in last 2 days, will replace as needed   # Hypoalbuminemia: Lowest albumin = 1.9 g/dL at 4/30/2023  3:56 AM, will monitor as appropriate           # Severe Obesity: Estimated body mass index is 43.8 kg/m  as calculated from the following:    Height as of this encounter: 1.753 m (5' 9\").    Weight as of this encounter: 134.5 kg (296 lb 9.6 oz).          Disposition Plan      Expected Discharge Date: 05/03/2023    Discharge Delays: Specialist Consult (enter specialist & decision needed in comments)            The patient's care was discussed with the attending, Dr. Richard Gaona MD  Hospitalist Service  [facility]  Securely message with [employer] (more info)  Text page via [employer] Paging/Directory " "  ______________________________________________________________________    Interval History   No acute events overnight. Was able to remain off pressors!     Patient feeling \"rough\" this AM   Belly hurts and just had diarrhea   Legs are very painful     Denies chest pain or SOB.     Physical Exam   Vital Signs: Temp: 97.8  F (36.6  C) Temp src: Oral BP: 127/61 Pulse: 90   Resp: 18 SpO2: 95 % O2 Device: None (Room air)    Weight: 296 lbs 9.6 oz    Gen: Pleasant. No distress.     HEENT: MMM.   Neck: No overt asymmetry.   CV: Appears well-perfused. RRR. No murmur.   Resp: Breathing comfortably on room air. Lung sounds distant -- otherwise clear to auscultation bilaterally without wheeze or crackle.   Abd: large ventral hernia noted.   /rectal: liquid stool over mo cath; discussed with RN bedside who is cleaning this   Ext: No edema or overt asymmetry/deformity.   Skin: leg wraps in place; no surrounding erythema. Abdomen wounds noted.    Neuro: Non-focal.   Psych: Calm.        Data     I have personally reviewed the following data over the past 24 hrs:    N/A  \   N/A   / N/A     141 104 51.2 (H) /  99   3.6 25 1.90 (H) \       Imaging results reviewed over the past 24 hrs:   No results found for this or any previous visit (from the past 24 hour(s)).  "

## 2023-05-03 NOTE — PROGRESS NOTES
"Pt is identified as Mosque in her chart. Rebecca appears to frequently be uncomfortable and unable to focus on conversation beyond her pain. This afternoon she declined a visit due to her being tired and talking most of the day. I did deliver a brief prayer for pain relief and God's help in her coping. She answered, \"I definitely need that.\"    Rebecca mentioned that her feet hurt from being near the bed's foot board. She wanted to be pulled up higher in bed. I told her that I would speak with her RN before I left the unit, which I did.    Spiritual Care is available as needed or requested.    [facility]  Palliative Care Team  "

## 2023-05-03 NOTE — PROGRESS NOTES
RENAL PROGRESS NOTE    CC:  Prince Swan MD    ASSESSMENT & PLAN:   68 year old female brought to the ER from her AL with complaints of malaise and weakness  Found to have ARF and hyperkalemia and acidosis.    1. ARF/CKD; CR 1.6 PTA with eGFR in 70s in 12/2022.; now ARF secondary to hemodynamic injury; continued improvement with IVF; following. Lab pending. Urine output seems ok with 1.4L with 1 undocumented yesterday and 645 ml as of 10 am. Cr trending down. Ok to continue IVF for now and titrate with PO intake.   2. Hyperkalemia; resolved; now on lower side. Follow up labs. Ok to replace per protocol.   3. Acidosis; Resolved. Bicarb 25 today.    4. MS; altered due to ARF and medical illness and sedating meds with metabolites increased due to ARF; awake today. Seems improving.   5. Polypharmacy  6. Hyperlipid; on statin; normal CPK  7. HTN; typically on meds (metoprolol and ARB and diuretics); holding with low bp and follow need to resume; IVF and albumin and pressors  8. ID; suspect sepsis from ? Cellulitis vs ??; urine not looking awful and blood cultures negative so far; ab CT negative for source; on abx and hemodynamics improving  9.  Hypothyroid; on replacment  10. Anemia; on oral iron; follow hgb  11. Chronic pain; on meds typically; holding for now       SUBJECTIVE:  Patient seen in her room. Stated feeling hungry. . Good urine output.  Denies any CP, palpitation, nausea.     OBJECTIVE:  Physical Exam   Temp: 97.8  F (36.6  C) Temp src: Oral BP: 127/61 Pulse: 90   Resp: 18 SpO2: 95 % O2 Device: None (Room air)    Vitals:    04/30/23 0000 05/01/23 0630 05/02/23 0346   Weight: 129.5 kg (285 lb 8 oz) 132.9 kg (293 lb 1.6 oz) 134.5 kg (296 lb 9.6 oz)     Vital Signs with Ranges  Temp:  [97.6  F (36.4  C)-97.8  F (36.6  C)] 97.8  F (36.6  C)  Pulse:  [90-95] 90  Resp:  [17-18] 18  BP: (112-127)/(58-61) 127/61  SpO2:  [92 %-95 %] 95 %  I/O last 3 completed shifts:  In: 1903 [P.O.:800;  I.V.:1103]  Out: 1800 [Urine:1800]    @TMAXR(24)@    Patient Vitals for the past 72 hrs:   Weight   05/02/23 0346 134.5 kg (296 lb 9.6 oz)   05/01/23 0630 132.9 kg (293 lb 1.6 oz)       Intake/Output Summary (Last 24 hours) at 5/1/2023 1038  Last data filed at 5/1/2023 0600  Gross per 24 hour   Intake 2667.2 ml   Output 1790 ml   Net 877.2 ml       PHYSICAL EXAM:  General - Alert and oriented x3, appears comfortable, NAD  Cardiovascular - Regular rate and rhythm, no rub  Respiratory - Clear to auscultation bilaterally, no crackles or wheezes  Abd: BS present, no guarding or pain with palpation, no ascites  Extremities - No lower extremity edema bilaterally  Skin: dry, intact, no rash, good turgor  Neuro:  Grossly intact, no focal deficits  MSK:  Grossly intact  Psych:  Normal affect    LABORATORY STUDIES:     Recent Labs   Lab 05/02/23  0335 05/01/23  0450 04/30/23  0356 04/29/23  0527 04/28/23  1252   WBC 13.2* 15.9* 18.6* 20.8* 24.4*   RBC 3.50* 3.50* 3.68* 4.04 4.37   HGB 9.7* 9.8* 10.3* 11.3* 12.0   HCT 31.5* 30.5* 31.5* 35.6 40.0    309 358 477* 537*       Basic Metabolic Panel:  Recent Labs   Lab 05/03/23  1132 05/03/23  0324 05/03/23  0024 05/02/23  2141 05/02/23  1818 05/02/23  1554 05/02/23  1155 05/02/23  0341 05/02/23  0335 05/01/23  1616 05/01/23  1314 04/30/23  0410 04/30/23  0356 04/29/23  1612 04/29/23  1435 04/29/23  0738 04/29/23  0527   NA  --  141  --   --   --   --   --   --  138  --  138  --  138  --  137  --  134*   POTASSIUM  --  3.6  --   --  3.2*  --   --   --  3.0*  --  3.2*  --  3.4  --  4.0  --  4.6   CHLORIDE  --  104  --   --   --   --   --   --  99  --  99  --  102  --  102  --  103   CO2  --  25  --   --   --   --   --   --  26  --  27  --  19*  --  17*  --  13*   BUN  --  51.2*  --   --   --   --   --   --  60.5*  --  68.9*  --  77.2*  --  87.2*  --  86.1*   CR  --  1.90*  --   --   --   --   --   --  2.23*  --  2.58*  --  3.02*  --  3.81*  --  4.03*   * 99 100* 110*   --  112* 120*   < > 124*   < > 126*   < > 132*   < > 167*   < > 163*   JAYA  --  7.6*  --   --   --   --   --   --  7.4*  --  7.7*  --  6.9*  --  7.6*  --  7.7*    < > = values in this interval not displayed.       INRNo lab results found in last 7 days.     Recent Labs   Lab Test 05/02/23  0335 05/01/23  0450   WBC 13.2* 15.9*   HGB 9.7* 9.8*    309       Personally reviewed current labs      Wood May MD  [facility]  [phone]

## 2023-05-04 NOTE — PROGRESS NOTES
[facility]    Progress Note - Hospitalist Service       Date of Admission:  4/28/2023    Assessment & Plan   Carolina Mari 68 year old female never smoker with a history of SBO s/p ileal resection and colostomy with subsequent takedown, large ventral hernia with most of small and large bowel herniated, obesity, adult failure to thrive, wheelchair-bound, chronic edema, chronic diarrhea, multiple skin ulcerations (anterior abdomen, perineum, bilateral legs), questionable DM (not on meds, A1c always <6), PUD/GERD, HTN, osteoporosis, hypothyroidism, chronic anemia, CKD, cholecystectomy, presented to ED on 4/28 with acute encephalopathy, acute on chronic kidney injury, hyperkalemia, peripheral neutrophilia, and shock, admitted to ICU, found to have left lower shin/ankle cellulitis. Had been on pressors through 5/2; able to wean off and transferred out of ICU that date -- Phalen started following at that time.     Failure to thrive   Baseline failure to thrive. Poor mobility, wheel-chair bound. Frail. Multiple ulcers/wounds (ventral abdomen, perineum, bilateral legs). Assist of 2 with [employer] lift   -PT/OT/CC  -Wound care   -spiritual consult   -Placement plan   PT recommending LTC -- updating PT consult as it is multiple days old     Goals of care   Remain life prolonging per palliative care. She enjoys life when not in the hospital.   -DNR/DNI    -POLST completed   -Son has a HCD he will bring in     Left skin cellulitis, improving   Felt to be etiology of sepsis. Noted: left shin, ankle and foot asymmetrically erythematous (has bilateral stasis dermopathy but worse on left, may be more tender though seems to have pain with any movement/exam). CT left ankle without evidence of fracture, osteomyelitis or joint effusion.   Status: appears to be improving on abx. S/p several days Vanc/Zosyn. Appears to be improving on abx as of photos 5/4.    -Continue IV Unasyn   Generally plan on  extended course; at least 10 days, likely no more than 14   -Will need to determine duration and route in coming days     Leg pain, persistent    Chronic pain    Main complaint of patient.  Severe 10/10 pain chronically and acutely, all over especially left lower leg ulcer/cellulitis. Takes oxycodone 10 mg 6 times daily at assisted living, also on duloxetine and pregabalin. States that pain is 10/10 at home despite the oxycodone. Had been on IV opioids prior in hospital stay.   -Pain service consult appreciated  -Continue PTA duloxetine and pregabalin    Depression   Patient feels more irritable and wants more energy. She felt this way when on Prozac in past. Wants to restart. No SI, no contraindications.   -Restart low dose fluoxetine   -Discussed this will take weeks to take effect     Chronic diarrhea, improved   H/o SBO, ileal resection, colostomy (taken down), large ventral hernia  Noted. C diff PCR negative. Abdomen is quite large -- likely 2/2 to habitus and known hernia. Stools less frequent after holding senna.   -At risk with ongoing abx   -Continue PTA cholestyramine  -Move bowel med to PRN rather than scheduled with ongoing diarrhea    Watch carefully while she is on opioid   -Consider restarting loperamide at home, can restart if needed  -Continue diet, ADAT    Acute on chronic kidney injury, improving   Cr as high as ~5 early admission (baseline 1-1.6). Felt to be 2/2 combination of hypovolemia with low intake from encephalopathy, diuresis with furosemide PTA, chronic diarrhea; ATN due to sepsis with septic shock; and ARB.   Was fluid repleted and s/p pressors.   Renal function gradually improving (Cr to 1.55 as of 5/4) with correcting the above measures.   PLAN:   -Daily BMP   -Nephro consulted, appreciate   -Lasix and lisinopril on hold currently   -Strict I&Os   -Discontinue fluids as she is eating   -Avoid nephrotoxins     HFpEF, not in current exacerbation   Noted hx of   TTE with EF 60-65%,  "evidence of diastolic dysfunction.  Appears volume up on exam 5/3 though difficult to tell based on habitus.   PLAN:  -Monitor   -Hold diuresis for now   -May consider repeat imaging vs resuming diuresis in collaboration with nephro in coming days     Chronic anemia  HGB in 9s. ACD. No evidence of active hemorrhage.  -CBC daily     Acute encephalopathy, resolved   Multifactorial septic-metabolic encephalopathy with sepsis, NEELIMA, hypovolemia, electrolyte derangements. CT head without acute findings. More alert, severe 10/10 pain chronically and acutely, all over especially left lower leg ulcer/cellulitis. Improving s/p ICU stay; near baseline mentation as of 5/4    -Monitor   -Delirium protocol     Resolved during hospitalization:  Shock. Sepsis in setting of cellulitis. S/p several days in ICU. Low threshold to discuss with ICU if recurrent shock.  Hyperkalemia.  Metabolic acidosis.  Lactic acidosis.  Thrombocytosis.    Lines/Drains/Tubes:  Central line (LUE PICC) placed on 4/28  Rodriguez catheter placed on 4/28         Diet: Moderate Consistent Carb (60 g CHO per Meal) Diet  Snacks/Supplements Adult: Glucerna; With Meals    DVT Prophylaxis: Heparin SQ  Rodriguez Catheter: PRESENT, indication: Strict 1-2 Hour I&O, Pressure Ulcer with Incontinence;Strict 1-2 Hour I&O  Fluids: stopped 5/3   Lines: PRESENT      PICC 04/28/23 Triple Lumen Left Brachial vein lateral Multiple medications-Site Assessment: WDL      Cardiac Monitoring: None  Code Status: No CPR- Do NOT Intubate      Clinically Significant Risk Factors        # Hypokalemia: Lowest K = 3.2 mmol/L in last 2 days, will replace as needed     # Hypomagnesemia: Lowest Mg = 1.3 mg/dL in last 2 days, will replace as needed   # Hypoalbuminemia: Lowest albumin = 1.9 g/dL at 4/30/2023  3:56 AM, will monitor as appropriate           # Severe Obesity: Estimated body mass index is 43.8 kg/m  as calculated from the following:    Height as of this encounter: 1.753 m (5' 9\").    " "Weight as of this encounter: 134.5 kg (296 lb 9.6 oz).          Disposition Plan      Expected Discharge Date: 05/04/2023    Discharge Delays: Specialist Consult (enter specialist & decision needed in comments)  IV Medication - consider oral or Home Infusion    Discharge Comments: dressing changes on legs, IV atx        The patient's care was discussed with the attending, Dr. Richard Gaona MD  Hospitalist Service  [facility]  Securely message with [employer] (more info)  Text page via [employer] Paging/Directory   ______________________________________________________________________    Interval History   No acute events overnight. Was able to remain off pressors!     Patient feeling \"rough\" this AM   Belly hurts and just had diarrhea   Legs are very painful     Denies chest pain or SOB.     Physical Exam   Vital Signs: Temp: 97.6  F (36.4  C) Temp src: Oral BP: 135/74 Pulse: 95   Resp: 18 SpO2: 94 % O2 Device: None (Room air)    Weight: 296 lbs 9.6 oz    Gen: Pleasant. No distress.     HEENT: MMM.   Neck: No overt asymmetry.   CV: Appears well-perfused. RRR. No murmur.   Resp: Breathing comfortably on room air. Lung sounds distant -- otherwise clear to auscultation bilaterally without wheeze or crackle.   Abd: large ventral hernia noted.   /rectal: liquid stool over mo cath; discussed with RN bedside who is cleaning this   Ext: No edema or overt asymmetry/deformity.   Skin: leg wraps in place; no surrounding erythema. Abdomen wounds noted.    Neuro: Non-focal.   Psych: Calm.        Data     I have personally reviewed the following data over the past 24 hrs:    13.8 (H)  \   10.2 (L)   / 233     139 104 38.0 (H) /  93   3.5 24 1.55 (H) \       Imaging results reviewed over the past 24 hrs:   No results found for this or any previous visit (from the past 24 hour(s)).  "

## 2023-05-04 NOTE — PROGRESS NOTES
"CLINICAL NUTRITION SERVICES - ASSESSMENT NOTE     Nutrition Prescription    RECOMMENDATIONS FOR MDs/PROVIDERS TO ORDER:  -Recommend MVI daily for wound healing needs and not meeting RDIs with poor intake  -Recommend initiate prn immodium - this may improve pt intake    Malnutrition Status:    Not noted    Recommendations already ordered by Registered Dietitian (RD):  No new    Future/Additional Recommendations:  Adjust supplement pending intake, weight, tolerance, acceptance       EVALUATION OF PROGRESS TOWARDS GOALS    CURRENT NUTRITION ORDERS  Diet: Moderate Consistent Carbohydrate  Supplement: Glucerna tid with meals  Intake: Poor to fair. 25% and 50% for 2 meals yesterday. Ate 50% of breakfast this time  Intake not documented 5/1  Supplement intake not documented    Per nursing notes pt holding back on eating more d/t diarrhea. On immodium at home. Not ordered here. Stool softeners have been changed to prn and pt on questran    LABS  Labs reviewed    MEDICATIONS  Questran bid, lasix daily, ssi, levothyroxine, protonix, kcl, zocor  Medications reviewed    ANTHROPOMETRICS  Height: 175.3 cm (5' 9\"[per prior records[)  Most Recent Weight: 134.5 kg (296 lb 9.6oz) 5/2, up 3 lb from day prior    Dosing Weight: 82.7 kg, adjusted wt    ASSESSED NUTRITION NEEDS  Estimated Energy Needs: 1900+ kcals/day (23+ kcals/kg)  Justification: Obese and Wound healing  Estimated Protein Needs: 82-99 grams protein/day (1 - 1.2 grams of pro/kg)  Justification: CKD and Wound healing  Estimated Fluid Needs: 1900+ mL/day (1 mL/kcal)   Justification: Maintenance    PHYSICAL FINDINGS  See malnutrition section below.  jeevan LE edema  Wounds:  Buttocks, abdomen, thigh  LE wounds improving per nsg     MALNUTRITION:  % Weight Loss:  None noted  % Intake:  Unable to determine d/t  Incomplete intake documentation -suspect <50% > 5 days  Subcutaneous Fat Loss:  None observed  Muscle Loss:  None observed  Fluid Retention:  Moderate, jeevan LE per " chart    Malnutrition Diagnosis: Patient does not meet two of the above criteria necessary for diagnosing malnutrition      NUTRITION DIAGNOSIS  Increased nutrient needs related to wound healing as evidenced by wounds  - no change    INTERVENTIONS  Implementation  -Recommend initiate prn immodium - this may improve pt intake  -Recommend MVI daily for wound healing needs   -Requested nsg let RD know if pt was not taking Glucerna    Goals  Wound healing- progressing  BG < 180- goal dc'd -BG not being check, WNL  Patient to consume % of nutritionally adequate meals three times per day, or the equivalent with supplements/snacks.- not progressing     Monitoring/Evaluation  Supplement acceptance, weight, intake, stooling

## 2023-05-04 NOTE — PROGRESS NOTES
Palliative medicine will sign off as patient has been clinically stable.  If there are future changes clinically for which further goals of care conversation or family meeting would be helpful, or new needs for support for this patient and family, please don't hesitate to contact our service at [phone] or place order for reconsult.    POLST form completed per patient preferences and endorsed by son/surrogate, previous HCD would be helpful if it can be found.

## 2023-05-04 NOTE — PLAN OF CARE
Plan of Care Reviewed With: patient    Overall Patient Progress: improvingOverall Patient Progress: improving         Alert and oriented. Admitted with leukocytosis, on Q 6 unasyn for skin cellulitis. Afebrile at 98.1. On room air. Occasional non productive cough. Daily weight. Wheelchair bound at baseline. Chronic mo. Incontinent of bowel. Loose stool x 1 so far. On fall precautions, bed alarm on at default setting. Heavy assist of 2 to turn and reposition. Bilateral lower extremity lymphadema wraps. AC and HS blood glucose, 60 g CHO diet.  Triple lumen picc on the left arm, unit draw.  On potassium and magnesium protocol.  Last potassium lab was 3.6 and Magnesium was 2.0, recheck of both labs this morning.  Complained of left leg pain, PO oxycodone was administered at 2334 and was effective.

## 2023-05-04 NOTE — PROGRESS NOTES
RENAL PROGRESS NOTE    CC:  Prince Swan MD    ASSESSMENT & PLAN:   68 year old female brought to the ER from her AL with complaints of malaise and weakness  Found to have ARF and hyperkalemia and acidosis.    1. ARF/CKD; CR 1.6 PTA with eGFR in 70s in 12/2022.; now ARF secondary to hemodynamic injury; continued improvement with IVF; following. Lab pending. Urine output 2.7L today. Cr 1.55 today.   2. Hyperkalemia; resolved; now on lower side. Follow up labs. Ok to replace per protocol.   3. Acidosis; Resolved. Bicarb 24 today.    4. MS; altered due to ARF and medical illness and sedating meds with metabolites increased due to ARF; awake today. Seems improving.   5. Polypharmacy  6. Hyperlipid; on statin; normal CPK  7. HTN; typically on meds (metoprolol and ARB and diuretics); holding with low bp and follow need to resume; IVF and albumin and pressors  8.  Hypothyroid; on replacment  9. Anemia; on oral iron; follow hgb  10. Chronic pain; on meds typically; holding for now  Nephrology will sign off for now. Please let us know with additional question or concern. Please get BMP in 1 week post discharge with PCP and we will plan to see her in the clinic in 2-3 weeks.      SUBJECTIVE:  Patient seen in her room.  . Good urine output.  Denies any CP, palpitation, nausea.     OBJECTIVE:  Physical Exam   Temp: 97.6  F (36.4  C) Temp src: Oral BP: 135/74 Pulse: 95   Resp: 18 SpO2: 94 % O2 Device: None (Room air)    Vitals:    04/30/23 0000 05/01/23 0630 05/02/23 0346   Weight: 129.5 kg (285 lb 8 oz) 132.9 kg (293 lb 1.6 oz) 134.5 kg (296 lb 9.6 oz)     Vital Signs with Ranges  Temp:  [97.6  F (36.4  C)-98.1  F (36.7  C)] 97.6  F (36.4  C)  Pulse:  [73-95] 95  Resp:  [18] 18  BP: (117-138)/(61-74) 135/74  SpO2:  [93 %-94 %] 94 %  I/O last 3 completed shifts:  In: 1684 [P.O.:720; I.V.:964]  Out: 2675 [Urine:2675]    @TMAXR(24)@    Patient Vitals for the past 72 hrs:   Weight   05/02/23 0346 134.5 kg (296 lb 9.6  oz)       Intake/Output Summary (Last 24 hours) at 5/1/2023 1038  Last data filed at 5/1/2023 0600  Gross per 24 hour   Intake 2667.2 ml   Output 1790 ml   Net 877.2 ml       PHYSICAL EXAM:  General - Alert and oriented x3, appears comfortable, NAD  Cardiovascular - Regular rate and rhythm, no rub  Respiratory - Clear to auscultation bilaterally, no crackles or wheezes  Abd: BS present, no guarding or pain with palpation, no ascites  Extremities - No lower extremity edema bilaterally  Skin: dry, intact, no rash, good turgor  Neuro:  Grossly intact, no focal deficits  MSK:  Grossly intact  Psych:  Normal affect    LABORATORY STUDIES:     Recent Labs   Lab 05/04/23  0541 05/02/23  0335 05/01/23  0450 04/30/23  0356 04/29/23  0527 04/28/23  1252   WBC 13.8* 13.2* 15.9* 18.6* 20.8* 24.4*   RBC 3.62* 3.50* 3.50* 3.68* 4.04 4.37   HGB 10.2* 9.7* 9.8* 10.3* 11.3* 12.0   HCT 33.3* 31.5* 30.5* 31.5* 35.6 40.0    243 309 358 477* 537*       Basic Metabolic Panel:  Recent Labs   Lab 05/04/23  1127 05/04/23  0725 05/04/23  0541 05/03/23  2105 05/03/23  1728 05/03/23  1132 05/03/23  0725 05/03/23  0324 05/02/23  2141 05/02/23  1818 05/02/23  0341 05/02/23  0335 05/01/23  1616 05/01/23  1314 04/30/23  0410 04/30/23  0356 04/29/23  1612 04/29/23  1435   NA  --   --  139  --   --   --   --  141  --   --   --  138  --  138  --  138  --  137   POTASSIUM 3.5  --  3.2*  --   --   --   --  3.6  --  3.2*  --  3.0*  --  3.2*  --  3.4  --  4.0   CHLORIDE  --   --  104  --   --   --   --  104  --   --   --  99  --  99  --  102  --  102   CO2  --   --  24  --   --   --   --  25  --   --   --  26  --  27  --  19*  --  17*   BUN  --   --  38.0*  --   --   --   --  51.2*  --   --   --  60.5*  --  68.9*  --  77.2*  --  87.2*   CR  --   --  1.55*  --   --   --   --  1.90*  --   --   --  2.23*  --  2.58*  --  3.02*  --  3.81*   GLC  --  93 101* 107* 110* 128* 110* 99   < >  --    < > 124*   < > 126*   < > 132*   < > 167*   JAYA  --   --   8.0*  --   --   --   --  7.6*  --   --   --  7.4*  --  7.7*  --  6.9*  --  7.6*    < > = values in this interval not displayed.       INRNo lab results found in last 7 days.     Recent Labs   Lab Test 05/04/23  0541 05/02/23  0335   WBC 13.8* 13.2*   HGB 10.2* 9.7*    243       Personally reviewed current labs      Wood May MD  [facility]  [phone]

## 2023-05-04 NOTE — PLAN OF CARE
Problem: Oral Intake Inadequate  Goal: Improved Oral Intake  Outcome: Progressing     Problem: Infection  Goal: Absence of Infection Signs and Symptoms  Outcome: Progressing     Problem: Hyperglycemia  Goal: Blood Glucose Level Within Targeted Range  Outcome: Progressing     Problem: Pain Chronic (Persistent)  Goal: Optimal Pain Control and Function  Outcome: Progressing  Intervention: Develop Pain Management Plan  Recent Flowsheet Documentation  Taken 5/4/2023 1116 by Christianne Correa, RN  Pain Management Interventions: medication (see MAR)  Taken 5/4/2023 0845 by Christianne Correa RN  Pain Management Interventions:   medication (see MAR)   distraction   emotional support   essential oils   pillow support provided   Goal Outcome Evaluation:               Pt's BG this shift was 93 and 101, so did not require sliding scale insulin.  Pt rates pain 7-9 with decrease in pain down 7-8 (down 1).  Pt tolerated dressing change better today, but stated Vashe causes burning.  Wounds on legs improving and decreased edema

## 2023-05-04 NOTE — PROGRESS NOTES
Care Management Follow Up    Length of Stay (days): 6    Expected Discharge Date: 05/05/2023     Concerns to be Addressed:     Discharge planning  Patient plan of care discussed at interdisciplinary rounds: Yes    Anticipated Discharge Disposition:  Home to Assisted Living Facility with resumption of home care     Anticipated Discharge Services:  RN PT OT  Anticipated Discharge DME:  n/a    Education Provided on the Discharge Plan:  yes  Patient/Family in Agreement with the Plan:  yes    Referrals Placed by CM/SW:  yes  Private pay costs discussed: Not applicable    Additional Information:  SW spoke with OT (recommending home lymphadema) and left message for PT inquiring about updated recommendations as last recommendation was on 5/1; pt is from [facility] with [facility] home care services in place (updated referral sent). OT agreeable to pt returning with said services and SW will follow for PT recommendations once placed.   12:38 PM    MICHAEL Gregory  5/4/2023

## 2023-05-04 NOTE — PROVIDER NOTIFICATION
Room 406    Patient has a triple lumen PICC. Can you put an order for unit draw?    Luis Fernando DEL CID RN

## 2023-05-04 NOTE — PROGRESS NOTES
[facility] ACUTE PAIN SERVICE    Daily PAIN Progress Note    Assessment/Plan:  Carolina Mari is a 68 year old female who was admitted on 4/28/2023.  Pain team was asked to see the patient forsevere chronic pain. Admitted for altered mental status, NEELIMA, metabolic acidosis, leukocytosis, hyperkalemia, generalized body aches, failure to thrive and found to have cellulitis. History of SBO, ileal resection with colostomy and then takedown, obesity, wheelchair-bound, chronic edema, chronic pain, DM questionable, GERD, HTN, hypothyroidism, CKD.  Per chart review patient was a daily drinker back in 2021.   shows chronic opioid use and typically fills 180 tablets of 10 mg oxycodone per month.    Opioid Induced Respiratory Depression Risk Assessment: Moderate to High due to the following risk factors: renal dysfunction, Obesity,  Age>60, concomitant CNS depressants    Pt reports good pain control, no changes in pain regimen. Having a hard time pushing call buttons, requested a different remote that is easier for her press and ask for help, she was having to call out for help when needed. Pain team will sign off, please -reconsult should any new issues arise.       PLAN:   1) Acute pain secondary to cellulitis and wounds, in the setting of chronic pain and chronic opioid tolerance, pain management c/b encephalopathy.   2)Multimodal Medication Therapy  Topical: none ordered - could consider if warranted  NSAID'S: none, crcl = 50.6 ml/min  Adjuvants: APAP 650 mg po TID - increase to 975 mg po TID, lyrica 25 mg po BID - don't recommend dose increases d/t dose dependent peripheral edema, continue same  Antidepressants/anxiolytics: Cymbalta 40 mg po daily   Opioids: oxycodone 10 mg po q6h prn   IV Pain medication: none  3)Non-medication interventions: per nursing  Acupuncture consult - if agreeable  Integrative consult - if agreeable  4)Constipation Prophylaxis:  Ileostomy - monitor  5) Care Teams:    Discharge  Recommendations - We recommend prescribing the following at the time of discharge: continue APAP, cymbalta, lidocaine, reduced dose of oxycodone 10 mg po q6h prn - not q4h prn - no scripts - follow up with prescribing provider      MN   pulled from system on  05/01/23  Last refill on 04/04/23. This indicated opioid use.   Most recent MN  entries:  04/17/23 pregabalin 25 mg #60  04/04/23 oxycodone 10 mg #180  03/22/23 pregabalin 25 mg #60  03/09/23 oxycodone 10 mg #180  Consistent Rx for oxycodone and pregabalin       Principal Problem:    Leukocytosis, unspecified type  Active Problems:    NEELIMA (acute kidney injury) (H)    Hyperkalemia    Metabolic acidosis        Elena Sexton, PharmD, BCPS  Acute Care Pain Management Program  [facility] ([facility], [place], [place])  Page via online paging system or Storefront

## 2023-05-05 PROBLEM — L02.419 CELLULITIS AND ABSCESS OF LEG: Status: ACTIVE | Noted: 2023-01-01

## 2023-05-05 PROBLEM — G82.20 PARAPARESIS OF BOTH LOWER LIMBS (H): Status: ACTIVE | Noted: 2022-01-01

## 2023-05-05 PROBLEM — L03.119 CELLULITIS AND ABSCESS OF LEG: Status: ACTIVE | Noted: 2023-01-01

## 2023-05-05 PROBLEM — I89.0: Status: ACTIVE | Noted: 2023-01-01

## 2023-05-05 PROBLEM — S81.802A OPEN WOUND OF LOWER LIMB, LEFT, INITIAL ENCOUNTER: Status: ACTIVE | Noted: 2023-01-01

## 2023-05-05 PROBLEM — L03.317 CELLULITIS OF BUTTOCK: Status: RESOLVED | Noted: 2021-01-05 | Resolved: 2023-01-01

## 2023-05-05 NOTE — CONSULTS
Integrative Therapy Consult    Healing PresenceYes  Essential Oils: Inhalation (EO/Topical oil)     Support Healing process blend - HC       Healing Music: TV/Care channel     Breathwork:       Guided Imagery:       Acupressure:       Oshibori:       Energy Therapy: Healing touch     Healing Touch: Done     Reiki:       Qi Gong:     Massage:        Targeted Massage:    Sleep Promotion:       Other Therapy:       Intervention Reason: Anxiety/Stress, Pain     Pre and Post Session Scores: Patient Desires Treatment: yes  Pre-Session Anxiety: 8  Post-session Anxiety: Asleep     Pre-session Pain: 7 Post-session Pain: asleep               Delivery:         Referrals:      Marlene Gatica

## 2023-05-05 NOTE — PLAN OF CARE
Problem: Plan of Care - These are the overarching goals to be used throughout the patient stay.    Goal: Absence of Hospital-Acquired Illness or Injury  Intervention: Prevent Skin Injury  Recent Flowsheet Documentation  Taken 5/5/2023 1000 by Caro Waggoner RN  Body Position: supine, legs elevated     Problem: Plan of Care - These are the overarching goals to be used throughout the patient stay.    Goal: Absence of Hospital-Acquired Illness or Injury  Intervention: Prevent and Manage VTE (Venous Thromboembolism) Risk  Recent Flowsheet Documentation  Taken 5/5/2023 1000 by Caro Waggoner RN  VTE Prevention/Management: patient refused intervention     Problem: Plan of Care - These are the overarching goals to be used throughout the patient stay.    Goal: Optimal Comfort and Wellbeing  Intervention: Monitor Pain and Promote Comfort  Recent Flowsheet Documentation  Taken 5/5/2023 1200 by Caro Waggoner RN  Pain Management Interventions:    care clustered    distraction    emotional support    pillow support provided    quiet environment facilitated    rest  Taken 5/5/2023 1103 by Caro Waggoner RN  Pain Management Interventions: medication (see MAR)     Problem: Plan of Care - These are the overarching goals to be used throughout the patient stay.    Goal: Optimal Comfort and Wellbeing  Intervention: Provide Person-Centered Care  Recent Flowsheet Documentation  Taken 5/5/2023 1000 by Caro Waggoner RN  Trust Relationship/Rapport:    care explained    emotional support provided    questions answered     Problem: Plan of Care - These are the overarching goals to be used throughout the patient stay.    Goal: Readiness for Transition of Care  Intervention: Mutually Develop Transition Plan  Recent Flowsheet Documentation  Taken 5/5/2023 1000 by Caro Waggoner RN  Equipment Currently Used at Home: wheelchair, manual     Problem: Pain Chronic (Persistent)  Goal: Optimal Pain Control and  Function  Intervention: Develop Pain Management Plan  Recent Flowsheet Documentation  Taken 5/5/2023 1200 by Caro Waggoner, RN  Pain Management Interventions:    care clustered    distraction    emotional support    pillow support provided    quiet environment facilitated    rest  Taken 5/5/2023 1103 by Caro Waggoner, RN  Pain Management Interventions: medication (see MAR)  Intervention: Manage Persistent Pain  Recent Flowsheet Documentation  Taken 5/5/2023 1000 by Caro Waggoner RN  Medication Review/Management: medications reviewed   Goal Outcome Evaluation:  Patient alert and oriented x4. Moving using johanna lift. Vitals stable on room air. 60 g carb diet with adequate intake. Rodriguez in place for retention. 3 lumen PICC in place, saline locked. Lymph wraps clean, dry and intact to BLE. Abdominal and buttock wounds open to air. Loose bowel movement today. Blood sugars stable. Magnesium and potassium protocol. Pain in lower extremities and abdomen managed using PRN 10 mg oxycodone, scheduled tylenol and scheduled lyrica.

## 2023-05-05 NOTE — PROGRESS NOTES
Care Management Follow Up    Length of Stay (days): 7    Expected Discharge Date: 05/05/2023     Concerns to be Addressed:     Discharge planning  Patient plan of care discussed at interdisciplinary rounds: Yes    Anticipated Discharge Disposition:  TCU      Anticipated Discharge Services:  TCU   Anticipated Discharge DME:      Patient/family educated on Medicare website which has current facility and service quality ratings:    Education Provided on the Discharge Plan:  yes  Patient/Family in Agreement with the Plan:  yes    Referrals Placed by CM/SW:  yes  Private pay costs discussed: Not applicable    Additional Information:  SW met with pt in room to discuss discharge planning and goals of care. Pt stated that she is aware that she will need more assistance at time of discharge as therapy will be needed as well as IV abx (14 days). Pt agreeable to TCU referrals to be sent and noted that she would like to be close to home; ref[facility] and [facility]. SW following for placement.  11:00 AM    [facility] declined due to no beds being available. Messages left for [facility], [facility] and [facility]. Care management following.  2:40 PM    MICHAEL Gregory  5/5/2023

## 2023-05-05 NOTE — PROGRESS NOTES
[facility]    Progress Note - Hospitalist Service       Date of Admission:  4/28/2023    Assessment & Plan   Carolina Mari 68 year old female never smoker with a history of SBO s/p ileal resection and colostomy with subsequent takedown, large ventral hernia with most of small and large bowel herniated, obesity, adult failure to thrive, wheelchair-bound, chronic edema, chronic diarrhea, multiple skin ulcerations (anterior abdomen, perineum, bilateral legs), questionable DM (not on meds, A1c always <6), PUD/GERD, HTN, osteoporosis, hypothyroidism, chronic anemia, CKD, cholecystectomy, presented to ED on 4/28 with acute encephalopathy, acute on chronic kidney injury, hyperkalemia, peripheral neutrophilia, and shock, admitted to ICU, found to have left lower shin/ankle cellulitis. Had been on pressors through 5/2; able to wean off and transferred out of ICU that date -- [facility] started following at that time. Has steadily been improving as below.     Failure to thrive   Baseline failure to thrive. Poor mobility, wheel-chair bound. Multiple ulcers/wounds (ventral abdomen, perineum, bilateral legs); improving as below. Assist of 2 with johanna lift   -PT/OT/CC  -Wound care   -spiritual consult   -Placement plan              PT recommending LTC -- updating PT assessment 5/5      Goals of care   Remain life prolonging per palliative care. She enjoys life when not in the hospital.   -DNR/DNI    -POLST completed   -Son has a HCD he will bring in      Left skin cellulitis, improving   Felt to be etiology of sepsis.   Initial photos 5/1, much improved as of photos 5/4   As of 5/5: on day 7-8 of IV abx (Vanc/Zosyn-->unasyn)  BC's throughout hospitalization: NGTD   White count steadily down-trending   Given improvement, candidate to de-escalate to oral.   Plan:  -Wound care, appreciate   -Abx    Stop IV Unasyn 5/ 5 (s/p 7-8 days IV abx)    Start oral Augmentin 5/5    Plan for 10-days total  course pending clinical course; stop date 5/8       Leg pain, persistent    Chronic pain    Main complaint of patient.  Severe 10/10 pain chronically and acutely, all over especially left lower leg ulcer/cellulitis. Takes oxycodone 10 mg 6 times daily at assisted living, also on duloxetine and pregabalin. Had been on IV opioids prior in hospital stay. Pain under reasonable control to 5/5  -Opioids as per pain consult, appreciate   -Continue PTA duloxetine and pregabalin     Depression, improved    Patient feels more irritable and wants more energy. She felt this way when on Prozac in past. Wants to restart. No SI, no contraindications.   -Restart low dose fluoxetine, tolerating    -Discussed this will take weeks to take effect      Chronic diarrhea, improved   H/o SBO, ileal resection, colostomy (taken down), large ventral hernia  Noted. C diff PCR negative. Abdomen is quite large -- likely 2/2 to habitus and known hernia. Stools less frequent after holding senna.   -At risk with ongoing abx   -Continue PTA cholestyramine  -Move bowel med to PRN rather than scheduled with ongoing diarrhea               Watch carefully while she is on opioid   -Consider restarting loperamide at home, can restart if needed  -Continue diet, ADAT     Acute on chronic kidney injury, improving   Cr as high as ~5 early admission (baseline 1-1.6). Felt to be 2/2 combination of hypovolemia with low intake from encephalopathy, diuresis with furosemide PTA, chronic diarrhea; ATN due to sepsis with septic shock; and ARB.   Was fluid repleted and s/p pressors.   Renal function gradually improving (Cr to 1.55 as of 5/4) with correcting the above measures.   PLAN:   -Daily BMP   -Nephro consulted, appreciate   -Lasix and lisinopril on hold currently   -Strict I&Os   -Discontinue fluids as she is eating   -Avoid nephrotoxins      HFpEF, not in current exacerbation   Noted hx of   TTE with EF 60-65%, evidence of diastolic dysfunction.  Appears  "volume up on exam 5/3 though difficult to tell based on habitus.   PLAN:  -Monitor   -Hold diuresis for now   -May consider repeat imaging vs resuming diuresis in collaboration with nephro in coming days      Chronic anemia  HGB in 9-10s. ACD. No evidence of active hemorrhage.  -CBC daily      Acute encephalopathy, resolved   Multifactorial septic-metabolic encephalopathy with sepsis, NEELIMA, hypovolemia, electrolyte derangements. CT head without acute findings. More alert, severe 10/10 pain chronically and acutely, all over especially left lower leg ulcer/cellulitis. Improving s/p ICU stay; near baseline mentation as of 5/4    -Monitor   -Delirium protocol      Resolved during hospitalization:  Shock. Sepsis in setting of cellulitis. S/p several days in ICU. Low threshold to discuss with ICU if recurrent shock.  Hyperkalemia.  Metabolic acidosis.  Lactic acidosis.  Thrombocytosis.     Lines/Drains/Tubes:  Central line (LUE PICC) placed on 4/28  Rodriguez catheter placed on 4/28           Diet: Moderate Consistent Carb (60 g CHO per Meal) Diet  Snacks/Supplements Adult: Glucerna; With Meals    DVT Prophylaxis: Heparin SQ  Rodriguez Catheter: PRESENT, indication: Retention, Pressure Ulcer with Incontinence;Strict 1-2 Hour I&O  Fluids: none   Lines: PRESENT      PICC 04/28/23 Triple Lumen Left Brachial vein lateral Multiple medications-Site Assessment: WDL      Cardiac Monitoring: None  Code Status: No CPR- Do NOT Intubate      Clinically Significant Risk Factors        # Hypokalemia: Lowest K = 3.2 mmol/L in last 2 days, will replace as needed     # Hypomagnesemia: Lowest Mg = 1.4 mg/dL in last 2 days, will replace as needed   # Hypoalbuminemia: Lowest albumin = 1.9 g/dL at 4/30/2023  3:56 AM, will monitor as appropriate           # Severe Obesity: Estimated body mass index is 43.8 kg/m  as calculated from the following:    Height as of this encounter: 1.753 m (5' 9\").    Weight as of this encounter: 134.5 kg (296 lb 9.6 oz).   "        Disposition Plan      Expected Discharge Date: 05/05/2023    Discharge Delays: Specialist Consult (enter specialist & decision needed in comments)  IV Medication - consider oral or Home Infusion    Discharge Comments: dressing changes on legs, IV atx        The patient's care was discussed with the Attending Physician, Dr. Gregorio .    Everette Gaona MD  Hospitalist Service  [facility]  Securely message with [employer] (more info)  Text page via [employer] Paging/Directory   ______________________________________________________________________    Interval History   No acute events     Feeling well  Thinks the prozac may have helped a touch. Mood is better, she feels less angry.    Pain is under reasonable control. Does have leg pain with therapy.       Physical Exam   Vital Signs: Temp: 97.8  F (36.6  C) Temp src: Oral BP: (!) 165/74 Pulse: 90   Resp: 18 SpO2: 93 % O2 Device: None (Room air)    Weight: 296 lbs 9.6 oz    Gen: Pleasant. No distress.    HEENT: MMM.   Neck: No overt asymmetry. No JVD, difficult to assess with habitus.   CV: Appears well-perfused. RRR. No murmur.   Resp: Breathing comfortably on room air. Lungs clear to auscultation bilaterally without wheeze or crackle.   Abd: distended with central wound. See photo from 5/4. No TTP.   Ext: No edema   Skin: see photos from 5/4   Neuro: Non-focal.   Psych: Calm. More pleasant than last few days.     Data     I have personally reviewed the following data over the past 24 hrs:    12.0 (H)  \   10.1 (L)   / 201     145 107 28.7 (H) /  133 (H)   3.5 27 1.31 (H) \       Imaging results reviewed over the past 24 hrs:   No results found for this or any previous visit (from the past 24 hour(s)).

## 2023-05-05 NOTE — PLAN OF CARE
Problem: Pain Chronic (Persistent)  Goal: Optimal Pain Control and Function  Outcome: Progressing  Intervention: Manage Persistent Pain  Recent Flowsheet Documentation  Taken 5/5/2023 0106 by Sarah Moreno RN  Medication Review/Management: medications reviewed   Goal Outcome Evaluation: Pain controlled with prn oxycodone.    Problem: Risk for Delirium  Goal: Optimal Coping  Outcome: Progressing  No s/sx of delirium    Slept quietly most of shift. IV antibiotics given as ordered. No complaints/problems.

## 2023-05-05 NOTE — PLAN OF CARE
Problem: Hyperglycemia  Goal: Blood Glucose Level Within Targeted Range  Outcome: Progressing     Problem: Pain Chronic (Persistent)  Goal: Optimal Pain Control and Function  Outcome: Progressing   Goal Outcome Evaluation:       Pt is progressing with these goals.  Pt denies pain currently and mo sliding scale insulin needed per orders.  Will continue to monitor.  VSS.

## 2023-05-06 NOTE — PLAN OF CARE
Problem: Plan of Care - These are the overarching goals to be used throughout the patient stay.    Goal: Absence of Hospital-Acquired Illness or Injury  Intervention: Prevent Skin Injury  Recent Flowsheet Documentation  Taken 5/6/2023 0806 by Waggoner, Caro, RN  Body Position:    position changed independently    position maintained     Problem: Plan of Care - These are the overarching goals to be used throughout the patient stay.    Goal: Optimal Comfort and Wellbeing  Intervention: Monitor Pain and Promote Comfort  Recent Flowsheet Documentation  Taken 5/6/2023 1200 by Caro Waggoner RN  Pain Management Interventions:    distraction    food    pillow support provided    quiet environment facilitated    rest  Taken 5/6/2023 1108 by Caro Waggoner RN  Pain Management Interventions: medication (see MAR)  Taken 5/6/2023 0900 by Caro Waggoner RN  Pain Management Interventions:    emotional support    pillow support provided    quiet environment facilitated    rest  Taken 5/6/2023 0806 by Caro Waggoner RN  Pain Management Interventions: medication (see MAR)  Intervention: Provide Person-Centered Care  Recent Flowsheet Documentation  Taken 5/6/2023 0806 by Caro Waggoner RN  Trust Relationship/Rapport:    care explained    choices provided     Problem: Pain Chronic (Persistent)  Goal: Optimal Pain Control and Function  Intervention: Develop Pain Management Plan  Recent Flowsheet Documentation  Taken 5/6/2023 1200 by Caro Waggoner RN  Pain Management Interventions:    distraction    food    pillow support provided    quiet environment facilitated    rest  Taken 5/6/2023 1108 by Caro Waggoner RN  Pain Management Interventions: medication (see MAR)  Taken 5/6/2023 0900 by Caro Waggoner RN  Pain Management Interventions:    emotional support    pillow support provided    quiet environment facilitated    rest  Taken 5/6/2023 0806 by Caro Waggoner RN  Pain  Management Interventions: medication (see MAR)  Intervention: Manage Persistent Pain  Recent Flowsheet Documentation  Taken 5/6/2023 0806 by Caro Waggoner RN  Medication Review/Management: medications reviewed   Goal Outcome Evaluation:  Patient alert and oriented x4. Moving using johanna lift. Vitals stable on room air. 60 g carb diet with adequate intake. Rodriguez in place for retention. 3 lumen PICC removed. Lymph wraps clean, dry and intact to BLE. Abdominal and buttock wounds open to air. Loose bowel movements today. PRN imodium given. Blood sugars stable. Magnesium and potassium protocol. Pain in lower extremities and abdomen managed using PRN 10 mg oxycodone, scheduled tylenol and scheduled lyrica.

## 2023-05-06 NOTE — PLAN OF CARE
Problem: Hyperglycemia  Goal: Blood Glucose Level Within Targeted Range  Outcome: Progressing     Problem: Pain Chronic (Persistent)  Goal: Optimal Pain Control and Function  Intervention: Manage Persistent Pain  Recent Flowsheet Documentation  Taken 5/5/2023 1700 by Sergio Patel RN  Medication Review/Management: medications reviewed     Problem: Oral Intake Inadequate  Goal: Improved Oral Intake  Outcome: Progressing   Goal Outcome Evaluation:               Patient A and O times 4. Refused to get out of bed for dinner. Triple lumen PICC line on LUE flushes well.Rodriguez in place, draining yellowish urine with light sediment . Patient on K and Mag protocols, recheck both in morning. Abdominal wound open to air. Dressing on LLE showing light drainage, will monitor. Blood sugars 82 at dinner and 97 at HS. No insulin given in either instance.

## 2023-05-06 NOTE — PROGRESS NOTES
[facility]    Progress Note - Hospitalist Service       Date of Admission:  4/28/2023    Assessment & Plan   Carolina Mari 68 year old female never smoker with a history of SBO s/p ileal resection and colostomy with subsequent takedown, large ventral hernia with most of small and large bowel herniated, obesity, adult failure to thrive, wheelchair-bound, chronic edema, chronic diarrhea, multiple skin ulcerations (anterior abdomen, perineum, bilateral legs), PUD/GERD, HTN, osteoporosis, hypothyroidism, CKD. Presented to ED on 4/28 with acute encephalopathy, acute on chronic kidney injury, hyperkalemia, peripheral neutrophilia, and shock, admitted to ICU. Shock felt 2/2 sepsis due to left lower shin/ankle cellulitis. Had been on pressors through 5/2; able to wean off and transferred out of ICU that date -- [facility] started following at that time. Has steadily been improving as below.      Failure to thrive   Baseline failure to thrive. Poor mobility, wheel-chair bound. Multiple ulcers/wounds (ventral abdomen, perineum, bilateral legs); improving as below. PT recommending TCU   -PT/OT/CC  -Wound care   -Spiritual consult   -Removing diabetic orders as no confirmed dx diabetes and sugars in <100 range   -Placement plan TCU per rehab      Goals of care   Remain life prolonging per palliative care. She enjoys life when not in the hospital.   -DNR/DNI    -POLST completed   -Son has a HCD he will bring in      Left skin cellulitis, improving   Felt to be etiology of sepsis.   Initial photos 5/1, much improved as of photos 5/4   As of 5/5: on day 7-8 of IV abx (Vanc/Zosyn-->unasyn).   Switched to oral therapy on 5/5   BC's throughout hospitalization: NGTD   White count steadily down-trending   Continues to improve including with switch to oral therapy   Plan:  -Wound care, appreciate   -Abx    Continue oral Augmentin    Plan for 10 days total course; stop date 5/8    -Consider removing  PICC given ability to stay off pressors      Glossitis   New as of 5/6. R/o b12 being low with known hx and no recent replacement. Not clinically consistent with thrush, consider. No eye sx to suggest sjogrens vs other autoimmune.   -Increase oral hydration / cares   -Vitamin B12    Lab 5/6    Injection 5/7 if lab is low   -Consider screening sjogrens vs other if not improving with above  -Consider oral glycopyrrolate vs empirically treating thrush vs other med to assist with secretions     Leg pain, improving   Chronic pain    Main complaint of patient.  Severe 10/10 pain chronically and acutely, all over especially left lower leg ulcer/cellulitis. Takes oxycodone 10 mg 6 times daily at assisted living, also on duloxetine and pregabalin. Had been on IV opioids prior in hospital stay. Pain continues to improve with treatment of cellulitis as of 5/6   -Abx above   -Opioids as per pain consult, appreciate   -Continue PTA duloxetine and pregabalin     Depression, improved    Patient feels more irritable and wants more energy. She felt this way when on Prozac in past. Wants to restart. No SI, no contraindications.   -Restart low dose fluoxetine, tolerating    -Discussed this will take weeks to take effect      Chronic diarrhea, ongoing   H/o SBO, ileal resection, colostomy (taken down), large ventral hernia  Noted. C diff PCR negative. Abdomen is quite large -- likely 2/2 to habitus and known hernia. Stools had been less frequent after holding senna but increased in frequency again 5/6. This is a major complaint and affecting quality of life for the patient.   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 Felt to be 2/2 combination of hypovolemia with low intake from encephalopathy, diuresis with furosemide PTA, chronic diarrhea; ATN due to sepsis with septic shock; and ARB.   Was fluid repleted and s/p pressors.   Renal function gradually improving (Cr to 1.3 as of 5/6) with correcting the above measures.   PLAN:   -Daily BMP   -Nephro consulted, appreciate   -Lasix and lisinopril on hold currently   -Strict I&Os   -Discontinue fluids as she is eating   -Avoid nephrotoxins      HFpEF, not in current exacerbation   Noted hx of   TTE with EF 60-65%, evidence of diastolic dysfunction.  Habitus is large but not felt to be volume up as of 5/6   PLAN:  -Monitor   -Continue PTA diuresis   -May consider repeat imaging vs resuming diuresis in collaboration with nephro in coming days      Chronic anemia  HGB in 9-10s. ACD. Likely in setting of CKD vs other chronic dz's. No evidence of active hemorrhage.  -CBC daily      Acute encephalopathy, resolved   Multifactorial septic-metabolic encephalopathy with sepsis, NEELIMA, hypovolemia, electrolyte derangements. CT head without acute findings. More alert, severe 10/10 pain chronically and acutely, all over especially left lower leg ulcer/cellulitis. Improving s/p ICU stay; near baseline mentation as of 5/4    -Monitor   -Delirium protocol      Resolved during hospitalization:  Shock. Sepsis in setting of cellulitis. S/p several days in ICU. Low threshold to discuss with ICU if recurrent shock.  Hyperkalemia.  Metabolic acidosis.  Lactic acidosis.  Thrombocytosis.       Diet: Moderate Consistent Carb (60 g CHO per Meal) Diet  Snacks/Supplements Adult: Glucerna; With Meals    DVT Prophylaxis: Heparin SQ  Rodriguez Catheter: PRESENT, indication: Retention, Pressure Ulcer with Incontinence;Strict 1-2 Hour I&O  Fluids: none   Lines: PRESENT      PICC 04/28/23 Triple Lumen Left Brachial vein lateral Multiple medications-Site Assessment: WDL      Cardiac Monitoring:  "None  Code Status: No CPR- Do NOT Intubate      Clinically Significant Risk Factors        # Hypokalemia: Lowest K = 3 mmol/L in last 2 days, will replace as needed     # Hypomagnesemia: Lowest Mg = 1.4 mg/dL in last 2 days, will replace as needed   # Hypoalbuminemia: Lowest albumin = 1.9 g/dL at 4/30/2023  3:56 AM, will monitor as appropriate           # Severe Obesity: Estimated body mass index is 43.8 kg/m  as calculated from the following:    Height as of this encounter: 1.753 m (5' 9\").    Weight as of this encounter: 134.5 kg (296 lb 9.6 oz).          Disposition Plan      Expected Discharge Date: 05/08/2023    Discharge Delays: IV Medication - consider oral or Home Infusion  Placement - TCU    Discharge Comments: dressing changes on legs, IV atx        The patient's care was discussed with the Attending Physician, Dr. Gregorio .    Everette Gaona MD  Hospitalist Service  [facility]  Securely message with [employer] (more info)  Text page via [employer] Paging/Directory   ______________________________________________________________________    Interval History   No acute events     Feeling pretty good   She is not happy with having 2 episodes of loose stools this AM   She is bothered by having a dry mouth. Tongue seems red and irritated. No white spots. She gets B12 injections but hasn't had one in months probably   Leg pain is improving and under good control   No breathing issues, no wheeze     Physical Exam   Vital Signs: Temp: 98.1  F (36.7  C) Temp src: Oral BP: (!) 143/66 Pulse: 91   Resp: 18 SpO2: 91 % O2 Device: None (Room air)    Weight: 296 lbs 9.6 oz    Gen: pleasant, no distress   HEENT: MMM  Neck: no overt asymmetry, difficult to assess with habitus.   CV: appears reasonably well perfused   Resp: breathing comfortably on room air   Abd: obese / large hernia, baseline.   Ext: no pitting edema   Skin: see photos from 5/4 (leg wraps in place today)   Neuro: non-focal   Psych: " calm     Data     I have personally reviewed the following data over the past 24 hrs:    9.9  \   9.6 (L)   / 238     138 102 22.2 /  97   3.0 (L) 27 1.25 (H) \       Imaging results reviewed over the past 24 hrs:   No results found for this or any previous visit (from the past 24 hour(s)).

## 2023-05-06 NOTE — PLAN OF CARE
Problem: Pain Chronic (Persistent)  Goal: Optimal Pain Control and Function  Outcome: Progressing  Intervention: Develop Pain Management Plan  Recent Flowsheet Documentation  Taken 5/6/2023 0596 by Sarah Moreno RN  Pain Management Interventions: medication (see MAR)  Intervention: Manage Persistent Pain  Recent Flowsheet Documentation  Taken 5/6/2023 0115 by Sarah Moreno, RN  Medication Review/Management: medications reviewed   Goal Outcome Evaluation: Pain controlled with prn oxycodone.     Patient slept most of shift. Tessalon Pearls given for cough.

## 2023-05-07 NOTE — PROGRESS NOTES
[facility]    Progress Note - Hospitalist Service       Date of Admission:  4/28/2023    Assessment & Plan   Carolina Mari 68 year old female never smoker with a history of SBO s/p ileal resection and colostomy with subsequent takedown, large ventral hernia with most of small and large bowel herniated, obesity, adult failure to thrive, wheelchair-bound, chronic edema, chronic diarrhea, multiple skin ulcerations (anterior abdomen, perineum, bilateral legs), PUD/GERD, HTN, osteoporosis, hypothyroidism, CKD. Presented to ED on 4/28 with acute encephalopathy, acute on chronic kidney injury, hyperkalemia, peripheral neutrophilia, and shock, admitted to ICU. Shock felt 2/2 sepsis due to left lower shin/ankle cellulitis. Had been on pressors through 5/2; able to wean off and transferred out of ICU that date -- [facility] started following at that time. Has steadily been improving as below.      Failure to thrive   Baseline failure to thrive. Poor mobility, wheel-chair bound. Multiple ulcers/wounds (ventral abdomen, perineum, bilateral legs); improving as below. PT recommending TCU   -PT/OT/CC  -Wound care   -Spiritual consult   -Removing diabetic orders as no confirmed dx diabetes and sugars in <100 range   -Placement plan TCU per rehab      Goals of care   Remain life prolonging per palliative care. She enjoys life when not in the hospital.   -DNR/DNI    -POLST completed   -Son has a HCD he will bring in      Left skin cellulitis, improving   Felt to be etiology of sepsis.   Initial photos 5/1, much improved as of photos 5/4   As of 5/5: on day 7-8 of IV abx (Vanc/Zosyn-->unasyn).   Switched to oral therapy on 5/5   BC's throughout hospitalization: NGTD   White count steadily down-trending   Continues to improve including with switch to oral therapy   Plan:  -Wound care, appreciate   -Abx    Continue oral Augmentin    Plan for 10 days total course; stop date 5/8    -Consider removing  PICC given ability to stay off pressors      Glossitis-stable  New as of 5/6. R/o b12 being low with known hx and no recent replacement. Not clinically consistent with thrush, consider. No eye sx to suggest sjogrens vs other autoimmune.   -Increase oral hydration / cares   -Vitamin B12 normal  -Consider screening sjogrens vs other if not improving with above  -Consider oral glycopyrrolate vs empirically treating thrush vs other med to assist with secretions     Leg pain, improving   Chronic pain    R shoulder pain  Main complaint of patient.  Severe 10/10 pain chronically and acutely, all over especially left lower leg ulcer/cellulitis. Takes oxycodone 10 mg 6 times daily at assisted living, also on duloxetine and pregabalin. Had been on IV opioids prior in hospital stay. Pain continues to improve with treatment of cellulitis as of 5/6. Right shoulder pain began this morning,thought to be muscle strain related to transfers.  -Abx above   -Opioids as per pain consult, appreciate   -Continue PTA duloxetine and pregabalin  -ice/heat as tolerated  -Topical voltaren for R shoulder pain, consider imaging if not improving      Depression, improved    Patient feels more irritable and wants more energy. She felt this way when on Prozac in past. Wants to restart. No SI, no contraindications.   -Restart low dose fluoxetine, tolerating    -Discussed this will take weeks to take effect      Chronic diarrhea, ongoing   H/o SBO, ileal resection, colostomy (taken down), large ventral hernia  Noted. C diff PCR negative. Abdomen is quite large -- likely 2/2 to habitus and known hernia. Stools had been less frequent after holding senna but increased in frequency again 5/6. This is a major complaint and affecting quality of life for the patient.   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 Felt to be 2/2 combination of hypovolemia with low intake from encephalopathy, diuresis with furosemide PTA, chronic diarrhea; ATN due to sepsis with septic shock; and ARB.   Was fluid repleted and s/p pressors.   Renal function gradually improving (Cr to 1.3 as of 5/6) with correcting the above measures.   PLAN:   -Daily BMP   -Nephro consulted, appreciate   -Lasix and lisinopril on hold currently   -Strict I&Os   -Discontinued fluids as she is eating   -Avoid nephrotoxins      HFpEF, not in current exacerbation   Noted hx of   TTE with EF 60-65%, evidence of diastolic dysfunction.  Habitus is large but not felt to be volume up as of 5/6   PLAN:  -Monitor   -Continue PTA diuresis   -May consider repeat imaging vs resuming diuresis in collaboration with nephro in coming days      Chronic anemia  HGB in 9-10s. ACD. Likely in setting of CKD vs other chronic dz's. No evidence of active hemorrhage.  -CBC daily      Acute encephalopathy, resolved   Multifactorial septic-metabolic encephalopathy with sepsis, NEELIMA, hypovolemia, electrolyte derangements. CT head without acute findings. More alert, severe 10/10 pain chronically and acutely, all over especially left lower leg ulcer/cellulitis. Improving s/p ICU stay; near baseline mentation as of 5/4    -Monitor   -Delirium protocol      Resolved during hospitalization:  Shock. Sepsis in setting of cellulitis. S/p several days in ICU. Low threshold to discuss with ICU if recurrent shock.  Hyperkalemia.  Metabolic acidosis.  Lactic acidosis.  Thrombocytosis.       Diet: Moderate Consistent Carb (60 g CHO per Meal) Diet  Snacks/Supplements Adult: Glucerna; With Meals    DVT Prophylaxis: Heparin SQ  Rodriguez Catheter: PRESENT, indication: Retention, Pressure Ulcer with Incontinence;Strict  "1-2 Hour I&O  Fluids: none   Lines: None     Cardiac Monitoring: None  Code Status: No CPR- Do NOT Intubate      Clinically Significant Risk Factors        # Hypokalemia: Lowest K = 3 mmol/L in last 2 days, will replace as needed     # Hypomagnesemia: Lowest Mg = 1.4 mg/dL in last 2 days, will replace as needed   # Hypoalbuminemia: Lowest albumin = 1.9 g/dL at 4/30/2023  3:56 AM, will monitor as appropriate           # Severe Obesity: Estimated body mass index is 43.8 kg/m  as calculated from the following:    Height as of this encounter: 1.753 m (5' 9\").    Weight as of this encounter: 134.5 kg (296 lb 9.6 oz).          Disposition Plan      Expected Discharge Date: 05/08/2023    Discharge Delays: Placement - TCU    Discharge Comments: dressing changes daily        The patient's care was discussed with the Attending Physician, Dr. Gregorio .    Donato Lainez,   Hospitalist Service  [facility]  Securely message with [employer] (more info)  Text page via [email or web address] Paging/Directory   ______________________________________________________________________    Interval History   No acute events     Pt's primary complaint today is Rshoulder pain. She denies any injury, but noted that it seems to be exacerbated with transfers and is requesting topical pain relief. Otherwise she has no new complaints. She just started loperimide yesterday. Denies any fevers, chills, dyspnea.     Physical Exam   Vital Signs: Temp: 98.3  F (36.8  C) Temp src: Oral BP: (!) 172/76 Pulse: 89   Resp: 18 SpO2: 92 % O2 Device: None (Room air)    Weight: 296 lbs 9.6 oz    Gen: pleasant, no distress   HEENT: MMM  Neck: no overt asymmetry, difficult to assess with habitus.   CV: appears reasonably well perfused   Resp: breathing comfortably on room air   Abd: obese / large hernia, baseline.   Ext: no pitting edema. R Shld: limited passive and active ROM in all planes due to pain. No redness or warmth, shoulder joint appears enlarged, " felt to be chronic. Strength intact.   Skin: see photos from 5/4 (leg wraps in place today)   Neuro: non-focal   Psych: calm     Data     I have personally reviewed the following data over the past 24 hrs:    10.1  \   10.1 (L)   / 291     137 101 16.0 /  93   3.3 (L) 26 1.05 (H) \       Imaging results reviewed over the past 24 hrs:   No results found for this or any previous visit (from the past 24 hour(s)).

## 2023-05-07 NOTE — PLAN OF CARE
Patient alert and oriented. Patient reported pain throughout the night, was given prns per mar. Patient utilized ice pack for L shoulder pain as well. Patient slept on and off through the night.

## 2023-05-07 NOTE — PLAN OF CARE
Problem: Infection  Goal: Absence of Infection Signs and Symptoms  Outcome: Progressing     Problem: Pain Chronic (Persistent)  Goal: Optimal Pain Control and Function  Intervention: Manage Persistent Pain  Recent Flowsheet Documentation  Taken 5/6/2023 1700 by Sergio Patel RN  Medication Review/Management: medications reviewed   Goal Outcome Evaluation:         Patient no longer has IV access as PICC line was pulled on previous shift. Mag and K protocol recheck in morning. One large bowel movement this shift. Rodriguez draining straw colored urine. Patient had Oxy one time this shift for chronic pain.

## 2023-05-07 NOTE — PLAN OF CARE
Problem: Plan of Care - These are the overarching goals to be used throughout the patient stay.    Goal: Optimal Comfort and Wellbeing  Intervention: Monitor Pain and Promote Comfort  Recent Flowsheet Documentation  Taken 5/7/2023 1220 by Caro Waggoner RN  Pain Management Interventions:    emotional support    distraction  Taken 5/7/2023 1124 by Caro Waggoner RN  Pain Management Interventions:    emotional support    distraction    medication (see MAR)  Taken 5/7/2023 0846 by Caro Waggoner RN  Pain Management Interventions:    medication (see MAR)    repositioned  Taken 5/7/2023 0751 by Caro Waggoner RN  Pain Management Interventions:    medication (see MAR)    repositioned  Intervention: Provide Person-Centered Care  Recent Flowsheet Documentation  Taken 5/7/2023 0800 by Caro Waggoner RN  Trust Relationship/Rapport: care explained     Problem: Risk for Delirium  Goal: Optimal Coping  Intervention: Optimize Psychosocial Adjustment to Delirium  Recent Flowsheet Documentation  Taken 5/7/2023 0800 by Caro Waggoner RN  Family/Support System Care:    support provided    involvement promoted   Goal Outcome Evaluation:  Patient alert and oriented x4. Moving using johanna lift. Vitals stable on room air. 60 g carb diet with adequate intake. Rodriguez in place for retention. Peripheral IV saline locked. Lymph wraps on BLE, some dried drainage on right leg wrap. Abdominal and buttock wounds open to air. Magnesium and potassium protocol. Pain in lower extremities and abdomen managed using PRN 10 mg oxycodone, scheduled tylenol and scheduled lyrica. Patient complaining of right shoulder pain today, scheduled Voltaren gel applied.

## 2023-05-07 NOTE — PROGRESS NOTES
"Care Management Follow Up    Length of Stay (days): 9    Expected Discharge Date: 05/08/2023     Concerns to be Addressed:     Discharge planning  Patient plan of care discussed at interdisciplinary rounds: Yes    Anticipated Discharge Disposition:  TCU      Anticipated Discharge Services:  TCU   Anticipated Discharge DME:      Patient/family educated on Medicare website which has current facility and service quality ratings:    Education Provided on the Discharge Plan:  yes  Patient/Family in Agreement with the Plan:  yes    Referrals Placed by CM/SW:  yes  Private pay costs discussed: Not applicable    Additional Information:  Chart Reviewed.  Pt is on oral antibiotics.  Daily dressing changes.  Plan is for TCU.  Need OT consult, MD made aware. Referrals pending.      Per previous CM note:   \"Pt agreeable to TCU referrals to be sent and noted that she would like to be close to home.  [facility] TCU declined due to no beds being available. Messages left for [facility] and care home. Care management following.  2:40 PM\"    Social HX: \"Patient lives at [facility] in Assisted Living. She gets assistance with IADLs but not a high level of assistance with ADLs\"  \"Baseline failure to thrive. Poor mobility, wheel-chair bound.\"         Dia Edmondson RN        "

## 2023-05-08 NOTE — PROGRESS NOTES
"[facility] Nurse Inpatient Assessment     Consulted for: Abd, buttocks, BLE    Summary:     Patient History (according to provider note(s):      Carolina Mari is a 68 year old female with morbid obesity, CKD3, DM2, anxiety/depression, chronic pain with opiate dependence who is sent in from assisted living with weakness and body aches.  Son reports confusion, \"delirious\" on Wednesday when he visited.   This AM got a call that she she was hallucinating and was being sent to ER.  Patient reports weakness and abdominal pain.  Unable to give reliable hx due to confusion.  I reached out to RN care manager for more history and left a message.       In the ER: found to have leukocytosis, renal failure and hyperkelamia.  Received 1L saline bolus, calcium gluconate, being started on bicarb infusion.     History is provided by patient, son, and chart    Assessment:      Skin Injury Location: BLE        5/1 R Calf                                                               BLE                                                                L calf                                                                   Last photo: 5/1  Skin injury due to: Venous stasis dermatitis  Skin history and plan of care:   Unable to give full history of wounds due to confusion, these wounds are chronic  Affected area:      Skin assessment: Denudement, Erosion of epidermis and Erythema     Measurements (length x width x depth, in cm)    1. R calf now 2 small areas 1 1 x 0.6 and 0.8 x 0.5cm, other areas healed or scabbed   2. L calf 7.6 x 5 x 0.2 cm     Color: red     Temperature  warm     Drainage: small .      Color: serosanguinous      Odor: mild  Pain: moderate, Irritable or crying out at intervals, facial expression of distress and during dressing change, throbbing, shooting and radiating  Pain interventions prior to dressing change: slow and gentle cares   Treatment goal: Drainage control, Decrease " moisture, Protection and Promote epidermal migration  STATUS: improving  Supplies ordered: at bedside  ___________________________________________________________________________________________________________________________________________________________    Skin Injury Location: abdomen    5/1    Last photo: 5/1  Skin injury due to: scab, previous abdominal surgical wound  Skin history and plan of care:   Hx of abdominal surgery  Affected area:      Skin assessment: Superficial scabbing     Measurements (length x width x depth, in cm) 8  x 2 cm      Color: pink     Temperature  normal      Drainage: none .      Color: none      Odor: none  Pain: absent, none  Pain interventions prior to dressing change: no significant pain present   Treatment goal: Keep dry eschar stable to prevent wet gangrene and Protection  STATUS: stable  Supplies ordered: supplies stored on unit  _____________________________________________________________________________________________________________________________________________    Skin Injury Location: buttocks    5/1  Last photo: 5/1  Skin injury due to: Chronic skin discoloration, Chronic skin injury (often related to prolonged recliner use) and Incontinence associated dermatitis (IAD)  Skin history and plan of care:   Patient is chair bound, chronic incontinence and skin discoloration  Affected area:      Skin assessment: Denudement and chronic discoloration     Measurements (length x width x depth, in cm) pinpoint denudement with moist peeling skin over large area     Color: pink and purple     Temperature  normal      Drainage: scant .      Color: serous      Odor: none  Pain: Irritable or crying out at intervals and with movement, aching  Pain interventions prior to dressing change: slow and gentle cares   Treatment goal: Protection  STATUS: evolving  Supplies ordered: ordered Calmoseptine  Interdry for pannus and groin folds         Treatment Plan:     BLE  1. Soak wounds  with vashe moistened gauze for 5 minutes  2. Apply ammonium lactate lotion from toes to knees around wounds  3. Place pieces of silvercel over weeping calf wounds  4. Secure with gauze roll from toes to knees  5. Bedside nurse to perform wound care twice weekly before Lymphedema wraps    Abdomen  1. Paint wounds with betadine daily, allow to dry    Buttocks  1. Cleanse with bath wipes, pat dry  2. Apply Calmoseptine  3. Do not use briefs in bed, instead use blue/white absorbable underpads    Orders: Updated    RECOMMEND PRIMARY TEAM ORDER: Lymphedema consult  Education provided: importance of repositioning, plan of care, wound progress, Moisture management, Hygiene and Off-loading pressure  Discussed plan of care with: Patient and Nurse  WO nurse follow-up plan: weekly  Notify WOC if wound(s) deteriorate.  Nursing to notify the Provider(s) and re-consult the WOC Nurse if new skin concern.    DATA:     Current support surface: Standard  Low air loss (FABIO pump, Isolibrium, Pulsate, skin guard, etc)  Containment of urine/stool: Incontinence Protocol and Indwelling catheter  BMI: Body mass index is 43.8 kg/m .   Active diet order: Orders Placed This Encounter      Moderate Consistent Carb (60 g CHO per Meal) Diet     Output: I/O last 3 completed shifts:  In: 240 [P.O.:240]  Out: 1550 [Urine:1550]     Labs:   Recent Labs   Lab 05/08/23  0536   HGB 10.5*   WBC 11.3*     Pressure injury risk assessment:   Sensory Perception: 3-->slightly limited  Moisture: 3-->occasionally moist  Activity: 1-->bedfast  Mobility: 2-->very limited  Nutrition: 3-->adequate  Friction and Shear: 1-->problem  Jesus Score: 13    Griselda Steward, PARAMJITN, RN, PHN, HNB-BC, CWOCN  Pager no longer is use, please contact through [employer]INFO group: [facility] [employer] [facility]

## 2023-05-08 NOTE — PROGRESS NOTES
Care Management Follow Up    Length of Stay (days): 10    Expected Discharge Date: 5/9     Concerns to be Addressed:       Patient plan of care discussed at interdisciplinary rounds: Yes    Anticipated Discharge Disposition:       Anticipated Discharge Services:    Anticipated Discharge DME:      Patient/family educated on Medicare website which has current facility and service quality ratings:    Education Provided on the Discharge Plan:    Patient/Family in Agreement with the Plan:      Referrals Placed by CM/SW:    Private pay costs discussed: Not applicable    Additional Information:  CM following . Additional TCU referrals sent today       Maria Elena Muniz RN

## 2023-05-08 NOTE — PROGRESS NOTES
"CLINICAL NUTRITION SERVICES - ASSESSMENT NOTE     Nutrition Prescription    RECOMMENDATIONS FOR MDs/PROVIDERS TO ORDER:  None    Malnutrition Status:    Severe in acute illness    Recommendations already ordered by Registered Dietitian (RD):  Change Glucerna to Ensure enlive bid     Future/Additional Recommendations:  Adjust supplement pending intake, weight, tolerance, acceptance       EVALUATION OF PROGRESS TOWARDS GOALS    CURRENT NUTRITION ORDERS  Diet: Moderate Consistent Carbohydrate  Supplement: Glucerna tid with meals  Intake: Fair, improved, 0-100% of meals but does not order much  Estimate intake with up to 2 Glucerna/day 750 kcal, 44 g protein/day meeting </= 50% of needs    Pt reports her meals are ok. She doesn't like much of the food as it comes too dry and hard which is difficult with her dentures. She reports she is drinking 1-2 Glucerna/day but feels it makes her diarrhea worse  Pt reports her diarrhea is improving.     LABS  Labs reviewed  Mag 1.6 (L), decreased after improving with replacement. On mag replacement protocol    MEDICATIONS  Questran bid, lasix daily,  levothyroxine, protonix,  zocor, immodium qid prn, mvi with minerals  Medications reviewed    ANTHROPOMETRICS  Height: 175.3 cm (5' 9\"[per prior records[)  Most Recent Weight: 134.5 kg (296 lb 9.6oz) 5/2, up 3 lb from day prior    Dosing Weight: 82.7 kg, adjusted wt    ASSESSED NUTRITION NEEDS  Estimated Energy Needs: 1900+ kcals/day (23+ kcals/kg)  Justification: Obese and Wound healing  Estimated Protein Needs: 82-99 grams protein/day (1 - 1.2 grams of pro/kg)  Justification: CKD and Wound healing  Estimated Fluid Needs: 1900+ mL/day (1 mL/kcal)   Justification: Maintenance    PHYSICAL FINDINGS  +3 jeevan LE edema, +2 generalized edema per nsg  Wounds:  BLE venous stasis - improving, abd wound scabbed-stable, per WOC    MALNUTRITION:  % Weight Loss:  None noted- no new weight to assess  % Intake: <50% > 5 days  Subcutaneous Fat Loss:  " None observed  Muscle Loss:  None observed  Fluid Retention:  Moderate+ 2 generalized, + 3 LE perr nsg    Malnutrition Diagnosis: Severe in acute illness      NUTRITION DIAGNOSIS  Increased nutrient needs related to wound healing as evidenced by wounds  - no change    Inadequate intake r/t difficulty chewing, loose stools, pt preferences evidenced by meeting < 50% of needs    INTERVENTIONS  Implementation  -Encouraged pt to ask for immodium as it is prn.   -Encouraged re-trying ordering some other foods as quality may not be the same every time. -Suggested soft, moist foods to order  -Change Glucerna to ensure enlive bid per pt request to improve tolerance    Goals  Wound healing- progressing  Patient to consume % of nutritionally adequate meals three times per day, or the equivalent with supplements/snacks.- not progressing     Monitoring/Evaluation  Supplement acceptance, weight, intake, stooling

## 2023-05-08 NOTE — PROGRESS NOTES
[facility]    Progress Note - Hospitalist Service       Date of Admission:  4/28/2023    Assessment & Plan   Carolina Mari 68 year old female never smoker with a history of SBO s/p ileal resection and colostomy with subsequent takedown, large ventral hernia with most of small and large bowel herniated, obesity, adult failure to thrive, wheelchair-bound, chronic edema, chronic diarrhea, multiple skin ulcerations (anterior abdomen, perineum, bilateral legs), PUD/GERD, HTN, osteoporosis, hypothyroidism, CKD. Presented to ED on 4/28 with acute encephalopathy, acute on chronic kidney injury, hyperkalemia, peripheral neutrophilia, and shock, admitted to ICU. Shock felt 2/2 sepsis due to left lower shin/ankle cellulitis. Had been on pressors through 5/2; able to wean off and transferred out of ICU that date -- [facility] started following at that time. Has steadily been improving as below.      Failure to thrive   Baseline failure to thrive. Poor mobility, wheel-chair bound. Multiple ulcers/wounds (ventral abdomen, perineum, bilateral legs); improving as below. PT recommending TCU   -PT/OT/CC  -Wound care   -Spiritual consult   -Placement plan TCU per rehab      Left skin cellulitis, improving/stable    Felt to be etiology of sepsis.   Initial photos 5/1, much improved as of photos 5/4; clinically improved again as of 5/8    As of 5/5: on day 7-8 of IV abx (Vanc/Zosyn-->unasyn). Switched to oral therapy on 5/5   BC's throughout hospitalization: NGTD   WBC had been down-trending through 5/6; from 9.9->11.3 as of 5/8. Monitoring this closely.   Plan:  -Wound care, appreciate   -Continue to follow CBC / clinical appearance of wounds   -Abx    Continue oral Augmentin    Last day abx to complete 10-day course 5/8   -Lines/drains:   PICC removed    Rodriguez still in place, look to discontinue if possible to avoid CAUTI     Leg pain, improving   Chronic pain    R shoulder pain  Main complaint of  patient.  Severe 10/10 pain chronically and acutely, all over especially left lower leg ulcer/cellulitis. Takes oxycodone 10 mg 6 times daily at assisted living, also on duloxetine and pregabalin. Had been on IV opioids prior in hospital stay. Pain continues to improve with treatment of cellulitis as of 5/6. R shoulder pain ongoing; acute strain with transfers vs underlying OA / cuff tendinopathy.   -Abx above   -Opioids as per pain consult, appreciate   -Continue PTA pregabalin  -Duloxetine stopped given her preference for SSRI  -ice/heat as tolerated  -Shoulder pain    Topical voltaren, lidocaine    Encouraged ranging the shoulder and working with PT      Chronic diarrhea, ongoing   H/o SBO, ileal resection, colostomy (taken down), large ventral hernia  Noted. C diff PCR negative. Abdomen is quite large -- likely 2/2 to habitus and known hernia. Stools had been less frequent after holding senna but increased in frequency again 5/6. This is a major complaint and affecting quality of life for the patient.   -At risk with ongoing abx   -Continue PTA cholestyramine  -increase loperamide frequency 5/8   -Bowel meds are PRN rather than scheduled with ongoing diarrhea               Watch carefully while she is on opioid   -Consider restarting loperamide at home, can restart if needed  -Continue diet, ADAT     HFpEF, not in current exacerbation   Noted hx of   TTE with EF 60-65%, evidence of diastolic dysfunction.  Habitus is large but not felt to be volume up as of 5/6   PLAN:  -Monitor   -Continue PTA diuresis   -May consider repeat imaging vs resuming diuresis in collaboration with nephro in coming days      Chronic anemia  HGB in 9-10s. ACD. Likely in setting of CKD vs other chronic dz's. No evidence of active hemorrhage.  -CBC daily     Depression, improved    Patient feels more irritable and wants more energy. She felt this way when on Prozac in past. Wants to restart. No SI, no contraindications.   -Restart low  "dose fluoxetine, tolerating    -Discontinued SNRI  -Discussed this will take weeks to take effect     Goals of care   Remain life prolonging per palliative care. She enjoys life when not in the hospital.   -DNR/DNI    -POLST completed   -Son has a HCD he will bring in      Resolved during hospitalization:  Shock. Sepsis in setting of cellulitis. S/p several days in ICU. Low threshold to discuss with ICU if recurrent shock.  Hyperkalemia.  Metabolic acidosis.  Lactic acidosis.  Thrombocytosis.  Glossitis. B12 WNL. 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        Diet: Moderate Consistent Carb (60 g CHO per Meal) Diet  Snacks/Supplements Adult: Glucerna; With Meals    DVT Prophylaxis: Heparin SQ  Rodriguez Catheter: PRESENT, indication: Retention, Pressure Ulcer with Incontinence;Strict 1-2 Hour I&O  Fluids: none   Lines: None     Cardiac Monitoring: None  Code Status: No CPR- Do NOT Intubate      Clinically Significant Risk Factors        # Hypokalemia: Lowest K = 3.3 mmol/L in last 2 days, will replace as needed     # Hypomagnesemia: Lowest Mg = 1.5 mg/dL in last 2 days, will replace as needed   # Hypoalbuminemia: Lowest albumin = 1.9 g/dL at 4/30/2023  3:56 AM, will monitor as appropriate           # Severe Obesity: Estimated body mass index is 43.8 kg/m  as calculated from the following:    Height as of this encounter: 1.753 m (5' 9\").    Weight as of this encounter: 134.5 kg (296 lb 9.6 oz).          Disposition Plan      Expected Discharge Date: 05/08/2023    Discharge Delays: Placement - TCU  *Medically Ready for Discharge    Discharge Comments: dressing changes daily        The patient's care was discussed with the attending, Dr. Ani Gaona MD  Hospitalist Service  [facility]  Securely message with [employer] (more info)  Text page via [employer] Paging/Directory "   ______________________________________________________________________    Interval History   No acute events.     Continues to have R shoulder pain despite voltaren. Wants something more.     Still having diarrhea despite loperamide.   Denies any burning with the mo. Abdominal pain is better compared to last week.     Mouth / tongue is feeling much better.     Physical Exam   Vital Signs: Temp: 98.2  F (36.8  C) Temp src: Oral BP: 138/68 Pulse: 91   Resp: 18 SpO2: 93 % O2 Device: None (Room air)    Weight: 296 lbs 9.6 oz    Gen: pleasant   HEENT: MMM  Neck: large neck due to habitus   CV: appears reasonably well perfused   Resp: breathing comfortably on room air   Abd: obese, large hernia. Midline wound appears as it did last week.   : mo in place   Ext: TTP R shoulder.    Skin: LE wounds appear to be improving relative to last week -- observed with RN.   Neuro: non focal, sensation intact throughout.   Psych: calm, irritable when leg wraps are being taken off     Data     I have personally reviewed the following data over the past 24 hrs:    11.3 (H)  \   10.5 (L)   / 352     136 101 15.3 /  106 (H)   3.7 23 0.99 (H) \       Imaging results reviewed over the past 24 hrs:   No results found for this or any previous visit (from the past 24 hour(s)).

## 2023-05-08 NOTE — PLAN OF CARE
"  Problem: Risk for Delirium  Goal: Improved Attention and Thought Clarity  Outcome: Progressing   Goal Outcome Evaluation:      Plan of Care Reviewed With: patient      Is alert x4, reports she slept well lastnight.    Problem: Oral Intake Inadequate  Goal: Improved Oral Intake  Outcome: Progressing   Did not want to eat breakfast but is finishing lunch right now. Is afraid to eat much because she is worried she will have diarrhea. Spoke with MD, immodium medication was given. Has had 2 loose stools so far this shift.     Problem: Infection  Goal: Absence of Infection Signs and Symptoms  Outcome: Progressing   Dressings were changed this morning to bilateral lower legs. WOCN saw patient. Lymph wraps will be replaced later this afternoon by therapy. Patient reports her legs to look better to her.     Problem: Pain Chronic (Persistent)  Goal: Optimal Pain Control and Function  Outcome: Progressing   Continues to have chronic pain \"from the waist down\" left leg is worse than the right. Reports right shoulder pain as well, ointment applied as order. Is tolerating oral pain medication.     Problem: Plan of Care - These are the overarching goals to be used throughout the patient stay.    Goal: Absence of Hospital-Acquired Illness or Injury  Intervention: Identify and Manage Fall Risk  Recent Flowsheet Documentation  Taken 5/8/2023 0900 by Maru Montes, RN  Safety Promotion/Fall Prevention: safety round/check completed   Is having therapy at about 1pm today, patient is wanting to sit at the bedside in the wheelchair.              "

## 2023-05-08 NOTE — PLAN OF CARE
Problem: Plan of Care - These are the overarching goals to be used throughout the patient stay.    Goal: Plan of Care Review  Description: The Plan of Care Review/Shift note should be completed every shift.  The Outcome Evaluation is a brief statement about your assessment that the patient is improving, declining, or no change.  This information will be displayed automatically on your shift note.  Outcome: Progressing     Problem: Plan of Care - These are the overarching goals to be used throughout the patient stay.    Goal: Readiness for Transition of Care  Outcome: Progressing     Problem: Pain Chronic (Persistent)  Goal: Optimal Pain Control and Function  Outcome: Progressing   Goal Outcome Evaluation:    VSS. Pt having pain in shoulders and lower extremities, PRN medications given. Ppt resistant to repositioning and ambulation. Rodriguez in place and patent. Incontinent of loose stool.

## 2023-05-08 NOTE — PLAN OF CARE
Problem: Pain Chronic (Persistent)  Goal: Optimal Pain Control and Function  Intervention: Manage Persistent Pain  Recent Flowsheet Documentation  Taken 5/7/2023 1700 by Sergio Patel RN  Medication Review/Management: medications reviewed     Problem: Electrolyte Imbalance  Goal: Electrolyte Imbalance: Plan of Care  Outcome: Progressing     Problem: Infection  Goal: Absence of Infection Signs and Symptoms  Outcome: Progressing   Goal Outcome Evaluation:             Patient A and O times 4 but resistant to ambulation. Patient stayed in bed whole shift. K and mag protocol, recheck both in morning. Rodriguez in place, draining well. Pain managed with oxy 10 mg PO PRN. Pain in shoulder managed with Voltaren gel and Bengay.

## 2023-05-09 NOTE — PROGRESS NOTES
[facility]    Progress Note - Hospitalist Service       Date of Admission:  4/28/2023    Assessment & Plan   Carolina Mari 68 year old female never smoker with a history of SBO s/p ileal resection and colostomy with subsequent takedown, large ventral hernia with most of small and large bowel herniated, obesity, adult failure to thrive, wheelchair-bound, chronic edema, chronic diarrhea, multiple skin ulcerations (anterior abdomen, perineum, bilateral legs), PUD/GERD, HTN, osteoporosis, hypothyroidism, CKD. Presented to ED on 4/28 with acute encephalopathy, acute on chronic kidney injury, hyperkalemia, peripheral neutrophilia, and shock, admitted to ICU. Shock felt 2/2 sepsis due to left lower shin/ankle cellulitis. Had been on pressors through 5/2; able to wean off and transferred out of ICU that date -- [facility] started following at that time. Has steadily been improving as below.       Failure to thrive, improving    Baseline failure to thrive. Poor mobility, wheel-chair bound; working hard on therapy. Multiple ulcers/wounds (ventral abdomen, perineum, bilateral legs); improving as below. PT recommending TCU   -PT/OT/CC  -Wound care   -Placement plan TCU per rehab      Left skin cellulitis, improving/stable    Felt to be etiology of initial sepsis.   Initial photos 5/1, much improved as of photos 5/4; clinically improved again as of 5/8    As of 5/5: on day 7-8 of IV abx (Vanc/Zosyn-->unasyn). Switched to oral therapy on 5/5   BC's throughout hospitalization: NGTD   Completed 10-day course of abx as of 5/8   Clinically improving through 5/8 to 5/9   Plan:  -Wound care, appreciate   -Continue to follow CBC / clinical appearance of wounds   -No further abx at this time   -Lines/drains:   PICC removed    Rodriguez -- attempt removal today. Per protocol      Leg pain, improving   Chronic pain    R shoulder pain  Main complaint of patient.  Severe 10/10 pain chronically and acutely, all  over especially left lower leg ulcer/cellulitis. Takes oxycodone 10 mg 6 times daily at assisted living, also on duloxetine and pregabalin. Had been on IV opioids prior in hospital stay. Pain continues to improve with treatment of cellulitis as of 5/6. R shoulder pain ongoing; acute strain with transfers vs underlying OA / cuff tendinopathy.   -Abx above   -Opioids as per pain consult, appreciate   -Continue PTA pregabalin  -Duloxetine stopped given her preference for SSRI  -ice/heat as tolerated  -Shoulder pain    Topical voltaren, lidocaine    Encouraged ranging the shoulder and working with PT      Chronic diarrhea, ongoing   H/o SBO, ileal resection, colostomy (taken down), large ventral hernia  Noted. C diff PCR negative. Abdomen is quite large -- likely 2/2 to habitus and known hernia. Stools had been less frequent after holding senna but increased in frequency again 5/6. This is a major complaint and affecting quality of life for the patient.   -At risk with ongoing abx   -Continue PTA cholestyramine  -increase loperamide frequency 5/8   -Bowel meds are PRN rather than scheduled with ongoing diarrhea               Watch carefully while she is on opioid   -Consider restarting loperamide at home, can restart if needed  -Continue diet, ADAT     HFpEF, not in current exacerbation   Noted hx of   TTE with EF 60-65%, evidence of diastolic dysfunction.  Habitus is large but not felt to be volume up as of 5/6   PLAN:  -Monitor   -Continue PTA diuresis   -May consider repeat imaging vs resuming diuresis in collaboration with nephro in coming days      Chronic anemia  HGB in 9-10s. ACD. Likely in setting of CKD vs other chronic dz's. No evidence of active hemorrhage.  -CBC daily     Depression, improved    Patient feels more irritable and wants more energy. She felt this way when on Prozac in past. Wants to restart. No SI, no contraindications.   -Restart low dose fluoxetine, tolerating    -Discontinued  "SNRI  -Discussed this will take weeks to take effect     Goals of care   Remain life prolonging per palliative care. She enjoys life when not in the hospital.   -DNR/DNI    -POLST completed   -Son has a HCD he will bring in      Resolved during hospitalization:  Shock. Sepsis in setting of cellulitis. S/p several days in ICU. Low threshold to discuss with ICU if recurrent shock.  Hyperkalemia.  Metabolic acidosis.  Lactic acidosis.  Thrombocytosis.  Glossitis. B12 WNL. 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        Diet: Moderate Consistent Carb (60 g CHO per Meal) Diet  Snacks/Supplements Adult: Ensure Enlive; With Meals    DVT Prophylaxis: Heparin SQ  Rodriguez Catheter: Not present  Fluids: none   Lines: None     Cardiac Monitoring: None  Code Status: No CPR- Do NOT Intubate      Clinically Significant Risk Factors            # Hypomagnesemia: Lowest Mg = 1.3 mg/dL in last 2 days, will replace as needed   # Hypoalbuminemia: Lowest albumin = 1.9 g/dL at 4/30/2023  3:56 AM, will monitor as appropriate           # Severe Obesity: Estimated body mass index is 43.98 kg/m  as calculated from the following:    Height as of this encounter: 1.753 m (5' 9\").    Weight as of this encounter: 135.1 kg (297 lb 13.5 oz).   # Severe Malnutrition: based on nutrition assessment        Disposition Plan      Expected Discharge Date: 05/10/2023, 11:40 AM  Discharge Delays: *Medically Ready for Discharge    Discharge Comments: dressing changes daily        The patient's care was discussed with the attending, Dr. Darline Gaona MD  Hospitalist Service  [facility]  Securely message with "[employer] (more info)  Text page via [facility] Paging/Directory   ______________________________________________________________________    Interval History   No acute events   Staff notes reviewed     Patient feels sore today   Worked hard on rehab yesterday   Ready to get " the mo out     Shoulder pain better   No new complaints     Physical Exam   Vital Signs: Temp: 98  F (36.7  C) Temp src: Oral BP: 136/67 Pulse: 91   Resp: 16 SpO2: 96 % O2 Device: None (Room air)    Weight: 297 lbs 13.46 oz    Gen: pleasant   HEENT: MMM  CV: appears reasonably well perfused   Resp: breathing comfortably on room air   Abd: obese, large hernia   : mo in place   Skin: wraps / dressing in place on b/l LE   Neuro: non focal   Psych: calm     Data     I have personally reviewed the following data over the past 24 hrs:    N/A  \   N/A   / N/A     137 101 16.6 /  99   3.5 24 0.98 (H) \       Imaging results reviewed over the past 24 hrs:   No results found for this or any previous visit (from the past 24 hour(s)).

## 2023-05-09 NOTE — PROGRESS NOTES
Care Management Follow Up    Length of Stay (days): 11    Expected Discharge Date: 05/09/2023     Concerns to be Addressed:       Patient plan of care discussed at interdisciplinary rounds: Yes    Anticipated Discharge Disposition:  TCU     Anticipated Discharge Services:    Anticipated Discharge DME:      Patient/family educated on Medicare website which has current facility and service quality ratings:    Education Provided on the Discharge Plan:    Patient/Family in Agreement with the Plan:      Referrals Placed by CM/SW:    Private pay costs discussed: Not applicable    Additional Information:  Patient ready for discharge. Referral sent to [facility]. Spoke to Morgan. She is reviewing and will call CM back     2:34 PM  Morgan accepted at [facility] for tomorrow. Met with patient. She is agreeable. Requests I call son Tim to update. Called son. He wants to know if [facility] could accept because patient has been there before and it is closer to him. Told him I can send referral but if they cannot accept or do not respond by tomorrow will need to proceed with [facility]. Son understands and is agreeable.       Maria Elena Muniz RN

## 2023-05-09 NOTE — PLAN OF CARE
Problem: Plan of Care - These are the overarching goals to be used throughout the patient stay.    Goal: Optimal Comfort and Wellbeing  Outcome: Progressing  Intervention: Monitor Pain and Promote Comfort  Recent Flowsheet Documentation  Taken 5/9/2023 0006 by Corazon Dixon RN  Pain Management Interventions:    medication (see MAR)    distraction  Intervention: Provide Person-Centered Care  Recent Flowsheet Documentation  Taken 5/9/2023 0006 by Corazon Dixon RN  Trust Relationship/Rapport: care explained     Problem: Risk for Delirium  Goal: Improved Behavioral Control  Outcome: Progressing  Intervention: Minimize Safety Risk  Recent Flowsheet Documentation  Taken 5/9/2023 0006 by Corazon Dixon RN  Trust Relationship/Rapport: care explained     Problem: Pain Chronic (Persistent)  Goal: Optimal Pain Control and Function  Outcome: Progressing  Intervention: Develop Pain Management Plan  Recent Flowsheet Documentation  Taken 5/9/2023 0006 by Corazon Dixon RN  Pain Management Interventions:    medication (see MAR)    distraction  Intervention: Manage Persistent Pain  Recent Flowsheet Documentation  Taken 5/9/2023 0006 by Corazon Dixon RN  Medication Review/Management: medications reviewed   Goal Outcome Evaluation:  Pt is alert and oriented x3. With confusion with time and day.  VS WNL. Pt was in pain 8/10 at midnight to BLE PRN Oxycodone 10 mg given. Repositioning done. Pt is cooperative. BLE on lymphedema wrap. Protective cream applied to buttocks. On K and Mag protocol, Mag is 1.3 and K is 3.5 today 0600AM. IFC draining well with 350 ml output.

## 2023-05-09 NOTE — PLAN OF CARE
Goal Outcome Evaluation:    Problem: Plan of Care - These are the overarching goals to be used throughout the patient stay.    Goal: Absence of Hospital-Acquired Illness or Injury  Intervention: Prevent and Manage VTE (Venous Thromboembolism) Risk  Recent Flowsheet Documentation  Taken 5/9/2023 8167 by Maru Montes RN  VTE Prevention/Management: (lymphedema wraps on both legs) --   Has bilateral lymphedema wraps on her legs. Legs elevated on pillows while in bed.       Problem: Oral Intake Inadequate  Goal: Improved Oral Intake  Outcome: Progressing   Is eating only 2 meals per day because she says she doesn't want to have diarrhea. She feels like she is getting enough to eat. Imodium has been given per request     Problem: Electrolyte Imbalance  Goal: Electrolyte Imbalance: Plan of Care  Outcome: Progressing   Did need magnesium replacement, re check for most current lab will be done this evening.    Problem: Pain Chronic (Persistent)  Goal: Optimal Pain Control and Function  Outcome: Progressing   Continues to complain of pain in the right shoulder and left knee. Ointment has been applied per order and reposition has been done in bed. Encourage patient to get out of bed to the wheel chair, reports she is just too weak to do it. Explained the the johanna lift can be used for transfers

## 2023-05-09 NOTE — PLAN OF CARE
"  Problem: Plan of Care - These are the overarching goals to be used throughout the patient stay.    Goal: Plan of Care Review  Description: The Plan of Care Review/Shift note should be completed every shift.  The Outcome Evaluation is a brief statement about your assessment that the patient is improving, declining, or no change.  This information will be displayed automatically on your shift note.  Outcome: Progressing  Goal: Patient-Specific Goal (Individualized)  Description: You can add care plan individualizations to a care plan. Examples of Individualization might be:  \"Parent requests to be called daily at 9am for status\", \"I have a hard time hearing out of my right ear\", or \"Do not touch me to wake me up as it startles me\".  Outcome: Progressing  Goal: Absence of Hospital-Acquired Illness or Injury  Outcome: Progressing  Intervention: Identify and Manage Fall Risk  Recent Flowsheet Documentation  Taken 5/8/2023 1600 by Raymond Stout RN  Safety Promotion/Fall Prevention:   activity supervised   assistive device/personal items within reach  Goal: Optimal Comfort and Wellbeing  Outcome: Progressing  Intervention: Monitor Pain and Promote Comfort  Recent Flowsheet Documentation  Taken 5/8/2023 1736 by Raymond Stout RN  Pain Management Interventions:   medication (see MAR)   distraction  Intervention: Provide Person-Centered Care  Recent Flowsheet Documentation  Taken 5/8/2023 1600 by Raymond Stout RN  Trust Relationship/Rapport: care explained  Goal: Readiness for Transition of Care  Outcome: Progressing     Problem: Risk for Delirium  Goal: Optimal Coping  Outcome: Progressing  Goal: Improved Behavioral Control  Outcome: Progressing  Intervention: Minimize Safety Risk  Recent Flowsheet Documentation  Taken 5/8/2023 1600 by Raymond Stout RN  Trust Relationship/Rapport: care explained  Goal: Improved Attention and Thought Clarity  Outcome: Progressing  Goal: Improved Sleep  Outcome: " Progressing     Problem: Oral Intake Inadequate  Goal: Improved Oral Intake  Outcome: Progressing     Problem: Infection  Goal: Absence of Infection Signs and Symptoms  Outcome: Progressing     Problem: Hyperglycemia  Goal: Blood Glucose Level Within Targeted Range  Outcome: Progressing     Problem: Fluid Volume Excess  Goal: Fluid Balance  Outcome: Progressing     Problem: Electrolyte Imbalance  Goal: Electrolyte Imbalance: Plan of Care  Outcome: Progressing     Problem: Pain Chronic (Persistent)  Goal: Optimal Pain Control and Function  Outcome: Progressing  Intervention: Develop Pain Management Plan  Recent Flowsheet Documentation  Taken 5/8/2023 1736 by Raymond Stout RN  Pain Management Interventions:   medication (see MAR)   distraction  Intervention: Manage Persistent Pain  Recent Flowsheet Documentation  Taken 5/8/2023 1600 by Raymond Stout RN  Medication Review/Management: medications reviewed   Goal Outcome Evaluation:         Pt complain of loose stool and request PRN imodium, writer administered the medication as prescribed. Pt have a Pain 8/10 and PRN oxycodone as been administered and relived. Vitals stable and will continue to monitor.

## 2023-05-10 NOTE — PROGRESS NOTES
Care Management Discharge Note    Discharge Date: 05/10/2023       Discharge Disposition:  [facility]    Discharge Services:  TCU  Discharge DME:  walker    Discharge Transportation:  [facility] Wheelchair     Private pay costs discussed: Not applicable    Does the patient's insurance plan have a 3 day qualifying hospital stay waiver?  No    PAS Confirmation Code: [id number]  Patient/family educated on Medicare website which has current facility and service quality ratings:      Education Provided on the Discharge Plan:    Persons Notified of Discharge Plans:  left for patient's yves Dumont @ 8:15am.  Patient/Family in Agreement with the Plan:      Handoff Referral Completed: Yes    Additional Information:  Patient accepted to [facility] in [place] - this is family's first choice.  PAS updated via [employer].   Ride time provided to admissions coordinator at [facility] and discharge orders faxed.  [facility] updated.   Ride time 11:40am.     11:16 AM  Updated yves Dumont by phone, he is aware [facility].      Swati Hua, VICENTE

## 2023-05-10 NOTE — PLAN OF CARE
"  Problem: Fluid Volume Excess  Goal: Fluid Balance  Outcome: Progressing     Problem: Plan of Care - These are the overarching goals to be used throughout the patient stay.    Goal: Plan of Care Review  Description: The Plan of Care Review/Shift note should be completed every shift.  The Outcome Evaluation is a brief statement about your assessment that the patient is improving, declining, or no change.  This information will be displayed automatically on your shift note.  Outcome: Progressing  Goal: Patient-Specific Goal (Individualized)  Description: You can add care plan individualizations to a care plan. Examples of Individualization might be:  \"Parent requests to be called daily at 9am for status\", \"I have a hard time hearing out of my right ear\", or \"Do not touch me to wake me up as it startles me\".  Outcome: Progressing  Goal: Absence of Hospital-Acquired Illness or Injury  Outcome: Progressing  Goal: Optimal Comfort and Wellbeing  Outcome: Progressing  Intervention: Provide Person-Centered Care  Recent Flowsheet Documentation  Taken 5/9/2023 1600 by Raymond Stout RN  Trust Relationship/Rapport: care explained  Goal: Readiness for Transition of Care  Outcome: Progressing     Problem: Risk for Delirium  Goal: Optimal Coping  Outcome: Progressing  Goal: Improved Behavioral Control  Outcome: Progressing  Intervention: Minimize Safety Risk  Recent Flowsheet Documentation  Taken 5/9/2023 1600 by Raymond Stout RN  Trust Relationship/Rapport: care explained  Goal: Improved Attention and Thought Clarity  Outcome: Progressing  Goal: Improved Sleep  Outcome: Progressing     Problem: Oral Intake Inadequate  Goal: Improved Oral Intake  Outcome: Progressing     Problem: Infection  Goal: Absence of Infection Signs and Symptoms  Outcome: Progressing     Problem: Hyperglycemia  Goal: Blood Glucose Level Within Targeted Range  Outcome: Progressing     Problem: Fluid Volume Excess  Goal: Fluid Balance  Outcome: " Progressing     Problem: Electrolyte Imbalance  Goal: Electrolyte Imbalance: Plan of Care  Outcome: Progressing     Problem: Pain Chronic (Persistent)  Goal: Optimal Pain Control and Function  Outcome: Progressing   Goal Outcome Evaluation:       Pt is alert and oriented x4, tried to get her out of the bed and sit in the chair but refused to get up from the bed. Catheter has been removed per Nurse protocol, Purewick has been place and pt not urinate after 4hr of removal, bladder scan has been done within 4 hr and 190 ml. VS, WDL and will continue to monitor.

## 2023-05-10 NOTE — PLAN OF CARE
Occupational Therapy Discharge Summary    Reason for therapy discharge:    Discharged to transitional care facility.    Progress towards therapy goal(s). See goals on Care Plan in [facility] electronic health record for goal details.  Goals partially met.  Barriers to achieving goals:   discharge from facility.    Therapy recommendation(s):    Continued therapy is recommended.  Rationale/Recommendations:  to improve ADL independence.

## 2023-05-10 NOTE — PLAN OF CARE
Problem: Plan of Care - These are the overarching goals to be used throughout the patient stay.    Goal: Plan of Care Review  Description: The Plan of Care Review/Shift note should be completed every shift.  The Outcome Evaluation is a brief statement about your assessment that the patient is improving, declining, or no change.  This information will be displayed automatically on your shift note.  Outcome: Progressing   Goal Outcome Evaluation:         Oxycodone q 6 hrs for generalized pain. Patient unable to void after mo removal. Multiple bladder scans done showing less then 200ml of urine. Dr. Bedolla notified.  Orders given for straight cath x1 with 950ml of urine out. VSS. Loose stool x1. Imodium given. Patient declined repositioning. Calmoseptine cream applied to ryan area.

## 2023-05-10 NOTE — PROGRESS NOTES
Patient discharged to [facility] via [facility] wheelchair ride. Paperwork and belongings sent with patient.

## 2023-05-10 NOTE — TELEPHONE ENCOUNTER
[facility] phone call message - order or referral request for patient:     Order or referral being requested:     Ureo Jet       Additional Comments:     Unsure if I spell it correctly, however caller advise Dr. Chong saw patient at [facility] and he said he was going to put in the order, which he never did. Please place in the order so caller can insert in the capacitor     Please call back and advise. Thank you    Caller did advise that when he call, phone number is the general phone number, however they will transfer call.     OK to leave a message on voice mail? Yes    Primary language: English      needed? No    Call taken on May 10, 2023 at 8:41 AM by Aminah Baker

## 2023-05-10 NOTE — PROGRESS NOTES
Patient very anxious about leaving the hospital today was tearful. Patient agreed to application of essential oils (inhalation method use of calming and orthopedic oil) and agreed to lavendar hand and arm massage. Massage was done to both arms and hands for about 7-8 minutes. After complete, patient resting quietly with her eyes closed in bed, reports that she feels better. Is no longer tearful, is resting well.

## 2023-05-10 NOTE — PROGRESS NOTES
[facility] GERIATRICS  Primary Care Provider & Clinic: Le Romo MD, [address] / [facility] [address]  Chief Complaint   Patient presents with     Hospital F/U     [facility] 4/28/2023 - 5/10/2023     [place] Medical Record Number: [id number]  Place of Service Where Encounter Took Place: [facility] (U/SNF) [[id number]]    Carolina Mari is a 68 year old (1955), admitted to the above facility from  [facility]. Hospital stay 4/28/23 through 5/10/23.  Patient's living condition: lives in an assisted living facility    HPI:    Brief Summary of Hospital Course: Treated for left leg cellulitis and sepsis.  Complications included acute kidney injury, hyperkalemia and shock.  He has a past medical history of large ventral hernia with chronic wound, obesity, adult failure to thrive, chronic edema, chronic diarrhea, GERD, hypertension, osteoporosis, hypothyroidism and CKD.  MEDICATION CHANGES: Start fluoxetine and melatonin, stopped duloxetine.  RECOMMENDED FOLLOW UP: [facility] is requested within 2-4 weeks  Updates on Status Since Skilled nursing Admission: None    Today: Patient reports chronic diarrhea.  Would like Imodium and states that she has some in her room but she has not been able to get any from the staff.  She does have a Rodriguez catheter and she states this was placed at the hospital because she was unable to void.  Likely diagnosis is neurogenic bladder.  For her chronic diarrhea she was tested for C. difficile on 4/30 and the stool was negative.  She has no increase in abdominal pain or cramping.  She reports chronic leg pain and chronic abdominal pain.  She reports that she slept well last night after not getting much sleep at the hospital.  She reports that she has no concerns with eating or fluid intake.  Nurse requesting verification of diagnoses for medications.  Several diagnoses for medications written.  Is  also requesting diagnosis for Rodriguez catheter.    External notes reviewed: Facility EHR including progress notes, vital signs. Hospital discharge summary reviewed. Hospital discharge orders reviewed.  POLST reviewed and signed today    CODE STATUS/ADVANCE DIRECTIVES DISCUSSION: DNR/DNI    ALLERGIES:   Allergies   Allergen Reactions     Aspirin Other (See Comments)     History of ulcers       Colchicine Angioedema and Swelling     And gout flare ups     Colchicine Angioedema and Swelling     And gout flare ups      PAST MEDICAL HISTORY:   Past Medical History:   Diagnosis Date     History of stomach ulcers      HTN (hypertension)      Knee osteoarthritis     right     Osteoarthritis of right hip      Osteoporosis      Osteoporosis      Thyroid disease       PAST SURGICAL HISTORY:   has a past surgical history that includes tka; gastric bypass; Foot surgery; SACROPLASTY; joint replacement; hernia repair; Amputate toe(s) (Bilateral); REMOVAL GALLBLADDER (N/A, 12/25/2016); and PICC/Midline Placement (4/28/2023).  FAMILY HISTORY: family history includes Breast Cancer in her mother; Cancer in her brother and mother; Cancer (age of onset: 20.00) in her brother; Coronary Artery Disease in her brother, brother, and father.  SOCIAL HISTORY:   reports that she has never smoked. She has never used smokeless tobacco. She reports that she does not drink alcohol and does not use drugs.    Post Discharge Medication Reconciliation Status: discharge medications reconciled, continue medications without change  Current Outpatient Medications   Medication Sig     acetaminophen (TYLENOL) 500 MG tablet Take 1,000 mg by mouth 3 times daily      alum & mag hydroxide-simethicone (MAALOX) 200-200-20 MG/5ML SUSP suspension Take 30 mLs by mouth every 6 hours as needed for indigestion Moderate heartburn or indigestion     Ascorbic Acid (VITAMIN C) 500 MG CAPS Take 500 mg by mouth daily     bacitracin 500 UNIT/GM OINT Apply topically 2 times daily  as needed for wound care Minor or superficial abrasions, lacerations, or sores     calcium carbonate (TUMS) 500 MG chewable tablet Take 1-2 chew tab by mouth every 6 hours as needed for heartburn Mild heartburn or indigestion     carbamide peroxide (DEBROX) 6.5 % otic solution Place 5 drops into both ears daily as needed for other (earwax buildup)     cetirizine (ZYRTEC) 10 MG tablet Take 10 mg by mouth 2 times daily     cholestyramine light (PREVALITE) 4 GM powder Take 4 g by mouth 2 times daily diarrhea     diclofenac (VOLTAREN) 1 % topical gel Apply 2 g topically 4 times daily To affected area     dimethicone 1 % external cream Apply topically 2 times daily To periarea/buttocks     docusate sodium (COLACE) 100 MG capsule Take 100 mg by mouth 2 times daily as needed for constipation Mild constipation (no BM in 1-2 days)     famotidine (PEPCID) 20 MG tablet Take 20 mg by mouth daily     ferrous sulfate (FEROSUL) 325 (65 Fe) MG tablet Take 325 mg by mouth daily (with breakfast)     FLUoxetine (PROZAC) 20 MG capsule Take 1 capsule (20 mg) by mouth daily     furosemide (LASIX) 40 MG tablet Take 40 mg by mouth daily     glycerin (ADULT) 2 g suppository Place 1 suppository rectally daily as needed for constipation Moderate constipation (no BM in 3-5 days)     guaiFENesin (ROBITUSSIN) 20 mg/mL liquid Take 5-10 mLs by mouth every 4 hours as needed for cough 5 ml mild cough  10ml moderate/severe cough     hydrocortisone (CORTAID) 1 % external cream Apply topically 2 times daily as needed for itching     hydrocortisone, Perianal, (ANUSOL-HC) 2.5 % cream Place rectally 2 times daily as needed for hemorrhoids     levothyroxine (SYNTHROID/LEVOTHROID) 200 MCG tablet Take 225 mcg by mouth daily     lidocaine patch in PLACE Place 1 patch onto the skin every morning Remove at HS     loperamide (IMODIUM) 2 MG capsule Take 1 capsule (2 mg) by mouth 4 times daily as needed for diarrhea     loperamide (IMODIUM) 2 MG capsule Take 1  capsule (2 mg) by mouth 4 times daily as needed for diarrhea     losartan (COZAAR) 100 MG tablet Take 100 mg by mouth daily Hold if SBP less than 100     magnesium hydroxide (MOM) 2400 MG/10ML SUSP Take 30 mLs by mouth daily as needed for constipation For MILD constipation (no BM in 2-3 days)     melatonin 3 MG tablet Take 1 tablet (3 mg) by mouth nightly as needed for sleep     metoprolol tartrate (LOPRESSOR) 100 MG tablet Take 50 mg by mouth 2 times daily Hold for SBP < 100 or HR < 60     miconazole (MICATIN) 2 % AERP powder Apply topically 2 times daily as needed for other (to abdominal/groin folds)     mineral oil-hydrophilic petrolatum (AQUAPHOR) external ointment Apply topically as needed for dry skin     NARCAN 4 MG/0.1ML nasal spray CALL 911. SPR CONTENTS OF ONE SPRAYER (0.1ML) INTO ONE NOSTRIL. REPEAT IN 2-3 MIN IF SYMPTOMS OF OPIOID EMERGENCY PERSIST, ALTERNATE NOSTRILS     nystatin (MYCOSTATIN) 250926 UNIT/GM external powder Apply topically 2 times daily as needed for other (skin irritation)     oxyCODONE IR (ROXICODONE) 10 MG tablet Take 1 tablet (10 mg) by mouth every 6 hours as needed for moderate pain or severe pain     polyethylene glycol-propylene glycol (SYSTANE ULTRA) 0.4-0.3 % SOLN ophthalmic solution Place 1 drop into both eyes 4 times daily as needed for dry eyes     pregabalin (LYRICA) 25 MG capsule Take 1 capsule (25 mg) by mouth 2 times daily     simvastatin (ZOCOR) 20 MG tablet Take 20 mg by mouth At Bedtime      sodium phosphate (FLEET ENEMA) 7-19 GM/118ML rectal enema Place 1 enema rectally daily as needed for constipation Severe constipation (no BM in 5 or more days)     witch hazel-glycerin (TUCKS) pad Apply topically as needed for hemorrhoids     No current facility-administered medications for this visit.       ROS:  4 point ROS including Respiratory, CV, GI and , other than that noted in the HPI,  is negative    Vitals:  /70   Pulse 78   Temp 98.1  F (36.7  C)   Resp 18    "Ht 1.753 m (5' 9\")   Wt 118.9 kg (262 lb 1.6 oz)   SpO2 94%   BMI 38.71 kg/m    Exam:  GENERAL APPEARANCE:  Alert, in no distress  RESP:  respiratory effort normal   CV:  edema none.  Left leg wrapped.  ABDOMEN: Chronic abdominal wound present  M/S:  Gait and station lying flat in bed seems comfortable.    PSYCH:  insight and judgement, memory seems intact, affect and mood normal    Lab/Diagnostic data: Pertinent hospital labs: 5/10 sodium 136, potassium 3.5 BUN 17.4 creatinine 0.98 calcium 8.2 WBC 8.9 hemoglobin 9.6 GFR 62    ASSESSMENT/PLAN:  Cellulitis of left lower extremity  Lymphedema  Treated with IV antibiotics during hospital stay.  She will have lymphedema therapy at the TCU.   -Nursing to monitor skin  -PT/OT    Physical deconditioning  Admitted to the TCU for therapy and nursing care following a hospital stay for cellulitis and acute shock.  -Continue PT/OT  - following for discharge planning    Chronic pain syndrome  No new concerns.  Continues on PTA medications of lidocaine patch, Tylenol, Lyrica, oxycodone.    Diarrhea, unspecified type  Patient requests loperamide as needed.  She was unable to get it earlier today.    Neurogenic bladder  Admitted with Rodriguez catheter.  -Rodriguez cares per facility  -Attempt removal in 10 days    Orders:  - Loperamide 2 mg p.o. 4 times daily as needed  -Diagnosis for Rodriguez catheter is neurogenic bladder  -Remove Rodriguez catheter in 10 days then bladder scan every shift and straight cath if residual greater than 300 mils x3 days and update NP    45 MINUTES SPENT BY ME on the date of service doing chart review, history, exam, documentation & further activities per the note.       Electronically signed by: Kirsten Lovell NP  "

## 2023-05-11 NOTE — DISCHARGE SUMMARY
"[facility]  Discharge Summary - Medicine & Pediatrics       Date of Admission:  4/28/2023  Date of Discharge:  5/10/2023 12:40 PM  Discharging Provider: Dr. Gaona supervised by Dr. Law   Discharge Service: Hospitalist Service    Discharge Diagnoses     Principal Problem:    Cellulitis and abscess of leg (5/5/2023)  Active Problems:    Unspecified open wound of abdominal wall, unspecified quadrant without penetration into peritoneal cavity, subsequent encounter (7/1/2015)    Hypothyroidism (1/6/2021)    NEELIMA (acute kidney injury) (H) (12/23/2016)    Chronic pain (5/7/2015)    Morbid obesity (H) (5/26/2021)    Hyperkalemia (4/28/2023)    Metabolic acidosis (4/28/2023)    Leukocytosis, unspecified type (4/28/2023)    Lymphedema of lower extremity, unspecified laterality (5/5/2023)    Open wound of lower limb, left, initial encounter (5/5/2023)    Clinically Significant Risk Factors     # Obesity: Estimated body mass index is 43.98 kg/m  as calculated from the following:    Height as of this encounter: 1.753 m (5' 9\").    Weight as of this encounter: 135.1 kg (297 lb 13.5 oz).  # Severe Malnutrition: based on nutrition assessment      Follow-ups Needed After Discharge   Follow-up Appointments     Follow Up and recommended labs and tests      Recommend being seen by facility provider within 3-7 days    Follow up with [facility] is requested within 2-4 weeks of   discharge     Follow up with [facility] pharmacist is recommended to ensure ongoing rec of meds               Discharge Disposition   Discharged to TCU   Condition at discharge: Stable    Hospital Course   Carolina Mari 68 year old female never smoker with a history of SBO s/p ileal resection and colostomy with subsequent takedown, large ventral hernia with most of small and large bowel herniated, obesity, adult failure to thrive, wheelchair-bound, chronic edema, chronic diarrhea, multiple skin ulcerations (anterior " abdomen, perineum, bilateral legs), PUD/GERD, HTN, osteoporosis, hypothyroidism, CKD.     Briefly, pt presented to ED on 4/28 with acute encephalopathy, acute on chronic kidney injury, hyperkalemia, peripheral neutrophilia, and shock, admitted to ICU. Shock felt 2/2 sepsis due to left lower shin/ankle cellulitis. Had been on pressors through 5/2; able to wean off and transferred out of ICU that date -- [facility] started following at that time. Has steadily been improving as below. Following addressed:        Sepsis   Left leg cellulitis  Admitted to ICU for septic shock   Did have lactic acidosis that resolved with management   Wounds felt to be etiology of initial sepsis. Left leg wounds were the worst; significant wounds on right leg, abdomen, buttock as well.   Initial photos 5/1  Clinical appearance much improved as of photos 5/4 with wound care and abx; clinically improved again as of 5/8    BC's throughout hospitalization: NGTD   Clinically improved and was able to wean off pressors / downgrade to the medical floor.   As of 5/5: on day 7-8 of IV abx (Vanc/Zosyn-->unasyn). Switched to oral therapy on 5/5.   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 Felt to be 2/2 combination of hypovolemia with low intake from encephalopathy, diuresis with furosemide PTA, chronic diarrhea; ATN due to sepsis with septic shock; and ARB.   Was fluid repleted and s/p pressors.   Renal function gradually improving to <1 with correcting the above measures.   -Recommend BMP at facility follow up   -Nephro follow up recommended in 2-3 weeks     Acute encephalopathy, multifactorial (toxic, metabolic, meds)    Patient with confusion / somnolence on admission and in first  few days. In setting sepsis, lactic acidosis / NEELIMA, opioid side effects, etc. Resolved as hospitalization went on.     Failure to thrive  Baseline failure to thrive. Poor mobility, wheel-chair bound; working hard on therapy. Multiple ulcers/wounds (ventral abdomen, perineum, bilateral legs); improving as below. PT recommending TCU   -PT/OT/CC    Leg pain, improving   Chronic pain    R shoulder pain  Main complaint of patient.  Severe 10/10 pain chronically and acutely, all over especially left lower leg ulcer/cellulitis. Takes oxycodone 10 mg 6 times daily at assisted living, also on duloxetine and pregabalin. Had been on IV opioids prior in hospital stay. Pain continues to improve with treatment of cellulitis as of 5/6. R shoulder pain ongoing; acute strain with transfers vs underlying OA / cuff tendinopathy.   -Opioids continued; wrote hand-written scripts and placed on chart   -Continue PTA pregabalin  -Duloxetine stopped given her preference for SSRI  -ice/heat as tolerated  -Shoulder pain               Topical voltaren, lidocaine               Encouraged ranging the shoulder and working with PT      Chronic diarrhea, ongoing   H/o SBO, ileal resection, colostomy (taken down), large ventral hernia  Noted. C diff PCR negative. Abdomen is quite large -- likely 2/2 to habitus and known hernia. Stools had been less frequent after holding senna but increased in frequency again 5/6. This is a major complaint and affecting quality of life for the patient. Etiology of chronic component not known but likely exacerbated by abx during admission.   -Continue PTA cholestyramine  -Increase loperamide frequency 5/8   -Bowel meds are PRN rather than scheduled with ongoing diarrhea               Watch carefully while she is on opioid   -Consider restarting loperamide at home, can restart if needed  -Continue diet, ADAT      Chronic anemia  HGB in 9-10s. ACD. Likely in setting of CKD vs other chronic dz's. No evidence of active  hemorrhage.  -Continue PTA iron     Depression   Patient feels more irritable and wants more energy. She felt this way when on Prozac in past. Wants to restart. No SI, no contraindications.   -Restart low dose fluoxetine, tolerating    -Discontinued SNRI  -Discussed this will take weeks to take effect      Goals of care   Remain life prolonging per palliative care. She enjoys life when not in the hospital.   -DNR/DNI    -POLST completed   -Son has a HCD he will bring in    Urinary retention   Had a Mo placed on admission. Attempted removal day prior to discharge. Unfortunately, had retention and failed removal. Needed re-insertion of the cath. High risk for retention with opoids, pregabalin, antidepressants, etc. Shared decision making with patient: she felt having the cath dramatically helps her as she has poor mobility and pure-wick was not effective.   -Continue mo on discharge   -Look to remove as soon as possible   -If going to have this long-term, recommend urology follow it     HFpEF, not in current exacerbation   Noted hx of   TTE with EF 60-65%, evidence of diastolic dysfunction.  Habitus is large but not felt to be volume up as of 5/6   PLAN:  -Monitor   -Continue PTA diuresis     Consultations This Hospital Stay   PHARMACY TO DOSE VANCO  VASCULAR ACCESS ADULT IP CONSULT  CARE MANAGEMENT / SOCIAL WORK IP CONSULT  CARE MANAGEMENT / SOCIAL WORK IP CONSULT  INTENSIVIST IP CONSULT  WOUND OSTOMY CONTINENCE NURSE  IP CONSULT  WOUND OSTOMY CONTINENCE NURSE  IP CONSULT  PHYSICAL THERAPY ADULT IP CONSULT  OCCUPATIONAL THERAPY ADULT IP CONSULT  PAIN MANAGEMENT ADULT IP CONSULT  LYMPHEDEMA THERAPY IP CONSULT  ACUPUNCTURE IP CONSULT  PALLIATIVE CARE ADULT IP CONSULT  PHARMACY IP CONSULT  HOSPITALIST IP CONSULT  PHYSICAL THERAPY ADULT IP CONSULT  OCCUPATIONAL THERAPY ADULT IP CONSULT  LYMPHEDEMA THERAPY IP CONSULT    Code Status   Prior       The patient was discussed with Dr. Ani Gaona DO  "  [facility] Family Medicine Resident     [address] [address]  Phone: [phone]  Fax: [phone]  ______________________________________________________________________    Physical Exam   Vital Signs: /63 (BP Location: Right arm)   Pulse 97   Temp 97.8  F (36.6  C) (Oral)   Resp 18   Ht 1.753 m (5' 9\")   Wt 135.1 kg (297 lb 13.5 oz)   SpO2 92%   BMI 43.98 kg/m    Weight: 297 lbs 13.46 oz    Gen: Pleasant. No distress.    HEENT: MMM.   Neck: No overt asymmetry.   CV: Appears well-perfused.   Resp: Breathing comfortably on room air.   Abd: Obese with large ventral hernia. Non-distended  Ext: No edema   Skin: see most up to date photos on chart for status of leg, abdominal, buttock wounds   Neuro: Non-focal.   Psych: anxious, tearful. Scared of leaving the hospital.       Primary Care Physician   Le Romo    Discharge Orders      Adult Nephrology  Referral      Medication Therapy Management Referral      Primary Care - Care Coordination Referral      Follow Up (TCM)    Requesting nephrology follow up on discharge for acute on chronic kidney disease  Follow up in renal clinic ([facility])  Call [phone] to make appt  Request pt to be seen in 2-4 weeks     General info for SNF    Length of Stay Estimate: Short Term Care: Estimated # of Days <30  Condition at Discharge: Improving  Level of care:skilled   Rehabilitation Potential: Fair  Admission H&P remains valid and up-to-date: Yes  Recent Chemotherapy: N/A  Use Nursing Home Standing Orders: Yes     Mantoux instructions    Give two-step Mantoux (PPD) Per Facility Policy Yes     Follow Up and recommended labs and tests    Recommend being seen by facility provider within 3-7 days    Follow up with [facility] is requested within 2-4 weeks of discharge     Follow up with [facility] pharmacist is recommended to ensure ongoing rec of meds "     Reason for your hospital stay    You came in with sepsis   This was thought to be related to your leg wounds   Your wounds improved with antibiotics and wound care   You started the rehab process here in the hospital   You are going to a place to get stronger and hopefully transition back to your prior level of living     Mo catheter    Our goal is to keep the mo catheter out near the time of discharge  She is struggling to void enough   May need it replaced   If the mo stays in, please follow the below protocol   If not, please ignore the below protocol      To straight gravity drainage. Change catheter every 2 weeks and PRN for leaking or decreased urine output with signs of bladder distention. DO NOT change catheter without a specific Provider order IF diagnosis of benign prostatic hypertrophy (BPH), neurogenic bladder, or other urological conditions     Wound care (specify)    Site:   b/l lower extremity wounds, abdominal, buttock wounds.   Instructions:    BLE  1. Soak wounds with vashe moistened gauze for 5 minutes  2. Apply ammonium lactate lotion from toes to knees around wounds  3. Place pieces of silvercel over weeping calf wounds  4. Secure with gauze roll from toes to knees  5. Bedside nurse to perform wound care twice weekly before Lymphedema wraps     Abdomen  1. Paint wounds with betadine daily, allow to dry     Buttocks  1. Cleanse with bath wipes, pat dry  2. Apply Calmoseptine  3. Do not use briefs in bed, instead use blue/white absorbable underpads     Activity - Up with nursing assistance    And/or as directed by PT/OT  As of time of admission, the most she is able to move on her own volition is sitting up at edge of bed. Could not transfer to wheelchair     Physical Therapy Adult Consult    Evaluate and treat as clinically indicated.    Reason:  weakness / failure to thrive     Occupational Therapy Adult Consult    Evaluate and treat as clinically indicated.    Reason:  weakness /  failure to thrive     Lymphedema Therapy Adult Consult    Evaluate and treat as clinically indicated.    Reason:  weakness / failure to thrive     Fall precautions     Diet    Follow this diet upon discharge: Orders Placed This Encounter      Snacks/Supplements Adult: Ensure Enlive; With Meals      Moderate Consistent Carb (60 g CHO per Meal) Diet       Significant Results and Procedures   Most Recent 3 CBC's:Recent Labs   Lab Test 05/10/23  0513 05/08/23  0536 05/07/23  0551   WBC 8.9 11.3* 10.1   HGB 9.6* 10.5* 10.1*   MCV 93 91 92   * 352 291     Most Recent 3 BMP's:Recent Labs   Lab Test 05/10/23  0513 05/09/23  0515 05/08/23  0536    137 136   POTASSIUM 3.5 3.5 3.7   CHLORIDE 99 101 101   CO2 24 24 23   BUN 17.4 16.6 15.3   CR 0.98* 0.98* 0.99*   ANIONGAP 13 12 12   JAYA 8.2* 8.0* 8.3*   GLC 89 99 106*     Most Recent 2 LFT's:Recent Labs   Lab Test 04/30/23  0356 04/28/23  1253   AST 18 31   ALT 9* 12   ALKPHOS 113* 216*   BILITOTAL 0.3 0.7     Most Recent 3 INR's:No lab results found.  Most Recent INR's and Anticoagulation Dosing History:  Anticoagulation Dose History          View : No data to display.                    Most Recent 3 Creatinines:Recent Labs   Lab Test 05/10/23  0513 05/09/23  0515 05/08/23  0536   CR 0.98* 0.98* 0.99*     Most Recent 3 Hemoglobins:Recent Labs   Lab Test 05/10/23  0513 05/08/23  0536 05/07/23  0551   HGB 9.6* 10.5* 10.1*     Most Recent 3 Troponin's:Recent Labs   Lab Test 05/20/21 2022   TROPI <0.015     Most Recent 3 BNP's:No lab results found.  Most Recent D-dimer:No lab results found.  Most Recent Cholesterol Panel:Recent Labs   Lab Test 10/07/21  1622   CHOL 126   LDL 61   HDL 43*   TRIG 108     7-Day Micro Results     No results found for the last 168 hours.        Most Recent TSH and T4:Recent Labs   Lab Test 04/28/23  1253 01/18/23  0851   TSH 0.44 8.54*   T4  --  1.12     Most Recent Hemoglobin A1c:Recent Labs   Lab Test 04/28/23  2241   A1C 4.9     Most  Recent 6 glucoses:Recent Labs   Lab Test 05/10/23  0513 05/09/23  0515 05/08/23  0536 05/07/23  0551 05/06/23  1201 05/06/23  0753   GLC 89 99 106* 93 113* 97     Most Recent Urinalysis:Recent Labs   Lab Test 04/28/23  1819   COLOR Yellow   APPEARANCE Turbid*   URINEGLC Negative   URINEBILI Negative   URINEKETONE Negative   SG 1.023   UBLD Negative   URINEPH 5.0   PROTEIN 30*   NITRITE Negative   LEUKEST Negative   RBCU 4*   WBCU 4     Most Recent ABG:No lab results found.  Most Recent ESR & CRP:Recent Labs   Lab Test 01/05/21  1315 02/15/16  1239   SED  --  14   CRP 33.7* 5.9     Most Recent Anemia Panel:Recent Labs   Lab Test 05/10/23  0513 05/07/23  0551 05/06/23  0533   WBC 8.9   < > 9.9   HGB 9.6*   < > 9.6*   HCT 32.3*   < > 32.1*   MCV 93   < > 92   *   < > 238   B12  --   --  1,489*    < > = values in this interval not displayed.     Most Recent CPK:Recent Labs   Lab Test 04/28/23  1253      ,   Results for orders placed or performed during the hospital encounter of 04/28/23   CT Head w/o Contrast    Narrative    EXAM: CT HEAD W/O CONTRAST  LOCATION: [facility]  DATE/TIME: 4/28/2023 5:17 PM CDT    INDICATION: Altered mental status, confusion.  COMPARISON: None.  TECHNIQUE: Routine CT Head without IV contrast. Multiplanar reformats. Dose reduction techniques were used.    FINDINGS:  INTRACRANIAL CONTENTS: No intracranial hemorrhage, extraaxial collection, or mass effect.  No CT evidence of acute infarct. Mild presumed chronic small vessel ischemic changes. Mild generalized volume loss. No hydrocephalus.     VISUALIZED ORBITS/SINUSES/MASTOIDS: No intraorbital abnormality. No paranasal sinus mucosal disease. No middle ear or mastoid effusion.    BONES/SOFT TISSUES: No acute abnormality.      Impression    IMPRESSION:  1.  No CT evidence for acute intracranial process.  2.  Mild chronic microvascular ischemic changes as above.   CT Abdomen Pelvis w/o Contrast    Narrative     EXAM: CT ABDOMEN PELVIS W/O CONTRAST  LOCATION: [facility]  DATE/TIME: 4/28/2023 5:18 PM CDT    INDICATION: pain, renal failure, leukocytosis.  r o obstruction, infection source  COMPARISON: 01/04/2022  TECHNIQUE: CT scan of the abdomen and pelvis was performed without IV contrast. Multiplanar reformats were obtained. Dose reduction techniques were used.  CONTRAST: None.    FINDINGS:   LOWER CHEST: Partially imaged coronary artery calcifications. Respiratory motion limits evaluation of the lung bases.    HEPATOBILIARY: Cholecystectomy. Liver and bile ducts are unremarkable.    PANCREAS: Normal.    SPLEEN: Normal.    ADRENAL GLANDS: Normal.    KIDNEYS/BLADDER: No significant mass, stones, or hydronephrosis. There are simple or benign cysts. No follow up is needed. Ptotic and partially malrotated right kidney.    BOWEL: Status post gastric bypass. Most of the bowel is within a large ventral hernia, but no evidence of acute inflammation or obstruction.    LYMPH NODES: Normal.    VASCULATURE: Unremarkable.    PELVIC ORGANS: Normal.    MUSCULOSKELETAL: Large ventral hernia, as above. Advanced degenerative changes in the hips. Osteopenia and multilevel degenerative changes in the spine.      Impression    IMPRESSION:   1.  No acute findings or other explanation for symptoms.    2.  Large multicompartment ventral hernia containing most of the small and large bowel. No acute inflammation or obstruction.     XR Chest Port 1 View    Narrative    EXAM: XR CHEST PORT 1 VIEW  LOCATION: [facility]  DATE/TIME: 4/28/2023 5:27 PM CDT    INDICATION: ams  COMPARISON: 05/20/2021      Impression    IMPRESSION: Low lung volumes. Heart size prominent on this portable view, unchanged. Pulmonary vascularity normal. Elevated right hemidiaphragm. Subsegmental atelectasis both bases. Left PICC line tip distal SVC. Numerous surgical clips left upper   quadrant. Lower cervical spinal fusion.  Degenerative changes both shoulders.   CT Ankle Left w/o Contrast    Narrative    EXAM: CT ANKLE LEFT W/O CONTRAST  LOCATION: [facility]  DATE/TIME: 2023 5:26 PM CDT    INDICATION: Left ankle pain.  COMPARISON: None.  TECHNIQUE: Noncontrast. Axial, sagittal and coronal thin-section reconstruction. Dose reduction techniques were used.     FINDINGS:     I have no left ankle radiographic exam for review.    BONES:  -Anatomic alignment. No acute displaced fracture. Tarsal bones and metatarsal bases are intact. No aggressive bone lesion. Heel spur and tiny enthesophyte at the distal Achilles tendon insertion on the calcaneus. Mild tibiotalar and hindfoot arthrosis.   More advanced midfoot osteoarthritis.    SOFT TISSUES:  -No full-thickness proximally retracted ankle tendon tear. Global intrinsic leg and ankle as well as foot muscle atrophy. No drainable fluid collection. Skin thickening and slight infiltration of the subcutaneous fat in the ankle and dorsal foot.   Thickened proximal medial bundle plantar fascia with mineralization. Sinus tarsi fat attenuation preserved. No sizable ankle or hindfoot joint effusion.      Impression    IMPRESSION:  1.  No sizable ankle or hindfoot joint effusion. Septic arthritis should be excluded clinically.  2.  No acute displaced left ankle fracture or malalignment.  3.  Moderate midfoot osteoarthritis.  4.  No full-thickness proximally retracted ankle tendon tear.  5.  Global intrinsic leg, ankle and foot muscle atrophy.     Echocardiogram Complete     Value    LVEF  60-65%    Narrative    [id number]  [id number]  [id number]  [id number]^BRITANY^ZULEIMA^[place] [address]     Name: AL WILLARD  MRN: [id number]  : 1955  Study Date: 2023 08:11 AM  Age: 68 yrs  Gender: Female  Patient Location: [facility]  Reason For Study: Shock  Ordering Physician: ZULEIMA GARG  Performed By: MB      BSA: 2.3 m2  Height: 69 in  Weight: 265 lb  HR: 73  BP: 107/55 mmHg  ______________________________________________________________________________  Procedure  Complete Echo Adult. Definity (NDC #33277-565) given intravenously.  ______________________________________________________________________________  Interpretation Summary     The left ventricle is normal in size.  The visual ejection fraction is 60-65%.  Diastolic Doppler findings (E/E' ratio and/or other parameters) suggest left  ventricular filling pressures are increased.  No regional wall motion abnormalities noted.  The right ventricle is not well visualized.  TAPSE is normal, which is consistent with normal right ventricular systolic  function.  The left atrium is mild to moderately dilated.  In one view the AV appears to open well. Peak velocity of 4 m/sec is suspected  to be in the LVOT. The waveform is also more c/w/ dynamic outflow tract  obstruction. No MICHELLE noted. Consider RAMSEY if clinically warranted.  Inferior vena cava not well visualized for estimation of right atrial  pressure.  Sinus rhythm was noted.  Technically difficult, suboptimal study. There is no comparison study  available.  ______________________________________________________________________________  Left Ventricle  The left ventricle is normal in size. There is normal left ventricular wall  thickness. Diastolic Doppler findings (E/E' ratio and/or other parameters)  suggest left ventricular filling pressures are increased. The visual ejection  fraction is 60-65%. No regional wall motion abnormalities noted.     Right Ventricle  The right ventricle is not well visualized. TAPSE is normal, which is  consistent with normal right ventricular systolic function.     Atria  The left atrium is mild to moderately dilated. Right atrium not well  visualized.     Mitral Valve  There is moderate mitral annular calcification. There is mild (1+) mitral  regurgitation. There is mild to moderate  mitral stenosis.     Tricuspid Valve  The tricuspid valve is not well visualized. There is mild (1+) tricuspid  regurgitation.     Aortic Valve  The aortic valve is not well visualized. No aortic regurgitation is present.  In one view the AV appears to open well. Peak velocity of 4 m/sec is suspected  to be in the LVOT. The waveform is also more c/w/ dynamic outflow tract  obstruction. No MICHELLE noted. Consider RAMSEY if clinically warranted.     Pulmonic Valve  The pulmonic valve is not well visualized. There is no pulmonic valvular  stenosis.     Vessels  The aorta root is normal. The ascending aorta is Borderline dilated. Inferior  vena cava not well visualized for estimation of right atrial pressure.     Pericardium  There is no pericardial effusion.     Rhythm  Sinus rhythm was noted.  ______________________________________________________________________________  MMode/2D Measurements & Calculations     IVSd: 1.0 cm  LVIDd: 4.0 cm  LVIDs: 2.7 cm  LVPWd: 0.96 cm  FS: 32.5 %  LV mass(C)d: 126.2 grams  LV mass(C)dI: 54.2 grams/m2  Ao root diam: 3.0 cm  asc Aorta Diam: 3.8 cm  LVOT diam: 2.1 cm  LVOT area: 3.5 cm2  LA Volume Indexed (AL/bp): 37.5 ml/m2  RWT: 0.48  TAPSE: 2.0 cm     Time Measurements  MM HR: 73.0 BPM     Doppler Measurements & Calculations  MV E max arun: 128.0 cm/sec  MV A max arun: 111.0 cm/sec  MV E/A: 1.2  MV max P.0 mmHg  MV mean P.0 mmHg  MV V2 VTI: 42.7 cm  MVA(VTI): 2.5 cm2  MV P1/2t max arun: 164.0 cm/sec  MV P1/2t: 99.1 msec  MVA(P1/2t): 2.2 cm2  MV dec slope: 484.5 cm/sec2  MV dec time: 0.24 sec  Ao V2 max: 442.0 cm/sec  Ao max P.0 mmHg  Ao V2 mean: 250.0 cm/sec  Ao mean P.0 mmHg  Ao V2 VTI: 66.4 cm  SERENE(I,D): 1.6 cm2  SERENE(V,D): 1.3 cm2  LV V1 max PG: 10.6 mmHg  LV V1 max: 163.0 cm/sec  LV V1 VTI: 30.9 cm  SV(LVOT): 107.0 ml  SI(LVOT): 46.0 ml/m2  PA acc time: 0.09 sec  AV Arun Ratio (DI): 0.37  SERENE Index (cm2/m2): 0.69  E/E': 22.2  E/E' av.8     Lateral E/e': 22.2  Medial  E/e': 21.4  Peak E' Arun: 5.8 cm/sec  RV S Arun: 20.1 cm/sec     ______________________________________________________________________________  Report approved by: Marlon Moreno 04/29/2023 11:10 AM               Discharge Medications   Discharge Medication List as of 5/10/2023  8:04 AM      START taking these medications    Details   FLUoxetine (PROZAC) 20 MG capsule Take 1 capsule (20 mg) by mouth daily, Disp-90 capsule, R-0, E-Prescribe      melatonin 3 MG tablet Take 1 tablet (3 mg) by mouth nightly as needed for sleep, Disp-90 tablet, R-0, E-Prescribe         CONTINUE these medications which have CHANGED    Details   !! loperamide (IMODIUM) 2 MG capsule Take 1 capsule (2 mg) by mouth 4 times daily as needed for diarrhea, Disp-60 capsule, R-0, E-Prescribe      oxyCODONE (ROXICODONE) 10 MG tablet Take 1 tablet (10 mg) by mouth every 6 hours as needed for moderate pain or severe pain, Disp-30 tablet, R-0, Local Print      pregabalin (LYRICA) 25 MG capsule Take 1 capsule (25 mg) by mouth 2 times daily, Disp-60 capsule, R-0, Local Print       !! - Potential duplicate medications found. Please discuss with provider.      CONTINUE these medications which have NOT CHANGED    Details   acetaminophen (TYLENOL) 500 MG tablet Take 1,000 mg by mouth 3 times daily , Historical      alum & mag hydroxide-simethicone (MAALOX) 200-200-20 MG/5ML SUSP suspension Take 30 mLs by mouth every 6 hours as needed for indigestion Moderate heartburn or indigestion, Historical      Ascorbic Acid (VITAMIN C) 500 MG CAPS Take 500 mg by mouth daily, Historical      bacitracin 500 UNIT/GM OINT Apply topically 2 times daily as needed for wound care Minor or superficial abrasions, lacerations, or soresHistorical      calcium carbonate (TUMS) 500 MG chewable tablet Take 1-2 chew tab by mouth every 6 hours as needed for heartburn Mild heartburn or indigestion, Historical      carbamide peroxide (DEBROX) 6.5 % otic solution Place 5 drops into  both ears daily as needed for other (earwax buildup), Historical      cetirizine (ZYRTEC) 10 MG tablet Take 10 mg by mouth 2 times daily, Historical      cholestyramine light (PREVALITE) 4 GM powder Take 4 g by mouth 2 times daily diarrhea, Historical      diclofenac (VOLTAREN) 1 % topical gel Apply 2 g topically 4 times daily To affected area, Historical      dimethicone 1 % external cream Apply topically 2 times daily To periarea/buttocksHistorical      docusate sodium (COLACE) 100 MG capsule Take 100 mg by mouth 2 times daily as needed for constipation Mild constipation (no BM in 1-2 days), Historical      famotidine (PEPCID) 20 MG tablet Take 20 mg by mouth daily, Historical      ferrous sulfate (FEROSUL) 325 (65 Fe) MG tablet Take 325 mg by mouth daily (with breakfast), Historical      furosemide (LASIX) 40 MG tablet Take 40 mg by mouth daily, Historical      glycerin (ADULT) 2 g suppository Place 1 suppository rectally daily as needed for constipation Moderate constipation (no BM in 3-5 days), Historical      guaiFENesin (ROBITUSSIN) 20 mg/mL liquid Take 5-10 mLs by mouth every 4 hours as needed for cough 5 ml mild cough  10ml moderate/severe cough, Historical      hydrocortisone (CORTAID) 1 % external cream Apply topically 2 times daily as needed for itchingHistorical      hydrocortisone, Perianal, (ANUSOL-HC) 2.5 % cream Place rectally 2 times daily as needed for hemorrhoidsHistorical      levothyroxine (SYNTHROID/LEVOTHROID) 200 MCG tablet Take 225 mcg by mouth daily, Historical      lidocaine patch in PLACE Place 1 patch onto the skin every morning Remove at HS, Historical      !! loperamide (IMODIUM) 2 MG capsule Take 1 capsule (2 mg) by mouth 4 times daily as needed for diarrhea, Transitional      losartan (COZAAR) 100 MG tablet Take 100 mg by mouth daily Hold if SBP less than 100, Historical      magnesium hydroxide (MOM) 2400 MG/10ML SUSP Take 30 mLs by mouth daily as needed for constipation For MILD  constipation (no BM in 2-3 days), Historical      metoprolol tartrate (LOPRESSOR) 100 MG tablet Take 50 mg by mouth 2 times daily Hold for SBP < 100 or HR < 60, Historical      miconazole (MICATIN) 2 % AERP powder Apply topically 2 times daily as needed for other (to abdominal/groin folds)Historical      mineral oil-hydrophilic petrolatum (AQUAPHOR) external ointment Apply topically as needed for dry skinHistorical      NARCAN 4 MG/0.1ML nasal spray CALL 911. SPR CONTENTS OF ONE SPRAYER (0.1ML) INTO ONE NOSTRIL. REPEAT IN 2-3 MIN IF SYMPTOMS OF OPIOID EMERGENCY PERSIST, ALTERNATE NOSTRILS, SOURAV, Historical      nystatin (MYCOSTATIN) 547314 UNIT/GM external powder Apply topically 2 times daily as needed for other (skin irritation)Historical      polyethylene glycol-propylene glycol (SYSTANE ULTRA) 0.4-0.3 % SOLN ophthalmic solution Place 1 drop into both eyes 4 times daily as needed for dry eyes, Historical      simvastatin (ZOCOR) 20 MG tablet Take 20 mg by mouth At Bedtime , Historical      sodium phosphate (FLEET ENEMA) 7-19 GM/118ML rectal enema Place 1 enema rectally daily as needed for constipation Severe constipation (no BM in 5 or more days), Historical      witch hazel-glycerin (TUCKS) pad Apply topically as needed for hemorrhoidsHistorical       !! - Potential duplicate medications found. Please discuss with provider.      STOP taking these medications       DULoxetine (CYMBALTA) 20 MG capsule Comments:   Reason for Stopping:             Allergies   Allergies   Allergen Reactions     Aspirin Other (See Comments)     History of ulcers       Colchicine Angioedema and Swelling     And gout flare ups     Colchicine Angioedema and Swelling     And gout flare ups

## 2023-05-11 NOTE — LETTER
5/11/2023        RE: [facility]  [address]  [facility] [address]        [facility]  Primary Care Provider & Clinic: Le Romo MD, [address] / [facility] [address]  Chief Complaint   Patient presents with     Hospital F/U     [facility] 4/28/2023 - 5/10/2023     [facility] Record Number: [id number]  Place of Service Where Encounter Took Place: [facility] (U/SNF) [4000]    Carolina Mari is a 68 year old (1955), admitted to the above facility from  [facility]. Hospital stay 4/28/23 through 5/10/23.  Patient's living condition: lives in an assisted living facility    HPI:    Brief Summary of Hospital Course: Treated for left leg cellulitis and sepsis.  Complications included acute kidney injury, hyperkalemia and shock.  He has a past medical history of large ventral hernia with chronic wound, obesity, adult failure to thrive, chronic edema, chronic diarrhea, GERD, hypertension, osteoporosis, hypothyroidism and CKD.  MEDICATION CHANGES: Start fluoxetine and melatonin, stopped duloxetine.  RECOMMENDED FOLLOW UP: [facility] is requested within 2-4 weeks  Updates on Status Since Skilled nursing Admission: None    Today: Patient reports chronic diarrhea.  Would like Imodium and states that she has some in her room but she has not been able to get any from the staff.  She does have a Rodriguez catheter and she states this was placed at the hospital because she was unable to void.  Likely diagnosis is neurogenic bladder.  For her chronic diarrhea she was tested for C. difficile on 4/30 and the stool was negative.  She has no increase in abdominal pain or cramping.  She reports chronic leg pain and chronic abdominal pain.  She reports that she slept well last night after not getting much sleep at the hospital.  She reports that she has no concerns with eating or  fluid intake.  Nurse requesting verification of diagnoses for medications.  Several diagnoses for medications written.  Is also requesting diagnosis for Rodriguez catheter.    External notes reviewed: Facility EHR including progress notes, vital signs. Hospital discharge summary reviewed. Hospital discharge orders reviewed.  POLST reviewed and signed today    CODE STATUS/ADVANCE DIRECTIVES DISCUSSION: DNR/DNI    ALLERGIES:   Allergies   Allergen Reactions     Aspirin Other (See Comments)     History of ulcers       Colchicine Angioedema and Swelling     And gout flare ups     Colchicine Angioedema and Swelling     And gout flare ups      PAST MEDICAL HISTORY:   Past Medical History:   Diagnosis Date     History of stomach ulcers      HTN (hypertension)      Knee osteoarthritis     right     Osteoarthritis of right hip      Osteoporosis      Osteoporosis      Thyroid disease       PAST SURGICAL HISTORY:   has a past surgical history that includes tka; gastric bypass; Foot surgery; SACROPLASTY; joint replacement; hernia repair; Amputate toe(s) (Bilateral); REMOVAL GALLBLADDER (N/A, 12/25/2016); and PICC/Midline Placement (4/28/2023).  FAMILY HISTORY: family history includes Breast Cancer in her mother; Cancer in her brother and mother; Cancer (age of onset: 20.00) in her brother; Coronary Artery Disease in her brother, brother, and father.  SOCIAL HISTORY:   reports that she has never smoked. She has never used smokeless tobacco. She reports that she does not drink alcohol and does not use drugs.    Post Discharge Medication Reconciliation Status: discharge medications reconciled, continue medications without change  Current Outpatient Medications   Medication Sig     acetaminophen (TYLENOL) 500 MG tablet Take 1,000 mg by mouth 3 times daily      alum & mag hydroxide-simethicone (MAALOX) 200-200-20 MG/5ML SUSP suspension Take 30 mLs by mouth every 6 hours as needed for indigestion Moderate heartburn or indigestion      Ascorbic Acid (VITAMIN C) 500 MG CAPS Take 500 mg by mouth daily     bacitracin 500 UNIT/GM OINT Apply topically 2 times daily as needed for wound care Minor or superficial abrasions, lacerations, or sores     calcium carbonate (TUMS) 500 MG chewable tablet Take 1-2 chew tab by mouth every 6 hours as needed for heartburn Mild heartburn or indigestion     carbamide peroxide (DEBROX) 6.5 % otic solution Place 5 drops into both ears daily as needed for other (earwax buildup)     cetirizine (ZYRTEC) 10 MG tablet Take 10 mg by mouth 2 times daily     cholestyramine light (PREVALITE) 4 GM powder Take 4 g by mouth 2 times daily diarrhea     diclofenac (VOLTAREN) 1 % topical gel Apply 2 g topically 4 times daily To affected area     dimethicone 1 % external cream Apply topically 2 times daily To periarea/buttocks     docusate sodium (COLACE) 100 MG capsule Take 100 mg by mouth 2 times daily as needed for constipation Mild constipation (no BM in 1-2 days)     famotidine (PEPCID) 20 MG tablet Take 20 mg by mouth daily     ferrous sulfate (FEROSUL) 325 (65 Fe) MG tablet Take 325 mg by mouth daily (with breakfast)     FLUoxetine (PROZAC) 20 MG capsule Take 1 capsule (20 mg) by mouth daily     furosemide (LASIX) 40 MG tablet Take 40 mg by mouth daily     glycerin (ADULT) 2 g suppository Place 1 suppository rectally daily as needed for constipation Moderate constipation (no BM in 3-5 days)     guaiFENesin (ROBITUSSIN) 20 mg/mL liquid Take 5-10 mLs by mouth every 4 hours as needed for cough 5 ml mild cough  10ml moderate/severe cough     hydrocortisone (CORTAID) 1 % external cream Apply topically 2 times daily as needed for itching     hydrocortisone, Perianal, (ANUSOL-HC) 2.5 % cream Place rectally 2 times daily as needed for hemorrhoids     levothyroxine (SYNTHROID/LEVOTHROID) 200 MCG tablet Take 225 mcg by mouth daily     lidocaine patch in PLACE Place 1 patch onto the skin every morning Remove at HS     loperamide (IMODIUM) 2  MG capsule Take 1 capsule (2 mg) by mouth 4 times daily as needed for diarrhea     loperamide (IMODIUM) 2 MG capsule Take 1 capsule (2 mg) by mouth 4 times daily as needed for diarrhea     losartan (COZAAR) 100 MG tablet Take 100 mg by mouth daily Hold if SBP less than 100     magnesium hydroxide (MOM) 2400 MG/10ML SUSP Take 30 mLs by mouth daily as needed for constipation For MILD constipation (no BM in 2-3 days)     melatonin 3 MG tablet Take 1 tablet (3 mg) by mouth nightly as needed for sleep     metoprolol tartrate (LOPRESSOR) 100 MG tablet Take 50 mg by mouth 2 times daily Hold for SBP < 100 or HR < 60     miconazole (MICATIN) 2 % AERP powder Apply topically 2 times daily as needed for other (to abdominal/groin folds)     mineral oil-hydrophilic petrolatum (AQUAPHOR) external ointment Apply topically as needed for dry skin     NARCAN 4 MG/0.1ML nasal spray CALL 911. SPR CONTENTS OF ONE SPRAYER (0.1ML) INTO ONE NOSTRIL. REPEAT IN 2-3 MIN IF SYMPTOMS OF OPIOID EMERGENCY PERSIST, ALTERNATE NOSTRILS     nystatin (MYCOSTATIN) 940229 UNIT/GM external powder Apply topically 2 times daily as needed for other (skin irritation)     oxyCODONE IR (ROXICODONE) 10 MG tablet Take 1 tablet (10 mg) by mouth every 6 hours as needed for moderate pain or severe pain     polyethylene glycol-propylene glycol (SYSTANE ULTRA) 0.4-0.3 % SOLN ophthalmic solution Place 1 drop into both eyes 4 times daily as needed for dry eyes     pregabalin (LYRICA) 25 MG capsule Take 1 capsule (25 mg) by mouth 2 times daily     simvastatin (ZOCOR) 20 MG tablet Take 20 mg by mouth At Bedtime      sodium phosphate (FLEET ENEMA) 7-19 GM/118ML rectal enema Place 1 enema rectally daily as needed for constipation Severe constipation (no BM in 5 or more days)     witch hazel-glycerin (TUCKS) pad Apply topically as needed for hemorrhoids     No current facility-administered medications for this visit.       ROS:  4 point ROS including Respiratory, CV, GI and  ", other than that noted in the HPI,  is negative    Vitals:  /70   Pulse 78   Temp 98.1  F (36.7  C)   Resp 18   Ht 1.753 m (5' 9\")   Wt 118.9 kg (262 lb 1.6 oz)   SpO2 94%   BMI 38.71 kg/m    Exam:  GENERAL APPEARANCE:  Alert, in no distress  RESP:  respiratory effort normal   CV:  edema none.  Left leg wrapped.  ABDOMEN: Chronic abdominal wound present  M/S:  Gait and station lying flat in bed seems comfortable.    PSYCH:  insight and judgement, memory seems intact, affect and mood normal    Lab/Diagnostic data: Pertinent hospital labs: 5/10 sodium 136, potassium 3.5 BUN 17.4 creatinine 0.98 calcium 8.2 WBC 8.9 hemoglobin 9.6 GFR 62    ASSESSMENT/PLAN:  Cellulitis of left lower extremity  Lymphedema  Treated with IV antibiotics during hospital stay.  She will have lymphedema therapy at the TCU.   -Nursing to monitor skin  -PT/OT    Physical deconditioning  Admitted to the TCU for therapy and nursing care following a hospital stay for cellulitis and acute shock.  -Continue PT/OT  - following for discharge planning    Chronic pain syndrome  No new concerns.  Continues on PTA medications of lidocaine patch, Tylenol, Lyrica, oxycodone.    Diarrhea, unspecified type  Patient requests loperamide as needed.  She was unable to get it earlier today.    Neurogenic bladder  Admitted with Rodriguez catheter.  -Rodriguez cares per facility  -Attempt removal in 10 days    Orders:  - Loperamide 2 mg p.o. 4 times daily as needed  -Diagnosis for Rodriguez catheter is neurogenic bladder  -Remove Rodriguez catheter in 10 days then bladder scan every shift and straight cath if residual greater than 300 mils x3 days and update NP    45 MINUTES SPENT BY ME on the date of service doing chart review, history, exam, documentation & further activities per the note.       Electronically signed by: Kirsten Lovell NP        Sincerely,        Kirsten Lovell NP      "

## 2023-05-12 NOTE — PROGRESS NOTES
[facility]  PRIMARY CARE PROVIDER AND CLINIC:  Le Romo MD, [address] / [facility] [address]  Chief Complaint   Patient presents with     Hospital F/U     [facility] Record Number:  [id number]  Place of Service where encounter took place:  [facility] (TCU/SNF) [4000]    Carolina Mari  is a 68 year old  (1955), admitted to the above facility from  [facility]. Hospital stay 4/28/23 through 5/10/23..  Admitted to this facility for  rehab, medical management and nursing care.    HPI:    HPI information obtained from: facility chart records, facility staff, patient report and [facility] chart review.   Brief Summary of Hospital Course:   * pt hospitalzied with septic shock 2/2 cellulitis, treated with IV abx  * metabolic encephalopathy 2/2 infection: resolved  * NEELIMA/CKD 2/2 sepsis: resolved  * Hyperkalemia  * IUC inserted.       TODAY:  - leg cellulitis:  Reports today when the bandage was changed caused her so much pain. Denies fever.   - abd wound/ diarrhea  Still has large scab where the hernia was, and it is getting better. Reprots 3-4 BM daily. She is getting 3 tab of imodium but at home take 5-6 tab a day and it helps.   - rehab: reports just started, reports today was the first time she sat down in the .   - IUC: did not have it in at home.     ========================================    CODE STATUS/ADVANCE DIRECTIVES DISCUSSION:   DNR / DNI  Patient's living condition: lives in an assisted living facility  ALLERGIES: Aspirin, Colchicine, and Colchicine  PAST MEDICAL HISTORY:  has a past medical history of History of stomach ulcers, HTN (hypertension), Knee osteoarthritis, Osteoarthritis of right hip, Osteoporosis, Osteoporosis, and Thyroid disease.  PAST SURGICAL HISTORY:   has a past surgical history that includes tka; gastric bypass; Foot surgery; SACROPLASTY; joint replacement; hernia repair;  Amputate toe(s) (Bilateral); REMOVAL GALLBLADDER (N/A, 12/25/2016); and PICC/Midline Placement (4/28/2023).  FAMILY HISTORY: family history includes Breast Cancer in her mother; Cancer in her brother and mother; Cancer (age of onset: 20.00) in her brother; Coronary Artery Disease in her brother, brother, and father.  SOCIAL HISTORY:   reports that she has never smoked. She has never used smokeless tobacco. She reports that she does not drink alcohol and does not use drugs.    Post Discharge Medication Reconciliation Status: discharge medications reconciled and changed, per note/orders  Current Outpatient Medications   Medication Sig Dispense Refill     acetaminophen (TYLENOL) 500 MG tablet Take 1,000 mg by mouth 3 times daily        alum & mag hydroxide-simethicone (MAALOX) 200-200-20 MG/5ML SUSP suspension Take 30 mLs by mouth every 6 hours as needed for indigestion Moderate heartburn or indigestion       Ascorbic Acid (VITAMIN C) 500 MG CAPS Take 500 mg by mouth daily       bacitracin 500 UNIT/GM OINT Apply topically 2 times daily as needed for wound care Minor or superficial abrasions, lacerations, or sores       calcium carbonate (TUMS) 500 MG chewable tablet Take 1-2 chew tab by mouth every 6 hours as needed for heartburn Mild heartburn or indigestion       carbamide peroxide (DEBROX) 6.5 % otic solution Place 5 drops into both ears daily as needed for other (earwax buildup)       cetirizine (ZYRTEC) 10 MG tablet Take 10 mg by mouth 2 times daily       cholestyramine light (PREVALITE) 4 GM powder Take 4 g by mouth 2 times daily diarrhea       diclofenac (VOLTAREN) 1 % topical gel Apply 2 g topically 4 times daily To affected area       dimethicone 1 % external cream Apply topically 2 times daily To periarea/buttocks       docusate sodium (COLACE) 100 MG capsule Take 100 mg by mouth 2 times daily as needed for constipation Mild constipation (no BM in 1-2 days)       famotidine (PEPCID) 20 MG tablet Take 20 mg by  mouth daily       ferrous sulfate (FEROSUL) 325 (65 Fe) MG tablet Take 325 mg by mouth daily (with breakfast)       FLUoxetine (PROZAC) 20 MG capsule Take 1 capsule (20 mg) by mouth daily 90 capsule 0     furosemide (LASIX) 40 MG tablet Take 40 mg by mouth daily       glycerin (ADULT) 2 g suppository Place 1 suppository rectally daily as needed for constipation Moderate constipation (no BM in 3-5 days)       guaiFENesin (ROBITUSSIN) 20 mg/mL liquid Take 5-10 mLs by mouth every 4 hours as needed for cough 5 ml mild cough  10ml moderate/severe cough       hydrocortisone (CORTAID) 1 % external cream Apply topically 2 times daily as needed for itching       hydrocortisone, Perianal, (ANUSOL-HC) 2.5 % cream Place rectally 2 times daily as needed for hemorrhoids       levothyroxine (SYNTHROID/LEVOTHROID) 200 MCG tablet Take 225 mcg by mouth daily       lidocaine patch in PLACE Place 1 patch onto the skin every morning Remove at HS       loperamide (IMODIUM) 2 MG capsule Take 1 capsule (2 mg) by mouth 4 times daily as needed for diarrhea 60 capsule 0     loperamide (IMODIUM) 2 MG capsule Take 1 capsule (2 mg) by mouth 4 times daily as needed for diarrhea       losartan (COZAAR) 100 MG tablet Take 100 mg by mouth daily Hold if SBP less than 100       magnesium hydroxide (MOM) 2400 MG/10ML SUSP Take 30 mLs by mouth daily as needed for constipation For MILD constipation (no BM in 2-3 days)       melatonin 3 MG tablet Take 1 tablet (3 mg) by mouth nightly as needed for sleep 90 tablet 0     metoprolol tartrate (LOPRESSOR) 100 MG tablet Take 50 mg by mouth 2 times daily Hold for SBP < 100 or HR < 60       miconazole (MICATIN) 2 % AERP powder Apply topically 2 times daily as needed for other (to abdominal/groin folds)       mineral oil-hydrophilic petrolatum (AQUAPHOR) external ointment Apply topically as needed for dry skin       NARCAN 4 MG/0.1ML nasal spray CALL 911. SPR CONTENTS OF ONE SPRAYER (0.1ML) INTO ONE NOSTRIL.  "REPEAT IN 2-3 MIN IF SYMPTOMS OF OPIOID EMERGENCY PERSIST, ALTERNATE NOSTRILS       nystatin (MYCOSTATIN) 020969 UNIT/GM external powder Apply topically 2 times daily as needed for other (skin irritation)       oxyCODONE IR (ROXICODONE) 10 MG tablet Take 1 tablet (10 mg) by mouth every 6 hours as needed for moderate pain or severe pain 30 tablet 0     polyethylene glycol-propylene glycol (SYSTANE ULTRA) 0.4-0.3 % SOLN ophthalmic solution Place 1 drop into both eyes 4 times daily as needed for dry eyes       pregabalin (LYRICA) 25 MG capsule Take 1 capsule (25 mg) by mouth 2 times daily 60 capsule 0     simvastatin (ZOCOR) 20 MG tablet Take 20 mg by mouth At Bedtime        sodium phosphate (FLEET ENEMA) 7-19 GM/118ML rectal enema Place 1 enema rectally daily as needed for constipation Severe constipation (no BM in 5 or more days)       witch hazel-glycerin (TUCKS) pad Apply topically as needed for hemorrhoids       ROS:  10 point ROS of systems including Constitutional, Eyes, Respiratory, Cardiovascular, Gastroenterology, Genitourinary, Integumentary, Musculoskeletal, Psychiatric were all negative except for pertinent positives noted in my HPI.    Vitals:  /49   Pulse 70   Temp 98.1  F (36.7  C)   Resp 16   Ht 1.753 m (5' 9\")   Wt 118.9 kg (262 lb 1.6 oz)   SpO2 94%   BMI 38.71 kg/m    Exam:  GENERAL APPEARANCE:  in no distress,   RESP:  Unlabored breathing. CTA b/l.   CV:  S1S2 audible, regular HR, no murmur appreciated.   ABDOMEN:  soft, NT/ND, BS audible.   M/S:   no joint deformity noted on observation.   SKIN:  Large foam dressing over abd.   NEURO:   No NFD appreciated on observation.   PSYCH:  affect and mood normal    Lab/Diagnostic data:  Reviewed in the chart and EHR.        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   - leg wound improving. Continue care  - abdominal wound improving.   - analgesia optimal  - started rehab program, making a progress, continue until desired goal is achieved.         (F33.1) Moderate episode of recurrent major depressive disorder (H)  - adjusted well. No concern.       (E66.01,  Z68.38) Class 2 severe obesity due to excess calories with serious comorbidity and body mass index (BMI) of 38.0 to 38.9 in adult (H)  -- chronic. .     (N31.9) Neurogenic bladder  - inserted while IP. There is an order to remove and attempt PVR later this week. Follow.       (K52.9) Chronic diarrhea  - hx of ileal resection  - routine care. At baseline.   - counseled on the nature of imodium order, that it is given after each loose stool. At home pt takes it whenever she feels going to have a BM.    Electronically signed by:  Ariana Bose MD

## 2023-05-12 NOTE — TELEPHONE ENCOUNTER
[facility].  Referral placed by inpatient WOC team.     [phone]

## 2023-05-15 PROBLEM — L89.313 PRESSURE ULCER OF RIGHT BUTTOCK, STAGE 3 (H): Status: ACTIVE | Noted: 2023-01-01

## 2023-05-15 PROBLEM — L89.313 PRESSURE ULCER OF RIGHT BUTTOCK, STAGE 3 (H): Status: RESOLVED | Noted: 2023-01-01 | Resolved: 2023-01-01

## 2023-05-15 PROBLEM — N18.30 CHRONIC KIDNEY DISEASE, STAGE 3 UNSPECIFIED (H): Status: RESOLVED | Noted: 2021-01-14 | Resolved: 2023-01-01

## 2023-05-15 PROBLEM — E11.21 DIABETIC RENAL DISEASE (H): Status: RESOLVED | Noted: 2021-05-23 | Resolved: 2023-01-01

## 2023-05-15 PROBLEM — F11.10 NONDEPENDENT OPIOID ABUSE (H): Status: RESOLVED | Noted: 2021-05-23 | Resolved: 2023-01-01

## 2023-05-15 NOTE — LETTER
5/15/2023        RE: [facility]  [address]  [facility] [address]        [facility]  PRIMARY CARE PROVIDER AND CLINIC:  Le Romo MD, [address] / [facility] [address]  Chief Complaint   Patient presents with     Hospital F/U     [place] Medical Record Number:  [id number]  Place of Service where encounter took place:  [facility] (U/SNF) [4000]    Carolina Mari  is a 68 year old  (1955), admitted to the above facility from  [facility]. Hospital stay 4/28/23 through 5/10/23..  Admitted to this facility for  rehab, medical management and nursing care.    HPI:    HPI information obtained from: facility chart records, facility staff, patient report and [facility] chart review.   Brief Summary of Hospital Course:   * pt hospitalzied with septic shock 2/2 cellulitis, treated with IV abx  * metabolic encephalopathy 2/2 infection: resolved  * NEELIMA/CKD 2/2 sepsis: resolved  * Hyperkalemia  * IUC inserted.       TODAY:  - leg cellulitis:  Reports today when the bandage was changed caused her so much pain. Denies fever.   - abd wound/ diarrhea  Still has large scab where the hernia was, and it is getting better. Reprots 3-4 BM daily. She is getting 3 tab of imodium but at home take 5-6 tab a day and it helps.   - rehab: reports just started, reports today was the first time she sat down in the .   - IUC: did not have it in at home.     ========================================    CODE STATUS/ADVANCE DIRECTIVES DISCUSSION:   DNR / DNI  Patient's living condition: lives in an assisted living facility  ALLERGIES: Aspirin, Colchicine, and Colchicine  PAST MEDICAL HISTORY:  has a past medical history of History of stomach ulcers, HTN (hypertension), Knee osteoarthritis, Osteoarthritis of right hip, Osteoporosis, Osteoporosis, and Thyroid disease.  PAST SURGICAL HISTORY:   has a  past surgical history that includes tka; gastric bypass; Foot surgery; SACROPLASTY; joint replacement; hernia repair; Amputate toe(s) (Bilateral); REMOVAL GALLBLADDER (N/A, 12/25/2016); and PICC/Midline Placement (4/28/2023).  FAMILY HISTORY: family history includes Breast Cancer in her mother; Cancer in her brother and mother; Cancer (age of onset: 20.00) in her brother; Coronary Artery Disease in her brother, brother, and father.  SOCIAL HISTORY:   reports that she has never smoked. She has never used smokeless tobacco. She reports that she does not drink alcohol and does not use drugs.    Post Discharge Medication Reconciliation Status: discharge medications reconciled and changed, per note/orders  Current Outpatient Medications   Medication Sig Dispense Refill     acetaminophen (TYLENOL) 500 MG tablet Take 1,000 mg by mouth 3 times daily        alum & mag hydroxide-simethicone (MAALOX) 200-200-20 MG/5ML SUSP suspension Take 30 mLs by mouth every 6 hours as needed for indigestion Moderate heartburn or indigestion       Ascorbic Acid (VITAMIN C) 500 MG CAPS Take 500 mg by mouth daily       bacitracin 500 UNIT/GM OINT Apply topically 2 times daily as needed for wound care Minor or superficial abrasions, lacerations, or sores       calcium carbonate (TUMS) 500 MG chewable tablet Take 1-2 chew tab by mouth every 6 hours as needed for heartburn Mild heartburn or indigestion       carbamide peroxide (DEBROX) 6.5 % otic solution Place 5 drops into both ears daily as needed for other (earwax buildup)       cetirizine (ZYRTEC) 10 MG tablet Take 10 mg by mouth 2 times daily       cholestyramine light (PREVALITE) 4 GM powder Take 4 g by mouth 2 times daily diarrhea       diclofenac (VOLTAREN) 1 % topical gel Apply 2 g topically 4 times daily To affected area       dimethicone 1 % external cream Apply topically 2 times daily To periarea/buttocks       docusate sodium (COLACE) 100 MG capsule Take 100 mg by mouth 2 times daily  as needed for constipation Mild constipation (no BM in 1-2 days)       famotidine (PEPCID) 20 MG tablet Take 20 mg by mouth daily       ferrous sulfate (FEROSUL) 325 (65 Fe) MG tablet Take 325 mg by mouth daily (with breakfast)       FLUoxetine (PROZAC) 20 MG capsule Take 1 capsule (20 mg) by mouth daily 90 capsule 0     furosemide (LASIX) 40 MG tablet Take 40 mg by mouth daily       glycerin (ADULT) 2 g suppository Place 1 suppository rectally daily as needed for constipation Moderate constipation (no BM in 3-5 days)       guaiFENesin (ROBITUSSIN) 20 mg/mL liquid Take 5-10 mLs by mouth every 4 hours as needed for cough 5 ml mild cough  10ml moderate/severe cough       hydrocortisone (CORTAID) 1 % external cream Apply topically 2 times daily as needed for itching       hydrocortisone, Perianal, (ANUSOL-HC) 2.5 % cream Place rectally 2 times daily as needed for hemorrhoids       levothyroxine (SYNTHROID/LEVOTHROID) 200 MCG tablet Take 225 mcg by mouth daily       lidocaine patch in PLACE Place 1 patch onto the skin every morning Remove at HS       loperamide (IMODIUM) 2 MG capsule Take 1 capsule (2 mg) by mouth 4 times daily as needed for diarrhea 60 capsule 0     loperamide (IMODIUM) 2 MG capsule Take 1 capsule (2 mg) by mouth 4 times daily as needed for diarrhea       losartan (COZAAR) 100 MG tablet Take 100 mg by mouth daily Hold if SBP less than 100       magnesium hydroxide (MOM) 2400 MG/10ML SUSP Take 30 mLs by mouth daily as needed for constipation For MILD constipation (no BM in 2-3 days)       melatonin 3 MG tablet Take 1 tablet (3 mg) by mouth nightly as needed for sleep 90 tablet 0     metoprolol tartrate (LOPRESSOR) 100 MG tablet Take 50 mg by mouth 2 times daily Hold for SBP < 100 or HR < 60       miconazole (MICATIN) 2 % AERP powder Apply topically 2 times daily as needed for other (to abdominal/groin folds)       mineral oil-hydrophilic petrolatum (AQUAPHOR) external ointment Apply topically as needed  "for dry skin       NARCAN 4 MG/0.1ML nasal spray CALL 911. SPR CONTENTS OF ONE SPRAYER (0.1ML) INTO ONE NOSTRIL. REPEAT IN 2-3 MIN IF SYMPTOMS OF OPIOID EMERGENCY PERSIST, ALTERNATE NOSTRILS       nystatin (MYCOSTATIN) 731079 UNIT/GM external powder Apply topically 2 times daily as needed for other (skin irritation)       oxyCODONE IR (ROXICODONE) 10 MG tablet Take 1 tablet (10 mg) by mouth every 6 hours as needed for moderate pain or severe pain 30 tablet 0     polyethylene glycol-propylene glycol (SYSTANE ULTRA) 0.4-0.3 % SOLN ophthalmic solution Place 1 drop into both eyes 4 times daily as needed for dry eyes       pregabalin (LYRICA) 25 MG capsule Take 1 capsule (25 mg) by mouth 2 times daily 60 capsule 0     simvastatin (ZOCOR) 20 MG tablet Take 20 mg by mouth At Bedtime        sodium phosphate (FLEET ENEMA) 7-19 GM/118ML rectal enema Place 1 enema rectally daily as needed for constipation Severe constipation (no BM in 5 or more days)       witch hazel-glycerin (TUCKS) pad Apply topically as needed for hemorrhoids       ROS:  10 point ROS of systems including Constitutional, Eyes, Respiratory, Cardiovascular, Gastroenterology, Genitourinary, Integumentary, Musculoskeletal, Psychiatric were all negative except for pertinent positives noted in my HPI.    Vitals:  /49   Pulse 70   Temp 98.1  F (36.7  C)   Resp 16   Ht 1.753 m (5' 9\")   Wt 118.9 kg (262 lb 1.6 oz)   SpO2 94%   BMI 38.71 kg/m    Exam:  GENERAL APPEARANCE:  in no distress,   RESP:  Unlabored breathing. CTA b/l.   CV:  S1S2 audible, regular HR, no murmur appreciated.   ABDOMEN:  soft, NT/ND, BS audible.   M/S:   no joint deformity noted on observation.   SKIN:  Large foam dressing over abd.   NEURO:   No NFD appreciated on observation.   PSYCH:  affect and mood normal    Lab/Diagnostic data:  Reviewed in the chart and EHR.        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   - leg wound improving. Continue care  - abdominal wound improving.   - analgesia optimal  - started rehab program, making a progress, continue until desired goal is achieved.         (F33.1) Moderate episode of recurrent major depressive disorder (H)  - adjusted well. No concern.       (E66.01,  Z68.38) Class 2 severe obesity due to excess calories with serious comorbidity and body mass index (BMI) of 38.0 to 38.9 in adult (H)  -- chronic. .     (N31.9) Neurogenic bladder  - inserted while IP. There is an order to remove and attempt PVR later this week. Follow.       (K52.9) Chronic diarrhea  - hx of ileal resection  - routine care. At baseline.   - counseled on the nature of imodium order, that it is given after each loose stool. At home pt takes it whenever she feels going to have a BM.    Electronically signed by:  Ariana Bose MD                  Sincerely,        Ariana Bose MD

## 2023-05-18 NOTE — LETTER
5/18/2023        RE: [facility]  [address]  [facility] [address]        [facility] GERIATRICS  TCU FOLLOW UP VISIT    Chief Complaint   Patient presents with     RECHECK      Place of Service where encounter took place:  [facility] (TCU/SNF) [4000]    Carolina Mari  is a 68 year old  (1955), who is being seen today at the above facility to discuss progress in therapy, review nursing home EHR, recheck chronic medical problems as well as address any new concerns.       HPI:    Copied forward from previous note: admitted to the above facility from  [facility]. Hospital stay 4/28/23 through 5/10/23.  Patient's living condition: lives in an assisted living facility    Brief Summary of Hospital Course: Treated for left leg cellulitis and sepsis.  Complications included acute kidney injury, hyperkalemia and shock.  He has a past medical history of large ventral hernia with chronic wound, obesity, adult failure to thrive, chronic edema, chronic diarrhea, GERD, hypertension, osteoporosis, hypothyroidism and CKD.  MEDICATION CHANGES: Start fluoxetine and melatonin, stopped duloxetine.  RECOMMENDED FOLLOW UP: [facility] is requested within 2-4 weeks  Updates on Status Since Skilled nursing Admission:  5/11 - Loperamide 2 mg p.o. 4 times daily as needed  -Diagnosis for Rodriguez catheter is neurogenic bladder  -Remove Rodriguez catheter in 10 days then bladder scan every shift and straight cath if residual greater than 300 mils x3 days and update NP  5/15 MD visit    Today: patient expresses frustration over lack of progress in therapy, not getting good pain management she wants 20 mg of oxycodone every 6 hours PRN. She complains of abd pain and bloating and diarrhea. This have been a chronic issue for years. She says at home she gets loperimide three times a day. Agrees to trial of scheduling the  "loperimide. Encouraged her to request the pain medications. Pharmacy recomends discussing if cirtrizine should be twice or could be decreased to once daily. Facility EHR reviewed and shows No weights recorded, b/p 90/42 - 129/73, no fever, P 70 - 83. She is getting the loperimide about 1 - 2 times per day, she is taking 30 - 40 mg of oxycodone per day. She hasn't been using many of her PRN medications so will discontinue those. Will also discontinue duplicate medications.     ALLERGIES: Aspirin, Colchicine, and Colchicine    ROS:  4 point ROS including Respiratory, CV, GI and , other than that noted in the HPI,  is negative    Vitals:  /77   Pulse 76   Temp 98.1  F (36.7  C)   Resp 16   Ht 1.753 m (5' 9\")   Wt 118.9 kg (262 lb 1.6 oz)   SpO2 96%   BMI 38.71 kg/m    Exam:  GENERAL APPEARANCE:  Alert, in no distress  RESP:  respiratory effort normal   CV:  edema none.  Left leg wrapped.  ABDOMEN: bloated, no tenderness.   M/S:  Gait and station lying flat in bed seems comfortable.    PSYCH:  insight and judgement, memory seems intact, affect and mood irritable.     ASSESSMENT/PLAN:  Lymphedema  Stable.   - continue with lymphedema therapy at the TCU.   -Nursing to monitor skin  -PT/OT     Physical deconditioning  Admitted to the TCU for therapy and nursing care following a hospital stay for cellulitis and acute shock.  -Continue PT/OT  - following for discharge planning     Chronic pain syndrome  No new concerns.  Continues on PTA medications of lidocaine patch, Tylenol, Lyrica, oxycodone. Could consider slight increase in lyrica.      Diarrhea, unspecified type  Ok to schedule lopermide BID and offer QID PRN.      Neurogenic bladder  Admitted with Mo catheter.  -Mo cares per facility  -has orders to remove mo catheter.     Orders:  - decrease cirtrizine to 10 mg po every day  - add loperimide 2 mg po BID - hold for no BM in 48 hours  - discontinue milk for mag  - discontinue " glycerin SUP  - discontinue maalox  - discontinue nystatin powder    Electronically signed by: Kirsten Lovell NP          Sincerely,        Kirsten Lovell NP

## 2023-05-19 NOTE — PROGRESS NOTES
[facility] GERIATRICS  TCU FOLLOW UP VISIT    Chief Complaint   Patient presents with     RECHECK      Place of Service where encounter took place:  [facility] (TCU/SNF) [4000]    Carolina Mari  is a 68 year old  (1955), who is being seen today at the above facility to discuss progress in therapy, review nursing home EHR, recheck chronic medical problems as well as address any new concerns.       HPI:    Copied forward from previous note: admitted to the above facility from  [facility]. Hospital stay 4/28/23 through 5/10/23.  Patient's living condition: lives in an assisted living facility    Brief Summary of Hospital Course: Treated for left leg cellulitis and sepsis.  Complications included acute kidney injury, hyperkalemia and shock.  He has a past medical history of large ventral hernia with chronic wound, obesity, adult failure to thrive, chronic edema, chronic diarrhea, GERD, hypertension, osteoporosis, hypothyroidism and CKD.  MEDICATION CHANGES: Start fluoxetine and melatonin, stopped duloxetine.  RECOMMENDED FOLLOW UP: [facility] is requested within 2-4 weeks  Updates on Status Since Skilled nursing Admission:  5/11 - Loperamide 2 mg p.o. 4 times daily as needed  -Diagnosis for Rodriguez catheter is neurogenic bladder  -Remove Rodriguez catheter in 10 days then bladder scan every shift and straight cath if residual greater than 300 mils x3 days and update NP  5/15 MD visit    Today: patient expresses frustration over lack of progress in therapy, not getting good pain management she wants 20 mg of oxycodone every 6 hours PRN. She complains of abd pain and bloating and diarrhea. This have been a chronic issue for years. She says at home she gets loperimide three times a day. Agrees to trial of scheduling the loperimide. Encouraged her to request the pain medications. Pharmacy recomends discussing if cirtrizine should be twice or could be  "decreased to once daily. Facility EHR reviewed and shows No weights recorded, b/p 90/42 - 129/73, no fever, P 70 - 83. She is getting the loperimide about 1 - 2 times per day, she is taking 30 - 40 mg of oxycodone per day. She hasn't been using many of her PRN medications so will discontinue those. Will also discontinue duplicate medications.     ALLERGIES: Aspirin, Colchicine, and Colchicine    ROS:  4 point ROS including Respiratory, CV, GI and , other than that noted in the HPI,  is negative    Vitals:  /77   Pulse 76   Temp 98.1  F (36.7  C)   Resp 16   Ht 1.753 m (5' 9\")   Wt 118.9 kg (262 lb 1.6 oz)   SpO2 96%   BMI 38.71 kg/m    Exam:  GENERAL APPEARANCE:  Alert, in no distress  RESP:  respiratory effort normal   CV:  edema none.  Left leg wrapped.  ABDOMEN: bloated, no tenderness.   M/S:  Gait and station lying flat in bed seems comfortable.    PSYCH:  insight and judgement, memory seems intact, affect and mood irritable.     ASSESSMENT/PLAN:  Lymphedema  Stable.   - continue with lymphedema therapy at the TCU.   -Nursing to monitor skin  -PT/OT     Physical deconditioning  Admitted to the TCU for therapy and nursing care following a hospital stay for cellulitis and acute shock.  -Continue PT/OT  - following for discharge planning     Chronic pain syndrome  No new concerns.  Continues on PTA medications of lidocaine patch, Tylenol, Lyrica, oxycodone. Could consider slight increase in lyrica.      Diarrhea, unspecified type  Ok to schedule lopermide BID and offer QID PRN.      Neurogenic bladder  Admitted with Mo catheter.  -Mo cares per facility  -has orders to remove mo catheter.     Orders:  - decrease cirtrizine to 10 mg po every day  - add loperimide 2 mg po BID - hold for no BM in 48 hours  - discontinue milk for mag  - discontinue glycerin SUP  - discontinue maalox  - discontinue nystatin powder    Electronically signed by: Kirsten Lovell NP    "

## 2023-05-22 NOTE — LETTER
5/22/2023        RE: [facility]  [address]  [facility] [address]        [facility] GERIATRICS  TCU FOLLOW UP VISIT    Chief Complaint   Patient presents with     RECHECK      Place of Service where encounter took place:  [facility] (TCU/SNF) [[id number]]    Carolina Mari  is a 68 year old  (1955), who is being seen today at the above facility to discuss progress in therapy, review nursing home EHR, recheck chronic medical problems as well as address any new concerns.       HPI:    Copied forward from previous note: admitted to the above facility from  [facility]. Hospital stay 4/28/23 through 5/10/23.  Patient's living condition: lives in an assisted living facility    Brief Summary of Hospital Course: Treated for left leg cellulitis and sepsis.  Complications included acute kidney injury, hyperkalemia and shock.  He has a past medical history of large ventral hernia with chronic wound, obesity, adult failure to thrive, chronic edema, chronic diarrhea, GERD, hypertension, osteoporosis, hypothyroidism and CKD.  MEDICATION CHANGES: Start fluoxetine and melatonin, stopped duloxetine.  RECOMMENDED FOLLOW UP: [facility] is requested within 2-4 weeks  Updates on Status Since Skilled nursing Admission:  5/11 - Loperamide 2 mg p.o. 4 times daily as needed  -Diagnosis for Rodriguez catheter is neurogenic bladder  -Remove Rodriguez catheter in 10 days then bladder scan every shift and straight cath if residual greater than 300 mils x3 days and update NP  5/15 MD visit  5/18 - decrease cirtrizine to 10 mg po every day  - add loperimide 2 mg po BID - hold for no BM in 48 hours  - discontinue milk for mag, glycerin suppository, Maalox, nystatin powder    Today: patient continues with therapy.  She reports that she just tried to stand up left and she did not like it.  She reports that her last BM was yesterday and  "it was loose.  She reports that she likes taking the Imodium scheduled and does not want it changed.  Nursing reports that wound is improving.  PT reports patient still using a Kyle with nursing staff.  Goal is for standing and pivot transfers before going back to her assisted living.  She is currently working on using the PAL.   offered psychology and patient declined.  Facility EHR reviewed including BM record progress notes and vital signs and MAR.  Shows blood pressure 119//69.  Pulse 69-77.  1 dose of Imodium scheduled was held.  She is getting the as needed loperamide once on Friday and twice on Sunday.  She is using her as needed oxy about 2-4 times per day.  No other concerns noted.  Today I spoke with patient about my order that was written last week to remove her Rodriguez.  She reports that no one told her this and she is upset and does not want it removed today.  She agrees that she would allow it to be removed tomorrow.    ALLERGIES: Aspirin, Colchicine, and Colchicine    ROS:  4 point ROS including Respiratory, CV, GI and , other than that noted in the HPI,  is negative    Vitals:  /69   Pulse 77   Temp 98.4  F (36.9  C)   Resp 16   Ht 1.753 m (5' 9\")   Wt 118.9 kg (262 lb 1.6 oz)   SpO2 94%   BMI 38.71 kg/m    Exam:  GENERAL APPEARANCE:  Alert, in no distress  RESP:  respiratory effort normal   CV:  edema none.  Left leg wrapped.  ABDOMEN: bloated, no tenderness.   M/S:  Gait and station sitting in wheelchair.  Seems comfortable.    PSYCH:  insight and judgement, memory seems intact, affect and mood irritable.     ASSESSMENT/PLAN:  Physical deconditioning  Admitted to the TCU for therapy and nursing care following a hospital stay for cellulitis and acute shock.  -Continue PT/OT  - following for discharge planning    Lymphedema  Stable.   - continue with lymphedema therapy at the TCU.   -Nursing to monitor skin  -PT/OT     Chronic pain syndrome  No new " concerns.  Continues on PTA medications of lidocaine patch, Tylenol, Lyrica, oxycodone.      Diarrhea, unspecified type  Continue with schedule lopermide BID and QID PRN.      Neurogenic bladder  Admitted with Mo catheter.  -Mo cares per facility  -has orders to remove mo catheter.     Orders:  No changes    Electronically signed by: Kirsten Lovell NP          Sincerely,        Kirsten Lovell NP

## 2023-05-23 NOTE — TELEPHONE ENCOUNTER
Spoke to patients son, he states the wound is being managed at the TCU and an apt with us is  not needed.

## 2023-05-23 NOTE — PROGRESS NOTES
[facility] GERIATRICS  TCU FOLLOW UP VISIT    Chief Complaint   Patient presents with     RECHECK      Place of Service where encounter took place:  [facility] (TCU/SNF) [4000]    Carolina Mari  is a 68 year old  (1955), who is being seen today at the above facility to discuss progress in therapy, review nursing home EHR, recheck chronic medical problems as well as address any new concerns.       HPI:    Copied forward from previous note: admitted to the above facility from  [facility]. Hospital stay 4/28/23 through 5/10/23.  Patient's living condition: lives in an assisted living facility    Brief Summary of Hospital Course: Treated for left leg cellulitis and sepsis.  Complications included acute kidney injury, hyperkalemia and shock.  He has a past medical history of large ventral hernia with chronic wound, obesity, adult failure to thrive, chronic edema, chronic diarrhea, GERD, hypertension, osteoporosis, hypothyroidism and CKD.  MEDICATION CHANGES: Start fluoxetine and melatonin, stopped duloxetine.  RECOMMENDED FOLLOW UP: [facility] is requested within 2-4 weeks  Updates on Status Since Skilled nursing Admission:  5/11 - Loperamide 2 mg p.o. 4 times daily as needed  -Diagnosis for Rodriguez catheter is neurogenic bladder  -Remove Rodriguez catheter in 10 days then bladder scan every shift and straight cath if residual greater than 300 mils x3 days and update NP  5/15 MD visit  5/18 - decrease cirtrizine to 10 mg po every day  - add loperimide 2 mg po BID - hold for no BM in 48 hours  - discontinue milk for mag, glycerin suppository, Maalox, nystatin powder    Today: patient continues with therapy.  She reports that she just tried to stand up left and she did not like it.  She reports that her last BM was yesterday and it was loose.  She reports that she likes taking the Imodium scheduled and does not want it changed.  Nursing reports that  "wound is improving.  PT reports patient still using a Kyle with nursing staff.  Goal is for standing and pivot transfers before going back to her assisted living.  She is currently working on using the PAL.   offered psychology and patient declined.  Facility EHR reviewed including BM record progress notes and vital signs and MAR.  Shows blood pressure 119//69.  Pulse 69-77.  1 dose of Imodium scheduled was held.  She is getting the as needed loperamide once on Friday and twice on Sunday.  She is using her as needed oxy about 2-4 times per day.  No other concerns noted.  Today I spoke with patient about my order that was written last week to remove her Rodriguez.  She reports that no one told her this and she is upset and does not want it removed today.  She agrees that she would allow it to be removed tomorrow.    ALLERGIES: Aspirin, Colchicine, and Colchicine    ROS:  4 point ROS including Respiratory, CV, GI and , other than that noted in the HPI,  is negative    Vitals:  /69   Pulse 77   Temp 98.4  F (36.9  C)   Resp 16   Ht 1.753 m (5' 9\")   Wt 118.9 kg (262 lb 1.6 oz)   SpO2 94%   BMI 38.71 kg/m    Exam:  GENERAL APPEARANCE:  Alert, in no distress  RESP:  respiratory effort normal   CV:  edema none.  Left leg wrapped.  ABDOMEN: bloated, no tenderness.   M/S:  Gait and station sitting in wheelchair.  Seems comfortable.    PSYCH:  insight and judgement, memory seems intact, affect and mood irritable.     ASSESSMENT/PLAN:  Physical deconditioning  Admitted to the TCU for therapy and nursing care following a hospital stay for cellulitis and acute shock.  -Continue PT/OT  - following for discharge planning    Lymphedema  Stable.   - continue with lymphedema therapy at the TCU.   -Nursing to monitor skin  -PT/OT     Chronic pain syndrome  No new concerns.  Continues on PTA medications of lidocaine patch, Tylenol, Lyrica, oxycodone.      Diarrhea, unspecified type  Continue " with schedule lopermide BID and QID PRN.      Neurogenic bladder  Admitted with Mo catheter.  -Mo cares per facility  -has orders to remove mo catheter.     Orders:  No changes    Electronically signed by: Kirsten Lovell NP

## 2023-05-25 NOTE — LETTER
5/25/2023        RE: [facility]  [address]  [facility] [address]        [facility] GERIATRICS  TCU FOLLOW UP VISIT    Chief Complaint   Patient presents with     RECHECK      Place of Service where encounter took place:  [facility] (TCU/SNF) [4000]    Carolina Mari  is a 68 year old  (1955), who is being seen today at the above facility to discuss progress in therapy, review nursing home EHR, recheck chronic medical problems as well as address any new concerns.       HPI:    Copied forward from previous note: admitted to the above facility from  [facility]. Hospital stay 4/28/23 through 5/10/23.  Patient's living condition: lives in an assisted living facility    Brief Summary of Hospital Course: Treated for left leg cellulitis and sepsis.  Complications included acute kidney injury, hyperkalemia and shock.  He has a past medical history of large ventral hernia with chronic wound, obesity, adult failure to thrive, chronic edema, chronic diarrhea, GERD, hypertension, osteoporosis, hypothyroidism and CKD.  MEDICATION CHANGES: Start fluoxetine and melatonin, stopped duloxetine.  RECOMMENDED FOLLOW UP: [facility] is requested within 2-4 weeks  Updates on Status Since Skilled nursing Admission:  5/11 - Loperamide 2 mg p.o. 4 times daily as needed  -Diagnosis for Rodriguez catheter is neurogenic bladder  -Remove Rodriguez catheter in 10 days then bladder scan every shift and straight cath if residual greater than 300 mils x3 days and update NP  5/15 MD visit  5/18 - decrease cirtrizine to 10 mg po every day  - add loperimide 2 mg po BID - hold for no BM in 48 hours  - discontinue milk for mag, glycerin suppository, Maalox, nystatin powder    Today: patient reports frustration that she doesn't want to do what therapy asks her to do and she doesn't want to get out of bed. She states she will get out of  "bed when she is ready. Encouraged patient to particpate in therapy when they come because that is what she needs to do in order to get back home. I asked patient if she wanted to stay at a nursing home and she said yes but then became frustrated with me. She states that she is still having liquid BM's and she would like the loperimide increased. Facility EHR reviewed and shows b/ps lower than needed. BMs only once per day but large incontinent and loose. Her mo catheter removal has been postponed to next week.   Nurse requested a renewal of her PRN melatonin. MAR reviewed and show no use of the melatonin since admit.     ALLERGIES: Aspirin, Colchicine, and Colchicine    ROS:  4 point ROS including Respiratory, CV, GI and , other than that noted in the HPI,  is negative    Vitals:  /67   Pulse 89   Temp 98.2  F (36.8  C)   Resp 18   Ht 1.753 m (5' 9\")   Wt 119.5 kg (263 lb 8 oz)   SpO2 96%   BMI 38.91 kg/m    Exam:  GENERAL APPEARANCE:  Alert, in no distress  RESP:  respiratory effort normal   CV:  edema none.  Left leg wrapped.  ABDOMEN: bloated, no tenderness.   M/S:  Gait and station sitting in wheelchair.  Seems comfortable.    PSYCH:  insight and judgement, memory seems intact, affect and mood irritable.     ASSESSMENT/PLAN:  Essential hypertension  Lower than needed. Some SBP in the 90's. Metoprolol is being held in the evening do to lower b/ps.   - decrease losartan 50 mg po every day.  Not using the melatonin so will discontinue it.    Insomnia, unspecified type  Not using the melatonin, will discontinue it.    Physical deconditioning  Admitted to the TCU for therapy and nursing care following a hospital stay for cellulitis and acute shock.  -Continue PT/OT  - following for discharge planning    Lymphedema  Stable.   - continue with lymphedema therapy at the TCU.   -Nursing to monitor skin  -PT/OT     Chronic pain syndrome  No new concerns.  Continues on PTA medications of " lidocaine patch, Tylenol, Lyrica, oxycodone.      Diarrhea, unspecified type  Continue with schedule lopermide BID and QID PRN.      Neurogenic bladder  Admitted with Mo catheter.  -Mo cares per facility  -has orders to remove mo catheter.     Orders:  -Decrease losartan to 50 mg p.o. every day  -Discontinue melatonin    Electronically signed by: Kirsten Lovell NP          Sincerely,        Kirsten Lovell NP

## 2023-05-26 NOTE — PROGRESS NOTES
[facility] GERIATRICS  TCU FOLLOW UP VISIT    Chief Complaint   Patient presents with     RECHECK      Place of Service where encounter took place:  [facility] (TCU/SNF) [4000]    Carolina Mari  is a 68 year old  (1955), who is being seen today at the above facility to discuss progress in therapy, review nursing home EHR, recheck chronic medical problems as well as address any new concerns.       HPI:    Copied forward from previous note: admitted to the above facility from  [facility]. Hospital stay 4/28/23 through 5/10/23.  Patient's living condition: lives in an assisted living facility    Brief Summary of Hospital Course: Treated for left leg cellulitis and sepsis.  Complications included acute kidney injury, hyperkalemia and shock.  He has a past medical history of large ventral hernia with chronic wound, obesity, adult failure to thrive, chronic edema, chronic diarrhea, GERD, hypertension, osteoporosis, hypothyroidism and CKD.  MEDICATION CHANGES: Start fluoxetine and melatonin, stopped duloxetine.  RECOMMENDED FOLLOW UP: [facility] is requested within 2-4 weeks  Updates on Status Since Skilled nursing Admission:  5/11 - Loperamide 2 mg p.o. 4 times daily as needed  -Diagnosis for Rodriguez catheter is neurogenic bladder  -Remove Rodriguez catheter in 10 days then bladder scan every shift and straight cath if residual greater than 300 mils x3 days and update NP  5/15 MD visit  5/18 - decrease cirtrizine to 10 mg po every day  - add loperimide 2 mg po BID - hold for no BM in 48 hours  - discontinue milk for mag, glycerin suppository, Maalox, nystatin powder    Today: patient reports frustration that she doesn't want to do what therapy asks her to do and she doesn't want to get out of bed. She states she will get out of bed when she is ready. Encouraged patient to particpate in therapy when they come because that is what she needs to do in  "order to get back home. I asked patient if she wanted to stay at a nursing home and she said yes but then became frustrated with me. She states that she is still having liquid BM's and she would like the loperimide increased. Facility EHR reviewed and shows b/ps lower than needed. BMs only once per day but large incontinent and loose. Her mo catheter removal has been postponed to next week.   Nurse requested a renewal of her PRN melatonin. MAR reviewed and show no use of the melatonin since admit.     ALLERGIES: Aspirin, Colchicine, and Colchicine    ROS:  4 point ROS including Respiratory, CV, GI and , other than that noted in the HPI,  is negative    Vitals:  /67   Pulse 89   Temp 98.2  F (36.8  C)   Resp 18   Ht 1.753 m (5' 9\")   Wt 119.5 kg (263 lb 8 oz)   SpO2 96%   BMI 38.91 kg/m    Exam:  GENERAL APPEARANCE:  Alert, in no distress  RESP:  respiratory effort normal   CV:  edema none.  Left leg wrapped.  ABDOMEN: bloated, no tenderness.   M/S:  Gait and station sitting in wheelchair.  Seems comfortable.    PSYCH:  insight and judgement, memory seems intact, affect and mood irritable.     ASSESSMENT/PLAN:  Essential hypertension  Lower than needed. Some SBP in the 90's. Metoprolol is being held in the evening do to lower b/ps.   - decrease losartan 50 mg po every day.  Not using the melatonin so will discontinue it.    Insomnia, unspecified type  Not using the melatonin, will discontinue it.    Physical deconditioning  Admitted to the TCU for therapy and nursing care following a hospital stay for cellulitis and acute shock.  -Continue PT/OT  - following for discharge planning    Lymphedema  Stable.   - continue with lymphedema therapy at the TCU.   -Nursing to monitor skin  -PT/OT     Chronic pain syndrome  No new concerns.  Continues on PTA medications of lidocaine patch, Tylenol, Lyrica, oxycodone.      Diarrhea, unspecified type  Continue with schedule lopermide BID and QID PRN. "      Neurogenic bladder  Admitted with Mo catheter.  -Mo cares per facility  -has orders to remove mo catheter.     Orders:  -Decrease losartan to 50 mg p.o. every day  -Discontinue melatonin    Electronically signed by: Kirsten Lovell NP

## 2023-05-30 NOTE — LETTER
5/30/2023        RE: [facility]  [address]  [facility] [address]        [facility] GERIATRICS  TCU FOLLOW UP VISIT    Chief Complaint   Patient presents with     RECHECK      Place of Service where encounter took place:  [facility] (TCU/SNF) [4000]    Carolina Mari  is a 68 year old  (1955), who is being seen today at the above facility to discuss progress in therapy, review nursing home EHR, recheck chronic medical problems as well as address any new concerns.       HPI:    Copied forward from previous note: admitted to the above facility from  [facility]. Hospital stay 4/28/23 through 5/10/23.  Patient's living condition: lives in an assisted living facility    Brief Summary of Hospital Course: Treated for left leg cellulitis and sepsis.  Complications included acute kidney injury, hyperkalemia and shock.  He has a past medical history of large ventral hernia with chronic wound, obesity, adult failure to thrive, chronic edema, chronic diarrhea, GERD, hypertension, osteoporosis, hypothyroidism and CKD.  MEDICATION CHANGES: Start fluoxetine and melatonin, stopped duloxetine.  RECOMMENDED FOLLOW UP: [facility] is requested within 2-4 weeks  Updates on Status Since Skilled nursing Admission:  5/11 - Loperamide 2 mg p.o. 4 times daily as needed  -Diagnosis for Rodriguez catheter is neurogenic bladder  -Remove Rodriguez catheter in 10 days then bladder scan every shift and straight cath if residual greater than 300 mils x3 days and update NP  5/15 MD visit  5/18 - decrease cirtrizine to 10 mg po every day  - add loperimide 2 mg po BID - hold for no BM in 48 hours  - discontinue milk for mag, glycerin suppository, Maalox, nystatin powder  5/25 -Decrease losartan to 50 mg p.o. every day  -Discontinue melatonin    Today: patient reporting diarrhea continues. She reports she had an episode yesterday.  "Facility EHR notes 1 putty like BM today, 2 formed bm on 5/29 and 1 loose bm on 5/28. I offered a GI referral today and she states she will think on it. Barriers would be transportation, having to get out of bed and dressed and possibility of needing a colonoscopy. Vitals reviewed and have been stable. No low b/ps. Therapy reports she needs the PAL for transfers, wounds are improving and she is getting the lymph wraps every 2 - 3 days. SW and Therapy will be meeting with patient soon to discuss goals due to low participation and motivation from patient.     ALLERGIES: Aspirin, Colchicine, and Colchicine    ROS:  4 point ROS including Respiratory, CV, GI and , other than that noted in the HPI,  is negative    Vitals:  /64   Pulse 80   Temp 97.5  F (36.4  C)   Resp 12   Ht 1.753 m (5' 9\")   Wt 119.5 kg (263 lb 8 oz)   SpO2 95%   BMI 38.91 kg/m    Exam:  GENERAL APPEARANCE:  Alert, in no distress  RESP:  respiratory effort normal   CV:  edema none.  Left leg wrapped.  ABDOMEN: bloated, no tenderness.   M/S:  Gait and station - lying in bed.  Seems comfortable.    PSYCH:  insight and judgement, memory seems intact, affect and mood irritable.     ASSESSMENT/PLAN:  Essential hypertension  Controlled. Losartan dose decreased to 50 mg daily  - Continue with losartan and metoprolol.     Physical deconditioning  Admitted to the TCU for therapy and nursing care following a hospital stay for cellulitis and acute shock. Needs to be able to transfer with assist of 1 to return to her AL.   -Continue PT/OT  - following for discharge planning    Lymphedema  Stable.   - continue with lymphedema therapy at the TCU.   -Nursing to monitor skin  -PT/OT     Chronic pain syndrome  No new concerns.  Continues on PTA medications of lidocaine patch, Tylenol, Lyrica, oxycodone PRN.      Diarrhea, unspecified type  Continue with schedule lopermide BID and QID PRN. Declined GI referral today.      Neurogenic " bladder  Admitted with Mo catheter.   -Mo cares per facility  -has orders to remove mo catheter.     Orders:  No changes    Electronically signed by: Kirsten Lovell NP          Sincerely,        Kirsten Lovell NP

## 2023-05-31 NOTE — PROGRESS NOTES
[facility] GERIATRICS  TCU FOLLOW UP VISIT    Chief Complaint   Patient presents with     RECHECK      Place of Service where encounter took place:  [facility] (TCU/SNF) [4000]    Carolina Mari  is a 68 year old  (1955), who is being seen today at the above facility to discuss progress in therapy, review nursing home EHR, recheck chronic medical problems as well as address any new concerns.       HPI:    Copied forward from previous note: admitted to the above facility from  [facility]. Hospital stay 4/28/23 through 5/10/23.  Patient's living condition: lives in an assisted living facility    Brief Summary of Hospital Course: Treated for left leg cellulitis and sepsis.  Complications included acute kidney injury, hyperkalemia and shock.  He has a past medical history of large ventral hernia with chronic wound, obesity, adult failure to thrive, chronic edema, chronic diarrhea, GERD, hypertension, osteoporosis, hypothyroidism and CKD.  MEDICATION CHANGES: Start fluoxetine and melatonin, stopped duloxetine.  RECOMMENDED FOLLOW UP: Associated Neph[facility] is requested within 2-4 weeks  Updates on Status Since Skilled nursing Admission:  5/11 - Loperamide 2 mg p.o. 4 times daily as needed  -Diagnosis for Rodriguez catheter is neurogenic bladder  -Remove Rodriguez catheter in 10 days then bladder scan every shift and straight cath if residual greater than 300 mils x3 days and update NP  5/15 MD visit  5/18 - decrease cirtrizine to 10 mg po every day  - add loperimide 2 mg po BID - hold for no BM in 48 hours  - discontinue milk for mag, glycerin suppository, Maalox, nystatin powder  5/25 -Decrease losartan to 50 mg p.o. every day  -Discontinue melatonin    Today: patient reporting diarrhea continues. She reports she had an episode yesterday. Facility EHR notes 1 putty like BM today, 2 formed bm on 5/29 and 1 loose bm on 5/28. I offered a GI referral today and she  "states she will think on it. Barriers would be transportation, having to get out of bed and dressed and possibility of needing a colonoscopy. Vitals reviewed and have been stable. No low b/ps. Therapy reports she needs the PAL for transfers, wounds are improving and she is getting the lymph wraps every 2 - 3 days. SW and Therapy will be meeting with patient soon to discuss goals due to low participation and motivation from patient.     ALLERGIES: Aspirin, Colchicine, and Colchicine    ROS:  4 point ROS including Respiratory, CV, GI and , other than that noted in the HPI,  is negative    Vitals:  /64   Pulse 80   Temp 97.5  F (36.4  C)   Resp 12   Ht 1.753 m (5' 9\")   Wt 119.5 kg (263 lb 8 oz)   SpO2 95%   BMI 38.91 kg/m    Exam:  GENERAL APPEARANCE:  Alert, in no distress  RESP:  respiratory effort normal   CV:  edema none.  Left leg wrapped.  ABDOMEN: bloated, no tenderness.   M/S:  Gait and station - lying in bed.  Seems comfortable.    PSYCH:  insight and judgement, memory seems intact, affect and mood irritable.     ASSESSMENT/PLAN:  Essential hypertension  Controlled. Losartan dose decreased to 50 mg daily  - Continue with losartan and metoprolol.     Physical deconditioning  Admitted to the TCU for therapy and nursing care following a hospital stay for cellulitis and acute shock. Needs to be able to transfer with assist of 1 to return to her AL.   -Continue PT/OT  - following for discharge planning    Lymphedema  Stable.   - continue with lymphedema therapy at the TCU.   -Nursing to monitor skin  -PT/OT     Chronic pain syndrome  No new concerns.  Continues on PTA medications of lidocaine patch, Tylenol, Lyrica, oxycodone PRN.      Diarrhea, unspecified type  Continue with schedule lopermide BID and QID PRN. Declined GI referral today.      Neurogenic bladder  Admitted with Mo catheter.   -Mo cares per facility  -has orders to remove mo catheter.     Orders:  No " changes    Electronically signed by: Kirsten Lovell NP

## 2023-06-05 NOTE — LETTER
6/5/2023        RE: [facility]  [address]  [facility] [address]        [facility] GERIATRICS  TCU FOLLOW UP VISIT    Chief Complaint   Patient presents with     RECHECK      Place of Service where encounter took place:  [facility] (TCU/SNF) [[id number]]    Carolina Mari  is a 68 year old  (1955), who is being seen today at the above facility to discuss progress in therapy, review nursing home EHR, recheck chronic medical problems as well as address any new concerns.       HPI:    Copied forward from previous note: admitted to the above facility from  [facility]. Hospital stay 4/28/23 through 5/10/23.  Patient's living condition: lives in an assisted living facility    Brief Summary of Hospital Course: Treated for left leg cellulitis and sepsis.  Complications included acute kidney injury, hyperkalemia and shock.  He has a past medical history of large ventral hernia with chronic wound, obesity, adult failure to thrive, chronic edema, chronic diarrhea, GERD, hypertension, osteoporosis, hypothyroidism and CKD.  MEDICATION CHANGES: Start fluoxetine and melatonin, stopped duloxetine.  RECOMMENDED FOLLOW UP: [facility] is requested within 2-4 weeks  Updates on Status Since Skilled nursing Admission:  5/11 - Loperamide 2 mg p.o. 4 times daily as needed  -Diagnosis for Rodriguez catheter is neurogenic bladder  -Remove Rodriguez catheter in 10 days then bladder scan every shift and straight cath if residual greater than 300 mils x3 days and update NP  5/15 MD visit  5/18 - decrease cirtrizine to 10 mg po every day  - add loperimide 2 mg po BID - hold for no BM in 48 hours  - discontinue milk for mag, glycerin suppository, Maalox, nystatin powder  5/25 -Decrease losartan to 50 mg p.o. every day  -Discontinue melatonin    Today: Patient reports worsening of mood. Just want to lie in bed and be left alone.  "Agrees to trial of increasing the prozac. Patient reports abd pain continues. Worse when she gets up and when the nurses turn in bed. It is chronic and she has been using the oxycdone to treat it. We had previously discussed option of referral to Gastroenterology or colonoscopy and patient declined. Today she again declines but also declines imaging. She has chronic diarrhea for which she uses imodium.       ALLERGIES: Aspirin, Colchicine, and Colchicine    ROS:  4 point ROS including Respiratory, CV, GI and , other than that noted in the HPI,  is negative    Vitals:  BP 97/55   Pulse 109   Temp 99  F (37.2  C)   Resp 20   Ht 1.753 m (5' 9\")   Wt 119.5 kg (263 lb 8 oz)   SpO2 94%   BMI 38.91 kg/m    Exam:  GENERAL APPEARANCE:  Alert, in no distress  RESP:  respiratory effort normal   CV:  edema none.  Left leg wrapped.  ABDOMEN: bloated, no tenderness.   M/S:  Gait and station - lying in bed.  Seems comfortable.    PSYCH:  insight and judgement, memory seems intact, affect and mood irritable.     ASSESSMENT/PLAN:  Moderate episode of recurrent major depressive disorder (H)  Patient reports worsening of mood. Just want to lie in bed and be left alone. Agrees to trial of increasing the prozac.     Essential hypertension  Controlled. Losartan dose decreased to 50 mg daily  - Continue with losartan and metoprolol.     Physical deconditioning  Admitted to the TCU for therapy and nursing care following a hospital stay for cellulitis and acute shock. Needs to be able to transfer with assist of 1 to return to her AL.   -Continue PT/OT  - following for discharge planning    Lymphedema  Stable.   - continue with lymphedema therapy at the TCU.   -Nursing to monitor skin  -PT/OT     Chronic pain syndrome  No new concerns.  Continues on PTA medications of lidocaine patch, Tylenol, Lyrica, oxycodone PRN.      Diarrhea, unspecified type  Continue with schedule lopermide BID and QID PRN.     Neurogenic " bladder  Rodriguez removed. Voiding fine.     Orders:  Increase fluoxetine to 30 mg po every day dx     Electronically signed by: Kirsten Lovell NP                  Sincerely,        Kirsten Lovell NP

## 2023-06-06 NOTE — PROGRESS NOTES
[facility] GERIATRICS  TCU FOLLOW UP VISIT    Chief Complaint   Patient presents with     RECHECK      Place of Service where encounter took place:  [facility] (TCU/SNF) [4000]    Carolina Mari  is a 68 year old  (1955), who is being seen today at the above facility to discuss progress in therapy, review nursing home EHR, recheck chronic medical problems as well as address any new concerns.       HPI:    Copied forward from previous note: admitted to the above facility from  [facility]. Hospital stay 4/28/23 through 5/10/23.  Patient's living condition: lives in an assisted living facility    Brief Summary of Hospital Course: Treated for left leg cellulitis and sepsis.  Complications included acute kidney injury, hyperkalemia and shock.  He has a past medical history of large ventral hernia with chronic wound, obesity, adult failure to thrive, chronic edema, chronic diarrhea, GERD, hypertension, osteoporosis, hypothyroidism and CKD.  MEDICATION CHANGES: Start fluoxetine and melatonin, stopped duloxetine.  RECOMMENDED FOLLOW UP: [facility] is requested within 2-4 weeks  Updates on Status Since Skilled nursing Admission:  5/11 - Loperamide 2 mg p.o. 4 times daily as needed  -Diagnosis for Rodriguez catheter is neurogenic bladder  -Remove Rodriguez catheter in 10 days then bladder scan every shift and straight cath if residual greater than 300 mils x3 days and update NP  5/15 MD visit  5/18 - decrease cirtrizine to 10 mg po every day  - add loperimide 2 mg po BID - hold for no BM in 48 hours  - discontinue milk for mag, glycerin suppository, Maalox, nystatin powder  5/25 -Decrease losartan to 50 mg p.o. every day  -Discontinue melatonin    Today: Patient reports worsening of mood. Just want to lie in bed and be left alone. Agrees to trial of increasing the prozac. Patient reports abd pain continues. Worse when she gets up and when the nurses turn in  "bed. It is chronic and she has been using the oxycdone to treat it. We had previously discussed option of referral to Gastroenterology or colonoscopy and patient declined. Today she again declines but also declines imaging. She has chronic diarrhea for which she uses imodium.       ALLERGIES: Aspirin, Colchicine, and Colchicine    ROS:  4 point ROS including Respiratory, CV, GI and , other than that noted in the HPI,  is negative    Vitals:  BP 97/55   Pulse 109   Temp 99  F (37.2  C)   Resp 20   Ht 1.753 m (5' 9\")   Wt 119.5 kg (263 lb 8 oz)   SpO2 94%   BMI 38.91 kg/m    Exam:  GENERAL APPEARANCE:  Alert, in no distress  RESP:  respiratory effort normal   CV:  edema none.  Left leg wrapped.  ABDOMEN: bloated, no tenderness.   M/S:  Gait and station - lying in bed.  Seems comfortable.    PSYCH:  insight and judgement, memory seems intact, affect and mood irritable.     ASSESSMENT/PLAN:  Moderate episode of recurrent major depressive disorder (H)  Patient reports worsening of mood. Just want to lie in bed and be left alone. Agrees to trial of increasing the prozac.     Essential hypertension  Controlled. Losartan dose decreased to 50 mg daily  - Continue with losartan and metoprolol.     Physical deconditioning  Admitted to the TCU for therapy and nursing care following a hospital stay for cellulitis and acute shock. Needs to be able to transfer with assist of 1 to return to her AL.   -Continue PT/OT  - following for discharge planning    Lymphedema  Stable.   - continue with lymphedema therapy at the TCU.   -Nursing to monitor skin  -PT/OT     Chronic pain syndrome  No new concerns.  Continues on PTA medications of lidocaine patch, Tylenol, Lyrica, oxycodone PRN.      Diarrhea, unspecified type  Continue with schedule lopermide BID and QID PRN.     Neurogenic bladder  Rodriguez removed. Voiding fine.     Orders:  Increase fluoxetine to 30 mg po every day dx     Electronically signed by: Kirsten" Stone, NP

## 2023-06-07 PROBLEM — A41.9 SEPTIC SHOCK (H): Status: ACTIVE | Noted: 2023-01-01

## 2023-06-07 PROBLEM — R65.21 SEPTIC SHOCK (H): Status: ACTIVE | Noted: 2023-01-01

## 2023-06-07 PROBLEM — K63.1 PERFORATION OF INTESTINE (H): Status: ACTIVE | Noted: 2023-01-01

## 2023-06-07 PROBLEM — N31.9 NEUROGENIC BLADDER: Status: ACTIVE | Noted: 2023-01-01

## 2023-06-07 NOTE — H&P
"[facility]    History and Physical - Hospitalist Service       Date of Admission:  6/7/2023    Assessment & Plan      Carolina Mari is a 68 year old female admitted on 6/7/2023. She presented to the emergency department for evaluation of increased abdominal pain and lethargy / altered mental status and was found to be in septic shock, acute kidney injury, and hyperkalemia with an extensive bowel perforation and has elected to pursue comfort cares.     Septic shock  Elevated lactic acid   Perforation of intestine  Abnormal UA  Adult failure to thrive syndrome  Sent to emergency department from TCU for altered mental status and hypotension. Had refused imaging at TCU despite ongoing abdominal pain. Presented with leukocytosis with left shift (WBC 24.3 / ANC 20.3) and became hypotensive in the emergency department despite 3L IV fluids, lactic acid 14.0. CT chest, abdomen, & pelvis significant for \"Free air within bowel containing hernia sac compatible with bowel perforation.\" UA abnormal (moderate leukocyte esterase, > 182 WBCs).   Patient initially started on Zosyn and vancomycin and given IV boluses, but after discussion with patient and family, they elected to pursue comfort cares while still in the emergency department.  - Admit on comfort cares, order set utilized  - No further antibiotics or IV fluids ordered    Hyperkalemia   Initial K = 6.7, improved minimally to 5.9 after 3L LR in the emergency department.  - No further treatment due to comfort cares    Acute kidney failure, unspecified  Admit creatinine 3.63 (baseline around 0.9 - 1.0). Likely due to septic shock.  - No further treatment due to comfort cares    Unspecified open wound of abdominal wall, unspecified quadrant without penetration into peritoneal cavity, subsequent encounter  Chronic ulcer of skin   Chronic abdominal wound, had been refusing wound cares at TCU.     Neurogenic bladder  Has a chronic Rodriguez, " "continue.     Chronic pain  Prior to admission medications discontinued, patient has IV pain options available.     Diabetes mellitus  Hypertension, benign essential, goal below 140/90  Gastro-esophageal reflux disease without esophagitis  Hypothyroidism   Depression / Generalized anxiety disorder  All of patient's prior to admission medications were discontinued on admission due to comfort care status.     Tobacco user  No nicotine patch needed.       Obesity  BMI 38.99.          Diet: NPO for Medical/Clinical Reasons Except for: Meds, Ice Chips  DVT Prophylaxis: N/A - comfort cares  Rodriguez Catheter: PRESENT, indication: End of Life  Lines: PRESENT             Cardiac Monitoring: None  Code Status: No CPR- Do NOT Intubate      Clinically Significant Risk Factors Present on Admission        # Hyperkalemia: Highest K = 6.7 mmol/L in last 2 days, will monitor as appropriate    # Hypercalcemia: corrected calcium is >10.1, will monitor as appropriate   # Anion Gap Metabolic Acidosis: Highest Anion Gap = 34 mmol/L in last 2 days, will monitor and treat as appropriate  # Hypoalbuminemia: Lowest albumin = 1.9 g/dL at 6/7/2023  9:00 AM, will monitor as appropriate    # Acute Kidney Injury, unspecified: based on a >150% or 0.3 mg/dL increase in last creatinine compared to past 90 day average, will monitor renal function  # Hypertension: Noted on problem list      # Obesity: Estimated body mass index is 38.99 kg/m  as calculated from the following:    Height as of 6/5/23: 1.753 m (5' 9\").    Weight as of this encounter: 119.7 kg (264 lb).            Disposition Plan      Expected Discharge Date: 06/08/2023                The patient's care was discussed with the Attending Physician, Dr. Isidro Correa, Patient and Patient's Family.    Minna Rodriguez PA-C  Hospitalist Service  [facility]  Securely message with [employer] (more info)  Text page via [facility] Paging/Directory " "    ______________________________________________________________________    Chief Complaint   Per patient's son - \"she doesn't want to do this anymore.\"    History is obtained from the patient (who is only minimally responsive), patient's son and daughter in law, review of EMR, and emergency department sign out from Dr. David Jones.     History of Present Illness   Carolina Mari is a 68 year old female who presented to the emergency department from TCU for evaluation of hypotension and altered mental status.    Patient is actively dying, alert but not consistently responsive, unable to provide a good history.    Patient has been dealing with a non-healing abdominal wound and other chronic conditions for a long time.  She was hospitalized at [facility] from 4/28 - 5/10/23 for septic shock secondary to lower extremity cellulitis, and was discharged to [facility].  Notes from [facility] state that patient had ongoing abdominal pain since her prior hospitalization, but had refused any further imaging recently.  She also had increased depression and poor motivation to do much.   Per her son, she had been refusing wound cares as well.    Today she was sent to the emergency department due to hypotension and altered mental status.  She was found to be in septic shock with acute kidney injury, hyperkalemia, and perforated bowel and did not respond to initial IV fluid boluses and antibiotics.  After discussion between emergency department, patient, and family, it was determined that patient would go on comfort cares.  Her son states \"she doesn't want to do this anymore, she hasn't for a long time.\"      Past Medical History    Past Medical History:   Diagnosis Date     History of stomach ulcers      HTN (hypertension)      Knee osteoarthritis     right     Osteoarthritis of right hip      Osteoporosis      Osteoporosis      Thyroid disease        Past Surgical History   Past Surgical History:   Procedure " Laterality Date     AMPUTATE TOE(S) Bilateral     3rd toe on both feet amputated.      FOOT SURGERY       GASTRIC BYPASS       HC REMOVAL GALLBLADDER N/A 12/25/2016    Procedure: CHOLECYSTECTOMY, OPEN;  Surgeon: Everardo Cisneros MD;  Location: [facility] OR;  Service: General     HERNIA REPAIR      Had 4 surgeries total     JOINT REPLACEMENT       PICC TRIPLE LUMEN PLACEMENT  4/28/2023          [place] SP[place] SURGERY PROCEDURE UNLISTED       tka      right       Prior to Admission Medications   Prior to Admission Medications   Prescriptions Last Dose Informant Patient Reported? Taking?   Ascorbic Acid (VITAMIN C) 500 MG CAPS 6/6/2023 at 0800  Yes Yes   Sig: Take 500 mg by mouth daily   FLUoxetine (PROZAC) 10 MG capsule 6/6/2023 at 0900  Yes Yes   Sig: Take 10 mg by mouth daily With a 20 mg capsule=30 mg daily   FLUoxetine (PROZAC) 20 MG capsule 6/6/2023 at 0900  Yes Yes   Sig: Take 20 mg by mouth daily With a 10 mg capsule=30 mg daily   NARCAN 4 MG/0.1ML nasal spray Unknown at unknown Self Yes Yes   Sig: CALL 911. SPR CONTENTS OF ONE SPRAYER (0.1ML) INTO ONE NOSTRIL. REPEAT IN 2-3 MIN IF SYMPTOMS OF OPIOID EMERGENCY PERSIST, ALTERNATE NOSTRILS   Nutritional Supplements (GLUCERNA PO) 6/6/2023 at 2000  Yes Yes   Sig: Take by mouth At Bedtime   Sodium Phosphates (ENEMA READY-TO-USE) 7-19 GM/118ML ENEM Unknown at unknown  Yes Yes   Sig: Place 1 enema rectally daily as needed for severe constipation. No BM in 5 or 6 days   acetaminophen (TYLENOL) 325 MG tablet 6/6/2023 at 0256  Yes Yes   Sig: Take 325-650 mg by mouth every 6 hours as needed for mild pain or fever   acetaminophen (TYLENOL) 500 MG tablet 6/6/2023 at 2000 Self Yes Yes   Sig: Take 1,000 mg by mouth 3 times daily    alum & mag hydroxide-simethicone (MAALOX) 200-200-20 MG/5ML SUSP suspension   Yes No   Sig: Take 30 mLs by mouth every 6 hours as needed for indigestion Moderate heartburn or indigestion   bacitracin 500 UNIT/GM OINT   Yes No   Sig: Apply topically  2 times daily as needed for wound care Minor or superficial abrasions, lacerations, or sores   calcium carbonate (TUMS) 500 MG chewable tablet Unknown at unknown  Yes Yes   Sig: Take 1-2 chew tab by mouth every 6 hours as needed for heartburn Mild heartburn or indigestion   carbamide peroxide (DEBROX) 6.5 % otic solution Unknown at unknown  Yes Yes   Sig: Place 5 drops into both ears daily as needed for other (earwax buildup)   cetirizine (ZYRTEC) 10 MG tablet 6/6/2023 at 0800  Yes Yes   Sig: Take 10 mg by mouth daily   cholestyramine light (PREVALITE) 4 GM powder 6/6/2023 at 1700  Yes Yes   Sig: Take 4 g by mouth 2 times daily diarrhea   diclofenac (VOLTAREN) 1 % topical gel  Self Yes No   Sig: Apply 2 g topically 4 times daily To affected area   dimethicone 1 % external cream   Yes No   Sig: Apply topically 2 times daily To periarea/buttocks   docusate sodium (COLACE) 100 MG capsule Unknown at unknown  Yes Yes   Sig: Take 100 mg by mouth 2 times daily as needed for constipation Mild constipation (no BM in 1-2 days)   famotidine (PEPCID) 20 MG tablet 6/6/2023 at 0800 Self Yes Yes   Sig: Take 20 mg by mouth daily   ferrous sulfate (FEROSUL) 325 (65 Fe) MG tablet 6/6/2023 at 0800  Yes Yes   Sig: Take 325 mg by mouth daily (with breakfast)   furosemide (LASIX) 40 MG tablet 6/6/2023 at 0800  Yes Yes   Sig: Take 40 mg by mouth daily   guaiFENesin (ROBITUSSIN) 20 mg/mL liquid Unknown at unknown  Yes Yes   Sig: Take 5-10 mLs by mouth every 4 hours as needed for cough 5 ml mild cough  10ml moderate/severe cough   hydrocortisone (CORTAID) 1 % external cream   Yes No   Sig: Apply topically 2 times daily as needed for itching   hydrocortisone, Perianal, (ANUSOL-HC) 2.5 % cream   Yes No   Sig: Place rectally 2 times daily as needed for hemorrhoids   levothyroxine (SYNTHROID/LEVOTHROID) 200 MCG tablet 6/7/2023 at 0600 Self Yes Yes   Sig: Take 225 mcg by mouth daily   lidocaine patch in PLACE 6/6/2023 at 0800  Yes Yes   Sig:  Place 1 patch onto the skin every morning Remove at HS   loperamide (IMODIUM) 2 MG capsule   Yes No   Sig: Take 2 mg by mouth 4 times daily as needed And 2 mg po BID   losartan (COZAAR) 100 MG tablet 6/6/2023 at 0800 Self Yes Yes   Sig: Take 50 mg by mouth daily Hold if SBP less than 100   metoprolol tartrate (LOPRESSOR) 100 MG tablet 6/6/2023 at 1600 Self Yes Yes   Sig: Take 50 mg by mouth 2 times daily Hold for SBP < 100 or HR < 60   miconazole (MICATIN) 2 % AERP powder   Yes No   Sig: Apply topically 2 times daily as needed for other (to abdominal/groin folds)   mineral oil-hydrophilic petrolatum (AQUAPHOR) external ointment   Yes No   Sig: Apply topically as needed for dry skin   oxyCODONE IR (ROXICODONE) 10 MG tablet 6/6/2023 at 2024  No Yes   Sig: Take 1 tablet (10 mg) by mouth every 6 hours as needed for moderate pain or severe pain   polyethylene glycol-propylene glycol (SYSTANE ULTRA) 0.4-0.3 % SOLN ophthalmic solution Unknown at unknown  Yes Yes   Sig: Place 1 drop into both eyes 4 times daily as needed for dry eyes   pregabalin (LYRICA) 25 MG capsule 6/6/2023 at 2000  No Yes   Sig: Take 1 capsule (25 mg) by mouth 2 times daily   simvastatin (ZOCOR) 20 MG tablet 6/6/2023 at 2000 Self Yes Yes   Sig: Take 20 mg by mouth At Bedtime    witch hazel-glycerin (TUCKS) pad   Yes No   Sig: Apply topically as needed for hemorrhoids   zinc sulfate (ZINCATE) 220 (50 Zn) MG capsule 6/6/2023 at 0800  Yes Yes   Sig: TAKE 1 CAP BY MOUTH ONCE DAILY FOR 14 DAYS      Facility-Administered Medications: None        Review of Systems    Review of systems not obtained due to patient factors - lethargy, patient's comfort cares status      Physical Exam   Vital Signs: Temp: (!) 96.6  F (35.9  C) Temp src: Rectal BP: (!) 56/40 Pulse: 57   Resp: 20 SpO2: (!) 81 % O2 Device: None (Room air)    Weight: 264 lbs 0 oz    Constitutional: Alert but not oriented, minimally responsive. Toxic appearing, in mild distress. Occasionally responds  to yes/no questions, but does not speak in full sentences.     Eyes: Eyes are clear, pupils are reactive. No scleral icterus.    HEENT: Oropharynx is dry. Normocephalic, no evidence of cranial trauma.      Cardiovascular: Regular rhythm and rate, normal S1 and S2. No murmur, rubs, or gallops. Peripheral pulses weak and thready. Bilateral +1 lower extremity edema.    Respiratory: Slightly labored breathing on oxymask. Coarse lung sounds with rhonchi. No wheezes or crackles appreciated.     GI: Slightly firm and distended. Patient moans with palpation of abdomen but no rebound or guarding. Firm area in left upper quadrant but no hepatosplenomegaly or masses appreciated. Hyperactive bowel sounds.     Musculoskeletal: Without obvious deformity. Distal CMS intact.      Skin: Warm and dry. Some mottling of skin. Large partially healing wound present on abdomen with surrounding ecchymotic areas.      Neurologic: Patient moves all extremities spontaneously. Gross strength and sensation not assessed.     Genitourinary: Rodriguez in place draining small amount of dark kasie colored urine.      Medical Decision Making       50 MINUTES SPENT BY ME on the date of service doing chart review, history, exam, documentation & further activities per the note.  NOTE(S)/MEDICAL RECORDS REVIEWED over the past 24 hours: TCU records, [facility] discharge summary      Data     I have personally reviewed the following data over the past 24 hrs:    24.3 (H)  \   10.1 (L)   / 844 (H)     134 (L) 94 (L) 86.4 (H) /  231 (H)   5.9 (H) 6 (LL) 3.63 (H) \       ALT: 17 AST: 49 (H) AP: 189 (H) TBILI: 0.5   ALB: 1.9 (L) TOT PROTEIN: 6.9 LIPASE: N/A       Trop: 47 (H) BNP: N/A       Procal: N/A CRP: N/A Lactic Acid: 13.1 (HH)         Imaging results reviewed over the past 24 hrs:   Recent Results (from the past 24 hour(s))   POC US ABDOMEN LIMITED    Impression    [facility] Procedure Note      Limited Bedside ED Ultrasound for Central Vein  Cannulation:    PROCEDURE: PERFORMED BY: Dr. David Jones MD, MD  INDICATIONS/SYMPTOM:  Hypotension and IV Access Needed for Multiple Medications  PROBE: High frequency linear probe  BODY LOCATION: The ultrasound was performed on the right internal jugular vein.  FINDINGS: Artery and vein visualized.  Vein completely compressible (ie collapsible) and no thrombus visualized.  INTERPRETATION: Patent vein confirmed with US.  Central line inserted into vein utilizing real-time ultrasonic guidance.   IMAGE DOCUMENTATION: Images were archived to PACs system.     CT Chest/Abdomen/Pelvis w Contrast   Result Value    Radiologist flags Unexplained pneumoperitoneum (AA)    Narrative    CT CHEST/ABDOMEN/PELVIS WITH CONTRAST June 7, 2023 11:05 AM    CLINICAL HISTORY: Sepsis.    TECHNIQUE: CT scan of the chest, abdomen, and pelvis was performed  following injection of IV contrast. Multiplanar reformats were  obtained. Dose reduction techniques were used.   CONTRAST: 100mL Isovue-370.    COMPARISON: January 4, 2022.    FINDINGS:   LUNGS AND PLEURA: Minimal scattered atelectasis and/or fibrosis. Mild  bronchial wall thickening and bronchiectasis. No effusions.    MEDIASTINUM/AXILLAE: No lymphadenopathy. No thoracic aortic aneurysms.    CORONARY ARTERY CALCIFICATIONS: Mild.    HEPATOBILIARY: Diffuse hepatic steatosis. No significant mass. No bile  duct dilatation. No calcified gallstones.    PANCREAS: No significant mass, duct dilatation, or inflammatory  change.    SPLEEN: Normal size.    ADRENAL GLANDS: Stable left adrenal nodule. Right adrenal gland  unremarkable.    KIDNEYS/BLADDER: No significant mass, stones, or hydronephrosis. There  are simple or benign cysts. No follow up is needed. Mildly  heterogeneously enhancing kidneys bilaterally of uncertain etiology.    BOWEL: There is a bowel containing hernia that contains free air  concerning for bowel perforation. A few mildly dilated small bowel  loops are  evident.    PELVIC ORGANS: No pelvic masses.    ADDITIONAL FINDINGS: No definite abscess. Trace free fluid.    MUSCULOSKELETAL: Erosive changes of both femoral heads. Spine  degenerative changes.      Impression    IMPRESSION:  1.  Free air within bowel containing hernia sac compatible with bowel  perforation.  2.  Heterogeneously enhancing kidneys bilaterally, may be related to  perfusion versus pyelonephritis, clinical correlation needed.    [Critical Result: Unexplained pneumoperitoneum]    Finding was identified on 6/7/2023 11:16 AM.     Dr. NEIL TAPIA was contacted by me at 6/7/2023 11:21 AM and  verbalized understanding of the critical finding.     SCOOTER THOMAS MD         SYSTEM ID:  [id number]

## 2023-06-07 NOTE — MEDICATION SCRIBE - ADMISSION MEDICATION HISTORY
Medication Scribe Admission Medication History    Admission medication history is complete. The information provided in this note is only as accurate as the sources available at the time of the update.    Medication reconciliation/reorder completed by provider prior to medication history? Yes    Information Source(s): Facility (U/NH/) medication list/MAR via MAR    Pertinent Information: Darline     Changes made to PTA medication list:    Added: aquaphor, lido patch, sodium phosphates    Deleted: None    Changed: None    Medication Affordability:  Not including over the counter (OTC) medications, was there a time in the past 3 months when you did not take your medications as prescribed because of cost?: Unable to Assess (nursing home pt)    Allergies reviewed with patient and updates made in EHR: yes    Medication History Completed By: Carly Altamirano 6/7/2023 2:10 PM    Prior to Admission medications    Medication Sig Last Dose Taking? Auth Provider Long Term End Date   acetaminophen (TYLENOL) 325 MG tablet Take 325-650 mg by mouth every 6 hours as needed for mild pain or fever 6/6/2023 at 0256 Yes Abstract, Provider  6/9/23   acetaminophen (TYLENOL) 500 MG tablet Take 1,000 mg by mouth 3 times daily  6/6/2023 at 2000 Yes Reported, Patient     Ascorbic Acid (VITAMIN C) 500 MG CAPS Take 500 mg by mouth daily 6/6/2023 at 0800 Yes Reported, Patient     bacitracin 500 UNIT/GM OINT Apply topically 2 times daily as needed for wound care Minor or superficial abrasions, lacerations, or sores Unknown at unknown Yes Unknown, Entered By History     calcium carbonate (TUMS) 500 MG chewable tablet Take 1-2 chew tab by mouth every 6 hours as needed for heartburn Mild heartburn or indigestion Unknown at unknown Yes Unknown, Entered By History     carbamide peroxide (DEBROX) 6.5 % otic solution Place 5 drops into both ears daily as needed for other (earwax buildup) Unknown at unknown Yes Unknown, Entered By History      cetirizine (ZYRTEC) 10 MG tablet Take 10 mg by mouth daily 6/6/2023 at 0800 Yes Unknown, Entered By History     cholestyramine light (PREVALITE) 4 GM powder Take 4 g by mouth 2 times daily diarrhea 6/6/2023 at 1700 Yes Unknown, Entered By History Yes    diclofenac (VOLTAREN) 1 % topical gel Apply 2 g topically 4 times daily To affected area 6/6/2023 at 2000 Yes Reported, Patient     dimethicone 1 % external cream Apply topically 2 times daily To periarea/buttocks 6/6/2023 at pm Yes Unknown, Entered By History     docusate sodium (COLACE) 100 MG capsule Take 100 mg by mouth 2 times daily as needed for constipation Mild constipation (no BM in 1-2 days) Unknown at unknown Yes Unknown, Entered By History     Emollient (AQUAPHOR ADV PROTECT HEALING) OINT APPLY TOPICALLY TO AFFECTED AREA(S) AS NEEDED FOR DRY SKIN Unknown at unknown Yes Reported, Patient     famotidine (PEPCID) 20 MG tablet Take 20 mg by mouth daily 6/6/2023 at 0800 Yes Reported, Patient     ferrous sulfate (FEROSUL) 325 (65 Fe) MG tablet Take 325 mg by mouth daily (with breakfast) 6/6/2023 at 0800 Yes Reported, Patient     FLUoxetine (PROZAC) 10 MG capsule Take 10 mg by mouth daily With a 20 mg capsule=30 mg daily 6/6/2023 at 0900 Yes Abstract, Provider No    FLUoxetine (PROZAC) 20 MG capsule Take 20 mg by mouth daily With a 10 mg capsule=30 mg daily 6/6/2023 at 0900 Yes Abstract, Provider No    furosemide (LASIX) 40 MG tablet Take 40 mg by mouth daily 6/6/2023 at 0800 Yes Unknown, Entered By History No    guaiFENesin (ROBITUSSIN) 20 mg/mL liquid Take 5-10 mLs by mouth every 4 hours as needed for cough 5 ml mild cough  10ml moderate/severe cough Unknown at unknown Yes Unknown, Entered By History     HM LIDOCAINE PATCH EX APPLY 1 PATCH TO CLEAN DRY INTACT SKIN ONCE DAILY.LEAVE ON FOR UP TO 12hrs/24hrs 6/6/2023 at 0800 Yes Reported, Patient     hydrocortisone (CORTAID) 1 % external cream Apply topically 2 times daily as needed for itching Unknown at  unknown Yes Unknown, Entered By History     hydrocortisone, Perianal, (ANUSOL-HC) 2.5 % cream Place rectally 2 times daily as needed for hemorrhoids Unknown at unknown Yes Unknown, Entered By History     levothyroxine (SYNTHROID/LEVOTHROID) 200 MCG tablet Take 225 mcg by mouth daily 6/7/2023 at 0600 Yes Reported, Patient Yes    loperamide (IMODIUM) 2 MG capsule Take 2 mg by mouth 4 times daily as needed And 2 mg po BID 6/6/2023 at 1600 Yes Reported, Patient     losartan (COZAAR) 100 MG tablet Take 50 mg by mouth daily Hold if SBP less than 100 6/6/2023 at 0800 Yes Reported, Patient No    metoprolol tartrate (LOPRESSOR) 100 MG tablet Take 50 mg by mouth 2 times daily Hold for SBP < 100 or HR < 60 6/6/2023 at 1600 Yes Reported, Patient Yes    miconazole (MICATIN) 2 % AERP powder Apply topically 2 times daily as needed for other (to abdominal/groin folds) Unknown at unknown Yes Unknown, Entered By History     mineral oil-hydrophilic petrolatum (AQUAPHOR) external ointment Apply topically as needed for dry skin Unknown at unknown Yes Unknown, Entered By History     NARCAN 4 MG/0.1ML nasal spray CALL 911. SPR CONTENTS OF ONE SPRAYER (0.1ML) INTO ONE NOSTRIL. REPEAT IN 2-3 MIN IF SYMPTOMS OF OPIOID EMERGENCY PERSIST, ALTERNATE NOSTRILS Unknown at unknown Yes Reported, Patient     Nutritional Supplements (GLUCERNA PO) Take by mouth At Bedtime 6/6/2023 at 2000 Yes Abstract, Provider     oxyCODONE IR (ROXICODONE) 10 MG tablet Take 1 tablet (10 mg) by mouth every 6 hours as needed for moderate pain or severe pain 6/6/2023 at 2024 Yes Kirsten Lovell NP     polyethylene glycol-propylene glycol (SYSTANE ULTRA) 0.4-0.3 % SOLN ophthalmic solution Place 1 drop into both eyes 4 times daily as needed for dry eyes Unknown at unknown Yes Unknown, Entered By History     pregabalin (LYRICA) 25 MG capsule Take 1 capsule (25 mg) by mouth 2 times daily 6/6/2023 at 2000 Yes Kirsten Lovell NP Yes    simvastatin (ZOCOR) 20 MG tablet Take 20 mg by  mouth At Bedtime  6/6/2023 at 2000 Yes Reported, Patient Yes    Sodium Phosphates (ENEMA READY-TO-USE) 7-19 GM/118ML ENEM Place 1 enema rectally daily as needed for severe constipation. No BM in 5 or 6 days Unknown at unknown Yes Abstract, Provider     witch hazel-glycerin (TUCKS) pad Apply topically as needed for hemorrhoids Unknown at unknown Yes Unknown, Entered By History     zinc sulfate (ZINCATE) 220 (50 Zn) MG capsule TAKE 1 CAP BY MOUTH ONCE DAILY FOR 14 DAYS 6/6/2023 at 0800 Yes Reported, Patient

## 2023-06-07 NOTE — ED PROVIDER NOTES
History     Chief Complaint   Patient presents with     Hypotension     Altered Mental Status     HPI  Carolina Mari is a 68 year old female who presents by EMS from [facility] complex patient with history of open abdominal wall wound, diabetes, hypothyroidism, chronic pain, chronic skin ulcer, chronic use of opioids, hyponatremia hypomagnesemia, iron deficiency anemia, small bowel obstruction, open wound left lower limb, lymphedema, cellulitis.  Diaphoretic hypotensive pale altered mental status/encephalopathic prompting evaluation here.  Blood pressures low in route, blood sugar 200, nasal cannula 2 L sats low 90s.  Complaining of general pain.    Admission last month for 2 weeks at [facility], discharge summary reviewed in epic at time of presentation, discharge diagnoses cellulitis/abscess leg, open abdominal wound, NEELIMA, hyperkalemia, metabolic acidosis, left lower extremity open wound.    Allergies:  Allergies   Allergen Reactions     Aspirin Other (See Comments)     History of ulcers       Colchicine Angioedema and Swelling     And gout flare ups     Colchicine Angioedema and Swelling     And gout flare ups       Problem List:    Patient Active Problem List    Diagnosis Date Noted     Perforation of intestine (H) 06/07/2023     Priority: Medium     Septic shock (H) 06/07/2023     Priority: Medium     Cellulitis and abscess of leg 05/05/2023     Priority: Medium     Lymphedema of lower extremity, unspecified laterality 05/05/2023     Priority: Medium     Open wound of lower limb, left, initial encounter 05/05/2023     Priority: Medium     Hyperkalemia 04/28/2023     Priority: Medium     Metabolic acidosis 04/28/2023     Priority: Medium     Leukocytosis, unspecified type 04/28/2023     Priority: Medium     Paraparesis of both lower limbs (H) 09/21/2022     Priority: Medium     Anemia in other chronic diseases classified elsewhere 03/17/2022     Priority: Medium     First degree  atrioventricular block 01/17/2022     Priority: Medium     Shoulder joint pain 01/17/2022     Priority: Medium     Undifferentiated inflammatory arthritis (H) 01/07/2022     Priority: Medium     Diarrhea of presumed infectious origin 01/04/2022     Priority: Medium     Dehydration 01/04/2022     Priority: Medium     General weakness 01/04/2022     Priority: Medium     Acute pain of right shoulder 01/04/2022     Priority: Medium     Abdominal pain, generalized 08/03/2021     Priority: Medium     Non-intractable vomiting with nausea, unspecified vomiting type 08/03/2021     Priority: Medium     Morbid obesity (H) 05/26/2021     Priority: Medium     Chronic ulcer of skin (H) 05/23/2021     Priority: Medium     Edema 05/23/2021     Priority: Medium     History of cholecystectomy 05/23/2021     Priority: Medium     Hyperlipidemia 05/23/2021     Priority: Medium     Iron deficiency anemia 05/23/2021     Priority: Medium     Vitamin B deficiency 05/23/2021     Priority: Medium     Debility 05/23/2021     Priority: Medium     Encounter for screening laboratory testing for severe acute respiratory syndrome coronavirus 2 (SARS-CoV-2) 05/21/2021     Priority: Medium     Acute kidney failure, unspecified (H) 05/21/2021     Priority: Medium     Depression 01/26/2021     Priority: Medium     Hypothyroidism 01/06/2021     Priority: Medium     Generalized anxiety disorder 01/06/2021     Priority: Medium     Gastro-esophageal reflux disease without esophagitis 01/06/2021     Priority: Medium     Hypertension, benign essential, goal below 140/90 01/06/2021     Priority: Medium     Nicotine dependence, unspecified, uncomplicated 01/06/2021     Priority: Medium     Recurrent major depression (H) 01/06/2021     Priority: Medium     Primary osteoarthritis 01/06/2021     Priority: Medium     Pressure injury of right buttock, stage 2 (H) 01/05/2021     Priority: Medium     NEELIMA (acute kidney injury) (H) 12/23/2016     Priority: Medium      Hyponatremia 12/23/2016     Priority: Medium     Hypomagnesemia 12/23/2016     Priority: Medium     Hyperbilirubinemia 12/23/2016     Priority: Medium     Leukocytosis 12/23/2016     Priority: Medium     Adult failure to thrive syndrome 08/04/2015     Priority: Medium     UTI (urinary tract infection) 08/04/2015     Priority: Medium     Unspecified open wound of abdominal wall, unspecified quadrant without penetration into peritoneal cavity, subsequent encounter 07/01/2015     Priority: Medium     Diabetes mellitus (H) 07/01/2015     Priority: Medium     Tobacco user 07/01/2015     Priority: Medium     Obesity 06/05/2015     Priority: Medium     Chronic, continuous use of opioids 06/05/2015     Priority: Medium     Tinea corporis 06/05/2015     Priority: Medium     Weak 06/05/2015     Priority: Medium     Hip pain, bilateral 06/04/2015     Priority: Medium     Chronic pain 05/07/2015     Priority: Medium     Small bowel obstruction (H) 05/06/2015     Priority: Medium     Unilateral primary osteoarthritis, right hip 04/22/2015     Priority: Medium        Past Medical History:    Past Medical History:   Diagnosis Date     History of stomach ulcers      HTN (hypertension)      Knee osteoarthritis      Osteoarthritis of right hip      Osteoporosis      Osteoporosis      Thyroid disease        Past Surgical History:    Past Surgical History:   Procedure Laterality Date     AMPUTATE TOE(S) Bilateral     3rd toe on both feet amputated.      FOOT SURGERY       GASTRIC BYPASS       HC REMOVAL GALLBLADDER N/A 12/25/2016    Procedure: CHOLECYSTECTOMY, OPEN;  Surgeon: Everardo Cisneros MD;  Location: [facility] OR;  Service: General     HERNIA REPAIR      Had 4 surgeries total     JOINT REPLACEMENT       PICC TRIPLE LUMEN PLACEMENT  4/28/2023          AK SPINE SURGERY PROCEDURE UNLISTED       tka      right       Family History:    Family History   Problem Relation Age of Onset     Coronary Artery Disease Father       Coronary Artery Disease Brother      Cancer Mother         bone     Cancer Brother         leukemia     Breast Cancer Mother      Cancer Brother 20.00        Leukemia     Coronary Artery Disease Brother        Social History:  Marital Status:   [4]  Social History     Tobacco Use     Smoking status: Never     Smokeless tobacco: Never   Substance Use Topics     Alcohol use: No     Drug use: No        Medications:    acetaminophen (TYLENOL) 500 MG tablet  alum & mag hydroxide-simethicone (MAALOX) 200-200-20 MG/5ML SUSP suspension  Ascorbic Acid (VITAMIN C) 500 MG CAPS  bacitracin 500 UNIT/GM OINT  calcium carbonate (TUMS) 500 MG chewable tablet  carbamide peroxide (DEBROX) 6.5 % otic solution  cetirizine (ZYRTEC) 10 MG tablet  cholestyramine light (PREVALITE) 4 GM powder  diclofenac (VOLTAREN) 1 % topical gel  dimethicone 1 % external cream  docusate sodium (COLACE) 100 MG capsule  famotidine (PEPCID) 20 MG tablet  ferrous sulfate (FEROSUL) 325 (65 Fe) MG tablet  FLUoxetine (PROZAC) 20 MG capsule  furosemide (LASIX) 40 MG tablet  guaiFENesin (ROBITUSSIN) 20 mg/mL liquid  hydrocortisone (CORTAID) 1 % external cream  hydrocortisone, Perianal, (ANUSOL-HC) 2.5 % cream  levothyroxine (SYNTHROID/LEVOTHROID) 200 MCG tablet  lidocaine patch in PLACE  loperamide (IMODIUM) 2 MG capsule  losartan (COZAAR) 100 MG tablet  metoprolol tartrate (LOPRESSOR) 100 MG tablet  miconazole (MICATIN) 2 % AERP powder  mineral oil-hydrophilic petrolatum (AQUAPHOR) external ointment  NARCAN 4 MG/0.1ML nasal spray  oxyCODONE IR (ROXICODONE) 10 MG tablet  polyethylene glycol-propylene glycol (SYSTANE ULTRA) 0.4-0.3 % SOLN ophthalmic solution  pregabalin (LYRICA) 25 MG capsule  simvastatin (ZOCOR) 20 MG tablet  witch hazel-glycerin (TUCKS) pad          Review of Systems    Physical Exam   BP: (!) 111/93  Pulse: 106  Temp: 97.6  F (36.4  C)  Resp: 20  Weight: 119.7 kg (264 lb)      Physical Exam  GENERAL  mild respiratory distress alert GCS  15 on arrival, throughout stay and at discharge.  Appears older than stated age, acutely ill, moaning, unable to answer questions, diaphoretic mottled and pale    HEENT Head atraumatic normocephalic     PERRL, EOMI, conjunctiva clear, sclera nonicteric, no discharge, no periorbital swelling or redness     Oropharynx tacky and dry     Nose is unremarkable to inspection, mucous membranes are moist and pink, no nasal discharge or bleeding    MUSCULOSKELETAL     Spine nontender to palpation    Pelvis stable nontender      PULMONARY diminished sounds bilaterally    CARDIOVASCULAR heart is regular no murmur appreciated.  Peripheral pulses are symmetrical and strong.  Capillary refill is normal.     GASTROINTESTINAL abdomen is morbidly obese, massive ventral hernia, abdominal wounds as pictured below    Second-degree sacral ulcerations bilaterally vertically oriented measure 6 cm in the vertical axis and 2 cm in the horizontal axis    Black stool    SKIN pale mottled diaphoretic        ED Course                 [facility]    -Central Line    Date/Time: 6/7/2023 9:33 AM    Performed by: David Jones MD  Authorized by: David Jones MD    Emergent condition/consent implied      PRE-PROCEDURE DETAILS:     Skin preparation:  2% chlorhexidine  PROCEDURE DETAILS:     Location:  R internal jugular    Patient position:  Trendelenburg    Procedural supplies:  Triple lumen    Landmarks identified: yes      Ultrasound guidance: yes      Number of attempts:  1    Successful placement: yes      POST PROCEDURE DETAILS:      Post-procedure:  Dressing applied        Critical Care time:  60 minutes      The patient has signs of Septic Shock  The patient has signs of septic shock as evidenced by:  1. Presence of Sepsis, AND  2. Lactic Acidosis with value greater than or equal to 4    Time septic shock diagnosis confirmed = upon presentation 06/07/23   as this was the time when Provider determined that  "septic shock was present    3 Hour Septic Shock Bundle Completion:  1. Initial Lactic Acid Result:   Recent Labs   Lab Test 06/07/23  1127 06/07/23  0900 04/28/23  2123   LACT 13.1* 14.0* 1.0     2. Blood Cultures before Antibiotics: Yes  3. Broad Spectrum Antibiotics Administered:  yes       Anti-infectives (From admission through now)    Start     Dose/Rate Route Frequency Ordered Stop    06/07/23 1035  vancomycin (VANCOCIN) 750 mg in 0.9% NaCl 250 mL intermittent infusion         750 mg  over 60 Minutes Intravenous EVERY 24 HOURS 06/07/23 1032      06/07/23 0948  piperacillin-tazobactam (ZOSYN) intermittent infusion 2.25 g        Note to Pharmacy: For [facility], [facility] and [facility]: For Zosyn-naive patients, use the \"Zosyn initial dose + extended infusion\" order panel.    2.25 g  100 mL/hr over 30 Minutes Intravenous EVERY 6 HOURS 06/07/23 0947            4. IF 30 mL/kg bolus criteria met based on:  -Lactate > 4  OR  -Initial Hypotension:  Definition:  2 low BP readings (SBP <90, MAP <65, or decrease > 40 from baseline due to infection) within 3 hrs of each other during the time period of 6 hrs before and 3 hrs  after time zero  THEN: Fluid volume administered in ED:  Full 30 mL/kg bolus given (see amount below).    BMI Readings from Last 1 Encounters:   06/07/23 38.99 kg/m      30 mL/kg fluids based on weight: 3,590 mL  30 mL/kg fluids based on IBW (must be >= 60 inches tall): 1,990 mL    Septic Shock reassessment:  1. Repeat Lactic Acid Level:                      Results for orders placed or performed during the hospital encounter of 06/07/23 (from the past 24 hour(s))   POC US ABDOMEN LIMITED    [facility] Procedure Note      Limited Bedside ED Ultrasound for Central Vein Cannulation:    PROCEDURE: PERFORMED BY: Dr. David Jones MD, MD  INDICATIONS/SYMPTOM:  Hypotension and IV Access Needed for Multiple Medications  PROBE: High frequency linear probe  BODY LOCATION: The ultrasound was performed on " the right internal jugular vein.  FINDINGS: Artery and vein visualized.  Vein completely compressible (ie collapsible) and no thrombus visualized.  INTERPRETATION: Patent vein confirmed with US.  Central line inserted into vein utilizing real-time ultrasonic guidance.   IMAGE DOCUMENTATION: Images were archived to PACs system.     CBC with platelets differential    Narrative    The following orders were created for panel order CBC with platelets differential.  Procedure                               Abnormality         Status                     ---------                               -----------         ------                     CBC with platelets and d...[[id number]]  Abnormal            Final result                 Please view results for these tests on the individual orders.   Comprehensive metabolic panel   Result Value Ref Range    Sodium 134 (L) 136 - 145 mmol/L    Potassium 6.7 (HH) 3.4 - 5.3 mmol/L    Chloride 94 (L) 98 - 107 mmol/L    Carbon Dioxide (CO2) 6 (LL) 22 - 29 mmol/L    Anion Gap 34 (H) 7 - 15 mmol/L    Urea Nitrogen 86.4 (H) 8.0 - 23.0 mg/dL    Creatinine 3.63 (H) 0.51 - 0.95 mg/dL    Calcium 9.8 8.8 - 10.2 mg/dL    Glucose 231 (H) 70 - 99 mg/dL    Alkaline Phosphatase 189 (H) 35 - 104 U/L    AST 49 (H) 10 - 35 U/L    ALT 17 10 - 35 U/L    Protein Total 6.9 6.4 - 8.3 g/dL    Albumin 1.9 (L) 3.5 - 5.2 g/dL    Bilirubin Total 0.5 <=1.2 mg/dL    GFR Estimate 13 (L) >60 mL/min/1.73m2   Lactic acid whole blood   Result Value Ref Range    Lactic Acid 14.0 (HH) 0.7 - 2.0 mmol/L   Troponin T, High Sensitivity   Result Value Ref Range    Troponin T, High Sensitivity 47 (H) <=14 ng/L   Magnesium   Result Value Ref Range    Magnesium 2.4 (H) 1.7 - 2.3 mg/dL   CBC with platelets and differential   Result Value Ref Range    WBC Count 24.3 (H) 4.0 - 11.0 10e3/uL    RBC Count 3.71 (L) 3.80 - 5.20 10e6/uL    Hemoglobin 10.1 (L) 11.7 - 15.7 g/dL    Hematocrit 37.1 35.0 - 47.0 %     78 - 100 fL    MCH 27.2  26.5 - 33.0 pg    MCHC 27.2 (L) 31.5 - 36.5 g/dL    RDW 16.0 (H) 10.0 - 15.0 %    Platelet Count 844 (H) 150 - 450 10e3/uL    % Neutrophils 84 %    % Lymphocytes 9 %    % Monocytes 3 %    % Eosinophils 0 %    % Basophils 0 %    % Immature Granulocytes 4 %    NRBCs per 100 WBC 0 <1 /100    Absolute Neutrophils 20.3 (H) 1.6 - 8.3 10e3/uL    Absolute Lymphocytes 2.1 0.8 - 5.3 10e3/uL    Absolute Monocytes 0.7 0.0 - 1.3 10e3/uL    Absolute Eosinophils 0.0 0.0 - 0.7 10e3/uL    Absolute Basophils 0.1 0.0 - 0.2 10e3/uL    Absolute Immature Granulocytes 1.1 (H) <=0.4 10e3/uL    Absolute NRBCs 0.1 10e3/uL   [place] Draw    Narrative    The following orders were created for panel order [place] Draw.  Procedure                               Abnormality         Status                     ---------                               -----------         ------                     Extra Blue Top Tube[[id number]]                              Final result               Extra Red Top Tube[[id number]]                               Final result               Extra Green Top (Lithium...[[id number]]                      Final result               Extra Purple Top Tube[[id number]]                            Final result               Extra Green Top (Lithium...[[id number]]                      Final result                 Please view results for these tests on the individual orders.   Extra Blue Top Tube   Result Value Ref Range    Hold Specimen JIC    Extra Red Top Tube   Result Value Ref Range    Hold Specimen JIC    Extra Green Top (Lithium Heparin) Tube   Result Value Ref Range    Hold Specimen JIC    Extra Purple Top Tube   Result Value Ref Range    Hold Specimen JIC    Extra Green Top (Lithium Heparin) ON ICE   Result Value Ref Range    Hold Specimen JIC    [place] Draw    Narrative    The following orders were created for panel order [place] Draw.  Procedure                               Abnormality         Status                      ---------                               -----------         ------                     Extra Heparinized Syringe[[id number]]                        Final result                 Please view results for these tests on the individual orders.   Extra Heparinized Syringe   Result Value Ref Range    Hold Specimen [facility]    Occult blood stool   Result Value Ref Range    Occult Blood Negative Negative   UA with Microscopic reflex to Culture    Specimen: Urine, Rordiguez Catheter   Result Value Ref Range    Color Urine Yellow Colorless, Straw, Light Yellow, Yellow    Appearance Urine Cloudy (A) Clear    Glucose Urine Negative Negative mg/dL    Bilirubin Urine Negative Negative    Ketones Urine Negative Negative mg/dL    Specific Gravity Urine 1.014 1.003 - 1.035    Blood Urine Negative Negative    pH Urine 5.0 5.0 - 7.0    Protein Albumin Urine 30 (A) Negative mg/dL    Urobilinogen Urine Normal Normal, 2.0 mg/dL    Nitrite Urine Negative Negative    Leukocyte Esterase Urine Moderate (A) Negative    Bacteria Urine Many (A) None Seen /HPF    WBC Clumps Urine Present (A) None Seen /HPF    Mucus Urine Present (A) None Seen /LPF    RBC Urine 73 (H) <=2 /HPF    WBC Urine >182 (H) <=5 /HPF    Narrative    Urine Culture ordered based on laboratory criteria   CT Chest/Abdomen/Pelvis w Contrast   Result Value Ref Range    Radiologist flags Unexplained pneumoperitoneum (AA)     Narrative    CT CHEST/ABDOMEN/PELVIS WITH CONTRAST June 7, 2023 11:05 AM    CLINICAL HISTORY: Sepsis.    TECHNIQUE: CT scan of the chest, abdomen, and pelvis was performed  following injection of IV contrast. Multiplanar reformats were  obtained. Dose reduction techniques were used.   CONTRAST: 100mL Isovue-370.    COMPARISON: January 4, 2022.    FINDINGS:   LUNGS AND PLEURA: Minimal scattered atelectasis and/or fibrosis. Mild  bronchial wall thickening and bronchiectasis. No effusions.    MEDIASTINUM/AXILLAE: No lymphadenopathy. No thoracic aortic  aneurysms.    CORONARY ARTERY CALCIFICATIONS: Mild.    HEPATOBILIARY: Diffuse hepatic steatosis. No significant mass. No bile  duct dilatation. No calcified gallstones.    PANCREAS: No significant mass, duct dilatation, or inflammatory  change.    SPLEEN: Normal size.    ADRENAL GLANDS: Stable left adrenal nodule. Right adrenal gland  unremarkable.    KIDNEYS/BLADDER: No significant mass, stones, or hydronephrosis. There  are simple or benign cysts. No follow up is needed. Mildly  heterogeneously enhancing kidneys bilaterally of uncertain etiology.    BOWEL: There is a bowel containing hernia that contains free air  concerning for bowel perforation. A few mildly dilated small bowel  loops are evident.    PELVIC ORGANS: No pelvic masses.    ADDITIONAL FINDINGS: No definite abscess. Trace free fluid.    MUSCULOSKELETAL: Erosive changes of both femoral heads. Spine  degenerative changes.      Impression    IMPRESSION:  1.  Free air within bowel containing hernia sac compatible with bowel  perforation.  2.  Heterogeneously enhancing kidneys bilaterally, may be related to  perfusion versus pyelonephritis, clinical correlation needed.    [Critical Result: Unexplained pneumoperitoneum]    Finding was identified on 6/7/2023 11:16 AM.     Dr. NEIL TAPIA was contacted by me at 6/7/2023 11:21 AM and  verbalized understanding of the critical finding.    Lactic acid whole blood   Result Value Ref Range    Lactic Acid 13.1 (HH) 0.7 - 2.0 mmol/L       Medications   lactated ringers BOLUS 1,000 mL (0 mLs Intravenous Stopped 6/7/23 0950)     Followed by   lactated ringers infusion (0 mL/hr Intravenous Stopped 6/7/23 1131)   lactated ringers BOLUS 1,000 mL (0 mLs Intravenous Stopped 6/7/23 1030)     Followed by   lactated ringers BOLUS 1,000 mL (0 mLs Intravenous Stopped 6/7/23 1051)     Followed by   lactated ringers infusion (0 mLs Intravenous Stopped 6/7/23 1131)   piperacillin-tazobactam (ZOSYN) intermittent infusion 2.25 g  (0 g Intravenous Stopped 6/7/23 1020)   vancomycin (VANCOCIN) 750 mg in 0.9% NaCl 250 mL intermittent infusion (0 mg Intravenous Hold 6/7/23 1131)   HYDROmorphone (PF) (DILAUDID) injection 0.3-0.5 mg (has no administration in time range)   fentaNYL (PF) (SUBLIMAZE) injection 100 mcg (100 mcg Nasal $Given 6/7/23 0844)   iopamidol (ISOVUE-370) solution 100 mL (100 mLs Intravenous $Given 6/7/23 1046)   sodium chloride 0.9 % bag 500mL for CT scan flush use (72 mLs As instructed $Given 6/7/23 1047)   fentaNYL (PF) (SUBLIMAZE) injection 100 mcg (100 mcg Intravenous $Given 6/7/23 1035)       Assessments & Plan (with Medical Decision Making)  Patient evaluated in room 1 upon arrival, hypotensive diaphoretic mottled pale encephalopathic moaning.  Anterior abdominal wall wound with question area of necrosis right mid abdomen see pictures above.  Right internal jugular vein central venous catheter placed without acute complication.  Fluid resuscitation in progress.  Zosyn vancomycin.  Intermittent hypotension, continue to monitor during fluid resuscitation.  CT chest abdomen pelvis to evaluate for source of sepsis.  Black stool pending guaiac.  Anticipate ICU admission.  DNR/DNI per chart review.  CT chest abdomen pelvis as above free air within bowel containing hernia sac compatible with bowel perforation, heterogeneously enhancing bilateral kidneys consistent with hypoperfusion.  I discussed the patient's critical condition with she and her family, she does not wish to pursue further treatment and wishes to have comfort care.  She understands she is acutely dying.  Her family including son Augustus, his spouse and son Sha expressed understanding and agreement.  IV fluid is discontinued, antibiotics discontinued.  Hydromorphone for pain and agitation.  Admit to hospital service for comfort cares.  Viewed with Dr. Correa who accepts for inpatient service     I have reviewed the nursing notes.    I have reviewed the findings,  diagnosis, plan and need for follow up with the patient.       Critical Care Addendum  My initial assessment, based on my review of prehospital provider report, review of nursing observations, review of vital signs, focused history, physical exam and review of cardiac rhythm monitor, established a high suspicion that Carolina Mari has sepsis with indication for early goal-directed therapy and peritonitis, which requires immediate intervention, and therefore she is critically ill.     After the initial assessment, the care team initiated multiple lab tests, initiated IV fluid administration, initiated medication therapy with Piperacillin/tazobactam and vancomycin and achieved central venous access to provide stabilization care. Due to the critical nature of this patient, I reassessed nursing observations, vital signs, physical exam, review of cardiac rhythm monitor, 12 lead ECG analysis, mental status, neurologic status and respiratory status multiple times prior to her disposition.     Time also spent performing documentation, discussion with family to obtain medical information for decision making, reviewing test results and coordination of care.     Critical care time (excluding teaching time and procedures): 60 minutes.           New Prescriptions    No medications on file       Final diagnoses:   Septic shock (H)   Perforation of intestine (H)   NEELIMA (acute kidney injury) (H)       6/7/2023   [facility] EMERGENCY DEPT     David Jones MD  06/07/23 8018

## 2023-06-07 NOTE — PROGRESS NOTES
"SPIRITUAL HEALTH SERVICES Progress Note  [facility]     Saw pt Carolina Mari per family request.    Patient/Family Understanding of Illness and Goals of Care - Son, Augustus, shared about his mom's declining health and how she was brought to the ER from [place], where she has been living.  He also spoke of her verbalizing that she \"didn't want to do this any more.\"  He stated that \"she is more than ready to go.\"    Distress and Loss - Augustus commented that she is the last of her family, her parents and brothers and sisters, are all .    Strengths, Coping, and Resources - NA    Meaning, Beliefs, and Spirituality - Daughter in law Elizabeth stated that she was raised and confirmed Christian.  I provided a prayer for this time of passing for Rebecca and for Augustus and Elizabeth.  EMMIE Buitrago arrived as we were concluding this time.  I passed on Rebecca's care to her as she explained more from her side of the care.  I provided a hug for Augustus and a blessing for them.    Plan of Care -  will continue to be available for on-going support if requested.    Bob Fuentes MA, [facility]  Staff  - Spiritual Health Services  Pager: [phone]  Office: [phone]    "

## 2023-06-07 NOTE — ED NOTES
[facility]   Admission Handoff    The patient is Carolina Mari, 68 year old who arrived in the ED by AMBULANCE from [facility] with a complaint of Hypotension and Altered Mental Status  . The patient's current symptoms are an exacerbation of a chronic illness and during this time the symptoms have increased. In the ED, patient was diagnosed with   Final diagnoses:   Septic shock (H)   Perforation of intestine (H)   NEELIMA (acute kidney injury) (H)         Needed?: No    Allergies:    Allergies   Allergen Reactions    Aspirin Other (See Comments)     History of ulcers      Colchicine Angioedema and Swelling     And gout flare ups    Colchicine Angioedema and Swelling     And gout flare ups       Past Medical Hx:   Past Medical History:   Diagnosis Date    History of stomach ulcers     HTN (hypertension)     Knee osteoarthritis     right    Osteoarthritis of right hip     Osteoporosis     Osteoporosis     Thyroid disease        Initial vitals were: BP: (!) 111/93  Pulse: 106  Temp: 97.6  F (36.4  C)  Resp: 20  Weight: 119.7 kg (264 lb)  SpO2: (!) 81 %   Recent vital Signs: BP (!) 56/40   Pulse 57   Temp (!) 96.6  F (35.9  C) (Rectal)   Resp 19   Wt 119.7 kg (264 lb)   SpO2 (!) 81%   BMI 38.99 kg/m      Elimination Status: Continent: indwelling catheter and laying on chux     Activity Level: Total assist/lift    Fall Status: Reason for falls risk:  Mobility, Reason for falls risk: Elimination, Reason for falls risk: Cognition, and Reason for falls risk: High Risk Medications  patient and family education and activity supervised    Baseline Mental status: other altered due to illness  Current Mental Status changes: lethargic    Infection present or suspected this encounter: yes other sepsis  Sepsis suspected: Yes    Isolation type: none    Bariatric equipment needed?: Yes    In the ED these meds were given:   Medications   lactated ringers BOLUS 1,000 mL (0  mLs Intravenous Stopped 6/7/23 0950)     Followed by   lactated ringers infusion (0 mL/hr Intravenous Stopped 6/7/23 1131)   lactated ringers BOLUS 1,000 mL (0 mLs Intravenous Stopped 6/7/23 1030)     Followed by   lactated ringers BOLUS 1,000 mL (0 mLs Intravenous Stopped 6/7/23 1051)     Followed by   lactated ringers infusion (0 mLs Intravenous Stopped 6/7/23 1131)   piperacillin-tazobactam (ZOSYN) intermittent infusion 2.25 g (0 g Intravenous Stopped 6/7/23 1020)   vancomycin (VANCOCIN) 750 mg in 0.9% NaCl 250 mL intermittent infusion (0 mg Intravenous Hold 6/7/23 1131)   HYDROmorphone (PF) (DILAUDID) injection 0.3-0.5 mg (0.3 mg Intravenous $Given 6/7/23 1143)   naloxone (NARCAN) injection 0.1 mg (has no administration in time range)   naloxone (NARCAN) injection 0.2 mg (has no administration in time range)   HYDROmorphone (STANDARD CONC) (DILAUDID) oral solution 1 mg (has no administration in time range)     Or   HYDROmorphone (DILAUDID) half-tab 1 mg (has no administration in time range)   HYDROmorphone (STANDARD CONC) (DILAUDID) oral solution 2 mg (has no administration in time range)     Or   HYDROmorphone (DILAUDID) tablet 2 mg (has no administration in time range)   fentaNYL (PF) (SUBLIMAZE) injection 100 mcg (100 mcg Nasal $Given 6/7/23 3487)   iopamidol (ISOVUE-370) solution 100 mL (100 mLs Intravenous $Given 6/7/23 1046)   sodium chloride 0.9 % bag 500mL for CT scan flush use (72 mLs As instructed $Given 6/7/23 1047)   fentaNYL (PF) (SUBLIMAZE) injection 100 mcg (100 mcg Intravenous $Given 6/7/23 1035)       Drips running?  No    Home pump  No    Current LDAs: CVC right internal jugular  none     Results:   Labs/Imaging  Ordered and Resulted from Time of ED Arrival Up to the Time of Departure from the ED  Results for orders placed or performed during the hospital encounter of 06/07/23 (from the past 24 hour(s))   POC US ABDOMEN LIMITED    Impression    [facility] Procedure Note      Limited  Bedside ED Ultrasound for Central Vein Cannulation:    PROCEDURE: PERFORMED BY: Dr. David Jones MD, MD  INDICATIONS/SYMPTOM:  Hypotension and IV Access Needed for Multiple Medications  PROBE: High frequency linear probe  BODY LOCATION: The ultrasound was performed on the right internal jugular vein.  FINDINGS: Artery and vein visualized.  Vein completely compressible (ie collapsible) and no thrombus visualized.  INTERPRETATION: Patent vein confirmed with US.  Central line inserted into vein utilizing real-time ultrasonic guidance.   IMAGE DOCUMENTATION: Images were archived to PACs system.     CBC with platelets differential    Narrative    The following orders were created for panel order CBC with platelets differential.  Procedure                               Abnormality         Status                     ---------                               -----------         ------                     CBC with platelets and d...[[id number]]  Abnormal            Final result                 Please view results for these tests on the individual orders.   Comprehensive metabolic panel   Result Value Ref Range    Sodium 134 (L) 136 - 145 mmol/L    Potassium 6.7 (HH) 3.4 - 5.3 mmol/L    Chloride 94 (L) 98 - 107 mmol/L    Carbon Dioxide (CO2) 6 (LL) 22 - 29 mmol/L    Anion Gap 34 (H) 7 - 15 mmol/L    Urea Nitrogen 86.4 (H) 8.0 - 23.0 mg/dL    Creatinine 3.63 (H) 0.51 - 0.95 mg/dL    Calcium 9.8 8.8 - 10.2 mg/dL    Glucose 231 (H) 70 - 99 mg/dL    Alkaline Phosphatase 189 (H) 35 - 104 U/L    AST 49 (H) 10 - 35 U/L    ALT 17 10 - 35 U/L    Protein Total 6.9 6.4 - 8.3 g/dL    Albumin 1.9 (L) 3.5 - 5.2 g/dL    Bilirubin Total 0.5 <=1.2 mg/dL    GFR Estimate 13 (L) >60 mL/min/1.73m2   Lactic acid whole blood   Result Value Ref Range    Lactic Acid 14.0 (HH) 0.7 - 2.0 mmol/L   Troponin T, High Sensitivity   Result Value Ref Range    Troponin T, High Sensitivity 47 (H) <=14 ng/L   Magnesium   Result Value Ref Range    Magnesium 2.4  (H) 1.7 - 2.3 mg/dL   CBC with platelets and differential   Result Value Ref Range    WBC Count 24.3 (H) 4.0 - 11.0 10e3/uL    RBC Count 3.71 (L) 3.80 - 5.20 10e6/uL    Hemoglobin 10.1 (L) 11.7 - 15.7 g/dL    Hematocrit 37.1 35.0 - 47.0 %     78 - 100 fL    MCH 27.2 26.5 - 33.0 pg    MCHC 27.2 (L) 31.5 - 36.5 g/dL    RDW 16.0 (H) 10.0 - 15.0 %    Platelet Count 844 (H) 150 - 450 10e3/uL    % Neutrophils 84 %    % Lymphocytes 9 %    % Monocytes 3 %    % Eosinophils 0 %    % Basophils 0 %    % Immature Granulocytes 4 %    NRBCs per 100 WBC 0 <1 /100    Absolute Neutrophils 20.3 (H) 1.6 - 8.3 10e3/uL    Absolute Lymphocytes 2.1 0.8 - 5.3 10e3/uL    Absolute Monocytes 0.7 0.0 - 1.3 10e3/uL    Absolute Eosinophils 0.0 0.0 - 0.7 10e3/uL    Absolute Basophils 0.1 0.0 - 0.2 10e3/uL    Absolute Immature Granulocytes 1.1 (H) <=0.4 10e3/uL    Absolute NRBCs 0.1 10e3/uL   [place] Draw    Narrative    The following orders were created for panel order [place] Draw.  Procedure                               Abnormality         Status                     ---------                               -----------         ------                     Extra Blue Top Tube[[id number]]                              Final result               Extra Red Top Tube[[id number]]                               Final result               Extra Green Top (Lithium...[[id number]]                      Final result               Extra Purple Top Tube[[id number]]                            Final result               Extra Green Top (Lithium...[[id number]]                      Final result                 Please view results for these tests on the individual orders.   Extra Blue Top Tube   Result Value Ref Range    Hold Specimen JIC    Extra Red Top Tube   Result Value Ref Range    Hold Specimen JIC    Extra Green Top (Lithium Heparin) Tube   Result Value Ref Range    Hold Specimen JIC    Extra Purple Top Tube   Result Value Ref Range    Hold Specimen JIC     Extra Green Top (Lithium Heparin) ON ICE   Result Value Ref Range    Hold Specimen JIC    [place] Draw    Narrative    The following orders were created for panel order [place] Draw.  Procedure                               Abnormality         Status                     ---------                               -----------         ------                     Extra Heparinized Syringe[[id number]]                        Final result                 Please view results for these tests on the individual orders.   Extra Heparinized Syringe   Result Value Ref Range    Hold Specimen JIC    Occult blood stool   Result Value Ref Range    Occult Blood Negative Negative   -Central Line    Narrative    David Jones MD     6/7/2023 11:41 AM  [facility]    -Central Line    Date/Time: 6/7/2023 9:33 AM    Performed by: David Jones MD  Authorized by: David Jones MD    Emergent condition/consent implied      PRE-PROCEDURE DETAILS:     Skin preparation:  2% chlorhexidine  PROCEDURE DETAILS:     Location:  R internal jugular    Patient position:  Trendelenburg    Procedural supplies:  Triple lumen    Landmarks identified: yes      Ultrasound guidance: yes      Number of attempts:  1    Successful placement: yes      POST PROCEDURE DETAILS:      Post-procedure:  Dressing applied   UA with Microscopic reflex to Culture    Specimen: Urine, Rodriguez Catheter   Result Value Ref Range    Color Urine Yellow Colorless, Straw, Light Yellow, Yellow    Appearance Urine Cloudy (A) Clear    Glucose Urine Negative Negative mg/dL    Bilirubin Urine Negative Negative    Ketones Urine Negative Negative mg/dL    Specific Gravity Urine 1.014 1.003 - 1.035    Blood Urine Negative Negative    pH Urine 5.0 5.0 - 7.0    Protein Albumin Urine 30 (A) Negative mg/dL    Urobilinogen Urine Normal Normal, 2.0 mg/dL    Nitrite Urine Negative Negative    Leukocyte Esterase Urine Moderate (A) Negative    Bacteria Urine Many (A)  None Seen /HPF    WBC Clumps Urine Present (A) None Seen /HPF    Mucus Urine Present (A) None Seen /LPF    RBC Urine 73 (H) <=2 /HPF    WBC Urine >182 (H) <=5 /HPF    Narrative    Urine Culture ordered based on laboratory criteria   CT Chest/Abdomen/Pelvis w Contrast   Result Value Ref Range    Radiologist flags Unexplained pneumoperitoneum (AA)     Narrative    CT CHEST/ABDOMEN/PELVIS WITH CONTRAST June 7, 2023 11:05 AM    CLINICAL HISTORY: Sepsis.    TECHNIQUE: CT scan of the chest, abdomen, and pelvis was performed  following injection of IV contrast. Multiplanar reformats were  obtained. Dose reduction techniques were used.   CONTRAST: 100mL Isovue-370.    COMPARISON: January 4, 2022.    FINDINGS:   LUNGS AND PLEURA: Minimal scattered atelectasis and/or fibrosis. Mild  bronchial wall thickening and bronchiectasis. No effusions.    MEDIASTINUM/AXILLAE: No lymphadenopathy. No thoracic aortic aneurysms.    CORONARY ARTERY CALCIFICATIONS: Mild.    HEPATOBILIARY: Diffuse hepatic steatosis. No significant mass. No bile  duct dilatation. No calcified gallstones.    PANCREAS: No significant mass, duct dilatation, or inflammatory  change.    SPLEEN: Normal size.    ADRENAL GLANDS: Stable left adrenal nodule. Right adrenal gland  unremarkable.    KIDNEYS/BLADDER: No significant mass, stones, or hydronephrosis. There  are simple or benign cysts. No follow up is needed. Mildly  heterogeneously enhancing kidneys bilaterally of uncertain etiology.    BOWEL: There is a bowel containing hernia that contains free air  concerning for bowel perforation. A few mildly dilated small bowel  loops are evident.    PELVIC ORGANS: No pelvic masses.    ADDITIONAL FINDINGS: No definite abscess. Trace free fluid.    MUSCULOSKELETAL: Erosive changes of both femoral heads. Spine  degenerative changes.      Impression    IMPRESSION:  1.  Free air within bowel containing hernia sac compatible with bowel  perforation.  2.  Heterogeneously  enhancing kidneys bilaterally, may be related to  perfusion versus pyelonephritis, clinical correlation needed.    [Critical Result: Unexplained pneumoperitoneum]    Finding was identified on 6/7/2023 11:16 AM.     Dr. NEIL TAPIA was contacted by me at 6/7/2023 11:21 AM and  verbalized understanding of the critical finding.    Potassium   Result Value Ref Range    Potassium 5.9 (H) 3.4 - 5.3 mmol/L   Lactic acid whole blood   Result Value Ref Range    Lactic Acid 13.1 (HH) 0.7 - 2.0 mmol/L       For the majority of the shift this patient's behavior was Green     Cardiac Rhythm: N/A  Pt needs tele? No  Skin/wound Issues:  pt has multiple wounds; coccyx, abdomen, buttocks, see chart for additional details.     Code Status: DNR / DNI    Pain control: fair    Nausea control: pt had none    Abnormal labs/tests/findings requiring intervention: see chart    Patient tested for COVID 19 prior to admission: NO     OBS brochure/video discussed/provided to patient/family: Yes     Family present during ED course? Yes     Family Comments/Social Situation comments: see chart    Tasks needing completion:  see chart    Brynn Rodriguez, RN

## 2023-06-07 NOTE — PROGRESS NOTES
Family called RN into room at 1840. Patient appears to stop breathing, no noted chest rise. Heart auscultated for 1 minute. MD advised of findings at 1844

## 2023-06-07 NOTE — ED TRIAGE NOTES
Pt arrives from [place] via [facility] with concerns of altered mental status and hypotension.      Triage Assessment     Row Name 06/07/23 8186       Triage Assessment (Adult)    Airway WDL WDL       Respiratory WDL    Respiratory WDL WDL       Skin Circulation/Temperature WDL    Skin Circulation/Temperature WDL all  large wound on abdomen       Cardiac WDL    Cardiac WDL all       Chest Pain Assessment    Chest Pain Intervention cardiac monitor placed       Peripheral/Neurovascular WDL    Peripheral Neurovascular WDL WDL       Cognitive/Neuro/Behavioral WDL    Cognitive/Neuro/Behavioral WDL mood/behavior

## 2023-06-07 NOTE — ED NOTES
DATE/TIME OF CALL RECEIVED FROM LAB:  06/07/23 at 9:09 AM   LAB TEST:  lactic  LAB VALUE:  14.0  PROVIDER NOTIFIED?: Yes  PROVIDER NAME: maxi Jones  DATE/TIME LAB VALUE REPORTED TO PROVIDER: 0909  MECHANISM OF PROVIDER NOTIFICATION: Face-To-Face  PROVIDER RESPONSE: no new orders at this time

## 2023-06-07 NOTE — PHARMACY-VANCOMYCIN DOSING SERVICE
"Pharmacy Vancomycin Initial Note  Date of Service 2023  Patient's  1955  68 year old, female    Indication: Sepsis    Current estimated CrCl = Estimated Creatinine Clearance: 20.2 mL/min (A) (based on SCr of 3.63 mg/dL (H)).    Creatinine for last 3 days  2023:  9:00 AM Creatinine 3.63 mg/dL    Recent Vancomycin Level(s) for last 3 days  No results found for requested labs within last 3 days.      Vancomycin IV Administrations (past 72 hours)      No vancomycin orders with administrations in past 72 hours.                Nephrotoxins and other renal medications (From now, onward)    Start     Dose/Rate Route Frequency Ordered Stop    23 1035  vancomycin (VANCOCIN) 750 mg in 0.9% NaCl 250 mL intermittent infusion         750 mg  over 60 Minutes Intravenous EVERY 24 HOURS 23 1032      23 0948  piperacillin-tazobactam (ZOSYN) intermittent infusion 2.25 g        Note to Pharmacy: For [facility], [facility] and [facility]: For Zosyn-naive patients, use the \"Zosyn initial dose + extended infusion\" order panel.    2.25 g  100 mL/hr over 30 Minutes Intravenous EVERY 6 HOURS 23 0947            Contrast Orders - past 72 hours (72h ago, onward)    Start     Dose/Rate Route Frequency Stop    23 0925  iopamidol (ISOVUE-370) solution 100 mL         100 mL Intravenous ONCE 23 1046          [employer] Prediction of Planned Initial Vancomycin Regimen  Loading dose: N/A  Regimen: 750 mg IV every 24 hours.  Start time: 10:56 on 2023  Exposure target: AUC24 (range)400-600 mg/L.hr   AUC24,ss: 502 mg/L.hr  Probability of AUC24 > 400: 75 %  Ctrough,ss: 18.3 mg/L  Probability of Ctrough,ss > 20: 40 %  Probability of nephrotoxicity (Lodise INDERJIT ): 15 %          Plan:  1. Start vancomycin  750 mg IV q24h. Note the patient's CrCl was significantly higher than baseline due to possible sepsis.  Anticipate having to make an emperic change in dose as CrCl improves.   2. Vancomycin monitoring method: " AUC  3. Vancomycin therapeutic monitoring goal: 400-600 mg*h/L  4. Pharmacy will check vancomycin levels as appropriate in 3-5 Days.    5. Serum creatinine levels will be ordered daily for the first week of therapy and at least twice weekly for subsequent weeks.      Reji Galo, [facility]

## 2023-06-08 NOTE — PROGRESS NOTES
home arrived at approximately 0045. Dhara Shultz staff member left with patient at approximately 0050. All belonging had been taken by family member earlier in the evening.

## 2023-06-08 NOTE — DISCHARGE SUMMARY
[facility]    Death Summary - Hospitalist Service     Date of Admission:  6/7/2023  Date of Death: 6/7/23   Discharging Provider: Gianfranco Acosta MD    Discharge Diagnoses   Principal Problem:    Septic shock (H)  Active Problems:    Unspecified open wound of abdominal wall, unspecified quadrant without penetration into peritoneal cavity, subsequent encounter    Obesity    Diabetes mellitus (H)    Tobacco user    Depression    Adult failure to thrive syndrome    Hypothyroidism    NEELIMA (acute kidney injury) (H)    Generalized anxiety disorder    Chronic pain    Chronic ulcer of skin (H)    Gastro-esophageal reflux disease without esophagitis    Hypertension, benign essential, goal below 140/90    Acute kidney failure, unspecified (H)    Perforation of intestine (H)    Neurogenic bladder        Cause of death: Septic shock    Hospital Course   Carolina Mari is a 68 year old female admitted on 6/7/2023. She presented to the emergency department for evaluation of increased abdominal pain and lethargy / altered mental status and was found to be in septic shock, acute kidney injury, and hyperkalemia with an extensive bowel perforation and has elected to pursue comfort cares.  She passed away later the day of admission      Gianfranco Acosta MD  [facility]  ______________________________________________________________________      Significant Results and Procedures   No procedures were performed    Consultations This Hospital Stay   PHARMACY TO [facility] IP CONSULT    Primary Care Physician   Le Romo    Time Spent on this Encounter   I, Gianfranco Acosta MD, personally saw the patient today and spent less than or equal to 30 minutes discharging this patient.

## 2023-06-08 NOTE — DEATH PRONOUNCEMENT
MD DEATH PRONOUNCEMENT    Called to pronounce Carolina Mari dead.    Physical Exam: Unresponsive to noxious stimuli, Spontaneous respirations absent, Breath sounds absent, Carotid pulse absent, Heart sounds absent, Pupillary light reflex absent and Corneal blink reflex absent    Patient was pronounced dead at 6:44 PM CDT, 2023.    Preliminary Cause of Death: Septic shock (H)       Principal Problem:    Septic shock (H)  Active Problems:    Unspecified open wound of abdominal wall, unspecified quadrant without penetration into peritoneal cavity, subsequent encounter    Obesity    Diabetes mellitus (H)    Tobacco user    Depression    Adult failure to thrive syndrome    Hypothyroidism    NEELIMA (acute kidney injury) (H)    Generalized anxiety disorder    Chronic pain    Chronic ulcer of skin (H)    Gastro-esophageal reflux disease without esophagitis    Hypertension, benign essential, goal below 140/90    Acute kidney failure, unspecified (H)    Perforation of intestine (H)    Neurogenic bladder       Infectious disease present?: YES    Communicable disease present? (examples: HIV, chicken pox, TB, Ebola, CJD) :  NO    Multi-drug resistant organism present? (example: MRSA): NO    Please consider an autopsy if any of the following exist:  NO Unexpected or unexplained death during or following any dental, medical, or surgical diagnostic treatment procedures.   NO Death of mother at or up to seven days after delivery.     NO All  and pediatric deaths.     NO Death where the cause is sufficiently obscure to delay completion of the death certificate.   NO Deaths in which autopsy would confirm a suspected illness/condition that would affect surviving family members or recipients of transplanted organs.     The following deaths must be reported to the 's Office:  NO A death that may be due entirely or in part to any factors other than natural disease (recent surgery, recent trauma, suspected  abuse/neglect).   NO A death that may be an accident, suicide, or homicide.     NO Any sudden, unexpected death in which there is no prior history of significant heart disease or any other condition associated with sudden death.   NO A death under suspicious, unusual, or unexpected circumstances.    NO Any death which is apparently due to natural causes but in which the  does not have a personal physician familiar with the patient s medical history, social, or environmental situation or the circumstances of the terminal event.   NO Any death apparently due to Sudden Infant Death Syndrome.     NO Deaths that occur during, in association with, or as consequences of a diagnostic, therapeutic, or anesthetic procedure.   NO Any death in which a fracture of a major bone has occurred within the past (6) six months.   NO A death of persons note seen by their physician within 120 days of demise.     NO Any death in which the  was an inmate of a public institution or was in the custody of Law Enforcement personnel.   NO  All unexpected deaths of children   NO Solid organ donors   NO Unidentified bodies   YES Deaths of persons whose bodies are to be cremated or otherwise disposed of so that the bodies will later be unavailable for examination;   NO Deaths unattended by a physician outside of a licensed healthcare facility or licensed residential hospice program   YES Deaths occurring within 24 hours of arrival to a health care facility if death is unexpected.    NO Deaths associated with the decedent s employment.   NO Deaths attributed to acts of terrorism.   NO Any death in which there is uncertainty as to whether it is a medical examiner s care should be discussed with the medical investigator.        Body disposition: Case referred to the .

## 2023-06-08 NOTE — PROGRESS NOTES
Writer received call back from [employer], pt is not eligible for donation. Writer given okay from [employer] to release the body to the  home.

## 2023-06-10 LAB
BACTERIA UR CULT: ABNORMAL
BACTERIA UR CULT: ABNORMAL

## 2023-06-12 LAB
BACTERIA BLD CULT: NO GROWTH
BACTERIA BLD CULT: NO GROWTH

## 2023-06-14 NOTE — PROGRESS NOTES
I was contacted with a coding query regarding presence of encephalopathy at time of admission, questioning whether it should be added to H&P.  The H&P note will not allow an addendum, so documentation is provided in this progress note to address the coding query.    Patient was encephalopathic at time of admission, likely multifactorial due to active dying process and septic shock - toxic metabolic encephalopathy. No specific interventions were taken due to patient's comfort cares status.    Minna Rodriguez PA-C on 6/14/2023 at 2:37 AM   [facility]

## 2025-04-07 NOTE — LETTER
2/7/2022        RE: Carolina Mari  [address] [facility]  [address]  [facility] [address]        [facility] [place] [facility]  Episodic/Acute/Follow-Up  [place] MRN: [id number]. Place of Service where encounter took place:  [facility] ([facility]) [[id number]]   Chief Complaint   Patient presents with     RECHECK    HPI: Carolina Mari  is a 67 year old (1955), who is being seen today for an episodic care visit.  HPI information obtained from: facility chart records, facility staff, patient report and [facility] chart review. Today's concern is:    Rebecca seen today on routine follow-up, after her Keflex was extended last week for lower extremity cellulitis.  Today, she states that her diarrhea is ongoing and has not changed despite the addition of FiberCon.  She denies any headaches, dizziness, fever, chills.  She denies any shortness of breath, and is sleeping a little bit better than last week.  Her leg pain is still there, but is improving.  Per occupational therapy, edema is down by several centimeters from last week.  Drainage from bilateral lower extremities is also lessened than prior.    Past Medical and Surgical History reviewed in Epic today.  MEDICATIONS:  Current Outpatient Medications   Medication Sig Dispense Refill     Ascorbic Acid (VITAMIN C) 500 MG CAPS Take 500 mg by mouth daily       calcium polycarbophil (FIBERCON) 625 MG tablet Take 2 tablets by mouth 2 times daily       ferrous sulfate (FEROSUL) 325 (65 Fe) MG tablet Take 325 mg by mouth daily (with breakfast)       acetaminophen (TYLENOL) 500 MG tablet Take 1,000 mg by mouth 3 times daily        amLODIPine (NORVASC) 5 MG tablet Take 5 mg by mouth At Bedtime       cyanocobalamin (CYANOCOBALAMIN) 1000 MCG/ML injection Inject 1 mL (1,000 mcg) into the muscle every 30 days       diclofenac (VOLTAREN) 1 % topical gel Apply 2 g topically 4 times daily To affected area       famotidine (PEPCID) 20 MG tablet  "Take 20 mg by mouth daily       FLUoxetine HCl 60 MG TABS Take 1 tablet by mouth every morning        furosemide (LASIX) 20 MG tablet Take 20 mg by mouth daily       gabapentin (NEURONTIN) 600 MG tablet Take 1 tablet by mouth 3 times daily       levothyroxine (SYNTHROID/LEVOTHROID) 200 MCG tablet Take 1 tablet by mouth daily       lidocaine patch in PLACE Place 1 patch onto the skin every morning Remove at HS       loperamide (IMODIUM) 2 MG capsule Take 1 capsule (2 mg) by mouth 4 times daily as needed for diarrhea       losartan (COZAAR) 100 MG tablet Take 100 mg by mouth daily       metoprolol tartrate (LOPRESSOR) 100 MG tablet Take 75 mg by mouth 2 times daily       MICRO GUARD 2 % external powder USE AS DIRECTED PER WOUND CARE ORDERS       mineral oil-hydrophilic petrolatum (AQUAPHOR) external ointment USE AS DIRECTED PER WOUND CARE ORDERS       NARCAN 4 MG/0.1ML nasal spray CALL 911. SPR CONTENTS OF ONE SPRAYER (0.1ML) INTO ONE NOSTRIL. REPEAT IN 2-3 MIN IF SYMPTOMS OF OPIOID EMERGENCY PERSIST, ALTERNATE NOSTRILS       Nystatin (NYAMYC) 260269 UNIT/GM POWD Apply topically 2 times daily as needed        oxyCODONE (OXYCONTIN) 20 MG 12 hr tablet Take 1 tablet (20 mg) by mouth every 12 hours 20 tablet 0     oxyCODONE (ROXICODONE) 5 MG tablet Take 1 tablet (5 mg) by mouth every 4 hours as needed for pain 30 tablet 0     simvastatin (ZOCOR) 20 MG tablet Take 20 mg by mouth At Bedtime        REVIEW OF SYSTEMS: Limited secondary to cognitive impairment but today pt reports the above and 8 point ROS including Respiratory, CV, GI and , other than that noted in the HPI, is negative.    Objective: BP (!) 158/83   Pulse 64   Temp 97.5  F (36.4  C)   Resp 16   Ht 1.753 m (5' 9\")   Wt 120.7 kg (266 lb)   SpO2 94%   BMI 39.28 kg/m    GENERAL APPEARANCE: Alert, in no distress, cooperative.   ENT: Mouth/posterior oropharynx intact w/ moist mucous membranes, hearing acuity [place].  EYES: EOM, conjunctivae, lids, pupils and " irises normal, PERRL2.   RESP: Respiratory effort good, no respiratory distress, Lung sounds clear. On RA.   CV: Auscultation of heart reveals S1, S2, rate and rhythm regular, no murmur, no rub or gallop, Edema 2+ BLE. Peripheral pulses are 2+.  ABDOMEN: Normal bowel sounds, soft, non-tender abdomen, and no masses palpated.  SKIN: Inspection/Palpation of skin and subcutaneous tissue baseline w/ fragility.   RLE pictured here:      LLE pictured here:      NEURO: CN II-XII at patient's baseline, sensation baseline PPS.  PSYCH: Insight, judgement, and memory are baseline, affect and mood are happy/engaged.    Labs: Labs done in facility are in EPIC. Please refer to them using [employer] Everywhere.    ASSESSMENT/PLAN:    ICD-10-CM Acute on chronic.   1. Chronic skin ulcer, limited to breakdown of skin (H)  L98.491 Unstable.  Cellulitis appears improved, but ongoing stasis dermatitis is present.  Advise lymphedema therapy to apply Triad paste on lower extremities to help with dryness and skin integrity.  We will continue with lymph wraps,   2. Stasis dermatitis of lower extremity due to chronic peripheral vascular hypertension  I87.329 Unstable. See above.    3. Diarrhea, unspecified type  R19.7 Unstable.  Noting previous hypothyroidism, and consideration that diarrhea could be contributable to TSH changes.  Also noting initial fiber dosing not effective, and will trial increase.   4. Yeast infection of the vagina  B37.3 Tenuous.  Rebecca does feel like she could use antifungal for yeast in her vaginal area.  She has itching, stinging, and this always happens after she has an antibiotic course.  Will start Monistat for short course.  Hopefully correction of diarrhea will also help maintain cleanliness in this area.   5. Anemia in other chronic diseases classified elsewhere  D63.8 Tenuous.  Noting ongoing anemia.  Will add FeSO4 and vitamin C to help with correction, and noting that ferrous sulfate may also help to  constipate.   6. Acquired hypothyroidism  E03.9 Tenuous.  TSH as discussed above.     Follow up w/in 1 week or as needed.    Orders:  1. Triad paste to buttocks TID PRN. Dx: wound care.  2. Dietitian consult x1. Dx: diarrhea/food log.  3. Increase FiberCon to 2 tabs PO BID. Dx: diarrhea.   4. Recheck TSH x1 on 2/10. Dx: hypothyroidism.  5. FeSO4 325mg PO QDay. Dx: Anemia.   6. Vitamin C 500mg PO Qday. Dx: Anemia.   7. Monistat 4% cream, apply vaginally BID x 4 days. Dx: yeast infection.  8. Advised OT for triad to BLE as well.    Electronically signed by:  RACHEL Alvarez CNP DNP        Sincerely,        RACHEL Martin CNP     Detail Level: Detailed General Sunscreen Counseling: I recommended a broad spectrum sunscreen with a SPF of 30 or higher.  I explained that SPF 30 sunscreens block approximately 97 percent of the sun's harmful rays.  Sunscreens should be applied at least 15 minutes prior to expected sun exposure and then every 2 hours after that as long as sun exposure continues. If swimming or exercising sunscreen should be reapplied every 45 minutes to an hour after getting wet or sweating.  One ounce, or the equivalent of a shot glass full of sunscreen, is adequate to protect the skin not covered by a bathing suit. I also recommended a lip balm with a sunscreen as well. Sun protective clothing can be used in lieu of sunscreen but must be worn the entire time you are exposed to the sun's rays.